# Patient Record
Sex: MALE | Race: BLACK OR AFRICAN AMERICAN | NOT HISPANIC OR LATINO | Employment: OTHER | URBAN - METROPOLITAN AREA
[De-identification: names, ages, dates, MRNs, and addresses within clinical notes are randomized per-mention and may not be internally consistent; named-entity substitution may affect disease eponyms.]

---

## 2017-01-10 ENCOUNTER — ANTICOAGULATION THERAPY VISIT (OUTPATIENT)
Dept: ANTICOAGULATION | Facility: CLINIC | Age: 72
End: 2017-01-10
Payer: COMMERCIAL

## 2017-01-10 DIAGNOSIS — Z79.01 LONG-TERM (CURRENT) USE OF ANTICOAGULANTS: Primary | ICD-10-CM

## 2017-01-10 LAB — INR POINT OF CARE: 2.6 (ref 0.86–1.14)

## 2017-01-10 PROCEDURE — 85610 PROTHROMBIN TIME: CPT | Mod: QW

## 2017-01-10 PROCEDURE — 36416 COLLJ CAPILLARY BLOOD SPEC: CPT

## 2017-01-10 PROCEDURE — 99207 ZZC NO CHARGE NURSE ONLY: CPT

## 2017-01-10 NOTE — PROGRESS NOTES
ANTICOAGULATION FOLLOW-UP CLINIC VISIT    Patient Name:  Billy Stock  Date:  1/10/2017  Contact Type:  Face to Face    SUBJECTIVE:     Patient Findings     Positives No Problem Findings           OBJECTIVE    INR PROTIME   Date Value Ref Range Status   01/10/2017 2.6* 0.86 - 1.14 Final       ASSESSMENT / PLAN  INR assessment THER    Recheck INR In: 6 WEEKS    INR Location Clinic      Anticoagulation Summary as of 1/10/2017     INR goal 2.0-3.0   Selected INR 2.6 (1/10/2017)   Maintenance plan 5 mg (5 mg x 1) every day   Full instructions 5 mg every day   Weekly total 35 mg   No change documented Samantha Galeas, RN   Plan last modified Samantha Galeas, RN (4/18/2016)   Next INR check 2/21/2017   Priority INR   Target end date     Indications   Long-term (current) use of anticoagulants [Z79.01] [Z79.01]  Atrial fibrillation (H) (Resolved) [I48.91]         Anticoagulation Episode Summary     INR check location     Preferred lab     Send INR reminders to [facility]    Comments       Anticoagulation Care Providers     Provider Role Specialty Phone number    Malcolm Schafer MD [facility] Internal Medicine [phone]            See the Encounter Report to view Anticoagulation Flowsheet and Dosing Calendar (Go to Encounters tab in chart review, and find the Anticoagulation Therapy Visit)        Samantha Galeas, RN

## 2017-01-10 NOTE — MR AVS SNAPSHOT
Billy Stock   1/10/2017 10:00 AM   Anticoagulation Therapy Visit    Description:  71 year old male   Provider:  [facility]   Department:  [facility]           INR as of 1/10/2017     Selected INR 2.6 (1/10/2017)      Anticoagulation Summary as of 1/10/2017     INR goal 2.0-3.0   Selected INR 2.6 (1/10/2017)   Full instructions 5 mg every day   Next INR check 2/21/2017    Indications   Long-term (current) use of anticoagulants [Z79.01] [Z79.01]  Atrial fibrillation (H) (Resolved) [I48.91]         Your next Anticoagulation Clinic appointment(s)     Feb 21, 2017 10:00 AM   Anticoagulation Visit with [facility]   [facility]    [address] [facility] [address]  [facility] [address] [phone]              Contact Numbers     [facility] Phone Numbers:  Anticoagulation Clinic Appointments : [phone]  Anticoagulation Nurse: [phone]         January 2017 Details    Sun Mon Tue Wed Thu Fri Sat     1               2               3               4               5               6               7                 8               9               10      5 mg   See details      11      5 mg         12      5 mg         13      5 mg         14      5 mg           15      5 mg         16      5 mg         17      5 mg         18      5 mg         19      5 mg         20      5 mg         21      5 mg           22      5 mg         23      5 mg         24      5 mg         25      5 mg         26      5 mg         27      5 mg         28      5 mg           29      5 mg         30      5 mg         31      5 mg              Date Details   01/10 This INR check               How to take your warfarin dose     To take:  5 mg Take 1 of the 5 mg tablets.           February 2017 Details    Sun Mon Tue Wed Thu Fri Sat        1      5 mg         2      5 mg         3      5 mg         4      5 mg           5      5 mg         6      5  mg         7      5 mg         8      5 mg         9      5 mg         10      5 mg         11      5 mg           12      5 mg         13      5 mg         14      5 mg         15      5 mg         16      5 mg         17      5 mg         18      5 mg           19      5 mg         20      5 mg         21            22               23               24               25                 26               27               28                    Date Details   No additional details    Date of next INR:  2/21/2017         How to take your warfarin dose     To take:  5 mg Take 1 of the 5 mg tablets.

## 2017-01-18 ENCOUNTER — OFFICE VISIT (OUTPATIENT)
Dept: INTERNAL MEDICINE | Facility: CLINIC | Age: 72
End: 2017-01-18
Payer: COMMERCIAL

## 2017-01-18 VITALS
HEART RATE: 69 BPM | WEIGHT: 242.9 LBS | SYSTOLIC BLOOD PRESSURE: 140 MMHG | TEMPERATURE: 98 F | BODY MASS INDEX: 32.19 KG/M2 | OXYGEN SATURATION: 99 % | HEIGHT: 73 IN | DIASTOLIC BLOOD PRESSURE: 80 MMHG

## 2017-01-18 DIAGNOSIS — Z23 NEED FOR VACCINATION WITH 13-POLYVALENT PNEUMOCOCCAL CONJUGATE VACCINE: ICD-10-CM

## 2017-01-18 DIAGNOSIS — I48.20 CHRONIC ATRIAL FIBRILLATION (H): ICD-10-CM

## 2017-01-18 DIAGNOSIS — D07.5 CARCINOMA IN SITU OF PROSTATE: ICD-10-CM

## 2017-01-18 DIAGNOSIS — Z00.00 ROUTINE GENERAL MEDICAL EXAMINATION AT A HEALTH CARE FACILITY: Primary | ICD-10-CM

## 2017-01-18 DIAGNOSIS — I10 HYPERTENSION GOAL BP (BLOOD PRESSURE) < 140/90: ICD-10-CM

## 2017-01-18 DIAGNOSIS — G47.33 OSA (OBSTRUCTIVE SLEEP APNEA): ICD-10-CM

## 2017-01-18 DIAGNOSIS — N28.1 KIDNEY CYST, ACQUIRED: ICD-10-CM

## 2017-01-18 LAB
ALBUMIN UR-MCNC: >=300 MG/DL
APPEARANCE UR: CLEAR
BILIRUB UR QL STRIP: NEGATIVE
COLOR UR AUTO: YELLOW
ERYTHROCYTE [DISTWIDTH] IN BLOOD BY AUTOMATED COUNT: 13.8 % (ref 10–15)
GLUCOSE UR STRIP-MCNC: NEGATIVE MG/DL
HCT VFR BLD AUTO: 39.6 % (ref 40–53)
HGB BLD-MCNC: 13.5 G/DL (ref 13.3–17.7)
HGB UR QL STRIP: ABNORMAL
KETONES UR STRIP-MCNC: NEGATIVE MG/DL
LEUKOCYTE ESTERASE UR QL STRIP: NEGATIVE
MCH RBC QN AUTO: 28.5 PG (ref 26.5–33)
MCHC RBC AUTO-ENTMCNC: 34.1 G/DL (ref 31.5–36.5)
MCV RBC AUTO: 84 FL (ref 78–100)
MUCOUS THREADS #/AREA URNS LPF: PRESENT /LPF
NITRATE UR QL: NEGATIVE
PH UR STRIP: 6 PH (ref 5–7)
PLATELET # BLD AUTO: 148 10E9/L (ref 150–450)
RBC # BLD AUTO: 4.73 10E12/L (ref 4.4–5.9)
RBC #/AREA URNS AUTO: ABNORMAL /HPF (ref 0–2)
SP GR UR STRIP: 1.02 (ref 1–1.03)
URN SPEC COLLECT METH UR: ABNORMAL
UROBILINOGEN UR STRIP-ACNC: 0.2 EU/DL (ref 0.2–1)
WBC # BLD AUTO: 5.2 10E9/L (ref 4–11)
WBC #/AREA URNS AUTO: ABNORMAL /HPF (ref 0–2)

## 2017-01-18 PROCEDURE — 36415 COLL VENOUS BLD VENIPUNCTURE: CPT | Performed by: INTERNAL MEDICINE

## 2017-01-18 PROCEDURE — G0009 ADMIN PNEUMOCOCCAL VACCINE: HCPCS | Performed by: INTERNAL MEDICINE

## 2017-01-18 PROCEDURE — 84443 ASSAY THYROID STIM HORMONE: CPT | Performed by: INTERNAL MEDICINE

## 2017-01-18 PROCEDURE — 90670 PCV13 VACCINE IM: CPT | Performed by: INTERNAL MEDICINE

## 2017-01-18 PROCEDURE — 81001 URINALYSIS AUTO W/SCOPE: CPT | Performed by: INTERNAL MEDICINE

## 2017-01-18 PROCEDURE — 99387 INIT PM E/M NEW PAT 65+ YRS: CPT | Performed by: INTERNAL MEDICINE

## 2017-01-18 PROCEDURE — 80061 LIPID PANEL: CPT | Performed by: INTERNAL MEDICINE

## 2017-01-18 PROCEDURE — 80053 COMPREHEN METABOLIC PANEL: CPT | Performed by: INTERNAL MEDICINE

## 2017-01-18 PROCEDURE — 85027 COMPLETE CBC AUTOMATED: CPT | Performed by: INTERNAL MEDICINE

## 2017-01-18 RX ORDER — WARFARIN SODIUM 5 MG/1
5 TABLET ORAL
COMMUNITY
Start: 2013-09-25 | End: 2017-09-25

## 2017-01-18 NOTE — NURSING NOTE
"Chief Complaint   Patient presents with     Medicare Visit     would like ears checked, and talk about flash backs of falling down the stairs, wants hep c screen fasting for labs, needs refills on medications        Initial /80 mmHg  Pulse 69  Temp(Src) 98  F (36.7  C) (Oral)  Ht 6' 1\" (1.854 m)  Wt 242 lb 14.4 oz (110.179 kg)  BMI 32.05 kg/m2  SpO2 99% Estimated body mass index is 32.05 kg/(m^2) as calculated from the following:    Height as of this encounter: 6' 1\" (1.854 m).    Weight as of this encounter: 242 lb 14.4 oz (110.179 kg).  BP completed using cuff size: large    "

## 2017-01-18 NOTE — MR AVS SNAPSHOT
After Visit Summary   1/18/2017    Billy Stock    MRN: [id number]           Patient Information     Date Of Birth          1945        Visit Information        Provider Department      1/18/2017 8:40 AM Malcolm Schafer MD [facility]        Today's Diagnoses     Routine general medical examination at a health care facility    -  1     Hypertension goal BP (blood pressure) < 140/90         SARAH (obstructive sleep apnea)         Chronic atrial fibrillation (H)         Kidney cyst, acquired         Carcinoma in situ of prostate           Care Instructions      Preventive Health Recommendations:       Male Ages 65 and over    Yearly exam:             See your health care provider every year in order to  o   Review health changes.   o   Discuss preventive care.    o   Review your medicines if your doctor has prescribed any.    Talk with your health care provider about whether you should have a test to screen for prostate cancer (PSA).    Every 3 years, have a diabetes test (fasting glucose). If you are at risk for diabetes, you should have this test more often.    Every 5 years, have a cholesterol test. Have this test more often if you are at risk for high cholesterol or heart disease.     Every 10 years, have a colonoscopy. Or, have a yearly FIT test (stool test). These exams will check for colon cancer.    Talk to with your health care provider about screening for Abdominal Aortic Aneurysm if you have a family history of AAA or have a history of smoking.  Shots:     Get a flu shot each year.     Get a tetanus shot every 10 years.     Talk to your doctor about your pneumonia vaccines. There are now two you should receive - Pneumovax (PPSV 23) and Prevnar (PCV 13).    Talk to your doctor about a shingles vaccine.     Talk to your doctor about the hepatitis B vaccine.  Nutrition:     Eat at least 5 servings of fruits and vegetables each day.     Eat whole-grain bread, whole-wheat  "pasta and brown rice instead of white grains and rice.     Talk to your doctor about Calcium and Vitamin D.   Lifestyle    Exercise for at least 150 minutes a week (30 minutes a day, 5 days a week). This will help you control your weight and prevent disease.     Limit alcohol to one drink per day.     No smoking.     Wear sunscreen to prevent skin cancer.     See your dentist every six months for an exam and cleaning.     See your eye doctor every 1 to 2 years to screen for conditions such as glaucoma, macular degeneration and cataracts.        Follow-ups after your visit        Your next 10 appointments already scheduled     Feb 21, 2017 10:00 AM   Anticoagulation Visit with [facility]   [facility] ([facility]    [address] [facility] [address]  [facility] [address] [phone]              Who to contact     If you have questions or need follow up information about today's clinic visit or your schedule please contact [facility] directly at [phone].  Normal or non-critical lab and imaging results will be communicated to you by MyChart, letter or phone within 4 business days after the clinic has received the results. If you do not hear from us within 7 days, please contact the clinic through MyChart or phone. If you have a critical or abnormal lab result, we will notify you by phone as soon as possible.  Submit refill requests through [employer] or call your pharmacy and they will forward the refill request to us. Please allow 3 business days for your refill to be completed.          Additional Information About Your Visit        Care EveryWhere ID     This is your Care EveryWhere ID. This could be used by other organizations to access your [facility] medical records  [id number]        Your Vitals Were     Pulse Temperature Height BMI (Body Mass Index) Pulse Oximetry       69 98  F (36.7  C) (Oral) 6' 1\" (1.854 m) 32.05 kg/m2 99%        Blood " Pressure from Last 3 Encounters:   01/18/17 140/80   11/21/16 132/78   10/31/16 114/80    Weight from Last 3 Encounters:   01/18/17 242 lb 14.4 oz (110.179 kg)   11/21/16 247 lb (112.038 kg)   10/31/16 236 lb (107.049 kg)              We Performed the Following     *UA reflex to Microscopic and Culture ([facility] (except [place] and [place])     CBC with platelets     Comprehensive metabolic panel     Lipid panel reflex to direct LDL     TSH with free T4 reflex        Primary Care Provider Office Phone # Fax #    Malcoml Schafer [phone]       [facility] [address] [facility] [address]        Thank you!     Thank you for choosing [facility]  for your care. Our goal is always to provide you with excellent care. Hearing back from our patients is one way we can continue to improve our services. Please take a few minutes to complete the written survey that you may receive in the mail after your visit with us. Thank you!             Your Updated Medication List - Protect others around you: Learn how to safely use, store and throw away your medicines at www.disposemymeds.org.          This list is accurate as of: 1/18/17  9:31 AM.  Always use your most recent med list.                   Brand Name Dispense Instructions for use    ALLEGRA 180 MG tablet   Generic drug:  fexofenadine      Take by mouth daily as needed       allopurinol 100 MG tablet    ZYLOPRIM    90 tablet    Take 1 tablet (100 mg) by mouth daily       amLODIPine 10 MG tablet    NORVASC    90 tablet    Take 1 tablet (10 mg) by mouth daily       fluticasone 50 MCG/ACT spray    FLONASE    1 Package    Spray 1-2 sprays into both nostrils daily       HYDROcodone-acetaminophen 5-325 MG per tablet    NORCO    20 tablet    Take 1 tablet by mouth every 6 hours as needed       lisinopril 10 MG tablet    PRINIVIL/ZESTRIL    180 tablet    Take 2 tablets (20 mg) by mouth daily        methocarbamol 750 MG tablet    ROBAXIN     Take by mouth 3 times daily as needed for muscle spasms       metoprolol 100 MG 24 hr tablet    TOPROL-XL    90 tablet    Take 1 tablet (100 mg) by mouth daily       * order for DME     1 Device    Equipment being ordered: CPAP-mask, hose and supplies       * order for DME     1 Device    Equipment being ordered: Digital Blood pressure Monitor       order for DME     1 each    Equipment being ordered: Walker () Treatment Diagnosis: impaired mobility       TYLENOL 325 MG tablet   Generic drug:  acetaminophen      Take 1-2 tablets by mouth every 6 hours as needed.       warfarin 5 MG tablet    COUMADIN     Take 5 mg by mouth       ZYRTEC ALLERGY PO      Take 10 mg by mouth daily as needed       * Notice:  This list has 2 medication(s) that are the same as other medications prescribed for you. Read the directions carefully, and ask your doctor or other care provider to review them with you.

## 2017-01-18 NOTE — PROGRESS NOTES
SUBJECTIVE:                                                            Billy Stock is a 71 year old male who presents for Preventive Visit.      Are you in the first 12 months of your Medicare Part B coverage?  No    Healthy Habits:    Do you get at least three servings of calcium containing foods daily (dairy, green leafy vegetables, etc.)? yes and no, taking calcium and/or vitamin D supplement: no    Amount of exercise or daily activities, outside of work: none     Problems taking medications regularly No    Medication side effects: No    Have you had an eye exam in the past two years? yes    Do you see a dentist twice per year? yes    Do you have sleep apnea, excessive snoring or daytime drowsiness?yes    COGNITIVE SCREEN  1) Repeat 3 items (Banana, Sunrise, Chair)    2) Clock draw: NORMAL  3) 3 item recall: Recalls 3 objects  Results: 3 items recalled: COGNITIVE IMPAIRMENT LESS LIKELY    Mini-CogTM Copyright S Gissel. Licensed by the author for use in [facility]; reprinted with permission ([email or web address]). All rights reserved.            PROBLEMS TO ADD ON...    Has history of atrial fibrillation. On anticoagulation with Coumadin and rate control medications. Asymptonatic - no chest pains , palpitations,  no side effects from medications.  Has h/o HTN. on medical treatment. BP has been controlled. No side effects from medications. No CP, HA, dizziness. good compliance with medications and low salt diet.  Has h/o recent fall. Had psoas hematoma and incidental finding of large left kidney cyst. No symptoms.   Has SARAH on CPAP. Controlled   Has h/o prostate cancer post surgery. No recurrence. Follows with urology     All Histories reviewed and updated in [facility] as appropriate.  Social History   Substance Use Topics     Smoking status: Never Smoker      Smokeless tobacco: Never Used     Alcohol Use: No       The patient does not drink >3 drinks per day nor >7 drinks per week.    Today's PHQ-2 Score:    PHQ-2 ( [facility]) 1/18/2017 1/12/2016   Q1: Little interest or pleasure in doing things 0 0   Q2: Feeling down, depressed or hopeless 1 0   PHQ-2 Score 1 0       Do you feel safe in your environment - Yes    Do you have a Health Care Directive?: No: Advance care planning was reviewed with patient; patient declined at this time.    Current providers sharing in care for this patient include:   Patient Care Team:  Malcolm Schafer MD as PCP - General (Internal Medicine)      Hearing impairment: No    Ability to successfully perform activities of daily living: Yes, no assistance needed     Fall risk:  Fallen 2 or more times in the past year?: No  Any fall with injury in the past year?: Yes    Home safety:  none identified      The following health maintenance items are reviewed in Epic and correct as of today:  Health Maintenance   Topic Date Due     HEPATITIS C SCREENING  10/28/1964     PNEUMOCOCCAL (1 of 2 - PCV13) 10/28/2011     AORTIC ANEURYSM SCREENING (SYSTEM ASSIGNED)  10/28/2011     FALL RISK ASSESSMENT  01/12/2017     OP ANNUAL INR REFERRAL  08/22/2017     INFLUENZA VACCINE (SYSTEM ASSIGNED)  09/01/2017     ADVANCE DIRECTIVE PLANNING Q5 YRS (NO INBASKET)  08/28/2018     COLONOSCOPY Q5 YR INBASKET MESSAGE  09/30/2019     LIPID SCREEN Q5 YR MALE (SYSTEM ASSIGNED)  10/04/2021     TETANUS IMMUNIZATION (SYSTEM ASSIGNED)  04/02/2022         Pneumonia Vaccine:Adults age 65+ who received Pneumovax (PPSV23) at 65 years or older: Should be given PCV13 > 1 year after their most recent PPSV23     ROS:  C: NEGATIVE for fever, chills, change in weight  I: NEGATIVE for worrisome rashes, moles or lesions  E: NEGATIVE for vision changes or irritation  E/M: NEGATIVE for ear, mouth and throat problems  R: NEGATIVE for significant cough or SOB  B: NEGATIVE for masses, tenderness or discharge  CV: NEGATIVE for chest pain, palpitations or peripheral edema  GI: NEGATIVE for nausea, abdominal pain, heartburn, or change in  "bowel habits  : NEGATIVE for frequency, dysuria, or hematuria  M: NEGATIVE for significant arthralgias or myalgia  N: NEGATIVE for weakness, dizziness or paresthesias  E: NEGATIVE for temperature intolerance, skin/hair changes  H: NEGATIVE for bleeding problems  P: NEGATIVE for changes in mood or affect    Problem list, Medication list, Allergies, and Medical/Social/Surgical histories reviewed in [facility] and updated as appropriate.  OBJECTIVE:                                                            /80 mmHg  Pulse 69  Temp(Src) 98  F (36.7  C) (Oral)  Ht 6' 1\" (1.854 m)  Wt 242 lb 14.4 oz (110.179 kg)  BMI 32.05 kg/m2  SpO2 99% Estimated body mass index is 32.05 kg/(m^2) as calculated from the following:    Height as of this encounter: 6' 1\" (1.854 m).    Weight as of this encounter: 242 lb 14.4 oz (110.179 kg).  EXAM:   GENERAL: healthy, alert and no distress  EYES: Eyes grossly normal to inspection, PERRL and conjunctivae and sclerae normal  HENT: ear canals and TM's normal, nose and mouth without ulcers or lesions  NECK: no adenopathy, no asymmetry, masses, or scars and thyroid normal to palpation  RESP: lungs clear to auscultation - no rales, rhonchi or wheezes  CV: regular rate and rhythm, normal S1 S2, no S3 or S4, no murmur, click or rub, no peripheral edema and peripheral pulses strong  ABDOMEN: soft, nontender, no hepatosplenomegaly, no masses and bowel sounds normal  MS: no gross musculoskeletal defects noted, no edema  SKIN: no suspicious lesions or rashes  NEURO: Normal strength and tone, mentation intact and speech normal  PSYCH: mentation appears normal, affect normal/bright    ASSESSMENT / PLAN:                                                                ICD-10-CM    1. Routine general medical examination at a health care facility Z00.00 Lipid panel reflex to direct LDL     CBC with platelets     Comprehensive metabolic panel     TSH with free T4 reflex     *UA reflex to Microscopic " "and Culture ([facility] (except [place] and [place])   2. Hypertension goal BP (blood pressure) < 140/90 I10 Lipid panel reflex to direct LDL     CBC with platelets     Comprehensive metabolic panel     TSH with free T4 reflex     *UA reflex to Microscopic and Culture ([facility] (except [place] and [place])   3. SARAH (obstructive sleep apnea) G47.33    4. Chronic atrial fibrillation (H) I48.2 TSH with free T4 reflex   5. Kidney cyst, acquired N28.1    6. Carcinoma in situ of prostate D07.5      Advised to discuss with urology findings of kidney cyst.     End of Life Planning:  Patient currently has an advanced directive: Yes.  Practitioner is supportive of decision.    COUNSELING:  Reviewed preventive health counseling, as reflected in patient instructions       Regular exercise       Healthy diet/nutrition       Vision screening       Hearing screening       Dental care       Colon cancer screening       Prostate cancer screening        Estimated body mass index is 32.05 kg/(m^2) as calculated from the following:    Height as of this encounter: 6' 1\" (1.854 m).    Weight as of this encounter: 242 lb 14.4 oz (110.179 kg).  Weight management plan: Discussed healthy diet and exercise guidelines and patient will follow up in 6 months in clinic to re-evaluate.   reports that he has never smoked. He has never used smokeless tobacco.      Appropriate preventive services were discussed with this patient, including applicable screening as appropriate for cardiovascular disease, diabetes, osteopenia/osteoporosis, and glaucoma.  As appropriate for age/gender, discussed screening for colorectal cancer, prostate cancer, breast cancer, and cervical cancer. Checklist reviewing preventive services available has been given to the patient.    Reviewed patients plan of care and provided an AVS. The Intermediate Care Plan ( asthma action plan, low back pain action plan, and " migraine action plan) for Billy meets the Care Plan requirement. This Care Plan has been established and reviewed with the Patient.    Counseling Resources:  ATP IV Guidelines  Pooled Cohorts Equation Calculator  Breast Cancer Risk Calculator  FRAX Risk Assessment  ICSI Preventive Guidelines  Dietary Guidelines for Americans, 2010  USDA's MyPlate  ASA Prophylaxis  Lung CA Screening    Malcolm Schafer MD  [facility]

## 2017-01-19 LAB
ALBUMIN SERPL-MCNC: 3.7 G/DL (ref 3.4–5)
ALP SERPL-CCNC: 107 U/L (ref 40–150)
ALT SERPL W P-5'-P-CCNC: 20 U/L (ref 0–70)
ANION GAP SERPL CALCULATED.3IONS-SCNC: 7 MMOL/L (ref 3–14)
AST SERPL W P-5'-P-CCNC: 19 U/L (ref 0–45)
BILIRUB SERPL-MCNC: 0.3 MG/DL (ref 0.2–1.3)
BUN SERPL-MCNC: 16 MG/DL (ref 7–30)
CALCIUM SERPL-MCNC: 8.8 MG/DL (ref 8.5–10.1)
CHLORIDE SERPL-SCNC: 108 MMOL/L (ref 94–109)
CHOLEST SERPL-MCNC: 173 MG/DL
CO2 SERPL-SCNC: 28 MMOL/L (ref 20–32)
CREAT SERPL-MCNC: 1.32 MG/DL (ref 0.66–1.25)
GFR SERPL CREATININE-BSD FRML MDRD: 54 ML/MIN/1.7M2
GLUCOSE SERPL-MCNC: 104 MG/DL (ref 70–99)
HDLC SERPL-MCNC: 37 MG/DL
LDLC SERPL CALC-MCNC: 71 MG/DL
NONHDLC SERPL-MCNC: 136 MG/DL
POTASSIUM SERPL-SCNC: 4 MMOL/L (ref 3.4–5.3)
PROT SERPL-MCNC: 7.6 G/DL (ref 6.8–8.8)
SODIUM SERPL-SCNC: 143 MMOL/L (ref 133–144)
TRIGL SERPL-MCNC: 323 MG/DL
TSH SERPL DL<=0.005 MIU/L-ACNC: 3.25 MU/L (ref 0.4–4)

## 2017-01-20 ENCOUNTER — TELEPHONE (OUTPATIENT)
Dept: INTERNAL MEDICINE | Facility: CLINIC | Age: 72
End: 2017-01-20

## 2017-01-20 DIAGNOSIS — R79.89 ELEVATED SERUM CREATININE: Primary | ICD-10-CM

## 2017-01-20 DIAGNOSIS — R31.9 BLOOD IN URINE: ICD-10-CM

## 2017-01-27 DIAGNOSIS — R31.9 BLOOD IN URINE: ICD-10-CM

## 2017-01-27 DIAGNOSIS — R79.89 ELEVATED SERUM CREATININE: ICD-10-CM

## 2017-01-27 LAB
ALBUMIN UR-MCNC: 30 MG/DL
APPEARANCE UR: CLEAR
BILIRUB UR QL STRIP: NEGATIVE
COLOR UR AUTO: YELLOW
GLUCOSE UR STRIP-MCNC: NEGATIVE MG/DL
HGB UR QL STRIP: NEGATIVE
KETONES UR STRIP-MCNC: NEGATIVE MG/DL
LEUKOCYTE ESTERASE UR QL STRIP: NEGATIVE
NITRATE UR QL: NEGATIVE
PH UR STRIP: 6 PH (ref 5–7)
RBC #/AREA URNS AUTO: ABNORMAL /HPF (ref 0–2)
SP GR UR STRIP: 1.01 (ref 1–1.03)
URN SPEC COLLECT METH UR: ABNORMAL
UROBILINOGEN UR STRIP-ACNC: 0.2 EU/DL (ref 0.2–1)
WBC #/AREA URNS AUTO: ABNORMAL /HPF (ref 0–2)

## 2017-01-27 PROCEDURE — 80048 BASIC METABOLIC PNL TOTAL CA: CPT | Performed by: INTERNAL MEDICINE

## 2017-01-27 PROCEDURE — 81001 URINALYSIS AUTO W/SCOPE: CPT | Performed by: INTERNAL MEDICINE

## 2017-01-27 PROCEDURE — 36415 COLL VENOUS BLD VENIPUNCTURE: CPT | Performed by: INTERNAL MEDICINE

## 2017-01-28 LAB
ANION GAP SERPL CALCULATED.3IONS-SCNC: 10 MMOL/L (ref 3–14)
BUN SERPL-MCNC: 19 MG/DL (ref 7–30)
CALCIUM SERPL-MCNC: 8.7 MG/DL (ref 8.5–10.1)
CHLORIDE SERPL-SCNC: 106 MMOL/L (ref 94–109)
CO2 SERPL-SCNC: 26 MMOL/L (ref 20–32)
CREAT SERPL-MCNC: 1.3 MG/DL (ref 0.66–1.25)
GFR SERPL CREATININE-BSD FRML MDRD: 55 ML/MIN/1.7M2
GLUCOSE SERPL-MCNC: 99 MG/DL (ref 70–99)
POTASSIUM SERPL-SCNC: 3.9 MMOL/L (ref 3.4–5.3)
SODIUM SERPL-SCNC: 142 MMOL/L (ref 133–144)

## 2017-01-31 ENCOUNTER — ANTICOAGULATION THERAPY VISIT (OUTPATIENT)
Dept: ANTICOAGULATION | Facility: CLINIC | Age: 72
End: 2017-01-31
Payer: COMMERCIAL

## 2017-01-31 DIAGNOSIS — Z79.01 LONG-TERM (CURRENT) USE OF ANTICOAGULANTS: Primary | ICD-10-CM

## 2017-01-31 LAB — INR POINT OF CARE: 2.1 (ref 0.86–1.14)

## 2017-01-31 PROCEDURE — 85610 PROTHROMBIN TIME: CPT | Mod: QW

## 2017-01-31 PROCEDURE — 99207 ZZC NO CHARGE NURSE ONLY: CPT

## 2017-01-31 PROCEDURE — 36416 COLLJ CAPILLARY BLOOD SPEC: CPT

## 2017-01-31 NOTE — PROGRESS NOTES
ANTICOAGULATION FOLLOW-UP CLINIC VISIT    Patient Name:  Billy Stock  Date:  1/31/2017  Contact Type:  Face to Face    SUBJECTIVE:     Patient Findings     Positives No Problem Findings    Comments Pt having a growth removed from inside of cheek today.           OBJECTIVE    INR PROTIME   Date Value Ref Range Status   01/31/2017 2.1* 0.86 - 1.14 Final       ASSESSMENT / PLAN  INR assessment THER    Recheck INR In: 6 WEEKS    INR Location Clinic      Anticoagulation Summary as of 1/31/2017     INR goal 2.0-3.0   Selected INR 2.1 (1/31/2017)   Maintenance plan 5 mg (5 mg x 1) every day   Full instructions 5 mg every day   Weekly total 35 mg   No change documented Samantha Galeas RN   Plan last modified Samantha Galeas, RN (4/18/2016)   Next INR check 3/14/2017   Priority INR   Target end date     Indications   Long-term (current) use of anticoagulants [Z79.01] [Z79.01]  Atrial fibrillation (H) (Resolved) [I48.91]         Anticoagulation Episode Summary     INR check location     Preferred lab     Send INR reminders to [facility]    Comments       Anticoagulation Care Providers     Provider Role Specialty Phone number    Malcolm Schafer MD Responsible Internal Medicine [phone]            See the Encounter Report to view Anticoagulation Flowsheet and Dosing Calendar (Go to Encounters tab in chart review, and find the Anticoagulation Therapy Visit)        Samantha Galeas, RN

## 2017-01-31 NOTE — MR AVS SNAPSHOT
Billy Stock   1/31/2017 9:00 AM   Anticoagulation Therapy Visit    Description:  71 year old male   Provider:  [facility]   Department:  [facility]           INR as of 1/31/2017     Selected INR 2.1 (1/31/2017)      Anticoagulation Summary as of 1/31/2017     INR goal 2.0-3.0   Selected INR 2.1 (1/31/2017)   Full instructions 5 mg every day   Next INR check 3/14/2017    Indications   Long-term (current) use of anticoagulants [Z79.01] [Z79.01]  Atrial fibrillation (H) (Resolved) [I48.91]         Your next Anticoagulation Clinic appointment(s)     Mar 14, 2017 10:00 AM   Anticoagulation Visit with [facility]   [facility]    [address] [facility] [address]  [facility] [address] [phone]              Contact Numbers     [facility] Phone Numbers:  Anticoagulation Clinic Appointments : [phone]  Anticoagulation Nurse: [phone]         January 2017 Details    Sun Mon Tue Wed Thu Fri Sat     1               2               3               4               5               6               7                 8               9               10               11               12               13               14                 15               16               17               18               19               20               21                 22               23               24               25               26               27               28                 29               30               31      5 mg   See details           Date Details   01/31 This INR check               How to take your warfarin dose     To take:  5 mg Take 1 of the 5 mg tablets.           February 2017 Details    Sun Mon Tue Wed Thu Fri Sat        1      5 mg         2      5 mg         3      5 mg         4      5 mg           5      5 mg         6      5 mg         7      5 mg         8      5 mg         9      5 mg         10      5 mg          11      5 mg           12      5 mg         13      5 mg         14      5 mg         15      5 mg         16      5 mg         17      5 mg         18      5 mg           19      5 mg         20      5 mg         21      5 mg         22      5 mg         23      5 mg         24      5 mg         25      5 mg           26      5 mg         27      5 mg         28      5 mg              Date Details   No additional details            How to take your warfarin dose     To take:  5 mg Take 1 of the 5 mg tablets.           March 2017 Details    Sun Mon Tue Wed Thu Fri Sat        1      5 mg         2      5 mg         3      5 mg         4      5 mg           5      5 mg         6      5 mg         7      5 mg         8      5 mg         9      5 mg         10      5 mg         11      5 mg           12      5 mg         13      5 mg         14            15               16               17               18                 19               20               21               22               23               24               25                 26               27               28               29               30               31                 Date Details   No additional details    Date of next INR:  3/14/2017         How to take your warfarin dose     To take:  5 mg Take 1 of the 5 mg tablets.

## 2017-02-08 DIAGNOSIS — C61 PROSTATE CANCER (H): Primary | ICD-10-CM

## 2017-02-13 DIAGNOSIS — C61 PROSTATE CANCER (H): Primary | ICD-10-CM

## 2017-02-15 ENCOUNTER — OFFICE VISIT (OUTPATIENT)
Dept: UROLOGY | Facility: CLINIC | Age: 72
End: 2017-02-15
Payer: COMMERCIAL

## 2017-02-15 VITALS
HEIGHT: 73 IN | SYSTOLIC BLOOD PRESSURE: 138 MMHG | WEIGHT: 244 LBS | DIASTOLIC BLOOD PRESSURE: 80 MMHG | BODY MASS INDEX: 32.34 KG/M2 | HEART RATE: 60 BPM

## 2017-02-15 DIAGNOSIS — C61 PROSTATE CANCER (H): Primary | ICD-10-CM

## 2017-02-15 LAB — PSA SERPL-MCNC: 0.44 NG/ML (ref 0–4)

## 2017-02-15 PROCEDURE — 99213 OFFICE O/P EST LOW 20 MIN: CPT | Performed by: UROLOGY

## 2017-02-15 PROCEDURE — 36415 COLL VENOUS BLD VENIPUNCTURE: CPT | Performed by: UROLOGY

## 2017-02-15 PROCEDURE — 84153 ASSAY OF PSA TOTAL: CPT | Performed by: UROLOGY

## 2017-02-15 ASSESSMENT — PAIN SCALES - GENERAL: PAINLEVEL: NO PAIN (0)

## 2017-02-15 NOTE — MR AVS SNAPSHOT
After Visit Summary   2/15/2017    Billy Stock    MRN: [id number]           Patient Information     Date Of Birth          1945        Visit Information        Provider Department      2/15/2017 9:50 AM Hoang Huerta MD [facility] [place]        Today's Diagnoses     Prostate cancer (H)    -  1       Follow-ups after your visit        Follow-up notes from your care team     Return in about 6 months (around 8/15/2017) for PSA, Physical Exam.      Your next 10 appointments already scheduled     Mar 14, 2017 10:00 AM CDT   Anticoagulation Visit with [facility]   [facility]    [address]  [facility] [address] [phone]            Aug 16, 2017 10:00 AM CDT   (Arrive by 9:45 AM)   Return Visit with Hoang Huerta MD   [facility] ([facility] [place])    [address]  [address]  [facility] [address] [phone]              Who to contact     If you have questions or need follow up information about today's clinic visit or your schedule please contact [facility] directly at [phone].  Normal or non-critical lab and imaging results will be communicated to you by MyChart, letter or phone within 4 business days after the clinic has received the results. If you do not hear from us within 7 days, please contact the clinic through MyChart or phone. If you have a critical or abnormal lab result, we will notify you by phone as soon as possible.  Submit refill requests through [employer] or call your pharmacy and they will forward the refill request to us. Please allow 3 business days for your refill to be completed.          Additional Information About Your Visit        Care EveryWhere ID     This is your Care EveryWhere ID. This could be used by other organizations to access  "your [facility] medical records  [id number]        Your Vitals Were     Pulse Height BMI (Body Mass Index)             60 1.854 m (6' 1\") 32.19 kg/m2          Blood Pressure from Last 3 Encounters:   02/15/17 138/80   01/18/17 140/80   11/21/16 132/78    Weight from Last 3 Encounters:   02/15/17 110.7 kg (244 lb)   01/18/17 110.2 kg (242 lb 14.4 oz)   11/21/16 112 kg (247 lb)              We Performed the Following     PSA Diag Urologic Phys [[id number]]        Primary Care Provider Office Phone # Fax #    Malcolm Schafer [phone]       [facility] [address] [facility] [address]        Thank you!     Thank you for choosing [facility] [place]  for your care. Our goal is always to provide you with excellent care. Hearing back from our patients is one way we can continue to improve our services. Please take a few minutes to complete the written survey that you may receive in the mail after your visit with us. Thank you!             Your Updated Medication List - Protect others around you: Learn how to safely use, store and throw away your medicines at www.disposemymeds.org.          This list is accurate as of: 2/15/17 10:50 AM.  Always use your most recent med list.                   Brand Name Dispense Instructions for use    allopurinol 100 MG tablet    ZYLOPRIM    90 tablet    Take 1 tablet (100 mg) by mouth daily       amLODIPine 10 MG tablet    NORVASC    90 tablet    Take 1 tablet (10 mg) by mouth daily       fluticasone 50 MCG/ACT spray    FLONASE    1 Package    Spray 1-2 sprays into both nostrils daily       HYDROcodone-acetaminophen 5-325 MG per tablet    NORCO    20 tablet    Take 1 tablet by mouth every 6 hours as needed       lisinopril 10 MG tablet    PRINIVIL/ZESTRIL    180 tablet    Take 2 tablets (20 mg) by mouth daily       metoprolol 100 MG 24 hr tablet    TOPROL-XL    90 tablet    Take 1 tablet (100 mg) by mouth daily       * " order for DME     1 Device    Equipment being ordered: CPAP-mask, hose and supplies       * order for DME     1 Device    Equipment being ordered: Digital Blood pressure Monitor       order for DME     1 each    Equipment being ordered: Walker () Treatment Diagnosis: impaired mobility       TYLENOL 325 MG tablet   Generic drug:  acetaminophen      Take 1-2 tablets by mouth every 6 hours as needed.       warfarin 5 MG tablet    COUMADIN     Take 5 mg by mouth       ZYRTEC ALLERGY PO      Take 10 mg by mouth daily as needed       * Notice:  This list has 2 medication(s) that are the same as other medications prescribed for you. Read the directions carefully, and ask your doctor or other care provider to review them with you.

## 2017-02-15 NOTE — NURSING NOTE
Chief Complaint   Patient presents with     Psa Screening     Patient is here for yearly exam and PSA     Rolanda Ordonez LPN 10:23 AM February 15, 2017

## 2017-02-15 NOTE — LETTER
2/15/2017       RE: Billy Stock  [address] [place] [address]     Dear Colleague,    Thank you for referring your patient, Billy Stock, to the [facility] [place] [facility]. Please see a copy of my visit note below.    History: It is a great pleasure to see this very pleasant 71-year-old gentleman in follow-up consultation today.  We recall that he been diagnosed with [place] 7 adenocarcinoma of the prostate in 2002 and subsequently a radical prostatectomy was performed at this time which showed extensive disease in the prostate with positive surgical margins.  Subsequently he had adjuvant external beam radiation therapy and was on Eligard until 2008.  After that we began intermittent therapy.  5 2014 the PSA returned 1.8 and a further Eligard 45 is given and again in February 2050.  Gradually it then increased to 1.5 in August 2060 and Eligard 45 was given at that time.  He did have a fall down the stairs recently and injured his pelvis but no fracture was demonstrated.  Control of urine is good, he is otherwise voiding well.  There are no other significant major medical issues at present time.  PSA today is 0.44      Past Medical History   Diagnosis Date     Bell's palsy 10/15/2002     CA IN SITU PROSTATE 10/15/2002     Chronic atrial fibrillation (H) 7/1/10     Glaucoma 4/10/2003      Problem list name updated by automated process. Provider to review     Gout 5/16/2013     Hyperlipidemia LDL goal <100 9/10/2014     Hypertension goal BP (blood pressure) < 140/90 6/28/2006     SARAH (obstructive sleep apnea) 8/28/2013     Pericarditis 2001       Social History     Social History     Marital status:      Spouse name: N/A     Number of children: 3     Years of education: N/A     Social History Main Topics     Smoking status: Never Smoker     Smokeless tobacco: Never Used     Alcohol use No     Drug use: No     Sexual  activity: No     Other Topics Concern     Caffeine Concern No     Occupational Exposure No     Hobby Hazards No     Sleep Concern Yes     CPAP at night     Stress Concern No     Weight Concern No     Special Diet No     Back Care No     Exercise No     Seat Belt Yes     Social History Narrative       Past Surgical History   Procedure Laterality Date     Suprapubic prostatectomy       Colonoscopy       [facility]     Colonoscopy  2014     Dr. Long [facility]     Eye surgery       glaucoma surgery right eye     Colonoscopy N/A 2014     Procedure: COMBINED COLONOSCOPY, SINGLE BIOPSY/POLYPECTOMY BY BIOPSY;  Surgeon: Puneet Long MD;  Location: [facility]     Cataract iol, rt/lt  10/15, 11/15       Family History   Problem Relation Age of Onset     Hypertension Maternal Grandfather      CEREBROVASCULAR DISEASE Maternal Grandfather      Hypertension Maternal Grandmother      CEREBROVASCULAR DISEASE Maternal Grandmother      Hypertension Mother       age 51, MVA     Hypertension Brother      Heart Failure Brother      Bronchitis Brother      Other - See Comments Brother      multiple myeloma     Heart Surgery Brother      defibrilater     Prostate Problems Brother      DIABETES Brother      Breast Cancer Maternal Aunt      hx     CANCER Maternal Aunt      cervical cancer     Cancer - colorectal Maternal Aunt      Unknown/Adopted Father      Don't know father's history         Current Outpatient Prescriptions:      warfarin (COUMADIN) 5 MG tablet, Take 5 mg by mouth, Disp: , Rfl:      order for DME, Equipment being ordered: Walker () Treatment Diagnosis: impaired mobility, Disp: 1 each, Rfl: 0     HYDROcodone-acetaminophen (NORCO) 5-325 MG per tablet, Take 1 tablet by mouth every 6 hours as needed, Disp: 20 tablet, Rfl: 0     lisinopril (PRINIVIL,ZESTRIL) 10 MG tablet, Take 2 tablets (20 mg) by mouth daily, Disp: 180 tablet, Rfl: 4     allopurinol (ZYLOPRIM) 100 MG tablet, Take 1 tablet (100 mg) by  "mouth daily, Disp: 90 tablet, Rfl: 4     amLODIPine (NORVASC) 10 MG tablet, Take 1 tablet (10 mg) by mouth daily, Disp: 90 tablet, Rfl: 4     metoprolol (TOPROL-XL) 100 MG 24 hr tablet, Take 1 tablet (100 mg) by mouth daily, Disp: 90 tablet, Rfl: 4     fluticasone (FLONASE) 50 MCG/ACT nasal spray, Spray 1-2 sprays into both nostrils daily, Disp: 1 Package, Rfl: 5     ORDER FOR DME, Equipment being ordered: CPAP-mask, hose and supplies, Disp: 1 Device, Rfl: 1     ORDER FOR DME, Equipment being ordered: Digital Blood pressure Monitor, Disp: 1 Device, Rfl: 1     Cetirizine HCl (ZYRTEC ALLERGY PO), Take 10 mg by mouth daily as needed , Disp: , Rfl:      acetaminophen (TYLENOL) 325 MG tablet, Take 1-2 tablets by mouth every 6 hours as needed., Disp: , Rfl:     10 point ROS of systems including Constitutional, Eyes, Respiratory, Cardiovascular, Gastroenterology, Genitourinary, Integumentary, Muscularskeletal, Psychiatric were all negative except for pertinent positives noted in my HPI.    Examination:   /84  Pulse 60  Ht 1.854 m (6' 1\")  Wt 110.7 kg (244 lb)  BMI 32.19 kg/m2  General Impression: Very pleasant gentleman in no acute distress, well-oriented in time place and person  Mental Status: Normal.  HEENT.  There is no evidence of jaundice and the mucous membranes are normal  Skin: The skin is normal to examination  Respiratory System: The respiratory cycle is normal  Lymph Nodes: Not examined  Back/Flank Tenderness: Not examined  Cardiovascular System: Not examined  Abdominal Examination: Not examined  Extremities: Not examined  Genitial: Not examined  Rectal Examination: Good sphincter tone, normal perianal sensation.  Smooth rectal mucosa without hemorrhoids or fissures.  Empty prostatic fossa with no evidence of residual mass  Seminal vesicles.  Absent  Neurologic System: There are no focal abnormal clinical neurological signs in the central peripheral nervous system    Impression: He has responded well once " again to Eligard given 6 months ago after a gap of well over a year.  PSA today is 0.44 which is not negligible, but I do not think we need to give her another injection today.  I would recommend we repeat PSA in 6 months time and if it is rising significantly we may need to give another injection at that time, and we may need to consider at some point the use of Casodex.  I did discuss his situation carefully with the patient in detail today.  I answered all his questions    Plan: 6 months for PSA and examination possibly repeating Eligard at that time    Time: 20 minutes.  Greater than 50% was spent in discussion and consultation      Again, thank you for allowing me to participate in the care of your patient.      Sincerely,    Hoang Huerta MD

## 2017-02-15 NOTE — PROGRESS NOTES
History: It is a great pleasure to see this very pleasant 71-year-old gentleman in follow-up consultation today.  We recall that he been diagnosed with [place] 7 adenocarcinoma of the prostate in 2002 and subsequently a radical prostatectomy was performed at this time which showed extensive disease in the prostate with positive surgical margins.  Subsequently he had adjuvant external beam radiation therapy and was on Eligard until 2008.  After that we began intermittent therapy.  5 2014 the PSA returned 1.8 and a further Eligard 45 is given and again in February 2050.  Gradually it then increased to 1.5 in August 2060 and Eligard 45 was given at that time.  He did have a fall down the stairs recently and injured his pelvis but no fracture was demonstrated.  Control of urine is good, he is otherwise voiding well.  There are no other significant major medical issues at present time.  PSA today is 0.44      Past Medical History   Diagnosis Date     Bell's palsy 10/15/2002     CA IN SITU PROSTATE 10/15/2002     Chronic atrial fibrillation (H) 7/1/10     Glaucoma 4/10/2003      Problem list name updated by automated process. Provider to review     Gout 5/16/2013     Hyperlipidemia LDL goal <100 9/10/2014     Hypertension goal BP (blood pressure) < 140/90 6/28/2006     SARAH (obstructive sleep apnea) 8/28/2013     Pericarditis 2001       Social History     Social History     Marital status:      Spouse name: N/A     Number of children: 3     Years of education: N/A     Social History Main Topics     Smoking status: Never Smoker     Smokeless tobacco: Never Used     Alcohol use No     Drug use: No     Sexual activity: No     Other Topics Concern     Caffeine Concern No     Occupational Exposure No     Hobby Hazards No     Sleep Concern Yes     CPAP at night     Stress Concern No     Weight Concern No     Special Diet No     Back Care No     Exercise No     Seat Belt Yes     Social History Narrative       Past  Surgical History   Procedure Laterality Date     Suprapubic prostatectomy       Colonoscopy       Counts include 234 beds at the [facility]     Colonoscopy  2014     Dr. Long Counts include 234 beds at the [facility]     Eye surgery  2007     glaucoma surgery right eye     Colonoscopy N/A 2014     Procedure: COMBINED COLONOSCOPY, SINGLE BIOPSY/POLYPECTOMY BY BIOPSY;  Surgeon: Puneet Long MD;  Location: [facility]     Cataract iol, rt/lt  10/15, 11/15       Family History   Problem Relation Age of Onset     Hypertension Maternal Grandfather      CEREBROVASCULAR DISEASE Maternal Grandfather      Hypertension Maternal Grandmother      CEREBROVASCULAR DISEASE Maternal Grandmother      Hypertension Mother       age 51, MVA     Hypertension Brother      Heart Failure Brother      Bronchitis Brother      Other - See Comments Brother      multiple myeloma     Heart Surgery Brother      defibrilater     Prostate Problems Brother      DIABETES Brother      Breast Cancer Maternal Aunt      hx     CANCER Maternal Aunt      cervical cancer     Cancer - colorectal Maternal Aunt      Unknown/Adopted Father      Don't know father's history         Current Outpatient Prescriptions:      warfarin (COUMADIN) 5 MG tablet, Take 5 mg by mouth, Disp: , Rfl:      order for DME, Equipment being ordered: Walker () Treatment Diagnosis: impaired mobility, Disp: 1 each, Rfl: 0     HYDROcodone-acetaminophen (NORCO) 5-325 MG per tablet, Take 1 tablet by mouth every 6 hours as needed, Disp: 20 tablet, Rfl: 0     lisinopril (PRINIVIL,ZESTRIL) 10 MG tablet, Take 2 tablets (20 mg) by mouth daily, Disp: 180 tablet, Rfl: 4     allopurinol (ZYLOPRIM) 100 MG tablet, Take 1 tablet (100 mg) by mouth daily, Disp: 90 tablet, Rfl: 4     amLODIPine (NORVASC) 10 MG tablet, Take 1 tablet (10 mg) by mouth daily, Disp: 90 tablet, Rfl: 4     metoprolol (TOPROL-XL) 100 MG 24 hr tablet, Take 1 tablet (100 mg) by mouth daily, Disp: 90 tablet, Rfl: 4     fluticasone (FLONASE) 50 MCG/ACT nasal spray, Spray 1-2  "sprays into both nostrils daily, Disp: 1 Package, Rfl: 5     ORDER FOR DME, Equipment being ordered: CPAP-mask, hose and supplies, Disp: 1 Device, Rfl: 1     ORDER FOR DME, Equipment being ordered: Digital Blood pressure Monitor, Disp: 1 Device, Rfl: 1     Cetirizine HCl (ZYRTEC ALLERGY PO), Take 10 mg by mouth daily as needed , Disp: , Rfl:      acetaminophen (TYLENOL) 325 MG tablet, Take 1-2 tablets by mouth every 6 hours as needed., Disp: , Rfl:     10 point ROS of systems including Constitutional, Eyes, Respiratory, Cardiovascular, Gastroenterology, Genitourinary, Integumentary, Muscularskeletal, Psychiatric were all negative except for pertinent positives noted in my HPI.    Examination:   /84  Pulse 60  Ht 1.854 m (6' 1\")  Wt 110.7 kg (244 lb)  BMI 32.19 kg/m2  General Impression: Very pleasant gentleman in no acute distress, well-oriented in time place and person  Mental Status: Normal.  HEENT.  There is no evidence of jaundice and the mucous membranes are normal  Skin: The skin is normal to examination  Respiratory System: The respiratory cycle is normal  Lymph Nodes: Not examined  Back/Flank Tenderness: Not examined  Cardiovascular System: Not examined  Abdominal Examination: Not examined  Extremities: Not examined  Genitial: Not examined  Rectal Examination: Good sphincter tone, normal perianal sensation.  Smooth rectal mucosa without hemorrhoids or fissures.  Empty prostatic fossa with no evidence of residual mass  Seminal vesicles.  Absent  Neurologic System: There are no focal abnormal clinical neurological signs in the central peripheral nervous system    Impression: He has responded well once again to Eligard given 6 months ago after a gap of well over a year.  PSA today is 0.44 which is not negligible, but I do not think we need to give her another injection today.  I would recommend we repeat PSA in 6 months time and if it is rising significantly we may need to give another injection at that " time, and we may need to consider at some point the use of Casodex.  I did discuss his situation carefully with the patient in detail today.  I answered all his questions    Plan: 6 months for PSA and examination possibly repeating Eligard at that time    Time: 20 minutes.  Greater than 50% was spent in discussion and consultation

## 2017-03-29 ENCOUNTER — ANTICOAGULATION THERAPY VISIT (OUTPATIENT)
Dept: ANTICOAGULATION | Facility: CLINIC | Age: 72
End: 2017-03-29
Payer: COMMERCIAL

## 2017-03-29 DIAGNOSIS — Z79.01 LONG-TERM (CURRENT) USE OF ANTICOAGULANTS: ICD-10-CM

## 2017-03-29 LAB — INR POINT OF CARE: 2.1 (ref 0.86–1.14)

## 2017-03-29 PROCEDURE — 99207 ZZC NO CHARGE NURSE ONLY: CPT

## 2017-03-29 PROCEDURE — 85610 PROTHROMBIN TIME: CPT | Mod: QW

## 2017-03-29 PROCEDURE — 36416 COLLJ CAPILLARY BLOOD SPEC: CPT

## 2017-03-29 NOTE — MR AVS SNAPSHOT
Billy Moore   3/29/2017 10:45 AM   Anticoagulation Therapy Visit    Description:  71 year old male   Provider[facility]  [facility]   Department:  [facility]           INR as of 3/29/2017     Today's INR 2.1      Anticoagulation Summary as of 3/29/2017     INR goal 2.0-3.0   Today's INR 2.1   Full instructions 5 mg every day   Next INR check 5/10/2017    Indications   Long-term (current) use of anticoagulants [Z79.01] [Z79.01]  Atrial fibrillation (H) (Resolved) [I48.91]         Your next Anticoagulation Clinic appointment(s)     May 10, 2017 10:45 AM CDT   Anticoagulation Visit with [facility]   [facility] ([facility])    [address] [facility] [address]  [facility] [address] [phone]              Contact Numbers     [facility] Phone Numbers:  Anticoagulation Clinic Appointments : [phone]  Anticoagulation Nurse: [phone]         March 2017 Details    Sun Mon Tue Wed Thu Fri Sat        1               2               3               4                 5               6               7               8               9               10               11                 12               13               14               15               16               17               18                 19               20               21               22               23               24               25                 26               27               28               29      5 mg   See details      30      5 mg         31      5 mg           Date Details   03/29 This INR check               How to take your warfarin dose     To take:  5 mg Take 1 of the 5 mg tablets.           April 2017 Details    Sun Mon Tue Wed Thu Fri Sat           1      5 mg           2      5 mg         3      5 mg         4      5 mg         5      5 mg         6      5 mg         7      5 mg         8      5 mg           9      5 mg         10      5 mg         11       5 mg         12      5 mg         13      5 mg         14      5 mg         15      5 mg           16      5 mg         17      5 mg         18      5 mg         19      5 mg         20      5 mg         21      5 mg         22      5 mg           23      5 mg         24      5 mg         25      5 mg         26      5 mg         27      5 mg         28      5 mg         29      5 mg           30      5 mg                Date Details   No additional details            How to take your warfarin dose     To take:  5 mg Take 1 of the 5 mg tablets.           May 2017 Details    Sun Mon Tue Wed Thu Fri Sat      1      5 mg         2      5 mg         3      5 mg         4      5 mg         5      5 mg         6      5 mg           7      5 mg         8      5 mg         9      5 mg         10            11               12               13                 14               15               16               17               18               19               20                 21               22               23               24               25               26               27                 28               29               30               31                   Date Details   No additional details    Date of next INR:  5/10/2017         How to take your warfarin dose     To take:  5 mg Take 1 of the 5 mg tablets.

## 2017-03-29 NOTE — PROGRESS NOTES
ANTICOAGULATION FOLLOW-UP CLINIC VISIT    Patient Name:  Billy Stock  Date:  3/29/2017  Contact Type:  Face to Face    SUBJECTIVE:     Patient Findings     Positives No Problem Findings           OBJECTIVE    INR Protime   Date Value Ref Range Status   03/29/2017 2.1 (A) 0.86 - 1.14 Final       ASSESSMENT / PLAN  INR assessment THER    Recheck INR In: 6 WEEKS    INR Location Clinic      Anticoagulation Summary as of 3/29/2017     INR goal 2.0-3.0   Today's INR 2.1   Maintenance plan 5 mg (5 mg x 1) every day   Full instructions 5 mg every day   Weekly total 35 mg   No change documented Marge Gale RN   Plan last modified Samantha Galeas RN (4/18/2016)   Next INR check 5/10/2017   Priority INR   Target end date     Indications   Long-term (current) use of anticoagulants [Z79.01] [Z79.01]  Atrial fibrillation (H) (Resolved) [I48.91]         Anticoagulation Episode Summary     INR check location     Preferred lab     Send INR reminders to [facility]    Comments       Anticoagulation Care Providers     Provider Role Specialty Phone number    Malcolm Schafer MD Responsible Internal Medicine [phone]            See the Encounter Report to view Anticoagulation Flowsheet and Dosing Calendar (Go to Encounters tab in chart review, and find the Anticoagulation Therapy Visit)    Dosage adjustment made based on physician directed care plan.    Marge Gale RN

## 2017-04-05 ENCOUNTER — OFFICE VISIT (OUTPATIENT)
Dept: URGENT CARE | Facility: URGENT CARE | Age: 72
End: 2017-04-05
Payer: COMMERCIAL

## 2017-04-05 VITALS
OXYGEN SATURATION: 100 % | HEART RATE: 64 BPM | WEIGHT: 247.2 LBS | TEMPERATURE: 98.4 F | DIASTOLIC BLOOD PRESSURE: 76 MMHG | BODY MASS INDEX: 32.61 KG/M2 | SYSTOLIC BLOOD PRESSURE: 140 MMHG

## 2017-04-05 DIAGNOSIS — M94.0 COSTOCHONDRITIS: Primary | ICD-10-CM

## 2017-04-05 PROCEDURE — 99213 OFFICE O/P EST LOW 20 MIN: CPT | Performed by: FAMILY MEDICINE

## 2017-04-05 RX ORDER — PREDNISONE 20 MG/1
40 TABLET ORAL DAILY
Qty: 10 TABLET | Refills: 0 | Status: SHIPPED | OUTPATIENT
Start: 2017-04-05 | End: 2017-04-10

## 2017-04-05 RX ORDER — CYCLOBENZAPRINE HCL 5 MG
5 TABLET ORAL EVERY 8 HOURS PRN
Qty: 30 TABLET | Refills: 0 | Status: SHIPPED | OUTPATIENT
Start: 2017-04-05 | End: 2017-11-20

## 2017-04-05 NOTE — MR AVS SNAPSHOT
After Visit Summary   4/5/2017    Billy Stock    MRN: [id number]           Patient Information     Date Of Birth          1945        Visit Information        Provider Department      4/5/2017 10:25 AM William James MD [facility]        Today's Diagnoses     Costochondritis    -  1      Care Instructions    Okay to take tylenol for discomfort.  Okay to try flexeril 5 mg every 8 hours as needed to help with muscle spasm.  Okay to take prednisone to help with chest wall inflammation.  Apply ice to area of discomfort.      Costochondritis  Costochondritis is inflammation of a rib or the cartilage that connects a rib to your breastbone (sternum). It causes tenderness, and sometimes chest pain may be sharp or aching, or it may feel like pressure. Pain may get worse with deep breathing, movement, or exercise. In some cases, the pain is mistaken for a heart attack. Despite this, the condition is not serious. Read on to learn more about the condition and how it can be treated.    What causes costochondritis?  The cause of costochondritis is not completely clear, but it may happen after a chest injury, chest infection or coughing episode. Some physical activities can sometimes lead to costochondritis. Large-breasted women may be more likely to have the condition. Often, the reason for the inflammation is unknown.  Diagnosing costochondritis  There is no test for costochrondritis. The condition is diagnosed by the symptoms you have. In some cases, tests are done to rule out more serious problems. These tests may include imaging tests such as chest X-ray or CT scan.  Treating costochondritis  If an underlying cause is found, treatment for that will likely relieve the problem. Costochondritis often goes away on its own. The course of the condition varies from person to person. It usually lasts from weeks to months. In some cases, mild symptoms continue for months to years. To ease  symptoms:    Take medications as directed. These relieve pain and swelling. Ibuprofen or other NSAIDs are often recommended. In some cases, you may be given prescription medication, such as muscle relaxants.    Avoid activities that put stress on the chest or spine.    Apply a heating pad (set to warm, not to high, heat) to the breastbone several times a day.    Perform stretching exercises as directed  Call the health care provider right away if you have any of the following:    Pain that is not relieved by medication    Shortness of breath    Lightheadedness, dizziness, or fainting    Feeling of irregular heartbeat or fast pulse  Anyone with chest pain should see a doctor, especially those who are older and may be at risk for heart disease.     7161-8644 [employer]. [address] [place] [address], [place] [address]. All rights reserved. This information is not intended as a substitute for professional medical care. Always follow your healthcare professional's instructions.              Follow-ups after your visit        Your next 10 appointments already scheduled     May 10, 2017 10:45 AM CDT   [facility]ticoagulation Visit with [facility]   [facility]    [address] [facility] [address]  [facility] [address] [phone]            Aug 16, 2017 10:00 AM CDT   (Arrive by 9:45 AM)   Return Visit with Hoang Huerta MD   [facility] ([facility])    [address]  [address]  [facility] [address] [phone]              Who to contact     If you have questions or need follow up information about today's clinic visit or your schedule please contact [facility] directly at [phone].  Normal or non-critical lab and imaging results will be communicated to you by MyChart, letter or phone within 4 business days after the clinic has received the results. If you do not hear from  "us within 7 days, please contact the clinic through BO[employer]LT or phone. If you have a critical or abnormal lab result, we will notify you by phone as soon as possible.  Submit refill requests through BO[employer]LT or call your pharmacy and they will forward the refill request to us. Please allow 3 business days for your refill to be completed.          Additional Information About Your Visit        [employer] Information     [employer] lets you send messages to your doctor, view your test results, renew your prescriptions, schedule appointments and more. To sign up, go to [email or web address] . Click on \"Log in\" on the left side of the screen, which will take you to the Welcome page. Then click on \"Sign up Now\" on the right side of the page.     You will be asked to enter the access code listed below, as well as some personal information. Please follow the directions to create your username and password.     Your access code is: [email or web address]  Expires: 2017 10:58 AM     Your access code will  in 90 days. If you need help or a new code, please call your [facility] clinic or [phone].        Care EveryWhere ID     This is your Care EveryWhere ID. This could be used by other organizations to access your [facility] medical records  [id number]        Your Vitals Were     Pulse Temperature Pulse Oximetry BMI (Body Mass Index)          64 98.4  F (36.9  C) (Oral) 100% 32.61 kg/m2         Blood Pressure from Last 3 Encounters:   17 140/76   02/15/17 138/80   17 140/80    Weight from Last 3 Encounters:   17 247 lb 3.2 oz (112.1 kg)   02/15/17 244 lb (110.7 kg)   17 242 lb 14.4 oz (110.2 kg)              Today, you had the following     No orders found for display         Today's Medication Changes          These changes are accurate as of: 17 10:58 AM.  If you have any questions, ask your nurse or doctor.               Start taking these medicines.        Dose/Directions    cyclobenzaprine 5 MG " tablet   Commonly known as:  FLEXERIL   Used for:  Costochondritis   Started by:  William James MD        Dose:  5 mg   Take 1 tablet (5 mg) by mouth every 8 hours as needed for muscle spasms   Quantity:  30 tablet   Refills:  0       predniSONE 20 MG tablet   Commonly known as:  DELTASONE   Used for:  Costochondritis   Started by:  William James MD        Dose:  40 mg   Take 2 tablets (40 mg) by mouth daily for 5 days   Quantity:  10 tablet   Refills:  0            Where to get your medicines      These medications were sent to [facility] [address] [place] [address] [address] [facility]  [address] [facility] [address]     Phone:  [phone]     cyclobenzaprine 5 MG tablet         Some of these will need a paper prescription and others can be bought over the counter.  Ask your nurse if you have questions.     Bring a paper prescription for each of these medications     predniSONE 20 MG tablet                Primary Care Provider Office Phone # Fax #    Malcolm Schafer [phone]       [place] [address] [place] [address]        Thank you!     Thank you for choosing [facility]  for your care. Our goal is always to provide you with excellent care. Hearing back from our patients is one way we can continue to improve our services. Please take a few minutes to complete the written survey that you may receive in the mail after your visit with us. Thank you!             Your Updated Medication List - Protect others around you: Learn how to safely use, store and throw away your medicines at www.disposemymeds.org.          This list is accurate as of: 4/5/17 10:58 AM.  Always use your most recent med list.                   Brand Name Dispense Instructions for use    allopurinol 100 MG tablet    ZYLOPRIM    90 tablet    Take 1 tablet (100 mg) by mouth daily       amLODIPine 10 MG tablet    NORVASC    90 tablet    Take 1 tablet (10 mg) by mouth daily        cyclobenzaprine 5 MG tablet    FLEXERIL    30 tablet    Take 1 tablet (5 mg) by mouth every 8 hours as needed for muscle spasms       fluticasone 50 MCG/ACT spray    FLONASE    1 Package    Spray 1-2 sprays into both nostrils daily       HYDROcodone-acetaminophen 5-325 MG per tablet    NORCO    20 tablet    Take 1 tablet by mouth every 6 hours as needed       lisinopril 10 MG tablet    PRINIVIL/ZESTRIL    180 tablet    Take 2 tablets (20 mg) by mouth daily       metoprolol 100 MG 24 hr tablet    TOPROL-XL    90 tablet    Take 1 tablet (100 mg) by mouth daily       * order for DME     1 Device    Equipment being ordered: CPAP-mask, hose and supplies       * order for DME     1 Device    Equipment being ordered: Digital Blood pressure Monitor       predniSONE 20 MG tablet    DELTASONE    10 tablet    Take 2 tablets (40 mg) by mouth daily for 5 days       TYLENOL 325 MG tablet   Generic drug:  acetaminophen      Take 1-2 tablets by mouth every 6 hours as needed.       warfarin 5 MG tablet    COUMADIN     Take 5 mg by mouth       ZYRTEC ALLERGY PO      Take 10 mg by mouth daily as needed       * Notice:  This list has 2 medication(s) that are the same as other medications prescribed for you. Read the directions carefully, and ask your doctor or other care provider to review them with you.

## 2017-04-05 NOTE — PATIENT INSTRUCTIONS
Okay to take tylenol for discomfort.  Okay to try flexeril 5 mg every 8 hours as needed to help with muscle spasm.  Okay to take prednisone to help with chest wall inflammation.  Apply ice to area of discomfort.      Costochondritis  Costochondritis is inflammation of a rib or the cartilage that connects a rib to your breastbone (sternum). It causes tenderness, and sometimes chest pain may be sharp or aching, or it may feel like pressure. Pain may get worse with deep breathing, movement, or exercise. In some cases, the pain is mistaken for a heart attack. Despite this, the condition is not serious. Read on to learn more about the condition and how it can be treated.    What causes costochondritis?  The cause of costochondritis is not completely clear, but it may happen after a chest injury, chest infection or coughing episode. Some physical activities can sometimes lead to costochondritis. Large-breasted women may be more likely to have the condition. Often, the reason for the inflammation is unknown.  Diagnosing costochondritis  There is no test for costochrondritis. The condition is diagnosed by the symptoms you have. In some cases, tests are done to rule out more serious problems. These tests may include imaging tests such as chest X-ray or CT scan.  Treating costochondritis  If an underlying cause is found, treatment for that will likely relieve the problem. Costochondritis often goes away on its own. The course of the condition varies from person to person. It usually lasts from weeks to months. In some cases, mild symptoms continue for months to years. To ease symptoms:    Take medications as directed. These relieve pain and swelling. Ibuprofen or other NSAIDs are often recommended. In some cases, you may be given prescription medication, such as muscle relaxants.    Avoid activities that put stress on the chest or spine.    Apply a heating pad (set to warm, not to high, heat) to the breastbone several times a  day.    Perform stretching exercises as directed  Call the health care provider right away if you have any of the following:    Pain that is not relieved by medication    Shortness of breath    Lightheadedness, dizziness, or fainting    Feeling of irregular heartbeat or fast pulse  Anyone with chest pain should see a doctor, especially those who are older and may be at risk for heart disease.     [id number] [employer]. [address] [place] [address], [place] [address]. All rights reserved. This information is not intended as a substitute for professional medical care. Always follow your healthcare professional's instructions.

## 2017-04-05 NOTE — NURSING NOTE
"No chief complaint on file.      Initial /76 (BP Location: Right arm, Cuff Size: Adult Large)  Pulse 64  Temp 98.4  F (36.9  C) (Oral)  Wt 247 lb 3.2 oz (112.1 kg)  SpO2 100%  BMI 32.61 kg/m2 Estimated body mass index is 32.61 kg/(m^2) as calculated from the following:    Height as of 2/15/17: 6' 1\" (1.854 m).    Weight as of this encounter: 247 lb 3.2 oz (112.1 kg).  Medication Reconciliation: complete   Larissa Kruger MA      "

## 2017-04-05 NOTE — PROGRESS NOTES
SUBJECTIVE:  Chief Complaint   Patient presents with     Chest Pain     chest muscle pain from lifting water bottle, pulling sensation Monday evening, tx: Billy Velasquez is a 71 year old male presents with a chief complaint of right sided chest wall pain.  The injury occurred 2 day(s) ago (Monday).   The injury happened while at home. How: was lifting 5 water gallon to change.  The patient complained of mild and moderate pain.  Pain exacerbated by movement.  Relieved by rest.  He treated it initially with norco. This is the first time this type of injury has occurred to this patient.  Patient currently on warfarin for a.fib, denies any palpitations, SOB.    Past Medical History:   Diagnosis Date     Bell's palsy 10/15/2002     CA IN SITU PROSTATE 10/15/2002     Chronic atrial fibrillation (H) 7/1/10     Glaucoma 4/10/2003     Problem list name updated by automated process. Provider to review     Gout 5/16/2013     Hyperlipidemia LDL goal <100 9/10/2014     Hypertension goal BP (blood pressure) < 140/90 6/28/2006     SARAH (obstructive sleep apnea) 8/28/2013     Pericarditis 2001     Current Outpatient Prescriptions   Medication Sig Dispense Refill     warfarin (COUMADIN) 5 MG tablet Take 5 mg by mouth       HYDROcodone-acetaminophen (NORCO) 5-325 MG per tablet Take 1 tablet by mouth every 6 hours as needed 20 tablet 0     lisinopril (PRINIVIL,ZESTRIL) 10 MG tablet Take 2 tablets (20 mg) by mouth daily 180 tablet 4     allopurinol (ZYLOPRIM) 100 MG tablet Take 1 tablet (100 mg) by mouth daily 90 tablet 4     amLODIPine (NORVASC) 10 MG tablet Take 1 tablet (10 mg) by mouth daily 90 tablet 4     metoprolol (TOPROL-XL) 100 MG 24 hr tablet Take 1 tablet (100 mg) by mouth daily 90 tablet 4     fluticasone (FLONASE) 50 MCG/ACT nasal spray Spray 1-2 sprays into both nostrils daily 1 Package 5     ORDER FOR DME Equipment being ordered: CPAP-mask, hose and supplies 1 Device 1     Cetirizine HCl (ZYRTEC ALLERGY  PO) Take 10 mg by mouth daily as needed        acetaminophen (TYLENOL) 325 MG tablet Take 1-2 tablets by mouth every 6 hours as needed.       ORDER FOR DME Equipment being ordered: Digital Blood pressure Monitor (Patient not taking: Reported on 4/5/2017) 1 Device 1     Social History   Substance Use Topics     Smoking status: Never Smoker     Smokeless tobacco: Never Used     Alcohol use No       ROS:  CONSTITUTIONAL:NEGATIVE for fever, chills, change in weight  INTEGUMENTARY/SKIN: NEGATIVE for worrisome rashes, moles or lesions  ENT/MOUTH: NEGATIVE for ear, mouth and throat problems  RESP:NEGATIVE for significant cough or SOB  CV: POSITIVE for chest pain/chest pressure  GI: NEGATIVE for nausea, abdominal pain, heartburn, or change in bowel habits  MUSCULOSKELETAL: NEGATIVE for significant arthralgias or myalgia    EXAM:   /76 (BP Location: Right arm, Cuff Size: Adult Large)  Pulse 64  Temp 98.4  F (36.9  C) (Oral)  Wt 247 lb 3.2 oz (112.1 kg)  SpO2 100%  BMI 32.61 kg/m2  Gen: healthy,alert,no distress  CHEST: clear to auscultation.  Right sided sternal chest wall tenderness  CV: regular rate and rhythm  EXTREMITIES: peripheral pulses normal  SKIN: no suspicious lesions or rashes  NEURO: Normal strength and tone, sensory exam grossly normal, mentation intact and speech normal    X-RAY was not done.    ASSESSMENT/PLAN:   (M94.0) Costochondritis  (primary encounter diagnosis)  Comment: right sided  Plan: predniSONE (DELTASONE) 20 MG tablet,         cyclobenzaprine (FLEXERIL) 5 MG tablet            Reassurance given, reviewed that most likely caused minor strain in chest wall.  Due to warfarin use, patient is unable to take NSAIDs, will given RX prednisone burst to help with inflammation.  Encourage heat/ice to area.  RX flexeril given to try if this may also help with muscle spasm.    Return to clinic if no resolution of symptoms.    William James MD  April 5, 2017 11:53 AM

## 2017-05-10 ENCOUNTER — ANTICOAGULATION THERAPY VISIT (OUTPATIENT)
Dept: ANTICOAGULATION | Facility: CLINIC | Age: 72
End: 2017-05-10
Payer: COMMERCIAL

## 2017-05-10 DIAGNOSIS — Z79.01 LONG-TERM (CURRENT) USE OF ANTICOAGULANTS: ICD-10-CM

## 2017-05-10 LAB — INR POINT OF CARE: 1.8 (ref 0.86–1.14)

## 2017-05-10 PROCEDURE — 99207 ZZC NO CHARGE NURSE ONLY: CPT

## 2017-05-10 PROCEDURE — 85610 PROTHROMBIN TIME: CPT | Mod: QW

## 2017-05-10 PROCEDURE — 36416 COLLJ CAPILLARY BLOOD SPEC: CPT

## 2017-05-10 NOTE — MR AVS SNAPSHOT
Billy Stock   5/10/2017 10:45 AM   Anticoagulation Therapy Visit    Description:  71 year old male   Provider[facility]   Department:  [facility]           INR as of 5/10/2017     Today's INR 1.8!      Anticoagulation Summary as of 5/10/2017     INR goal 2.0-3.0   Today's INR 1.8!   Full instructions 5/10: 10 mg; Otherwise 5 mg every day   Next INR check 5/31/2017    Indications   Long-term (current) use of anticoagulants [Z79.01] [Z79.01]  Atrial fibrillation (H) (Resolved) [I48.91]         Your next Anticoagulation Clinic appointment(s)     May 31, 2017 10:30 AM CDT   Anticoagulation Visit with [facility]   [facility]    [address] [facility] [address]  [facility] [address] [phone]              Contact Numbers     [facility] Phone Numbers:  Anticoagulation Clinic Appointments : [phone]  Anticoagulation Nurse: [phone]         May 2017 Details    Sun Mon Tue Wed Thu Fri Sat      1               2               3               4               5               6                 7               8               9               10      10 mg   See details      11      5 mg         12      5 mg         13      5 mg           14      5 mg         15      5 mg         16      5 mg         17      5 mg         18      5 mg         19      5 mg         20      5 mg           21      5 mg         22      5 mg         23      5 mg         24      5 mg         25      5 mg         26      5 mg         27      5 mg           28      5 mg         29      5 mg         30      5 mg         31                Date Details   05/10 This INR check       Date of next INR:  5/31/2017         How to take your warfarin dose     To take:  5 mg Take 1 of the 5 mg tablets.    To take:  10 mg Take 2 of the 5 mg tablets.

## 2017-05-10 NOTE — PROGRESS NOTES
ANTICOAGULATION FOLLOW-UP CLINIC VISIT    Patient Name:  Billy Stock  Date:  5/10/2017  Contact Type:  Face to Face    SUBJECTIVE:     Patient Findings     Positives Change in diet/appetite (Pt has not had many vitamin K foods recently.  Plans to slowly resume these.), Missed doses (Pt skipped his dose on 5/1.  Was having some leg swelling and thought that the Vitamin K and warfarin was contributing to this.  Reminded pt to continue to take his medication as directed and to call the INR clinic if he is having concerning symptoms.)           OBJECTIVE    INR Protime   Date Value Ref Range Status   05/10/2017 1.8 (A) 0.86 - 1.14 Final       ASSESSMENT / PLAN  INR assessment SUB    Recheck INR In: 3 WEEKS    INR Location Clinic      Anticoagulation Summary as of 5/10/2017     INR goal 2.0-3.0   Today's INR 1.8!   Maintenance plan 5 mg (5 mg x 1) every day   Full instructions 5/10: 10 mg; Otherwise 5 mg every day   Weekly total 35 mg   Plan last modified Samantha Galeas RN (4/18/2016)   Next INR check 5/31/2017   Priority INR   Target end date     Indications   Long-term (current) use of anticoagulants [Z79.01] [Z79.01]  Atrial fibrillation (H) (Resolved) [I48.91]         Anticoagulation Episode Summary     INR check location     Preferred lab     Send INR reminders to [facility]    Comments       Anticoagulation Care Providers     Provider Role Specialty Phone number    Malcolm Schafer MD [facility] Internal Medicine [phone]            See the Encounter Report to view Anticoagulation Flowsheet and Dosing Calendar (Go to Encounters tab in chart review, and find the Anticoagulation Therapy Visit)    Dosage adjustment made based on physician directed care plan.    Marge Gale RN

## 2017-05-19 DIAGNOSIS — I48.20 CHRONIC ATRIAL FIBRILLATION (H): ICD-10-CM

## 2017-05-19 NOTE — TELEPHONE ENCOUNTER
Metoprolol      Last Written Prescription Date: 10/04/16  Last Fill Quantity: 90, # refills: 4    Last Office Visit with G, P or [facility] prescribing provider:  01/18/17   Future Office Visit:        BP Readings from Last 3 Encounters:   04/05/17 140/76   02/15/17 138/80   01/18/17 140/80

## 2017-05-20 RX ORDER — METOPROLOL SUCCINATE 100 MG/1
100 TABLET, EXTENDED RELEASE ORAL DAILY
Qty: 90 TABLET | Refills: 0 | Status: SHIPPED | OUTPATIENT
Start: 2017-05-20 | End: 2017-05-20

## 2017-05-20 RX ORDER — METOPROLOL SUCCINATE 100 MG/1
100 TABLET, EXTENDED RELEASE ORAL DAILY
Qty: 90 TABLET | Refills: 0 | Status: SHIPPED | OUTPATIENT
Start: 2017-05-20 | End: 2017-08-23

## 2017-05-20 NOTE — TELEPHONE ENCOUNTER
Medication is being filled for 1 time refill only due to:    Advised 1/18/17 to follow up in 6 months. Due July 2017 for office visit.

## 2017-05-31 ENCOUNTER — ANTICOAGULATION THERAPY VISIT (OUTPATIENT)
Dept: ANTICOAGULATION | Facility: CLINIC | Age: 72
End: 2017-05-31
Payer: COMMERCIAL

## 2017-05-31 DIAGNOSIS — Z79.01 LONG-TERM (CURRENT) USE OF ANTICOAGULANTS: ICD-10-CM

## 2017-05-31 LAB — INR POINT OF CARE: 2.8 (ref 0.86–1.14)

## 2017-05-31 PROCEDURE — 36416 COLLJ CAPILLARY BLOOD SPEC: CPT

## 2017-05-31 PROCEDURE — 85610 PROTHROMBIN TIME: CPT | Mod: QW

## 2017-05-31 PROCEDURE — 99207 ZZC NO CHARGE NURSE ONLY: CPT

## 2017-05-31 NOTE — PROGRESS NOTES
ANTICOAGULATION FOLLOW-UP CLINIC VISIT    Patient Name:  Billy Stock  Date:  5/31/2017  Contact Type:  Face to Face    SUBJECTIVE:     Patient Findings     Positives No Problem Findings           OBJECTIVE    INR Protime   Date Value Ref Range Status   05/31/2017 2.8 (A) 0.86 - 1.14 Final       ASSESSMENT / PLAN  INR assessment THER    Recheck INR In: 6 WEEKS    INR Location Clinic      Anticoagulation Summary as of 5/31/2017     INR goal 2.0-3.0   Today's INR 2.8   Maintenance plan 5 mg (5 mg x 1) every day   Full instructions 5 mg every day   Weekly total 35 mg   No change documented Marge Gale RN   Plan last modified Samantha Galeas RN (4/18/2016)   Next INR check 7/12/2017   Priority INR   Target end date     Indications   Long-term (current) use of anticoagulants [Z79.01] [Z79.01]  Atrial fibrillation (H) (Resolved) [I48.91]         Anticoagulation Episode Summary     INR check location     Preferred lab     Send INR reminders to [facility]    Comments       Anticoagulation Care Providers     Provider Role Specialty Phone number    Malcolm Schafer MD Responsible Internal Medicine [phone]            See the Encounter Report to view Anticoagulation Flowsheet and Dosing Calendar (Go to Encounters tab in chart review, and find the Anticoagulation Therapy Visit)    Dosage adjustment made based on physician directed care plan.    Marge Gale RN

## 2017-05-31 NOTE — MR AVS SNAPSHOT
Billy Montrell   5/31/2017 10:30 AM   Anticoagulation Therapy Visit    Description:  71 year old male   Provider[facility]  [facility]   Department:  [facility]           INR as of 5/31/2017     Today's INR 2.8      Anticoagulation Summary as of 5/31/2017     INR goal 2.0-3.0   Today's INR 2.8   Full instructions 5 mg every day   Next INR check 7/12/2017    Indications   Long-term (current) use of anticoagulants [Z79.01] [Z79.01]  Atrial fibrillation (H) (Resolved) [I48.91]         Your next Anticoagulation Clinic appointment(s)     Jul 12, 2017 10:30 AM CDT   [facility] Visit with [facility]   [facility]    [address]  [facility] [address] [phone]              Contact Numbers     [facility] Phone Numbers:  Anticoagulation Clinic Appointments : [phone]  Anticoagulation Nurse: [phone]         May 2017 Details    Sun Mon Tue Wed Thu Fri Sat      1               2               3               4               5               6                 7               8               9               10               11               12               13                 14               15               16               17               18               19               20                 21               22               23               24               25               26               27                 28               29               30               31      5 mg   See details          Date Details   05/31 This INR check               How to take your warfarin dose     To take:  5 mg Take 1 of the 5 mg tablets.           June 2017 Details    Sun Mon Tue Wed Thu Fri Sat         1      5 mg         2      5 mg         3      5 mg           4      5 mg         5      5 mg         6      5 mg         7      5 mg         8      5 mg         9      5 mg         10      5 mg           11      5 mg          12      5 mg         13      5 mg         14      5 mg         15      5 mg         16      5 mg         17      5 mg           18      5 mg         19      5 mg         20      5 mg         21      5 mg         22      5 mg         23      5 mg         24      5 mg           25      5 mg         26      5 mg         27      5 mg         28      5 mg         29      5 mg         30      5 mg           Date Details   No additional details            How to take your warfarin dose     To take:  5 mg Take 1 of the 5 mg tablets.           July 2017 Details    Sun Mon Tue Wed Thu Fri Sat           1      5 mg           2      5 mg         3      5 mg         4      5 mg         5      5 mg         6      5 mg         7      5 mg         8      5 mg           9      5 mg         10      5 mg         11      5 mg         12            13               14               15                 16               17               18               19               20               21               22                 23               24               25               26               27               28               29                 30               31                     Date Details   No additional details    Date of next INR:  7/12/2017         How to take your warfarin dose     To take:  5 mg Take 1 of the 5 mg tablets.

## 2017-07-28 ENCOUNTER — ANTICOAGULATION THERAPY VISIT (OUTPATIENT)
Dept: ANTICOAGULATION | Facility: CLINIC | Age: 72
End: 2017-07-28
Payer: COMMERCIAL

## 2017-07-28 DIAGNOSIS — Z79.01 LONG-TERM (CURRENT) USE OF ANTICOAGULANTS: ICD-10-CM

## 2017-07-28 LAB — INR POINT OF CARE: 2.3 (ref 0.86–1.14)

## 2017-07-28 PROCEDURE — 85610 PROTHROMBIN TIME: CPT | Mod: QW

## 2017-07-28 PROCEDURE — 36416 COLLJ CAPILLARY BLOOD SPEC: CPT

## 2017-07-28 PROCEDURE — 99207 ZZC NO CHARGE NURSE ONLY: CPT

## 2017-07-28 NOTE — MR AVS SNAPSHOT
Billy Moore   7/28/2017 11:45 AM   Anticoagulation Therapy Visit    Description:  71 year old male   Provider[facility]  [facility]   Department:  [facility]           INR as of 7/28/2017     Today's INR 2.3      Anticoagulation Summary as of 7/28/2017     INR goal 2.0-3.0   Today's INR 2.3   Full instructions 5 mg every day   Next INR check 9/8/2017    Indications   Long-term (current) use of anticoagulants [Z79.01] [Z79.01]  Atrial fibrillation (H) (Resolved) [I48.91]         Your next Anticoagulation Clinic appointment(s)     Sep 08, 2017 10:30 AM CDT   [facility] Visit with [facility]   [facility]    [address] [facility] [address]  [facility] [address] [phone]              Contact Numbers     [facility] Phone Numbers:  Anticoagulation Clinic Appointments : [phone]  Anticoagulation Nurse: [phone]         July 2017 Details    Sun Mon Tue Wed Thu Fri Sat           1                 2               3               4               5               6               7               8                 9               10               11               12               13               14               15                 16               17               18               19               20               21               22                 23               24               25               26               27               28      5 mg   See details      29      5 mg           30      5 mg         31      5 mg               Date Details   07/28 This INR check               How to take your warfarin dose     To take:  5 mg Take 1 of the 5 mg tablets.           August 2017 Details    Sun Mon Tue Wed Thu Fri Sat       1      5 mg         2      5 mg         3      5 mg         4      5 mg         5      5 mg           6      5 mg         7      5 mg         8      5 mg         9      5 mg         10      5 mg          11      5 mg         12      5 mg           13      5 mg         14      5 mg         15      5 mg         16      5 mg         17      5 mg         18      5 mg         19      5 mg           20      5 mg         21      5 mg         22      5 mg         23      5 mg         24      5 mg         25      5 mg         26      5 mg           27      5 mg         28      5 mg         29      5 mg         30      5 mg         31      5 mg            Date Details   No additional details            How to take your warfarin dose     To take:  5 mg Take 1 of the 5 mg tablets.           September 2017 Details    Sun Mon Tue Wed Thu Fri Sat          1      5 mg         2      5 mg           3      5 mg         4      5 mg         5      5 mg         6      5 mg         7      5 mg         8            9                 10               11               12               13               14               15               16                 17               18               19               20               21               22               23                 24               25               26               27               28               29               30                Date Details   No additional details    Date of next INR:  9/8/2017         How to take your warfarin dose     To take:  5 mg Take 1 of the 5 mg tablets.

## 2017-07-28 NOTE — PROGRESS NOTES
ANTICOAGULATION FOLLOW-UP CLINIC VISIT    Patient Name:  Billy Stock  Date:  7/28/2017  Contact Type:  Face to Face    SUBJECTIVE:     Patient Findings     Positives No Problem Findings           OBJECTIVE    INR Protime   Date Value Ref Range Status   07/28/2017 2.3 (A) 0.86 - 1.14 Final       ASSESSMENT / PLAN  INR assessment THER    Recheck INR In: 6 WEEKS    INR Location Clinic      Anticoagulation Summary as of 7/28/2017     INR goal 2.0-3.0   Today's INR 2.3   Maintenance plan 5 mg (5 mg x 1) every day   Full instructions 5 mg every day   Weekly total 35 mg   No change documented Marge Gale RN   Plan last modified Samantha Galeas RN (4/18/2016)   Next INR check 9/8/2017   Priority INR   Target end date     Indications   Long-term (current) use of anticoagulants [Z79.01] [Z79.01]  Atrial fibrillation (H) (Resolved) [I48.91]         Anticoagulation Episode Summary     INR check location     Preferred lab     Send INR reminders to [facility]    Comments       Anticoagulation Care Providers     Provider Role Specialty Phone number    Malcolm Schafer MD [facility] Internal Medicine [phone]            See the Encounter Report to view Anticoagulation Flowsheet and Dosing Calendar (Go to Encounters tab in chart review, and find the Anticoagulation Therapy Visit)    Dosage adjustment made based on physician directed care plan.    Marge Gale RN

## 2017-08-15 DIAGNOSIS — D07.5 CARCINOMA IN SITU OF PROSTATE: Primary | ICD-10-CM

## 2017-08-16 ENCOUNTER — OFFICE VISIT (OUTPATIENT)
Dept: UROLOGY | Facility: CLINIC | Age: 72
End: 2017-08-16
Payer: COMMERCIAL

## 2017-08-16 VITALS
SYSTOLIC BLOOD PRESSURE: 130 MMHG | WEIGHT: 245 LBS | HEIGHT: 73 IN | DIASTOLIC BLOOD PRESSURE: 72 MMHG | HEART RATE: 80 BPM | BODY MASS INDEX: 32.47 KG/M2

## 2017-08-16 DIAGNOSIS — D07.5 CARCINOMA IN SITU OF PROSTATE: ICD-10-CM

## 2017-08-16 LAB — PSA SERPL-MCNC: 1.3 NG/ML (ref 0–4)

## 2017-08-16 PROCEDURE — 84153 ASSAY OF PSA TOTAL: CPT | Performed by: UROLOGY

## 2017-08-16 PROCEDURE — 36415 COLL VENOUS BLD VENIPUNCTURE: CPT | Performed by: UROLOGY

## 2017-08-16 PROCEDURE — 99213 OFFICE O/P EST LOW 20 MIN: CPT | Performed by: UROLOGY

## 2017-08-16 RX ORDER — ALBUTEROL SULFATE 90 UG/1
AEROSOL, METERED RESPIRATORY (INHALATION)
COMMUNITY
Start: 2017-03-13 | End: 2018-02-19

## 2017-08-16 ASSESSMENT — PAIN SCALES - GENERAL: PAINLEVEL: NO PAIN (0)

## 2017-08-16 NOTE — MR AVS SNAPSHOT
After Visit Summary   8/16/2017    Billy Stock    MRN: [id number]           Patient Information     Date Of Birth          1945        Visit Information        Provider Department      8/16/2017 10:10 AM Hoang Huerta MD [facility]        Today's Diagnoses     Carcinoma in situ of prostate           Follow-ups after your visit        Follow-up notes from your care team     Return in about 6 months (around 2/16/2018) for PSA, Physical Exam.      Your next 10 appointments already scheduled     Sep 08, 2017 10:30 AM CDT   [facility]tico[facility] Visit with [facility]   [facility]    [address]  [facility] [address]   [phone]            Feb 19, 2018 11:00 AM CST   Return Visit with Hoang Huerta MD   [facility] ([facility])    [address]  [address]  [facility] [id number] [phone]              Who to contact     If you have questions or need follow up information about today's clinic visit or your schedule please contact [facility] directly at [phone].  Normal or non-critical lab and imaging results will be communicated to you by MyChart, letter or phone within 4 business days after the clinic has received the results. If you do not hear from us within 7 days, please contact the clinic through MyChart or phone. If you have a critical or abnormal lab result, we will notify you by phone as soon as possible.  Submit refill requests through [email or web address] or call your pharmacy and they will forward the refill request to us. Please allow 3 business days for your refill to be completed.          Additional Information About Your Visit        BASE Inchart Information     [email or web address] lets you send messages to your doctor, view your test results, renew your prescriptions,  "schedule appointments and more. To sign up, go to [email or web address] . Click on \"Log in\" on the left side of the screen, which will take you to the Welcome page. Then click on \"Sign up Now\" on the right side of the page.     You will be asked to enter the access code listed below, as well as some personal information. Please follow the directions to create your username and password.     Your access code is: [email or web address]  Expires: 2017 10:44 AM     Your access code will  in 90 days. If you need help or a new code, please call your [place] clinic or [phone].        Care EveryWhere ID     This is your Care EveryWhere ID. This could be used by other organizations to access your [facility] [place] medical records  [id number]        Your Vitals Were     Pulse Height BMI (Body Mass Index)             80 1.854 m (6' 1\") 32.32 kg/m2          Blood Pressure from Last 3 Encounters:   17 130/72   17 140/76   02/15/17 138/80    Weight from Last 3 Encounters:   17 111.1 kg (245 lb)   17 112.1 kg (247 lb 3.2 oz)   02/15/17 110.7 kg (244 lb)              We Performed the Following     [facility]rolog[facility] Phys [[id number]]        Primary Care Provider Office Phone # Fax #    Malcolm Schafer [phone]       [address] [facility] [address]        Equal Access to Services     [facility] Center[facility] : Hadii stan gaitan hadasho Solitoali, waaxda luqadaha, qaybta kaalmada adeegyachencho, jake zurita. So [facility] [phone].    ATENCIÓN: Si habla español, tiene a braga disposición servicios gratuitos de asistencia lingüística. Llame al [phone].    We comply with applicable federal civil rights laws and [place] laws. We do not discriminate on the basis of race, color, national origin, age, disability sex, sexual orientation or gender identity.            Thank you!     Thank you for choosing [facility]  for your care. Our " goal is always to provide you with excellent care. Hearing back from our patients is one way we can continue to improve our services. Please take a few minutes to complete the written survey that you may receive in the mail after your visit with us. Thank you!             Your Updated Medication List - Protect others around you: Learn how to safely use, store and throw away your medicines at www.disposemymeds.org.          This list is accurate as of: 8/16/17 10:44 AM.  Always use your most recent med list.                   Brand Name Dispense Instructions for use Diagnosis    allopurinol 100 MG tablet    ZYLOPRIM    90 tablet    Take 1 tablet (100 mg) by mouth daily    Gout of foot, unspecified cause, unspecified chronicity, unspecified laterality       amLODIPine 10 MG tablet    NORVASC    90 tablet    Take 1 tablet (10 mg) by mouth daily    Essential hypertension with goal blood pressure less than 140/90       cyclobenzaprine 5 MG tablet    FLEXERIL    30 tablet    Take 1 tablet (5 mg) by mouth every 8 hours as needed for muscle spasms    Costochondritis       fluticasone 50 MCG/ACT spray    FLONASE    1 Package    Spray 1-2 sprays into both nostrils daily    Seasonal allergies       HYDROcodone-acetaminophen 5-325 MG per tablet    NORCO    20 tablet    Take 1 tablet by mouth every 6 hours as needed    Fall on stairs, initial encounter, Bilateral impacted cerumen, Contusion of pelvis, initial encounter, Contusion of left elbow, initial encounter       lisinopril 10 MG tablet    PRINIVIL/ZESTRIL    180 tablet    Take 2 tablets (20 mg) by mouth daily    Benign essential hypertension       metoprolol 100 MG 24 hr tablet    TOPROL-XL    90 tablet    Take 1 tablet (100 mg) by mouth daily    Chronic atrial fibrillation (H)       * order for DME     1 Device    Equipment being ordered: CPAP-mask, hose and supplies    SARAH on CPAP       * order for DME     1 Device    Equipment being ordered: Digital Blood pressure  Monitor    HTN (hypertension)       TYLENOL 325 MG tablet   Generic drug:  acetaminophen      Take 1-2 tablets by mouth every 6 hours as needed.        VENTOLIN  (90 BASE) MCG/ACT Inhaler   Generic drug:  albuterol           warfarin 5 MG tablet    COUMADIN     Take 5 mg by mouth        ZYRTEC ALLERGY PO      Take 10 mg by mouth daily as needed        * Notice:  This list has 2 medication(s) that are the same as other medications prescribed for you. Read the directions carefully, and ask your doctor or other care provider to review them with you.

## 2017-08-16 NOTE — PROGRESS NOTES
History: It is a great pleasure to see this very pleasant 71-year-old gentleman in follow-up consultation again once again today.  As you recall, he was diagnosed with Puma 7 adenocarcinoma of the prostate in 2002, he had a radical prostatectomy which showed extensive disease in the prostate with positive surgical margins.  Subsequently also had adjuvant external beam radiation therapy and was on regular Eligard therapy until 2008.  After that we begin intermittent therapy.  He received Eligard 45 in February 2015.  The PSA had increased to 1.5 by August 2016 and further Eligard 5 was given at that time.  6 months ago the PSA was 0.44, and today the PSA is 1.3.  The patient also does have gout and is being treated with allopurinol.  He has no other major complaints at this time    Past Medical History:   Diagnosis Date     Bell's palsy 10/15/2002     CA IN SITU PROSTATE 10/15/2002     Chronic atrial fibrillation (H) 7/1/10     Glaucoma 4/10/2003     Problem list name updated by automated process. Provider to review     Gout 5/16/2013     Hyperlipidemia LDL goal <100 9/10/2014     Hypertension goal BP (blood pressure) < 140/90 6/28/2006     SARAH (obstructive sleep apnea) 8/28/2013     Pericarditis 2001     Renal cyst        Social History     Social History     Marital status:      Spouse name: N/A     Number of children: 3     Years of education: N/A     Social History Main Topics     Smoking status: Never Smoker     Smokeless tobacco: Never Used     Alcohol use No     Drug use: No     Sexual activity: No     Other Topics Concern     Caffeine Concern No     Occupational Exposure No     Hobby Hazards No     Sleep Concern Yes     CPAP at night     Stress Concern No     Weight Concern No     Special Diet No     Back Care No     Exercise No     Seat Belt Yes     Social History Narrative       Past Surgical History:   Procedure Laterality Date     CATARACT IOL, RT/LT  10/15, 11/15     COLONOSCOPY  2009     [facility]     COLONOSCOPY  2014    Dr. Long [facility]     COLONOSCOPY N/A 2014    Procedure: COMBINED COLONOSCOPY, SINGLE BIOPSY/POLYPECTOMY BY BIOPSY;  Surgeon: Puneet Long MD;  Location:  GI     EYE SURGERY      glaucoma surgery right eye     PROSTATE SURGERY       SUPRAPUBIC PROSTATECTOMY  2002     VASECTOMY         Family History   Problem Relation Age of Onset     Hypertension Maternal Grandfather      CEREBROVASCULAR DISEASE Maternal Grandfather      Hypertension Maternal Grandmother      CEREBROVASCULAR DISEASE Maternal Grandmother      Hypertension Mother       age 51, MVA     Hypertension Brother      Heart Failure Brother      Bronchitis Brother      Other - See Comments Brother      multiple myeloma     Heart Surgery Brother      defibrilater     Prostate Problems Brother      DIABETES Brother      Breast Cancer Maternal Aunt      hx     CANCER Maternal Aunt      cervical cancer     Cancer - colorectal Maternal Aunt      Unknown/Adopted Father      Don't know father's history         Current Outpatient Prescriptions:      metoprolol (TOPROL-XL) 100 MG 24 hr tablet, Take 1 tablet (100 mg) by mouth daily, Disp: 90 tablet, Rfl: 0     warfarin (COUMADIN) 5 MG tablet, Take 5 mg by mouth, Disp: , Rfl:      lisinopril (PRINIVIL,ZESTRIL) 10 MG tablet, Take 2 tablets (20 mg) by mouth daily, Disp: 180 tablet, Rfl: 4     allopurinol (ZYLOPRIM) 100 MG tablet, Take 1 tablet (100 mg) by mouth daily, Disp: 90 tablet, Rfl: 4     amLODIPine (NORVASC) 10 MG tablet, Take 1 tablet (10 mg) by mouth daily, Disp: 90 tablet, Rfl: 4     fluticasone (FLONASE) 50 MCG/ACT nasal spray, Spray 1-2 sprays into both nostrils daily, Disp: 1 Package, Rfl: 5     ORDER FOR DME, Equipment being ordered: CPAP-mask, hose and supplies, Disp: 1 Device, Rfl: 1     ORDER FOR DME, Equipment being ordered: Digital Blood pressure Monitor, Disp: 1 Device, Rfl: 1     VENTOLIN  (90 BASE) MCG/ACT Inhaler, , Disp: , Rfl:       "cyclobenzaprine (FLEXERIL) 5 MG tablet, Take 1 tablet (5 mg) by mouth every 8 hours as needed for muscle spasms (Patient not taking: Reported on 8/16/2017), Disp: 30 tablet, Rfl: 0     HYDROcodone-acetaminophen (NORCO) 5-325 MG per tablet, Take 1 tablet by mouth every 6 hours as needed (Patient not taking: Reported on 8/16/2017), Disp: 20 tablet, Rfl: 0     Cetirizine HCl (ZYRTEC ALLERGY PO), Take 10 mg by mouth daily as needed , Disp: , Rfl:      acetaminophen (TYLENOL) 325 MG tablet, Take 1-2 tablets by mouth every 6 hours as needed., Disp: , Rfl:     10 point ROS of systems including Constitutional, Eyes, Respiratory, Cardiovascular, Gastroenterology, Genitourinary, Integumentary, Muscularskeletal, Psychiatric were all negative except for pertinent positives noted in my HPI.    Examination:   /72  Pulse 80  Ht 1.854 m (6' 1\")  Wt 111.1 kg (245 lb)  BMI 32.32 kg/m2  General Impression: Very pleasant gentleman in no acute distress, well-oriented in time place and person  Mental Status: Normal.  HEENT.  There is no evidence of jaundice and mucous membranes are normal  Skin: Skin is otherwise normal to examination  Respiratory System: The respiratory cycle is normal  Lymph Nodes: Not examined  Back/Flank Tenderness: Not   Cardiovascular System: Not examined  Abdominal Examination: Not examined   Extremities: There is no significant peripheral edema  Genitial: Not examined  Rectal Examination: Good sphincter tone, normal perianal sensation.  Smooth rectal mucosa without hemorrhoids or fissures.  Empty prostatic fossa no evidence of recurrent mass.  Seminal vesicles.  Not palpable  Neurologic System: There are no focal abnormal clinical neurological signs in the central, or peripheral nervous systems    Impression: The PSA has risen from 0.44-1.3 in the last 6 months.  He has been on intermittent hormone treatment.  I did discuss the situation carefully with him today.  We have therefore agreed to defer " "Eligard 45 for a further period of 6 months.  If the PSA has risen significantly, at that stage we will then give Eligard 45 once again  I did discuss the entire situation with the patient in detail.  I answered all his questions    Plan: 6 months for PSA and examination he will probably require Eligard 45 at that time    Time: 20 minutes with greater than 50% in discussion and consultation    \"This dictation was performed with voice recognition software and may contain errors,  omissions and inadvertent word substitution.\"      "

## 2017-08-16 NOTE — LETTER
8/16/2017       RE: Billy Stock  [address] [place] [address]     Dear Colleague,    Thank you for referring your patient, Billy Stock, to the [facility] [place] at [facility]. Please see a copy of my visit note below.       History: It is a great pleasure to see this very pleasant 71-year-old gentleman in follow-up consultation again once again today.  As you recall, he was diagnosed with Puma 7 adenocarcinoma of the prostate in 2002, he had a radical prostatectomy which showed extensive disease in the prostate with positive surgical margins.  Subsequently also had adjuvant external beam radiation therapy and was on regular Eligard therapy until 2008.  After that we begin intermittent therapy.  He received Eligard 45 in February 2015.  The PSA had increased to 1.5 by August 2016 and further Eligard 5 was given at that time.  6 months ago the PSA was 0.44, and today the PSA is 1.3.  The patient also does have gout and is being treated with allopurinol.  He has no other major complaints at this time  Past Medical History:   Diagnosis Date     Bell's palsy 10/15/2002     CA IN SITU PROSTATE 10/15/2002     Chronic atrial fibrillation (H) 7/1/10     Glaucoma 4/10/2003     Problem list name updated by automated process. Provider to review     Gout 5/16/2013     Hyperlipidemia LDL goal <100 9/10/2014     Hypertension goal BP (blood pressure) < 140/90 6/28/2006     SARAH (obstructive sleep apnea) 8/28/2013     Pericarditis 2001     Renal cyst        Social History     Social History     Marital status:      Spouse name: N/A     Number of children: 3     Years of education: N/A     Social History Main Topics     Smoking status: Never Smoker     Smokeless tobacco: Never Used     Alcohol use No     Drug use: No     Sexual activity: No     Other Topics Concern     Caffeine Concern No     Occupational Exposure No     Hobby Hazards  No     Sleep Concern Yes     CPAP at night     Stress Concern No     Weight Concern No     Special Diet No     Back Care No     Exercise No     Seat Belt Yes     Social History Narrative     Past Surgical History:   Procedure Laterality Date     CATARACT IOL, RT/LT  10/15, 11/15     COLONOSCOPY      [facility]     COLONOSCOPY  2014    Dr. Long [facility]     COLONOSCOPY N/A 2014    Procedure: COMBINED COLONOSCOPY, SINGLE BIOPSY/POLYPECTOMY BY BIOPSY;  Surgeon: Puneet Long MD;  Location:  GI     EYE SURGERY      glaucoma surgery right eye     PROSTATE SURGERY       SUPRAPUBIC PROSTATECTOMY       VASECTOMY       Family History   Problem Relation Age of Onset     Hypertension Maternal Grandfather      CEREBROVASCULAR DISEASE Maternal Grandfather      Hypertension Maternal Grandmother      CEREBROVASCULAR DISEASE Maternal Grandmother      Hypertension Mother       age 51, MVA     Hypertension Brother      Heart Failure Brother      Bronchitis Brother      Other - See Comments Brother      multiple myeloma     Heart Surgery Brother      defibrilater     Prostate Problems Brother      DIABETES Brother      Breast Cancer Maternal Aunt      hx     CANCER Maternal Aunt      cervical cancer     Cancer - colorectal Maternal Aunt      Unknown/Adopted Father      Don't know father's history     Current Outpatient Prescriptions:      metoprolol (TOPROL-XL) 100 MG 24 hr tablet, Take 1 tablet (100 mg) by mouth daily, Disp: 90 tablet, Rfl: 0     warfarin (COUMADIN) 5 MG tablet, Take 5 mg by mouth, Disp: , Rfl:      lisinopril (PRINIVIL,ZESTRIL) 10 MG tablet, Take 2 tablets (20 mg) by mouth daily, Disp: 180 tablet, Rfl: 4     allopurinol (ZYLOPRIM) 100 MG tablet, Take 1 tablet (100 mg) by mouth daily, Disp: 90 tablet, Rfl: 4     amLODIPine (NORVASC) 10 MG tablet, Take 1 tablet (10 mg) by mouth daily, Disp: 90 tablet, Rfl: 4     fluticasone (FLONASE) 50 MCG/ACT nasal spray, Spray 1-2 sprays into both nostrils  "daily, Disp: 1 Package, Rfl: 5     ORDER FOR DME, Equipment being ordered: CPAP-mask, hose and supplies, Disp: 1 Device, Rfl: 1     ORDER FOR DME, Equipment being ordered: Digital Blood pressure Monitor, Disp: 1 Device, Rfl: 1     VENTOLIN  (90 BASE) MCG/ACT Inhaler, , Disp: , Rfl:      cyclobenzaprine (FLEXERIL) 5 MG tablet, Take 1 tablet (5 mg) by mouth every 8 hours as needed for muscle spasms (Patient not taking: Reported on 8/16/2017), Disp: 30 tablet, Rfl: 0     HYDROcodone-acetaminophen (NORCO) 5-325 MG per tablet, Take 1 tablet by mouth every 6 hours as needed (Patient not taking: Reported on 8/16/2017), Disp: 20 tablet, Rfl: 0     Cetirizine HCl (ZYRTEC ALLERGY PO), Take 10 mg by mouth daily as needed , Disp: , Rfl:      acetaminophen (TYLENOL) 325 MG tablet, Take 1-2 tablets by mouth every 6 hours as needed., Disp: , Rfl:     10 point ROS of systems including Constitutional, Eyes, Respiratory, Cardiovascular, Gastroenterology, Genitourinary, Integumentary, Muscularskeletal, Psychiatric were all negative except for pertinent positives noted in my HPI.    Examination:   /72  Pulse 80  Ht 1.854 m (6' 1\")  Wt 111.1 kg (245 lb)  BMI 32.32 kg/m2  General Impression: Very pleasant gentleman in no acute distress, well-oriented in time place and person  Mental Status: Normal.  HEENT.  There is no evidence of jaundice and mucous membranes are normal  Skin: Skin is otherwise normal to examination  Respiratory System: The respiratory cycle is normal  Lymph Nodes: Not examined  Back/Flank Tenderness: Not   Cardiovascular System: Not examined  Abdominal Examination: Not examined   Extremities: There is no significant peripheral edema  Genitial: Not examined  Rectal Examination: Good sphincter tone, normal perianal sensation.  Smooth rectal mucosa without hemorrhoids or fissures.  Empty prostatic fossa no evidence of recurrent mass.  Seminal vesicles.  Not palpable  Neurologic System: There are no focal " "abnormal clinical neurological signs in the central, or peripheral nervous systems    Impression: The PSA has risen from 0.44-1.3 in the last 6 months.  He has been on intermittent hormone treatment.  I did discuss the situation carefully with him today.  We have therefore agreed to defer Eligard 45 for a further period of 6 months.  If the PSA has risen significantly, at that stage we will then give Eligard 45 once again  I did discuss the entire situation with the patient in detail.  I answered all his questions    Plan: 6 months for PSA and examination he will probably require Eligard 45 at that time    Time: 20 minutes with greater than 50% in discussion and consultation    \"This dictation was performed with voice recognition software and may contain errors,  omissions and inadvertent word substitution.\"    Again, thank you for allowing me to participate in the care of your patient.    Sincerely,    Hoang Huerta MD      "

## 2017-08-23 DIAGNOSIS — I48.20 CHRONIC ATRIAL FIBRILLATION (H): ICD-10-CM

## 2017-08-23 NOTE — TELEPHONE ENCOUNTER
Metoprolol      Last Written Prescription Date: 05/20/17  Last Fill Quantity: 90, # refills: 0    Last Office Visit with G, P or [facility] prescribing provider:  01/18/17   Future Office Visit:        BP Readings from Last 3 Encounters:   08/16/17 130/72   04/05/17 140/76   02/15/17 138/80

## 2017-08-25 RX ORDER — METOPROLOL SUCCINATE 100 MG/1
TABLET, EXTENDED RELEASE ORAL
Qty: 90 TABLET | Refills: 0 | Status: SHIPPED | OUTPATIENT
Start: 2017-08-25 | End: 2017-09-25

## 2017-08-28 NOTE — TELEPHONE ENCOUNTER
Patient calling asking if Rx was sent [employer], advised Rx was sent 8/25/17 with pharmacy confirmation at 0964. Advised to call [employer] to check if they received request of if it is being processed. Patient verbalizes understanding, agrees to plan of care, and has no further questions.

## 2017-09-08 ENCOUNTER — ANTICOAGULATION THERAPY VISIT (OUTPATIENT)
Dept: ANTICOAGULATION | Facility: CLINIC | Age: 72
End: 2017-09-08
Payer: COMMERCIAL

## 2017-09-08 DIAGNOSIS — Z79.01 LONG-TERM (CURRENT) USE OF ANTICOAGULANTS: ICD-10-CM

## 2017-09-08 LAB — INR POINT OF CARE: 2.7 (ref 0.86–1.14)

## 2017-09-08 PROCEDURE — 36416 COLLJ CAPILLARY BLOOD SPEC: CPT

## 2017-09-08 PROCEDURE — 85610 PROTHROMBIN TIME: CPT | Mod: QW

## 2017-09-08 PROCEDURE — 99207 ZZC NO CHARGE NURSE ONLY: CPT

## 2017-09-08 NOTE — PROGRESS NOTES
ANTICOAGULATION FOLLOW-UP CLINIC VISIT    Patient Name:  Billy Stock  Date:  9/8/2017  Contact Type:  Face to Face    SUBJECTIVE:     Patient Findings     Positives No Problem Findings           OBJECTIVE    INR Protime   Date Value Ref Range Status   09/08/2017 2.7 (A) 0.86 - 1.14 Final       ASSESSMENT / PLAN  INR assessment THER    Recheck INR In: 6 WEEKS    INR Location Clinic      Anticoagulation Summary as of 9/8/2017     INR goal 2.0-3.0   Today's INR 2.7   Maintenance plan 5 mg (5 mg x 1) every day   Full instructions 5 mg every day   Weekly total 35 mg   No change documented Lyla Jeter, RN   Plan last modified Samantha Galeas RN (4/18/2016)   Next INR check 10/20/2017   Priority INR   Target end date     Indications   Long-term (current) use of anticoagulants [Z79.01] [Z79.01]  Atrial fibrillation (H) (Resolved) [I48.91]         Anticoagulation Episode Summary     INR check location     Preferred lab     Send INR reminders to [facility]    Comments       Anticoagulation Care Providers     Provider Role Specialty Phone number    Malcolm Schafer MD [facility] Internal Medicine [phone]            See the Encounter Report to view Anticoagulation Flowsheet and Dosing Calendar (Go to Encounters tab in chart review, and find the Anticoagulation Therapy Visit)    Dosage adjustment made based on physician directed care plan.    Lyla Jeter, TOMEKA

## 2017-09-08 NOTE — MR AVS SNAPSHOT
Billy Stock   9/8/2017 10:30 AM   Anticoagulation Therapy Visit    Description:  71 year old male   Provider:  [facility]   Department:  [facility]           INR as of 9/8/2017     Today's INR 2.7      Anticoagulation Summary as of 9/8/2017     INR goal 2.0-3.0   Today's INR 2.7   Full instructions 5 mg every day   Next INR check 10/20/2017    Indications   Long-term (current) use of anticoagulants [Z79.01] [Z79.01]  Atrial fibrillation (H) (Resolved) [I48.91]         Your next Anticoagulation Clinic appointment(s)     Oct 20, 2017 10:30 AM CDT   Anticoagulation Visit with [facility]   [facility]    [address]  [facility] [address] [phone]              Contact Numbers     [facility] Phone Numbers:  Anticoagulation Clinic Appointments : [phone]  Anticoagulation Nurse: [phone]         September 2017 Details    Sun Mon Tue Wed Thu Fri Sat          1               2                 3               4               5               6               7               8      5 mg   See details      9      5 mg           10      5 mg         11      5 mg         12      5 mg         13      5 mg         14      5 mg         15      5 mg         16      5 mg           17      5 mg         18      5 mg         19      5 mg         20      5 mg         21      5 mg         22      5 mg         23      5 mg           24      5 mg         25      5 mg         26      5 mg         27      5 mg         28      5 mg         29      5 mg         30      5 mg          Date Details   09/08 This INR check               How to take your warfarin dose     To take:  5 mg Take 1 of the 5 mg tablets.           October 2017 Details    Sun Mon Tue Wed Thu Fri Sat     1      5 mg         2      5 mg         3      5 mg         4      5 mg         5      5 mg         6      5 mg         7      5 mg           8       5 mg         9      5 mg         10      5 mg         11      5 mg         12      5 mg         13      5 mg         14      5 mg           15      5 mg         16      5 mg         17      5 mg         18      5 mg         19      5 mg         20            21                 22               23               24               25               26               27               28                 29               30               31                    Date Details   No additional details    Date of next INR:  10/20/2017         How to take your warfarin dose     To take:  5 mg Take 1 of the 5 mg tablets.

## 2017-09-25 ENCOUNTER — TELEPHONE (OUTPATIENT)
Dept: INTERNAL MEDICINE | Facility: CLINIC | Age: 72
End: 2017-09-25

## 2017-09-25 DIAGNOSIS — I48.20 CHRONIC ATRIAL FIBRILLATION (H): ICD-10-CM

## 2017-09-25 DIAGNOSIS — I48.20 CHRONIC ATRIAL FIBRILLATION (H): Primary | ICD-10-CM

## 2017-09-25 RX ORDER — WARFARIN SODIUM 5 MG/1
5 TABLET ORAL DAILY
Qty: 90 TABLET | Refills: 1 | Status: SHIPPED | OUTPATIENT
Start: 2017-09-25 | End: 2018-04-03

## 2017-09-25 RX ORDER — METOPROLOL SUCCINATE 100 MG/1
TABLET, EXTENDED RELEASE ORAL
Qty: 90 TABLET | Refills: 0 | Status: SHIPPED | OUTPATIENT
Start: 2017-09-25 | End: 2018-02-21

## 2017-09-25 NOTE — TELEPHONE ENCOUNTER
Warfarin 5 mg    Last Written Prescription Date: 10/4/16  Last Fill Qty: 90, # refills: 4  Last Office Visit with [facility], [facility] or [facility] prescribing provider: 1/18/17       Date and Result of Last PT/INR:   Lab Results   Component Value Date    INR 2.7 09/08/2017    INR 2.3 07/28/2017    INR 2.52 10/23/2016    INR 2.13 10/21/2016   Prescription approved per [facility] Refill Protocol.  Samantha Galeas RN

## 2017-09-25 NOTE — TELEPHONE ENCOUNTER
Metoprolol      Last Written Prescription Date: 8/25/17  Last Fill Quantity: 90, # refills: 0    Last Office Visit with G, P or [facility] prescribing provider:  1/18/17   Future Office Visit:        BP Readings from Last 3 Encounters:   08/16/17 130/72   04/05/17 140/76   02/15/17 138/80     Will schedule OV when comes in next month for INR  Taylor Medina RN

## 2017-10-20 ENCOUNTER — ANTICOAGULATION THERAPY VISIT (OUTPATIENT)
Dept: ANTICOAGULATION | Facility: CLINIC | Age: 72
End: 2017-10-20
Payer: COMMERCIAL

## 2017-10-20 DIAGNOSIS — Z79.01 LONG-TERM (CURRENT) USE OF ANTICOAGULANTS: ICD-10-CM

## 2017-10-20 LAB — INR POINT OF CARE: 2.9 (ref 0.86–1.14)

## 2017-10-20 PROCEDURE — 85610 PROTHROMBIN TIME: CPT | Mod: QW

## 2017-10-20 PROCEDURE — 36416 COLLJ CAPILLARY BLOOD SPEC: CPT

## 2017-10-20 PROCEDURE — 99207 ZZC NO CHARGE NURSE ONLY: CPT

## 2017-10-20 NOTE — MR AVS SNAPSHOT
Billy Montrell   10/20/2017 10:30 AM   Anticoagulation Therapy Visit    Description:  71 year old male   Provider:  [facility]   Department:  [facility]           INR as of 10/20/2017     Today's INR 2.9      Anticoagulation Summary as of 10/20/2017     INR goal 2.0-3.0   Today's INR 2.9   Full instructions 5 mg every day   Next INR check 12/1/2017    Indications   Long-term (current) use of anticoagulants [Z79.01] [Z79.01]  Atrial fibrillation (H) (Resolved) [I48.91]         Your next Anticoagulation Clinic appointment(s)     Dec 01, 2017 10:00 AM CST   Anticoagulation Visit with [facility]   [facility]    [address] [facility] [address]  [facility] [address] [phone]              Contact Numbers     [facility] Phone Numbers:  Anticoagulation Clinic Appointments : [phone]  Anticoagulation Nurse: [phone]         October 2017 Details    Sun Mon Tue Wed Thu Fri Sat     1               2               3               4               5               6               7                 8               9               10               11               12               13               14                 15               16               17               18               19               20      5 mg   See details      21      5 mg           22      5 mg         23      5 mg         24      5 mg         25      5 mg         26      5 mg         27      5 mg         28      5 mg           29      5 mg         30      5 mg         31      5 mg              Date Details   10/20 This INR check               How to take your warfarin dose     To take:  5 mg Take 1 of the 5 mg tablets.           November 2017 Details    Sun Mon Tue Wed Thu Fri Sat        1      5 mg         2      5 mg         3      5 mg         4      5 mg           5      5 mg         6      5 mg         7      5 mg         8      5 mg         9      5  mg         10      5 mg         11      5 mg           12      5 mg         13      5 mg         14      5 mg         15      5 mg         16      5 mg         17      5 mg         18      5 mg           19      5 mg         20      5 mg         21      5 mg         22      5 mg         23      5 mg         24      5 mg         25      5 mg           26      5 mg         27      5 mg         28      5 mg         29      5 mg         30      5 mg            Date Details   No additional details            How to take your warfarin dose     To take:  5 mg Take 1 of the 5 mg tablets.           December 2017 Details    Sun Mon Tue Wed Thu Fri Sat          1            2                 3               4               5               6               7               8               9                 10               11               12               13               14               15               16                 17               18               19               20               21               22               23                 24               25               26               27               28               29               30                 31                      Date Details   No additional details    Date of next INR:  12/1/2017         How to take your warfarin dose     To take:  5 mg Take 1 of the 5 mg tablets.

## 2017-10-20 NOTE — PROGRESS NOTES
ANTICOAGULATION FOLLOW-UP CLINIC VISIT    Patient Name:  Billy Stock  Date:  10/20/2017  Contact Type:  Face to Face    SUBJECTIVE:     Patient Findings     Positives No Problem Findings           OBJECTIVE    INR Protime   Date Value Ref Range Status   10/20/2017 2.9 (A) 0.86 - 1.14 Final       ASSESSMENT / PLAN  INR assessment THER    Recheck INR In: 6 WEEKS    INR Location Clinic      Anticoagulation Summary as of 10/20/2017     INR goal 2.0-3.0   Today's INR 2.9   Maintenance plan 5 mg (5 mg x 1) every day   Full instructions 5 mg every day   Weekly total 35 mg   No change documented Lyla Jeter, RN   Plan last modified Samantha Galeas RN (4/18/2016)   Next INR check 12/1/2017   Priority INR   Target end date     Indications   Long-term (current) use of anticoagulants [Z79.01] [Z79.01]  Atrial fibrillation (H) (Resolved) [I48.91]         Anticoagulation Episode Summary     INR check location     Preferred lab     Send INR reminders to [facility]    Comments       Anticoagulation Care Providers     Provider Role Specialty Phone number    Malcolm Schafer MD [facility] Internal Medicine [phone]            See the Encounter Report to view Anticoagulation Flowsheet and Dosing Calendar (Go to Encounters tab in chart review, and find the Anticoagulation Therapy Visit)    Dosage adjustment made based on physician directed care plan.    Lyla Jeter, TOMEKA

## 2017-10-27 DIAGNOSIS — I10 ESSENTIAL HYPERTENSION WITH GOAL BLOOD PRESSURE LESS THAN 140/90: ICD-10-CM

## 2017-10-27 RX ORDER — AMLODIPINE BESYLATE 10 MG/1
10 TABLET ORAL DAILY
Qty: 90 TABLET | Refills: 1 | Status: SHIPPED | OUTPATIENT
Start: 2017-10-27 | End: 2018-04-27

## 2017-10-27 NOTE — TELEPHONE ENCOUNTER
Routing refill request to provider for review/approval because:  Labs out of range:  Creatinine elevated on 1/27/17. recommendation was to repeat in 3 months, but pt did not do this. Please advise if pt need to have this done now or if okay to wait until due for appt in January.

## 2017-11-01 DIAGNOSIS — M10.9 GOUT OF FOOT, UNSPECIFIED CAUSE, UNSPECIFIED CHRONICITY, UNSPECIFIED LATERALITY: ICD-10-CM

## 2017-11-04 NOTE — TELEPHONE ENCOUNTER
Routing refill request to provider for review/approval because:  Labs not current:  CBC, Cr;  greater than 12 months

## 2017-11-05 RX ORDER — ALLOPURINOL 100 MG/1
100 TABLET ORAL DAILY
Qty: 90 TABLET | Refills: 4 | Status: SHIPPED | OUTPATIENT
Start: 2017-11-05 | End: 2018-10-29

## 2017-11-20 ENCOUNTER — OFFICE VISIT (OUTPATIENT)
Dept: CARDIOLOGY | Facility: CLINIC | Age: 72
End: 2017-11-20
Attending: INTERNAL MEDICINE
Payer: COMMERCIAL

## 2017-11-20 VITALS
WEIGHT: 240 LBS | HEIGHT: 73 IN | SYSTOLIC BLOOD PRESSURE: 146 MMHG | BODY MASS INDEX: 31.81 KG/M2 | DIASTOLIC BLOOD PRESSURE: 90 MMHG | HEART RATE: 84 BPM

## 2017-11-20 DIAGNOSIS — I48.0 PAROXYSMAL ATRIAL FIBRILLATION (H): ICD-10-CM

## 2017-11-20 PROCEDURE — 99213 OFFICE O/P EST LOW 20 MIN: CPT | Performed by: INTERNAL MEDICINE

## 2017-11-20 PROCEDURE — 93000 ELECTROCARDIOGRAM COMPLETE: CPT | Performed by: INTERNAL MEDICINE

## 2017-11-20 NOTE — PROGRESS NOTES
HISTORY OF PRESENT ILLNESS:  I saw Mr. Kirby for followup of atrial fibrillation.  He is a 72-year-old black male with chronic atrial fibrillation.  He has been on rate control and anticoagulation.  About 3 years ago he was in sinus rhythm but later on he reverted back to atrial fibrillation.  On the current medications, he does not feel palpitation, shortness of breath of fatigue.  He has not had any bleeding problem from anticoagulation.      PHYSICAL EXAMINATION:     VITAL SIGNS:  The initial blood pressure reading was 146/90.  When I rechecked it, it was 120/70, heart rate 84 beats per minute, body weight 240 pounds.   EYES AND ENT:  Unremarkable.   LUNGS:  Clear.   CARDIAC:  Rhythm was irregularly irregular.  The heart sounds were normal, without murmur.   ABDOMEN:  Moderate obesity.   EXTREMITIES:  There was no pedal edema.        EKG showed atrial fibrillation with controlled ventricular rate.      ASSESSMENT AND RECOMMENDATIONS:  Mr. Kirby is doing well symptomatically.  His weight is trending down and I encouraged him to continue the weight loss effort.  He can continue the current medications for atrial fibrillation.  He is scheduled to return for followup in 1 year.      cc:      Malcolm Schafer MD   [facility]   [address]   [place] [address]         JONATHAN MITTAL MD             D: 2017 14:20   T: 2017 17:03   MT: ALONDRA      Name:     TONY KIRBY   MRN:      [id number]        Account:      [id number]   :      1945           Service Date: 2017      Document: [id number]

## 2017-11-20 NOTE — LETTER
11/20/2017      Malcolm Schafer MD  [address] [facility] [address]      RE: Billy Stock       Dear Colleague,    I had the pleasure of seeing Billy Stock in the [facility].    HISTORY OF PRESENT ILLNESS:  I saw Mr. Stock for followup of atrial fibrillation.  He is a 72-year-old black male with chronic atrial fibrillation.  He has been on rate control and anticoagulation.  About 3 years ago he was in sinus rhythm but later on he reverted back to atrial fibrillation.  On the current medications, he does not feel palpitation, shortness of breath of fatigue.  He has not had any bleeding problem from anticoagulation.      PHYSICAL EXAMINATION:     VITAL SIGNS:  The initial blood pressure reading was 146/90.  When I rechecked it, it was 120/70, heart rate 84 beats per minute, body weight 240 pounds.   EYES AND ENT:  Unremarkable.   LUNGS:  Clear.   CARDIAC:  Rhythm was irregularly irregular.  The heart sounds were normal, without murmur.   ABDOMEN:  Moderate obesity.   EXTREMITIES:  There was no pedal edema.        EKG showed atrial fibrillation with controlled ventricular rate.      ASSESSMENT AND RECOMMENDATIONS:  Mr. Stock is doing well symptomatically.  His weight is trending down and I encouraged him to continue the weight loss effort.  He can continue the current medications for atrial fibrillation.  He is scheduled to return for followup in 1 year.        Outpatient Encounter Prescriptions as of 11/20/2017   Medication Sig Dispense Refill     allopurinol (ZYLOPRIM) 100 MG tablet Take 1 tablet (100 mg) by mouth daily 90 tablet 4     amLODIPine (NORVASC) 10 MG tablet Take 1 tablet (10 mg) by mouth daily 90 tablet 1     metoprolol (TOPROL-XL) 100 MG 24 hr tablet TAKE 1 TABLET (100 MG) BY MOUTH DAILY 90 tablet 0     warfarin (COUMADIN) 5 MG tablet Take 1 tablet (5 mg) by mouth daily Or as instructed by INR clinic 90 tablet 1     VENTOLIN  (90 BASE) MCG/ACT  Inhaler        [DISCONTINUED] lisinopril (PRINIVIL,ZESTRIL) 10 MG tablet Take 2 tablets (20 mg) by mouth daily 180 tablet 4     fluticasone (FLONASE) 50 MCG/ACT nasal spray Spray 1-2 sprays into both nostrils daily 1 Package 5     ORDER FOR DME Equipment being ordered: CPAP-mask, hose and supplies 1 Device 1     ORDER FOR DME Equipment being ordered: Digital Blood pressure Monitor 1 Device 1     Cetirizine HCl (ZYRTEC ALLERGY PO) Take 10 mg by mouth daily as needed        acetaminophen (TYLENOL) 325 MG tablet Take 1-2 tablets by mouth every 6 hours as needed.       [DISCONTINUED] cyclobenzaprine (FLEXERIL) 5 MG tablet Take 1 tablet (5 mg) by mouth every 8 hours as needed for muscle spasms 30 tablet 0     [DISCONTINUED] HYDROcodone-acetaminophen (NORCO) 5-325 MG per tablet Take 1 tablet by mouth every 6 hours as needed 20 tablet 0     No facility-administered encounter medications on file as of 11/20/2017.        Again, thank you for allowing me to participate in the care of your patient.      Sincerely,    Justo Reilly MD     [facility]

## 2017-11-20 NOTE — PROGRESS NOTES
HPI and Plan:   See dictation    Orders Placed This Encounter   Procedures     Follow-Up with Cardiac Advanced Practice Provider     Follow-Up with Electrophysiologist     EKG 12-lead complete w/read - Clinics (performed today)       No orders of the defined types were placed in this encounter.      Medications Discontinued During This Encounter   Medication Reason     cyclobenzaprine (FLEXERIL) 5 MG tablet Therapy completed     HYDROcodone-acetaminophen (NORCO) 5-325 MG per tablet Therapy completed         Encounter Diagnosis   Name Primary?     Paroxysmal atrial fibrillation (H)        CURRENT MEDICATIONS:  Current Outpatient Prescriptions   Medication Sig Dispense Refill     allopurinol (ZYLOPRIM) 100 MG tablet Take 1 tablet (100 mg) by mouth daily 90 tablet 4     amLODIPine (NORVASC) 10 MG tablet Take 1 tablet (10 mg) by mouth daily 90 tablet 1     metoprolol (TOPROL-XL) 100 MG 24 hr tablet TAKE 1 TABLET (100 MG) BY MOUTH DAILY 90 tablet 0     warfarin (COUMADIN) 5 MG tablet Take 1 tablet (5 mg) by mouth daily Or as instructed by INR clinic 90 tablet 1     VENTOLIN  (90 BASE) MCG/ACT Inhaler        lisinopril (PRINIVIL,ZESTRIL) 10 MG tablet Take 2 tablets (20 mg) by mouth daily 180 tablet 4     fluticasone (FLONASE) 50 MCG/ACT nasal spray Spray 1-2 sprays into both nostrils daily 1 Package 5     ORDER FOR DME Equipment being ordered: CPAP-mask, hose and supplies 1 Device 1     ORDER FOR DME Equipment being ordered: Digital Blood pressure Monitor 1 Device 1     Cetirizine HCl (ZYRTEC ALLERGY PO) Take 10 mg by mouth daily as needed        acetaminophen (TYLENOL) 325 MG tablet Take 1-2 tablets by mouth every 6 hours as needed.         ALLERGIES     Allergies   Allergen Reactions     Cats      Codeine      Hallucinated when taking codeine with another medication     Maple Tree      Morphine Visual Disturbance     Watermelon [Citrullus Vulgaris]      Itching throat, hives       PAST MEDICAL HISTORY:  Past  Medical History:   Diagnosis Date     Bell's palsy 10/15/2002     CA IN SITU PROSTATE 10/15/2002     Chronic atrial fibrillation (H) 7/1/10     Glaucoma 4/10/2003     Problem list name updated by automated process. Provider to review     Gout 2013     Hyperlipidemia LDL goal <100 9/10/2014     Hypertension goal BP (blood pressure) < 140/90 2006     SARAH (obstructive sleep apnea) 2013     Pericarditis 2001     Renal cyst        PAST SURGICAL HISTORY:  Past Surgical History:   Procedure Laterality Date     CATARACT IOL, RT/LT  10/15, 11/15     COLONOSCOPY  2009    [facility]     COLONOSCOPY  2014    Dr. Long [facility]     COLONOSCOPY N/A 2014    Procedure: COMBINED COLONOSCOPY, SINGLE BIOPSY/POLYPECTOMY BY BIOPSY;  Surgeon: Puneet Long MD;  Location:  GI     EYE SURGERY      glaucoma surgery right eye     PROSTATE SURGERY       SUPRAPUBIC PROSTATECTOMY  2002     VASECTOMY         FAMILY HISTORY:  Family History   Problem Relation Age of Onset     Hypertension Maternal Grandfather      CEREBROVASCULAR DISEASE Maternal Grandfather      Hypertension Maternal Grandmother      CEREBROVASCULAR DISEASE Maternal Grandmother      Hypertension Mother       age 51, MVA     Hypertension Brother      Heart Failure Brother      Bronchitis Brother      Other - See Comments Brother      multiple myeloma     Heart Surgery Brother      defibrilater     Prostate Problems Brother      DIABETES Brother      Breast Cancer Maternal Aunt      hx     CANCER Maternal Aunt      cervical cancer     Cancer - colorectal Maternal Aunt      Unknown/Adopted Father      Don't know father's history       SOCIAL HISTORY:  Social History     Social History     Marital status:      Spouse name: N/A     Number of children: 3     Years of education: N/A     Social History Main Topics     Smoking status: Never Smoker     Smokeless tobacco: Never Used     Alcohol use No     Drug use: No     Sexual activity: No     Other  "Topics Concern     Caffeine Concern No     Occupational Exposure No     Hobby Hazards No     Sleep Concern Yes     CPAP at night     Stress Concern No     Weight Concern No     Special Diet No     Back Care No     Exercise No     Seat Belt Yes     Social History Narrative       Review of Systems:  Skin:  Negative       Eyes:  Positive for cataracts    ENT:  Negative      Respiratory:  Negative       Cardiovascular:  Negative      Gastroenterology: Negative      Genitourinary:  Negative      Musculoskeletal:  Negative      Neurologic:  Negative      Psychiatric:  Negative      Heme/Lymph/Imm:  Negative      Endocrine:  Negative        Physical Exam:  Vitals: /90  Pulse 84  Ht 1.854 m (6' 1\")  Wt 108.9 kg (240 lb)  BMI 31.66 kg/m2    Constitutional:  cooperative, alert and oriented, well developed, well nourished, in no acute distress        Skin:  warm and dry to the touch          Head:  normocephalic        Eyes:  pupils equal and round        Lymph:      ENT:  no pallor or cyanosis        Neck:           Respiratory:  normal breath sounds, clear to auscultation, normal A-P diameter, normal symmetry, normal respiratory excursion, no use of accessory muscles         Cardiac: regular rhythm, normal S1/S2, no S3 or S4, apical impulse not displaced, no murmurs, gallops or rubs                pulses full and equal, no bruits auscultated                                        GI:  abdomen soft, non-tender, BS normoactive, no mass, no HSM, no bruits        Extremities and Muscular Skeletal:  no deformities, clubbing, cyanosis, erythema observed              Neurological:           Psych:           CC  Justo Reilly MD  [address]  [address]  [place] [address]              "

## 2017-11-20 NOTE — MR AVS SNAPSHOT
After Visit Summary   11/20/2017    Billy Stock    MRN: [id number]           Patient Information     Date Of Birth          1945        Visit Information        Provider Department      11/20/2017 1:45 PM Justo Reilly MD [facility]        Today's Diagnoses     Paroxysmal atrial fibrillation (H)           Follow-ups after your visit        Additional Services     Follow-Up with Cardiac Advanced Practice Provider           Follow-Up with Electrophysiologist                 Your next 10 appointments already scheduled     Dec 01, 2017  9:45 AM CST   [facility]tico[facility] Visit with [facility]   [facility]    [address]  [facility] [id number] [phone]            Feb 19, 2018 11:00 AM CST   Return Visit with Hoang Huerta MD   [facility] ([facility]    [address]  [address]  [facility] [address] [phone]              Who to contact     If you have questions or need follow up information about today's clinic visit or your schedule please contact [facility] directly at [phone].  Normal or non-critical lab and imaging results will be communicated to you by MyChart, letter or phone within 4 business days after the clinic has received the results. If you do not hear from us within 7 days, please contact the clinic through MyChart or phone. If you have a critical or abnormal lab result, we will notify you by phone as soon as possible.  Submit refill requests through [employer] or call your pharmacy and they will forward the refill request to us. Please allow 3 business days for your refill to be completed.          Additional Information About Your Visit        MyChart Information     [employer] lets you send messages to your doctor, view your test results, renew your  "prescriptions, schedule appointments and more. To sign up, go to [email or web address] . Click on \"Log in\" on the left side of the screen, which will take you to the Welcome page. Then click on \"Sign up Now\" on the right side of the page.     You will be asked to enter the access code listed below, as well as some personal information. Please follow the directions to create your username and password.     Your access code is: [email or web address]  Expires: 2018  2:47 PM     Your access code will  in 90 days. If you need help or a new code, please call your [place] clinic or [phone].        Care EveryWhere ID     This is your Care EveryWhere ID. This could be used by other organizations to access your [place] medical records  [id number]        Your Vitals Were     Pulse Height BMI (Body Mass Index)             84 1.854 m (6' 1\") 31.66 kg/m2          Blood Pressure from Last 3 Encounters:   17 146/90   17 130/72   17 140/76    Weight from Last 3 Encounters:   17 108.9 kg (240 lb)   17 111.1 kg (245 lb)   17 112.1 kg (247 lb 3.2 oz)              We Performed the Following     EKG 12-lead complete w/read - Clinics (performed today)     Follow-Up with Electrophysiologist        Primary Care Provider Office Phone # Fax #    Malcolm Schafer [phone]       [facility] [address] [facility] [address]        Equal Access to Services     [facility]: Hadii stan gaitan hadasho Solitoali, waaxda luqadaha, qaybta kaalmada sofiya, jake zurita. So [facility] [phone].    ATENCIÓN: Si habla español, tiene a braga disposición servicios gratuitos de asistencia lingüística. Llame al [phone].    We comply with applicable federal civil rights laws and [place] laws. We do not discriminate on the basis of race, color, national origin, age, disability, sex, sexual orientation, or gender identity.            Thank you!     Thank you for " choosing [facility]  for your care. Our goal is always to provide you with excellent care. Hearing back from our patients is one way we can continue to improve our services. Please take a few minutes to complete the written survey that you may receive in the mail after your visit with us. Thank you!             Your Updated Medication List - Protect others around you: Learn how to safely use, store and throw away your medicines at www.disposemymeds.org.          This list is accurate as of: 11/20/17 11:59 PM.  Always use your most recent med list.                   Brand Name Dispense Instructions for use Diagnosis    allopurinol 100 MG tablet    ZYLOPRIM    90 tablet    Take 1 tablet (100 mg) by mouth daily    Gout of foot, unspecified cause, unspecified chronicity, unspecified laterality       amLODIPine 10 MG tablet    NORVASC    90 tablet    Take 1 tablet (10 mg) by mouth daily    Essential hypertension with goal blood pressure less than 140/90       fluticasone 50 MCG/ACT spray    FLONASE    1 Package    Spray 1-2 sprays into both nostrils daily    Seasonal allergies       lisinopril 10 MG tablet    PRINIVIL/ZESTRIL    180 tablet    Take 2 tablets (20 mg) by mouth daily    Benign essential hypertension       metoprolol 100 MG 24 hr tablet    TOPROL-XL    90 tablet    TAKE 1 TABLET (100 MG) BY MOUTH DAILY    Chronic atrial fibrillation (H)       * order for DME     1 Device    Equipment being ordered: CPAP-mask, hose and supplies    SARAH on CPAP       * order for DME     1 Device    Equipment being ordered: Digital Blood pressure Monitor    HTN (hypertension)       TYLENOL 325 MG tablet   Generic drug:  acetaminophen      Take 1-2 tablets by mouth every 6 hours as needed.        VENTOLIN  (90 BASE) MCG/ACT Inhaler   Generic drug:  albuterol           warfarin 5 MG tablet    COUMADIN    90 tablet    Take 1 tablet (5 mg) by mouth daily Or as instructed by INR clinic     Chronic atrial fibrillation (H)       ZYRTEC ALLERGY PO      Take 10 mg by mouth daily as needed        * Notice:  This list has 2 medication(s) that are the same as other medications prescribed for you. Read the directions carefully, and ask your doctor or other care provider to review them with you.

## 2017-11-27 DIAGNOSIS — I10 BENIGN ESSENTIAL HYPERTENSION: ICD-10-CM

## 2017-11-28 RX ORDER — LISINOPRIL 10 MG/1
20 TABLET ORAL DAILY
Qty: 60 TABLET | Refills: 0 | Status: SHIPPED | OUTPATIENT
Start: 2017-11-28 | End: 2017-12-30

## 2017-11-28 NOTE — TELEPHONE ENCOUNTER
Abnormal labs-pt past due for BP appt       Per note attached to 1/27 lab results-Notes Recorded by Malcolm Schafer MD on 1/30/2017 at 10:01 AM  Borderline elevated creatinine. Improved. Suggest to follow up with repeated BMP in 3 months.

## 2017-12-06 ENCOUNTER — ANTICOAGULATION THERAPY VISIT (OUTPATIENT)
Dept: ANTICOAGULATION | Facility: CLINIC | Age: 72
End: 2017-12-06
Payer: COMMERCIAL

## 2017-12-06 ENCOUNTER — TELEPHONE (OUTPATIENT)
Dept: ANTICOAGULATION | Facility: CLINIC | Age: 72
End: 2017-12-06

## 2017-12-06 DIAGNOSIS — I48.20 CHRONIC ATRIAL FIBRILLATION (H): ICD-10-CM

## 2017-12-06 DIAGNOSIS — Z79.01 LONG-TERM (CURRENT) USE OF ANTICOAGULANTS: Primary | ICD-10-CM

## 2017-12-06 DIAGNOSIS — Z79.01 LONG-TERM (CURRENT) USE OF ANTICOAGULANTS: ICD-10-CM

## 2017-12-06 LAB — INR POINT OF CARE: 3.6 (ref 0.86–1.14)

## 2017-12-06 PROCEDURE — 85610 PROTHROMBIN TIME: CPT | Mod: QW

## 2017-12-06 PROCEDURE — 36416 COLLJ CAPILLARY BLOOD SPEC: CPT

## 2017-12-06 PROCEDURE — 99207 ZZC NO CHARGE NURSE ONLY: CPT

## 2017-12-06 NOTE — PROGRESS NOTES
ANTICOAGULATION FOLLOW-UP CLINIC VISIT    Patient Name:  Billy Stock  Date:  12/6/2017  Contact Type:  Face to Face    SUBJECTIVE:     Patient Findings     Positives Change in diet/appetite (Pt reports decrease in greens. will resume. Also increase in stress due to family crisis)           OBJECTIVE    INR Protime   Date Value Ref Range Status   12/06/2017 3.6 (A) 0.86 - 1.14 Final       ASSESSMENT / PLAN  INR assessment SUPRA    Recheck INR In: 3 WEEKS    INR Location Clinic      Anticoagulation Summary as of 12/6/2017     INR goal 2.0-3.0   Today's INR 3.6!   Maintenance plan 5 mg (5 mg x 1) every day   Full instructions 12/6: 2.5 mg; Otherwise 5 mg every day   Weekly total 35 mg   Plan last modified Samantha Galeas RN (4/18/2016)   Next INR check 12/27/2017   Priority INR   Target end date     Indications   Long-term (current) use of anticoagulants [Z79.01] [Z79.01]  Atrial fibrillation (H) (Resolved) [I48.91]         Anticoagulation Episode Summary     INR check location     Preferred lab     Send INR reminders to [facility]    Comments       Anticoagulation Care Providers     Provider Role Specialty Phone number    Malcolm Schafer MD Responsible Internal Medicine [phone]            See the Encounter Report to view Anticoagulation Flowsheet and Dosing Calendar (Go to Encounters tab in chart review, and find the Anticoagulation Therapy Visit)    Dosage adjustment made based on physician directed care plan.    Lyla Jeter RN

## 2017-12-06 NOTE — TELEPHONE ENCOUNTER
For insurance purposes, an annual INR referral is needed.  Please sign order and route message back to p [facility].

## 2017-12-06 NOTE — MR AVS SNAPSHOT
Billy Montrell   12/6/2017 9:30 AM   Anticoagulation Therapy Visit    Description:  72 year old male   Provider:  [facility]   Department:  [facility]           INR as of 12/6/2017     Today's INR 3.6!      Anticoagulation Summary as of 12/6/2017     INR goal 2.0-3.0   Today's INR 3.6!   Full instructions 12/6: 2.5 mg; Otherwise 5 mg every day   Next INR check 12/27/2017    Indications   Long-term (current) use of anticoagulants [Z79.01] [Z79.01]  Atrial fibrillation (H) (Resolved) [I48.91]         Your next Anticoagulation Clinic appointment(s)     Dec 27, 2017 10:00 AM CST   Anticoagulation Visit with [facility]   [facility] ([facility]    [address] [facility] [address]  [facility] [address] [phone]              Contact Numbers     [facility] Phone Numbers:  Anticoagulation Clinic Appointments : [phone]  Anticoagulation Nurse: [phone]         December 2017 Details    Sun Mon Tue Wed Thu Fri Sat          1               2                 3               4               5               6      2.5 mg   See details      7      5 mg         8      5 mg         9      5 mg           10      5 mg         11      5 mg         12      5 mg         13      5 mg         14      5 mg         15      5 mg         16      5 mg           17      5 mg         18      5 mg         19      5 mg         20      5 mg         21      5 mg         22      5 mg         23      5 mg           24      5 mg         25      5 mg         26      5 mg         27            28               29               30                 31                      Date Details   12/06 This INR check       Date of next INR:  12/27/2017         How to take your warfarin dose     To take:  2.5 mg Take 0.5 of a 5 mg tablet.    To take:  5 mg Take 1 of the 5 mg tablets.

## 2017-12-27 ENCOUNTER — ANTICOAGULATION THERAPY VISIT (OUTPATIENT)
Dept: ANTICOAGULATION | Facility: CLINIC | Age: 72
End: 2017-12-27
Payer: COMMERCIAL

## 2017-12-27 DIAGNOSIS — Z79.01 LONG-TERM (CURRENT) USE OF ANTICOAGULANTS: ICD-10-CM

## 2017-12-27 LAB — INR POINT OF CARE: 3.1 (ref 0.86–1.14)

## 2017-12-27 PROCEDURE — 85610 PROTHROMBIN TIME: CPT | Mod: QW

## 2017-12-27 PROCEDURE — 36416 COLLJ CAPILLARY BLOOD SPEC: CPT

## 2017-12-27 PROCEDURE — 99207 ZZC NO CHARGE NURSE ONLY: CPT

## 2017-12-27 NOTE — MR AVS SNAPSHOT
Billy Moore   12/27/2017 10:00 AM   Anticoagulation Therapy Visit    Description:  72 year old male   Provider:  [facility]   Department:  [facility]           INR as of 12/27/2017     Today's INR 3.1!      Anticoagulation Summary as of 12/27/2017     INR goal 2.0-3.0   Today's INR 3.1!   Full instructions 5 mg every day   Next INR check 1/19/2018    Indications   Long-term (current) use of anticoagulants [Z79.01] [Z79.01]  Atrial fibrillation (H) (Resolved) [I48.91]         Your next Anticoagulation Clinic appointment(s)     Jan 19, 2018  9:15 AM CST   Anticoagulation Visit with [facility]   [facility] ([facility])    [address]  [facility] [address] [phone]              Contact Numbers     [facility] Phone Numbers:  Anticoagulation Clinic Appointments : [phone]  Anticoagulation Nurse: [phone]         December 2017 Details    Sun Mon Tue Wed Thu Fri Sat          1               2                 3               4               5               6               7               8               9                 10               11               12               13               14               15               16                 17               18               19               20               21               22               23                 24               25               26               27      5 mg   See details      28      5 mg         29      5 mg         30      5 mg           31      5 mg                Date Details   12/27 This INR check               How to take your warfarin dose     To take:  5 mg Take 1 of the 5 mg tablets.           January 2018 Details    Sun Mon Tue Wed Thu Fri Sat      1      5 mg         2      5 mg         3      5 mg         4      5 mg         5      5 mg         6      5 mg           7      5 mg         8      5 mg         9      5 mg         10       5 mg         11      5 mg         12      5 mg         13      5 mg           14      5 mg         15      5 mg         16      5 mg         17      5 mg         18      5 mg         19            20                 21               22               23               24               25               26               27                 28               29               30               31                   Date Details   No additional details    Date of next INR:  1/19/2018         How to take your warfarin dose     To take:  5 mg Take 1 of the 5 mg tablets.

## 2017-12-27 NOTE — PROGRESS NOTES
ANTICOAGULATION FOLLOW-UP CLINIC VISIT    Patient Name:  Billy Stock  Date:  12/27/2017  Contact Type:  Face to Face    SUBJECTIVE:     Patient Findings     Positives No Problem Findings           OBJECTIVE    INR Protime   Date Value Ref Range Status   12/27/2017 3.1 (A) 0.86 - 1.14 Final       ASSESSMENT / PLAN  INR assessment THER    Recheck INR In: 3 WEEKS    INR Location Clinic      Anticoagulation Summary as of 12/27/2017     INR goal 2.0-3.0   Today's INR 3.1!   Maintenance plan 5 mg (5 mg x 1) every day   Full instructions 5 mg every day   Weekly total 35 mg   No change documented Lyla Jeter, RN   Plan last modified Samantha Galeas RN (4/18/2016)   Next INR check 1/19/2018   Priority INR   Target end date     Indications   Long-term (current) use of anticoagulants [Z79.01] [Z79.01]  Atrial fibrillation (H) (Resolved) [I48.91]         Anticoagulation Episode Summary     INR check location     Preferred lab     Send INR reminders to [facility]    Comments       Anticoagulation Care Providers     Provider Role Specialty Phone number    Malcolm Schafer MD Responsible Internal Medicine [phone]            See the Encounter Report to view Anticoagulation Flowsheet and Dosing Calendar (Go to Encounters tab in chart review, and find the Anticoagulation Therapy Visit)    Dosage adjustment made based on physician directed care plan.    Lyla Jeter RN

## 2017-12-30 DIAGNOSIS — I10 BENIGN ESSENTIAL HYPERTENSION: ICD-10-CM

## 2018-01-03 RX ORDER — LISINOPRIL 10 MG/1
20 TABLET ORAL DAILY
Qty: 60 TABLET | Refills: 0 | Status: SHIPPED | OUTPATIENT
Start: 2018-01-03 | End: 2018-02-05

## 2018-01-03 NOTE — TELEPHONE ENCOUNTER
Pt has a 1/19 appt for Med Check appt, needs enough Lisinopril until that appt.  Please call pt with status.

## 2018-01-03 NOTE — TELEPHONE ENCOUNTER
"Requested Prescriptions   Pending Prescriptions Disp Refills     lisinopril (PRINIVIL/ZESTRIL) 10 MG tablet [Pharmacy Med Name: LISINOPRIL 10MG  TAB] 60 tablet 0     Sig: TAKE TWO TABLETS BY MOUTH ONCE DAILY PAST  DUE FOR  APPT/LABS  NEEDS  TO  CALL  DRS  OFFICE    ACE Inhibitors (Including Combos) Protocol Failed    1/3/2018 11:01 AM       Failed - Blood pressure under 140/90    BP Readings from Last 3 Encounters:   11/20/17 146/90   08/16/17 130/72   04/05/17 140/76          Failed - Normal serum creatinine on file in past 12 months    Recent Labs   Lab Test  01/27/17   0951   CR  1.30*   Per 01/27/17 result note: \"Borderline elevated creatinine. Improved. Suggest to follow up with repeated BMP in 3 months\"       Passed - Recent or future visit with authorizing provider's specialty    Patient had office visit in the last year or has a visit in the next 30 days with authorizing provider.  See chart review.   Last OV: 01/18/17, upcoming appt in Jan 2018       Passed - Patient is age 18 or older       Passed - Normal serum potassium on file in past 12 months    Recent Labs   Lab Test  01/27/17   0951   POTASSIUM  3.9           Medication is being filled for 1 time refill only due to:  upcoming appt    "

## 2018-01-19 ENCOUNTER — ANTICOAGULATION THERAPY VISIT (OUTPATIENT)
Dept: ANTICOAGULATION | Facility: CLINIC | Age: 73
End: 2018-01-19
Payer: COMMERCIAL

## 2018-01-19 ENCOUNTER — OFFICE VISIT (OUTPATIENT)
Dept: INTERNAL MEDICINE | Facility: CLINIC | Age: 73
End: 2018-01-19
Payer: COMMERCIAL

## 2018-01-19 ENCOUNTER — RADIANT APPOINTMENT (OUTPATIENT)
Dept: GENERAL RADIOLOGY | Facility: CLINIC | Age: 73
End: 2018-01-19
Attending: INTERNAL MEDICINE
Payer: COMMERCIAL

## 2018-01-19 VITALS
WEIGHT: 236 LBS | BODY MASS INDEX: 31.28 KG/M2 | OXYGEN SATURATION: 98 % | SYSTOLIC BLOOD PRESSURE: 130 MMHG | DIASTOLIC BLOOD PRESSURE: 80 MMHG | HEART RATE: 65 BPM | HEIGHT: 73 IN | TEMPERATURE: 98.2 F

## 2018-01-19 DIAGNOSIS — I10 HYPERTENSION GOAL BP (BLOOD PRESSURE) < 140/90: ICD-10-CM

## 2018-01-19 DIAGNOSIS — Z23 NEED FOR PROPHYLACTIC VACCINATION AGAINST STREPTOCOCCUS PNEUMONIAE (PNEUMOCOCCUS): ICD-10-CM

## 2018-01-19 DIAGNOSIS — M19.019 ARTHROPATHY OF SHOULDER REGION: ICD-10-CM

## 2018-01-19 DIAGNOSIS — I48.20 CHRONIC ATRIAL FIBRILLATION (H): ICD-10-CM

## 2018-01-19 DIAGNOSIS — Z00.00 ROUTINE GENERAL MEDICAL EXAMINATION AT A HEALTH CARE FACILITY: Primary | ICD-10-CM

## 2018-01-19 DIAGNOSIS — Z79.01 LONG-TERM (CURRENT) USE OF ANTICOAGULANTS: ICD-10-CM

## 2018-01-19 DIAGNOSIS — M10.9 GOUT OF FOOT, UNSPECIFIED CAUSE, UNSPECIFIED CHRONICITY, UNSPECIFIED LATERALITY: ICD-10-CM

## 2018-01-19 DIAGNOSIS — E78.5 HYPERLIPIDEMIA LDL GOAL <100: ICD-10-CM

## 2018-01-19 DIAGNOSIS — D07.5 CARCINOMA IN SITU OF PROSTATE: ICD-10-CM

## 2018-01-19 LAB
ALBUMIN UR-MCNC: 100 MG/DL
APPEARANCE UR: CLEAR
BILIRUB UR QL STRIP: NEGATIVE
COLOR UR AUTO: YELLOW
ERYTHROCYTE [DISTWIDTH] IN BLOOD BY AUTOMATED COUNT: 14.6 % (ref 10–15)
GLUCOSE UR STRIP-MCNC: NEGATIVE MG/DL
HCT VFR BLD AUTO: 43 % (ref 40–53)
HGB BLD-MCNC: 14.3 G/DL (ref 13.3–17.7)
HGB UR QL STRIP: ABNORMAL
INR POINT OF CARE: 3 (ref 0.86–1.14)
KETONES UR STRIP-MCNC: NEGATIVE MG/DL
LEUKOCYTE ESTERASE UR QL STRIP: NEGATIVE
MCH RBC QN AUTO: 27.9 PG (ref 26.5–33)
MCHC RBC AUTO-ENTMCNC: 33.3 G/DL (ref 31.5–36.5)
MCV RBC AUTO: 84 FL (ref 78–100)
MUCOUS THREADS #/AREA URNS LPF: PRESENT /LPF
NITRATE UR QL: NEGATIVE
PH UR STRIP: 7 PH (ref 5–7)
PLATELET # BLD AUTO: 149 10E9/L (ref 150–450)
RBC # BLD AUTO: 5.12 10E12/L (ref 4.4–5.9)
RBC #/AREA URNS AUTO: ABNORMAL /HPF
SOURCE: ABNORMAL
SP GR UR STRIP: 1.01 (ref 1–1.03)
UROBILINOGEN UR STRIP-ACNC: 0.2 EU/DL (ref 0.2–1)
WBC # BLD AUTO: 5.1 10E9/L (ref 4–11)
WBC #/AREA URNS AUTO: ABNORMAL /HPF

## 2018-01-19 PROCEDURE — 90732 PPSV23 VACC 2 YRS+ SUBQ/IM: CPT | Performed by: INTERNAL MEDICINE

## 2018-01-19 PROCEDURE — 99397 PER PM REEVAL EST PAT 65+ YR: CPT | Mod: 25 | Performed by: INTERNAL MEDICINE

## 2018-01-19 PROCEDURE — 99213 OFFICE O/P EST LOW 20 MIN: CPT | Mod: 25 | Performed by: INTERNAL MEDICINE

## 2018-01-19 PROCEDURE — 85027 COMPLETE CBC AUTOMATED: CPT | Performed by: INTERNAL MEDICINE

## 2018-01-19 PROCEDURE — 99207 ZZC NO CHARGE NURSE ONLY: CPT

## 2018-01-19 PROCEDURE — 85610 PROTHROMBIN TIME: CPT | Mod: QW

## 2018-01-19 PROCEDURE — 81001 URINALYSIS AUTO W/SCOPE: CPT | Performed by: INTERNAL MEDICINE

## 2018-01-19 PROCEDURE — 80061 LIPID PANEL: CPT | Performed by: INTERNAL MEDICINE

## 2018-01-19 PROCEDURE — 80053 COMPREHEN METABOLIC PANEL: CPT | Performed by: INTERNAL MEDICINE

## 2018-01-19 PROCEDURE — 84443 ASSAY THYROID STIM HORMONE: CPT | Performed by: INTERNAL MEDICINE

## 2018-01-19 PROCEDURE — G0009 ADMIN PNEUMOCOCCAL VACCINE: HCPCS | Performed by: INTERNAL MEDICINE

## 2018-01-19 PROCEDURE — 73030 X-RAY EXAM OF SHOULDER: CPT | Mod: LT

## 2018-01-19 PROCEDURE — 84550 ASSAY OF BLOOD/URIC ACID: CPT | Performed by: INTERNAL MEDICINE

## 2018-01-19 PROCEDURE — 36416 COLLJ CAPILLARY BLOOD SPEC: CPT

## 2018-01-19 ASSESSMENT — ACTIVITIES OF DAILY LIVING (ADL)
I_NEED_ASSISTANCE_FOR_THE_FOLLOWING_DAILY_ACTIVITIES:: NO ASSISTANCE IS NEEDED
CURRENT_FUNCTION: NO ASSISTANCE NEEDED

## 2018-01-19 NOTE — MR AVS SNAPSHOT
Billy Moore   1/19/2018 9:15 AM   Anticoagulation Therapy Visit    Description:  72 year old male   Provider:  [facility]   Department:  [facility]           INR as of 1/19/2018     Today's INR 3.0      Anticoagulation Summary as of 1/19/2018     INR goal 2.0-3.0   Today's INR 3.0   Full instructions 5 mg every day   Next INR check 2/16/2018    Indications   Long-term (current) use of anticoagulants [Z79.01] [Z79.01]  Atrial fibrillation (H) (Resolved) [I48.91]         Your next Anticoagulation Clinic appointment(s)     Feb 16, 2018 10:00 AM CST   [facility]    [address]  [facility] [address] [phone]              Contact Numbers     [facility] Phone Numbers:  Anticoagulation Clinic Appointments : [phone]  Anticoagulation Nurse: [phone]         January 2018 Details    Sun Mon Tue Wed Thu Fri Sat      1               2               3               4               5               6                 7               8               9               10               11               12               13                 14               15               16               17               18               19      5 mg   See details      20      5 mg           21      5 mg         22      5 mg         23      5 mg         24      5 mg         25      5 mg         26      5 mg         27      5 mg           28      5 mg         29      5 mg         30      5 mg         31      5 mg             Date Details   01/19 This INR check               How to take your warfarin dose     To take:  5 mg Take 1 of the 5 mg tablets.           February 2018 Details    Sun Mon Tue Wed Thu Fri Sat         1      5 mg         2      5 mg         3      5 mg           4      5 mg         5      5 mg         6      5 mg         7      5 mg         8      5 mg         9       5 mg         10      5 mg           11      5 mg         12      5 mg         13      5 mg         14      5 mg         15      5 mg         16            17                 18               19               20               21               22               23               24                 25               26               27               28                   Date Details   No additional details    Date of next INR:  2/16/2018         How to take your warfarin dose     To take:  5 mg Take 1 of the 5 mg tablets.

## 2018-01-19 NOTE — NURSING NOTE
"  Chief Complaint   Patient presents with     Wellness Visit     Fasting Px     Shoulder left     Lt shoulder discomfort       Initial /78  Pulse 65  Temp 98.2  F (36.8  C) (Oral)  Ht 6' 1\" (1.854 m)  Wt 236 lb (107 kg)  SpO2 98%  BMI 31.14 kg/m2 Estimated body mass index is 31.14 kg/(m^2) as calculated from the following:    Height as of this encounter: 6' 1\" (1.854 m).    Weight as of this encounter: 236 lb (107 kg).  Medication Reconciliation: complete   Lamar Campbell MA      "

## 2018-01-19 NOTE — MR AVS SNAPSHOT
After Visit Summary   1/19/2018    Billy Stock    MRN: [id number]           Patient Information     Date Of Birth          1945        Visit Information        Provider Department      1/19/2018 8:40 AM Malcolm Schafer MD [facility]        Today's Diagnoses     Routine general medical examination at a health care facility    -  1    Arthropathy of shoulder region        Hypertension goal BP (blood pressure) < 140/90        Carcinoma in situ of prostate        Hyperlipidemia LDL goal <100        Gout of foot, unspecified cause, unspecified chronicity, unspecified laterality        Chronic atrial fibrillation (H)           Follow-ups after your visit        Additional Services     [facility] PT, HAND, AND CHIROPRACTIC REFERRAL       **This order will print in the [facility] Scheduling Office**    Physical Therapy, Hand Therapy and Chiropractic Care are available through:    *[facility]  *[facility]  *[facility]    Call one number to schedule at any of the above locations: [phone].    Your provider has referred you to: Physical Therapy at [facility] or [facility]    Indication/Reason for Referral: Shoulder Pain  Onset of Illness: 3 months   Therapy Orders: Evaluate and Treat  Special Programs: None  Special Request: None    Alice Jenkins      Additional Comments for the Therapist or Chiropractor:     Please be aware that coverage of these services is subject to the terms and limitations of your health insurance plan.  Call member services at your health plan with any benefit or coverage questions.      Please bring the following to your appointment:    *Your personal calendar for scheduling future appointments  *Comfortable clothing                  Your next 10 appointments already scheduled     Feb 19, 2018 11:00 AM CST   Return Visit with Hoang Huerta MD   [facility]    [address]  [address]  [place] [address]   [phone]              Future tests that were ordered for you today     Open Future Orders        Priority Expected Expires Ordered    XR Shoulder Left 2 Views Routine 2018            Who to contact     If you have questions or need follow up information about today's clinic visit or your schedule please contact [facility] directly at [phone].  Normal or non-critical lab and imaging results will be communicated to you by MyChart, letter or phone within 4 business days after the clinic has received the results. If you do not hear from us within 7 days, please contact the clinic through [employer] or phone. If you have a critical or abnormal lab result, we will notify you by phone as soon as possible.  Submit refill requests through [employer] or call your pharmacy and they will forward the refill request to us. Please allow 3 business days for your refill to be completed.          Additional Information About Your Visit        [facility] Information     [employer] lets you send messages to your doctor, view your test results, renew your prescriptions, schedule appointments and more. To sign up, go to [email or web address] . Click on \"Log in\" on the left side of the screen, which will take you to the Welcome page. Then click on \"Sign up Now\" on the right side of the page.     You will be asked to enter the access code listed below, as well as some personal information. Please follow the directions to create your username and password.     Your access code is: [email or web address]  Expires: 2018  2:47 PM     Your access code will  in 90 days. If you need help or a new code, please call your [facility] or [phone].        Care EveryWhere ID     This is your Care EveryWhere ID. This could be used by other organizations to access your [facility] medical records  [id number]        Your Vitals Were  " "   Pulse Temperature Height Pulse Oximetry BMI (Body Mass Index)       65 98.2  F (36.8  C) (Oral) 6' 1\" (1.854 m) 98% 31.14 kg/m2        Blood Pressure from Last 3 Encounters:   01/19/18 130/80   11/20/17 146/90   08/16/17 130/72    Weight from Last 3 Encounters:   01/19/18 236 lb (107 kg)   11/20/17 240 lb (108.9 kg)   08/16/17 245 lb (111.1 kg)              We Performed the Following     *UA reflex to Microscopic     CBC with platelets     Comprehensive metabolic panel     [facility] PT, HAND, AND CHIROPRACTIC REFERRAL     Lipid panel reflex to direct LDL Fasting     TSH with free T4 reflex     Uric acid        Primary Care Provider Office Phone # Fax #    Malcolm Schafer [phone]       [address] [facility] [address]        Equal Access to Services     [facility] : Hadii aad ku hadasho Sostephon, waaxda luqadaha, qaybta kaalmada adeegyada, waxay kelvinin hayaan spencer hortonn . So [facility] [phone].    ATENCIÓN: Si habla español, tiene a braga disposición servicios gratuitos de asistencia lingüística. Llame al [phone].    We comply with applicable federal civil rights laws and [place] laws. We do not discriminate on the basis of race, color, national origin, age, disability, sex, sexual orientation, or gender identity.            Thank you!     Thank you for choosing [facility]  for your care. Our goal is always to provide you with excellent care. Hearing back from our patients is one way we can continue to improve our services. Please take a few minutes to complete the written survey that you may receive in the mail after your visit with us. Thank you!             Your Updated Medication List - Protect others around you: Learn how to safely use, store and throw away your medicines at www.disposemymeds.org.          This list is accurate as of: 1/19/18  9:30 AM.  Always use your most recent med list.                   Brand Name Dispense Instructions for use " Diagnosis    allopurinol 100 MG tablet    ZYLOPRIM    90 tablet    Take 1 tablet (100 mg) by mouth daily    Gout of foot, unspecified cause, unspecified chronicity, unspecified laterality       amLODIPine 10 MG tablet    NORVASC    90 tablet    Take 1 tablet (10 mg) by mouth daily    Essential hypertension with goal blood pressure less than 140/90       fluticasone 50 MCG/ACT spray    FLONASE    1 Package    Spray 1-2 sprays into both nostrils daily    Seasonal allergies       lisinopril 10 MG tablet    PRINIVIL/ZESTRIL    60 tablet    Take 2 tablets (20 mg) by mouth daily    Benign essential hypertension       metoprolol succinate 100 MG 24 hr tablet    TOPROL-XL    90 tablet    TAKE 1 TABLET (100 MG) BY MOUTH DAILY    Chronic atrial fibrillation (H)       TYLENOL 325 MG tablet   Generic drug:  acetaminophen      Take 1-2 tablets by mouth every 6 hours as needed.        VENTOLIN  (90 BASE) MCG/ACT Inhaler   Generic drug:  albuterol           warfarin 5 MG tablet    COUMADIN    90 tablet    Take 1 tablet (5 mg) by mouth daily Or as instructed by INR clinic    Chronic atrial fibrillation (H)       ZYRTEC ALLERGY PO      Take 10 mg by mouth daily as needed

## 2018-01-19 NOTE — PROGRESS NOTES
SUBJECTIVE:   Billy Stock is a 72 year old male who presents for Preventive Visit.      Are you in the first 12 months of your Medicare coverage?  No    Physical   Annual:     Getting at least 3 servings of Calcium per day::  Yes    Bi-annual eye exam::  Yes    Dental care twice a year::  Yes    Sleep apnea or symptoms of sleep apnea::  Sleep apnea    Diet::  Regular (no restrictions)    Frequency of exercise::  None    Taking medications regularly::  Yes    Medication side effects::  None    Additional concerns today::  YES    Ability to successfully perform activities of daily living: no assistance needed  Home Safety:  Throw rugs in the hallway and lack of grab bars in the bathroom  Hearing Impairment: feel that people are mumbling or not speaking clearly      Ability to successfully perform activities of daily living: Yes, no assistance needed  Home safety:  none identified   Hearing impairment: no      Fall risk:       click delete button to remove this line nowCOGNITIVE SCREEN  1) Repeat 3 items (Banana, Sunrise, Chair)    2) Clock draw: NORMAL  3) 3 item recall: Recalls 3 objects  Results: 3 items recalled: COGNITIVE IMPAIRMENT LESS LIKELY    Mini-CogTM Copyright S Gissel. Licensed by the author for use in [facility]; reprinted with permission (elvia@[email or web address]). All rights reserved.          Reviewed and updated as needed this visit by clinical staffTobacco  Allergies  Meds  Med Hx  Surg Hx  Fam Hx  Soc Hx        Reviewed and updated as needed this visit by Provider        Social History   Substance Use Topics     Smoking status: Never Smoker     Smokeless tobacco: Never Used     Alcohol use No       Alcohol Use 1/19/2018   If you drink alcohol, do you typically have greater than 3 drinks per day OR greater than 7 drinks per week?   No           PROBLEMS TO ADD ON...    Has h/o HTN. on medical treatment. BP has been controlled. No side effects from medications. No CP, HA, dizziness. good  compliance with medications and low salt diet.  Has history of atrial fibrillation. On anticoagulation with Coumadin and rate control medications. Asymptonatic - no chest pains , palpitations,  no side effects from medications.  Has h/o prostate cancer, no recurrence, follows with urology  Concern for left shoulder pain for several months. Worse with physical work. No edema.     Today's PHQ-2 Score: 0  PHQ-2 ( 1999 Pfizer) 1/19/2018   Q1: Little interest or pleasure in doing things 0   Q2: Feeling down, depressed or hopeless 0   PHQ-2 Score 0   Q1: Little interest or pleasure in doing things Not at all   Q2: Feeling down, depressed or hopeless Not at all   PHQ-2 Score 0       Do you feel safe in your environment - Yes    Do you have a Health Care Directive?: Yes: Advance Directive has been received and scanned.    Current providers sharing in care for this patient include:   Patient Care Team:  Malcolm Schafer MD as PCP - General (Internal Medicine)    The following health maintenance items are reviewed in Epic and correct as of today:  Health Maintenance   Topic Date Due     HEPATITIS C SCREENING  10/28/1963     AORTIC ANEURYSM SCREENING (SYSTEM ASSIGNED)  10/28/2011     OP ANNUAL INR REFERRAL  08/22/2017     FALL RISK ASSESSMENT  01/18/2018     PNEUMOCOCCAL (2 of 2 - PPSV23) 01/18/2018     ADVANCE DIRECTIVE PLANNING Q5 YRS  08/28/2018     COLONOSCOPY Q5 YR  09/30/2019     LIPID SCREEN Q5 YR MALE (SYSTEM ASSIGNED)  01/18/2022     TETANUS IMMUNIZATION (SYSTEM ASSIGNED)  04/02/2022     INFLUENZA VACCINE (SYSTEM ASSIGNED)  Addressed     Labs reviewed in EPIC  BP Readings from Last 3 Encounters:   01/19/18 150/78   11/20/17 146/90   08/16/17 130/72    Wt Readings from Last 3 Encounters:   01/19/18 236 lb (107 kg)   11/20/17 240 lb (108.9 kg)   08/16/17 245 lb (111.1 kg)                      Pneumonia Vaccine:Adults age 65+ who received Pneumovax (PPSV23) at 65 years or older: Should be given PCV13 > 1 year after  "their most recent PPSV23  Review of Systems  C: NEGATIVE for fever, chills, change in weight  I: NEGATIVE for worrisome rashes, moles or lesions  E: NEGATIVE for vision changes or irritation  E/M: NEGATIVE for ear, mouth and throat problems  R: NEGATIVE for significant cough or SOB  B: NEGATIVE for masses, tenderness or discharge  CV: NEGATIVE for chest pain, palpitations or peripheral edema  GI: NEGATIVE for nausea, abdominal pain, heartburn, or change in bowel habits  : NEGATIVE for frequency, dysuria, or hematuria  M: NEGATIVE for significant arthralgias or myalgia  N: NEGATIVE for weakness, dizziness or paresthesias  E: NEGATIVE for temperature intolerance, skin/hair changes  H: NEGATIVE for bleeding problems  P: NEGATIVE for changes in mood or affect    OBJECTIVE:   Pulse 65  Temp 98.2  F (36.8  C) (Oral)  Ht 6' 1\" (1.854 m)  Wt 236 lb (107 kg)  SpO2 98%  BMI 31.14 kg/m2 Estimated body mass index is 31.14 kg/(m^2) as calculated from the following:    Height as of this encounter: 6' 1\" (1.854 m).    Weight as of this encounter: 236 lb (107 kg).  Physical Exam  GENERAL: overweight , alert and no distress  EYES: Eyes grossly normal to inspection, PERRL and conjunctivae and sclerae normal  HENT: ear canals and TM's normal, nose and mouth without ulcers or lesions  NECK: no adenopathy, no asymmetry, masses, or scars and thyroid normal to palpation  RESP: lungs clear to auscultation - no rales, rhonchi or wheezes  CV: regular rate and rhythm, normal S1 S2, no S3 or S4, no murmur, click or rub, no peripheral edema and peripheral pulses strong  ABDOMEN: soft,obese, nontender, no hepatosplenomegaly, no masses and bowel sounds normal  MS: no gross musculoskeletal defects noted, no edema         Left shoulder restricted abduction, has pain in the superior, posterior shoulder, mild crepitus   SKIN: no suspicious lesions or rashes  NEURO: Normal strength and tone, mentation intact and speech normal  PSYCH: mentation " "appears normal, affect normal/bright    ASSESSMENT / PLAN:       ICD-10-CM    1. Routine general medical examination at a health care facility Z00.00 CBC with platelets     Comprehensive metabolic panel     Lipid panel reflex to direct LDL Fasting     TSH with free T4 reflex     *UA reflex to Microscopic     Uric acid   2. Arthropathy of shoulder region M19.019 XR Shoulder Left 2 Views     [facility] PT, HAND, AND CHIROPRACTIC REFERRAL   3. Hypertension goal BP (blood pressure) < 140/90 I10 CBC with platelets     Comprehensive metabolic panel     TSH with free T4 reflex     *UA reflex to Microscopic   4. Carcinoma in situ of prostate D07.5    5. Hyperlipidemia LDL goal <100 E78.5 Lipid panel reflex to direct LDL Fasting   6. Gout of foot, unspecified cause, unspecified chronicity, unspecified laterality M10.9 Uric acid   7. Chronic atrial fibrillation (H) I48.2 TSH with free T4 reflex     Assess shoulder X rays, refer to PT, likely OA of the shoulder. Can take PRN Tylenol, reassess as needed   Monitor BP, adjust medications if elevated, keep low salt diet.       End of Life Planning:  Patient currently has an advanced directive: Yes.  Practitioner is supportive of decision.    COUNSELING:  Reviewed preventive health counseling, as reflected in patient instructions       Regular exercise       Healthy diet/nutrition       Vision screening       Hearing screening       Dental care       Colon cancer screening       Prostate cancer screening        Estimated body mass index is 31.14 kg/(m^2) as calculated from the following:    Height as of this encounter: 6' 1\" (1.854 m).    Weight as of this encounter: 236 lb (107 kg).  Weight management plan: Discussed healthy diet and exercise guidelines and patient will follow up in 12 months in clinic to re-evaluate.   reports that he has never smoked. He has never used smokeless tobacco.        Appropriate preventive services were discussed with this patient, including applicable " screening as appropriate for cardiovascular disease, diabetes, osteopenia/osteoporosis, and glaucoma.  As appropriate for age/gender, discussed screening for colorectal cancer, prostate cancer, breast cancer, and cervical cancer. Checklist reviewing preventive services available has been given to the patient.    Reviewed patients plan of care and provided an AVS. The Intermediate Care Plan ( asthma action plan, low back pain action plan, and migraine action plan) for Billy meets the Care Plan requirement. This Care Plan has been established and reviewed with the Patient.    Counseling Resources:  ATP IV Guidelines  Pooled Cohorts Equation Calculator  Breast Cancer Risk Calculator  FRAX Risk Assessment  ICSI Preventive Guidelines  Dietary Guidelines for Americans, 2010  [employer]Log[employer]'s MyPlate  ASA Prophylaxis  Lung CA Screening    Malcolm Schafer MD  [facility] for HPI/ROS submitted by the patient on 1/19/2018   PHQ-2 Score: 0

## 2018-01-19 NOTE — PROGRESS NOTES
ANTICOAGULATION FOLLOW-UP CLINIC VISIT    Patient Name:  Billy Stock  Date:  1/19/2018  Contact Type:  Face to Face    SUBJECTIVE:     Patient Findings     Positives Change in diet/appetite (Pt reports fewer greens recently, will resume usual diet. ), No Problem Findings           OBJECTIVE    INR Protime   Date Value Ref Range Status   01/19/2018 3.0 (A) 0.86 - 1.14 Final       ASSESSMENT / PLAN  INR assessment THER    Recheck INR In: 4 WEEKS    INR Location Clinic      Anticoagulation Summary as of 1/19/2018     INR goal 2.0-3.0   Today's INR 3.0   Maintenance plan 5 mg (5 mg x 1) every day   Full instructions 5 mg every day   Weekly total 35 mg   No change documented Samantha Galeas, RN   Plan last modified Samantha Galeas RN (4/18/2016)   Next INR check 2/16/2018   Priority INR   Target end date     Indications   Long-term (current) use of anticoagulants [Z79.01] [Z79.01]  Atrial fibrillation (H) (Resolved) [I48.91]         Anticoagulation Episode Summary     INR check location     Preferred lab     Send INR reminders to [facility]    Comments       Anticoagulation Care Providers     Provider Role Specialty Phone number    Malcolm Schafer MD Responsible Internal Medicine [phone]            See the Encounter Report to view Anticoagulation Flowsheet and Dosing Calendar (Go to Encounters tab in chart review, and find the Anticoagulation Therapy Visit)    Dosage adjustment made based on physician directed care plan.    Samantha Galeas, RN

## 2018-01-21 LAB
ALBUMIN SERPL-MCNC: 3.7 G/DL (ref 3.4–5)
ALP SERPL-CCNC: 99 U/L (ref 40–150)
ALT SERPL W P-5'-P-CCNC: 19 U/L (ref 0–70)
ANION GAP SERPL CALCULATED.3IONS-SCNC: 7 MMOL/L (ref 3–14)
AST SERPL W P-5'-P-CCNC: 22 U/L (ref 0–45)
BILIRUB SERPL-MCNC: 0.4 MG/DL (ref 0.2–1.3)
BUN SERPL-MCNC: 18 MG/DL (ref 7–30)
CALCIUM SERPL-MCNC: 8.4 MG/DL (ref 8.5–10.1)
CHLORIDE SERPL-SCNC: 109 MMOL/L (ref 94–109)
CHOLEST SERPL-MCNC: 113 MG/DL
CO2 SERPL-SCNC: 28 MMOL/L (ref 20–32)
CREAT SERPL-MCNC: 1.41 MG/DL (ref 0.66–1.25)
GFR SERPL CREATININE-BSD FRML MDRD: 50 ML/MIN/1.7M2
GLUCOSE SERPL-MCNC: 89 MG/DL (ref 70–99)
HDLC SERPL-MCNC: 33 MG/DL
LDLC SERPL CALC-MCNC: 51 MG/DL
NONHDLC SERPL-MCNC: 80 MG/DL
POTASSIUM SERPL-SCNC: 4.2 MMOL/L (ref 3.4–5.3)
PROT SERPL-MCNC: 7.2 G/DL (ref 6.8–8.8)
SODIUM SERPL-SCNC: 144 MMOL/L (ref 133–144)
TRIGL SERPL-MCNC: 143 MG/DL
TSH SERPL DL<=0.005 MIU/L-ACNC: 2.04 MU/L (ref 0.4–4)
URATE SERPL-MCNC: 5 MG/DL (ref 3.5–7.2)

## 2018-01-24 ENCOUNTER — THERAPY VISIT (OUTPATIENT)
Dept: PHYSICAL THERAPY | Facility: CLINIC | Age: 73
End: 2018-01-24
Payer: COMMERCIAL

## 2018-01-24 DIAGNOSIS — M25.512 LEFT SHOULDER PAIN: Primary | ICD-10-CM

## 2018-01-24 PROCEDURE — G8984 CARRY CURRENT STATUS: HCPCS | Mod: GP | Performed by: PHYSICAL THERAPIST

## 2018-01-24 PROCEDURE — G8985 CARRY GOAL STATUS: HCPCS | Mod: GP | Performed by: PHYSICAL THERAPIST

## 2018-01-24 PROCEDURE — 97161 PT EVAL LOW COMPLEX 20 MIN: CPT | Mod: GP | Performed by: PHYSICAL THERAPIST

## 2018-01-24 PROCEDURE — 97110 THERAPEUTIC EXERCISES: CPT | Mod: GP | Performed by: PHYSICAL THERAPIST

## 2018-01-24 NOTE — PROGRESS NOTES
[facility] Initial Evaluation  Subjective:  Patient is a 72 year old male presenting with rehab left shoulder hpi. The history is provided by the patient. No  was used.   Billy Stock is a 72 year old male with a left shoulder condition.  Condition occurred with:  Unknown cause.  Condition occurred: for unknown reasons.  This is a new condition  Patient reports a gradual onset of shoulder pain beginning about 3-5 months ago.  Physical therapy was ordered on 1/19/2017.  Patient c/o pain reaching across body, with repetitive movements, and reaching forward.    Patient reports pain:  Posterior.    Pain is described as aching and is intermittent and reported as 3/10.  Associated symptoms:  Loss of motion/stiffness.   Symptoms are exacerbated by using arm at shoulder level, using arm overhead and certain positions and relieved by rest, heat and other (Mundo beck ).  Since onset symptoms are unchanged.  Special tests:  X-ray.  Previous treatment: none.    General health as reported by patient is good.  Pertinent medical history includes:  Cancer, high blood pressure and sleep disorder/apnea.  Medical allergies: no.  Other surgeries include:  Cancer surgery.  Current medications:  Heparin/coumadin and high blood pressure medication.  Current occupation is Retired.  ..  Patient is working in normal job without restrictions.      Barriers include:  None as reported by the patient.    Red flags:  None as reported by the patient.                        Objective:  Standing Alignment:      Shoulder/UE:  Rounded shoulders, protracted scapula L and protracted scapula R                                       Shoulder Evaluation:  ROM:  AROM:    Flexion:  Left:  95        Abduction:  Left: 85         External Rotation:  Left:  40                Extension/Internal Rotation:  Left:  To T10        PROM:    Flexion:  Left:  105          Abduction:  Left:  70 Gh Jt        Internal  Rotation:  Left:  60      External Rotation:  Left:  60                      Endfeel: Firm  Strength:        Abduction:  Left: 4+/5   Pain:+      Adduction:  Left: 5/5     Pain:-      Internal Rotation:  Left:5-/5      Pain:-/+      External Rotation:   Left:4/5      Pain:+               Special Tests:      Right shoulder positive for the following special tests:Impingement  Right shoulder negative for the following special tests:Rotator cuff tear    Mobility Tests:    Glenohumeral anterior left:  Hypomobile    Glenohumeral posterior left:  Hypomobile    Glenohumeral inferior left:  Hypomobile                                             General     ROS    Assessment/Plan:    Patient is a 72 year old male with left side shoulder complaints.    Patient has the following significant findings with corresponding treatment plan.                Diagnosis 1:  Shoulder arthropathy   Pain -  self management, education and home program  Decreased ROM/flexibility - therapeutic exercise, therapeutic activity and home program  Decreased joint mobility - therapeutic exercise, therapeutic activity and home program  Decreased strength - therapeutic exercise, therapeutic activities and home program  Impaired muscle performance - neuro re-education and home program  Decreased function - therapeutic activities and home program  Impaired posture - neuro re-education, therapeutic activities and home program    Therapy Evaluation Codes:   1) History comprised of:   Personal factors that impact the plan of care:      None.    Comorbidity factors that impact the plan of care are:      High blood pressure, Overweight and Sleep disorder/apnea.     Medications impacting care: High blood pressure, Heparin/coumadin and Pain.  2) Examination of Body Systems comprised of:   Body structures and functions that impact the plan of care:      Shoulder.   Activity limitations that impact the plan of care are:      Bathing and Reaching and repetive  tasks.  3) Clinical presentation characteristics are:   Stable/Uncomplicated.  4) Decision-Making    Low complexity using standardized patient assessment instrument and/or measureable assessment of functional outcome.  Cumulative Therapy Evaluation is: Low complexity.    Previous and current functional limitations:  (See Goal Flow Sheet for this information)    Short term and Long term goals: (See Goal Flow Sheet for this information)     Communication ability:  Patient appears to be able to clearly communicate and understand verbal and written communication and follow directions correctly.  Treatment Explanation - The following has been discussed with the patient:   RX ordered/plan of care  Anticipated outcomes  Possible risks and side effects  This patient would benefit from PT intervention to resume normal activities.   Rehab potential is good.    Frequency:  1 X week, once daily  Duration:  for 8 weeks  Discharge Plan:  Achieve all LTG.  Independent in home treatment program.  Reach maximal therapeutic benefit.    Please refer to the daily flowsheet for treatment today, total treatment time and time spent performing 1:1 timed codes.

## 2018-01-31 ENCOUNTER — THERAPY VISIT (OUTPATIENT)
Dept: PHYSICAL THERAPY | Facility: CLINIC | Age: 73
End: 2018-01-31
Payer: COMMERCIAL

## 2018-01-31 DIAGNOSIS — M25.512 LEFT SHOULDER PAIN: ICD-10-CM

## 2018-01-31 PROCEDURE — 97110 THERAPEUTIC EXERCISES: CPT | Mod: GP | Performed by: PHYSICAL THERAPIST

## 2018-02-05 DIAGNOSIS — I10 BENIGN ESSENTIAL HYPERTENSION: ICD-10-CM

## 2018-02-05 RX ORDER — LISINOPRIL 10 MG/1
TABLET ORAL
Qty: 90 TABLET | Refills: 1 | Status: SHIPPED | OUTPATIENT
Start: 2018-02-05 | End: 2018-05-08

## 2018-02-05 NOTE — TELEPHONE ENCOUNTER
"Requested Prescriptions   Pending Prescriptions Disp Refills     lisinopril (PRINIVIL/ZESTRIL) 10 MG tablet [Pharmacy Med Name: LISINOPRIL 10MG  TAB] 60 tablet 0     Sig: TAKE TWO TABLETS BY MOUTH  DAILY    ACE Inhibitors (Including Combos) Protocol Failed    2/5/2018  9:02 AM       Failed - Normal serum creatinine on file in past 12 months    Recent Labs   Lab Test  01/19/18   0936   CR  1.41*            Passed - Blood pressure under 140/90    BP Readings from Last 3 Encounters:   01/19/18 130/80   11/20/17 146/90   08/16/17 130/72                Passed - Recent or future visit with authorizing provider's specialty    Patient had office visit in the last year or has a visit in the next 30 days with authorizing provider.  See \"Patient Info\" tab in inbasket, or \"Choose Columns\" in Meds & Orders section of the refill encounter.            Passed - Patient is age 18 or older       Passed - Normal serum potassium on file in past 12 months    Recent Labs   Lab Test  01/19/18   0936   POTASSIUM  4.2             Routing refill request to provider for review/approval because:  Labs out of range:          "

## 2018-02-12 ENCOUNTER — THERAPY VISIT (OUTPATIENT)
Dept: PHYSICAL THERAPY | Facility: CLINIC | Age: 73
End: 2018-02-12
Payer: COMMERCIAL

## 2018-02-12 DIAGNOSIS — M25.512 LEFT SHOULDER PAIN: ICD-10-CM

## 2018-02-12 PROCEDURE — 97110 THERAPEUTIC EXERCISES: CPT | Mod: GP | Performed by: PHYSICAL THERAPIST

## 2018-02-12 PROCEDURE — 97112 NEUROMUSCULAR REEDUCATION: CPT | Mod: GP | Performed by: PHYSICAL THERAPIST

## 2018-02-16 ENCOUNTER — ANTICOAGULATION THERAPY VISIT (OUTPATIENT)
Dept: ANTICOAGULATION | Facility: CLINIC | Age: 73
End: 2018-02-16
Payer: COMMERCIAL

## 2018-02-16 DIAGNOSIS — Z79.01 LONG-TERM (CURRENT) USE OF ANTICOAGULANTS: ICD-10-CM

## 2018-02-16 LAB — INR POINT OF CARE: 2.3 (ref 0.86–1.14)

## 2018-02-16 PROCEDURE — 99207 ZZC NO CHARGE NURSE ONLY: CPT

## 2018-02-16 PROCEDURE — 36416 COLLJ CAPILLARY BLOOD SPEC: CPT

## 2018-02-16 PROCEDURE — 85610 PROTHROMBIN TIME: CPT | Mod: QW

## 2018-02-16 NOTE — MR AVS SNAPSHOT
Billy Stock   2/16/2018 10:00 AM   Anticoagulation Therapy Visit    Description:  72 year old male   Provider:  [facility]   Department:  [facility]           INR as of 2/16/2018     Today's INR 2.3      Anticoagulation Summary as of 2/16/2018     INR goal 2.0-3.0   Today's INR 2.3   Full instructions 5 mg every day   Next INR check 3/29/2018    Indications   Long-term (current) use of anticoagulants [Z79.01] [Z79.01]  Atrial fibrillation (H) (Resolved) [I48.91]         Your next Anticoagulation Clinic appointment(s)     Mar 29, 2018 10:00 AM CDT   Anticoagulation Visit with [facility]   [facility] ([facility]    [address] [facility] [address]  [facility] [address] [phone]              Contact Numbers     [facility] Phone Numbers:  Anticoagulation Clinic Appointments : [phone]  Anticoagulation Nurse: [phone]         February 2018 Details    Sun Mon Tue Wed Thu Fri Sat         1               2               3                 4               5               6               7               8               9               10                 11               12               13               14               15               16      5 mg   See details      17      5 mg           18      5 mg         19      5 mg         20      5 mg         21      5 mg         22      5 mg         23      5 mg         24      5 mg           25      5 mg         26      5 mg         27      5 mg         28      5 mg             Date Details   02/16 This INR check               How to take your warfarin dose     To take:  5 mg Take 1 of the 5 mg tablets.           March 2018 Details    Sun Mon Tue Wed Thu Fri Sat         1      5 mg         2      5 mg         3      5 mg           4      5 mg         5      5 mg         6      5 mg         7      5 mg         8      5 mg         9      5 mg         10      5 mg           11      5 mg          12      5 mg         13      5 mg         14      5 mg         15      5 mg         16      5 mg         17      5 mg           18      5 mg         19      5 mg         20      5 mg         21      5 mg         22      5 mg         23      5 mg         24      5 mg           25      5 mg         26      5 mg         27      5 mg         28      5 mg         29            30               31                Date Details   No additional details    Date of next INR:  3/29/2018         How to take your warfarin dose     To take:  5 mg Take 1 of the 5 mg tablets.

## 2018-02-16 NOTE — PROGRESS NOTES
ANTICOAGULATION FOLLOW-UP CLINIC VISIT    Patient Name:  Billy Stock  Date:  2/16/2018  Contact Type:  Face to Face    SUBJECTIVE:     Patient Findings     Positives No Problem Findings           OBJECTIVE    INR Protime   Date Value Ref Range Status   02/16/2018 2.3 (A) 0.86 - 1.14 Final       ASSESSMENT / PLAN  INR assessment THER    Recheck INR In: 6 WEEKS    INR Location Clinic      Anticoagulation Summary as of 2/16/2018     INR goal 2.0-3.0   Today's INR 2.3   Maintenance plan 5 mg (5 mg x 1) every day   Full instructions 5 mg every day   Weekly total 35 mg   No change documented Lyla Jeter, RN   Plan last modified Samantha Galeas RN (4/18/2016)   Next INR check 3/29/2018   Priority INR   Target end date     Indications   Long-term (current) use of anticoagulants [Z79.01] [Z79.01]  Atrial fibrillation (H) (Resolved) [I48.91]         Anticoagulation Episode Summary     INR check location     Preferred lab     Send INR reminders to [facility]    Comments       Anticoagulation Care Providers     Provider Role Specialty Phone number    Malcolm Schafer MD [facility] Internal Medicine [phone]            See the Encounter Report to view Anticoagulation Flowsheet and Dosing Calendar (Go to Encounters tab in chart review, and find the Anticoagulation Therapy Visit)    Dosage adjustment made based on physician directed care plan.    Lyla Jeter, TOMEKA

## 2018-02-19 ENCOUNTER — OFFICE VISIT (OUTPATIENT)
Dept: UROLOGY | Facility: CLINIC | Age: 73
End: 2018-02-19
Payer: COMMERCIAL

## 2018-02-19 ENCOUNTER — THERAPY VISIT (OUTPATIENT)
Dept: PHYSICAL THERAPY | Facility: CLINIC | Age: 73
End: 2018-02-19
Payer: COMMERCIAL

## 2018-02-19 VITALS
BODY MASS INDEX: 31.28 KG/M2 | SYSTOLIC BLOOD PRESSURE: 146 MMHG | WEIGHT: 236 LBS | HEART RATE: 60 BPM | DIASTOLIC BLOOD PRESSURE: 76 MMHG | HEIGHT: 73 IN

## 2018-02-19 DIAGNOSIS — C61 PROSTATE CANCER (H): Primary | ICD-10-CM

## 2018-02-19 DIAGNOSIS — M25.512 LEFT SHOULDER PAIN: ICD-10-CM

## 2018-02-19 LAB — PSA SERPL-MCNC: 2 NG/ML (ref 0–4)

## 2018-02-19 PROCEDURE — 97110 THERAPEUTIC EXERCISES: CPT | Mod: GP | Performed by: PHYSICAL THERAPIST

## 2018-02-19 PROCEDURE — 99213 OFFICE O/P EST LOW 20 MIN: CPT | Performed by: UROLOGY

## 2018-02-19 PROCEDURE — 84153 ASSAY OF PSA TOTAL: CPT | Performed by: UROLOGY

## 2018-02-19 PROCEDURE — 36415 COLL VENOUS BLD VENIPUNCTURE: CPT | Performed by: UROLOGY

## 2018-02-19 ASSESSMENT — PAIN SCALES - GENERAL: PAINLEVEL: NO PAIN (0)

## 2018-02-19 NOTE — LETTER
2/19/2018       RE: Billy Stock  [address] [place] [address]     Dear Colleague,    Thank you for referring your patient, Billy Stock, to the [facility] [place] at [facility]. Please see a copy of my visit note below.    History: It is a great pleasure to see this very pleasant 72-year-old gentleman in follow-up consultation today.  We recall, he was diagnosed with Puma 7 adenocarcinoma prostate in 2002.  Radical prostatectomy was performed at that time showed extensive disease in the prostate and with positive surgical margins.  Subsequently he also had adjuvant external beam radiation therapy and was on regular Eligard 45 therapy until 2008.  After that we began intermittent hormone treatment.  His last Eligard injection was given in August 2016 when the PSA had increased to 1.5.  One year ago the PSA was 0.44, 6 months ago it was 1.3 and today it is 2.0.  He has no symptoms.  He does get hot flashes with Eligard.  His general health is otherwise stable    Past Medical History:   Diagnosis Date     Bell's palsy 10/15/2002     CA IN SITU PROSTATE 10/15/2002     Chronic atrial fibrillation (H) 7/1/10     Glaucoma 4/10/2003     Problem list name updated by automated process. Provider to review     Gout 5/16/2013     Hyperlipidemia LDL goal <100 9/10/2014     Hypertension goal BP (blood pressure) < 140/90 6/28/2006     SARAH (obstructive sleep apnea) 8/28/2013     Pericarditis 2001     Renal cyst        Social History     Social History     Marital status:      Spouse name: N/A     Number of children: 3     Years of education: N/A     Social History Main Topics     Smoking status: Never Smoker     Smokeless tobacco: Never Used     Alcohol use No     Drug use: No     Sexual activity: No     Other Topics Concern     Caffeine Concern No     Occupational Exposure No     Hobby Hazards No     Sleep Concern Yes     CPAP at night      Stress Concern No     Weight Concern No     Special Diet No     Back Care No     Exercise No     Seat Belt Yes     Social History Narrative       Past Surgical History:   Procedure Laterality Date     CATARACT IOL, RT/LT  10/15, 11/15     COLONOSCOPY      [facility]     COLONOSCOPY  2014    Dr. Long [facility]     COLONOSCOPY N/A 2014    Procedure: COMBINED COLONOSCOPY, SINGLE BIOPSY/POLYPECTOMY BY BIOPSY;  Surgeon: Puneet Long MD;  Location:  GI     EYE SURGERY      glaucoma surgery right eye     PROSTATE SURGERY       SUPRAPUBIC PROSTATECTOMY       VASECTOMY         Family History   Problem Relation Age of Onset     Hypertension Maternal Grandfather      CEREBROVASCULAR DISEASE Maternal Grandfather      Hypertension Maternal Grandmother      CEREBROVASCULAR DISEASE Maternal Grandmother      Hypertension Mother       age 51, MVA     Hypertension Brother      Heart Failure Brother      Bronchitis Brother      Other - See Comments Brother      multiple myeloma     Heart Surgery Brother      defibrilater     Prostate Problems Brother      DIABETES Brother      Breast Cancer Maternal Aunt      hx     CANCER Maternal Aunt      cervical cancer     Cancer - colorectal Maternal Aunt      Unknown/Adopted Father      Don't know father's history         Current Outpatient Prescriptions:      Levocetirizine Dihydrochloride (XYZAL PO), , Disp: , Rfl:      UNABLE TO FIND, MEDICATION NAME: Tabavit, multivitmain without vitamin K in it., Disp: , Rfl:      lisinopril (PRINIVIL/ZESTRIL) 10 MG tablet, TAKE TWO TABLETS BY MOUTH  DAILY, Disp: 90 tablet, Rfl: 1     allopurinol (ZYLOPRIM) 100 MG tablet, Take 1 tablet (100 mg) by mouth daily, Disp: 90 tablet, Rfl: 4     amLODIPine (NORVASC) 10 MG tablet, Take 1 tablet (10 mg) by mouth daily, Disp: 90 tablet, Rfl: 1     metoprolol (TOPROL-XL) 100 MG 24 hr tablet, TAKE 1 TABLET (100 MG) BY MOUTH DAILY, Disp: 90 tablet, Rfl: 0     warfarin (COUMADIN) 5 MG tablet,  "Take 1 tablet (5 mg) by mouth daily Or as instructed by INR clinic, Disp: 90 tablet, Rfl: 1     fluticasone (FLONASE) 50 MCG/ACT nasal spray, Spray 1-2 sprays into both nostrils daily, Disp: 1 Package, Rfl: 5     acetaminophen (TYLENOL) 325 MG tablet, Take 1-2 tablets by mouth every 6 hours as needed., Disp: , Rfl:     10 point ROS of systems including Constitutional, Eyes, Respiratory, Cardiovascular, Gastroenterology, Genitourinary, Integumentary, Muscularskeletal, Psychiatric were all negative except for pertinent positives noted in my HPI.    Examination:   /76  Pulse 60  Ht 1.854 m (6' 1\")  Wt 107 kg (236 lb)  BMI 31.14 kg/m2  General Impression: Very pleasant gentleman in no acute distress, well-oriented in time place and person  Mental Status: Normal  HEENT.  There is no clinical evidence of jaundice.  Mucous membranes are normal  Skin: Skin is otherwise normal to examination  Respiratory System: The respiratory cycle is normal  Lymph Nodes: Not examined  Back/Flank Tenderness: Not examined  Cardiovascular System: Not Examined  Abdominal Examination: Not examined  Extremities: Not examined  Genitial: Not examined  Rectal Examination: Good sphincter tone, normal perianal sensation.  Smooth rectal mucosa without hemorrhoids or fissures.  Empty prostatic fossa.  No masses palpable.  Seminal vesicles.  Absent.  Perineum otherwise normal to examination  Neurologic System: There are no focal abnormal clinical neurological signs and central, or peripheral nervous system    Impression: His PSA today had risen to 2.0 from 1.3, 6 months ago.  We did have a full discussion today about the option of either repeating Eligard 45 today, all waiting 6 months to see what the PSA is like at that point.  The patient was concerned about problems with hot flashes which he has had before and so we decided to wait 6 months, and see what the PSA is at that time and then most likely repeat the Eligard 45 at that time.  I " "did discuss the entire situation with the patient in detail today.  I answered all questions    Plan: 6 months for PSA and examination    Time: 15 minutes with greater than 50% in discussion and consultation    \"This dictation was performed with voice recognition software and may contain errors,  omissions and inadvertent word substitution.\"        Again, thank you for allowing me to participate in the care of your patient.      Sincerely,    Hoang Huerta MD      "

## 2018-02-19 NOTE — NURSING NOTE
Chief Complaint   Patient presents with     Psa Screening     Patient is here for 6 month PSA and Exam.      Rolanda Ordonez LPN 11:00 AM February 19, 2018

## 2018-02-19 NOTE — PROGRESS NOTES
History: It is a great pleasure to see this very pleasant 72-year-old gentleman in follow-up consultation today.  We recall, he was diagnosed with Puma 7 adenocarcinoma prostate in 2002.  Radical prostatectomy was performed at that time showed extensive disease in the prostate and with positive surgical margins.  Subsequently he also had adjuvant external beam radiation therapy and was on regular Eligard 45 therapy until 2008.  After that we began intermittent hormone treatment.  His last Eligard injection was given in August 2016 when the PSA had increased to 1.5.  One year ago the PSA was 0.44, 6 months ago it was 1.3 and today it is 2.0.  He has no symptoms.  He does get hot flashes with Eligard.  His general health is otherwise stable    Past Medical History:   Diagnosis Date     Bell's palsy 10/15/2002     CA IN SITU PROSTATE 10/15/2002     Chronic atrial fibrillation (H) 7/1/10     Glaucoma 4/10/2003     Problem list name updated by automated process. Provider to review     Gout 5/16/2013     Hyperlipidemia LDL goal <100 9/10/2014     Hypertension goal BP (blood pressure) < 140/90 6/28/2006     SARAH (obstructive sleep apnea) 8/28/2013     Pericarditis 2001     Renal cyst        Social History     Social History     Marital status:      Spouse name: N/A     Number of children: 3     Years of education: N/A     Social History Main Topics     Smoking status: Never Smoker     Smokeless tobacco: Never Used     Alcohol use No     Drug use: No     Sexual activity: No     Other Topics Concern     Caffeine Concern No     Occupational Exposure No     Hobby Hazards No     Sleep Concern Yes     CPAP at night     Stress Concern No     Weight Concern No     Special Diet No     Back Care No     Exercise No     Seat Belt Yes     Social History Narrative       Past Surgical History:   Procedure Laterality Date     CATARACT IOL, RT/LT  10/15, 11/15     COLONOSCOPY  2009    [facility]     COLONOSCOPY  9/30/2014      [facility]     COLONOSCOPY N/A 2014    Procedure: COMBINED COLONOSCOPY, SINGLE BIOPSY/POLYPECTOMY BY BIOPSY;  Surgeon: Puneet Long MD;  Location:  GI     EYE SURGERY  2007    glaucoma surgery right eye     PROSTATE SURGERY       SUPRAPUBIC PROSTATECTOMY  2002     VASECTOMY         Family History   Problem Relation Age of Onset     Hypertension Maternal Grandfather      CEREBROVASCULAR DISEASE Maternal Grandfather      Hypertension Maternal Grandmother      CEREBROVASCULAR DISEASE Maternal Grandmother      Hypertension Mother       age 51, MVA     Hypertension Brother      Heart Failure Brother      Bronchitis Brother      Other - See Comments Brother      multiple myeloma     Heart Surgery Brother      defibrilater     Prostate Problems Brother      DIABETES Brother      Breast Cancer Maternal Aunt      hx     CANCER Maternal Aunt      cervical cancer     Cancer - colorectal Maternal Aunt      Unknown/Adopted Father      Don't know father's history         Current Outpatient Prescriptions:      Levocetirizine Dihydrochloride (XYZAL PO), , Disp: , Rfl:      UNABLE TO FIND, MEDICATION NAME: Tabavit, multivitmain without vitamin K in it., Disp: , Rfl:      lisinopril (PRINIVIL/ZESTRIL) 10 MG tablet, TAKE TWO TABLETS BY MOUTH  DAILY, Disp: 90 tablet, Rfl: 1     allopurinol (ZYLOPRIM) 100 MG tablet, Take 1 tablet (100 mg) by mouth daily, Disp: 90 tablet, Rfl: 4     amLODIPine (NORVASC) 10 MG tablet, Take 1 tablet (10 mg) by mouth daily, Disp: 90 tablet, Rfl: 1     metoprolol (TOPROL-XL) 100 MG 24 hr tablet, TAKE 1 TABLET (100 MG) BY MOUTH DAILY, Disp: 90 tablet, Rfl: 0     warfarin (COUMADIN) 5 MG tablet, Take 1 tablet (5 mg) by mouth daily Or as instructed by INR clinic, Disp: 90 tablet, Rfl: 1     fluticasone (FLONASE) 50 MCG/ACT nasal spray, Spray 1-2 sprays into both nostrils daily, Disp: 1 Package, Rfl: 5     acetaminophen (TYLENOL) 325 MG tablet, Take 1-2 tablets by mouth every 6 hours as  "needed., Disp: , Rfl:     10 point ROS of systems including Constitutional, Eyes, Respiratory, Cardiovascular, Gastroenterology, Genitourinary, Integumentary, Muscularskeletal, Psychiatric were all negative except for pertinent positives noted in my HPI.    Examination:   /76  Pulse 60  Ht 1.854 m (6' 1\")  Wt 107 kg (236 lb)  BMI 31.14 kg/m2  General Impression: Very pleasant gentleman in no acute distress, well-oriented in time place and person  Mental Status: Normal  HEENT.  There is no clinical evidence of jaundice.  Mucous membranes are normal  Skin: Skin is otherwise normal to examination  Respiratory System: The respiratory cycle is normal  Lymph Nodes: Not examined  Back/Flank Tenderness: Not examined  Cardiovascular System: Not Examined  Abdominal Examination: Not examined  Extremities: Not examined  Genitial: Not examined  Rectal Examination: Good sphincter tone, normal perianal sensation.  Smooth rectal mucosa without hemorrhoids or fissures.  Empty prostatic fossa.  No masses palpable.  Seminal vesicles.  Absent.  Perineum otherwise normal to examination  Neurologic System: There are no focal abnormal clinical neurological signs and central, or peripheral nervous system    Impression: His PSA today had risen to 2.0 from 1.3, 6 months ago.  We did have a full discussion today about the option of either repeating Eligard 45 today, all waiting 6 months to see what the PSA is like at that point.  The patient was concerned about problems with hot flashes which he has had before and so we decided to wait 6 months, and see what the PSA is at that time and then most likely repeat the Eligard 45 at that time.  I did discuss the entire situation with the patient in detail today.  I answered all questions    Plan: 6 months for PSA and examination    Time: 15 minutes with greater than 50% in discussion and consultation    \"This dictation was performed with voice recognition software and may contain errors,  " "omissions and inadvertent word substitution.\"      "

## 2018-02-19 NOTE — MR AVS SNAPSHOT
After Visit Summary   2/19/2018    Billy Stock    MRN: [id number]           Patient Information     Date Of Birth          1945        Visit Information        Provider Department      2/19/2018 11:00 AM Hoang Huerta MD [facility] [place]        Today's Diagnoses     Prostate cancer (H)    -  1       Follow-ups after your visit        Follow-up notes from your care team     Return in about 6 months (around 8/19/2018) for PSA, Physical Exam.      Your next 10 appointments already scheduled     Feb 19, 2018  2:10 PM CST   MOISÉS Extremity with Tre Ceja, PT   [facility] ([facility]  )    [address]  [address]  [facility] [address]   [phone]            Mar 29, 2018 10:00 AM CDT   Anticoagulation Visit with [facility]UL[facility] CLINIC   [facility] ([facility]    [address]  [facility] [id number] [phone]              Who to contact     If you have questions or need follow up information about today's clinic visit or your schedule please contact [facility] [place] directly at [phone].  Normal or non-critical lab and imaging results will be communicated to you by MyChart, letter or phone within 4 business days after the clinic has received the results. If you do not hear from us within 7 days, please contact the clinic through MyChart or phone. If you have a critical or abnormal lab result, we will notify you by phone as soon as possible.  Submit refill requests through [employer] or call your pharmacy and they will forward the refill request to us. Please allow 3 business days for your refill to be completed.          Additional Information About Your Visit        MyChart Information     [employer] lets you send messages to your doctor, view your test results, renew your prescriptions, schedule appointments and  "more. To sign up, go to [email or web address] . Click on \"Log in\" on the left side of the screen, which will take you to the Welcome page. Then click on \"Sign up Now\" on the right side of the page.     You will be asked to enter the access code listed below, as well as some personal information. Please follow the directions to create your username and password.     Your access code is: [email or web address]  Expires: 2018  2:47 PM     Your access code will  in 90 days. If you need help or a new code, please call your [place] clinic or [phone].        Care EveryWhere ID     This is your Care EveryWhere ID. This could be used by other organizations to access your [place] medical records  [id number]        Your Vitals Were     Pulse Height BMI (Body Mass Index)             60 1.854 m (6' 1\") 31.14 kg/m2          Blood Pressure from Last 3 Encounters:   18 146/76   18 130/80   17 146/90    Weight from Last 3 Encounters:   18 107 kg (236 lb)   18 107 kg (236 lb)   17 108.9 kg (240 lb)              We Performed the Following     PSA Diag Urologic Phys        Primary Care Provider Office Phone # Fax #    Malcolm Schafer [phone]       [address] [facility] [address]        Equal Access to Services     [facility]: Hadii aad ku hadasho Soomaali, waaxda luqadaha, qaybta kaalmada adeegyada, jake delacruz . So [facility] [phone].    ATENCIÓN: Si habla español, tiene a braga disposición servicios gratuitos de asistencia lingüística. Llame al [phone].    We comply with applicable federal civil rights laws and [place] laws. We do not discriminate on the basis of race, color, national origin, age, disability, sex, sexual orientation, or gender identity.            Thank you!     Thank you for choosing [facility] [place]  for your care. Our goal is always to provide you with excellent " care. Hearing back from our patients is one way we can continue to improve our services. Please take a few minutes to complete the written survey that you may receive in the mail after your visit with us. Thank you!             Your Updated Medication List - Protect others around you: Learn how to safely use, store and throw away your medicines at www.disposemymeds.org.          This list is accurate as of 2/19/18 11:26 AM.  Always use your most recent med list.                   Brand Name Dispense Instructions for use Diagnosis    allopurinol 100 MG tablet    ZYLOPRIM    90 tablet    Take 1 tablet (100 mg) by mouth daily    Gout of foot, unspecified cause, unspecified chronicity, unspecified laterality       amLODIPine 10 MG tablet    NORVASC    90 tablet    Take 1 tablet (10 mg) by mouth daily    Essential hypertension with goal blood pressure less than 140/90       fluticasone 50 MCG/ACT spray    FLONASE    1 Package    Spray 1-2 sprays into both nostrils daily    Seasonal allergies       lisinopril 10 MG tablet    PRINIVIL/ZESTRIL    90 tablet    TAKE TWO TABLETS BY MOUTH  DAILY    Benign essential hypertension       metoprolol succinate 100 MG 24 hr tablet    TOPROL-XL    90 tablet    TAKE 1 TABLET (100 MG) BY MOUTH DAILY    Chronic atrial fibrillation (H)       TYLENOL 325 MG tablet   Generic drug:  acetaminophen      Take 1-2 tablets by mouth every 6 hours as needed.        UNABLE TO FIND      MEDICATION NAME: Tabavit, multivitmain without vitamin K in it.        warfarin 5 MG tablet    COUMADIN    90 tablet    Take 1 tablet (5 mg) by mouth daily Or as instructed by INR clinic    Chronic atrial fibrillation (H)       XYZAL PO

## 2018-02-21 DIAGNOSIS — I48.20 CHRONIC ATRIAL FIBRILLATION (H): ICD-10-CM

## 2018-02-23 RX ORDER — METOPROLOL SUCCINATE 100 MG/1
TABLET, EXTENDED RELEASE ORAL
Qty: 90 TABLET | Refills: 0 | Status: SHIPPED | OUTPATIENT
Start: 2018-02-23 | End: 2018-02-27

## 2018-02-23 NOTE — TELEPHONE ENCOUNTER
"Requested Prescriptions   Pending Prescriptions Disp Refills     metoprolol succinate (TOPROL-XL) 100 MG 24 hr tablet [Pharmacy Med Name: METOPROLOL SUCCINATE  MG Tablet Extended Release 24 Hour] 90 tablet 0     Sig: TAKE 1 TABLET EVERY DAY    Beta-Blockers Protocol Passed    2/21/2018  2:58 PM       Passed - Blood pressure under 140/90 in past 12 months    BP Readings from Last 3 Encounters:   02/19/18 146/76   01/19/18 130/80   11/20/17 146/90                Passed - Patient is age 6 or older       Passed - Recent or future visit with authorizing provider's specialty    Patient had office visit in the last year or has a visit in the next 30 days with authorizing provider.  See \"Patient Info\" tab in inbasket, or \"Choose Columns\" in Meds & Orders section of the refill encounter.             Routing refill request to provider for review/approval because:  Labs out of range:  /76        "

## 2018-02-26 ENCOUNTER — THERAPY VISIT (OUTPATIENT)
Dept: PHYSICAL THERAPY | Facility: CLINIC | Age: 73
End: 2018-02-26
Payer: COMMERCIAL

## 2018-02-26 DIAGNOSIS — M25.512 LEFT SHOULDER PAIN: ICD-10-CM

## 2018-02-26 PROCEDURE — 97110 THERAPEUTIC EXERCISES: CPT | Mod: GP | Performed by: PHYSICAL THERAPIST

## 2018-02-26 PROCEDURE — 97112 NEUROMUSCULAR REEDUCATION: CPT | Mod: GP | Performed by: PHYSICAL THERAPIST

## 2018-02-27 ENCOUNTER — OFFICE VISIT (OUTPATIENT)
Dept: INTERNAL MEDICINE | Facility: CLINIC | Age: 73
End: 2018-02-27
Payer: COMMERCIAL

## 2018-02-27 VITALS
DIASTOLIC BLOOD PRESSURE: 80 MMHG | HEART RATE: 72 BPM | WEIGHT: 236 LBS | HEIGHT: 73 IN | BODY MASS INDEX: 31.28 KG/M2 | SYSTOLIC BLOOD PRESSURE: 130 MMHG | OXYGEN SATURATION: 97 % | TEMPERATURE: 97.8 F

## 2018-02-27 DIAGNOSIS — H26.9 CATARACT OF LEFT EYE, UNSPECIFIED CATARACT TYPE: ICD-10-CM

## 2018-02-27 DIAGNOSIS — I48.20 CHRONIC ATRIAL FIBRILLATION (H): ICD-10-CM

## 2018-02-27 DIAGNOSIS — C61 PROSTATE CANCER (H): ICD-10-CM

## 2018-02-27 DIAGNOSIS — I10 ESSENTIAL HYPERTENSION, BENIGN: ICD-10-CM

## 2018-02-27 DIAGNOSIS — Z01.818 PREOP GENERAL PHYSICAL EXAM: Primary | ICD-10-CM

## 2018-02-27 PROCEDURE — 99214 OFFICE O/P EST MOD 30 MIN: CPT | Performed by: INTERNAL MEDICINE

## 2018-02-27 RX ORDER — METOPROLOL SUCCINATE 100 MG/1
TABLET, EXTENDED RELEASE ORAL
Qty: 30 TABLET | Refills: 0 | Status: SHIPPED | OUTPATIENT
Start: 2018-02-27 | End: 2018-04-03

## 2018-02-27 NOTE — MR AVS SNAPSHOT
After Visit Summary   2/27/2018    Billy Stock    MRN: [id number]           Patient Information     Date Of Birth          1945        Visit Information        Provider Department      2/27/2018 5:00 PM Malcolm Schafer MD [facility]        Today's Diagnoses     Preop general physical exam    -  1    Cataract of left eye, unspecified cataract type        Chronic atrial fibrillation (H)        Essential hypertension, benign        Prostate cancer (H)          Care Instructions      Before Your Surgery      Call your surgeon if there is any change in your health. This includes signs of a cold or flu (such as a sore throat, runny nose, cough, rash or fever).    Do not smoke, drink alcohol or take over the counter medicine (unless your surgeon or primary care doctor tells you to) for the 24 hours before and after surgery.    If you take prescribed drugs: Follow your doctor s orders about which medicines to take and which to stop until after surgery.    Eating and drinking prior to surgery: follow the instructions from your surgeon    Take a shower or bath the night before surgery. Use the soap your surgeon gave you to gently clean your skin. If you do not have soap from your surgeon, use your regular soap. Do not shave or scrub the surgery site.  Wear clean pajamas and have clean sheets on your bed.           Follow-ups after your visit        Your next 10 appointments already scheduled     Mar 05, 2018  2:50 PM CST   MOISÉS Extremity with Tre Ceja, PT   [facility] ([facility]  )    [address]  [address]  [facility] [address]   [phone]            Mar 29, 2018 10:00 AM CDT   Anticoagulation Visit with [facility] ANTICOAGULATION CLINIC   [facility] ([facility])    [address]  [facility] [address] [phone]              Who to contact     If you have questions or need follow  "up information about today's clinic visit or your schedule please contact [facility] directly at [phone].  Normal or non-critical lab and imaging results will be communicated to you by "[employer]hart, letter or phone within 4 business days after the clinic has received the results. If you do not hear from us within 7 days, please contact the clinic through "[employer]hart or phone. If you have a critical or abnormal lab result, we will notify you by phone as soon as possible.  Submit refill requests through [employer] or call your pharmacy and they will forward the refill request to us. Please allow 3 business days for your refill to be completed.          Additional Information About Your Visit        MyChart Information     [employer] lets you send messages to your doctor, view your test results, renew your prescriptions, schedule appointments and more. To sign up, go to [email or web address] . Click on \"Log in\" on the left side of the screen, which will take you to the Welcome page. Then click on \"Sign up Now\" on the right side of the page.     You will be asked to enter the access code listed below, as well as some personal information. Please follow the directions to create your username and password.     Your access code is: [email or web address]  Expires: 2018  5:34 PM     Your access code will  in 90 days. If you need help or a new code, please call your [facility] clinic or [phone].        Care EveryWhere ID     This is your Care EveryWhere ID. This could be used by other organizations to access your [facility] medical records  [id number]        Your Vitals Were     Pulse Temperature Height Pulse Oximetry BMI (Body Mass Index)       72 97.8  F (36.6  C) (Oral) 6' 1\" (1.854 m) 97% 31.14 kg/m2        Blood Pressure from Last 3 Encounters:   18 130/80   18 146/76   18 130/80    Weight from Last 3 Encounters:   18 236 lb (107 kg)   18 236 lb (107 kg)   18 236 lb (107 " kg)              Today, you had the following     No orders found for display         Today's Medication Changes          These changes are accurate as of 2/27/18  5:34 PM.  If you have any questions, ask your nurse or doctor.               These medicines have changed or have updated prescriptions.        Dose/Directions    metoprolol succinate 100 MG 24 hr tablet   Commonly known as:  TOPROL-XL   This may have changed:  See the new instructions.   Used for:  Chronic atrial fibrillation (H)   Changed by:  Malcolm Schafer MD        TAKE 1 TABLET EVERY DAY   Quantity:  30 tablet   Refills:  0            Where to get your medicines      These medications [facility] [place] - [address]  [address], [facility] [facility] [address]     Phone:  [phone]     metoprolol succinate 100 MG 24 hr tablet                Primary Care Provider Office Phone # Fax #    Malcolm Schafer [phone]       [address] [facility] [address]        Equal Access to Services     [facility] Hadii aad ku hadasho Soomaali, waaxda luqadaha, qaybta kaalmada adeegyada, waxay idiin hayaan spencer delacruz . So [facility] [phone].    ATENCIÓN: Si habla español, tiene a braga disposición servicios gratuitos de asistencia lingüística. Llame al [phone].    We comply with applicable federal civil rights laws and [place] laws. We do not discriminate on the basis of race, color, national origin, age, disability, sex, sexual orientation, or gender identity.            Thank you!     Thank you for choosing [facility]  for your care. Our goal is always to provide you with excellent care. Hearing back from our patients is one way we can continue to improve our services. Please take a few minutes to complete the written survey that you may receive in the mail after your visit with us. Thank you!             Your Updated Medication List - Protect others around you:  Learn how to safely use, store and throw away your medicines at www.disposemymeds.org.          This list is accurate as of 2/27/18  5:34 PM.  Always use your most recent med list.                   Brand Name Dispense Instructions for use Diagnosis    allopurinol 100 MG tablet    ZYLOPRIM    90 tablet    Take 1 tablet (100 mg) by mouth daily    Gout of foot, unspecified cause, unspecified chronicity, unspecified laterality       amLODIPine 10 MG tablet    NORVASC    90 tablet    Take 1 tablet (10 mg) by mouth daily    Essential hypertension with goal blood pressure less than 140/90       fluticasone 50 MCG/ACT spray    FLONASE    1 Package    Spray 1-2 sprays into both nostrils daily    Seasonal allergies       lisinopril 10 MG tablet    PRINIVIL/ZESTRIL    90 tablet    TAKE TWO TABLETS BY MOUTH  DAILY    Benign essential hypertension       metoprolol succinate 100 MG 24 hr tablet    TOPROL-XL    30 tablet    TAKE 1 TABLET EVERY DAY    Chronic atrial fibrillation (H)       TYLENOL 325 MG tablet   Generic drug:  acetaminophen      Take 1-2 tablets by mouth every 6 hours as needed.        UNABLE TO FIND      MEDICATION NAME: Tabavit, multivitmain without vitamin K in it.        warfarin 5 MG tablet    COUMADIN    90 tablet    Take 1 tablet (5 mg) by mouth daily Or as instructed by INR clinic    Chronic atrial fibrillation (H)       XYZAL PO      Take 5 mg by mouth daily

## 2018-02-27 NOTE — PROGRESS NOTES
[place] [address]  [facility] [address]  [phone]  Dept: [phone]    PRE-OP EVALUATION:  Today's date: 2018    Billy Stock (: 1945) presents for pre-operative evaluation assessment as requested by Dr. Bailon.  He requires evaluation and anesthesia risk assessment prior to undergoing surgery/procedure for treatment of left eye lens opacification, laser surgery  .    Proposed Surgery/ Procedure: Yag Laser Capsulotomy ( Lt eye)  Date of Surgery/ Procedure: 2018  Time of Surgery/ Procedure: 10:00AM  Hospital/Surgical Facility: [facility]  Fax number for surgical facility: [phone]  Primary Physician: Malcolm Schafer  Type of Anesthesia Anticipated: Local    Patient has a Health Care Directive or Living Will:  YES     1. NO - Do you have a history of heart attack, stroke, stent, bypass or surgery on an artery in the head, neck, heart or legs?  2. NO - Do you ever have any pain or discomfort in your chest?  3. NO - Do you have a history of  Heart Failure?  4. NO - Are you troubled by shortness of breath when: walking on the level, up a slight hill or at night?  5. NO - Do you currently have a cold, bronchitis or other respiratory infection?  6. NO - Do you have a cough, shortness of breath or wheezing?  7. NO - Do you sometimes get pains in the calves of your legs when you walk?  8. NO - Do you or anyone in your family have previous history of blood clots?  9. NO - Do you or does anyone in your family have a serious bleeding problem such as prolonged bleeding following surgeries or cuts?  10. NO - Have you ever had problems with anemia or been told to take iron pills?  11. NO - Have you had any abnormal blood loss such as black, tarry or bloody stools, or abnormal vaginal bleeding?  12. NO - Have you ever had a blood transfusion?  13. NO - Have you or any of your relatives ever had problems with anesthesia?  14. YES - DO YOU HAVE  SLEEP APNEA, EXCESSIVE SNORING OR DAYTIME DROWSINESS?   15. NO - Do you have any prosthetic heart valves?  16. NO - Do you have prosthetic joints?  17. NO - Is there any chance that you may be pregnant?      HPI:     HPI related to upcoming procedure: scheduled for laser surgery of the left eye artificial lens.   No acute complaints, no medication change or new medical conditions.    Has history of atrial fibrillation. On anticoagulation with Coumadin and rate control medications. Asymptonatic - no chest pains , palpitations,  no side effects from medications.  Has h/o HTN. on medical treatment. BP has been controlled. No side effects from medications. No CP, HA, dizziness. good compliance with medications and low salt diet.  Has SARAH. On CPAP. Good compliance.   Has prostate cancer , not symptomatic, follows with urology.   Has h/o CRF. Symptoms include mild fatigue. Monitoring BP, BG, medications, avoiding OTC NSAIDs. Needs periodic recheck of kidney function.      See problem list for active medical problems.  Problems all longstanding and stable, except as noted/documented.  See ROS for pertinent symptoms related to these conditions.                                                                                                  .    MEDICAL HISTORY:     Patient Active Problem List    Diagnosis Date Noted     Left shoulder pain 01/24/2018     Priority: Medium     Prostate cancer (H) 02/08/2017     Priority: Medium     Kidney cyst, acquired 01/18/2017     Priority: Medium     Psoas hematoma, right, secondary to anticoagulant therapy 10/22/2016     Priority: Medium     Associated with fall.        Essential hypertension, benign 10/09/2016     Priority: Medium     Long-term (current) use of anticoagulants [Z79.01] 04/18/2016     Priority: Medium     Gout of foot, unspecified cause, unspecified chronicity, unspecified laterality 01/12/2016     Priority: Medium     Chronic atrial fibrillation (H) 01/12/2016      Priority: Medium     Hyperlipidemia LDL goal <100 09/10/2014     Priority: Medium     HCD (health care directive) 08/28/2013     Priority: Medium     SARAH (obstructive sleep apnea) 08/28/2013     Priority: Medium     Gout 05/16/2013     Priority: Medium     Colon polyps 04/02/2012     Priority: Medium                 Copath Report      Patient Name: TONY KIRBY   MR#: [id number]   Specimen #: [id number]   Collected: 9/30/2014   Received: 9/30/2014   Reported: 10/1/2014 17:12   Ordering Phy(s): CLARK COFFEY   Additional Phy(s): SUSAN PAUL               SPECIMEN(S):   Cecal polyp     FINAL DIAGNOSIS:   Colon, cecum, polypectomy:   -Tubular adenoma.     Electronically signed out by:     Bradly Stewart M.D.       CLINICAL HISTORY:   History of colon polyps.               CARDIOVASCULAR SCREENING; LDL GOAL LESS THAN 100 10/31/2010     Priority: Medium     Hypertension goal BP (blood pressure) < 140/90 06/28/2006     Priority: Medium     Glaucoma 04/10/2003     Priority: Medium     Problem list name updated by automated process. Provider to review       Carcinoma in situ of prostate 10/15/2002     Priority: Medium     Bell's palsy 10/15/2002     Priority: Medium     Allergic rhinitis due to pollen 10/10/2002     Priority: Medium      Past Medical History:   Diagnosis Date     Bell's palsy 10/15/2002     CA IN SITU PROSTATE 10/15/2002     Chronic atrial fibrillation (H) 7/1/10     Glaucoma 4/10/2003     Problem list name updated by automated process. Provider to review     Gout 5/16/2013     Hyperlipidemia LDL goal <100 9/10/2014     Hypertension goal BP (blood pressure) < 140/90 6/28/2006     SARAH (obstructive sleep apnea) 8/28/2013     Pericarditis 2001     Renal cyst      Past Surgical History:   Procedure Laterality Date     CATARACT IOL, RT/LT  10/15, 11/15     COLONOSCOPY  2009    [facility]     COLONOSCOPY  9/30/2014    Dr. Coffey [facility]     COLONOSCOPY N/A 9/30/2014    Procedure: COMBINED COLONOSCOPY, SINGLE  BIOPSY/POLYPECTOMY BY BIOPSY;  Surgeon: Puneet Long MD;  Location:  GI     EYE SURGERY  2007    glaucoma surgery right eye     PROSTATE SURGERY       SUPRAPUBIC PROSTATECTOMY  2002     VASECTOMY       Current Outpatient Prescriptions   Medication Sig Dispense Refill     metoprolol succinate (TOPROL-XL) 100 MG 24 hr tablet TAKE 1 TABLET EVERY DAY 90 tablet 0     Levocetirizine Dihydrochloride (XYZAL PO)        UNABLE TO FIND MEDICATION NAME: Tabavit, multivitmain without vitamin K in it.       lisinopril (PRINIVIL/ZESTRIL) 10 MG tablet TAKE TWO TABLETS BY MOUTH  DAILY 90 tablet 1     allopurinol (ZYLOPRIM) 100 MG tablet Take 1 tablet (100 mg) by mouth daily 90 tablet 4     amLODIPine (NORVASC) 10 MG tablet Take 1 tablet (10 mg) by mouth daily 90 tablet 1     warfarin (COUMADIN) 5 MG tablet Take 1 tablet (5 mg) by mouth daily Or as instructed by INR clinic 90 tablet 1     fluticasone (FLONASE) 50 MCG/ACT nasal spray Spray 1-2 sprays into both nostrils daily 1 Package 5     acetaminophen (TYLENOL) 325 MG tablet Take 1-2 tablets by mouth every 6 hours as needed.       OTC products: None, except as noted above    Allergies   Allergen Reactions     Cats      Codeine      Hallucinated when taking codeine with another medication     Maple Tree      Morphine Visual Disturbance     Watermelon [Citrullus Vulgaris]      Itching throat, hives      Latex Allergy: NO    Social History   Substance Use Topics     Smoking status: Never Smoker     Smokeless tobacco: Never Used     Alcohol use No     History   Drug Use No       REVIEW OF SYSTEMS:   CONSTITUTIONAL: NEGATIVE for fever, chills, change in weight  INTEGUMENTARY/SKIN: NEGATIVE for worrisome rashes, moles or lesions  EYES: NEGATIVE for vision changes or irritation  ENT/MOUTH: NEGATIVE for ear, mouth and throat problems  RESP: NEGATIVE for significant cough or SOB  BREAST: NEGATIVE for masses, tenderness or discharge  CV: NEGATIVE for chest pain, palpitations or  "peripheral edema  GI: NEGATIVE for nausea, abdominal pain, heartburn, or change in bowel habits  : NEGATIVE for frequency, dysuria, or hematuria  MUSCULOSKELETAL: NEGATIVE for significant arthralgias or myalgia  NEURO: NEGATIVE for weakness, dizziness or paresthesias  ENDOCRINE: NEGATIVE for temperature intolerance, skin/hair changes  HEME: NEGATIVE for bleeding problems  PSYCHIATRIC: NEGATIVE for changes in mood or affect    EXAM:   Pulse 72  Temp 97.8  F (36.6  C) (Oral)  Ht 6' 1\" (1.854 m)  Wt 236 lb (107 kg)  SpO2 97%  BMI 31.14 kg/m2    GENERAL APPEARANCE: overweight, alert and no distress     EYES: EOMI,  PERRL     HENT: ear canals and TM's normal and nose and mouth without ulcers or lesions     NECK: no adenopathy, no asymmetry, masses, or scars and thyroid normal to palpation     RESP: lungs clear to auscultation - no rales, rhonchi or wheezes     CV: regular rates and rhythm, normal S1 S2, no S3 or S4 and no murmur, click or rub     ABDOMEN:  soft, nontender, no HSM or masses and bowel sounds normal     MS: extremities normal- no gross deformities noted, no evidence of inflammation in joints, FROM in all extremities.     SKIN: no suspicious lesions or rashes     NEURO: Normal strength and tone, sensory exam grossly normal, mentation intact and speech normal     PSYCH: mentation appears normal. and affect normal/bright     LYMPHATICS: No cervical adenopathy    DIAGNOSTICS:   No labs or EKG required for low risk surgery (cataract, skin procedure, breast biopsy, etc)    Recent Labs   Lab Test 02/16/18 01/19/18   0936 01/19/18 01/27/17   0951  01/18/17   0936   HGB   --   14.3   --    --    --   13.5   PLT   --   149*   --    --    --   148*   INR  2.3*   --   3.0*   < >   --    --    NA   --   144   --    --   142  143   POTASSIUM   --   4.2   --    --   3.9  4.0   CR   --   1.41*   --    --   1.30*  1.32*    < > = values in this interval not displayed.        IMPRESSION:   Reason for " surgery/procedure: left eye cataract of lens   Diagnosis/reason for consult: preoperative evaluation/ clearance      The proposed surgical procedure is considered LOW risk.    REVISED CARDIAC RISK INDEX  The patient has the following serious cardiovascular risks for perioperative complications such as (MI, PE, VFib and 3  AV Block):  No serious cardiac risks  INTERPRETATION: 0 risks: Class I (very low risk - 0.4% complication rate)    The patient has the following additional risks for perioperative complications:  No identified additional risks      ICD-10-CM    1. Preop general physical exam Z01.818        RECOMMENDATIONS:             APPROVAL GIVEN to proceed with proposed procedure, without further diagnostic evaluation       Signed Electronically by: Malcolm Schafer MD    Copy of this evaluation report is provided to requesting physician.    [facility] Preop Guidelines

## 2018-02-27 NOTE — NURSING NOTE
"Chief Complaint   Patient presents with     Pre-Op Exam     09/09/18       Initial /80  Pulse 72  Temp 97.8  F (36.6  C) (Oral)  Ht 6' 1\" (1.854 m)  Wt 236 lb (107 kg)  SpO2 97%  BMI 31.14 kg/m2 Estimated body mass index is 31.14 kg/(m^2) as calculated from the following:    Height as of this encounter: 6' 1\" (1.854 m).    Weight as of this encounter: 236 lb (107 kg).  Medication Reconciliation: complete   Lamar Campbell MA      "

## 2018-02-28 ENCOUNTER — TELEPHONE (OUTPATIENT)
Dept: INTERNAL MEDICINE | Facility: CLINIC | Age: 73
End: 2018-02-28

## 2018-02-28 DIAGNOSIS — J30.2 CHRONIC SEASONAL ALLERGIC RHINITIS, UNSPECIFIED TRIGGER: ICD-10-CM

## 2018-02-28 RX ORDER — FLUTICASONE PROPIONATE 50 MCG
1-2 SPRAY, SUSPENSION (ML) NASAL DAILY
Qty: 1 BOTTLE | Refills: 11 | Status: SHIPPED | OUTPATIENT
Start: 2018-02-28 | End: 2019-08-29

## 2018-02-28 NOTE — TELEPHONE ENCOUNTER
Pt calls, states that he spoke to Dr. Schafer regarding alternative for Flonase at appt yesterday. Pt states the pharmacy did not get a prescription and asks if this was sent in. Reviewed visit note from yesterday, prescription was not sent for Flonase and no mention of this in visit note.    Pt is waiting at the pharmacy-he is aware provider is at lunch. Sent to provider to review.

## 2018-03-03 ENCOUNTER — OFFICE VISIT (OUTPATIENT)
Dept: URGENT CARE | Facility: URGENT CARE | Age: 73
End: 2018-03-03
Payer: COMMERCIAL

## 2018-03-03 VITALS
TEMPERATURE: 97.8 F | BODY MASS INDEX: 18.34 KG/M2 | OXYGEN SATURATION: 99 % | WEIGHT: 139 LBS | SYSTOLIC BLOOD PRESSURE: 132 MMHG | HEART RATE: 53 BPM | DIASTOLIC BLOOD PRESSURE: 80 MMHG

## 2018-03-03 DIAGNOSIS — J00 ACUTE RHINITIS, UNSPECIFIED TYPE: Primary | ICD-10-CM

## 2018-03-03 DIAGNOSIS — H93.91 EAR PROBLEM, RIGHT: ICD-10-CM

## 2018-03-03 PROCEDURE — 99213 OFFICE O/P EST LOW 20 MIN: CPT | Performed by: FAMILY MEDICINE

## 2018-03-03 NOTE — MR AVS SNAPSHOT
After Visit Summary   3/3/2018    Billy Stock    MRN: [id number]           Patient Information     Date Of Birth          1945        Visit Information        Provider Department      3/3/2018 5:40 PM Serg Morales MD [facility]        Today's Diagnoses     Acute rhinitis, unspecified type    -  1    Ear problem, right          Care Instructions    Continue the Flonase and the Antihistamine  Saline nasal rinses  follow up with the primary care provider if not better in 10-14 days.           Follow-ups after your visit        Your next 10 appointments already scheduled     Mar 05, 2018  2:50 PM CST   [facility] Extremity with Tre Ceja PT   [facility] ([facility] [place]  )    [address]  [address]  [facility] [address]   [phone]            Mar 29, 2018 10:00 AM CDT   [facility] Visit with [facility]   [facility]    [address]  [facility] [address] [phone]              Who to contact     If you have questions or need follow up information about today's clinic visit or your schedule please contact [facility] directly at [phone].  Normal or non-critical lab and imaging results will be communicated to you by MyChart, letter or phone within 4 business days after the clinic has received the results. If you do not hear from us within 7 days, please contact the clinic through MyChart or phone. If you have a critical or abnormal lab result, we will notify you by phone as soon as possible.  Submit refill requests through [employer] or call your pharmacy and they will forward the refill request to us. Please allow 3 business days for your refill to be completed.          Additional Information About Your Visit        MyChart Information     [employer] lets you send messages to your doctor, view your test results, renew your prescriptions,  "schedule appointments and more. To sign up, go to [email or web address] . Click on \"Log in\" on the left side of the screen, which will take you to the Welcome page. Then click on \"Sign up Now\" on the right side of the page.     You will be asked to enter the access code listed below, as well as some personal information. Please follow the directions to create your username and password.     Your access code is: [email or web address]  Expires: 2018  5:34 PM     Your access code will  in 90 days. If you need help or a new code, please call your [facility] clinic or [phone].        Care EveryWhere ID     This is your Care EveryWhere ID. This could be used by other organizations to access your [facility] medical records  [id number]        Your Vitals Were     Pulse Temperature Pulse Oximetry BMI (Body Mass Index)          53 97.8  F (36.6  C) (Oral) 99% 18.34 kg/m2         Blood Pressure from Last 3 Encounters:   18 132/80   18 130/80   18 146/76    Weight from Last 3 Encounters:   18 139 lb (63 kg)   18 236 lb (107 kg)   18 236 lb (107 kg)              Today, you had the following     No orders found for display       Primary Care Provider Office Phone # Fax #    Malcolm Schafer [phone]       [address] [facility] [address]        Equal Access to Services     [facility] AH: Hadii aad ku hadasho Soomaali, waaxda luqadaha, qaybta kaalmada adeegyada, jake delacruz . So [facility] [phone].    ATENCIÓN: Si habla español, tiene a braga disposición servicios gratuitos de asistencia lingüística. Llame al [phone].    We comply with applicable federal civil rights laws and [place] laws. We do not discriminate on the basis of race, color, national origin, age, disability, sex, sexual orientation, or gender identity.            Thank you!     Thank you for choosing [facility]  for your care. Our goal is always " to provide you with excellent care. Hearing back from our patients is one way we can continue to improve our services. Please take a few minutes to complete the written survey that you may receive in the mail after your visit with us. Thank you!             Your Updated Medication List - Protect others around you: Learn how to safely use, store and throw away your medicines at www.disposemymeds.org.          This list is accurate as of 3/3/18  6:25 PM.  Always use your most recent med list.                   Brand Name Dispense Instructions for use Diagnosis    allopurinol 100 MG tablet    ZYLOPRIM    90 tablet    Take 1 tablet (100 mg) by mouth daily    Gout of foot, unspecified cause, unspecified chronicity, unspecified laterality       amLODIPine 10 MG tablet    NORVASC    90 tablet    Take 1 tablet (10 mg) by mouth daily    Essential hypertension with goal blood pressure less than 140/90       fluticasone 50 MCG/ACT spray    FLONASE    1 Bottle    Spray 1-2 sprays into both nostrils daily    Chronic seasonal allergic rhinitis, unspecified trigger       lisinopril 10 MG tablet    PRINIVIL/ZESTRIL    90 tablet    TAKE TWO TABLETS BY MOUTH  DAILY    Benign essential hypertension       metoprolol succinate 100 MG 24 hr tablet    TOPROL-XL    30 tablet    TAKE 1 TABLET EVERY DAY    Chronic atrial fibrillation (H)       TYLENOL 325 MG tablet   Generic drug:  acetaminophen      Take 1-2 tablets by mouth every 6 hours as needed.        UNABLE TO FIND      MEDICATION NAME: Tabavit, multivitmain without vitamin K in it.        warfarin 5 MG tablet    COUMADIN    90 tablet    Take 1 tablet (5 mg) by mouth daily Or as instructed by INR clinic    Chronic atrial fibrillation (H)       XYZAL PO      Take 5 mg by mouth daily

## 2018-03-04 NOTE — PROGRESS NOTES
SUBJECTIVE:   Billy Stock is a 72 year old male presenting with a chief complaint of an improving ticklish sensation from the right ear down to the throat, nasal discharge (clear colorless discharge).  No fevers.  No sore throat pain.    Onset of symptoms was one day ago.  Course of illness is still present but improved.        Current and Associated symptoms: as listed above.   Treatment measures tried include Flonase nasal spray, Antihistamine..    Past Medical History:   Diagnosis Date     Bell's palsy 10/15/2002     CA IN SITU PROSTATE 10/15/2002     Chronic atrial fibrillation (H) 7/1/10     Glaucoma 4/10/2003     Problem list name updated by automated process. Provider to review     Gout 5/16/2013     Hyperlipidemia LDL goal <100 9/10/2014     Hypertension goal BP (blood pressure) < 140/90 6/28/2006     SARAH (obstructive sleep apnea) 8/28/2013     Pericarditis 2001     Renal cyst      Current Outpatient Prescriptions   Medication Sig Dispense Refill     fluticasone (FLONASE) 50 MCG/ACT spray Spray 1-2 sprays into both nostrils daily 1 Bottle 11     metoprolol succinate (TOPROL-XL) 100 MG 24 hr tablet TAKE 1 TABLET EVERY DAY 30 tablet 0     Levocetirizine Dihydrochloride (XYZAL PO) Take 5 mg by mouth daily        UNABLE TO FIND MEDICATION NAME: Tabavit, multivitmain without vitamin K in it.       lisinopril (PRINIVIL/ZESTRIL) 10 MG tablet TAKE TWO TABLETS BY MOUTH  DAILY 90 tablet 1     allopurinol (ZYLOPRIM) 100 MG tablet Take 1 tablet (100 mg) by mouth daily 90 tablet 4     amLODIPine (NORVASC) 10 MG tablet Take 1 tablet (10 mg) by mouth daily 90 tablet 1     warfarin (COUMADIN) 5 MG tablet Take 1 tablet (5 mg) by mouth daily Or as instructed by INR clinic 90 tablet 1     acetaminophen (TYLENOL) 325 MG tablet Take 1-2 tablets by mouth every 6 hours as needed.       Social History   Substance Use Topics     Smoking status: Never Smoker     Smokeless tobacco: Never Used     Alcohol use No       ROS:  Review  of systems negative except as stated above.    OBJECTIVE:  /80 (BP Location: Right arm, Patient Position: Chair, Cuff Size: Adult Regular)  Pulse 53  Temp 97.8  F (36.6  C) (Oral)  Wt 139 lb (63 kg)  SpO2 99%  BMI 18.34 kg/m2  GENERAL APPEARANCE: healthy, alert and no distress. No cough.  No acute respiratory distress.    HENT: TM's normal bilaterally and Oropharynx is within normal limits   NECK: supple, nontender, no lymphadenopathy  RESP: lungs clear to auscultation - no rales, rhonchi or wheezes  CV: regular rates and rhythm, normal S1 S2, no murmur noted  SKIN: no suspicious lesions or rashes    ASSESSMENT:  Right Ear Problem  Acute Rhinitis      PLAN:  Continue the Flonase and the Antihistamine  Saline nasal rinses  follow up with the primary care provider if not better in 10-14 days.   See orders in Epic    Serg Morales MD

## 2018-03-04 NOTE — PATIENT INSTRUCTIONS
Continue the Flonase and the Antihistamine  Saline nasal rinses  follow up with the primary care provider if not better in 10-14 days.

## 2018-03-05 ENCOUNTER — THERAPY VISIT (OUTPATIENT)
Dept: PHYSICAL THERAPY | Facility: CLINIC | Age: 73
End: 2018-03-05
Payer: COMMERCIAL

## 2018-03-05 DIAGNOSIS — M25.512 LEFT SHOULDER PAIN: ICD-10-CM

## 2018-03-05 PROCEDURE — 97110 THERAPEUTIC EXERCISES: CPT | Mod: GP | Performed by: PHYSICAL THERAPIST

## 2018-03-05 PROCEDURE — 97112 NEUROMUSCULAR REEDUCATION: CPT | Mod: GP | Performed by: PHYSICAL THERAPIST

## 2018-03-19 ENCOUNTER — THERAPY VISIT (OUTPATIENT)
Dept: PHYSICAL THERAPY | Facility: CLINIC | Age: 73
End: 2018-03-19
Payer: COMMERCIAL

## 2018-03-19 DIAGNOSIS — M25.512 LEFT SHOULDER PAIN: ICD-10-CM

## 2018-03-19 PROCEDURE — 97110 THERAPEUTIC EXERCISES: CPT | Mod: GP | Performed by: PHYSICAL THERAPIST

## 2018-03-19 PROCEDURE — 97112 NEUROMUSCULAR REEDUCATION: CPT | Mod: GP | Performed by: PHYSICAL THERAPIST

## 2018-03-29 ENCOUNTER — ANTICOAGULATION THERAPY VISIT (OUTPATIENT)
Dept: ANTICOAGULATION | Facility: CLINIC | Age: 73
End: 2018-03-29
Payer: COMMERCIAL

## 2018-03-29 DIAGNOSIS — I48.20 CHRONIC ATRIAL FIBRILLATION (H): ICD-10-CM

## 2018-03-29 DIAGNOSIS — Z79.01 LONG-TERM (CURRENT) USE OF ANTICOAGULANTS: ICD-10-CM

## 2018-03-29 LAB — INR POINT OF CARE: 3.4 (ref 0.86–1.14)

## 2018-03-29 PROCEDURE — 36416 COLLJ CAPILLARY BLOOD SPEC: CPT

## 2018-03-29 PROCEDURE — 85610 PROTHROMBIN TIME: CPT | Mod: QW

## 2018-03-29 PROCEDURE — 99207 ZZC NO CHARGE NURSE ONLY: CPT

## 2018-03-29 NOTE — PROGRESS NOTES
ANTICOAGULATION FOLLOW-UP CLINIC VISIT    Patient Name:  Billy Stock  Date:  3/29/2018  Contact Type:  Face to Face    SUBJECTIVE:     Patient Findings     Positives Change in diet/appetite (Pt reports having fewer greens than usual, will resume usual diet. )    Comments Pt denies any other changes.            OBJECTIVE    INR Protime   Date Value Ref Range Status   03/29/2018 3.4 (A) 0.86 - 1.14 Final       ASSESSMENT / PLAN  INR assessment SUPRA    Recheck INR In: 3 WEEKS    INR Location Clinic      Anticoagulation Summary as of 3/29/2018     INR goal 2.0-3.0   Today's INR 3.4!   Maintenance plan 5 mg (5 mg x 1) every day   Full instructions 3/30: 2.5 mg; Otherwise 5 mg every day   Weekly total 35 mg   Plan last modified Samantha Galeas RN (4/18/2016)   Next INR check 4/19/2018   Priority INR   Target end date     Indications   Long-term (current) use of anticoagulants [Z79.01] [Z79.01]  Chronic atrial fibrillation (H) [I48.2]         Anticoagulation Episode Summary     INR check location     Preferred lab     Send [facility] reminders to [facility]    Comments Pt reports taking Warfarin in the morning      Anticoagulation Care Providers     Provider Role Specialty Phone number    Malcolm Schafer MD Responsible Internal Medicine [phone]            See the Encounter Report to view Anticoagulation Flowsheet and Dosing Calendar (Go to Encounters tab in chart review, and find the Anticoagulation Therapy Visit)    Dosage adjustment made based on physician directed care plan.    Samantha Galeas, RN

## 2018-03-29 NOTE — MR AVS SNAPSHOT
Billy Stock   3/29/2018 10:00 AM   Anticoagulation Therapy Visit    Description:  72 year old male   Provider:  [facility]   Department:  [facility]           INR as of 3/29/2018     Today's INR 3.4!      Anticoagulation Summary as of 3/29/2018     INR goal 2.0-3.0   Today's INR 3.4!   Full instructions 3/30: 2.5 mg; Otherwise 5 mg every day   Next INR check 4/19/2018    Indications   Long-term (current) use of anticoagulants [Z79.01] [Z79.01]  Chronic atrial fibrillation (H) [I48.2]         Your next Anticoagulation Clinic appointment(s)     Apr 19, 2018  9:15 AM CDT   Anticoagulation Visit with [facility]   [facility]    [address] [facility] [address]  [facility] [address] [phone]              Contact Numbers     [facility] Phone Numbers:  Anticoagulation Clinic Appointments : [phone]  Anticoagulation Nurse: [phone]         March 2018 Details    Sun Mon Tue Wed Thu Fri Sat         1               2               3                 4               5               6               7               8               9               10                 11               12               13               14               15               16               17                 18               19               20               21               22               23               24                 25               26               27               28               29      5 mg   See details      30      2.5 mg         31      5 mg          Date Details   03/29 This INR check               How to take your warfarin dose     To take:  2.5 mg Take 0.5 of a 5 mg tablet.    To take:  5 mg Take 1 of the 5 mg tablets.           April 2018 Details    Sun Mon Tue Wed Thu Fri Sat     1      5 mg         2      5 mg         3      5 mg         4      5 mg         5      5 mg         6      5 mg         7      5 mg           8       5 mg         9      5 mg         10      5 mg         11      5 mg         12      5 mg         13      5 mg         14      5 mg           15      5 mg         16      5 mg         17      5 mg         18      5 mg         19            20               21                 22               23               24               25               26               27               28                 29               30                     Date Details   No additional details    Date of next INR:  4/19/2018         How to take your warfarin dose     To take:  5 mg Take 1 of the 5 mg tablets.

## 2018-04-02 ENCOUNTER — THERAPY VISIT (OUTPATIENT)
Dept: PHYSICAL THERAPY | Facility: CLINIC | Age: 73
End: 2018-04-02
Payer: COMMERCIAL

## 2018-04-02 DIAGNOSIS — M25.512 LEFT SHOULDER PAIN: ICD-10-CM

## 2018-04-02 PROCEDURE — 97112 NEUROMUSCULAR REEDUCATION: CPT | Mod: GP | Performed by: PHYSICAL THERAPIST

## 2018-04-02 PROCEDURE — 97110 THERAPEUTIC EXERCISES: CPT | Mod: GP | Performed by: PHYSICAL THERAPIST

## 2018-04-02 NOTE — PROGRESS NOTES
"Subjective:  HPI                    Objective:  System    Physical Exam    General     ROS    Assessment/Plan:    DISCHARGE REPORT    Progress reporting period is from 1/24/2018 to 4/2/2018.       SUBJECTIVE  Shoulder has been worse recently d/t cold and wintery weather.  Shoulder feels much better when the weather is warmer.  Patient reports a \"70-75%\" improvement in symptoms since beginning therapy.  Patient c/o difficulty reaching overhead and across body.  HEP is going well, and he feels he has a good understanding of the HEP.  Current pain level is 3/10  .     Previous pain level was  3/10  .   Changes in function:  Yes (See Goal flowsheet attached for changes in current functional level)  Adverse reaction to treatment or activity: None    OBJECTIVE  AROM:    Elevation: 125   External Rotation:  Left: 60      Extension/Internal Rotation:  Left:  To T10      Strength:    Abduction:  Left: 5-/5   Pain:-/+    Adduction:  Left: 5/5     Pain:-      Internal Rotation:  Left:5-/5    Pain:-/+      External Rotation:   Left:5-/5   Pain: -/+       ASSESSMENT/PLAN  Updated problem list and treatment plan: Diagnosis 1:  Left Shoulder Pain  Pain -  home program  Decreased ROM/flexibility - home program  Decreased joint mobility - home program  Decreased strength - home program  Decreased function - home program  STG/LTGs have been met or progress has been made towards goals:  Yes (See Goal flow sheet completed today.)  Assessment of Progress: The patient's condition is improving.  Patient is meeting short term goals and is progressing towards long term goals.  Self Management Plans:  Patient has been instructed in a home treatment program.  Patient is independent in a home treatment program.  I have re-evaluated this patient and find that the nature, scope, duration and intensity of the therapy is appropriate for the medical condition of the patient.  Billy continues to require the following intervention to meet STG and " LTG's:  PT intervention is no longer required to meet STG/LTG.    Recommendations:  This patient is ready to be discharged from therapy and continue their home treatment program.    Please refer to the daily flowsheet for treatment today, total treatment time and time spent performing 1:1 timed codes.

## 2018-04-03 ENCOUNTER — TELEPHONE (OUTPATIENT)
Dept: INTERNAL MEDICINE | Facility: CLINIC | Age: 73
End: 2018-04-03

## 2018-04-03 DIAGNOSIS — I48.20 CHRONIC ATRIAL FIBRILLATION (H): ICD-10-CM

## 2018-04-03 RX ORDER — WARFARIN SODIUM 5 MG/1
5 TABLET ORAL DAILY
Qty: 90 TABLET | Refills: 1 | Status: SHIPPED | OUTPATIENT
Start: 2018-04-03 | End: 2018-08-16

## 2018-04-03 RX ORDER — METOPROLOL SUCCINATE 100 MG/1
TABLET, EXTENDED RELEASE ORAL
Qty: 90 TABLET | Refills: 1 | Status: SHIPPED | OUTPATIENT
Start: 2018-04-03 | End: 2018-10-29

## 2018-04-03 NOTE — TELEPHONE ENCOUNTER
Last OV 2/27/18    Refill request for Metoprolol and Warfarin.   Prescription approved per [facility] Refill Protocol.    BP Readings from Last 3 Encounters:   03/03/18 132/80   02/27/18 130/80   02/19/18 146/76       Lab Results   Component Value Date    INR 3.4 (A) 03/29/2018    INR 2.3 (A) 02/16/2018    INR 3.0 (A) 01/19/2018    INR 3.1 (A) 12/27/2017    INR 3.6 (A) 12/06/2017

## 2018-04-03 NOTE — TELEPHONE ENCOUNTER
Reason for Call:  Medication or medication refill:    Do you use a [facility]?  Name of the pharmacy and phone number for the current request:  [facility] mail order # [phone]    Name of the medication requested: metoprolol and warfarin    Other request: none    Can we leave a detailed message on this number? YES    Phone number patient can be reached at: Home number on file [phone] (home)    Best Time: any    Call taken on 4/3/2018 at 1:16 PM by Keila Rowe

## 2018-04-08 ENCOUNTER — OFFICE VISIT (OUTPATIENT)
Dept: URGENT CARE | Facility: URGENT CARE | Age: 73
End: 2018-04-08
Payer: COMMERCIAL

## 2018-04-08 VITALS
BODY MASS INDEX: 31.08 KG/M2 | TEMPERATURE: 98 F | SYSTOLIC BLOOD PRESSURE: 141 MMHG | OXYGEN SATURATION: 98 % | HEART RATE: 66 BPM | WEIGHT: 235.6 LBS | DIASTOLIC BLOOD PRESSURE: 89 MMHG

## 2018-04-08 DIAGNOSIS — R10.9 FLANK PAIN, ACUTE: Primary | ICD-10-CM

## 2018-04-08 LAB
ALBUMIN UR-MCNC: >=300 MG/DL
APPEARANCE UR: CLEAR
BACTERIA #/AREA URNS HPF: ABNORMAL /HPF
BILIRUB UR QL STRIP: NEGATIVE
COLOR UR AUTO: YELLOW
GLUCOSE UR STRIP-MCNC: NEGATIVE MG/DL
HGB UR QL STRIP: ABNORMAL
KETONES UR STRIP-MCNC: NEGATIVE MG/DL
LEUKOCYTE ESTERASE UR QL STRIP: NEGATIVE
MUCOUS THREADS #/AREA URNS LPF: PRESENT /LPF
NITRATE UR QL: NEGATIVE
NON-SQ EPI CELLS #/AREA URNS LPF: ABNORMAL /LPF
PH UR STRIP: 6.5 PH (ref 5–7)
RBC #/AREA URNS AUTO: ABNORMAL /HPF
SOURCE: ABNORMAL
SP GR UR STRIP: 1.02 (ref 1–1.03)
UROBILINOGEN UR STRIP-ACNC: 1 EU/DL (ref 0.2–1)
WBC #/AREA URNS AUTO: ABNORMAL /HPF

## 2018-04-08 PROCEDURE — 81001 URINALYSIS AUTO W/SCOPE: CPT | Performed by: PHYSICIAN ASSISTANT

## 2018-04-08 PROCEDURE — 99213 OFFICE O/P EST LOW 20 MIN: CPT | Performed by: PHYSICIAN ASSISTANT

## 2018-04-08 NOTE — MR AVS SNAPSHOT
After Visit Summary   4/8/2018    Billy Stock    MRN: [id number]           Patient Information     Date Of Birth          1945        Visit Information        Provider Department      4/8/2018 3:55 PM Ritesh Morin PA-C [facility]        Today's Diagnoses     Urinary frequency    -  1      Care Instructions        Follow up with primary care provider for urine recheck    Protein in the Urine (Proteinuria)  The kidney filters waste products and unwanted substances from the blood. These waste products end up in the urine. Protein is an important part of the blood and is not filtered out. Normally, there is no protein in the urine.  Proteinuria occurs when some of the normal protein in the bloodstream ends up in the urine. Protein in the urine will show up on a standard urine test.   Protein in the urine can be a sign of serious disease or a harmless temporary condition. For example, heavy exercise or fever can cause temporary proteinuria. This is normal and will go away if the urine test is repeated.  If urine tests continue to show protein in the urine, chronic kidney disease may be present. Common causes of kidney disease include diabetes, high blood pressure, and conditions that cause inflammation in the kidneys.  Protein in the blood helps keep fluids from leaking out of the blood vessels and into the surrounding tissues. Mild or temporary proteinuria doesn't cause any symptoms. However, if too much protein is lost from the body, there may be swelling as fluid leaks from the blood vessels. Swelling may appear in legs, feet, and ankles or elsewhere such as lower back, face and eyelids.  If proteinuria persists on repeat urine testing, more tests will be needed to figure out the cause. If the cause is still unclear, you may be told to see a kidney specialist.  Home care  No special home care is needed until the cause of your proteinuria is known.  Follow-up  care  Follow up with your doctor, or as advised.  Call 911   Call 911 or get immediate medical care if any of the following occur:     Severe weakness, dizziness, fainting, drowsiness, or confusion    Chest pain or shortness of breath   When to seek medical advice  Call your healthcare provider right away if any of these occur:     Nausea or vomiting    Unexpected weight gain or swelling in the legs, ankles, or around the eyes    Dark colored urine    Decreased or absent urine output  Date Last Reviewed: 6/1/2016 2000-2017 [employer]. [address] [place] [address]. All rights reserved. This information is not intended as a substitute for professional medical care. Always follow your healthcare professional's instructions.                Follow-ups after your visit        Your next 10 appointments already scheduled     Apr 19, 2018  9:15 AM CDT   Anticoagulation Visit with [facility]   [facility] ([facility])    [address] [facility] [address]  [facility] [id number] [phone]              Who to contact     If you have questions or need follow up information about today's clinic visit or your schedule please contact [facility] directly at [phone].  Normal or non-critical lab and imaging results will be communicated to you by [employer], letter or phone within 4 business days after the clinic has received the results. If you do not hear from us within 7 days, please contact the clinic through [employer] or phone. If you have a critical or abnormal lab result, we will notify you by phone as soon as possible.  Submit refill requests through [email or web address] or call your pharmacy and they will forward the refill request to us. Please allow 3 business days for your refill to be completed.          Additional Information About Your Visit        [employer] Information     [email or web address] lets you send messages to your doctor, view your test  "results, renew your prescriptions, schedule appointments and more. To sign up, go to [email or web address] . Click on \"Log in\" on the left side of the screen, which will take you to the Welcome page. Then click on \"Sign up Now\" on the right side of the page.     You will be asked to enter the access code listed below, as well as some personal information. Please follow the directions to create your username and password.     Your access code is: [email or web address]  Expires: 2018  6:34 PM     Your access code will  in 90 days. If you need help or a new code, please call your [place] clinic or [phone].        Care EveryWhere ID     This is your Care EveryWhere ID. This could be used by other organizations to access your [place] medical records  [id number]        Your Vitals Were     Pulse Temperature Pulse Oximetry BMI (Body Mass Index)          66 98  F (36.7  C) (Tympanic) 98% 31.08 kg/m2         Blood Pressure from Last 3 Encounters:   18 141/89   18 132/80   18 130/80    Weight from Last 3 Encounters:   18 235 lb 9.6 oz (106.9 kg)   18 139 lb (63 kg)   18 236 lb (107 kg)              We Performed the Following     UA reflex to Microscopic and Culture     Urine Microscopic        Primary Care Provider Office Phone # Fax #    Malcolm Schafer [phone]       [address] [facility] [address]        Equal Access to Services     [facility]: Hadii stan gaitan hadasho Soomaali, waaxda luqadaha, qaybta kaalmada sofiya, jake delacruz . So [facility] [phone].    ATENCIÓN: Si habla español, tiene a braga disposición servicios gratuitos de asistencia lingüística. Llame al [phone].    We comply with applicable federal civil rights laws and [place] laws. We do not discriminate on the basis of race, color, national origin, age, disability, sex, sexual orientation, or gender identity.            Thank you!     Thank you " for choosing [facility]  for your care. Our goal is always to provide you with excellent care. Hearing back from our patients is one way we can continue to improve our services. Please take a few minutes to complete the written survey that you may receive in the mail after your visit with us. Thank you!             Your Updated Medication List - Protect others around you: Learn how to safely use, store and throw away your medicines at www.disposemymeds.org.          This list is accurate as of 4/8/18  5:10 PM.  Always use your most recent med list.                   Brand Name Dispense Instructions for use Diagnosis    allopurinol 100 MG tablet    ZYLOPRIM    90 tablet    Take 1 tablet (100 mg) by mouth daily    Gout of foot, unspecified cause, unspecified chronicity, unspecified laterality       amLODIPine 10 MG tablet    NORVASC    90 tablet    Take 1 tablet (10 mg) by mouth daily    Essential hypertension with goal blood pressure less than 140/90       fluticasone 50 MCG/ACT spray    FLONASE    1 Bottle    Spray 1-2 sprays into both nostrils daily    Chronic seasonal allergic rhinitis, unspecified trigger       lisinopril 10 MG tablet    PRINIVIL/ZESTRIL    90 tablet    TAKE TWO TABLETS BY MOUTH  DAILY    Benign essential hypertension       metoprolol succinate 100 MG 24 hr tablet    TOPROL-XL    90 tablet    TAKE 1 TABLET EVERY DAY    Chronic atrial fibrillation (H)       TYLENOL 325 MG tablet   Generic drug:  acetaminophen      Take 1-2 tablets by mouth every 6 hours as needed.        UNABLE TO FIND      MEDICATION NAME: Tabavit, multivitmain without vitamin K in it.        warfarin 5 MG tablet    COUMADIN    90 tablet    Take 1 tablet (5 mg) by mouth daily Or as instructed by INR clinic    Chronic atrial fibrillation (H)       XYZAL PO      Take 5 mg by mouth daily

## 2018-04-08 NOTE — PROGRESS NOTES
SUBJECTIVE  HPI: Billy Stock is a 72 year old male who presents for evaluation of flank pain  Symptoms began 4 day(s) ago.  Pain is located in the right flank region, with radiation to groin, and are at worst a 3 on a scale of 1-10.  Recent injury: none  Personal hx of back pain is no prior back problems.      Past Medical History:   Diagnosis Date     Bell's palsy 10/15/2002     CA IN SITU PROSTATE 10/15/2002     Chronic atrial fibrillation (H) 7/1/10     Glaucoma 4/10/2003     Problem list name updated by automated process. Provider to review     Gout 5/16/2013     Hyperlipidemia LDL goal <100 9/10/2014     Hypertension goal BP (blood pressure) < 140/90 6/28/2006     SARAH (obstructive sleep apnea) 8/28/2013     Pericarditis 2001     Renal cyst      Current Outpatient Prescriptions   Medication Sig Dispense Refill     warfarin (COUMADIN) 5 MG tablet Take 1 tablet (5 mg) by mouth daily Or as instructed by INR clinic 90 tablet 1     metoprolol succinate (TOPROL-XL) 100 MG 24 hr tablet TAKE 1 TABLET EVERY DAY 90 tablet 1     fluticasone (FLONASE) 50 MCG/ACT spray Spray 1-2 sprays into both nostrils daily 1 Bottle 11     Levocetirizine Dihydrochloride (XYZAL PO) Take 5 mg by mouth daily        UNABLE TO FIND MEDICATION NAME: Tabavit, multivitmain without vitamin K in it.       lisinopril (PRINIVIL/ZESTRIL) 10 MG tablet TAKE TWO TABLETS BY MOUTH  DAILY 90 tablet 1     allopurinol (ZYLOPRIM) 100 MG tablet Take 1 tablet (100 mg) by mouth daily 90 tablet 4     amLODIPine (NORVASC) 10 MG tablet Take 1 tablet (10 mg) by mouth daily 90 tablet 1     acetaminophen (TYLENOL) 325 MG tablet Take 1-2 tablets by mouth every 6 hours as needed.       Social History   Substance Use Topics     Smoking status: Never Smoker     Smokeless tobacco: Never Used     Alcohol use No       ROS:  10 point ROS negative except as listed above      OBJECTIVE:  Pulse 66  Temp 98  F (36.7  C) (Tympanic)  Wt 235 lb 9.6 oz (106.9 kg)  SpO2 98%   BMI 31.08 kg/m2  GENERAL APPEARANCE: healthy, alert and no distress  RESP: lungs clear to auscultation - no rales, rhonchi or wheezes  CV: regular rates and rhythm, normal S1 S2, no murmur noted  ABDOMEN:  soft, nontender, no HSM or masses and bowel sounds normal  NEURO: Normal strength and tone with no weakness or sensory deficit noted, reflexes normal   MS:  ROM intact, negative straight leg test  SKIN: no suspicious lesions or rashes    Results for orders placed or performed in visit on 04/08/18   UA reflex to Microscopic and Culture   Result Value Ref Range    Color Urine Yellow     Appearance Urine Clear     Glucose Urine Negative NEG^Negative mg/dL    Bilirubin Urine Negative NEG^Negative    Ketones Urine Negative NEG^Negative mg/dL    Specific Gravity Urine 1.025 1.003 - 1.035    Blood Urine Trace (A) NEG^Negative    pH Urine 6.5 5.0 - 7.0 pH    Protein Albumin Urine >=300 (A) NEG^Negative mg/dL    Urobilinogen Urine 1.0 0.2 - 1.0 EU/dL    Nitrite Urine Negative NEG^Negative    Leukocyte Esterase Urine Negative NEG^Negative    Source Midstream Urine    Urine Microscopic   Result Value Ref Range    WBC Urine 0 - 5 OTO5^0 - 5 /HPF    RBC Urine O - 2 OTO2^O - 2 /HPF    Squamous Epithelial /LPF Urine Few FEW^Few /LPF    Bacteria Urine Few (A) NEG^Negative /HPF    Mucous Urine Present (A) NEG^Negative /LPF         ASSESSMENT/IMPRESSION:  (R10.9) Flank pain, acute  (primary encounter diagnosis)  Comment: Does nto appear to be pyelo, or other emergent process  Plan: UA reflex to Microscopic and Culture, Urine         Microscopic  Follow up with PCP this week    Patient Instructions       Follow up with primary care provider for urine recheck    Protein in the Urine (Proteinuria)  The kidney filters waste products and unwanted substances from the blood. These waste products end up in the urine. Protein is an important part of the blood and is not filtered out. Normally, there is no protein in the urine.  Proteinuria  occurs when some of the normal protein in the bloodstream ends up in the urine. Protein in the urine will show up on a standard urine test.   Protein in the urine can be a sign of serious disease or a harmless temporary condition. For example, heavy exercise or fever can cause temporary proteinuria. This is normal and will go away if the urine test is repeated.  If urine tests continue to show protein in the urine, chronic kidney disease may be present. Common causes of kidney disease include diabetes, high blood pressure, and conditions that cause inflammation in the kidneys.  Protein in the blood helps keep fluids from leaking out of the blood vessels and into the surrounding tissues. Mild or temporary proteinuria doesn't cause any symptoms. However, if too much protein is lost from the body, there may be swelling as fluid leaks from the blood vessels. Swelling may appear in legs, feet, and ankles or elsewhere such as lower back, face and eyelids.  If proteinuria persists on repeat urine testing, more tests will be needed to figure out the cause. If the cause is still unclear, you may be told to see a kidney specialist.  Home care  No special home care is needed until the cause of your proteinuria is known.  Follow-up care  Follow up with your doctor, or as advised.  Call 911   Call 911 or get immediate medical care if any of the following occur:     Severe weakness, dizziness, fainting, drowsiness, or confusion    Chest pain or shortness of breath   When to seek medical advice  Call your healthcare provider right away if any of these occur:     Nausea or vomiting    Unexpected weight gain or swelling in the legs, ankles, or around the eyes    Dark colored urine    Decreased or absent urine output  Date Last Reviewed: 6/1/2016 2000-2017 [employer]. [address] [place] [address], [place] [address]. All rights reserved. This information is not intended as a substitute for professional medical care. Always  follow your healthcare professional's instructions.

## 2018-04-08 NOTE — PATIENT INSTRUCTIONS
Follow up with primary care provider for urine recheck    Protein in the Urine (Proteinuria)  The kidney filters waste products and unwanted substances from the blood. These waste products end up in the urine. Protein is an important part of the blood and is not filtered out. Normally, there is no protein in the urine.  Proteinuria occurs when some of the normal protein in the bloodstream ends up in the urine. Protein in the urine will show up on a standard urine test.   Protein in the urine can be a sign of serious disease or a harmless temporary condition. For example, heavy exercise or fever can cause temporary proteinuria. This is normal and will go away if the urine test is repeated.  If urine tests continue to show protein in the urine, chronic kidney disease may be present. Common causes of kidney disease include diabetes, high blood pressure, and conditions that cause inflammation in the kidneys.  Protein in the blood helps keep fluids from leaking out of the blood vessels and into the surrounding tissues. Mild or temporary proteinuria doesn't cause any symptoms. However, if too much protein is lost from the body, there may be swelling as fluid leaks from the blood vessels. Swelling may appear in legs, feet, and ankles or elsewhere such as lower back, face and eyelids.  If proteinuria persists on repeat urine testing, more tests will be needed to figure out the cause. If the cause is still unclear, you may be told to see a kidney specialist.  Home care  No special home care is needed until the cause of your proteinuria is known.  Follow-up care  Follow up with your doctor, or as advised.  Call 911   Call 911 or get immediate medical care if any of the following occur:     Severe weakness, dizziness, fainting, drowsiness, or confusion    Chest pain or shortness of breath   When to seek medical advice  Call your healthcare provider right away if any of these occur:     Nausea or vomiting    Unexpected  weight gain or swelling in the legs, ankles, or around the eyes    Dark colored urine    Decreased or absent urine output  Date Last Reviewed: 6/1/2016 2000-2017 [employer]. [address] [place] [address], [place] [address]. All rights reserved. This information is not intended as a substitute for professional medical care. Always follow your healthcare professional's instructions.

## 2018-04-18 ENCOUNTER — OFFICE VISIT (OUTPATIENT)
Dept: INTERNAL MEDICINE | Facility: CLINIC | Age: 73
End: 2018-04-18
Payer: COMMERCIAL

## 2018-04-18 ENCOUNTER — ANTICOAGULATION THERAPY VISIT (OUTPATIENT)
Dept: ANTICOAGULATION | Facility: CLINIC | Age: 73
End: 2018-04-18
Payer: COMMERCIAL

## 2018-04-18 VITALS
HEIGHT: 73 IN | BODY MASS INDEX: 31.01 KG/M2 | TEMPERATURE: 97.6 F | OXYGEN SATURATION: 99 % | WEIGHT: 234 LBS | SYSTOLIC BLOOD PRESSURE: 128 MMHG | HEART RATE: 68 BPM | DIASTOLIC BLOOD PRESSURE: 84 MMHG

## 2018-04-18 DIAGNOSIS — I10 HYPERTENSION GOAL BP (BLOOD PRESSURE) < 140/90: ICD-10-CM

## 2018-04-18 DIAGNOSIS — Z09 HOSPITAL DISCHARGE FOLLOW-UP: ICD-10-CM

## 2018-04-18 DIAGNOSIS — R10.9 RIGHT FLANK PAIN: Primary | ICD-10-CM

## 2018-04-18 DIAGNOSIS — Z79.01 LONG-TERM (CURRENT) USE OF ANTICOAGULANTS: ICD-10-CM

## 2018-04-18 DIAGNOSIS — I48.20 CHRONIC ATRIAL FIBRILLATION (H): ICD-10-CM

## 2018-04-18 LAB
ALBUMIN UR-MCNC: >=300 MG/DL
APPEARANCE UR: CLEAR
BACTERIA #/AREA URNS HPF: ABNORMAL /HPF
BILIRUB UR QL STRIP: NEGATIVE
COLOR UR AUTO: YELLOW
GLUCOSE UR STRIP-MCNC: NEGATIVE MG/DL
HGB UR QL STRIP: ABNORMAL
INR POINT OF CARE: 2 (ref 0.86–1.14)
KETONES UR STRIP-MCNC: NEGATIVE MG/DL
LEUKOCYTE ESTERASE UR QL STRIP: NEGATIVE
MUCOUS THREADS #/AREA URNS LPF: PRESENT /LPF
NITRATE UR QL: NEGATIVE
PH UR STRIP: 5.5 PH (ref 5–7)
RBC #/AREA URNS AUTO: ABNORMAL /HPF
SOURCE: ABNORMAL
SP GR UR STRIP: 1.02 (ref 1–1.03)
UROBILINOGEN UR STRIP-ACNC: 0.2 EU/DL (ref 0.2–1)
WBC #/AREA URNS AUTO: ABNORMAL /HPF

## 2018-04-18 PROCEDURE — 85610 PROTHROMBIN TIME: CPT | Mod: QW

## 2018-04-18 PROCEDURE — 99214 OFFICE O/P EST MOD 30 MIN: CPT | Performed by: INTERNAL MEDICINE

## 2018-04-18 PROCEDURE — 99207 ZZC NO CHARGE NURSE ONLY: CPT

## 2018-04-18 PROCEDURE — 36416 COLLJ CAPILLARY BLOOD SPEC: CPT

## 2018-04-18 PROCEDURE — 81001 URINALYSIS AUTO W/SCOPE: CPT | Performed by: INTERNAL MEDICINE

## 2018-04-18 NOTE — PROGRESS NOTES
ANTICOAGULATION FOLLOW-UP CLINIC VISIT    Patient Name:  Billy Stock  Date:  4/18/2018  Contact Type:  Face to Face    SUBJECTIVE:     Patient Findings     Positives No Problem Findings           OBJECTIVE    INR Protime   Date Value Ref Range Status   04/18/2018 2.0 (A) 0.86 - 1.14 Final       ASSESSMENT / PLAN  INR assessment THER    Recheck INR In: 4 WEEKS    INR Location Clinic      Anticoagulation Summary as of 4/18/2018     INR goal 2.0-3.0   Today's INR 2.0   Maintenance plan 5 mg (5 mg x 1) every day   Full instructions 5 mg every day   Weekly total 35 mg   No change documented Marge Gale RN   Plan last modified Samantha Galeas RN (4/18/2016)   Next INR check 5/16/2018   Priority INR   Target end date     Indications   Long-term (current) use of anticoagulants [Z79.01] [Z79.01]  Chronic atrial fibrillation (H) [I48.2]         Anticoagulation Episode Summary     INR check location     Preferred lab     Send INR reminders to [facility]    Comments Pt reports taking Warfarin in the morning      Anticoagulation Care Providers     Provider Role Specialty Phone number    Malcolm Schafer MD [facility] Internal Medicine [phone]            See the Encounter Report to view Anticoagulation Flowsheet and Dosing Calendar (Go to Encounters tab in chart review, and find the Anticoagulation Therapy Visit)    Dosage adjustment made based on physician directed care plan.    Marge Gale RN

## 2018-04-18 NOTE — PROGRESS NOTES
SUBJECTIVE:   Billy Stock is a 72 year old male who presents to clinic today for the following health issues:      ED/UC Followup:    Facility[facility]  Date of visit: 04/08/2018  Reason for visit: Acute Flank pain  Current Status: improved        Patient is seen for a follow up visit.  Seen in ED for back pain. Had pain in the lower back and right flank. Has improved .   No dysuria or hematuria. Worse with movements. No fever, chills. Initially had radiation to the groin, now resolved. UA with few bacteria , no evidence of infection and no hematuria.       PROBLEMS TO ADD ON...  Has h/o HTN. on medical treatment. BP has been controlled. No side effects from medications. No CP, HA, dizziness. good compliance with medications and low salt diet.      Problem list and histories reviewed & adjusted, as indicated.  Additional history: as documented    Patient Active Problem List   Diagnosis     Allergic rhinitis due to pollen     Carcinoma in situ of prostate     Bell's palsy     Glaucoma     Hypertension goal BP (blood pressure) < 140/90     CARDIOVASCULAR SCREENING; LDL GOAL LESS THAN 100     Colon polyps     Gout     HCD (health care directive)     SARAH (obstructive sleep apnea)     Hyperlipidemia LDL goal <100     Gout of foot, unspecified cause, unspecified chronicity, unspecified laterality     Chronic atrial fibrillation (H)     Long-term (current) use of anticoagulants [Z79.01]     Essential hypertension, benign     Psoas hematoma, right, secondary to anticoagulant therapy     Kidney cyst, acquired     Prostate cancer (H)     Left shoulder pain     Past Surgical History:   Procedure Laterality Date     CATARACT IOL, RT/LT  10/15, 11/15     COLONOSCOPY  2009    [facility]     COLONOSCOPY  9/30/2014    Dr. Long [facility]     COLONOSCOPY N/A 9/30/2014    Procedure: COMBINED COLONOSCOPY, SINGLE BIOPSY/POLYPECTOMY BY BIOPSY;  Surgeon: Puneet Long MD;  Location:  GI     EYE SURGERY  2007    glaucoma surgery  right eye     PROSTATE SURGERY       SUPRAPUBIC PROSTATECTOMY  2002     VASECTOMY         Social History   Substance Use Topics     Smoking status: Never Smoker     Smokeless tobacco: Never Used     Alcohol use No     Family History   Problem Relation Age of Onset     Hypertension Maternal Grandfather      CEREBROVASCULAR DISEASE Maternal Grandfather      Hypertension Maternal Grandmother      CEREBROVASCULAR DISEASE Maternal Grandmother      Hypertension Mother       age 51, MVA     Hypertension Brother      Heart Failure Brother      Bronchitis Brother      Other - See Comments Brother      multiple myeloma     Heart Surgery Brother      defibrilater     Prostate Problems Brother      DIABETES Brother      Other Cancer Brother      Multiple Myeloma     Breast Cancer Maternal Aunt      hx     CANCER Maternal Aunt      cervical cancer     Cancer - colorectal Maternal Aunt      Unknown/Adopted Father      Don't know father's history         Current Outpatient Prescriptions   Medication Sig Dispense Refill     acetaminophen (TYLENOL) 325 MG tablet Take 1-2 tablets by mouth every 6 hours as needed.       allopurinol (ZYLOPRIM) 100 MG tablet Take 1 tablet (100 mg) by mouth daily 90 tablet 4     amLODIPine (NORVASC) 10 MG tablet Take 1 tablet (10 mg) by mouth daily 90 tablet 1     fluticasone (FLONASE) 50 MCG/ACT spray Spray 1-2 sprays into both nostrils daily 1 Bottle 11     Levocetirizine Dihydrochloride (XYZAL PO) Take 5 mg by mouth daily        lisinopril (PRINIVIL/ZESTRIL) 10 MG tablet TAKE TWO TABLETS BY MOUTH  DAILY 90 tablet 1     metoprolol succinate (TOPROL-XL) 100 MG 24 hr tablet TAKE 1 TABLET EVERY DAY 90 tablet 1     UNABLE TO FIND MEDICATION NAME: Tabavit, multivitmain without vitamin K in it.       warfarin (COUMADIN) 5 MG tablet Take 1 tablet (5 mg) by mouth daily Or as instructed by INR clinic 90 tablet 1       Reviewed and updated as needed this visit by clinical staff  Tobacco       Reviewed and  "updated as needed this visit by Provider         ROS:  Constitutional, HEENT, cardiovascular, pulmonary, gi and gu systems are negative, except as otherwise noted.    OBJECTIVE:     /84  Pulse 68  Temp 97.6  F (36.4  C) (Oral)  Ht 6' 1\" (1.854 m)  Wt 234 lb (106.1 kg)  SpO2 99%  BMI 30.87 kg/m2  Body mass index is 30.87 kg/(m^2).   GENERAL: healthy, alert and no distress  NECK: no adenopathy, no asymmetry, masses, or scars and thyroid normal to palpation  RESP: lungs clear to auscultation - no rales, rhonchi or wheezes  CV: regular rate and rhythm, normal S1 S2, no S3 or S4, no murmur, click or rub, no peripheral edema and peripheral pulses strong  ABDOMEN: soft, nontender, no hepatosplenomegaly, no masses and bowel sounds normal  MS: no gross musculoskeletal defects noted, no edema    Diagnostic Test Results:  Results for orders placed or performed in visit on 04/18/18   **UA reflex to Microscopic FUTURE anytime   Result Value Ref Range    Color Urine Yellow     Appearance Urine Clear     Glucose Urine Negative NEG^Negative mg/dL    Bilirubin Urine Negative NEG^Negative    Ketones Urine Negative NEG^Negative mg/dL    Specific Gravity Urine 1.020 1.003 - 1.035    Blood Urine Trace (A) NEG^Negative    pH Urine 5.5 5.0 - 7.0 pH    Protein Albumin Urine >=300 (A) NEG^Negative mg/dL    Urobilinogen Urine 0.2 0.2 - 1.0 EU/dL    Nitrite Urine Negative NEG^Negative    Leukocyte Esterase Urine Negative NEG^Negative    Source Midstream Urine    Urine Microscopic   Result Value Ref Range    WBC Urine 0 - 5 OTO5^0 - 5 /HPF    RBC Urine O - 2 OTO2^O - 2 /HPF    Bacteria Urine Few (A) NEG^Negative /HPF    Mucous Urine Present (A) NEG^Negative /LPF       ASSESSMENT/PLAN:     Problem List Items Addressed This Visit     Hypertension goal BP (blood pressure) < 140/90      Other Visit Diagnoses     Right flank pain    -  Primary    Relevant Orders    **UA reflex to Microscopic FUTURE anytime (Completed)    Urine " Microscopic (Completed)    Hospital discharge follow-up               Repeated UA with again few bacteria and trace blood     Clinically improved , consider assessment with US if recurrent symptoms     Cont current treatment       Follow-Up:as needed     Malcolm Schafer MD  [facility]

## 2018-04-18 NOTE — NURSING NOTE
"Chief Complaint   Patient presents with     RECHECK     UC F/U       Initial /84  Pulse 68  Temp 97.6  F (36.4  C) (Oral)  Ht 6' 1\" (1.854 m)  Wt 234 lb (106.1 kg)  SpO2 99%  BMI 30.87 kg/m2 Estimated body mass index is 30.87 kg/(m^2) as calculated from the following:    Height as of this encounter: 6' 1\" (1.854 m).    Weight as of this encounter: 234 lb (106.1 kg).  Medication Reconciliation: complete   Lamar Campbell MA      "

## 2018-04-18 NOTE — MR AVS SNAPSHOT
Billy Moore   4/18/2018 3:45 PM   Anticoagulation Therapy Visit    Description:  72 year old male   Provider[facility]   Department:  [facility]           INR as of 4/18/2018     Today's INR 2.0      Anticoagulation Summary as of 4/18/2018     INR goal 2.0-3.0   Today's INR 2.0   Full instructions 5 mg every day   Next INR check 5/16/2018    Indications   Long-term (current) use of anticoagulants [Z79.01] [Z79.01]  Chronic atrial fibrillation (H) [I48.2]         Your next Anticoagulation Clinic appointment(s)     May 16, 2018 11:15 AM CDT   [facility] with [facility]   [facility]    [address] [facility] [address]  [facility] [address] [phone]              Contact Numbers     [facility] Phone Numbers:  Anticoagulation Clinic Appointments : [phone]  Anticoagulation Nurse: [phone]         April 2018 Details    Sun Mon Tue Wed Thu Fri Sat     1               2               3               4               5               6               7                 8               9               10               11               12               13               14                 15               16               17               18      5 mg   See details      19      5 mg         20      5 mg         21      5 mg           22      5 mg         23      5 mg         24      5 mg         25      5 mg         26      5 mg         27      5 mg         28      5 mg           29      5 mg         30      5 mg               Date Details   04/18 This INR check               How to take your warfarin dose     To take:  5 mg Take 1 of the 5 mg tablets.           May 2018 Details    Sun Mon Tue Wed Thu Fri Sat       1      5 mg         2      5 mg         3      5 mg         4      5 mg         5      5 mg           6      5 mg         7      5 mg         8      5 mg         9      5 mg         10      5 mg          11      5 mg         12      5 mg           13      5 mg         14      5 mg         15      5 mg         16            17               18               19                 20               21               22               23               24               25               26                 27               28               29               30               31                  Date Details   No additional details    Date of next INR:  5/16/2018         How to take your warfarin dose     To take:  5 mg Take 1 of the 5 mg tablets.

## 2018-04-18 NOTE — MR AVS SNAPSHOT
"              After Visit Summary   4/18/2018    Billy Stock    MRN: [id number]           Patient Information     Date Of Birth          1945        Visit Information        Provider Department      4/18/2018 4:20 PM Malcolm Schafer MD [facility]        Today's Diagnoses     Right flank pain    -  1    Hospital discharge follow-up        Hypertension goal BP (blood pressure) < 140/90           Follow-ups after your visit        Your next 10 appointments already scheduled     May 16, 2018 11:15 AM CDT   [facility] Visit with [facility]   [facility]    [address]  [facility] [address] [phone]              Who to contact     If you have questions or need follow up information about today's clinic visit or your schedule please contact [facility] directly at [phone].  Normal or non-critical lab and imaging results will be communicated to you by MyChart, letter or phone within 4 business days after the clinic has received the results. If you do not hear from us within 7 days, please contact the clinic through MyChart or phone. If you have a critical or abnormal lab result, we will notify you by phone as soon as possible.  Submit refill requests through [employer] or call your pharmacy and they will forward the refill request to us. Please allow 3 business days for your refill to be completed.          Additional Information About Your Visit        MyChart Information     [employer]ate lets you send messages to your doctor, view your test results, renew your prescriptions, schedule appointments and more. To sign up, go to [email or web address] . Click on \"Log in\" on the left side of the screen, which will take you to the Welcome page. Then click on \"Sign up Now\" on the right side of the page.     You will be asked to enter the access code listed below, as well as some personal " "information. Please follow the directions to create your username and password.     Your access code is: [email or web address]  Expires: 2018  6:34 PM     Your access code will  in 90 days. If you need help or a new code, please call your [place] clinic or [phone].        Care EveryWhere ID     This is your Care EveryWhere ID. This could be used by other organizations to access your [place] medical records  [id number]        Your Vitals Were     Pulse Temperature Height Pulse Oximetry BMI (Body Mass Index)       68 97.6  F (36.4  C) (Oral) 6' 1\" (1.854 m) 99% 30.87 kg/m2        Blood Pressure from Last 3 Encounters:   18 128/84   18 141/89   18 132/80    Weight from Last 3 Encounters:   18 234 lb (106.1 kg)   18 235 lb 9.6 oz (106.9 kg)   18 139 lb (63 kg)              We Performed the Following     **UA reflex to Microscopic FUTURE anytime     Urine Microscopic        Primary Care Provider Office Phone # Fax #    Malcolm Schafer [phone]       [address] [facility] [address]        Equal Access to Services     RUBI [place] : Hadii aad ku hadasho Soomaali, waaxda luqadaha, qaybta kaalmada adeegyada, waxay idiin haylissan spencer delacruz . So [facility] [phone].    ATENCIÓN: Si habla español, tiene a braga disposición servicios gratuitos de asistencia lingüística. Ll[facility] [phone].    We comply with applicable federal civil rights laws and [place] laws. We do not discriminate on the basis of race, color, national origin, age, disability, sex, sexual orientation, or gender identity.            Thank you!     Thank you for choosing [facility]  for your care. Our goal is always to provide you with excellent care. Hearing back from our patients is one way we can continue to improve our services. Please take a few minutes to complete the written survey that you may receive in the mail after your visit with us. Thank you!   "           Your Updated Medication List - Protect others around you: Learn how to safely use, store and throw away your medicines at www.disposemymeds.org.          This list is accurate as of 4/18/18 11:59 PM.  Always use your most recent med list.                   Brand Name Dispense Instructions for use Diagnosis    allopurinol 100 MG tablet    ZYLOPRIM    90 tablet    Take 1 tablet (100 mg) by mouth daily    Gout of foot, unspecified cause, unspecified chronicity, unspecified laterality       amLODIPine 10 MG tablet    NORVASC    90 tablet    Take 1 tablet (10 mg) by mouth daily    Essential hypertension with goal blood pressure less than 140/90       fluticasone 50 MCG/ACT spray    FLONASE    1 Bottle    Spray 1-2 sprays into both nostrils daily    Chronic seasonal allergic rhinitis, unspecified trigger       lisinopril 10 MG tablet    PRINIVIL/ZESTRIL    90 tablet    TAKE TWO TABLETS BY MOUTH  DAILY    Benign essential hypertension       metoprolol succinate 100 MG 24 hr tablet    TOPROL-XL    90 tablet    TAKE 1 TABLET EVERY DAY    Chronic atrial fibrillation (H)       TYLENOL 325 MG tablet   Generic drug:  acetaminophen      Take 1-2 tablets by mouth every 6 hours as needed.        UNABLE TO FIND      MEDICATION NAME: Tabavit, multivitmain without vitamin K in it.        warfarin 5 MG tablet    COUMADIN    90 tablet    Take 1 tablet (5 mg) by mouth daily Or as instructed by INR clinic    Chronic atrial fibrillation (H)       XYZAL PO      Take 5 mg by mouth daily

## 2018-04-27 DIAGNOSIS — I10 ESSENTIAL HYPERTENSION WITH GOAL BLOOD PRESSURE LESS THAN 140/90: ICD-10-CM

## 2018-04-30 ENCOUNTER — TRANSFERRED RECORDS (OUTPATIENT)
Dept: HEALTH INFORMATION MANAGEMENT | Facility: CLINIC | Age: 73
End: 2018-04-30

## 2018-04-30 RX ORDER — AMLODIPINE BESYLATE 10 MG/1
TABLET ORAL
Qty: 90 TABLET | Refills: 1 | Status: SHIPPED | OUTPATIENT
Start: 2018-04-30 | End: 2018-10-29

## 2018-04-30 NOTE — TELEPHONE ENCOUNTER
"Abnormal labs        Requested Prescriptions   Pending Prescriptions Disp Refills     amLODIPine (NORVASC) 10 MG tablet [Pharmacy Med Name: AmLODIPine Besylate 10MG TAB] 90 tablet 1     Sig: TAKE ONE TABLET BY MOUTH ONCE DAILY DUE  FOR  APPOINTMENT    Calcium Channel Blockers Protocol  Failed    4/27/2018  3:32 PM       Failed - Normal serum creatinine on file in past 12 months    Recent Labs   Lab Test  01/19/18   0936   CR  1.41*            Passed - Blood pressure under 140/90 in past 12 months    BP Readings from Last 3 Encounters:   04/18/18 128/84   04/08/18 141/89   03/03/18 132/80                Passed - Recent (12 mo) or future (30 days) visit within the authorizing provider's specialty    Patient had office visit in the last 12 months or has a visit in the next 30 days with authorizing provider or within the authorizing provider's specialty.  See \"Patient Info\" tab in inbasket, or \"Choose Columns\" in Meds & Orders section of the refill encounter.           Passed - Patient is age 18 or older          "

## 2018-05-04 DIAGNOSIS — I10 BENIGN ESSENTIAL HYPERTENSION: ICD-10-CM

## 2018-05-08 DIAGNOSIS — I10 BENIGN ESSENTIAL HYPERTENSION: ICD-10-CM

## 2018-05-08 RX ORDER — LISINOPRIL 10 MG/1
TABLET ORAL
Qty: 90 TABLET | Refills: 1 | OUTPATIENT
Start: 2018-05-08

## 2018-05-08 RX ORDER — LISINOPRIL 10 MG/1
TABLET ORAL
Qty: 180 TABLET | Refills: 3 | Status: SHIPPED | OUTPATIENT
Start: 2018-05-08 | End: 2018-10-29

## 2018-05-08 NOTE — TELEPHONE ENCOUNTER
"Pt received message from pharmacy that \"refill too soon\".  He was not given the correct quantity of his Lisinopril, he takes 2 per day but was only given quantity of 90.  Would like additional refills.    Routing refill request to provider for review/approval because:  Labs out of range:  Creatinine      Requested Prescriptions   Pending Prescriptions Disp Refills     lisinopril (PRINIVIL/ZESTRIL) 10 MG tablet 90 tablet 1    ACE Inhibitors (Including Combos) Protocol Failed    5/8/2018 12:15 PM       Failed - Normal serum creatinine on file in past 12 months    Recent Labs   Lab Test  01/19/18   0936   CR  1.41*            Passed - Blood pressure under 140/90 in past 12 months    BP Readings from Last 3 Encounters:   04/18/18 128/84   04/08/18 141/89   03/03/18 132/80                Passed - Recent (12 mo) or future (30 days) visit within the authorizing provider's specialty    Patient had office visit in the last 12 months or has a visit in the next 30 days with authorizing provider or within the authorizing provider's specialty.  See \"Patient Info\" tab in inbasket, or \"Choose Columns\" in Meds & Orders section of the refill encounter.           Passed - Patient is age 18 or older       Passed - Normal serum potassium on file in past 12 months    Recent Labs   Lab Test  01/19/18   0936   POTASSIUM  4.2               "

## 2018-05-16 ENCOUNTER — ANTICOAGULATION THERAPY VISIT (OUTPATIENT)
Dept: ANTICOAGULATION | Facility: CLINIC | Age: 73
End: 2018-05-16
Payer: COMMERCIAL

## 2018-05-16 DIAGNOSIS — I48.20 CHRONIC ATRIAL FIBRILLATION (H): ICD-10-CM

## 2018-05-16 DIAGNOSIS — Z79.01 LONG-TERM (CURRENT) USE OF ANTICOAGULANTS: ICD-10-CM

## 2018-05-16 LAB — INR POINT OF CARE: 4.7 (ref 0.86–1.14)

## 2018-05-16 PROCEDURE — 36416 COLLJ CAPILLARY BLOOD SPEC: CPT

## 2018-05-16 PROCEDURE — 85610 PROTHROMBIN TIME: CPT | Mod: QW

## 2018-05-16 PROCEDURE — 99207 ZZC NO CHARGE NURSE ONLY: CPT

## 2018-05-16 NOTE — MR AVS SNAPSHOT
Billy Montrell   5/16/2018 11:15 AM   Anticoagulation Therapy Visit    Description:  72 year old male   Provider[facility]  [facility]   Department:  [facility]           INR as of 5/16/2018     Today's INR 4.7!      Anticoagulation Summary as of 5/16/2018     INR goal 2.0-3.0   Today's INR 4.7!   Full instructions 5/16: Hold; 5/17: 2.5 mg; Otherwise 5 mg every day   Next INR check 5/23/2018    Indications   Long-term (current) use of anticoagulants [Z79.01] [Z79.01]  Chronic atrial fibrillation (H) [I48.2]         Your next Anticoagulation Clinic appointment(s)     May 23, 2018  2:30 PM CDT   [facility] with [facility]   [facility]    [address] [facility] [address]  [facility] [id number] [phone]              Contact Numbers     [facility] Phone Numbers:  Anticoagulation Clinic Appointments : [phone]  Anticoagulation Nurse: [phone]         May 2018 Details    Sun Mon Tue Wed Thu Fri Sat       1               2               3               4               5                 6               7               8               9               10               11               12                 13               14               15               16      Hold   See details      17      2.5 mg         18      5 mg         19      5 mg           20      5 mg         21      5 mg         22      5 mg         23            24               25               26                 27               28               29               30               31                  Date Details   05/16 This INR check       Date of next INR:  5/23/2018         How to take your warfarin dose     To take:  2.5 mg Take 0.5 of a 5 mg tablet.    To take:  5 mg Take 1 of the 5 mg tablets.    Hold Do not take your warfarin dose. See the Details table to the right for additional instructions.

## 2018-05-16 NOTE — PROGRESS NOTES
ANTICOAGULATION FOLLOW-UP CLINIC VISIT    Patient Name:  Billy Stock  Date:  5/16/2018  Contact Type:  Face to Face    SUBJECTIVE:     Patient Findings     Positives Inflammation (Pt states his allergies are flaring up a lot. He also changed his Warfarin to the PM )           OBJECTIVE    INR Protime   Date Value Ref Range Status   05/16/2018 4.7 (A) 0.86 - 1.14 Final       ASSESSMENT / PLAN  INR assessment SUPRA    Recheck INR In: 1 WEEK Pt cannot come in sooner    INR Location Clinic      Anticoagulation Summary as of 5/16/2018     INR goal 2.0-3.0   Today's INR 4.7!   Maintenance plan 5 mg (5 mg x 1) every day   Full instructions 5/16: Hold; 5/17: 2.5 mg; Otherwise 5 mg every day   Weekly total 35 mg   Plan last modified Samantha Galeas RN (4/18/2016)   Next INR check 5/23/2018   Priority INR   Target end date     Indications   Long-term (current) use of anticoagulants [Z79.01] [Z79.01]  Chronic atrial fibrillation (H) [I48.2]         Anticoagulation Episode Summary     INR check location     Preferred lab     Send INR reminders to [facility]    Comments Pt reports taking Warfarin in the morning      Anticoagulation Care Providers     Provider Role Specialty Phone number    Malcolm Schafer MD Responsible Internal Medicine [phone]            See the Encounter Report to view Anticoagulation Flowsheet and Dosing Calendar (Go to Encounters tab in chart review, and find the Anticoagulation Therapy Visit)    Dosage adjustment made based on physician directed care plan.    Lyla Jeter RN

## 2018-05-23 ENCOUNTER — ANTICOAGULATION THERAPY VISIT (OUTPATIENT)
Dept: ANTICOAGULATION | Facility: CLINIC | Age: 73
End: 2018-05-23
Payer: COMMERCIAL

## 2018-05-23 DIAGNOSIS — I48.20 CHRONIC ATRIAL FIBRILLATION (H): ICD-10-CM

## 2018-05-23 DIAGNOSIS — Z79.01 LONG-TERM (CURRENT) USE OF ANTICOAGULANTS: ICD-10-CM

## 2018-05-23 LAB — INR POINT OF CARE: 1.8 (ref 0.86–1.14)

## 2018-05-23 PROCEDURE — 85610 PROTHROMBIN TIME: CPT | Mod: QW

## 2018-05-23 PROCEDURE — 36416 COLLJ CAPILLARY BLOOD SPEC: CPT

## 2018-05-23 PROCEDURE — 99207 ZZC NO CHARGE NURSE ONLY: CPT

## 2018-05-23 NOTE — MR AVS SNAPSHOT
Billy Moore   5/23/2018 2:30 PM   Anticoagulation Therapy Visit    Description:  72 year old male   Provider[facility]  [facility]   Department:  [facility]           INR as of 5/23/2018     Today's INR 1.8!      Anticoagulation Summary as of 5/23/2018     INR goal 2.0-3.0   Today's INR 1.8!   Full warfarin instructions 5 mg every day   Next INR check 6/1/2018    Indications   Long-term (current) use of anticoagulants [Z79.01] [Z79.01]  Chronic atrial fibrillation (H) [I48.2]         Your next Anticoagulation Clinic appointment(s)     Jun 01, 2018 10:30 AM CDT   Anticoagulation [facility] with [facility]   [facility]    [address] [facility] [address]  [facility] [address] [phone]              Contact Numbers     [facility] Phone Numbers:  Anticoagulation Clinic Appointments : [phone]  Anticoagulation Nurse: [phone]         May 2018 Details    Sun Mon Tue Wed Thu Fri Sat       1               2               3               4               5                 6               7               8               9               10               11               12                 13               14               15               16               17               18               19                 20               21               22               23      5 mg   See details      24      5 mg         25      5 mg         26      5 mg           27      5 mg         28      5 mg         29      5 mg         30      5 mg         31      5 mg            Date Details   05/23 This INR check               How to take your warfarin dose     To take:  5 mg Take 1 of the 5 mg tablets.           June 2018 Details    Sun Mon Tue Wed Thu Fri Sat          1            2                 3               4               5               6               7               8               9                 10               11               12                13               14               15               16                 17               18               19               20               21               22               23                 24               25               26               27               28               29               30                Date Details   No additional details    Date of next INR:  6/1/2018         How to take your warfarin dose     To take:  5 mg Take 1 of the 5 mg tablets.

## 2018-05-23 NOTE — PROGRESS NOTES
ANTICOAGULATION FOLLOW-UP CLINIC VISIT    Patient Name:  Billy Stock  Date:  5/23/2018  Contact Type:  Face to Face    SUBJECTIVE:     Patient Findings     Positives Change in diet/appetite (Pt has been eating a lot of dark green veggies since last INR visit.  Recommended cutting back slightly on this.), Bruising (bruise and bump on left knee that he noticed this morning.  No pain in that area.  Also has some swelling on the left leg and ankle.  Recommended following up with MD if he starts having pain or if the swelling does not improve.), Inflammation (continues to have seasonal allergy symptoms.  Takes Xyzal but not on a regular basis.  Will try to take this more regularly.)           OBJECTIVE    INR Protime   Date Value Ref Range Status   05/23/2018 1.8 (A) 0.86 - 1.14 Final       ASSESSMENT / PLAN  INR assessment SUB    Recheck INR In: 1 WEEK    INR Location Clinic      Anticoagulation Summary as of 5/23/2018     INR goal 2.0-3.0   Today's INR 1.8!   Warfarin maintenance plan 5 mg (5 mg x 1) every day   Full warfarin instructions 5 mg every day   Weekly warfarin total 35 mg   Plan last modified Samantha Galeas RN (4/18/2016)   Next INR check 6/1/2018   Priority INR   Target end date     Indications   Long-term (current) use of anticoagulants [Z79.01] [Z79.01]  Chronic atrial fibrillation (H) [I48.2]         Anticoagulation Episode Summary     INR check location     Preferred lab     Send INR reminders to [facility]    Comments Pt reports taking Warfarin in the morning      Anticoagulation Care Providers     Provider Role Specialty Phone number    Malcolm Schafer MD Responsible Internal Medicine [phone]            See the Encounter Report to view Anticoagulation Flowsheet and Dosing Calendar (Go to Encounters tab in chart review, and find the Anticoagulation Therapy Visit)    Dosage adjustment made based on physician directed care plan.    Marge Gale RN

## 2018-06-01 ENCOUNTER — ANTICOAGULATION THERAPY VISIT (OUTPATIENT)
Dept: ANTICOAGULATION | Facility: CLINIC | Age: 73
End: 2018-06-01
Payer: COMMERCIAL

## 2018-06-01 DIAGNOSIS — I48.20 CHRONIC ATRIAL FIBRILLATION (H): ICD-10-CM

## 2018-06-01 DIAGNOSIS — Z79.01 LONG-TERM (CURRENT) USE OF ANTICOAGULANTS: ICD-10-CM

## 2018-06-01 LAB — INR POINT OF CARE: 3.2 (ref 0.86–1.14)

## 2018-06-01 PROCEDURE — 99207 ZZC NO CHARGE NURSE ONLY: CPT

## 2018-06-01 PROCEDURE — 85610 PROTHROMBIN TIME: CPT | Mod: QW

## 2018-06-01 PROCEDURE — 36416 COLLJ CAPILLARY BLOOD SPEC: CPT

## 2018-06-01 NOTE — PROGRESS NOTES
ANTICOAGULATION FOLLOW-UP CLINIC VISIT    Patient Name:  Billy Stock  Date:  6/1/2018  Contact Type:  Face to Face    SUBJECTIVE:     Patient Findings     Positives Change in diet/appetite (Pt reports cut back on greens to 2 days a week. Pt will increase greens to 3 times a week. ), Bruising (Pt reports bruising and bump on left knee has improved, did not develop any pain. ), Inflammation (Pt reports swelling in knee has improved. Pt further reports allergies have worsened. )           OBJECTIVE    INR Protime   Date Value Ref Range Status   06/01/2018 3.2 (A) 0.86 - 1.14 Final       ASSESSMENT / PLAN  INR assessment SUPRA    Recheck INR In: 2 WEEKS    INR Location Clinic      Anticoagulation Summary as of 6/1/2018     INR goal 2.0-3.0   Today's INR 3.2!   Warfarin maintenance plan 5 mg (5 mg x 1) every day   Full warfarin instructions 5 mg every day   Weekly warfarin total 35 mg   No change documented Samantha Galeas RN   Plan last modified Samantha Galeas RN (4/18/2016)   Next INR check 6/15/2018   Priority INR   Target end date     Indications   Long-term (current) use of anticoagulants [Z79.01] [Z79.01]  Chronic atrial fibrillation (H) [I48.2]         Anticoagulation Episode Summary     INR check location     Preferred lab     Send INR reminders to [facility]    Comments Pt reports taking Warfarin in the morning      Anticoagulation Care Providers     Provider Role Specialty Phone number    Malcolm Schafer MD [facility] Internal Medicine [phone]            See the Encounter Report to view Anticoagulation Flowsheet and Dosing Calendar (Go to Encounters tab in chart review, and find the Anticoagulation Therapy Visit)    Dosage adjustment made based on physician directed care plan.    Samantha Galeas RN

## 2018-06-01 NOTE — MR AVS SNAPSHOT
Billy Stock   6/1/2018 10:30 AM   Anticoagulation Therapy Visit    Description:  72 year old male   [facility]   Department:  [facility]           INR as of 6/1/2018     Today's INR 3.2!      Anticoagulation Summary as of 6/1/2018     INR goal 2.0-3.0   Today's INR 3.2!   Full warfarin instructions 5 mg every day   Next INR check 6/15/2018    Indications   Long-term (current) use of anticoagulants [Z79.01] [Z79.01]  Chronic atrial fibrillation (H) [I48.2]         Your next Anticoagulation Clinic appointment(s)     Jun 13, 2018  9:15 AM CDT   [facility] with [facility]   [facility] ([facility]    [address] [facility] [address]  [facility] [address] [phone]              Contact Numbers     [facility] Phone Numbers:  Anticoagulation Clinic Appointments : [phone]  Anticoagulation Nurse: [phone]         June 2018 Details    Sun Mon Tue Wed Thu Fri Sat          1      5 mg   See details      2      5 mg           3      5 mg         4      5 mg         5      5 mg         6      5 mg         7      5 mg         8      5 mg         9      5 mg           10      5 mg         11      5 mg         12      5 mg         13      5 mg         14      5 mg         15            16                 17               18               19               20               21               22               23                 24               25               26               27               28               29               30                Date Details   06/01 This INR check       Date of next INR:  6/15/2018         How to take your warfarin dose     To take:  5 mg Take 1 of the 5 mg tablets.

## 2018-06-13 ENCOUNTER — ANTICOAGULATION THERAPY VISIT (OUTPATIENT)
Dept: ANTICOAGULATION | Facility: CLINIC | Age: 73
End: 2018-06-13
Payer: COMMERCIAL

## 2018-06-13 DIAGNOSIS — I48.20 CHRONIC ATRIAL FIBRILLATION (H): ICD-10-CM

## 2018-06-13 DIAGNOSIS — Z79.01 LONG-TERM (CURRENT) USE OF ANTICOAGULANTS: ICD-10-CM

## 2018-06-13 LAB — INR POINT OF CARE: 3.6 (ref 0.86–1.14)

## 2018-06-13 PROCEDURE — 85610 PROTHROMBIN TIME: CPT | Mod: QW

## 2018-06-13 PROCEDURE — 99207 ZZC NO CHARGE NURSE ONLY: CPT

## 2018-06-13 PROCEDURE — 36416 COLLJ CAPILLARY BLOOD SPEC: CPT

## 2018-06-13 NOTE — MR AVS SNAPSHOT
Billy Moore   6/13/2018 9:15 AM   Anticoagulation Therapy Visit    Description:  72 year old male   Provider[facility]   Department:  [facility]           INR as of 6/13/2018     Today's INR 3.6!      Anticoagulation Summary as of 6/13/2018     INR goal 2.0-3.0   Today's INR 3.6!   Full warfarin instructions 6/13: 2.5 mg; 6/20: 2.5 mg; Otherwise 5 mg every day   Next INR check 6/27/2018    Indications   Long-term (current) use of anticoagulants [Z79.01] [Z79.01]  Chronic atrial fibrillation (H) [I48.2]         Your next Anticoagulation Clinic appointment(s)     Jun 27, 2018 10:00 AM CDT   [facility]   [facility]    [address] [facility] [address]  [facility] [id number] [phone]              Contact Numbers     [facility] Phone Numbers:  Anticoagulation Clinic Appointments : [phone]  Anticoagulation Nurse: [phone]         June 2018 Details    Sun Mon Tue Wed Thu Fri Sat          1               2                 3               4               5               6               7               8               9                 10               11               12               13      2.5 mg   See details      14      5 mg         15      5 mg         16      5 mg           17      5 mg         18      5 mg         19      5 mg         20      2.5 mg         21      5 mg         22      5 mg         23      5 mg           24      5 mg         25      5 mg         26      5 mg         27            28               29               30                Date Details   06/13 This INR check       Date of next INR:  6/27/2018         How to take your warfarin dose     To take:  2.5 mg Take 0.5 of a 5 mg tablet.    To take:  5 mg Take 1 of the 5 mg tablets.

## 2018-06-13 NOTE — PROGRESS NOTES
ANTICOAGULATION FOLLOW-UP CLINIC VISIT    Patient Name:  Billy Stock  Date:  6/13/2018  Contact Type:  Face to Face    SUBJECTIVE:     Patient Findings     Positives Change in diet/appetite (pt will continue to eat the same amount of greens.), Other complaints (bump on knee has slowly improved.)           OBJECTIVE    INR Protime   Date Value Ref Range Status   06/13/2018 3.6 (A) 0.86 - 1.14 Final       ASSESSMENT / PLAN  INR assessment SUPRA    Recheck INR In: 2 WEEKS    INR Location Clinic      Anticoagulation Summary as of 6/13/2018     INR goal 2.0-3.0   Today's INR 3.6!   Warfarin maintenance plan 5 mg (5 mg x 1) every day   Full warfarin instructions 6/13: 2.5 mg; 6/20: 2.5 mg; Otherwise 5 mg every day   Weekly warfarin total 35 mg   Plan last modified Samantha Galeas RN (4/18/2016)   Next INR check 6/27/2018   Priority INR   Target end date     Indications   Long-term (current) use of anticoagulants [Z79.01] [Z79.01]  Chronic atrial fibrillation (H) [I48.2]         Anticoagulation Episode Summary     INR check location     Preferred lab     Send INR reminders to [facility]    Comments Pt reports taking Warfarin in the morning      Anticoagulation Care Providers     Provider Role Specialty Phone number    Malcolm Schafer MD Responsible Internal Medicine [phone]            See the Encounter Report to view Anticoagulation Flowsheet and Dosing Calendar (Go to Encounters tab in chart review, and find the Anticoagulation Therapy Visit)    Dosage adjustment made based on physician directed care plan.    Marge Gale RN

## 2018-06-27 ENCOUNTER — ANTICOAGULATION THERAPY VISIT (OUTPATIENT)
Dept: ANTICOAGULATION | Facility: CLINIC | Age: 73
End: 2018-06-27
Payer: COMMERCIAL

## 2018-06-27 DIAGNOSIS — I48.20 CHRONIC ATRIAL FIBRILLATION (H): ICD-10-CM

## 2018-06-27 DIAGNOSIS — Z79.01 LONG-TERM (CURRENT) USE OF ANTICOAGULANTS: ICD-10-CM

## 2018-06-27 LAB — INR POINT OF CARE: 3.5 (ref 0.86–1.14)

## 2018-06-27 PROCEDURE — 85610 PROTHROMBIN TIME: CPT | Mod: QW

## 2018-06-27 PROCEDURE — 36416 COLLJ CAPILLARY BLOOD SPEC: CPT

## 2018-06-27 NOTE — MR AVS SNAPSHOT
Billy Stock   6/27/2018 10:00 AM   Anticoagulation Therapy Visit    Description:  72 year old male   [facility]   Department:  [facility]           INR as of 6/27/2018     Today's INR 3.5!      Anticoagulation Summary as of 6/27/2018     INR goal 2.0-3.0   Today's INR 3.5!   Full warfarin instructions 2.5 mg on Wed, Sat; 5 mg all other days   Next INR check 7/12/2018    Indications   Long-term (current) use of anticoagulants [Z79.01] [Z79.01]  Chronic atrial fibrillation (H) [I48.2]         Your next Anticoagulation Clinic appointment(s)     Jul 12, 2018 10:00 AM CDT   [facility]   [facility] ([facility])    [address] [facility] [address]  [facility] [address] [phone]              Contact Numbers     [facility] Phone Numbers:  Anticoagulation Clinic Appointments : [phone]  Anticoagulation Nurse: [phone]         June 2018 Details    Sun Mon Tue Wed Thu Fri Sat          1               2                 3               4               5               6               7               8               9                 10               11               12               13               14               15               16                 17               18               19               20               21               22               23                 24               25               26               27      2.5 mg   See details      28      5 mg         29      5 mg         30      2.5 mg          Date Details   06/27 This INR check               How to take your warfarin dose     To take:  2.5 mg Take 0.5 of a 5 mg tablet.    To take:  5 mg Take 1 of the 5 mg tablets.           July 2018 Details    Sun Mon Tue Wed Thu Fri Sat     1      5 mg         2      5 mg         3      5 mg         4      2.5 mg         5      5 mg         6      5 mg         7      2.5 mg           8       5 mg         9      5 mg         10      5 mg         11      2.5 mg         12            13               14                 15               16               17               18               19               20               21                 22               23               24               25               26               27               28                 29               30               31                    Date Details   No additional details    Date of next INR:  7/12/2018         How to take your warfarin dose     To take:  2.5 mg Take 0.5 of a 5 mg tablet.    To take:  5 mg Take 1 of the 5 mg tablets.

## 2018-06-27 NOTE — PROGRESS NOTES
ANTICOAGULATION FOLLOW-UP CLINIC VISIT    Patient Name:  Billy Stock  Date:  6/27/2018  Contact Type:  Face to Face    SUBJECTIVE:     Patient Findings     Positives Unexplained INR or factor level change (pt continues to eat a lot of green veggies and plans to continue same diet.)           OBJECTIVE    INR Protime   Date Value Ref Range Status   06/27/2018 3.5 (A) 0.86 - 1.14 Final       ASSESSMENT / PLAN  INR assessment SUPRA    Recheck INR In: 2 WEEKS    INR Location Clinic      Anticoagulation Summary as of 6/27/2018     INR goal 2.0-3.0   Today's INR 3.5!   Warfarin maintenance plan 2.5 mg (5 mg x 0.5) on Wed, Sat; 5 mg (5 mg x 1) all other days   Full warfarin instructions 2.5 mg on Wed, Sat; 5 mg all other days   Weekly warfarin total 30 mg   Plan last modified Marge Gale RN (6/27/2018)   Next INR check 7/12/2018   Priority INR   Target end date     Indications   Long-term (current) use of anticoagulants [Z79.01] [Z79.01]  Chronic atrial fibrillation (H) [I48.2]         Anticoagulation Episode Summary     INR check location     Preferred lab     Send INR reminders to [facility]    Comments Pt reports taking Warfarin in the morning      Anticoagulation Care Providers     Provider Role Specialty Phone number    Malcolm Schafer MD Responsible Internal Medicine [phone]            See the Encounter Report to view Anticoagulation Flowsheet and Dosing Calendar (Go to Encounters tab in chart review, and find the Anticoagulation Therapy Visit)    Dosage adjustment made based on physician directed care plan.    Marge Gale RN

## 2018-07-12 ENCOUNTER — ANTICOAGULATION THERAPY VISIT (OUTPATIENT)
Dept: ANTICOAGULATION | Facility: CLINIC | Age: 73
End: 2018-07-12
Payer: COMMERCIAL

## 2018-07-12 DIAGNOSIS — I48.20 CHRONIC ATRIAL FIBRILLATION (H): ICD-10-CM

## 2018-07-12 DIAGNOSIS — Z79.01 LONG-TERM (CURRENT) USE OF ANTICOAGULANTS: ICD-10-CM

## 2018-07-12 LAB — INR POINT OF CARE: 2 (ref 0.86–1.14)

## 2018-07-12 PROCEDURE — 85610 PROTHROMBIN TIME: CPT | Mod: QW

## 2018-07-12 PROCEDURE — 99207 ZZC NO CHARGE NURSE ONLY: CPT

## 2018-07-12 PROCEDURE — 36416 COLLJ CAPILLARY BLOOD SPEC: CPT

## 2018-07-12 NOTE — PROGRESS NOTES
ANTICOAGULATION FOLLOW-UP CLINIC VISIT    Patient Name:  Billy Stock  Date:  7/12/2018  Contact Type:  Face to Face    SUBJECTIVE:     Patient Findings     Positives Change in diet/appetite (Pt reports has increased green intake, this will now be usual diet. ), No Problem Findings           OBJECTIVE    INR Protime   Date Value Ref Range Status   07/12/2018 2.0 (A) 0.86 - 1.14 Final       ASSESSMENT / PLAN  INR assessment THER    Recheck INR In: 2 WEEKS    INR Location Clinic      Anticoagulation Summary as of 7/12/2018     INR goal 2.0-3.0   Today's INR 2.0   Warfarin maintenance plan 2.5 mg (5 mg x 0.5) on Wed, Sat; 5 mg (5 mg x 1) all other days   Full warfarin instructions 7/14: 5 mg; 7/21: 5 mg; Otherwise 2.5 mg on Wed, Sat; 5 mg all other days   Weekly warfarin total 30 mg   Plan last modified Marge Gale RN (6/27/2018)   Next INR check 7/26/2018   Priority INR   Target end date     Indications   Long-term (current) use of anticoagulants [Z79.01] [Z79.01]  Chronic atrial fibrillation (H) [I48.2]         Anticoagulation Episode Summary     INR check location     Preferred lab     Send INR reminders to [facility]    Comments Pt reports taking Warfarin in the morning      Anticoagulation Care Providers     Provider Role Specialty Phone number    Malcolm Schafer MD [facility] Internal Medicine [phone]            See the Encounter Report to view Anticoagulation Flowsheet and Dosing Calendar (Go to Encounters tab in chart review, and find the Anticoagulation Therapy Visit)    Dosage adjustment made based on physician directed care plan.    Samantha Galeas RN

## 2018-07-12 NOTE — MR AVS SNAPSHOT
Billy Stock   7/12/2018 10:00 AM   Anticoagulation Therapy Visit    Description:  72 year old male   [facility]   Department:  [facility]           INR as of 7/12/2018     Today's INR 2.0      Anticoagulation Summary as of 7/12/2018     INR goal 2.0-3.0   Today's INR 2.0   Full warfarin instructions 7/14: 5 mg; 7/21: 5 mg; Otherwise 2.5 mg on Wed, Sat; 5 mg all other days   Next INR check 7/26/2018    Indications   Long-term (current) use of anticoagulants [Z79.01] [Z79.01]  Chronic atrial fibrillation (H) [I48.2]         Your next Anticoagulation Clinic appointment(s)     Jul 26, 2018 10:00 AM CDT   [facility] with [facility]   [facility]    [address] [facility] [address]  [facility] [address] [phone]              Contact Numbers     [facility] Phone Numbers:  Anticoagulation Clinic Appointments : [phone]  Anticoagulation Nurse: [phone]         July 2018 Details    Sun Mon Tue Wed Thu Fri Sat     1               2               3               4               5               6               7                 8               9               10               11               12      5 mg   See details      13      5 mg         14      5 mg           15      5 mg         16      5 mg         17      5 mg         18      2.5 mg         19      5 mg         20      5 mg         21      5 mg           22      5 mg         23      5 mg         24      5 mg         25      2.5 mg         26            27               28                 29               30               31                    Date Details   07/12 This INR check       Date of next INR:  7/26/2018         How to take your warfarin dose     To take:  2.5 mg Take 0.5 of a 5 mg tablet.    To take:  5 mg Take 1 of the 5 mg tablets.

## 2018-07-26 ENCOUNTER — ANTICOAGULATION THERAPY VISIT (OUTPATIENT)
Dept: ANTICOAGULATION | Facility: CLINIC | Age: 73
End: 2018-07-26
Payer: COMMERCIAL

## 2018-07-26 DIAGNOSIS — I48.20 CHRONIC ATRIAL FIBRILLATION (H): ICD-10-CM

## 2018-07-26 DIAGNOSIS — Z79.01 LONG-TERM (CURRENT) USE OF ANTICOAGULANTS: ICD-10-CM

## 2018-07-26 LAB — INR POINT OF CARE: 2.1 (ref 0.86–1.14)

## 2018-07-26 PROCEDURE — 99207 ZZC NO CHARGE NURSE ONLY: CPT

## 2018-07-26 PROCEDURE — 85610 PROTHROMBIN TIME: CPT | Mod: QW

## 2018-07-26 PROCEDURE — 36416 COLLJ CAPILLARY BLOOD SPEC: CPT

## 2018-07-26 NOTE — PROGRESS NOTES
ANTICOAGULATION FOLLOW-UP CLINIC VISIT    Patient Name:  Billy Stock  Date:  7/26/2018  Contact Type:  Face to Face    SUBJECTIVE:     Patient Findings     Positives No Problem Findings           OBJECTIVE    INR Protime   Date Value Ref Range Status   07/26/2018 2.1 (A) 0.86 - 1.14 Final       ASSESSMENT / PLAN  INR assessment THER    Recheck INR In: 3 WEEKS    INR Location Clinic      Anticoagulation Summary as of 7/26/2018     INR goal 2.0-3.0   Today's INR 2.1   Warfarin maintenance plan 2.5 mg (5 mg x 0.5) on Wed; 5 mg (5 mg x 1) all other days   Full warfarin instructions 2.5 mg on Wed; 5 mg all other days   Weekly warfarin total 32.5 mg   Plan last modified Marge Gale RN (7/26/2018)   Next INR check 8/16/2018   Priority INR   Target end date     Indications   Long-term (current) use of anticoagulants [Z79.01] [Z79.01]  Chronic atrial fibrillation (H) [I48.2]         Anticoagulation Episode Summary     INR check location     Preferred lab     Send INR reminders to [facility]    Comments Pt reports taking Warfarin in the morning      Anticoagulation Care Providers     Provider Role Specialty Phone number    Malcolm Schafer MD Responsible Internal Medicine [phone]            See the Encounter Report to view Anticoagulation Flowsheet and Dosing Calendar (Go to Encounters tab in chart review, and find the Anticoagulation Therapy Visit)    Dosage adjustment made based on physician directed care plan.    Marge Gale RN

## 2018-07-26 NOTE — MR AVS SNAPSHOT
Billy Moore   7/26/2018 10:00 AM   Anticoagulation Therapy Visit    Description:  72 year old male   [facility]   Department:  [facility]           INR as of 7/26/2018     Today's INR 2.1      Anticoagulation Summary as of 7/26/2018     INR goal 2.0-3.0   Today's INR 2.1   Full warfarin instructions 2.5 mg on Wed; 5 mg all other days   Next INR check 8/16/2018    Indications   Long-term (current) use of anticoagulants [Z79.01] [Z79.01]  Chronic atrial fibrillation (H) [I48.2]         Your next Anticoagulation Clinic appointment(s)     Aug 16, 2018 10:30 AM CDT   [facility]   [facility]    [address] [facility] [address]  [facility] [address] [phone]              Contact Numbers     [facility] Phone Numbers:  Anticoagulation Clinic Appointments : [phone]  Anticoagulation Nurse: [phone]         July 2018 Details    Sun Mon Tue Wed Thu Fri Sat     1               2               3               4               5               6               7                 8               9               10               11               12               13               14                 15               16               17               18               19               20               21                 22               23               24               25               26      5 mg   See details      27      5 mg         28      5 mg           29      5 mg         30      5 mg         31      5 mg              Date Details   07/26 This INR check               How to take your warfarin dose     To take:  5 mg Take 1 of the 5 mg tablets.           August 2018 Details    Sun Mon Tue Wed Thu Fri Sat        1      2.5 mg         2      5 mg         3      5 mg         4      5 mg           5      5 mg         6      5 mg         7      5 mg         8      2.5 mg         9      5 mg          10      5 mg         11      5 mg           12      5 mg         13      5 mg         14      5 mg         15      2.5 mg         16            17               18                 19               20               21               22               23               24               25                 26               27               28               29               30               31                 Date Details   No additional details    Date of next INR:  8/16/2018         How to take your warfarin dose     To take:  2.5 mg Take 0.5 of a 5 mg tablet.    To take:  5 mg Take 1 of the 5 mg tablets.

## 2018-08-02 ENCOUNTER — OFFICE VISIT (OUTPATIENT)
Dept: PEDIATRICS | Facility: CLINIC | Age: 73
End: 2018-08-02
Payer: COMMERCIAL

## 2018-08-02 VITALS
WEIGHT: 227.7 LBS | HEIGHT: 73 IN | BODY MASS INDEX: 30.18 KG/M2 | DIASTOLIC BLOOD PRESSURE: 76 MMHG | HEART RATE: 72 BPM | OXYGEN SATURATION: 100 % | TEMPERATURE: 98.1 F | SYSTOLIC BLOOD PRESSURE: 132 MMHG

## 2018-08-02 DIAGNOSIS — L73.9 FOLLICULITIS: Primary | ICD-10-CM

## 2018-08-02 PROCEDURE — 99213 OFFICE O/P EST LOW 20 MIN: CPT | Performed by: PHYSICIAN ASSISTANT

## 2018-08-02 NOTE — MR AVS SNAPSHOT
After Visit Summary   8/2/2018    Billy Stock    MRN: [id number]           Patient Information     Date Of Birth          1945        Visit Information        Provider Department      8/2/2018 11:00 AM Ritesh Morin PA-C [facility]        Care Instructions      Folliculitis  Folliculitis is an inflammation of a hair follicle. A hair follicle is the little pocket where a hair grows out of the skin. Bacteria normally live on the skin. But sometimes bacteria can get trapped in a follicle and cause infection. This causes a bumpy rash. The area over the follicles is red and raised. It may itch or be painful. The bumps may have fluid (pus) inside. The pus may leak and then form crusts. Sores can spread to other areas of the body. Once it goes away, folliculitis can come back at any time. Severe cases may cause permanent hair loss and scarring.  Folliculitis can happen anywhere on the body where hair grows. It can be caused by rubbing from tight clothing. Ingrown hairs can cause it. Soaking in a hot tub or swimming pool that has bacteria in the water can cause it. It may also occur if a hair follicle is blocked by a bandage.  Sores often go away in a few days with no treatment. In some cases, medicine may be given. A small piece of skin or pus may be taken to find the type of bacteria causing the infection.  Home care  The healthcare provider may prescribe an antibiotic cream or ointment.  Oral antibiotics may also be prescribed. Or you may be told to use an over-the-counter antibiotic cream. Follow all instructions when using any of these medicines.  General care:    Apply warm, moist compresses to the sores for 20 minutes up to 3 times a day. You can make a compress by soaking a cloth in warm water. Squeeze out excess water.    Don t cut, poke, or squeeze the sores. This can be painful and spread infection.    Don t scratch the affected area. Scratching can delay  healing.    Don t shave the areas affected by folliculitis.    If the sores leak fluid, cover the area with a nonstick gauze bandage. Use as little tape as possible. Carefully discard all soiled bandages.    Dress in loose cotton clothing.    Change sheets and blankets if they are soiled by pus. Wash all clothes, towels, sheets, and cloth diapers in soap and hot water. Do not share clothes, towels, or sheets with other family members.    Do not soak the sores in bath water. This can spread infection. Instead, keep the area clean by gently washing sores with soap and warm water.    Wash your hands or use antibacterial gels often to prevent spreading the bacteria.  Follow-up care  Follow up with your healthcare provider, or as advised.  When to seek medical advice  Call your healthcare provider right away if any of these occur:    Fever of 100.4 F (38 C) or higher    Spreading of the rash    Rash does not get better with treatment    Redness or swelling that gets worse    Rash becomes more painful    Foul-smelling fluid leaking from the skin    Rash improves, but then comes back   Date Last Reviewed: 11/1/2016 2000-2017 [employer]. [address] [place] [address]. All rights reserved. This information is not intended as a substitute for professional medical care. Always follow your healthcare professional's instructions.                Follow-ups after your visit        Your next 10 appointments already scheduled     Aug 16, 2018 10:30 AM CDT   [facility] with [facility]   [facility]    [address] [facility] [address]  [facility] [address]   [phone]            Sep 05, 2018 11:10 AM CDT   (Arrive by 11:00 AM)   Return Visit with Hoang Huerta MD   [facility] ([facility])    [address]  [address]  [facility] [id number] [phone]             "  Who to contact     If you have questions or need follow up information about today's clinic visit or your schedule please contact [facility] directly at [phone].  Normal or non-critical lab and imaging results will be communicated to you by MyChart, letter or phone within 4 business days after the clinic has received the results. If you do not hear from us within 7 days, please contact the clinic through MyChart or phone. If you have a critical or abnormal lab result, we will notify you by phone as soon as possible.  Submit refill requests through [employer] or call your pharmacy and they will forward the refill request to us. Please allow 3 business days for your refill to be completed.          Additional Information About Your Visit        Care EveryWhere ID     This is your Care EveryWhere ID. This could be used by other organizations to access your [place] medical records  [id number]        Your Vitals Were     Pulse Temperature Height Pulse Oximetry BMI (Body Mass Index)       72 98.1  F (36.7  C) (Oral) 6' 1\" (1.854 m) 100% 30.04 kg/m2        Blood Pressure from Last 3 Encounters:   08/02/18 132/76   04/18/18 128/84   04/08/18 141/89    Weight from Last 3 Encounters:   08/02/18 227 lb 11.2 oz (103.3 kg)   04/18/18 234 lb (106.1 kg)   04/08/18 235 lb 9.6 oz (106.9 kg)              Today, you had the following     No orders found for display       Primary Care Provider Office Phone # Fax #    Mlacolm Schafer [phone]       [address] [facility] [address]        Equal Access to Services     [facility] : Hadii stan Doe, waaxda luqadaha, qaybta kaaljake crespo. So [facility] [phone].    ATENCIÓN: Si habla español, tiene a braga disposición servicios gratuitos de asistencia lingüística. Llame al [phone].    We comply with applicable federal civil rights laws and [place] laws. We do not discriminate " on the basis of race, color, national origin, age, disability, sex, sexual orientation, or gender identity.            Thank you!     Thank you for choosing [facility]  for your care. Our goal is always to provide you with excellent care. Hearing back from our patients is one way we can continue to improve our services. Please take a few minutes to complete the written survey that you may receive in the mail after your visit with us. Thank you!             Your Updated Medication List - Protect others around you: Learn how to safely use, store and throw away your medicines at www.disposemymeds.org.          This list is accurate as of 8/2/18 11:33 AM.  Always use your most recent med list.                   Brand Name Dispense Instructions for use Diagnosis    allopurinol 100 MG tablet    ZYLOPRIM    90 tablet    Take 1 tablet (100 mg) by mouth daily    Gout of foot, unspecified cause, unspecified chronicity, unspecified laterality       amLODIPine 10 MG tablet    NORVASC    90 tablet    TAKE ONE TABLET BY MOUTH ONCE DAILY DUE  FOR  APPOINTMENT    Essential hypertension with goal blood pressure less than 140/90       fluticasone 50 MCG/ACT spray    FLONASE    1 Bottle    Spray 1-2 sprays into both nostrils daily    Chronic seasonal allergic rhinitis, unspecified trigger       lisinopril 10 MG tablet    PRINIVIL/ZESTRIL    180 tablet    TAKE TWO TABLETS BY MOUTH  DAILY    Benign essential hypertension       metoprolol succinate 100 MG 24 hr tablet    TOPROL-XL    90 tablet    TAKE 1 TABLET EVERY DAY    Chronic atrial fibrillation (H)       TYLENOL 325 MG tablet   Generic drug:  acetaminophen      Take 1-2 tablets by mouth every 6 hours as needed.        UNABLE TO FIND      MEDICATION NAME: Tabavit, multivitmain without vitamin K in it.        warfarin 5 MG tablet    COUMADIN    90 tablet    Take 1 tablet (5 mg) by mouth daily Or as instructed by INR clinic    Chronic atrial fibrillation (H)       XYZAL PO       Take 5 mg by mouth daily

## 2018-08-02 NOTE — PROGRESS NOTES
SUBJECTIVE:  Billy Stock is a 72 year old male who presents to the clinic today for sore on right thigh noticed this morning. It is mildly sore, tender to touch.  No discharge.   No associated symptoms.  Used hydrocortisone this morning to improve symptoms.   Unsure if it was helpful.     Past Medical History:   Diagnosis Date     Bell's palsy 10/15/2002     CA IN SITU PROSTATE 10/15/2002     Chronic atrial fibrillation (H) 7/1/10     Glaucoma 4/10/2003     Problem list name updated by automated process. Provider to review     Gout 5/16/2013     Hyperlipidemia LDL goal <100 9/10/2014     Hypertension goal BP (blood pressure) < 140/90 6/28/2006     SARAH (obstructive sleep apnea) 8/28/2013     Pericarditis 2001     Renal cyst      Current Outpatient Prescriptions   Medication Sig Dispense Refill     acetaminophen (TYLENOL) 325 MG tablet Take 1-2 tablets by mouth every 6 hours as needed.       allopurinol (ZYLOPRIM) 100 MG tablet Take 1 tablet (100 mg) by mouth daily 90 tablet 4     amLODIPine (NORVASC) 10 MG tablet TAKE ONE TABLET BY MOUTH ONCE DAILY DUE  FOR  APPOINTMENT 90 tablet 1     fluticasone (FLONASE) 50 MCG/ACT spray Spray 1-2 sprays into both nostrils daily 1 Bottle 11     Levocetirizine Dihydrochloride (XYZAL PO) Take 5 mg by mouth daily        lisinopril (PRINIVIL/ZESTRIL) 10 MG tablet TAKE TWO TABLETS BY MOUTH  DAILY 180 tablet 3     metoprolol succinate (TOPROL-XL) 100 MG 24 hr tablet TAKE 1 TABLET EVERY DAY 90 tablet 1     UNABLE TO FIND MEDICATION NAME: Tabavit, multivitmain without vitamin K in it.       warfarin (COUMADIN) 5 MG tablet Take 1 tablet (5 mg) by mouth daily Or as instructed by INR clinic 90 tablet 1     Social History   Substance Use Topics     Smoking status: Never Smoker     Smokeless tobacco: Never Used     Alcohol use No       ROS:  Review of systems negative except as stated above.    EXAM:   /76 (BP Location: Right arm, Patient Position: Chair, Cuff Size: Adult Large)   "Pulse 72  Temp 98.1  F (36.7  C) (Oral)  Ht 6' 1\" (1.854 m)  Wt 227 lb 11.2 oz (103.3 kg)  SpO2 100%  BMI 30.04 kg/m2  GENERAL: alert, no acute distress.  SKIN:Inflamed hair follicle on upper thigh.   No erythema extending beyond lesion.   RESP: lungs clear to auscultation - no rales, rhonchi or wheezes  CV: regular rates and rhythm, normal S1 S2, no murmur noted    ASSESSMENT:  (L73.9) Folliculitis  (primary encounter diagnosis)  Comment: Mild presentation  Plan:     Patient Instructions     Folliculitis  Folliculitis is an inflammation of a hair follicle. A hair follicle is the little pocket where a hair grows out of the skin. Bacteria normally live on the skin. But sometimes bacteria can get trapped in a follicle and cause infection. This causes a bumpy rash. The area over the follicles is red and raised. It may itch or be painful. The bumps may have fluid (pus) inside. The pus may leak and then form crusts. Sores can spread to other areas of the body. Once it goes away, folliculitis can come back at any time. Severe cases may cause permanent hair loss and scarring.  Folliculitis can happen anywhere on the body where hair grows. It can be caused by rubbing from tight clothing. Ingrown hairs can cause it. Soaking in a hot tub or swimming pool that has bacteria in the water can cause it. It may also occur if a hair follicle is blocked by a bandage.  Sores often go away in a few days with no treatment. In some cases, medicine may be given. A small piece of skin or pus may be taken to find the type of bacteria causing the infection.  Home care  The healthcare provider may prescribe an antibiotic cream or ointment.  Oral antibiotics may also be prescribed. Or you may be told to use an over-the-counter antibiotic cream. Follow all instructions when using any of these medicines.  General care:    Apply warm, moist compresses to the sores for 20 minutes up to 3 times a day. You can make a compress by soaking a cloth " in warm water. Squeeze out excess water.    Don t cut, poke, or squeeze the sores. This can be painful and spread infection.    Don t scratch the affected area. Scratching can delay healing.    Don t shave the areas affected by folliculitis.    If the sores leak fluid, cover the area with a nonstick gauze bandage. Use as little tape as possible. Carefully discard all soiled bandages.    Dress in loose cotton clothing.    Change sheets and blankets if they are soiled by pus. Wash all clothes, towels, sheets, and cloth diapers in soap and hot water. Do not share clothes, towels, or sheets with other family members.    Do not soak the sores in bath water. This can spread infection. Instead, keep the area clean by gently washing sores with soap and warm water.    Wash your hands or use antibacterial gels often to prevent spreading the bacteria.  Follow-up care  Follow up with your healthcare provider, or as advised.  When to seek medical advice  Call your healthcare provider right away if any of these occur:    Fever of 100.4 F (38 C) or higher    Spreading of the rash    Rash does not get better with treatment    Redness or swelling that gets worse    Rash becomes more painful    Foul-smelling fluid leaking from the skin    Rash improves, but then comes back   Date Last Reviewed: 11/1/2016 2000-2017 [employer]. [address] [place] [address], [place] [address]. All rights reserved. This information is not intended as a substitute for professional medical care. Always follow your healthcare professional's instructions.

## 2018-08-02 NOTE — PATIENT INSTRUCTIONS
Folliculitis  Folliculitis is an inflammation of a hair follicle. A hair follicle is the little pocket where a hair grows out of the skin. Bacteria normally live on the skin. But sometimes bacteria can get trapped in a follicle and cause infection. This causes a bumpy rash. The area over the follicles is red and raised. It may itch or be painful. The bumps may have fluid (pus) inside. The pus may leak and then form crusts. Sores can spread to other areas of the body. Once it goes away, folliculitis can come back at any time. Severe cases may cause permanent hair loss and scarring.  Folliculitis can happen anywhere on the body where hair grows. It can be caused by rubbing from tight clothing. Ingrown hairs can cause it. Soaking in a hot tub or swimming pool that has bacteria in the water can cause it. It may also occur if a hair follicle is blocked by a bandage.  Sores often go away in a few days with no treatment. In some cases, medicine may be given. A small piece of skin or pus may be taken to find the type of bacteria causing the infection.  Home care  The healthcare provider may prescribe an antibiotic cream or ointment.  Oral antibiotics may also be prescribed. Or you may be told to use an over-the-counter antibiotic cream. Follow all instructions when using any of these medicines.  General care:    Apply warm, moist compresses to the sores for 20 minutes up to 3 times a day. You can make a compress by soaking a cloth in warm water. Squeeze out excess water.    Don t cut, poke, or squeeze the sores. This can be painful and spread infection.    Don t scratch the affected area. Scratching can delay healing.    Don t shave the areas affected by folliculitis.    If the sores leak fluid, cover the area with a nonstick gauze bandage. Use as little tape as possible. Carefully discard all soiled bandages.    Dress in loose cotton clothing.    Change sheets and blankets if they are soiled by pus. Wash all clothes,  towels, sheets, and cloth diapers in soap and hot water. Do not share clothes, towels, or sheets with other family members.    Do not soak the sores in bath water. This can spread infection. Instead, keep the area clean by gently washing sores with soap and warm water.    Wash your hands or use antibacterial gels often to prevent spreading the bacteria.  Follow-up care  Follow up with your healthcare provider, or as advised.  When to seek medical advice  Call your healthcare provider right away if any of these occur:    Fever of 100.4 F (38 C) or higher    Spreading of the rash    Rash does not get better with treatment    Redness or swelling that gets worse    Rash becomes more painful    Foul-smelling fluid leaking from the skin    Rash improves, but then comes back   Date Last Reviewed: 11/1/2016 2000-2017 [employer]. [address] [place] [address], [place] [address]. All rights reserved. This information is not intended as a substitute for professional medical care. Always follow your healthcare professional's instructions.

## 2018-08-16 ENCOUNTER — ANTICOAGULATION THERAPY VISIT (OUTPATIENT)
Dept: ANTICOAGULATION | Facility: CLINIC | Age: 73
End: 2018-08-16
Payer: COMMERCIAL

## 2018-08-16 ENCOUNTER — TELEPHONE (OUTPATIENT)
Dept: ANTICOAGULATION | Facility: CLINIC | Age: 73
End: 2018-08-16

## 2018-08-16 DIAGNOSIS — I48.20 CHRONIC ATRIAL FIBRILLATION (H): ICD-10-CM

## 2018-08-16 DIAGNOSIS — Z79.01 LONG-TERM (CURRENT) USE OF ANTICOAGULANTS: ICD-10-CM

## 2018-08-16 LAB — INR POINT OF CARE: 2.3 (ref 0.86–1.14)

## 2018-08-16 PROCEDURE — 99207 ZZC NO CHARGE NURSE ONLY: CPT

## 2018-08-16 PROCEDURE — 85610 PROTHROMBIN TIME: CPT | Mod: QW

## 2018-08-16 PROCEDURE — 36416 COLLJ CAPILLARY BLOOD SPEC: CPT

## 2018-08-16 RX ORDER — WARFARIN SODIUM 5 MG/1
5 TABLET ORAL DAILY
Qty: 14 TABLET | Refills: 0 | Status: SHIPPED | OUTPATIENT
Start: 2018-08-16 | End: 2018-10-31

## 2018-08-16 RX ORDER — WARFARIN SODIUM 5 MG/1
5 TABLET ORAL DAILY
Qty: 90 TABLET | Refills: 1 | Status: SHIPPED | OUTPATIENT
Start: 2018-08-16 | End: 2018-08-16

## 2018-08-16 NOTE — TELEPHONE ENCOUNTER
Patient requests a small supply of warfarin until he can get his mail order supply.  Prescription approved per [facility] Refill Protocol.  Marge Gale RN

## 2018-08-16 NOTE — PROGRESS NOTES
ANTICOAGULATION FOLLOW-UP CLINIC VISIT    Patient Name:  Billy Stock  Date:  8/16/2018  Contact Type:  Face to Face    SUBJECTIVE:     Patient Findings     Positives No Problem Findings           OBJECTIVE    INR Protime   Date Value Ref Range Status   08/16/2018 2.3 (A) 0.86 - 1.14 Final       ASSESSMENT / PLAN  INR assessment THER    Recheck INR In: 4 WEEKS    INR Location Clinic      Anticoagulation Summary as of 8/16/2018     INR goal 2.0-3.0   Today's INR 2.3   Warfarin maintenance plan 2.5 mg (5 mg x 0.5) on Wed; 5 mg (5 mg x 1) all other days   Full warfarin instructions 2.5 mg on Wed; 5 mg all other days   Weekly warfarin total 32.5 mg   No change documented Marge Gale RN   Plan last modified Marge Gale RN (7/26/2018)   Next INR check 9/13/2018   Priority INR   Target end date     Indications   Long-term (current) use of anticoagulants [Z79.01] [Z79.01]  Chronic atrial fibrillation (H) [I48.2]         Anticoagulation Episode Summary     INR check location     Preferred lab     Send INR reminders to [facility]    Comments Pt reports taking Warfarin in the morning      Anticoagulation Care Providers     Provider Role Specialty Phone number    Malcolm Schafer MD Responsible Internal Medicine [phone]            See the Encounter Report to view Anticoagulation Flowsheet and Dosing Calendar (Go to Encounters tab in chart review, and find the Anticoagulation Therapy Visit)    Dosage adjustment made based on physician directed care plan.    Marge Gale RN

## 2018-08-21 ENCOUNTER — OFFICE VISIT (OUTPATIENT)
Dept: URGENT CARE | Facility: URGENT CARE | Age: 73
End: 2018-08-21
Payer: COMMERCIAL

## 2018-08-21 VITALS
HEART RATE: 60 BPM | WEIGHT: 227 LBS | BODY MASS INDEX: 29.95 KG/M2 | SYSTOLIC BLOOD PRESSURE: 126 MMHG | DIASTOLIC BLOOD PRESSURE: 78 MMHG | OXYGEN SATURATION: 99 %
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
## 2018-08-21 DIAGNOSIS — S80.862A INSECT BITE OF LEFT LOWER EXTREMITY, INITIAL ENCOUNTER: Primary | ICD-10-CM

## 2018-08-21 DIAGNOSIS — S00.06XA INSECT BITE OF SCALP, INITIAL ENCOUNTER: ICD-10-CM
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
## 2018-08-21 PROCEDURE — 99213 OFFICE O/P EST LOW 20 MIN: CPT | Performed by: PHYSICIAN ASSISTANT

## 2018-08-21 RX ORDER — TRIAMCINOLONE ACETONIDE 5 MG/G
CREAM TOPICAL
Qty: 30 G | Refills: 0 | Status: SHIPPED | OUTPATIENT
Start: 2018-08-21 | End: 2021-08-05

## 2018-08-21 NOTE — PROGRESS NOTES
SUBJECTIVE:  Billy Stock is a 72 year old male who presents to the clinic today for a rash.  Onset of rash was 3 day(s) ago.   Rash is sudden onset.  Location of the rash: left leg.  Quality/symptoms of rash: itching   Symptoms are moderate and rash seems to be stable.  Previous history of a similar rash? Yes: has had insect bites in the past   Recent exposure history: insect bites on Sunday. He has been using cortisone 10 without relief of itchiness.     Associated symptoms include: nothing. Patient denies throat tightness, wheezing, cough and vomiting.    Past Medical History:   Diagnosis Date     Bell's palsy 10/15/2002     CA IN SITU PROSTATE 10/15/2002     Chronic atrial fibrillation (H) 7/1/10     Glaucoma 4/10/2003     Problem list name updated by automated process. Provider to review     Gout 5/16/2013     Hyperlipidemia LDL goal <100 9/10/2014     Hypertension goal BP (blood pressure) < 140/90 6/28/2006     SARAH (obstructive sleep apnea) 8/28/2013     Pericarditis 2001     Renal cyst      Current Outpatient Prescriptions   Medication Sig Dispense Refill     allopurinol (ZYLOPRIM) 100 MG tablet Take 1 tablet (100 mg) by mouth daily 90 tablet 4     amLODIPine (NORVASC) 10 MG tablet TAKE ONE TABLET BY MOUTH ONCE DAILY DUE  FOR  APPOINTMENT 90 tablet 1     Levocetirizine Dihydrochloride (XYZAL PO) Take 5 mg by mouth daily        lisinopril (PRINIVIL/ZESTRIL) 10 MG tablet TAKE TWO TABLETS BY MOUTH  DAILY 180 tablet 3     metoprolol succinate (TOPROL-XL) 100 MG 24 hr tablet TAKE 1 TABLET EVERY DAY 90 tablet 1     UNABLE TO FIND MEDICATION NAME: Tabavit, multivitmain without vitamin K in it.       warfarin (COUMADIN) 5 MG tablet Take 1 tablet (5 mg) by mouth daily Or as instructed by INR clinic 14 tablet 0     acetaminophen (TYLENOL) 325 MG tablet Take 1-2 tablets by mouth every 6 hours as needed.       fluticasone (FLONASE) 50 MCG/ACT spray Spray 1-2 sprays into both nostrils daily 1 Bottle 11     Social  History   Substance Use Topics     Smoking status: Never Smoker     Smokeless tobacco: Never Used     Alcohol use No       ROS:  Review of systems negative except as stated above.    EXAM:   /78 (BP Location: Right arm, Patient Position: Chair, Cuff Size: Adult Large)  Pulse 60  Wt 227 lb (103 kg)  SpO2 99%  BMI 29.95 kg/m2  GENERAL: alert, no acute distress.  SKIN: Rash description:    Distribution: generalized  Location: left feg, forehead and hand (5 total)  Color: red,  Lesion type: nodular, round with swelling. NO secondary sign of infection  GENERAL APPEARANCE: healthy, alert and no distress  EYES: EOMI,  PERRL, conjunctiva clear  NECK: supple, non-tender to palpation, no adenopathy noted  RESP: lungs clear to auscultation - no rales, rhonchi or wheezes  CV: regular rates and rhythm, normal S1 S2, no murmur noted    ASSESSMENT / PLAN:  1. Insect bite, initial encounter  Classic insect bites, still remain itchy after using hydrocortisone 1%, will try something stronger.   DO not use for greater than 2 weeks at a time.   - triamcinolone (KENALOG) 0.5 % cream; Apply sparingly to affected area three times daily.  Dispense: 30 g; Refill: 0    Terri Yancey PA-C

## 2018-08-21 NOTE — PATIENT INSTRUCTIONS
Insect Bite  Insects most often bite to protect themselves or their nests. Certain bugs, like fleas and mosquitoes, bite to feed. In some cases, the actual bite causes no pain. An itchy red welt or swelling may develop at the site of the bite. Most insect bites do not cause illness. And the itching and swelling most often go away without treatment. However, an infection can develop if the bite is scratched and the skin broken. Rarely, a person may have an allergic reaction to an insect bite.  If a stinger is visible at the bite spot, remove it as quickly as possible, as this can decrease the amount of venom that gets into your body. Scrape it out with a dull edge, such as the edge of a credit card. Try not to squeeze it. Do not try to dig it out, as you may damage the skin and also increase the chance of infection.     To help reduce swelling and itching, apply a cold pack or ice in a zip-top plastic bag wrapped in a thin towel.   Home care    Your healthcare provider may prescribe over-the-counter medicines to help relieve itching and swelling. Use each medicine according to the directions on the package. If the bite becomes infected, you will need an antibiotic. This may be in pill form taken by mouth or as an ointment or cream put directly on the skin. Be sure to use them exactly as prescribed.    Bite symptoms usually go away on their own within a week or two.    To help prevent infection, avoid scratching or picking at the bite.    To help relieve itching and swelling, apply ice in a zip-top plastic bag wrapped in a thin towel to the bites. Do this for up to 10 minutes at a time. Avoid hot showers or baths as these tend to make itching worse.    An over-the-counter anti-itch medicine such as calamine lotion or an antihistamine cream may be helpful.    If you suspect you have insects in your home, talk to a licensed pest-control professional. He or she can inspect your home and tell you how to get rid of bugs  safely.  Follow-up care  Follow up with your healthcare provider, or as advised.  Call 911  Call 911 if any of these occur:    Trouble breathing or swallowing    Wheezing    Feeling like your throat is closing up    Fainting, loss of consciousness    Swelling around the face or mouth  When to seek medical advice  Call your healthcare provider right away if any of these occur:    Fever of 100.4 F (38 C) or higher, or as directed by your healthcare provider    Signs of infection, such as increased swelling and pain, warmth, red streaks, or drainage from the skin    Signs of allergic reaction, such as hives, a spreading rash, or throat itching  Date Last Reviewed: 10/1/2016    6149-2467 [employer]. [address] [place] [address], [place] [address]. All rights reserved. This information is not intended as a substitute for professional medical care. Always follow your healthcare professional's instructions.

## 2018-08-21 NOTE — MR AVS SNAPSHOT
After Visit Summary   8/21/2018    Billy Stock    MRN: [id number]           Patient Information     Date Of Birth          1945        Visit Information        Provider Department      8/21/2018 10:25 AM Terri Yancey PA-C [facility]        Today's Diagnoses     Insect bite, initial encounter    -  1      Care Instructions      Insect Bite  Insects most often bite to protect themselves or their nests. Certain bugs, like fleas and mosquitoes, bite to feed. In some cases, the actual bite causes no pain. An itchy red welt or swelling may develop at the site of the bite. Most insect bites do not cause illness. And the itching and swelling most often go away without treatment. However, an infection can develop if the bite is scratched and the skin broken. Rarely, a person may have an allergic reaction to an insect bite.  If a stinger is visible at the bite spot, remove it as quickly as possible, as this can decrease the amount of venom that gets into your body. Scrape it out with a dull edge, such as the edge of a credit card. Try not to squeeze it. Do not try to dig it out, as you may damage the skin and also increase the chance of infection.     To help reduce swelling and itching, apply a cold pack or ice in a zip-top plastic bag wrapped in a thin towel.   Home care    Your healthcare provider may prescribe over-the-counter medicines to help relieve itching and swelling. Use each medicine according to the directions on the package. If the bite becomes infected, you will need an antibiotic. This may be in pill form taken by mouth or as an ointment or cream put directly on the skin. Be sure to use them exactly as prescribed.    Bite symptoms usually go away on their own within a week or two.    To help prevent infection, avoid scratching or picking at the bite.    To help relieve itching and swelling, apply ice in a zip-top plastic bag wrapped in a thin towel to the  bites. Do this for up to 10 minutes at a time. Avoid hot showers or baths as these tend to make itching worse.    An over-the-counter anti-itch medicine such as calamine lotion or an antihistamine cream may be helpful.    If you suspect you have insects in your home, talk to a licensed pest-control professional. He or she can inspect your home and tell you how to get rid of bugs safely.  Follow-up care  Follow up with your healthcare provider, or as advised.  Call 911  Call 911 if any of these occur:    Trouble breathing or swallowing    Wheezing    Feeling like your throat is closing up    Fainting, loss of consciousness    Swelling around the face or mouth  When to seek medical advice  Call your healthcare provider right away if any of these occur:    Fever of 100.4 F (38 C) or higher, or as directed by your healthcare provider    Signs of infection, such as increased swelling and pain, warmth, red streaks, or drainage from the skin    Signs of allergic reaction, such as hives, a spreading rash, or throat itching  Date Last Reviewed: 10/1/2016    7064-3500 [employer]. [address] [place] [address]. All rights reserved. This information is not intended as a substitute for professional medical care. Always follow your healthcare professional's instructions.                Follow-ups after your visit        Your next 10 appointments already scheduled     Sep 05, 2018 11:10 AM CDT   (Arrive by 11:00 AM)   Return Visit with Hoang Huerta MD   [facility] ([facility])    [address]  [address]  [facility] [id number] [phone]            Sep 13, 2018 10:00 AM CDT   Anticoagulation Visit with [facility]   [facility]    [address] [facility] [address]  [facility] [address] [phone]              Who to contact     If you have questions or need follow up  information about today's clinic visit or your schedule please contact [facility] directly at [phone].  Normal or non-critical lab and imaging results will be communicated to you by MyChart, letter or phone within 4 business days after the clinic has received the results. If you do not hear from us within 7 days, please contact the clinic through MyChart or phone. If you have a critical or abnormal lab result, we will notify you by phone as soon as possible.  Submit refill requests through [employer] or call your pharmacy and they will forward the refill request to us. Please allow 3 business days for your refill to be completed.          Additional Information About Your Visit        Care EveryWhere ID     This is your Care EveryWhere ID. This could be used by other organizations to access your [facility] medical records  [id number]        Your Vitals Were     Pulse Pulse Oximetry BMI (Body Mass Index)             60 99% 29.95 kg/m2          Blood Pressure from Last 3 Encounters:   08/21/18 126/78   08/02/18 132/76   04/18/18 128/84    Weight from Last 3 Encounters:   08/21/18 227 lb (103 kg)   08/02/18 227 lb 11.2 oz (103.3 kg)   04/18/18 234 lb (106.1 kg)              Today, you had the following     No orders found for display         Today's Medication Changes          These changes are accurate as of 8/21/18 10:39 AM.  If you have any questions, ask your nurse or doctor.               Start taking these medicines.        Dose/Directions    triamcinolone 0.5 % cream   Commonly known as:  KENALOG   Used for:  Insect bite, initial encounter   Started by:  Terri Yancey PA-C        Apply sparingly to affected area three times daily.   Quantity:  30 g   Refills:  0            Where to get your medicines      These medications were sent to [facility] [place] [facility] [address] [facility]  [address] [facility] [address]     Phone:  [phone]     triamcinolone 0.5 % cream                 Primary Care Provider Office Phone # Fax #    Malcolm Schafer [phone]       [address] [facility] [address]        Equal Access to Services     SHANTA [place] : Hadii aad ku hadsamo Solitoali, waaxda luqadaha, qaybta kaalmada adedanielyada, jake hortondavi parminder. So [facility] [phone].    ATENCIÓN: Si habla español, tiene a braga disposición servicios gratuitos de asistencia lingüística. Llame al [phone].    We comply with applicable federal civil rights laws and [place] laws. We do not discriminate on the basis of race, color, national origin, age, disability, sex, sexual orientation, or gender identity.            Thank you!     Thank you for choosing [facility]  for your care. Our goal is always to provide you with excellent care. Hearing back from our patients is one way we can continue to improve our services. Please take a few minutes to complete the written survey that you may receive in the mail after your visit with us. Thank you!             Your Updated Medication List - Protect others around you: Learn how to safely use, store and throw away your medicines at www.disposemymeds.org.          This list is accurate as of 8/21/18 10:39 AM.  Always use your most recent med list.                   Brand Name Dispense Instructions for use Diagnosis    allopurinol 100 MG tablet    ZYLOPRIM    90 tablet    Take 1 tablet (100 mg) by mouth daily    Gout of foot, unspecified cause, unspecified chronicity, unspecified laterality       amLODIPine 10 MG tablet    NORVASC    90 tablet    TAKE ONE TABLET BY MOUTH ONCE DAILY DUE  FOR  APPOINTMENT    Essential hypertension with goal blood pressure less than 140/90       fluticasone 50 MCG/ACT spray    FLONASE    1 Bottle    Spray 1-2 sprays into both nostrils daily    Chronic seasonal allergic rhinitis, unspecified trigger       lisinopril 10 MG tablet    PRINIVIL/ZESTRIL    180 tablet    TAKE  TWO TABLETS BY MOUTH  DAILY    Benign essential hypertension       metoprolol succinate 100 MG 24 hr tablet    TOPROL-XL    90 tablet    TAKE 1 TABLET EVERY DAY    Chronic atrial fibrillation (H)       triamcinolone 0.5 % cream    KENALOG    30 g    Apply sparingly to affected area three times daily.    Insect bite, initial encounter       TYLENOL 325 MG tablet   Generic drug:  acetaminophen      Take 1-2 tablets by mouth every 6 hours as needed.        UNABLE TO FIND      MEDICATION NAME: Tabavit, multivitmain without vitamin K in it.        warfarin 5 MG tablet    COUMADIN    14 tablet    Take 1 tablet (5 mg) by mouth daily Or as instructed by INR clinic    Chronic atrial fibrillation (H)       XYZAL PO      Take 5 mg by mouth daily

## 2018-08-30 DIAGNOSIS — C61 PROSTATE CANCER (H): Primary | ICD-10-CM

## 2018-09-05 ENCOUNTER — OFFICE VISIT (OUTPATIENT)
Dept: UROLOGY | Facility: CLINIC | Age: 73
End: 2018-09-05
Payer: COMMERCIAL

## 2018-09-05 VITALS
DIASTOLIC BLOOD PRESSURE: 80 MMHG | HEIGHT: 73 IN | BODY MASS INDEX: 30.09 KG/M2 | WEIGHT: 227 LBS | SYSTOLIC BLOOD PRESSURE: 130 MMHG

## 2018-09-05 DIAGNOSIS — C61 PROSTATE CANCER (H): ICD-10-CM

## 2018-09-05 LAB — PSA SERPL-MCNC: 2.5 NG/ML (ref 0–4)

## 2018-09-05 PROCEDURE — 36415 COLL VENOUS BLD VENIPUNCTURE: CPT | Performed by: UROLOGY

## 2018-09-05 PROCEDURE — 84153 ASSAY OF PSA TOTAL: CPT | Performed by: UROLOGY

## 2018-09-05 PROCEDURE — 99213 OFFICE O/P EST LOW 20 MIN: CPT | Performed by: UROLOGY

## 2018-09-05 ASSESSMENT — PAIN SCALES - GENERAL: PAINLEVEL: NO PAIN (0)

## 2018-09-05 NOTE — PROGRESS NOTES
History: There is a pleasure to see this very pleasant, 72-year-old gentleman in follow-up consultation today   He was diagnosed with Pmua 7 adenocarcinoma the prostate in 2002 and radical prostatectomy was performed at that time.  This showed extensive disease in the prostate with positive surgical margins and he subsequently had adjuvant radiation therapy to the region once occupied by the prostate and was on regular Eligard 45 therapy and 2/2008.  Since then he has been on intermittent hormone treatment.  The last injection was given in August 2016.  PSA at his last visit 6 months ago was 2.0, and he was concerned about hot flashes at that time so we deferred further Eligard 6 months and repeated the PSA again today  Today the PSA is now 2.5.    Results for TONY KIRBY (MRN [id number]) as of 9/5/2018 11:50   Ref. Range 2/15/2017 09:46 8/16/2017 10:11 2/19/2018 10:48 9/5/2018 11:13   PSA Diag Urologic Phys Latest Ref Range: 0.00 - 4.00 ng/mL 0.44 1.30 2.00 2.50   The patient has no major symptoms and is otherwise quite content    Past Medical History:   Diagnosis Date     Bell's palsy 10/15/2002     CA IN SITU PROSTATE 10/15/2002     Chronic atrial fibrillation (H) 7/1/10     Glaucoma 4/10/2003     Problem list name updated by automated process. Provider to review     Gout 5/16/2013     Hyperlipidemia LDL goal <100 9/10/2014     Hypertension goal BP (blood pressure) < 140/90 6/28/2006     SARAH (obstructive sleep apnea) 8/28/2013     Pericarditis 2001     Renal cyst        Social History     Social History     Marital status:      Spouse name: N/A     Number of children: 3     Years of education: N/A     Social History Main Topics     Smoking status: Never Smoker     Smokeless tobacco: Never Used     Alcohol use No     Drug use: No     Sexual activity: No     Other Topics Concern     Caffeine Concern No     Occupational Exposure No     Hobby Hazards No     Sleep Concern Yes     CPAP at night      Stress Concern No     Weight Concern No     Special Diet No     Back Care No     Exercise No     Seat Belt Yes     Social History Narrative       Past Surgical History:   Procedure Laterality Date     CATARACT IOL, RT/LT  10/15, 11/15     COLONOSCOPY      [facility]     COLONOSCOPY  2014    Dr. Long [facility]     COLONOSCOPY N/A 2014    Procedure: COMBINED COLONOSCOPY, SINGLE BIOPSY/POLYPECTOMY BY BIOPSY;  Surgeon: Puneet Long MD;  Location:  GI     EYE SURGERY      glaucoma surgery right eye     PROSTATE SURGERY       SUPRAPUBIC PROSTATECTOMY       VASECTOMY         Family History   Problem Relation Age of Onset     Hypertension Maternal Grandfather      Cerebrovascular Disease Maternal Grandfather      Hypertension Maternal Grandmother      Cerebrovascular Disease Maternal Grandmother      Hypertension Mother       age 51, MVA     Hypertension Brother      Heart Failure Brother      Bronchitis Brother      Other - See Comments Brother      multiple myeloma     Heart Surgery Brother      defibrilater     Prostate Problems Brother      Diabetes Brother      Other Cancer Brother      Multiple Myeloma     Breast Cancer Maternal Aunt      hx     Cancer Maternal Aunt      cervical cancer     Cancer - colorectal Maternal Aunt      Unknown/Adopted Father      Don't know father's history     Diabetes Son          Current Outpatient Prescriptions:      acetaminophen (TYLENOL) 325 MG tablet, Take 1-2 tablets by mouth every 6 hours as needed., Disp: , Rfl:      allopurinol (ZYLOPRIM) 100 MG tablet, Take 1 tablet (100 mg) by mouth daily, Disp: 90 tablet, Rfl: 4     amLODIPine (NORVASC) 10 MG tablet, TAKE ONE TABLET BY MOUTH ONCE DAILY DUE  FOR  APPOINTMENT, Disp: 90 tablet, Rfl: 1     fluticasone (FLONASE) 50 MCG/ACT spray, Spray 1-2 sprays into both nostrils daily, Disp: 1 Bottle, Rfl: 11     Levocetirizine Dihydrochloride (XYZAL PO), Take 5 mg by mouth daily , Disp: , Rfl:      lisinopril  "(PRINIVIL/ZESTRIL) 10 MG tablet, TAKE TWO TABLETS BY MOUTH  DAILY, Disp: 180 tablet, Rfl: 3     triamcinolone (KENALOG) 0.5 % cream, Apply sparingly to affected area three times daily., Disp: 30 g, Rfl: 0     warfarin (COUMADIN) 5 MG tablet, Take 1 tablet (5 mg) by mouth daily Or as instructed by INR clinic, Disp: 14 tablet, Rfl: 0     metoprolol succinate (TOPROL-XL) 100 MG 24 hr tablet, TAKE 1 TABLET EVERY DAY, Disp: 90 tablet, Rfl: 1     UNABLE TO FIND, MEDICATION NAME: Tabavit, multivitmain without vitamin K in it., Disp: , Rfl:     10 point ROS of systems including Constitutional, Eyes, Respiratory, Cardiovascular, Gastroenterology, Genitourinary, Integumentary, Muscularskeletal, Psychiatric were all negative except for pertinent positives noted in my HPI.    Examination:   /80 (BP Location: Right arm, Patient Position: Sitting, Cuff Size: Adult Regular)  Ht 1.854 m (6' 1\")  Wt 103 kg (227 lb)  BMI 29.95 kg/m2  General Impression: Very pleasant gentleman in no acute distress, well oriented in time place and person  Mental Status: Normal.  HEENT.  There is no clinical evidence of jaundice and the mucous membranes are normal  Skin: Skin otherwise normal to examination  Respiratory System: Respiratory cycle normal  Lymph Nodes: Not examined  Back/Flank Tenderness: Not examined  Cardiovascular System: Not examined  Abdominal Examination: Not examined  Extremities: No significant peripheral edema  Genitial: Not examined  Rectal Examination: Not examined  Neurologic System: There are no focal abnormal clinical neurological signs in the central, or peripheral nervous systems    Impression: The PSA is only risen very slightly over the last 6 months and is now 2.5.  The last Eligard injection was 2 years ago.  We did talk about whether we should repeat an injection today.  Given, the very slow rise in the PSA and his concerns about hot flashes when he does have Eligard I am happy to defer Eligard for a further " "6 months.  We will therefore repeat PSA in 6 months to determine if we need to give an Eligard injection at that time.  I did discuss the entire situation with the patient in detail today.  I answered all his questions    Plan: 6 months for PSA and examination with possible Eligard injection at that time    Time: 15 minutes with greater than 50% in discussion consultation    \"This dictation was performed with voice recognition software and may contain errors,  omissions and inadvertent word substitution.\"      "

## 2018-09-05 NOTE — LETTER
9/5/2018       RE: Tony Kirby  [address]  [place] [address]     Dear Colleague,    Thank you for referring your patient, Tony Kirby, to the [facility] UROLOGY CLINIC [place] at [facility]. Please see a copy of my visit note below.    History: There is a pleasure to see this very pleasant, 72-year-old gentleman in follow-up consultation today   He was diagnosed with [place] 7 adenocarcinoma the prostate in 2002 and radical prostatectomy was performed at that time.  This showed extensive disease in the prostate with positive surgical margins and he subsequently had adjuvant radiation therapy to the region once occupied by the prostate and was on regular Eligard 45 therapy and 2/2008.  Since then he has been on intermittent hormone treatment.  The last injection was given in August 2016.  PSA at his last visit 6 months ago was 2.0, and he was concerned about hot flashes at that time so we deferred further Eligard 6 months and repeated the PSA again today  Today the PSA is now 2.5.    Results for TONY KIRBY (MRN [id number]) as of 9/5/2018 11:50   Ref. Range 2/15/2017 09:46 8/16/2017 10:11 2/19/2018 10:48 9/5/2018 11:13   PSA Diag Urologic Phys Latest Ref Range: 0.00 - 4.00 ng/mL 0.44 1.30 2.00 2.50   The patient has no major symptoms and is otherwise quite content    Past Medical History:   Diagnosis Date     Bell's palsy 10/15/2002     CA IN SITU PROSTATE 10/15/2002     Chronic atrial fibrillation (H) 7/1/10     Glaucoma 4/10/2003     Problem list name updated by automated process. Provider to review     Gout 5/16/2013     Hyperlipidemia LDL goal <100 9/10/2014     Hypertension goal BP (blood pressure) < 140/90 6/28/2006     SARAH (obstructive sleep apnea) 8/28/2013     Pericarditis 2001     Renal cyst        Social History     Social History     Marital status:      Spouse name: N/A     Number of children: 3     Years of education:  N/A     Social History Main Topics     Smoking status: Never Smoker     Smokeless tobacco: Never Used     Alcohol use No     Drug use: No     Sexual activity: No     Other Topics Concern     Caffeine Concern No     Occupational Exposure No     Hobby Hazards No     Sleep Concern Yes     CPAP at night     Stress Concern No     Weight Concern No     Special Diet No     Back Care No     Exercise No     Seat Belt Yes     Social History Narrative       Past Surgical History:   Procedure Laterality Date     CATARACT IOL, RT/LT  10/15, 11/15     COLONOSCOPY  2009    [facility]     COLONOSCOPY  2014    Dr. Long [facility]     COLONOSCOPY N/A 2014    Procedure: COMBINED COLONOSCOPY, SINGLE BIOPSY/POLYPECTOMY BY BIOPSY;  Surgeon: Puneet Long MD;  Location:  GI     EYE SURGERY      glaucoma surgery right eye     PROSTATE SURGERY       SUPRAPUBIC PROSTATECTOMY       VASECTOMY         Family History   Problem Relation Age of Onset     Hypertension Maternal Grandfather      Cerebrovascular Disease Maternal Grandfather      Hypertension Maternal Grandmother      Cerebrovascular Disease Maternal Grandmother      Hypertension Mother       age 51, MVA     Hypertension Brother      Heart Failure Brother      Bronchitis Brother      Other - See Comments Brother      multiple myeloma     Heart Surgery Brother      defibrilater     Prostate Problems Brother      Diabetes Brother      Other Cancer Brother      Multiple Myeloma     Breast Cancer Maternal Aunt      hx     Cancer Maternal Aunt      cervical cancer     Cancer - colorectal Maternal Aunt      Unknown/Adopted Father      Don't know father's history     Diabetes Son          Current Outpatient Prescriptions:      acetaminophen (TYLENOL) 325 MG tablet, Take 1-2 tablets by mouth every 6 hours as needed., Disp: , Rfl:      allopurinol (ZYLOPRIM) 100 MG tablet, Take 1 tablet (100 mg) by mouth daily, Disp: 90 tablet, Rfl: 4     amLODIPine (NORVASC) 10 MG tablet,  "TAKE ONE TABLET BY MOUTH ONCE DAILY DUE  FOR  APPOINTMENT, Disp: 90 tablet, Rfl: 1     fluticasone (FLONASE) 50 MCG/ACT spray, Spray 1-2 sprays into both nostrils daily, Disp: 1 Bottle, Rfl: 11     Levocetirizine Dihydrochloride (XYZAL PO), Take 5 mg by mouth daily , Disp: , Rfl:      lisinopril (PRINIVIL/ZESTRIL) 10 MG tablet, TAKE TWO TABLETS BY MOUTH  DAILY, Disp: 180 tablet, Rfl: 3     triamcinolone (KENALOG) 0.5 % cream, Apply sparingly to affected area three times daily., Disp: 30 g, Rfl: 0     warfarin (COUMADIN) 5 MG tablet, Take 1 tablet (5 mg) by mouth daily Or as instructed by INR clinic, Disp: 14 tablet, Rfl: 0     metoprolol succinate (TOPROL-XL) 100 MG 24 hr tablet, TAKE 1 TABLET EVERY DAY, Disp: 90 tablet, Rfl: 1     UNABLE TO FIND, MEDICATION NAME: Tabavit, multivitmain without vitamin K in it., Disp: , Rfl:     10 point ROS of systems including Constitutional, Eyes, Respiratory, Cardiovascular, Gastroenterology, Genitourinary, Integumentary, Muscularskeletal, Psychiatric were all negative except for pertinent positives noted in my HPI.    Examination:   /80 (BP Location: Right arm, Patient Position: Sitting, Cuff Size: Adult Regular)  Ht 1.854 m (6' 1\")  Wt 103 kg (227 lb)  BMI 29.95 kg/m2  General Impression: Very pleasant gentleman in no acute distress, well oriented in time place and person  Mental Status: Normal.  HEENT.  There is no clinical evidence of jaundice and the mucous membranes are normal  Skin: Skin otherwise normal to examination  Respiratory System: Respiratory cycle normal  Lymph Nodes: Not examined  Back/Flank Tenderness: Not examined  Cardiovascular System: Not examined  Abdominal Examination: Not examined  Extremities: No significant peripheral edema  Genitial: Not examined  Rectal Examination: Not examined  Neurologic System: There are no focal abnormal clinical neurological signs in the central, or peripheral nervous systems    Impression: The PSA is only risen very " "slightly over the last 6 months and is now 2.5.  The last Eligard injection was 2 years ago.  We did talk about whether we should repeat an injection today.  Given, the very slow rise in the PSA and his concerns about hot flashes when he does have Eligard I am happy to defer Eligard for a further 6 months.  We will therefore repeat PSA in 6 months to determine if we need to give an Eligard injection at that time.  I did discuss the entire situation with the patient in detail today.  I answered all his questions    Plan: 6 months for PSA and examination with possible Eligard injection at that time    Time: 15 minutes with greater than 50% in discussion consultation    \"This dictation was performed with voice recognition software and may contain errors,  omissions and inadvertent word substitution.\"        Again, thank you for allowing me to participate in the care of your patient.      Sincerely,    Hoang Huerta MD      "

## 2018-09-05 NOTE — LETTER
9/5/2018      RE: Billy Kirby  [address]  [place] [address]       History: There is a pleasure to see this very pleasant, 72-year-old gentleman in follow-up consultation today   He was diagnosed with [place] 7 adenocarcinoma the prostate in 2002 and radical prostatectomy was performed at that time.  This showed extensive disease in the prostate with positive surgical margins and he subsequently had adjuvant radiation therapy to the region once occupied by the prostate and was on regular Eligard 45 therapy and 2/2008.  Since then he has been on intermittent hormone treatment.  The last injection was given in August 2016.  PSA at his last visit 6 months ago was 2.0, and he was concerned about hot flashes at that time so we deferred further Eligard 6 months and repeated the PSA again today  Today the PSA is now 2.5.    Results for BILLY KIRBY (MRN [id number]) as of 9/5/2018 11:50   Ref. Range 2/15/2017 09:46 8/16/2017 10:11 2/19/2018 10:48 9/5/2018 11:13   PSA Diag Urologic Phys Latest Ref Range: 0.00 - 4.00 ng/mL 0.44 1.30 2.00 2.50   The patient has no major symptoms and is otherwise quite content    Past Medical History:   Diagnosis Date     Bell's palsy 10/15/2002     CA IN SITU PROSTATE 10/15/2002     Chronic atrial fibrillation (H) 7/1/10     Glaucoma 4/10/2003     Problem list name updated by automated process. Provider to review     Gout 5/16/2013     Hyperlipidemia LDL goal <100 9/10/2014     Hypertension goal BP (blood pressure) < 140/90 6/28/2006     SARAH (obstructive sleep apnea) 8/28/2013     Pericarditis 2001     Renal cyst        Social History     Social History     Marital status:      Spouse name: N/A     Number of children: 3     Years of education: N/A     Social History Main Topics     Smoking status: Never Smoker     Smokeless tobacco: Never Used     Alcohol use No     Drug use: No     Sexual activity: No     Other Topics Concern     Caffeine Concern No     Occupational  Exposure No     Hobby Hazards No     Sleep Concern Yes     CPAP at night     Stress Concern No     Weight Concern No     Special Diet No     Back Care No     Exercise No     Seat Belt Yes     Social History Narrative       Past Surgical History:   Procedure Laterality Date     CATARACT IOL, RT/LT  10/15, 11/15     COLONOSCOPY      [facility]     COLONOSCOPY  2014    Dr. Long [facility]     COLONOSCOPY N/A 2014    Procedure: COMBINED COLONOSCOPY, SINGLE BIOPSY/POLYPECTOMY BY BIOPSY;  Surgeon: Puneet Long MD;  Location:  GI     EYE SURGERY      glaucoma surgery right eye     PROSTATE SURGERY       SUPRAPUBIC PROSTATECTOMY  2002     VASECTOMY         Family History   Problem Relation Age of Onset     Hypertension Maternal Grandfather      Cerebrovascular Disease Maternal Grandfather      Hypertension Maternal Grandmother      Cerebrovascular Disease Maternal Grandmother      Hypertension Mother       age 51, MVA     Hypertension Brother      Heart Failure Brother      Bronchitis Brother      Other - See Comments Brother      multiple myeloma     Heart Surgery Brother      defibrilater     Prostate Problems Brother      Diabetes Brother      Other Cancer Brother      Multiple Myeloma     Breast Cancer Maternal Aunt      hx     Cancer Maternal Aunt      cervical cancer     Cancer - colorectal Maternal Aunt      Unknown/Adopted Father      Don't know father's history     Diabetes Son          Current Outpatient Prescriptions:      acetaminophen (TYLENOL) 325 MG tablet, Take 1-2 tablets by mouth every 6 hours as needed., Disp: , Rfl:      allopurinol (ZYLOPRIM) 100 MG tablet, Take 1 tablet (100 mg) by mouth daily, Disp: 90 tablet, Rfl: 4     amLODIPine (NORVASC) 10 MG tablet, TAKE ONE TABLET BY MOUTH ONCE DAILY DUE  FOR  APPOINTMENT, Disp: 90 tablet, Rfl: 1     fluticasone (FLONASE) 50 MCG/ACT spray, Spray 1-2 sprays into both nostrils daily, Disp: 1 Bottle, Rfl: 11     Levocetirizine Dihydrochloride  "(XYZAL PO), Take 5 mg by mouth daily , Disp: , Rfl:      lisinopril (PRINIVIL/ZESTRIL) 10 MG tablet, TAKE TWO TABLETS BY MOUTH  DAILY, Disp: 180 tablet, Rfl: 3     triamcinolone (KENALOG) 0.5 % cream, Apply sparingly to affected area three times daily., Disp: 30 g, Rfl: 0     warfarin (COUMADIN) 5 MG tablet, Take 1 tablet (5 mg) by mouth daily Or as instructed by INR clinic, Disp: 14 tablet, Rfl: 0     metoprolol succinate (TOPROL-XL) 100 MG 24 hr tablet, TAKE 1 TABLET EVERY DAY, Disp: 90 tablet, Rfl: 1     UNABLE TO FIND, MEDICATION NAME: Tabavit, multivitmain without vitamin K in it., Disp: , Rfl:     10 point ROS of systems including Constitutional, Eyes, Respiratory, Cardiovascular, Gastroenterology, Genitourinary, Integumentary, Muscularskeletal, Psychiatric were all negative except for pertinent positives noted in my HPI.    Examination:   /80 (BP Location: Right arm, Patient Position: Sitting, Cuff Size: Adult Regular)  Ht 1.854 m (6' 1\")  Wt 103 kg (227 lb)  BMI 29.95 kg/m2  General Impression: Very pleasant gentleman in no acute distress, well oriented in time place and person  Mental Status: Normal.  HEENT.  There is no clinical evidence of jaundice and the mucous membranes are normal  Skin: Skin otherwise normal to examination  Respiratory System: Respiratory cycle normal  Lymph Nodes: Not examined  Back/Flank Tenderness: Not examined  Cardiovascular System: Not examined  Abdominal Examination: Not examined  Extremities: No significant peripheral edema  Genitial: Not examined  Rectal Examination: Not examined  Neurologic System: There are no focal abnormal clinical neurological signs in the central, or peripheral nervous systems    Impression: The PSA is only risen very slightly over the last 6 months and is now 2.5.  The last Eligard injection was 2 years ago.  We did talk about whether we should repeat an injection today.  Given, the very slow rise in the PSA and his concerns about hot flashes " when he does have Eligard I am happy to defer Eligard for a further 6 months.  We will therefore repeat PSA in 6 months to determine if we need to give an Eligard injection at that time.  I did discuss the entire situation with the patient in detail today.  I answered all his questions    Plan: 6 months for PSA and examination with possible Eligard injection at that time    Time: 15 minutes with greater than 50% in discussion consultation      Hoang Huerta MD

## 2018-09-05 NOTE — MR AVS SNAPSHOT
After Visit Summary   9/5/2018    Billy Stock    MRN: [id number]           Patient Information     Date Of Birth          1945        Visit Information        Provider Department      9/5/2018 11:10 AM Hoang Huerta MD [facility] [place]        Today's Diagnoses     Prostate cancer (H)           Follow-ups after your visit        Follow-up notes from your care team     Return in about 6 months (around 3/5/2019) for PSA, Physical Exam, ultrasound of kidneys, creatinine.      Your next 10 appointments already scheduled     Sep 13, 2018 10:00 AM CDT   Anticoagulation Visit with [facility]   [facility]    [address]  [facility] [address] [phone]            Mar 05, 2019 12:00 PM CST   US RENAL COMPLETE with SHUS5   [facility] Ultrasound ([facility])    [address] [facility] [address] [phone]           Please bring a list of your medicines (including vitamins, minerals and over-the-counter drugs). Also, tell your doctor about any allergies you may have. Wear comfortable clothes and leave your valuables at home.  You do not need to do anything special to prepare for your exam.  Please call the Imaging Department at your exam site with any questions.            Mar 05, 2019  1:00 PM CST   Return Visit with Hoang Huerta MD   [facility] [place] ([facility] [place])    [address]  [address]  [facility] [address] [phone]              Future tests that were ordered for you today     Open Future Orders        Priority Expected Expires Ordered    US Renal Complete [[id number]] Routine  9/5/2019 9/5/2018            Who to contact     If you have questions or need follow up information about today's clinic visit or your schedule please contact [facility]  "[facility]OLOGY CLINIC [place] directly at [phone].  Normal or non-critical lab and imaging results will be communicated to you by MyChart, letter or phone within 4 business days after the clinic has received the results. If you do not hear from us within 7 days, please contact the clinic through MyChart or phone. If you have a critical or abnormal lab result, we will notify you by phone as soon as possible.  Submit refill requests through [employer] or call your pharmacy and they will forward the refill request to us. Please allow 3 business days for your refill to be completed.          Additional Information About Your Visit        Care EveryWhere ID     This is your Care EveryWhere ID. This could be used by other organizations to access your [place] medical records  [id number]        Your Vitals Were     Height BMI (Body Mass Index)                1.854 m (6' 1\") 29.95 kg/m2           Blood Pressure from Last 3 Encounters:   09/05/18 130/80   08/21/18 126/78   08/02/18 132/76    Weight from Last 3 Encounters:   09/05/18 103 kg (227 lb)   08/21/18 103 kg (227 lb)   08/02/18 103.3 kg (227 lb 11.2 oz)              We Performed the Following     PSA Diag Urologic Phys        Primary Care Provider Office Phone # Fax #    Malcolm Schafer [phone]       [address] [facility] [address]        Equal Access to Services     [facility]: Hadii aad ku hadasho Solitoali, waaxda luqadaha, qaybta kaalmada adeegyada, jake delacruz . So [facility] [phone].    ATENCIÓN: Si habla español, tiene a braga disposición servicios gratuitos de asistencia lingüística. Jorje sanders [phone].    We comply with applicable federal civil rights laws and [place] laws. We do not discriminate on the basis of race, color, national origin, age, disability, sex, sexual orientation, or gender identity.            Thank you!     Thank you for choosing [facility] UROLOGY CLINIC " [place]  for your care. Our goal is always to provide you with excellent care. Hearing back from our patients is one way we can continue to improve our services. Please take a few minutes to complete the written survey that you may receive in the mail after your visit with us. Thank you!             Your Updated Medication List - Protect others around you: Learn how to safely use, store and throw away your medicines at www.disposemymeds.org.          This list is accurate as of 9/5/18 11:46 AM.  Always use your most recent med list.                   Brand Name Dispense Instructions for use Diagnosis    allopurinol 100 MG tablet    ZYLOPRIM    90 tablet    Take 1 tablet (100 mg) by mouth daily    Gout of foot, unspecified cause, unspecified chronicity, unspecified laterality       amLODIPine 10 MG tablet    NORVASC    90 tablet    TAKE ONE TABLET BY MOUTH ONCE DAILY DUE  FOR  APPOINTMENT    Essential hypertension with goal blood pressure less than 140/90       fluticasone 50 MCG/ACT spray    FLONASE    1 Bottle    Spray 1-2 sprays into both nostrils daily    Chronic seasonal allergic rhinitis, unspecified trigger       lisinopril 10 MG tablet    PRINIVIL/ZESTRIL    180 tablet    TAKE TWO TABLETS BY MOUTH  DAILY    Benign essential hypertension       metoprolol succinate 100 MG 24 hr tablet    TOPROL-XL    90 tablet    TAKE 1 TABLET EVERY DAY    Chronic atrial fibrillation (H)       triamcinolone 0.5 % cream    KENALOG    30 g    Apply sparingly to affected area three times daily.        TYLENOL 325 MG tablet   Generic drug:  acetaminophen      Take 1-2 tablets by mouth every 6 hours as needed.        UNABLE TO FIND      MEDICATION NAME: Tabavit, multivitmain without vitamin K in it.        warfarin 5 MG tablet    COUMADIN    14 tablet    Take 1 tablet (5 mg) by mouth daily Or as instructed by INR clinic    Chronic atrial fibrillation (H)       XYZAL PO      Take 5 mg by mouth daily

## 2018-09-05 NOTE — NURSING NOTE
Chief Complaint   Patient presents with     hx of prostate cancer     Pt is here for the same day PSA.      Radha Piña CMA

## 2018-09-13 ENCOUNTER — ANTICOAGULATION THERAPY VISIT (OUTPATIENT)
Dept: ANTICOAGULATION | Facility: CLINIC | Age: 73
End: 2018-09-13
Payer: COMMERCIAL

## 2018-09-13 DIAGNOSIS — Z79.01 LONG-TERM (CURRENT) USE OF ANTICOAGULANTS: ICD-10-CM

## 2018-09-13 DIAGNOSIS — I48.20 CHRONIC ATRIAL FIBRILLATION (H): ICD-10-CM

## 2018-09-13 LAB — INR POINT OF CARE: 2.1 (ref 0.86–1.14)

## 2018-09-13 PROCEDURE — 99207 ZZC NO CHARGE NURSE ONLY: CPT

## 2018-09-13 PROCEDURE — 85610 PROTHROMBIN TIME: CPT | Mod: QW

## 2018-09-13 PROCEDURE — 36416 COLLJ CAPILLARY BLOOD SPEC: CPT

## 2018-09-13 NOTE — PROGRESS NOTES
ANTICOAGULATION FOLLOW-UP CLINIC VISIT    Patient Name:  Billy Stock  Date:  9/13/2018  Contact Type:  Face to Face    SUBJECTIVE:     Patient Findings     Positives No Problem Findings           OBJECTIVE    INR Protime   Date Value Ref Range Status   09/13/2018 2.1 (A) 0.86 - 1.14 Final       ASSESSMENT / PLAN  INR assessment THER    Recheck INR In: 4 WEEKS    INR Location Clinic      Anticoagulation Summary as of 9/13/2018     INR goal 2.0-3.0   Today's INR 2.1   Warfarin maintenance plan 2.5 mg (5 mg x 0.5) on Wed; 5 mg (5 mg x 1) all other days   Full warfarin instructions 2.5 mg on Wed; 5 mg all other days   Weekly warfarin total 32.5 mg   No change documented Lyal Jeter, RN   Plan last modified Marge Gale RN (7/26/2018)   Next INR check 10/11/2018   Priority INR   Target end date     Indications   Long-term (current) use of anticoagulants [Z79.01] [Z79.01]  Chronic atrial fibrillation (H) [I48.2]         Anticoagulation Episode Summary     INR check location     Preferred lab     Send INR reminders to [facility]    Comments Pt reports taking Warfarin in the morning      Anticoagulation Care Providers     Provider Role Specialty Phone number    Malcolm Schafer MD Responsible Internal Medicine [phone]            See the Encounter Report to view Anticoagulation Flowsheet and Dosing Calendar (Go to Encounters tab in chart review, and find the Anticoagulation Therapy Visit)    Dosage adjustment made based on physician directed care plan.    Lyla Jeter RN

## 2018-09-13 NOTE — MR AVS SNAPSHOT
Billy Stock   9/13/2018 10:00 AM   Anticoagulation Therapy Visit    Description:  72 year old male   Provider:  [facility]   Department:  [facility] Co[facility]           INR as of 9/13/2018     Today's INR 2.1      Anticoagulation Summary as of 9/13/2018     INR goal 2.0-3.0   Today's INR 2.1   Full warfarin instructions 2.5 mg on Wed; 5 mg all other days   Next INR check 10/11/2018    Indications   Long-term (current) use of anticoagulants [Z79.01] [Z79.01]  Chronic atrial fibrillation (H) [I48.2]         Your next Anticoagulation Clinic appointment(s)     Oct 11, 2018 10:00 AM CDT   Anticoagulation Visit with [facility]   [facility] ([facility])    [address] [facility] [address]  [facility] [address] [phone]              Contact Numbers     [facility] Phone Numbers:  Anticoagulation Clinic Appointments : [phone]  Anticoagulation Nurse: [phone]         September 2018 Details    Sun Mon Tue Wed Thu Fri Sat           1                 2               3               4               5               6               7               8                 9               10               11               12               13      5 mg   See details      14      5 mg         15      5 mg           16      5 mg         17      5 mg         18      5 mg         19      2.5 mg         20      5 mg         21      5 mg         22      5 mg           23      5 mg         24      5 mg         25      5 mg         26      2.5 mg         27      5 mg         28      5 mg         29      5 mg           30      5 mg                Date Details   09/13 This INR check               How to take your warfarin dose     To take:  2.5 mg Take 0.5 of a 5 mg tablet.    To take:  5 mg Take 1 of the 5 mg tablets.           October 2018 Details    Sun Mon Tue Wed Thu Fri Sat      1      5 mg         2      5 mg         3      2.5 mg         4      5 mg          5      5 mg         6      5 mg           7      5 mg         8      5 mg         9      5 mg         10      2.5 mg         11            12               13                 14               15               16               17               18               19               20                 21               22               23               24               25               26               27                 28               29               30               31                   Date Details   No additional details    Date of next INR:  10/11/2018         How to take your warfarin dose     To take:  2.5 mg Take 0.5 of a 5 mg tablet.    To take:  5 mg Take 1 of the 5 mg tablets.

## 2018-10-11 ENCOUNTER — ANTICOAGULATION THERAPY VISIT (OUTPATIENT)
Dept: ANTICOAGULATION | Facility: CLINIC | Age: 73
End: 2018-10-11
Payer: COMMERCIAL

## 2018-10-11 DIAGNOSIS — I48.20 CHRONIC ATRIAL FIBRILLATION (H): ICD-10-CM

## 2018-10-11 LAB — INR POINT OF CARE: 2.5 (ref 0.86–1.14)

## 2018-10-11 PROCEDURE — 99207 ZZC NO CHARGE NURSE ONLY: CPT

## 2018-10-11 PROCEDURE — 36416 COLLJ CAPILLARY BLOOD SPEC: CPT

## 2018-10-11 PROCEDURE — 85610 PROTHROMBIN TIME: CPT | Mod: QW

## 2018-10-11 NOTE — PROGRESS NOTES
ANTICOAGULATION FOLLOW-UP CLINIC VISIT    Patient Name:  Billy Stock  Date:  10/11/2018  Contact Type:  Face to Face    SUBJECTIVE:     Patient Findings     Positives No Problem Findings    Comments Pt denies any changes or missed warfarin since last INR visit. Pt denies any bleeding or bruising problems.            OBJECTIVE    INR Protime   Date Value Ref Range Status   10/11/2018 2.5 (A) 0.86 - 1.14 Final       ASSESSMENT / PLAN  INR assessment THER    Recheck INR In: 4 WEEKS    INR Location Clinic      Anticoagulation Summary as of 10/11/2018     INR goal 2.0-3.0   Today's INR 2.5   Warfarin maintenance plan 2.5 mg (5 mg x 0.5) on Wed; 5 mg (5 mg x 1) all other days   Full warfarin instructions 2.5 mg on Wed; 5 mg all other days   Weekly warfarin total 32.5 mg   No change documented Samantha Galeas RN   Plan last modified Marge Gale RN (7/26/2018)   Next INR check 11/8/2018   Priority INR   Target end date     Indications   Long-term (current) use of anticoagulants [Z79.01] [Z79.01]  Chronic atrial fibrillation (H) [I48.2]         Anticoagulation Episode Summary     INR check location     Preferred lab     Send INR reminders to [facility]    Comments Pt reports taking Warfarin in the morning      Anticoagulation Care Providers     Provider Role Specialty Phone number    Malcolm Schafer MD Responsible Internal Medicine [phone]            See the Encounter Report to view Anticoagulation Flowsheet and Dosing Calendar (Go to Encounters tab in chart review, and find the Anticoagulation Therapy Visit)    Dosage adjustment made based on physician directed care plan.    Samantha Galeas RN

## 2018-10-11 NOTE — MR AVS SNAPSHOT
Billy Stock   10/11/2018 10:00 AM   Anticoagulation Therapy Visit    Description:  72 year old male   Provider:  [facility]   Department:  [facility]           INR as of 10/11/2018     Today's INR 2.5      Anticoagulation Summary as of 10/11/2018     INR goal 2.0-3.0   Today's INR 2.5   Full warfarin instructions 2.5 mg on Wed; 5 mg all other days   Next INR check 11/8/2018    Indications   Long-term (current) use of anticoagulants [Z79.01] [Z79.01]  Chronic atrial fibrillation (H) [I48.2]         Your next Anticoagulation Clinic appointment(s)     Nov 08, 2018 10:00 AM CST   [facility]ticoagulation Visit with [facility]   [facility]    [address] [facility] [address]  [facility] [address] [phone]              Contact Numbers     [facility] Phone Numbers:  Anticoagulation Clinic Appointments : [phone]  Anticoagulation Nurse: [phone]         October 2018 Details    Sun Mon Tue Wed Thu Fri Sat      1               2               3               4               5               6                 7               8               9               10               11      5 mg   See details      12      5 mg         13      5 mg           14      5 mg         15      5 mg         16      5 mg         17      2.5 mg         18      5 mg         19      5 mg         20      5 mg           21      5 mg         22      5 mg         23      5 mg         24      2.5 mg         25      5 mg         26      5 mg         27      5 mg           28      5 mg         29      5 mg         30      5 mg         31      2.5 mg             Date Details   10/11 This INR check               How to take your warfarin dose     To take:  2.5 mg Take 0.5 of a 5 mg tablet.    To take:  5 mg Take 1 of the 5 mg tablets.           November 2018 Details    Sun Mon Tue Wed Thu Fri Sat         1      5 mg         2      5 mg         3      5 mg            4      5 mg         5      5 mg         6      5 mg         7      2.5 mg         8            9               10                 11               12               13               14               15               16               17                 18               19               20               21               22               23               24                 25               26               27               28               29               30                 Date Details   No additional details    Date of next INR:  11/8/2018         How to take your warfarin dose     To take:  2.5 mg Take 0.5 of a 5 mg tablet.    To take:  5 mg Take 1 of the 5 mg tablets.

## 2018-10-29 ENCOUNTER — OFFICE VISIT (OUTPATIENT)
Dept: INTERNAL MEDICINE | Facility: CLINIC | Age: 73
End: 2018-10-29
Payer: COMMERCIAL

## 2018-10-29 VITALS
WEIGHT: 227.7 LBS | RESPIRATION RATE: 18 BRPM | HEIGHT: 73 IN | SYSTOLIC BLOOD PRESSURE: 122 MMHG | BODY MASS INDEX: 30.18 KG/M2 | HEART RATE: 69 BPM | TEMPERATURE: 98.1 F | OXYGEN SATURATION: 98 % | DIASTOLIC BLOOD PRESSURE: 82 MMHG

## 2018-10-29 DIAGNOSIS — Z29.9 PREVENTIVE MEASURE: ICD-10-CM

## 2018-10-29 DIAGNOSIS — I10 ESSENTIAL HYPERTENSION WITH GOAL BLOOD PRESSURE LESS THAN 140/90: Primary | ICD-10-CM

## 2018-10-29 DIAGNOSIS — I48.20 CHRONIC ATRIAL FIBRILLATION (H): ICD-10-CM

## 2018-10-29 DIAGNOSIS — I10 BENIGN ESSENTIAL HYPERTENSION: ICD-10-CM

## 2018-10-29 DIAGNOSIS — M10.9 GOUT OF FOOT, UNSPECIFIED CAUSE, UNSPECIFIED CHRONICITY, UNSPECIFIED LATERALITY: ICD-10-CM

## 2018-10-29 LAB
ERYTHROCYTE [DISTWIDTH] IN BLOOD BY AUTOMATED COUNT: 14.6 % (ref 10–15)
HCT VFR BLD AUTO: 44.1 % (ref 40–53)
HGB BLD-MCNC: 14.9 G/DL (ref 13.3–17.7)
MCH RBC QN AUTO: 28.8 PG (ref 26.5–33)
MCHC RBC AUTO-ENTMCNC: 33.8 G/DL (ref 31.5–36.5)
MCV RBC AUTO: 85 FL (ref 78–100)
PLATELET # BLD AUTO: 162 10E9/L (ref 150–450)
RBC # BLD AUTO: 5.17 10E12/L (ref 4.4–5.9)
WBC # BLD AUTO: 6.8 10E9/L (ref 4–11)

## 2018-10-29 PROCEDURE — 99214 OFFICE O/P EST MOD 30 MIN: CPT | Performed by: INTERNAL MEDICINE

## 2018-10-29 PROCEDURE — G0472 HEP C SCREEN HIGH RISK/OTHER: HCPCS | Performed by: INTERNAL MEDICINE

## 2018-10-29 PROCEDURE — 85027 COMPLETE CBC AUTOMATED: CPT | Performed by: INTERNAL MEDICINE

## 2018-10-29 PROCEDURE — 80053 COMPREHEN METABOLIC PANEL: CPT | Performed by: INTERNAL MEDICINE

## 2018-10-29 PROCEDURE — 36415 COLL VENOUS BLD VENIPUNCTURE: CPT | Performed by: INTERNAL MEDICINE

## 2018-10-29 RX ORDER — AMLODIPINE BESYLATE 10 MG/1
TABLET ORAL
Qty: 90 TABLET | Refills: 3 | Status: SHIPPED | OUTPATIENT
Start: 2018-10-29 | End: 2019-10-31

## 2018-10-29 RX ORDER — METOPROLOL SUCCINATE 100 MG/1
TABLET, EXTENDED RELEASE ORAL
Qty: 90 TABLET | Refills: 3 | Status: SHIPPED | OUTPATIENT
Start: 2018-10-29 | End: 2018-10-31

## 2018-10-29 RX ORDER — ALLOPURINOL 100 MG/1
100 TABLET ORAL DAILY
Qty: 90 TABLET | Refills: 4 | Status: SHIPPED | OUTPATIENT
Start: 2018-10-29 | End: 2019-10-31

## 2018-10-29 RX ORDER — LISINOPRIL 10 MG/1
TABLET ORAL
Qty: 180 TABLET | Refills: 3 | Status: SHIPPED | OUTPATIENT
Start: 2018-10-29 | End: 2019-10-31

## 2018-10-29 NOTE — MR AVS SNAPSHOT
After Visit Summary   10/29/2018    Billy Stock    MRN: [id number]           Patient Information     Date Of Birth          1945        Visit Information        Provider Department      10/29/2018 4:00 PM Malcolm Schafer MD [facility]        Today's Diagnoses     Essential hypertension with goal blood pressure less than 140/90    -  1    Gout of foot, unspecified cause, unspecified chronicity, unspecified laterality        Benign essential hypertension        Chronic atrial fibrillation (H)        Preventive measure           Follow-ups after your visit        Follow-up notes from your care team     Return in about 6 months (around 4/29/2019) for Physical Exam.      Your next 10 appointments already scheduled     Nov 08, 2018 10:00 AM CST   [facility] with [facility]   [facility]    [address]  [facility] [address]   [phone]            Nov 27, 2018 11:00 AM CST   UMP EP RETURN with Radha Kern PA-C   [facility] ([facility])    [address]  [facility] [address]   [phone] OPT 2            Mar 05, 2019 12:00 PM CST   US RENAL COMPLETE with SHUS5   [facility] ([facility])    [address] [address] [phone]           How do I prepare for my exam? (Food and drink instructions) No Food and Drink Restrictions.  How do I prepare for my exam? (Other instructions) You do not need to do anything special to prepare for your exam.  What should I wear: Wear comfortable clothes.  How long does the exam take: Most ultrasounds take 30 to 60 minutes.  What should I bring: Bring a list of your medicines, including vitamins, minerals and over-the-counter drugs. It is safest to leave personal items at home.  Do I need a :  No  is needed.  " What do I need to tell my doctor: Tell your doctor about any allergies you may have.  What should I do after the exam: No restrictions, You may resume normal activities.  What is this test: An ultrasound uses sound waves to make pictures of the body. Sound waves do not cause pain. The only discomfort may be the pressure of the wand against your skin or full bladder.  Who should I call with questions: If you have any questions, please call the Imaging Department where you will have your exam. Directions, parking instructions, and other information is available on our website, [email or web address].            Mar 05, 2019  1:00 PM CST   Return Visit with Hoang Huerta MD   [facility] ([facility])    [address]  [address]  [facility] [address] [phone]              Who to contact     If you have questions or need follow up information about today's clinic visit or your schedule please contact [facility] directly at [phone].  Normal or non-critical lab and imaging results will be communicated to you by MyChart, letter or phone within 4 business days after the clinic has received the results. If you do not hear from us within 7 days, please contact the clinic through MyChart or phone. If you have a critical or abnormal lab result, we will notify you by phone as soon as possible.  Submit refill requests through [employer] or call your pharmacy and they will forward the refill request to us. Please allow 3 business days for your refill to be completed.          Additional Information About Your Visit        [employer] Information     [employer] lets you send messages to your doctor, view your test results, renew your prescriptions, schedule appointments and more. To sign up, go to [email or web address] . Click on \"Log in\" on the left side of the screen, which will take you to the Welcome page. Then click on \"Sign up Now\" on the " "right side of the page.     You will be asked to enter the access code listed below, as well as some personal information. Please follow the directions to create your username and password.     Your access code is: [email or web address]  Expires: 2019 10:02 AM     Your access code will  in 90 days. If you need help or a new code, please call your [facility] clinic or [phone].        Care EveryWhere ID     This is your Care EveryWhere ID. This could be used by other organizations to access your [facility] medical records  [id number]        Your Vitals Were     Pulse Temperature Respirations Height Pulse Oximetry BMI (Body Mass Index)    69 98.1  F (36.7  C) (Oral) 18 6' 1\" (1.854 m) 98% 30.04 kg/m2       Blood Pressure from Last 3 Encounters:   10/29/18 122/82   18 130/80   18 126/78    Weight from Last 3 Encounters:   10/29/18 227 lb 11.2 oz (103.3 kg)   18 227 lb (103 kg)   18 227 lb (103 kg)              We Performed the Following     CBC with platelets     Comprehensive metabolic panel     Hepatitis C antibody          Where to get your medicines      These medications were sent to [facility] [address] [place], [place] [address] [address] [address]     Phone:  [phone]     allopurinol 100 MG tablet    amLODIPine 10 MG tablet    lisinopril 10 MG tablet    metoprolol succinate 100 MG 24 hr tablet          Primary Care Provider Office Phone # Fax #    Malcolm Schafer [phone]       [address] [place] [address]        Equal Access to Services     [facility]: Hadii stan lopez Sostephon, waaxda luqadaha, qaybta kaalmada sofiya, jake delacruz . So [facility] [phone].    ATENCIÓN: Si habla español, tiene a braga disposición servicios gratuitos de asistencia lingüística. Llame al [phone].    We comply with applicable federal civil rights laws and [place] laws. We do not discriminate on the " basis of race, color, national origin, age, disability, sex, sexual orientation, or gender identity.            Thank you!     Thank you for choosing [facility]  for your care. Our goal is always to provide you with excellent care. Hearing back from our patients is one way we can continue to improve our services. Please take a few minutes to complete the written survey that you may receive in the mail after your visit with us. Thank you!             Your Updated Medication List - Protect others around you: Learn how to safely use, store and throw away your medicines at www.disposemymeds.org.          This list is accurate as of 10/29/18  4:23 PM.  Always use your most recent med list.                   Brand Name Dispense Instructions for use Diagnosis    allopurinol 100 MG tablet    ZYLOPRIM    90 tablet    Take 1 tablet (100 mg) by mouth daily    Gout of foot, unspecified cause, unspecified chronicity, unspecified laterality       amLODIPine 10 MG tablet    NORVASC    90 tablet    TAKE ONE TABLET BY MOUTH ONCE DAILY DUE  FOR  APPOINTMENT    Essential hypertension with goal blood pressure less than 140/90       fluticasone 50 MCG/ACT spray    FLONASE    1 Bottle    Spray 1-2 sprays into both nostrils daily    Chronic seasonal allergic rhinitis, unspecified trigger       lisinopril 10 MG tablet    PRINIVIL/ZESTRIL    180 tablet    TAKE TWO TABLETS BY MOUTH  DAILY    Benign essential hypertension       metoprolol succinate 100 MG 24 hr tablet    TOPROL-XL    90 tablet    TAKE 1 TABLET EVERY DAY    Chronic atrial fibrillation (H)       triamcinolone 0.5 % cream    KENALOG    30 g    Apply sparingly to affected area three times daily.        TYLENOL 325 MG tablet   Generic drug:  acetaminophen      Take 1-2 tablets by mouth every 6 hours as needed.        UNABLE TO FIND      MEDICATION NAME: Tabavit, multivitmain without vitamin K in it.        warfarin 5 MG tablet    COUMADIN    14 tablet    Take 1  tablet (5 mg) by mouth daily Or as instructed by INR clinic    Chronic atrial fibrillation (H)       XYZAL PO      Take 5 mg by mouth daily

## 2018-10-29 NOTE — PROGRESS NOTES
SUBJECTIVE:   Billy Stock is a 73 year old male who presents to clinic today for the following health issues:    Patient is seen for a follow up visit.  No acute complaints, no medication change or new medical conditions.    Hyperlipidemia Follow-Up      Rate your low fat/cholesterol diet?: fair    Taking statin?  No    Other lipid medications/supplements?:  none    Hypertension Follow-up  Has h/o HTN. on medical treatment. BP has been controlled. No side effects from medications. No CP, HA, dizziness. good compliance with medications and low salt diet.      Outpatient blood pressures are not being checked.    Low Salt Diet: no added salt      Amount of exercise or physical activity: walk the dog about 3 times a day    Problems taking medications regularly: No    Medication side effects: none    Diet: regular (no restrictions)        PROBLEMS TO ADD ON...  Has history of atrial fibrillation. On anticoagulation with Coumadin and rate control medications. Asymptonatic - no chest pains , palpitations,  no side effects from medications.      Problem list and histories reviewed & adjusted, as indicated.  Additional history: as documented    Patient Active Problem List   Diagnosis     Allergic rhinitis due to pollen     Carcinoma in situ of prostate     Bell's palsy     Glaucoma     Hypertension goal BP (blood pressure) < 140/90     CARDIOVASCULAR SCREENING; LDL GOAL LESS THAN 100     Colon polyps     Gout     HCD (health care directive)     SARAH (obstructive sleep apnea)     Hyperlipidemia LDL goal <100     Gout of foot, unspecified cause, unspecified chronicity, unspecified laterality     Chronic atrial fibrillation (H)     Long-term (current) use of anticoagulants [Z79.01]     Essential hypertension, benign     Psoas hematoma, right, secondary to anticoagulant therapy     Kidney cyst, acquired     Prostate cancer (H)     Left shoulder pain     Past Surgical History:   Procedure Laterality Date     CATARACT IOL,  RT/LT  10/15, 11/15     COLONOSCOPY      [facility]     COLONOSCOPY  2014    Dr. Long [facility]     COLONOSCOPY N/A 2014    Procedure: COMBINED COLONOSCOPY, SINGLE BIOPSY/POLYPECTOMY BY BIOPSY;  Surgeon: Puneet Long MD;  Location:  GI     EYE SURGERY      glaucoma surgery right eye     PROSTATE SURGERY       SUPRAPUBIC PROSTATECTOMY  2002     VASECTOMY         Social History   Substance Use Topics     Smoking status: Never Smoker     Smokeless tobacco: Never Used     Alcohol use No     Family History   Problem Relation Age of Onset     Hypertension Maternal Grandfather      Cerebrovascular Disease Maternal Grandfather      Hypertension Maternal Grandmother      Cerebrovascular Disease Maternal Grandmother      Hypertension Mother       age 51, MVA     Hypertension Brother      Heart Failure Brother      Bronchitis Brother      Other - See Comments Brother      multiple myeloma     Heart Surgery Brother      defibrilater     Prostate Problems Brother      Diabetes Brother      Other Cancer Brother      Multiple Myeloma     Breast Cancer Maternal Aunt      hx     Cancer Maternal Aunt      cervical cancer     Cancer - colorectal Maternal Aunt      Unknown/Adopted Father      Don't know father's history     Diabetes Son          Current Outpatient Prescriptions   Medication Sig Dispense Refill     acetaminophen (TYLENOL) 325 MG tablet Take 1-2 tablets by mouth every 6 hours as needed.       allopurinol (ZYLOPRIM) 100 MG tablet Take 1 tablet (100 mg) by mouth daily 90 tablet 4     amLODIPine (NORVASC) 10 MG tablet TAKE ONE TABLET BY MOUTH ONCE DAILY DUE  FOR  APPOINTMENT 90 tablet 3     fluticasone (FLONASE) 50 MCG/ACT spray Spray 1-2 sprays into both nostrils daily 1 Bottle 11     Levocetirizine Dihydrochloride (XYZAL PO) Take 5 mg by mouth daily        lisinopril (PRINIVIL/ZESTRIL) 10 MG tablet TAKE TWO TABLETS BY MOUTH  DAILY 180 tablet 3     metoprolol succinate (TOPROL-XL) 100 MG 24 hr tablet  "TAKE 1 TABLET EVERY DAY 90 tablet 3     UNABLE TO FIND MEDICATION NAME: Tabavit, multivitmain without vitamin K in it.       warfarin (COUMADIN) 5 MG tablet Take 1 tablet (5 mg) by mouth daily Or as instructed by INR clinic 14 tablet 0     triamcinolone (KENALOG) 0.5 % cream Apply sparingly to affected area three times daily. (Patient not taking: Reported on 10/29/2018) 30 g 0     [DISCONTINUED] allopurinol (ZYLOPRIM) 100 MG tablet Take 1 tablet (100 mg) by mouth daily 90 tablet 4     [DISCONTINUED] amLODIPine (NORVASC) 10 MG tablet TAKE ONE TABLET BY MOUTH ONCE DAILY DUE  FOR  APPOINTMENT 90 tablet 1     [DISCONTINUED] lisinopril (PRINIVIL/ZESTRIL) 10 MG tablet TAKE TWO TABLETS BY MOUTH  DAILY 180 tablet 3     [DISCONTINUED] metoprolol succinate (TOPROL-XL) 100 MG 24 hr tablet TAKE 1 TABLET EVERY DAY 90 tablet 1       Reviewed and updated as needed this visit by clinical staff  Tobacco  Allergies  Meds  Med Hx  Surg Hx  Fam Hx  Soc Hx      Reviewed and updated as needed this visit by Provider         ROS:  Constitutional, HEENT, cardiovascular, pulmonary, GI, , musculoskeletal, neuro, skin, endocrine and psych systems are negative, except as otherwise noted.    OBJECTIVE:     /82 (BP Location: Left arm, Patient Position: Chair, Cuff Size: Adult Large)  Pulse 69  Temp 98.1  F (36.7  C) (Oral)  Resp 18  Ht 6' 1\" (1.854 m)  Wt 227 lb 11.2 oz (103.3 kg)  SpO2 98%  BMI 30.04 kg/m2  Body mass index is 30.04 kg/(m^2).   GENERAL: healthy, alert and no distress  NECK: no adenopathy, no asymmetry, masses, or scars and thyroid normal to palpation  RESP: lungs clear to auscultation - no rales, rhonchi or wheezes  CV: irregular rate and rhythm, normal S1 S2, no S3 or S4, no murmur, click or rub, no peripheral edema and peripheral pulses strong  ABDOMEN: soft, nontender, no hepatosplenomegaly, no masses and bowel sounds normal  MS: no gross musculoskeletal defects noted, no edema    Diagnostic Test " Results:  none     ASSESSMENT/PLAN:     Problem List Items Addressed This Visit     Gout of foot, unspecified cause, unspecified chronicity, unspecified laterality    Relevant Medications    allopurinol (ZYLOPRIM) 100 MG tablet    Chronic atrial fibrillation (H)    Relevant Medications    metoprolol succinate (TOPROL-XL) 100 MG 24 hr tablet      Other Visit Diagnoses     Essential hypertension with goal blood pressure less than 140/90    -  Primary    Relevant Medications    amLODIPine (NORVASC) 10 MG tablet    lisinopril (PRINIVIL/ZESTRIL) 10 MG tablet    Other Relevant Orders    CBC with platelets    Comprehensive metabolic panel    Benign essential hypertension        Relevant Medications    lisinopril (PRINIVIL/ZESTRIL) 10 MG tablet    Preventive measure        Relevant Orders    Hepatitis C antibody           Cont treatment   Assess lab work      Follow-Up:in 6 months     Malcolm Schafer MD  [facility]

## 2018-10-29 NOTE — LETTER
[facility]  [address], [address]  [place] [address]  [phone]        October 31, 2018    Billy Stock  [address] [place] [address]            Dear Mr. Billy Stock:      The results of your recent Tests showed Slightly decreased kidney function, recheck in 6 months. Normal liver tests and blood counts. If you have any further questions or problems, please contact our office.    Sincerely,    Malcolm Schafer M.D.    Results for orders placed or performed in visit on 10/29/18   CBC with platelets   Result Value Ref Range    WBC 6.8 4.0 - 11.0 10e9/L    RBC Count 5.17 4.4 - 5.9 10e12/L    Hemoglobin 14.9 13.3 - 17.7 g/dL    Hematocrit 44.1 40.0 - 53.0 %    MCV 85 78 - 100 fl    MCH 28.8 26.5 - 33.0 pg    MCHC 33.8 31.5 - 36.5 g/dL    RDW 14.6 10.0 - 15.0 %    Platelet Count 162 150 - 450 10e9/L   Comprehensive metabolic panel   Result Value Ref Range    Sodium 142 133 - 144 mmol/L    Potassium 4.0 3.4 - 5.3 mmol/L    Chloride 108 94 - 109 mmol/L    Carbon Dioxide 28 20 - 32 mmol/L    Anion Gap 6 3 - 14 mmol/L    Glucose 88 70 - 99 mg/dL    Urea Nitrogen 17 7 - 30 mg/dL    Creatinine 1.48 (H) 0.66 - 1.25 mg/dL    GFR Estimate 47 (L) >60 mL/min/1.7m2    GFR Estimate If Black 57 (L) >60 mL/min/1.7m2    Calcium 9.4 8.5 - 10.1 mg/dL    Bilirubin Total 0.5 0.2 - 1.3 mg/dL    Albumin 4.0 3.4 - 5.0 g/dL    Protein Total 7.9 6.8 - 8.8 g/dL    Alkaline Phosphatase 95 40 - 150 U/L    ALT 25 0 - 70 U/L    AST 18 0 - 45 U/L   Hepatitis C antibody   Result Value Ref Range    Hepatitis C Antibody Nonreactive NR^Nonreactive

## 2018-10-30 LAB
ALBUMIN SERPL-MCNC: 4 G/DL (ref 3.4–5)
ALP SERPL-CCNC: 95 U/L (ref 40–150)
ALT SERPL W P-5'-P-CCNC: 25 U/L (ref 0–70)
ANION GAP SERPL CALCULATED.3IONS-SCNC: 6 MMOL/L (ref 3–14)
AST SERPL W P-5'-P-CCNC: 18 U/L (ref 0–45)
BILIRUB SERPL-MCNC: 0.5 MG/DL (ref 0.2–1.3)
BUN SERPL-MCNC: 17 MG/DL (ref 7–30)
CALCIUM SERPL-MCNC: 9.4 MG/DL (ref 8.5–10.1)
CHLORIDE SERPL-SCNC: 108 MMOL/L (ref 94–109)
CO2 SERPL-SCNC: 28 MMOL/L (ref 20–32)
CREAT SERPL-MCNC: 1.48 MG/DL (ref 0.66–1.25)
GFR SERPL CREATININE-BSD FRML MDRD: 47 ML/MIN/1.7M2
GLUCOSE SERPL-MCNC: 88 MG/DL (ref 70–99)
POTASSIUM SERPL-SCNC: 4 MMOL/L (ref 3.4–5.3)
PROT SERPL-MCNC: 7.9 G/DL (ref 6.8–8.8)
SODIUM SERPL-SCNC: 142 MMOL/L (ref 133–144)

## 2018-10-31 DIAGNOSIS — I48.20 CHRONIC ATRIAL FIBRILLATION (H): ICD-10-CM

## 2018-10-31 LAB — HCV AB SERPL QL IA: NONREACTIVE

## 2018-10-31 RX ORDER — METOPROLOL SUCCINATE 100 MG/1
TABLET, EXTENDED RELEASE ORAL
Qty: 90 TABLET | Refills: 3 | Status: SHIPPED | OUTPATIENT
Start: 2018-10-31 | End: 2019-11-12

## 2018-10-31 RX ORDER — WARFARIN SODIUM 5 MG/1
TABLET ORAL
Qty: 90 TABLET | Refills: 0 | Status: SHIPPED | OUTPATIENT
Start: 2018-10-31 | End: 2019-05-14

## 2018-10-31 NOTE — TELEPHONE ENCOUNTER
"Requested Prescriptions   Pending Prescriptions Disp Refills     metoprolol succinate (TOPROL-XL) 100 MG 24 hr tablet 90 tablet 3     Sig: TAKE 1 TABLET EVERY DAY    Beta-Blockers Protocol Passed    10/31/2018  2:44 PM       Passed - Blood pressure under 140/90 in past 12 months    BP Readings from Last 3 Encounters:   10/29/18 122/82   09/05/18 130/80   08/21/18 126/78                Passed - Patient is age 6 or older       Passed - Recent (12 mo) or future (30 days) visit within the authorizing provider's specialty    Patient had office visit in the last 12 months or has a visit in the next 30 days with authorizing provider or within the authorizing provider's specialty.  See \"Patient Info\" tab in inbasket, or \"Choose Columns\" in Meds & Orders section of the refill encounter.              warfarin (COUMADIN) 5 MG tablet 14 tablet 0     Sig: Take 1 tablet (5 mg) by mouth daily Or as instructed by INR clinic    There is no refill protocol information for this order        Last office visit 10/29/18.  Prescription approved per [facility] Refill Protocol.  Marge Gale RN     "

## 2018-10-31 NOTE — TELEPHONE ENCOUNTER
Patient states that his Metoprolol and Coumadin RX's need to be sent to [facility] rather than to [facility].    Refill was sent on 10/29/18n to [facility] for Metoprolol, but Coumadin has not been refilled since 08/16/18.

## 2018-11-08 ENCOUNTER — ANTICOAGULATION THERAPY VISIT (OUTPATIENT)
Dept: ANTICOAGULATION | Facility: CLINIC | Age: 73
End: 2018-11-08
Payer: COMMERCIAL

## 2018-11-08 DIAGNOSIS — Z79.01 LONG TERM CURRENT USE OF ANTICOAGULANTS WITH INR GOAL OF 2.0-3.0: Primary | ICD-10-CM

## 2018-11-08 DIAGNOSIS — I48.20 CHRONIC ATRIAL FIBRILLATION (H): ICD-10-CM

## 2018-11-08 LAB — INR POINT OF CARE: 2.8 (ref 0.86–1.14)

## 2018-11-08 PROCEDURE — 99207 ZZC NO CHARGE NURSE ONLY: CPT

## 2018-11-08 PROCEDURE — 85610 PROTHROMBIN TIME: CPT | Mod: QW

## 2018-11-08 PROCEDURE — 36416 COLLJ CAPILLARY BLOOD SPEC: CPT

## 2018-11-08 NOTE — PROGRESS NOTES
ANTICOAGULATION FOLLOW-UP CLINIC VISIT    Patient Name:  Billy Stock  Date:  11/8/2018  Contact Type:  Face to Face    SUBJECTIVE:     Patient Findings     Positives No Problem Findings    Comments Pt denies any changes or missed warfarin doses since last INR visit. Pt denies any bleeding or bruising problems.              OBJECTIVE    INR Protime   Date Value Ref Range Status   11/08/2018 2.8 (A) 0.86 - 1.14 Final       ASSESSMENT / PLAN  INR assessment THER    Recheck INR In: 4 WEEKS    INR Location Clinic      Anticoagulation Summary as of 11/8/2018     INR goal 2.0-3.0   Today's INR 2.8   Warfarin maintenance plan 2.5 mg (5 mg x 0.5) on Wed; 5 mg (5 mg x 1) all other days   Full warfarin instructions 2.5 mg on Wed; 5 mg all other days   Weekly warfarin total 32.5 mg   No change documented Samantha Galeas RN   Plan last modified Marge Gale RN (7/26/2018)   Next INR check 12/6/2018   Priority INR   Target end date     Indications   Long term current use of anticoagulants with INR goal of 2.0-3.0 [Z79.01]  Chronic atrial fibrillation (H) [I48.2]         Anticoagulation Episode Summary     INR check location     Preferred lab     Send INR reminders to [facility]    Comments Pt reports taking Warfarin in the morning      Anticoagulation Care Providers     Provider Role Specialty Phone number    Malcolm Schafer MD Responsible Internal Medicine [phone]            See the Encounter Report to view Anticoagulation Flowsheet and Dosing Calendar (Go to Encounters tab in chart review, and find the Anticoagulation Therapy Visit)    Dosage adjustment made based on physician directed care plan.    Samantha Galeas RN

## 2018-11-20 ENCOUNTER — OFFICE VISIT (OUTPATIENT)
Dept: URGENT CARE | Facility: URGENT CARE | Age: 73
End: 2018-11-20
Payer: COMMERCIAL

## 2018-11-20 VITALS
BODY MASS INDEX: 30.08 KG/M2 | HEART RATE: 61 BPM | OXYGEN SATURATION: 98 % | TEMPERATURE: 98 F | DIASTOLIC BLOOD PRESSURE: 94 MMHG | WEIGHT: 228 LBS | SYSTOLIC BLOOD PRESSURE: 147 MMHG

## 2018-11-20 DIAGNOSIS — H81.12 BENIGN PAROXYSMAL POSITIONAL VERTIGO OF LEFT EAR: Primary | ICD-10-CM

## 2018-11-20 LAB
ANION GAP SERPL CALCULATED.3IONS-SCNC: 4 MMOL/L (ref 3–14)
BUN SERPL-MCNC: 18 MG/DL (ref 7–30)
CALCIUM SERPL-MCNC: 9.5 MG/DL (ref 8.5–10.1)
CHLORIDE SERPL-SCNC: 109 MMOL/L (ref 94–109)
CO2 SERPL-SCNC: 28 MMOL/L (ref 20–32)
CREAT SERPL-MCNC: 1.2 MG/DL (ref 0.66–1.25)
ERYTHROCYTE [DISTWIDTH] IN BLOOD BY AUTOMATED COUNT: 14.1 % (ref 10–15)
GFR SERPL CREATININE-BSD FRML MDRD: 60 ML/MIN/1.7M2
GLUCOSE SERPL-MCNC: 96 MG/DL (ref 70–99)
HCT VFR BLD AUTO: 43.9 % (ref 40–53)
HGB BLD-MCNC: 15.1 G/DL (ref 13.3–17.7)
MCH RBC QN AUTO: 28.5 PG (ref 26.5–33)
MCHC RBC AUTO-ENTMCNC: 34.4 G/DL (ref 31.5–36.5)
MCV RBC AUTO: 83 FL (ref 78–100)
PLATELET # BLD AUTO: 149 10E9/L (ref 150–450)
POTASSIUM SERPL-SCNC: 3.9 MMOL/L (ref 3.4–5.3)
RBC # BLD AUTO: 5.3 10E12/L (ref 4.4–5.9)
SODIUM SERPL-SCNC: 141 MMOL/L (ref 133–144)
WBC # BLD AUTO: 6.9 10E9/L (ref 4–11)

## 2018-11-20 PROCEDURE — 80048 BASIC METABOLIC PNL TOTAL CA: CPT | Performed by: PHYSICIAN ASSISTANT

## 2018-11-20 PROCEDURE — 85027 COMPLETE CBC AUTOMATED: CPT | Performed by: PHYSICIAN ASSISTANT

## 2018-11-20 PROCEDURE — 99214 OFFICE O/P EST MOD 30 MIN: CPT | Performed by: PHYSICIAN ASSISTANT

## 2018-11-20 PROCEDURE — 36415 COLL VENOUS BLD VENIPUNCTURE: CPT | Performed by: PHYSICIAN ASSISTANT

## 2018-11-20 RX ORDER — MECLIZINE HYDROCHLORIDE 25 MG/1
25 TABLET ORAL EVERY 6 HOURS PRN
Qty: 30 TABLET | Refills: 1 | Status: SHIPPED | OUTPATIENT
Start: 2018-11-20 | End: 2019-11-08

## 2018-11-20 RX ORDER — INFLUENZA A VIRUS A/VICTORIA/4897/2022 IVR-238 (H1N1) ANTIGEN (FORMALDEHYDE INACTIVATED), INFLUENZA A VIRUS A/CALIFORNIA/122/2022 SAN-022 (H3N2) ANTIGEN (FORMALDEHYDE INACTIVATED), AND INFLUENZA B VIRUS B/MICHIGAN/01/2021 ANTIGEN (FORMALDEHYDE INACTIVATED) 60; 60; 60 UG/.5ML; UG/.5ML; UG/.5ML
INJECTION, SUSPENSION INTRAMUSCULAR
Refills: 0 | COMMUNITY
Start: 2018-10-15 | End: 2019-12-09

## 2018-11-20 NOTE — MR AVS SNAPSHOT
After Visit Summary   11/20/2018    Billy Stock    MRN: [id number]           Patient Information     Date Of Birth          1945        Visit Information        Provider Department      11/20/2018 7:30 PM Ritesh Morin PA-C [facility]        Today's Diagnoses     Benign paroxysmal positional vertigo of left ear    -  1      Care Instructions    These symptoms do not appear to represent a serious or threatening condition. This is generally a self-limited temporary but uncomfortable situation. Rest, avoid potentially dangerous activities (such as driving or working with machinery or at heights), use OTC Meclizine prn.    Please call if you develop other symptoms, such as alterations of speech, swallowing, vision, motor or sensory systems, or if dizziness persists or worsens.      Benign Paroxysmal Positional Vertigo    Benign paroxysmal positional vertigo is a common condition. You feel as if the room is spinning after changing position, moving your head quickly, or even just rolling over in bed.  Vertigo is a false feeling of motion plus disorientation that makes it seem as though the room is spinning. A vertigo attack may cause sudden nausea, vomiting, and heavy sweating. Severe vertigo causes a loss of balance. You may even fall down.  Vertigo is caused by a problem with the inner ear. The inner ear is located behind the middle ear. It is a part of the balance center of the body. It contains small calcium particles within fluid-filled canals (semi-circular canals). These particles can move out of position. This may happen as a result of aging, head injury, or disease of the inner ear. Once that happens, moving your head in certain ways may cause the particles to stimulate the inner ear. This creates the feeling of vertigo.  An episode of vertigo may last seconds, minutes, or hours. Once you are over the first episode of vertigo, it may never return. Sometimes  symptoms return off and on for several weeks or longer.  Home care  Follow these guidelines when caring for yourself at home:    Rest quietly in bed if your symptoms are severe. Change position slowly. There is usually 1 position that will feel best. This might be lying on 1 side or lying on your back with your head slightly raised on pillows. Until you have no symptoms, you are at a higher risk of falling. Let someone help you when you get up. Get rid of home hazards such as loose electrical cords and throw rugs. Don t walk in unfamiliar areas that are not lighted. Use night lights in bathrooms and kitchen areas.    Do not drive or work with dangerous machinery for 1 week after symptoms go away. This is in case symptoms return suddenly.    Take medicine as prescribed to relieve your symptoms. Unless another medicine was prescribed for nausea, vomiting, and vertigo, you may use over-the-counter motion sickness medicine. Examples of this include meclizine and dimenhydrinate.  Follow-up care  Follow up with your healthcare provider, or as directed. Tell your provider about any ringing in your ear or hearing loss.  If you had a CT or MRI scan, a specialist will review it. You will be told of any new findings that may affect your care.  When to seek medical advice  Call your healthcare provider right away if any of these occur:    Vertigo gets worse even after taking prescribed medicine    Repeated vomiting even after taking prescribed medicine    Weakness that gets worse    Fainting    Severe headache or unusual drowsiness or confusion    Weakness of an arm or leg or 1 side of the face    Trouble walking    Trouble with speech or vision    Seizure    Trouble hearing    Fever of 100.4 F (38 C) or higher, or as directed by your healthcare provider    Fast heart rate    Chest pain   Date Last Reviewed: 11/1/2017 2000-2018 [email or web address]. [address] [facility], [place] [address]. All rights reserved. This  information is not intended as a substitute for professional medical care. Always follow your healthcare professional's instructions.                Follow-ups after your visit        Your next 10 appointments already scheduled     Nov 27, 2018 11:00 AM CST   UMP EP RETURN with Radha Kern PA-C   [facility] ([facility])    [address]  [facility] [address]   [phone] OPT 2            Dec 06, 2018 10:00 AM CST   Anticoagulation Visit with [facility] ANTICOAGULATION CLINIC   [facility]    [address]  [facility] [address] [phone]            Mar 05, 2019 12:00 PM CST   US RENAL COMPLETE with SHUS5   [facility] ([facility] [place])    [address] [address]   [phone]           How do I prepare for my exam? (Food and drink instructions) No Food and Drink Restrictions.  How do I prepare for my exam? (Other instructions) You do not need to do anything special to prepare for your exam.  What should I wear: Wear comfortable clothes.  How long does the exam take: Most ultrasounds take 30 to 60 minutes.  What should I bring: Bring a list of your medicines, including vitamins, minerals and over-the-counter drugs. It is safest to leave personal items at home.  Do I need a :  No  is needed.  What do I need to tell my doctor: Tell your doctor about any allergies you may have.  What should I do after the exam: No restrictions, You may resume normal activities.  What is this test: An ultrasound uses sound waves to make pictures of the body. Sound waves do not cause pain. The only discomfort may be the pressure of the wand against your skin or full bladder.  Who should I call with questions: If you have any questions, please call the Imaging Department where you will have your exam. Directions, parking instructions, and  "other information is available on our website, [email or web address].            Mar 05, 2019  1:00 PM CST   Return Visit with Hoang Huerta MD   [facility] ([facility])    [address]  [address]  [facility] [address] [phone]              Who to contact     If you have questions or need follow up information about today's clinic visit or your schedule please contact [facility] directly at [phone].  Normal or non-critical lab and imaging results will be communicated to you by [employer], letter or phone within 4 business days after the clinic has received the results. If you do not hear from us within 7 days, please contact the clinic through [employer] or phone. If you have a critical or abnormal lab result, we will notify you by phone as soon as possible.  Submit refill requests through [facility] or call your pharmacy and they will forward the refill request to us. Please allow 3 business days for your refill to be completed.          Additional Information About Your Visit        [facility] Information     [facility] lets you send messages to your doctor, view your test results, renew your prescriptions, schedule appointments and more. To sign up, go to [email or web address] . Click on \"Log in\" on the left side of the screen, which will take you to the Welcome page. Then click on \"Sign up Now\" on the right side of the page.     You will be asked to enter the access code listed below, as well as some personal information. Please follow the directions to create your username and password.     Your access code is: [email or web address]  Expires: 2019  9:02 AM     Your access code will  in 90 days. If you need help or a new code, please call your [facility] clinic or [phone].        Care EveryWhere ID     This is your Care EveryWhere ID. This could be used by other organizations to access your [facility] medical " records  [id number]        Your Vitals Were     Pulse Temperature Pulse Oximetry BMI (Body Mass Index)          61 98  F (36.7  C) (Tympanic) 98% 30.08 kg/m2         Blood Pressure from Last 3 Encounters:   11/20/18 (!) 147/94   10/29/18 122/82   09/05/18 130/80    Weight from Last 3 Encounters:   11/20/18 228 lb (103.4 kg)   10/29/18 227 lb 11.2 oz (103.3 kg)   09/05/18 227 lb (103 kg)              We Performed the Following     Basic metabolic panel     CBC with platelets          Today's Medication Changes          These changes are accurate as of 11/20/18  8:35 PM.  If you have any questions, ask your nurse or doctor.               Start taking these medicines.        Dose/Directions    meclizine 25 MG tablet   Commonly known as:  ANTIVERT   Used for:  Benign paroxysmal positional vertigo of left ear   Started by:  Ritesh Morin PA-C        Dose:  25 mg   Take 1 tablet (25 mg) by mouth every 6 hours as needed for dizziness   Quantity:  30 tablet   Refills:  1            Where to get your medicines      These medications were sent to [facility] [address] [place], [place] [address] [place] [address] [facility] [address]     Phone:  [phone]     meclizine 25 MG tablet                Primary Care Provider Office Phone # Fax #    Malcolm Schafer [phone]       [address] [place] [address]        Equal Access to Services     [place] campusAGNIESZKA AH: Hadii stan xiongo Sostephon, waaxda luqadaha, qaybta kaalmada adeegyada, waxpily jose miguel zurita. So [facility] [phone].    ATENCIÓN: Si habla español, tiene a braga disposición servicios gratuitos de asistencia lingüística. Llyash al [phone].    We comply with applicable federal civil rights laws and [place] laws. We do not discriminate on the basis of race, color, national origin, age, disability, sex, sexual orientation, or gender identity.            Thank you!     Thank you for choosing  [facility]  for your care. Our goal is always to provide you with excellent care. Hearing back from our patients is one way we can continue to improve our services. Please take a few minutes to complete the written survey that you may receive in the mail after your visit with us. Thank you!             Your Updated Medication List - Protect others around you: Learn how to safely use, store and throw away your medicines at www.disposemymeds.org.          This list is accurate as of 11/20/18  8:35 PM.  Always use your most recent med list.                   Brand Name Dispense Instructions for use Diagnosis    allopurinol 100 MG tablet    ZYLOPRIM    90 tablet    Take 1 tablet (100 mg) by mouth daily    Gout of foot, unspecified cause, unspecified chronicity, unspecified laterality       amLODIPine 10 MG tablet    NORVASC    90 tablet    TAKE ONE TABLET BY MOUTH ONCE DAILY DUE  FOR  APPOINTMENT    Essential hypertension with goal blood pressure less than 140/90       fluticasone 50 MCG/ACT spray    FLONASE    1 Bottle    Spray 1-2 sprays into both nostrils daily    Chronic seasonal allergic rhinitis, unspecified trigger       FLUZONE HIGH-DOSE 0.5 ML injection   Generic drug:  influenza Vac Split High-Dose      ADM 0.5ML IM UTD        lisinopril 10 MG tablet    PRINIVIL/ZESTRIL    180 tablet    TAKE TWO TABLETS BY MOUTH  DAILY    Benign essential hypertension       meclizine 25 MG tablet    ANTIVERT    30 tablet    Take 1 tablet (25 mg) by mouth every 6 hours as needed for dizziness    Benign paroxysmal positional vertigo of left ear       metoprolol succinate 100 MG 24 hr tablet    TOPROL-XL    90 tablet    TAKE 1 TABLET EVERY DAY    Chronic atrial fibrillation (H)       triamcinolone 0.5 % cream    KENALOG    30 g    Apply sparingly to affected area three times daily.        TYLENOL 325 MG tablet   Generic drug:  acetaminophen      Take 1-2 tablets by mouth every 6 hours as needed.        UNABLE TO  FIND      MEDICATION NAME: Tabavit, multivitmain without vitamin K in it.        warfarin 5 MG tablet    COUMADIN    90 tablet    Take 1 tablet (5 mg) daily except a half tablet (2.5 mg) on Wednesday or as instructed by INR clinic    Chronic atrial fibrillation (H)       XYZAL PO      Take 5 mg by mouth daily

## 2018-11-21 NOTE — PROGRESS NOTES
SUBJECTIVE:   Billy Stock is a 73 year old male who complains of new onset positional vertigo for 3 days. Has not had this in the past. The patient denies any other symptoms of neurological impairment or TIA's; no amaurosis, diplopia, dysphasia, or unilateral disturbance of motor or sensory function. No headaches. No hearing loss or tinnitus, nor head injury. No palpitations or syncope.  No chest pain/  Healthy in the past.    OBJECTIVE:  Appears well, in no apparent distress. Vitals normal. Ears normal. Neck supple. No adenopathy or masses in the neck or supraclavicular regions. Cranial nerves are normal.  PERRL. EOM's intact. DTR's normal and symmetric. Mental status normal. Gait and station normal. Romberg negative. Cerebellar function is normal. No internuclear ophthalmoplegia. Pulse regular. Reclined postioning with head turned to left elicits symptoms which resolve within a few seconds.     Results for orders placed or performed in visit on 11/20/18   Basic metabolic panel   Result Value Ref Range    Sodium 141 133 - 144 mmol/L    Potassium 3.9 3.4 - 5.3 mmol/L    Chloride 109 94 - 109 mmol/L    Carbon Dioxide 28 20 - 32 mmol/L    Anion Gap 4 3 - 14 mmol/L    Glucose 96 70 - 99 mg/dL    Urea Nitrogen 18 7 - 30 mg/dL    Creatinine 1.20 0.66 - 1.25 mg/dL    GFR Estimate 60 (L) >60 mL/min/1.7m2    GFR Estimate If Black 72 >60 mL/min/1.7m2    Calcium 9.5 8.5 - 10.1 mg/dL   CBC with platelets   Result Value Ref Range    WBC 6.9 4.0 - 11.0 10e9/L    RBC Count 5.30 4.4 - 5.9 10e12/L    Hemoglobin 15.1 13.3 - 17.7 g/dL    Hematocrit 43.9 40.0 - 53.0 %    MCV 83 78 - 100 fl    MCH 28.5 26.5 - 33.0 pg    MCHC 34.4 31.5 - 36.5 g/dL    RDW 14.1 10.0 - 15.0 %    Platelet Count 149 (L) 150 - 450 10e9/L         ASSESSMENT:  (H81.12) Benign paroxysmal positional vertigo of left ear  (primary encounter diagnosis)  Plan: Basic metabolic panel, CBC with platelets,         meclizine (ANTIVERT) 25 MG tablet    PLAN:  The  patient is reassured that these symptoms do not appear to represent a serious or threatening condition. This is generally a self-limited temporary but uncomfortable situation. Rest, avoid potentially dangerous activities (such as driving or working with machinery or at heights), use OTC Meclizine prn. Asked to call if develops other symptoms, such as alterations of speech, swallowing, vision, motor or sensory systems, or if dizziness persists or worsens.    Patient Instructions   These symptoms do not appear to represent a serious or threatening condition. This is generally a self-limited temporary but uncomfortable situation. Rest, avoid potentially dangerous activities (such as driving or working with machinery or at heights), use OTC Meclizine prn.    Please call if you develop other symptoms, such as alterations of speech, swallowing, vision, motor or sensory systems, or if dizziness persists or worsens.      Benign Paroxysmal Positional Vertigo    Benign paroxysmal positional vertigo is a common condition. You feel as if the room is spinning after changing position, moving your head quickly, or even just rolling over in bed.  Vertigo is a false feeling of motion plus disorientation that makes it seem as though the room is spinning. A vertigo attack may cause sudden nausea, vomiting, and heavy sweating. Severe vertigo causes a loss of balance. You may even fall down.  Vertigo is caused by a problem with the inner ear. The inner ear is located behind the middle ear. It is a part of the balance center of the body. It contains small calcium particles within fluid-filled canals (semi-circular canals). These particles can move out of position. This may happen as a result of aging, head injury, or disease of the inner ear. Once that happens, moving your head in certain ways may cause the particles to stimulate the inner ear. This creates the feeling of vertigo.  An episode of vertigo may last seconds, minutes, or  hours. Once you are over the first episode of vertigo, it may never return. Sometimes symptoms return off and on for several weeks or longer.  Home care  Follow these guidelines when caring for yourself at home:    Rest quietly in bed if your symptoms are severe. Change position slowly. There is usually 1 position that will feel best. This might be lying on 1 side or lying on your back with your head slightly raised on pillows. Until you have no symptoms, you are at a higher risk of falling. Let someone help you when you get up. Get rid of home hazards such as loose electrical cords and throw rugs. Don t walk in unfamiliar areas that are not lighted. Use night lights in bathrooms and kitchen areas.    Do not drive or work with dangerous machinery for 1 week after symptoms go away. This is in case symptoms return suddenly.    Take medicine as prescribed to relieve your symptoms. Unless another medicine was prescribed for nausea, vomiting, and vertigo, you may use over-the-counter motion sickness medicine. Examples of this include meclizine and dimenhydrinate.  Follow-up care  Follow up with your healthcare provider, or as directed. Tell your provider about any ringing in your ear or hearing loss.  If you had a CT or MRI scan, a specialist will review it. You will be told of any new findings that may affect your care.  When to seek medical advice  Call your healthcare provider right away if any of these occur:    Vertigo gets worse even after taking prescribed medicine    Repeated vomiting even after taking prescribed medicine    Weakness that gets worse    Fainting    Severe headache or unusual drowsiness or confusion    Weakness of an arm or leg or 1 side of the face    Trouble walking    Trouble with speech or vision    Seizure    Trouble hearing    Fever of 100.4 F (38 C) or higher, or as directed by your healthcare provider    Fast heart rate    Chest pain   Date Last Reviewed: 11/1/2017 2000-2018 [facility]  [employer]. [address] [place] [address], [place] [address]. All rights reserved. This information is not intended as a substitute for professional medical care. Always follow your healthcare professional's instructions.

## 2018-11-21 NOTE — PATIENT INSTRUCTIONS
These symptoms do not appear to represent a serious or threatening condition. This is generally a self-limited temporary but uncomfortable situation. Rest, avoid potentially dangerous activities (such as driving or working with machinery or at heights), use OTC Meclizine prn.    Please call if you develop other symptoms, such as alterations of speech, swallowing, vision, motor or sensory systems, or if dizziness persists or worsens.      Benign Paroxysmal Positional Vertigo    Benign paroxysmal positional vertigo is a common condition. You feel as if the room is spinning after changing position, moving your head quickly, or even just rolling over in bed.  Vertigo is a false feeling of motion plus disorientation that makes it seem as though the room is spinning. A vertigo attack may cause sudden nausea, vomiting, and heavy sweating. Severe vertigo causes a loss of balance. You may even fall down.  Vertigo is caused by a problem with the inner ear. The inner ear is located behind the middle ear. It is a part of the balance center of the body. It contains small calcium particles within fluid-filled canals (semi-circular canals). These particles can move out of position. This may happen as a result of aging, head injury, or disease of the inner ear. Once that happens, moving your head in certain ways may cause the particles to stimulate the inner ear. This creates the feeling of vertigo.  An episode of vertigo may last seconds, minutes, or hours. Once you are over the first episode of vertigo, it may never return. Sometimes symptoms return off and on for several weeks or longer.  Home care  Follow these guidelines when caring for yourself at home:    Rest quietly in bed if your symptoms are severe. Change position slowly. There is usually 1 position that will feel best. This might be lying on 1 side or lying on your back with your head slightly raised on pillows. Until you have no symptoms, you are at a higher risk of  falling. Let someone help you when you get up. Get rid of home hazards such as loose electrical cords and throw rugs. Don t walk in unfamiliar areas that are not lighted. Use night lights in bathrooms and kitchen areas.    Do not drive or work with dangerous machinery for 1 week after symptoms go away. This is in case symptoms return suddenly.    Take medicine as prescribed to relieve your symptoms. Unless another medicine was prescribed for nausea, vomiting, and vertigo, you may use over-the-counter motion sickness medicine. Examples of this include meclizine and dimenhydrinate.  Follow-up care  Follow up with your healthcare provider, or as directed. Tell your provider about any ringing in your ear or hearing loss.  If you had a CT or MRI scan, a specialist will review it. You will be told of any new findings that may affect your care.  When to seek medical advice  Call your healthcare provider right away if any of these occur:    Vertigo gets worse even after taking prescribed medicine    Repeated vomiting even after taking prescribed medicine    Weakness that gets worse    Fainting    Severe headache or unusual drowsiness or confusion    Weakness of an arm or leg or 1 side of the face    Trouble walking    Trouble with speech or vision    Seizure    Trouble hearing    Fever of 100.4 F (38 C) or higher, or as directed by your healthcare provider    Fast heart rate    Chest pain   Date Last Reviewed: 11/1/2017 2000-2018 [employer]. [address] [place] [address], [place] [address]. All rights reserved. This information is not intended as a substitute for professional medical care. Always follow your healthcare professional's instructions.

## 2018-11-27 ENCOUNTER — OFFICE VISIT (OUTPATIENT)
Dept: CARDIOLOGY | Facility: CLINIC | Age: 73
End: 2018-11-27
Attending: INTERNAL MEDICINE
Payer: COMMERCIAL

## 2018-11-27 VITALS
SYSTOLIC BLOOD PRESSURE: 129 MMHG | HEIGHT: 73 IN | WEIGHT: 230 LBS | HEART RATE: 70 BPM | DIASTOLIC BLOOD PRESSURE: 77 MMHG | BODY MASS INDEX: 30.48 KG/M2

## 2018-11-27 DIAGNOSIS — I48.20 CHRONIC ATRIAL FIBRILLATION (H): Primary | ICD-10-CM

## 2018-11-27 PROCEDURE — 93000 ELECTROCARDIOGRAM COMPLETE: CPT | Performed by: PHYSICIAN ASSISTANT

## 2018-11-27 PROCEDURE — 99214 OFFICE O/P EST MOD 30 MIN: CPT | Performed by: PHYSICIAN ASSISTANT

## 2018-11-27 NOTE — MR AVS SNAPSHOT
After Visit Summary   11/27/2018    Billy Stock    MRN: [id number]           Patient Information     Date Of Birth          1945        Visit Information        Provider Department      11/27/2018 11:00 AM Radha Kern PA-C [facility]        Today's Diagnoses     Chronic atrial fibrillation (H)    -  1      Care Instructions    1. EKG today shows atrial fibrillation with good heart rate    2. BP today looks good on your current meds    3. Let Dr. Schafer ([phone]) know if you are still having vertigo and having to take meclizine every day to see if you need to go to Therapy to get the crystals back in line.      4. See Dr. Reilly in 1 year with echocardiogram but CALL if issues! [phone]          Follow-ups after your visit        Your next 10 appointments already scheduled     Dec 06, 2018 10:00 AM CST   Anticoagulation Visit with [facility]   [facility] ([facility]    [address]  [facility] [id number] [phone]            Mar 05, 2019 12:00 PM CST   US RENAL COMPLETE with SHUS5   [facility] ([facility])    [address] [place] [address] [phone]           How do I prepare for my exam? (Food and drink instructions) No Food and Drink Restrictions.  How do I prepare for my exam? (Other instructions) You do not need to do anything special to prepare for your exam.  What should I wear: Wear comfortable clothes.  How long does the exam take: Most ultrasounds take 30 to 60 minutes.  What should I bring: Bring a list of your medicines, including vitamins, minerals and over-the-counter drugs. It is safest to leave personal items at home.  Do I need a :  No  is needed.  What do I need to tell my doctor: Tell your doctor about any allergies you may have.  What should I do after the exam: No restrictions,  "You may resume normal activities.  What is this test: An ultrasound uses sound waves to make pictures of the body. Sound waves do not cause pain. The only discomfort may be the pressure of the wand against your skin or full bladder.  Who should I call with questions: If you have any questions, please call the Imaging Department where you will have your exam. Directions, parking instructions, and other information is available on our website, [email or web address].            Mar 05, 2019  1:00 PM CST   Return Visit with Hoang Huerta MD   [facility] ([facility])    [address]  [address]  [facility] [id number] [phone]              Future tests that were ordered for you today     Open Future Orders        Priority Expected Expires Ordered    Echocardiogram Routine 11/27/2019 11/28/2019 11/27/2018            Who to contact     If you have questions or need follow up information about today's clinic visit or your schedule please contact [facility]   [facility] directly at [phone].  Normal or non-critical lab and imaging results will be communicated to you by Amorcytehart, letter or phone within 4 business days after the clinic has received the results. If you do not hear from us within 7 days, please contact the clinic through Amorcytehart or phone. If you have a critical or abnormal lab result, we will notify you by phone as soon as possible.  Submit refill requests through SpoonRocket or call your pharmacy and they will forward the refill request to us. Please allow 3 business days for your refill to be completed.          Additional Information About Your Visit        Amorcytehart Information     SpoonRocket lets you send messages to your doctor, view your test results, renew your prescriptions, schedule appointments and more. To sign up, go to [email or web address] . Click on \"Log in\" on the left side of the screen, which will take " "you to the Welcome page. Then click on \"Sign up Now\" on the right side of the page.     You will be asked to enter the access code listed below, as well as some personal information. Please follow the directions to create your username and password.     Your access code is: [email or web address]  Expires: 2019  9:02 AM     Your access code will  in 90 days. If you need help or a new code, please call your [place] clinic or [phone].        Care EveryWhere ID     This is your Care EveryWhere ID. This could be used by other organizations to access your [facility] medical records  [id number]        Your Vitals Were     Pulse Height BMI (Body Mass Index)             70 1.854 m (6' 1\") 30.34 kg/m2          Blood Pressure from Last 3 Encounters:   18 129/77   18 (!) 147/94   10/29/18 122/82    Weight from Last 3 Encounters:   18 104.3 kg (230 lb)   18 103.4 kg (228 lb)   10/29/18 103.3 kg (227 lb 11.2 oz)              We Performed the Following     EKG 12-lead complete w/read - Clinics (performed today)     Follow-Up with Cardiac Advanced Practice Provider        Primary Care Provider Office Phone # Fax #    Malcolm Schafer [phone]       [address] [facility] [address]        Equal Access to Services     [facility] Hospital[facility] AH: Hadii aad ku hadasho Sostephon, waaxda luqadaha, qaybta kaalmada spenceryada, jake zurita. So [facility] [phone].    ATENCIÓN: Si habla español, tiene a braga disposición servicios gratuitos de asistencia lingüística. Llame al [phone].    We comply with applicable federal civil rights laws and [place] laws. We do not discriminate on the basis of race, color, national origin, age, disability, sex, sexual orientation, or gender identity.            Thank you!     Thank you for choosing [facility]  for your care. Our goal is always to provide you with excellent care. Hearing back " from our patients is one way we can continue to improve our services. Please take a few minutes to complete the written survey that you may receive in the mail after your visit with us. Thank you!             Your Updated Medication List - Protect others around you: Learn how to safely use, store and throw away your medicines at www.disposemymeds.org.          This list is accurate as of 11/27/18 11:22 AM.  Always use your most recent med list.                   Brand Name Dispense Instructions for use Diagnosis    allopurinol 100 MG tablet    ZYLOPRIM    90 tablet    Take 1 tablet (100 mg) by mouth daily    Gout of foot, unspecified cause, unspecified chronicity, unspecified laterality       amLODIPine 10 MG tablet    NORVASC    90 tablet    TAKE ONE TABLET BY MOUTH ONCE DAILY DUE  FOR  APPOINTMENT    Essential hypertension with goal blood pressure less than 140/90       fluticasone 50 MCG/ACT nasal spray    FLONASE    1 Bottle    Spray 1-2 sprays into both nostrils daily    Chronic seasonal allergic rhinitis, unspecified trigger       FLUZONE HIGH-DOSE 0.5 ML injection   Generic drug:  influenza Vac Split High-Dose      ADM 0.5ML IM UTD        lisinopril 10 MG tablet    PRINIVIL/ZESTRIL    180 tablet    TAKE TWO TABLETS BY MOUTH  DAILY    Benign essential hypertension       meclizine 25 MG tablet    ANTIVERT    30 tablet    Take 1 tablet (25 mg) by mouth every 6 hours as needed for dizziness    Benign paroxysmal positional vertigo of left ear       metoprolol succinate 100 MG 24 hr tablet    TOPROL-XL    90 tablet    TAKE 1 TABLET EVERY DAY    Chronic atrial fibrillation (H)       triamcinolone 0.5 % cream    KENALOG    30 g    Apply sparingly to affected area three times daily.        TYLENOL 325 MG tablet   Generic drug:  acetaminophen      Take 1-2 tablets by mouth every 6 hours as needed.        UNABLE TO FIND      MEDICATION NAME: Tabavit, multivitmain without vitamin K in it.        warfarin 5 MG tablet     COUMADIN    90 tablet    Take 1 tablet (5 mg) daily except a half tablet (2.5 mg) on Wednesday or as instructed by INR clinic    Chronic atrial fibrillation (H)       XYZAL PO      Take 5 mg by mouth daily

## 2018-11-27 NOTE — PROGRESS NOTES
HPI:   I had the pleasure of seeing Billy when he came for follow up of atrial fibrillation. He sees Dr. Reilly for his history of:    1. Permanent AFib, noted first in 2010 during pre-operative work up for eye surgery. Briefly returned to  [id number], but subsequently has been back in AFib. Remains on warfarin and metoprolol  2. Recent vertigo, now on meclizine    Billy comes today for annual follow up and from a heart standpoint, thinks he's done really well, without palpitations or lightheadedness.  He denies edema, orthopnea or PND.  He denies chest pain.  Unfortunately, he developed vertigo for the first time just a few weeks ago.  He was seen in follow-up for this and was given meclizine.  He states that things have been getting better.    EKG today shows AFib at 63 bpm  Echo 9/2010 showed EF 55-60%    Assessment & Plan:    1. Chronic Atrial Fibrillation    Heart rate under good control on EKG    Remains on warfarin with stable INRs >2.0    PLAN    Continue metoprolol and warfarin    See Dr. Reilly 1 year with echocardiogram to check EF as last checked in 2010      2.  Hypertension    Blood pressure is under excellent control    PLAN:    Continue current medications    3.  Vertigo    Has meclizine, which helped      PLAN:    Asked him to contact Dr. Schafer's office if this worsened or if he started requiring more meclizine as he may benefit from therapy.        Deena Kern PA-C, MSPAS      Orders Placed This Encounter   Procedures     EKG 12-lead complete w/read - Clinics (performed today)     Echocardiogram     No orders of the defined types were placed in this encounter.    There are no discontinued medications.      Encounter Diagnosis   Name Primary?     Chronic atrial fibrillation (H) Yes       CURRENT MEDICATIONS:  Current Outpatient Prescriptions   Medication Sig Dispense Refill     acetaminophen (TYLENOL) 325 MG tablet Take 1-2 tablets by mouth every 6 hours as needed.       allopurinol (ZYLOPRIM)  100 MG tablet Take 1 tablet (100 mg) by mouth daily 90 tablet 4     amLODIPine (NORVASC) 10 MG tablet TAKE ONE TABLET BY MOUTH ONCE DAILY DUE  FOR  APPOINTMENT 90 tablet 3     fluticasone (FLONASE) 50 MCG/ACT spray Spray 1-2 sprays into both nostrils daily 1 Bottle 11     Levocetirizine Dihydrochloride (XYZAL PO) Take 5 mg by mouth daily        lisinopril (PRINIVIL/ZESTRIL) 10 MG tablet TAKE TWO TABLETS BY MOUTH  DAILY 180 tablet 3     meclizine (ANTIVERT) 25 MG tablet Take 1 tablet (25 mg) by mouth every 6 hours as needed for dizziness 30 tablet 1     metoprolol succinate (TOPROL-XL) 100 MG 24 hr tablet TAKE 1 TABLET EVERY DAY 90 tablet 3     UNABLE TO FIND MEDICATION NAME: Tabavit, multivitmain without vitamin K in it.       warfarin (COUMADIN) 5 MG tablet Take 1 tablet (5 mg) daily except a half tablet (2.5 mg) on Wednesday or as instructed by INR clinic 90 tablet 0     FLUZONE HIGH-DOSE 0.5 ML injection ADM 0.5ML IM UTD  0     triamcinolone (KENALOG) 0.5 % cream Apply sparingly to affected area three times daily. (Patient not taking: Reported on 11/27/2018) 30 g 0       ALLERGIES     Allergies   Allergen Reactions     Cats      Codeine      Hallucinated when taking codeine with another medication     Maple Tree      Morphine Visual Disturbance     Watermelon [Citrullus Vulgaris]      Itching throat, hives       PAST MEDICAL HISTORY:  Past Medical History:   Diagnosis Date     Bell's palsy 10/15/2002     CA IN SITU PROSTATE 10/15/2002     Chronic atrial fibrillation (H) 7/1/10     Glaucoma 4/10/2003     Problem list name updated by automated process. Provider to review     Gout 5/16/2013     Hyperlipidemia LDL goal <100 9/10/2014     Hypertension goal BP (blood pressure) < 140/90 6/28/2006     SARAH (obstructive sleep apnea) 8/28/2013     Pericarditis 2001     Renal cyst        PAST SURGICAL HISTORY:  Past Surgical History:   Procedure Laterality Date     CATARACT IOL, RT/LT  10/15, 11/15     COLONOSCOPY  2009     [facility]     COLONOSCOPY  2014    Dr. Long [facility]     COLONOSCOPY N/A 2014    Procedure: COMBINED COLONOSCOPY, SINGLE BIOPSY/POLYPECTOMY BY BIOPSY;  Surgeon: Puneet Long MD;  Location:  GI     EYE SURGERY  2007    glaucoma surgery right eye     PROSTATE SURGERY       SUPRAPUBIC PROSTATECTOMY  2002     VASECTOMY         FAMILY HISTORY:  Family History   Problem Relation Age of Onset     Hypertension Maternal Grandfather      Cerebrovascular Disease Maternal Grandfather      Hypertension Maternal Grandmother      Cerebrovascular Disease Maternal Grandmother      Hypertension Mother       age 51, MVA     Hypertension Brother      Heart Failure Brother      Bronchitis Brother      Other - See Comments Brother      multiple myeloma     Heart Surgery Brother      defibrilater     Prostate Problems Brother      Diabetes Brother      Other Cancer Brother      Multiple Myeloma     Breast Cancer Maternal Aunt      hx     Cancer Maternal Aunt      cervical cancer     Cancer - colorectal Maternal Aunt      Unknown/Adopted Father      Don't know father's history     Diabetes Son        SOCIAL HISTORY:  Social History     Social History     Marital status:      Spouse name: N/A     Number of children: 3     Years of education: N/A     Social History Main Topics     Smoking status: Never Smoker     Smokeless tobacco: Never Used     Alcohol use No     Drug use: No     Sexual activity: No     Other Topics Concern     Caffeine Concern No     Occupational Exposure No     Hobby Hazards No     Sleep Concern Yes     CPAP at night     Stress Concern No     Weight Concern No     Special Diet No     Back Care No     Exercise No     Seat Belt Yes     Social History Narrative       Review of Systems:  Skin:  Negative     Eyes:  Positive for cataracts  ENT:  Positive for vertigo  Respiratory:  Negative for shortness of breath;dyspnea on exertion;cough  Cardiovascular:  Negative  "for;palpitations;lightheadedness;dizziness;exercise intolerance;fatigue;chest pain    Gastroenterology: Negative for melena;hematochezia  Genitourinary:  Negative    Musculoskeletal:  Negative    Neurologic:  Negative    Psychiatric:  Negative    Heme/Lymph/Imm:  Negative    Endocrine:  Negative      Physical Exam:  Vitals: /77  Pulse 70  Ht 1.854 m (6' 1\")  Wt 104.3 kg (230 lb)  BMI 30.34 kg/m2    Constitutional:  cooperative, alert and oriented, well developed, well nourished, in no acute distress        Skin:  warm and dry to the touch        Head:  normocephalic        Eyes:  pupils equal and round        ENT:  no pallor or cyanosis        Neck:           Chest:  normal breath sounds, clear to auscultation, normal A-P diameter, normal symmetry, normal respiratory excursion, no use of accessory muscles        Cardiac:   irregularly irregular rhythm                Abdomen:  abdomen soft        Vascular: pulses full and equal                                      Extremities and Back:  no deformities, clubbing, cyanosis, erythema observed        Neurological:  no gross motor deficits          Recent Lab Results:  LIPID RESULTS:  Lab Results   Component Value Date    CHOL 113 01/19/2018    HDL 33 (L) 01/19/2018    LDL 51 01/19/2018    TRIG 143 01/19/2018    CHOLHDLRATIO 4.7 07/14/2015       LIVER ENZYME RESULTS:  Lab Results   Component Value Date    AST 18 10/29/2018    ALT 25 10/29/2018       CBC RESULTS:  Lab Results   Component Value Date    WBC 6.9 11/20/2018    RBC 5.30 11/20/2018    HGB 15.1 11/20/2018    HCT 43.9 11/20/2018    MCV 83 11/20/2018    MCH 28.5 11/20/2018    MCHC 34.4 11/20/2018    RDW 14.1 11/20/2018     (L) 11/20/2018       BMP RESULTS:  Lab Results   Component Value Date     11/20/2018    POTASSIUM 3.9 11/20/2018    CHLORIDE 109 11/20/2018    CO2 28 11/20/2018    ANIONGAP 4 11/20/2018    GLC 96 11/20/2018    BUN 18 11/20/2018    CR 1.20 11/20/2018    GFRESTIMATED 60 (L) " 11/20/2018    GFRESTBLACK 72 11/20/2018    BRIANA 9.5 11/20/2018        A1C RESULTS:  No results found for: A1C    INR RESULTS:  Lab Results   Component Value Date    INR 2.8 (A) 11/08/2018    INR 2.5 (A) 10/11/2018    INR 2.52 (H) 10/23/2016    INR 2.13 (H) 10/21/2016

## 2018-11-27 NOTE — LETTER
11/27/2018    Malcolm Schafer MD  [address] [facility] [address]    RE: Billy Stock       Dear Colleague,    I had the pleasure of seeing Billy Stock in the [facility].    HPI:   I had the pleasure of seeing Billy when he came for follow up of atrial fibrillation. He sees Dr. Reilly for his history of:    1. Permanent AFib, noted first in 2010 during pre-operative work up for eye surgery. Briefly returned to  [id number], but subsequently has been back in AFib. Remains on warfarin and metoprolol  2. Recent vertigo, now on meclizine    Billy comes today for annual follow up and from a heart standpoint, thinks he's done really well, without palpitations or lightheadedness.  He denies edema, orthopnea or PND.  He denies chest pain.  Unfortunately, he developed vertigo for the first time just a few weeks ago.  He was seen in follow-up for this and was given meclizine.  He states that things have been getting better.    EKG today shows AFib at 63 bpm  Echo 9/2010 showed EF 55-60%    Assessment & Plan:    1. Chronic Atrial Fibrillation    Heart rate under good control on EKG    Remains on warfarin with stable INRs >2.0    PLAN    Continue metoprolol and warfarin    See Dr. Reilly 1 year with echocardiogram to check EF as last checked in 2010      2.  Hypertension    Blood pressure is under excellent control    PLAN:    Continue current medications    3.  Vertigo    Has meclizine, which helped      PLAN:    Asked him to contact Dr. Schafer's office if this worsened or if he started requiring more meclizine as he may benefit from therapy.        Deena Kern PA-C, MSPAS      Orders Placed This Encounter   Procedures     EKG 12-lead complete w/read - Clinics (performed today)     Echocardiogram     No orders of the defined types were placed in this encounter.    There are no discontinued medications.      Encounter Diagnosis   Name Primary?     Chronic atrial fibrillation  (H) Yes       CURRENT MEDICATIONS:  Current Outpatient Prescriptions   Medication Sig Dispense Refill     acetaminophen (TYLENOL) 325 MG tablet Take 1-2 tablets by mouth every 6 hours as needed.       allopurinol (ZYLOPRIM) 100 MG tablet Take 1 tablet (100 mg) by mouth daily 90 tablet 4     amLODIPine (NORVASC) 10 MG tablet TAKE ONE TABLET BY MOUTH ONCE DAILY DUE  FOR  APPOINTMENT 90 tablet 3     fluticasone (FLONASE) 50 MCG/ACT spray Spray 1-2 sprays into both nostrils daily 1 Bottle 11     Levocetirizine Dihydrochloride (XYZAL PO) Take 5 mg by mouth daily        lisinopril (PRINIVIL/ZESTRIL) 10 MG tablet TAKE TWO TABLETS BY MOUTH  DAILY 180 tablet 3     meclizine (ANTIVERT) 25 MG tablet Take 1 tablet (25 mg) by mouth every 6 hours as needed for dizziness 30 tablet 1     metoprolol succinate (TOPROL-XL) 100 MG 24 hr tablet TAKE 1 TABLET EVERY DAY 90 tablet 3     UNABLE TO FIND MEDICATION NAME: Tabavit, multivitmain without vitamin K in it.       warfarin (COUMADIN) 5 MG tablet Take 1 tablet (5 mg) daily except a half tablet (2.5 mg) on Wednesday or as instructed by INR clinic 90 tablet 0     FLUZONE HIGH-DOSE 0.5 ML injection ADM 0.5ML IM UTD  0     triamcinolone (KENALOG) 0.5 % cream Apply sparingly to affected area three times daily. (Patient not taking: Reported on 11/27/2018) 30 g 0       ALLERGIES     Allergies   Allergen Reactions     Cats      Codeine      Hallucinated when taking codeine with another medication     Maple Tree      Morphine Visual Disturbance     Watermelon [Citrullus Vulgaris]      Itching throat, hives       PAST MEDICAL HISTORY:  Past Medical History:   Diagnosis Date     Bell's palsy 10/15/2002     CA IN SITU PROSTATE 10/15/2002     Chronic atrial fibrillation (H) 7/1/10     Glaucoma 4/10/2003     Problem list name updated by automated process. Provider to review     Gout 5/16/2013     Hyperlipidemia LDL goal <100 9/10/2014     Hypertension goal BP (blood pressure) < 140/90 6/28/2006      SARAH (obstructive sleep apnea) 2013     Pericarditis 2001     Renal cyst        PAST SURGICAL HISTORY:  Past Surgical History:   Procedure Laterality Date     CATARACT IOL, RT/LT  10/15, 11/15     COLONOSCOPY      [facility]     COLONOSCOPY  2014    Dr. Long [facility]     COLONOSCOPY N/A 2014    Procedure: COMBINED COLONOSCOPY, SINGLE BIOPSY/POLYPECTOMY BY BIOPSY;  Surgeon: Puneet Long MD;  Location:  GI     EYE SURGERY      glaucoma surgery right eye     PROSTATE SURGERY       SUPRAPUBIC PROSTATECTOMY  2002     VASECTOMY         FAMILY HISTORY:  Family History   Problem Relation Age of Onset     Hypertension Maternal Grandfather      Cerebrovascular Disease Maternal Grandfather      Hypertension Maternal Grandmother      Cerebrovascular Disease Maternal Grandmother      Hypertension Mother       age 51, MVA     Hypertension Brother      Heart Failure Brother      Bronchitis Brother      Other - See Comments Brother      multiple myeloma     Heart Surgery Brother      defibrilater     Prostate Problems Brother      Diabetes Brother      Other Cancer Brother      Multiple Myeloma     Breast Cancer Maternal Aunt      hx     Cancer Maternal Aunt      cervical cancer     Cancer - colorectal Maternal Aunt      Unknown/Adopted Father      Don't know father's history     Diabetes Son        SOCIAL HISTORY:  Social History     Social History     Marital status:      Spouse name: N/A     Number of children: 3     Years of education: N/A     Social History Main Topics     Smoking status: Never Smoker     Smokeless tobacco: Never Used     Alcohol use No     Drug use: No     Sexual activity: No     Other Topics Concern     Caffeine Concern No     Occupational Exposure No     Hobby Hazards No     Sleep Concern Yes     CPAP at night     Stress Concern No     Weight Concern No     Special Diet No     Back Care No     Exercise No     Seat Belt Yes     Social History Narrative       Review of  "Systems:  Skin:  Negative     Eyes:  Positive for cataracts  ENT:  Positive for vertigo  Respiratory:  Negative for shortness of breath;dyspnea on exertion;cough  Cardiovascular:  Negative for;palpitations;lightheadedness;dizziness;exercise intolerance;fatigue;chest pain    Gastroenterology: Negative for melena;hematochezia  Genitourinary:  Negative    Musculoskeletal:  Negative    Neurologic:  Negative    Psychiatric:  Negative    Heme/Lymph/Imm:  Negative    Endocrine:  Negative      Physical Exam:  Vitals: /77  Pulse 70  Ht 1.854 m (6' 1\")  Wt 104.3 kg (230 lb)  BMI 30.34 kg/m2    Constitutional:  cooperative, alert and oriented, well developed, well nourished, in no acute distress        Skin:  warm and dry to the touch        Head:  normocephalic        Eyes:  pupils equal and round        ENT:  no pallor or cyanosis        Neck:           Chest:  normal breath sounds, clear to auscultation, normal A-P diameter, normal symmetry, normal respiratory excursion, no use of accessory muscles        Cardiac:   irregularly irregular rhythm                Abdomen:  abdomen soft        Vascular: pulses full and equal                                      Extremities and Back:  no deformities, clubbing, cyanosis, erythema observed        Neurological:  no gross motor deficits          Recent Lab Results:  LIPID RESULTS:  Lab Results   Component Value Date    CHOL 113 01/19/2018    HDL 33 (L) 01/19/2018    LDL 51 01/19/2018    TRIG 143 01/19/2018    CHOLHDLRATIO 4.7 07/14/2015       LIVER ENZYME RESULTS:  Lab Results   Component Value Date    AST 18 10/29/2018    ALT 25 10/29/2018       CBC RESULTS:  Lab Results   Component Value Date    WBC 6.9 11/20/2018    RBC 5.30 11/20/2018    HGB 15.1 11/20/2018    HCT 43.9 11/20/2018    MCV 83 11/20/2018    MCH 28.5 11/20/2018    MCHC 34.4 11/20/2018    RDW 14.1 11/20/2018     (L) 11/20/2018       BMP RESULTS:  Lab Results   Component Value Date     11/20/2018 "    POTASSIUM 3.9 11/20/2018    CHLORIDE 109 11/20/2018    CO2 28 11/20/2018    ANIONGAP 4 11/20/2018    GLC 96 11/20/2018    BUN 18 11/20/2018    CR 1.20 11/20/2018    GFRESTIMATED 60 (L) 11/20/2018    GFRESTBLACK 72 11/20/2018    BRIANA 9.5 11/20/2018        A1C RESULTS:  No results found for: A1C    INR RESULTS:  Lab Results   Component Value Date    INR 2.8 (A) 11/08/2018    INR 2.5 (A) 10/11/2018    INR 2.52 (H) 10/23/2016    INR 2.13 (H) 10/21/2016                   Thank you for allowing me to participate in the care of your patient.      Sincerely,     ALFREDITO DalalC     [facility]    cc:   Justo Reilly MD  [address]  [address]  [place] [address]

## 2018-11-27 NOTE — PATIENT INSTRUCTIONS
1. EKG today shows atrial fibrillation with good heart rate    2. BP today looks good on your current meds    3. Let Dr. Schafer ([phone]) know if you are still having vertigo and having to take meclizine every day to see if you need to go to Therapy to get the crystals back in line.      4. See Dr. Reilly in 1 year with echocardiogram but CALL if issues! [phone]

## 2018-12-06 ENCOUNTER — ANTICOAGULATION THERAPY VISIT (OUTPATIENT)
Dept: ANTICOAGULATION | Facility: CLINIC | Age: 73
End: 2018-12-06
Payer: COMMERCIAL

## 2018-12-06 DIAGNOSIS — Z79.01 LONG TERM CURRENT USE OF ANTICOAGULANTS WITH INR GOAL OF 2.0-3.0: ICD-10-CM

## 2018-12-06 DIAGNOSIS — I48.20 CHRONIC ATRIAL FIBRILLATION (H): ICD-10-CM

## 2018-12-06 LAB — INR POINT OF CARE: 2.1 (ref 0.86–1.14)

## 2018-12-06 PROCEDURE — 99207 ZZC NO CHARGE NURSE ONLY: CPT

## 2018-12-06 PROCEDURE — 85610 PROTHROMBIN TIME: CPT | Mod: QW

## 2018-12-06 PROCEDURE — 36416 COLLJ CAPILLARY BLOOD SPEC: CPT

## 2018-12-06 NOTE — MR AVS SNAPSHOT
Billy Montrell   12/6/2018 10:00 AM   Anticoagulation Therapy Visit    Description:  73 year old male   Provider:  [facility]   Department:  [facility]           INR as of 12/6/2018     Today's INR 2.1      Anticoagulation Summary as of 12/6/2018     INR goal 2.0-3.0   Today's INR 2.1   Full warfarin instructions 2.5 mg on Wed; 5 mg all other days   Next INR check 1/3/2019    Indications   Long term current use of anticoagulants with INR goal of 2.0-3.0 [Z79.01]  Chronic atrial fibrillation (H) [I48.2]         Your next Anticoagulation Clinic appointment(s)     Jan 03, 2019  9:45 AM CST   Anticoagulation Visit with [facility]   [facility]    [address] [facility] [address]  [facility] [address] [phone]              Contact Numbers     [facility] Phone Numbers:  Anticoagulation Clinic Appointments : [phone]  Anticoagulation Nurse: [phone]         December 2018 Details    Sun Mon Tue Wed Thu Fri Sat           1                 2               3               4               5               6      5 mg   See details      7      5 mg         8      5 mg           9      5 mg         10      5 mg         11      5 mg         12      2.5 mg         13      5 mg         14      5 mg         15      5 mg           16      5 mg         17      5 mg         18      5 mg         19      2.5 mg         20      5 mg         21      5 mg         22      5 mg           23      5 mg         24      5 mg         25      5 mg         26      2.5 mg         27      5 mg         28      5 mg         29      5 mg           30      5 mg         31      5 mg               Date Details   12/06 This INR check               How to take your warfarin dose     To take:  2.5 mg Take 0.5 of a 5 mg tablet.    To take:  5 mg Take 1 of the 5 mg tablets.           January 2019 Details    Sun Mon Tue Wed Thu Fri Sat       1      5 mg          2      2.5 mg         3            4               5                 6               7               8               9               10               11               12                 13               14               15               16               17               18               19                 20               21               22               23               24               25               26                 27               28               29               30               31                  Date Details   No additional details    Date of next INR:  1/3/2019         How to take your warfarin dose     To take:  2.5 mg Take 0.5 of a 5 mg tablet.    To take:  5 mg Take 1 of the 5 mg tablets.

## 2018-12-06 NOTE — PROGRESS NOTES
ANTICOAGULATION FOLLOW-UP CLINIC VISIT    Patient Name:  Billy Stock  Date:  12/6/2018  Contact Type:  Face to Face    SUBJECTIVE:     Patient Findings     Positives No Problem Findings           OBJECTIVE    INR Protime   Date Value Ref Range Status   12/06/2018 2.1 (A) 0.86 - 1.14 Final       ASSESSMENT / PLAN  INR assessment THER    Recheck INR In: 4 WEEKS    INR Location Clinic      Anticoagulation Summary as of 12/6/2018     INR goal 2.0-3.0   Today's INR 2.1   Warfarin maintenance plan 2.5 mg (5 mg x 0.5) on Wed; 5 mg (5 mg x 1) all other days   Full warfarin instructions 2.5 mg on Wed; 5 mg all other days   Weekly warfarin total 32.5 mg   No change documented Marge Gale RN   Plan last modified Marge Gale RN (7/26/2018)   Next INR check 1/3/2019   Priority INR   Target end date     Indications   Long term current use of anticoagulants with INR goal of 2.0-3.0 [Z79.01]  Chronic atrial fibrillation (H) [I48.2]         Anticoagulation Episode Summary     INR check location     Preferred lab     Send INR reminders to [facility]    Comments Pt reports taking Warfarin in the morning      Anticoagulation Care Providers     Provider Role Specialty Phone number    Malcolm Schafer MD Responsible Internal Medicine [phone]            See the Encounter Report to view Anticoagulation Flowsheet and Dosing Calendar (Go to Encounters tab in chart review, and find the Anticoagulation Therapy Visit)    Dosage adjustment made based on physician directed care plan.    Marge Gale RN

## 2019-01-03 ENCOUNTER — TELEPHONE (OUTPATIENT)
Dept: INTERNAL MEDICINE | Facility: CLINIC | Age: 74
End: 2019-01-03

## 2019-01-03 ENCOUNTER — ANTICOAGULATION THERAPY VISIT (OUTPATIENT)
Dept: ANTICOAGULATION | Facility: CLINIC | Age: 74
End: 2019-01-03
Payer: COMMERCIAL

## 2019-01-03 DIAGNOSIS — I48.20 CHRONIC ATRIAL FIBRILLATION (H): Primary | ICD-10-CM

## 2019-01-03 DIAGNOSIS — I48.20 CHRONIC ATRIAL FIBRILLATION (H): ICD-10-CM

## 2019-01-03 DIAGNOSIS — Z79.01 LONG TERM CURRENT USE OF ANTICOAGULANTS WITH INR GOAL OF 2.0-3.0: ICD-10-CM

## 2019-01-03 LAB — INR POINT OF CARE: 2.4 (ref 0.86–1.14)

## 2019-01-03 PROCEDURE — 99207 ZZC NO CHARGE NURSE ONLY: CPT

## 2019-01-03 PROCEDURE — 85610 PROTHROMBIN TIME: CPT | Mod: QW

## 2019-01-03 PROCEDURE — 36416 COLLJ CAPILLARY BLOOD SPEC: CPT

## 2019-01-03 NOTE — TELEPHONE ENCOUNTER
For insurance purposes, an INR referral is needed.  Please sign order and route message back to [facility].  Marge Gale RN

## 2019-01-03 NOTE — PROGRESS NOTES
ANTICOAGULATION FOLLOW-UP CLINIC VISIT    Patient Name:  Billy Stock  Date:  1/3/2019  Contact Type:  Face to Face    SUBJECTIVE:     Patient Findings     Positives:   No Problem Findings           OBJECTIVE    INR Protime   Date Value Ref Range Status   2019 2.4 (A) 0.86 - 1.14 Final       ASSESSMENT / PLAN  INR assessment THER    Recheck INR In: 4 WEEKS    INR Location Clinic      Anticoagulation Summary  As of 1/3/2019    INR goal:   2.0-3.0   TTR:   78.8 % (2.7 y)   INR used for dosin.4 (1/3/2019)   Warfarin maintenance plan:   2.5 mg (5 mg x 0.5) every Wed; 5 mg (5 mg x 1) all other days   Full warfarin instructions:   2.5 mg every Wed; 5 mg all other days   Weekly warfarin total:   32.5 mg   No change documented:   Marge Gale RN   Plan last modified:   Marge Gale RN (2018)   Next INR check:   2019   Priority:   INR   Target end date:       Indications    Long term current use of anticoagulants with INR goal of 2.0-3.0 [Z79.01]  Chronic atrial fibrillation (H) [I48.2]             Anticoagulation Episode Summary     INR check location:       Preferred lab:       Send INR reminders to:   [facility]    Comments:   Pt reports taking Warfarin in the morning      Anticoagulation Care Providers     Provider Role Specialty Phone number    Malcolm Schafer MD [facility] Internal Medicine [phone]            See the Encounter Report to view Anticoagulation Flowsheet and Dosing Calendar (Go to Encounters tab in chart review, and find the Anticoagulation Therapy Visit)    Dosage adjustment made based on physician directed care plan.    Marge Gale RN

## 2019-01-29 ENCOUNTER — ANTICOAGULATION THERAPY VISIT (OUTPATIENT)
Dept: ANTICOAGULATION | Facility: CLINIC | Age: 74
End: 2019-01-29
Payer: COMMERCIAL

## 2019-01-29 DIAGNOSIS — Z79.01 LONG TERM CURRENT USE OF ANTICOAGULANTS WITH INR GOAL OF 2.0-3.0: ICD-10-CM

## 2019-01-29 DIAGNOSIS — I48.20 CHRONIC ATRIAL FIBRILLATION (H): ICD-10-CM

## 2019-01-29 LAB — INR POINT OF CARE: 3.3 (ref 0.86–1.14)

## 2019-01-29 PROCEDURE — 85610 PROTHROMBIN TIME: CPT | Mod: QW

## 2019-01-29 PROCEDURE — 99207 ZZC NO CHARGE NURSE ONLY: CPT

## 2019-01-29 PROCEDURE — 36416 COLLJ CAPILLARY BLOOD SPEC: CPT

## 2019-01-29 NOTE — PROGRESS NOTES
ANTICOAGULATION FOLLOW-UP CLINIC VISIT    Patient Name:  Billy Stock  Date:  1/29/2019  Contact Type:  Face to Face    SUBJECTIVE:     Patient Findings     Positives:   Change in diet/appetite (Pt reports has not had any green veggies, will resume usual diet. )    Comments:   Pt denies any missed warfarin doses since last INR visit. Pt denies any bleeding or bruising problems.              OBJECTIVE    INR Protime   Date Value Ref Range Status   01/29/2019 3.3 (A) 0.86 - 1.14 Final       ASSESSMENT / PLAN  INR assessment SUPRA    Recheck INR In: 3 WEEKS    INR Location Clinic      Anticoagulation Summary  As of 1/29/2019    INR goal:   2.0-3.0   TTR:   78.5 % (2.8 y)   INR used for dosing:   3.3! (1/29/2019)   Warfarin maintenance plan:   2.5 mg (5 mg x 0.5) every Wed; 5 mg (5 mg x 1) all other days   Full warfarin instructions:   2.5 mg every Wed; 5 mg all other days   Weekly warfarin total:   32.5 mg   No change documented:   Samantha Galeas RN   Plan last modified:   Marge Gale RN (7/26/2018)   Next INR check:   2/19/2019   Priority:   INR   Target end date:       Indications    Long term current use of anticoagulants with INR goal of 2.0-3.0 [Z79.01]  Chronic atrial fibrillation (H) [I48.2]             Anticoagulation Episode Summary     INR check location:       Preferred lab:       Send INR reminders to:   [facility]    Comments:   Pt reports taking Warfarin in the morning      Anticoagulation Care Providers     Provider Role Specialty Phone number    Malcolm Schafer MD [facility] Internal Medicine [phone]            See the Encounter Report to view Anticoagulation Flowsheet and Dosing Calendar (Go to Encounters tab in chart review, and find the Anticoagulation Therapy Visit)    Dosage adjustment made based on physician directed care plan.    Samantha Galeas, RN

## 2019-02-19 ENCOUNTER — ANTICOAGULATION THERAPY VISIT (OUTPATIENT)
Dept: ANTICOAGULATION | Facility: CLINIC | Age: 74
End: 2019-02-19
Payer: COMMERCIAL

## 2019-02-19 DIAGNOSIS — I48.20 CHRONIC ATRIAL FIBRILLATION (H): ICD-10-CM

## 2019-02-19 DIAGNOSIS — Z79.01 LONG TERM CURRENT USE OF ANTICOAGULANTS WITH INR GOAL OF 2.0-3.0: ICD-10-CM

## 2019-02-19 LAB — INR POINT OF CARE: 2.4 (ref 0.86–1.14)

## 2019-02-19 PROCEDURE — 36416 COLLJ CAPILLARY BLOOD SPEC: CPT

## 2019-02-19 PROCEDURE — 85610 PROTHROMBIN TIME: CPT | Mod: QW

## 2019-02-19 PROCEDURE — 99207 ZZC NO CHARGE NURSE ONLY: CPT

## 2019-02-19 RX ORDER — CARBIDOPA AND LEVODOPA 25; 100 MG/1; MG/1
1 TABLET ORAL 3 TIMES DAILY
COMMUNITY
Start: 2019-02-12

## 2019-02-19 NOTE — PROGRESS NOTES
ANTICOAGULATION FOLLOW-UP CLINIC VISIT    Patient Name:  Billy Stock  Date:  2019  Contact Type:  Face to Face    SUBJECTIVE:     Patient Findings     Positives:   Change in medications (Pt began taking Sinemet about a week ago, new diagnosis of Parkinsons. ), No Problem Findings    Comments:   Pt denies any missed warfarin doses since last INR visit. Pt denies any bleeding or bruising problems.              OBJECTIVE    INR Protime   Date Value Ref Range Status   2019 2.4 (A) 0.86 - 1.14 Final       ASSESSMENT / PLAN  INR assessment THER    Recheck INR In: 4 WEEKS    INR Location Clinic      Anticoagulation Summary  As of 2019    INR goal:   2.0-3.0   TTR:   78.3 % (2.8 y)   INR used for dosin.4 (2019)   Warfarin maintenance plan:   2.5 mg (5 mg x 0.5) every Wed; 5 mg (5 mg x 1) all other days   Full warfarin instructions:   2.5 mg every Wed; 5 mg all other days   Weekly warfarin total:   32.5 mg   No change documented:   Samantha Galeas RN   Plan last modified:   Marge Gale RN (2018)   Next INR check:   3/19/2019   Priority:   INR   Target end date:       Indications    Long term current use of anticoagulants with INR goal of 2.0-3.0 [Z79.01]  Chronic atrial fibrillation (H) [I48.2]             Anticoagulation Episode Summary     INR check location:       Preferred lab:       Send INR reminders to:   [facility]    Comments:   Pt reports taking Warfarin in the morning      Anticoagulation Care Providers     Provider Role Specialty Phone number    Malcolm Schafer MD [facility] Internal Medicine [phone]            See the Encounter Report to view Anticoagulation Flowsheet and Dosing Calendar (Go to Encounters tab in chart review, and find the Anticoagulation Therapy Visit)    Dosage adjustment made based on physician directed care plan.    Samantha Galeas, RN

## 2019-03-01 DIAGNOSIS — C61 PROSTATE CANCER (H): Primary | ICD-10-CM

## 2019-03-05 ENCOUNTER — HOSPITAL ENCOUNTER (OUTPATIENT)
Dept: ULTRASOUND IMAGING | Facility: CLINIC | Age: 74
Discharge: HOME | End: 2019-03-05
Attending: UROLOGY | Admitting: UROLOGY
Payer: COMMERCIAL

## 2019-03-05 ENCOUNTER — OFFICE VISIT (OUTPATIENT)
Dept: UROLOGY | Facility: CLINIC | Age: 74
End: 2019-03-05
Payer: COMMERCIAL

## 2019-03-05 VITALS
SYSTOLIC BLOOD PRESSURE: 130 MMHG | HEIGHT: 73 IN | DIASTOLIC BLOOD PRESSURE: 80 MMHG | BODY MASS INDEX: 30.09 KG/M2 | HEART RATE: 66 BPM | OXYGEN SATURATION: 98 % | WEIGHT: 227 LBS

## 2019-03-05 DIAGNOSIS — C61 PROSTATE CANCER (H): Primary | ICD-10-CM

## 2019-03-05 DIAGNOSIS — C61 PROSTATE CANCER (H): ICD-10-CM

## 2019-03-05 LAB — PSA SERPL-MCNC: 3.3 NG/ML (ref 0–4)

## 2019-03-05 PROCEDURE — 76770 US EXAM ABDO BACK WALL COMP: CPT

## 2019-03-05 PROCEDURE — 99214 OFFICE O/P EST MOD 30 MIN: CPT | Mod: 25 | Performed by: UROLOGY

## 2019-03-05 PROCEDURE — 36415 COLL VENOUS BLD VENIPUNCTURE: CPT | Performed by: UROLOGY

## 2019-03-05 PROCEDURE — 84153 ASSAY OF PSA TOTAL: CPT | Performed by: UROLOGY

## 2019-03-05 PROCEDURE — 96402 CHEMO HORMON ANTINEOPL SQ/IM: CPT | Performed by: UROLOGY

## 2019-03-05 ASSESSMENT — PAIN SCALES - GENERAL: PAINLEVEL: NO PAIN (0)

## 2019-03-05 ASSESSMENT — MIFFLIN-ST. JEOR: SCORE: 1833.55

## 2019-03-05 NOTE — PROGRESS NOTES
History: It is a pleasure to see this very pleasant 73-year-old gentleman in follow-up consultation today.  He had been diagnosed with Austin 7 adenocarcinoma the prostate in 2002 at that time radical prostatectomy was performed.  He had extensive disease in the prostate with positive surgical margins, and had adjuvant radiation therapy to the region on Dr. Y the prostate and has been on regular Eligard 45 therapy until 2008.  Since then he is been on intermittent hormone treatment and the last injection was given in August 2016.  We will continue to follow the PSA closely.  He has no other major complaints at this time.  He has however recently started treatment for Parkinson's disease  Results for TONY KIRBY (MRN [id number]) as of 3/5/2019 14:47   Ref. Range 2/15/2017 09:46 8/16/2017 10:11 2/19/2018 10:48 9/5/2018 11:13 3/5/2019 13:01   PSA Diag Urologic Phys Latest Ref Range: 0.00 - 4.00 ng/mL 0.44 1.30 2.00 2.50 3.30   I have reviewed the previous few years PSA results and we note that it is gradually rising and is now risen to 3.3.  Past Medical History:   Diagnosis Date     Bell's palsy 10/15/2002     CA IN SITU PROSTATE 10/15/2002     Chronic atrial fibrillation (H) 7/1/10     Glaucoma 4/10/2003     Problem list name updated by automated process. Provider to review     Gout 5/16/2013     Hyperlipidemia LDL goal <100 9/10/2014     Hypertension goal BP (blood pressure) < 140/90 6/28/2006     SARAH (obstructive sleep apnea) 8/28/2013     Pericarditis 2001     Renal cyst        Social History     Socioeconomic History     Marital status:      Spouse name: None     Number of children: 3     Years of education: None     Highest education level: None   Occupational History     None   Social Needs     Financial resource strain: None     Food insecurity:     Worry: None     Inability: None     Transportation needs:     Medical: None     Non-medical: None   Tobacco Use     Smoking status: Never Smoker      Smokeless tobacco: Never Used   Substance and Sexual Activity     Alcohol use: No     Alcohol/week: 0.0 oz     Drug use: No     Sexual activity: No   Lifestyle     Physical activity:     Days per week: None     Minutes per session: None     Stress: None   Relationships     Social connections:     Talks on phone: None     Gets together: None     Attends Church service: None     Active member of club or organization: None     Attends meetings of clubs or organizations: None     Relationship status: None     Intimate partner violence:     Fear of current or ex partner: None     Emotionally abused: None     Physically abused: None     Forced sexual activity: None   Other Topics Concern     Parent/sibling w/ CABG, MI or angioplasty before 65F 55M? Not Asked      Service Not Asked     Blood Transfusions Not Asked     Caffeine Concern No     Occupational Exposure No     Hobby Hazards No     Sleep Concern Yes     Comment: CPAP at night     Stress Concern No     Weight Concern No     Special Diet No     Back Care No     Exercise No     Bike Helmet Not Asked     Seat Belt Yes     Self-Exams Not Asked   Social History Narrative     None       Past Surgical History:   Procedure Laterality Date     CATARACT IOL, RT/LT  10/15, 11/15     COLONOSCOPY  [address] [place] [address]     COLONOSCOPY  2014    Dr. Long [facility]     COLONOSCOPY N/A 2014    Procedure: COMBINED COLONOSCOPY, SINGLE BIOPSY/POLYPECTOMY BY BIOPSY;  Surgeon: Puneet Long MD;  Location:  GI     EYE SURGERY  2007    glaucoma surgery right eye     PROSTATE SURGERY       SUPRAPUBIC PROSTATECTOMY  2002     VASECTOMY         Family History   Problem Relation Age of Onset     Hypertension Maternal Grandfather      Cerebrovascular Disease Maternal Grandfather      Hypertension Maternal Grandmother      Cerebrovascular Disease Maternal Grandmother      Hypertension Mother          age 51, MVA     Hypertension Brother      Heart Failure Brother       Bronchitis Brother      Other - See Comments Brother         multiple myeloma     Heart Surgery Brother         defibrilater     Prostate Problems Brother      Diabetes Brother      Other Cancer Brother         Multiple Myeloma     Breast Cancer Maternal Aunt         hx     Cancer Maternal Aunt         cervical cancer     Cancer - colorectal Maternal Aunt      Unknown/Adopted Father         Don't know father's history     Diabetes Son          Current Outpatient Medications:      acetaminophen (TYLENOL) 325 MG tablet, Take 1-2 tablets by mouth every 6 hours as needed., Disp: , Rfl:      allopurinol (ZYLOPRIM) 100 MG tablet, Take 1 tablet (100 mg) by mouth daily, Disp: 90 tablet, Rfl: 4     amLODIPine (NORVASC) 10 MG tablet, TAKE ONE TABLET BY MOUTH ONCE DAILY DUE  FOR  APPOINTMENT, Disp: 90 tablet, Rfl: 3     carbidopa-levodopa (SINEMET)  MG tablet, Take 1 tablet by mouth 3 times daily, Disp: , Rfl:      fluticasone (FLONASE) 50 MCG/ACT spray, Spray 1-2 sprays into both nostrils daily, Disp: 1 Bottle, Rfl: 11     lisinopril (PRINIVIL/ZESTRIL) 10 MG tablet, TAKE TWO TABLETS BY MOUTH  DAILY, Disp: 180 tablet, Rfl: 3     metoprolol succinate (TOPROL-XL) 100 MG 24 hr tablet, TAKE 1 TABLET EVERY DAY, Disp: 90 tablet, Rfl: 3     UNABLE TO FIND, MEDICATION NAME: Tabavit, multivitmain without vitamin K in it., Disp: , Rfl:      warfarin (COUMADIN) 5 MG tablet, Take 1 tablet (5 mg) daily except a half tablet (2.5 mg) on Wednesday or as instructed by INR clinic, Disp: 90 tablet, Rfl: 0     FLUZONE HIGH-DOSE 0.5 ML injection, ADM 0.5ML IM UTD, Disp: , Rfl: 0     Levocetirizine Dihydrochloride (XYZAL PO), Take 5 mg by mouth daily , Disp: , Rfl:      meclizine (ANTIVERT) 25 MG tablet, Take 1 tablet (25 mg) by mouth every 6 hours as needed for dizziness (Patient not taking: Reported on 3/5/2019), Disp: 30 tablet, Rfl: 1     triamcinolone (KENALOG) 0.5 % cream, Apply sparingly to affected area three times daily. (Patient not  "taking: Reported on 3/5/2019), Disp: 30 g, Rfl: 0  No current facility-administered medications for this visit.     10 point ROS of systems including Constitutional, Eyes, Respiratory, Cardiovascular, Gastroenterology, Genitourinary, Integumentary, Muscularskeletal, Psychiatric were all negative except for pertinent positives noted in my HPI.    Examination:   /80 (BP Location: Left arm, Patient Position: Sitting, Cuff Size: Adult Regular)   Pulse 66   Ht 1.854 m (6' 1\")   Wt 103 kg (227 lb)   SpO2 98%   BMI 29.95 kg/m    General Impression: Very pleasant gentleman in no acute distress, well oriented in time place and person  Mental Status: Normal.  HEENT.  There is no clinical evidence of jaundice in the mucous membranes are normal  Skin: Skin normal to examination  Respiratory System: Respiratory cycle normal  Lymph Nodes: Normal  Back/Flank Tenderness: Not examined  Cardiovascular System: There is no significant evidence of peripheral edema  Abdominal Examination: Abdominal examination unremarka  Extremities: Extremities are unremarkable  Genitial: Not examined  Rectal Examination: Not examined today at patient's request  Neurologic System: There are no focal abnormal clinical neurological signs in the central, peripheral nervous systems    Impression: The patient's PSA has risen from 2.5 up to 3.3 over the last 6 months and we have been gradually watching a going up.  I discussed this in detail with the patient today.  He had been concerned about hot flashes in the past but at this point I would be uncomfortable waiting for the 6 months before repeating another leuprolide 45 injection.  Therefore we will proceed with leuprolide 45 today and repeat the PSA check and examination in 6 months.  I did explain the entire situation to the patient in detail today.  I reviewed all the records in detail.  I answered all his questions    Plan: Leuprolide 45 mg today.  We will repeat PSA and examination in 6 " "months    \"This dictation was performed with voice recognition software and may contain errors,  omissions and inadvertent word substitution.\"      "

## 2019-03-05 NOTE — LETTER
RE: Tony Kirby  [address]  [place] [address]     Dear Colleague,    Thank you for referring your patient, Tony Kirby, to the [facility] at [facility]. Please see a copy of my visit note below.    History: It is a pleasure to see this very pleasant 73-year-old gentleman in follow-up consultation today.  He had been diagnosed with Elsie 7 adenocarcinoma the prostate in 2002 at that time radical prostatectomy was performed.  He had extensive disease in the prostate with positive surgical margins, and had adjuvant radiation therapy to the region on Dr. Y the prostate and has been on regular Eligard 45 therapy until 2008.  Since then he is been on intermittent hormone treatment and the last injection was given in August 2016.  We will continue to follow the PSA closely.  He has no other major complaints at this time.  He has however recently started treatment for Parkinson's disease  Results for TONY KIRBY (MRN [id number]) as of 3/5/2019 14:47   Ref. Range 2/15/2017 09:46 8/16/2017 10:11 2/19/2018 10:48 9/5/2018 11:13 3/5/2019 13:01   PSA Diag Urologic Phys Latest Ref Range: 0.00 - 4.00 ng/mL 0.44 1.30 2.00 2.50 3.30   I have reviewed the previous few years PSA results and we note that it is gradually rising and is now risen to 3.3.  Past Medical History:   Diagnosis Date     Bell's palsy 10/15/2002     CA IN SITU PROSTATE 10/15/2002     Chronic atrial fibrillation (H) 7/1/10     Glaucoma 4/10/2003     Problem list name updated by automated process. Provider to review     Gout 5/16/2013     Hyperlipidemia LDL goal <100 9/10/2014     Hypertension goal BP (blood pressure) < 140/90 6/28/2006     SARAH (obstructive sleep apnea) 8/28/2013     Pericarditis 2001     Renal cyst        Social History     Socioeconomic History     Marital status:      Spouse name: None     Number of children: 3     Years of education:  None     Highest education level: None   Occupational History     None   Social Needs     Financial resource strain: None     Food insecurity:     Worry: None     Inability: None     Transportation needs:     Medical: None     Non-medical: None   Tobacco Use     Smoking status: Never Smoker     Smokeless tobacco: Never Used   Substance and Sexual Activity     Alcohol use: No     Alcohol/week: 0.0 oz     Drug use: No     Sexual activity: No   Lifestyle     Physical activity:     Days per week: None     Minutes per session: None     Stress: None   Relationships     Social connections:     Talks on phone: None     Gets together: None     Attends [employer] service: None     Active member of club or organization: None     Attends meetings of clubs or organizations: None     Relationship status: None     Intimate partner violence:     Fear of current or ex partner: None     Emotionally abused: None     Physically abused: None     Forced sexual activity: None   Other Topics Concern     Parent/sibling w/ CABG, MI or angioplasty before 65F 55M? Not Asked      Service Not Asked     Blood Transfusions Not Asked     Caffeine Concern No     Occupational Exposure No     Hobby Hazards No     Sleep Concern Yes     Comment: CPAP at night     Stress Concern No     Weight Concern No     Special Diet No     Back Care No     Exercise No     Bike Helmet Not Asked     Seat Belt Yes     Self-Exams Not Asked   Social History Narrative     None       Past Surgical History:   Procedure Laterality Date     CATARACT IOL, RT/LT  10/15, 11/15     COLONOSCOPY  2009    [facility]     COLONOSCOPY  9/30/2014    Dr. Long [facility]     COLONOSCOPY N/A 9/30/2014    Procedure: COMBINED COLONOSCOPY, SINGLE BIOPSY/POLYPECTOMY BY BIOPSY;  Surgeon: Puneet Long MD;  Location:  GI     EYE SURGERY  2007    glaucoma surgery right eye     PROSTATE SURGERY       SUPRAPUBIC PROSTATECTOMY  2002     VASECTOMY         Family History   Problem Relation Age of  Onset     Hypertension Maternal Grandfather      Cerebrovascular Disease Maternal Grandfather      Hypertension Maternal Grandmother      Cerebrovascular Disease Maternal Grandmother      Hypertension Mother          age 51, MVA     Hypertension Brother      Heart Failure Brother      Bronchitis Brother      Other - See Comments Brother         multiple myeloma     Heart Surgery Brother         defibrilater     Prostate Problems Brother      Diabetes Brother      Other Cancer Brother         Multiple Myeloma     Breast Cancer Maternal Aunt         hx     Cancer Maternal Aunt         cervical cancer     Cancer - colorectal Maternal Aunt      Unknown/Adopted Father         Don't know father's history     Diabetes Son      Current Outpatient Medications:      acetaminophen (TYLENOL) 325 MG tablet, Take 1-2 tablets by mouth every 6 hours as needed., Disp: , Rfl:      allopurinol (ZYLOPRIM) 100 MG tablet, Take 1 tablet (100 mg) by mouth daily, Disp: 90 tablet, Rfl: 4     amLODIPine (NORVASC) 10 MG tablet, TAKE ONE TABLET BY MOUTH ONCE DAILY DUE  FOR  APPOINTMENT, Disp: 90 tablet, Rfl: 3     carbidopa-levodopa (SINEMET)  MG tablet, Take 1 tablet by mouth 3 times daily, Disp: , Rfl:      fluticasone (FLONASE) 50 MCG/ACT spray, Spray 1-2 sprays into both nostrils daily, Disp: 1 Bottle, Rfl: 11     lisinopril (PRINIVIL/ZESTRIL) 10 MG tablet, TAKE TWO TABLETS BY MOUTH  DAILY, Disp: 180 tablet, Rfl: 3     metoprolol succinate (TOPROL-XL) 100 MG 24 hr tablet, TAKE 1 TABLET EVERY DAY, Disp: 90 tablet, Rfl: 3     UNABLE TO FIND, MEDICATION NAME: Tabavit, multivitmain without vitamin K in it., Disp: , Rfl:      warfarin (COUMADIN) 5 MG tablet, Take 1 tablet (5 mg) daily except a half tablet (2.5 mg) on Wednesday or as instructed by INR clinic, Disp: 90 tablet, Rfl: 0     FLUZONE HIGH-DOSE 0.5 ML injection, ADM 0.5ML IM UTD, Disp: , Rfl: 0     Levocetirizine Dihydrochloride (XYZAL PO), Take 5 mg by mouth daily , Disp: ,  "Rfl:      meclizine (ANTIVERT) 25 MG tablet, Take 1 tablet (25 mg) by mouth every 6 hours as needed for dizziness (Patient not taking: Reported on 3/5/2019), Disp: 30 tablet, Rfl: 1     triamcinolone (KENALOG) 0.5 % cream, Apply sparingly to affected area three times daily. (Patient not taking: Reported on 3/5/2019), Disp: 30 g, Rfl: 0  No current facility-administered medications for this visit.     Examination:   /80 (BP Location: Left arm, Patient Position: Sitting, Cuff Size: Adult Regular)   Pulse 66   Ht 1.854 m (6' 1\")   Wt 103 kg (227 lb)   SpO2 98%   BMI 29.95 kg/m     General Impression: Very pleasant gentleman in no acute distress, well oriented in time place and person  Mental Status: Normal.  HEENT.  There is no clinical evidence of jaundice in the mucous membranes are normal  Skin: Skin normal to examination  Respiratory System: Respiratory cycle normal  Lymph Nodes: Normal  Back/Flank Tenderness: Not examined  Cardiovascular System: There is no significant evidence of peripheral edema  Abdominal Examination: Abdominal examination unremarka  Extremities: Extremities are unremarkable  Genitial: Not examined  Rectal Examination: Not examined today at patient's request  Neurologic System: There are no focal abnormal clinical neurological signs in the central, peripheral nervous systems    Impression: The patient's PSA has risen from 2.5 up to 3.3 over the last 6 months and we have been gradually watching a going up.  I discussed this in detail with the patient today.  He had been concerned about hot flashes in the past but at this point I would be uncomfortable waiting for the 6 months before repeating another leuprolide 45 injection.  Therefore we will proceed with leuprolide 45 today and repeat the PSA check and examination in 6 months.  I did explain the entire situation to the patient in detail today.  I reviewed all the records in detail.  I answered all his questions    Plan: Leuprolide " "45 mg today.  We will repeat PSA and examination in 6 months    \"This dictation was performed with voice recognition software and may contain errors,  omissions and inadvertent word substitution.\"    Again, thank you for allowing me to participate in the care of your patient.      Sincerely,    Hoang Huerta MD      "

## 2019-03-05 NOTE — NURSING NOTE
Chief Complaint   Patient presents with     Clinic Care Coordination - Follow-up     Pt here for same day PSA   The following medication was given:     MEDICATION:  Lupron Depot 45 mg  ROUTE: IM  SITE: [facility] - Gl[facility]us  DOSE: 45mg  LOT #: [id number]  : Elias  EXPIRATION DATE: 5/30/2021  NDC#: 5523-1576-80   Was there drug waste? No  Multi-dose vial: No    Chari Correia CMA  March 5, 2019  Chari Correia CMA

## 2019-03-15 ENCOUNTER — OFFICE VISIT (OUTPATIENT)
Dept: URGENT CARE | Facility: URGENT CARE | Age: 74
End: 2019-03-15
Payer: COMMERCIAL

## 2019-03-15 VITALS
HEART RATE: 60 BPM | OXYGEN SATURATION: 99 % | WEIGHT: 235.8 LBS | TEMPERATURE: 97.4 F | SYSTOLIC BLOOD PRESSURE: 120 MMHG | BODY MASS INDEX: 31.11 KG/M2 | DIASTOLIC BLOOD PRESSURE: 78 MMHG

## 2019-03-15 DIAGNOSIS — H61.23 BILATERAL IMPACTED CERUMEN: Primary | ICD-10-CM

## 2019-03-15 PROCEDURE — 69209 REMOVE IMPACTED EAR WAX UNI: CPT | Mod: RT | Performed by: FAMILY MEDICINE

## 2019-03-16 NOTE — PATIENT INSTRUCTIONS
Patient Education     Impacted Earwax     Inner ear structures including ear canal and eardrum.     Impacted earwax is a buildup of the natural wax in the ear (cerumen). Impacted earwax is very common. It can cause symptoms such as hearing loss. It can also make it difficult for a doctor to examine your ear.  Understanding earwax  Tiny glands in your ear make substances that combine with dead skin cells to form earwax. Earwax helps protect your ear canal from water, dirt, infection, and injury. Over time, earwax travels from the inner part of your ear canal to the entrance of the canal. Then it falls away naturally. But in some cases, it can t travel to the entrance of the canal. This may be because of a health condition or objects put in the ear. With age, earwax tends to become harder and less fluid. Older adults are more likely to have problems with earwax buildup.  What causes impacted earwax?  Earwax can build up because of many health conditions. Some cause a physical blockage. Others cause too much earwax to be made. Health conditions that can cause earwax buildup include:    Use of cotton swabs to clean deep in the ear canal    Bony blockage in the ear (osteoma or exostoses)    Infections, such as  infection of the outer ear (external otitis)    Skin disease, such as eczema    Autoimmune diseases, such as lupus    A narrowed ear canal from birth, chronic inflammation, or injury    Too much earwax because of injury    Too much earwax because of  water in the ear canal  Objects repeatedly placed in the ear can also cause impacted earwax. For example, putting cotton swabs in the ear may push the wax deeper into the ear. Over time, this may cause blockage. Hearing aids, swimming plugs, and swim molds can cause the same problem when used again and again.  In some cases, the cause of impacted earwax is not known.  Symptoms of impacted earwax  Excess earwax usually does not cause any symptoms, unless there is a  large amount of buildup. Then it may cause symptoms such as:    Hearing loss    Earache    Sense of ear fullness    Itching in the ear    Odor from the ear    Ear drainage    Dizziness    Ringing in the ears    Cough  Treatment for impacted earwax  If you don t have symptoms, you may not need treatment. Often, the earwax goes away on its own with time. If you have symptoms, you may have one or more treatments such as:    Eardrops to soften the earwax. This helps it leave the ear over time.    Rinsing (irrigation) of the ear canal with water. This is done in a doctor s office.    Removal of the earwax with small tools. This is also done in a doctor s office.  In rare cases, some treatments for earwax removal may cause complications such as:    Infection of the outer ear (otitis external)    Earache    Short-term hearing loss    Dizziness    Water trapped in the ear canal    Hole in the eardrum    Ringing in the ears    Bleeding from the ear  Talk with your healthcare provider about which risks apply most to you.  Don t use these at home  Healthcare providers do not advise use of ear candles or ear vacuum kits. These methods are not shown to work and may cause problems.   Preventing impacted earwax  You may not be able to prevent impacted earwax if you have a health condition that causes it, such as eczema. In other cases, you may be able to prevent earwax buildup by:    Using ear drops once a week    Having routine cleaning of the ear about every 6 months    Not using cotton swabs in the ear  When to call the healthcare provider  Call your healthcare provider if you have symptoms of impacted earwax. Also call right away if you have severe symptoms after earwax removal. These may include bleeding or severe ear pain.   Date Last Reviewed: 5/1/2017 2000-2018 [employer]. [address] [place] [address], [place] [address]. All rights reserved. This information is not intended as a substitute for professional  medical care. Always follow your healthcare professional's instructions.

## 2019-03-18 ENCOUNTER — ANTICOAGULATION THERAPY VISIT (OUTPATIENT)
Dept: ANTICOAGULATION | Facility: CLINIC | Age: 74
End: 2019-03-18
Payer: COMMERCIAL

## 2019-03-18 DIAGNOSIS — Z79.01 LONG TERM CURRENT USE OF ANTICOAGULANTS WITH INR GOAL OF 2.0-3.0: ICD-10-CM

## 2019-03-18 DIAGNOSIS — I48.20 CHRONIC ATRIAL FIBRILLATION (H): ICD-10-CM

## 2019-03-18 LAB — INR POINT OF CARE: 2.9 (ref 0.86–1.14)

## 2019-03-18 PROCEDURE — 36416 COLLJ CAPILLARY BLOOD SPEC: CPT

## 2019-03-18 PROCEDURE — 85610 PROTHROMBIN TIME: CPT | Mod: QW

## 2019-03-18 PROCEDURE — 99207 ZZC NO CHARGE NURSE ONLY: CPT

## 2019-03-18 NOTE — PROGRESS NOTES
ANTICOAGULATION FOLLOW-UP CLINIC VISIT    Patient Name:  Billy Stock  Date:  3/18/2019  Contact Type:  Face to Face    SUBJECTIVE:     Patient Findings     Comments:   Pt denies any changes or missed warfarin doses since last INR visit. Pt denies any bleeding or bruising problems. The patient was assessed for diet, medication, and activity level changes, missed or extra doses, bruising or bleeding, with no problem findings.             OBJECTIVE    INR Protime   Date Value Ref Range Status   2019 2.9 (A) 0.86 - 1.14 Final       ASSESSMENT / PLAN  INR assessment THER    Recheck INR In: 4 WEEKS    INR Location Clinic      Anticoagulation Summary  As of 3/18/2019    INR goal:   2.0-3.0   TTR:   78.8 % (2.9 y)   INR used for dosin.9 (3/18/2019)   Warfarin maintenance plan:   2.5 mg (5 mg x 0.5) every Wed; 5 mg (5 mg x 1) all other days   Full warfarin instructions:   2.5 mg every Wed; 5 mg all other days   Weekly warfarin total:   32.5 mg   Plan last modified:   Marge Gale RN (2018)   Next INR check:   4/15/2019   Priority:   INR   Target end date:       Indications    Long term current use of anticoagulants with INR goal of 2.0-3.0 [Z79.01]  Chronic atrial fibrillation (H) [I48.2]             Anticoagulation Episode Summary     INR check location:       Preferred lab:       Send INR reminders to:   [facility]    Comments:   Pt reports taking Warfarin in the morning      Anticoagulation Care Providers     Provider Role Specialty Phone number    Malcolm Schafer MD [facility] Internal Medicine [phone]            See the Encounter Report to view Anticoagulation Flowsheet and Dosing Calendar (Go to Encounters tab in chart review, and find the Anticoagulation Therapy Visit)    Dosage adjustment made based on physician directed care plan.    Samantha Galeas RN

## 2019-03-19 NOTE — PROGRESS NOTES
SUBJECTIVE:  Billy Stock, a 73 year old male scheduled an appointment to discuss the following issues:  Bilateral impacted cerumen    Medical, social, surgical, and family histories reviewed.    Ear Problem (Pt presents with the complaint of right ear wax build. Pt prone to wax build up. Onset of symptoms for 1 week. )  No URI sxs. Pt tells me he feels well otherwise. Pt does have Parkinsonism and seasonal allergies with some runny nose.  No purulent discharge.    ROS:  See HPI.  No nausea/vomiting.  No fever/chills.  No chest pain/SOB.  No BM/urine problems.  No dizziness or syncope.      OBJECTIVE:  /78   Pulse 60   Temp 97.4  F (36.3  C) (Tympanic)   Wt 107 kg (235 lb 12.8 oz)   SpO2 99%   BMI 31.11 kg/m    EXAM:  GENERAL APPEARANCE: alert and no distress; afebrile; moist mucus membrane  EYES: Eyes grossly normal to inspection, PERRL and conjunctivae and sclerae normal  HENT: ear canals filled with wet brown wax; no erythema or pus;  nose and mouth without ulcers or lesions  NECK: no adenopathy, no asymmetry, masses, or scars and thyroid normal to palpation  RESP: lungs clear to auscultation - no rales, rhonchi or wheezes  CV: regular rates and rhythm, normal S1 S2, no S3 or S4 and no murmur, click or rub  LYMPHATICS: no cervical adenopathy  ABDOMEN: soft, nontender, without hepatosplenomegaly or masses and bowel sounds normal  MS: extremities normal- no gross deformities noted  SKIN: no suspicious lesions or rashes  NEURO: Normal strength and tone, mentation intact and speech normal    ASSESSMENT/PLAN:  (H61.23) Bilateral impacted cerumen  (primary encounter diagnosis)  Comment: 2X irrigation removed most of the wax in both ear canals but not all; no infection noted of TM's and ear canals  Plan: REMOVE IMPACTED CERUMEN         Pt to f/up PCP if no improvement or worsening.  Warning signs and symptoms explained.

## 2019-03-21 ENCOUNTER — TELEPHONE (OUTPATIENT)
Dept: INTERNAL MEDICINE | Facility: CLINIC | Age: 74
End: 2019-03-21

## 2019-03-21 DIAGNOSIS — G20.A1 PARKINSON'S DISEASE (H): Primary | ICD-10-CM

## 2019-03-21 DIAGNOSIS — R49.0 DYSPHONIA: ICD-10-CM

## 2019-03-21 NOTE — TELEPHONE ENCOUNTER
Reason for Call: Pt needs a referral from [facility] to receive speech therapy with an ENT.      Detailed comments: For more information call Luly (Speech Therapist) at [facility] at [phone].    Phone Number Patient can be reached at: [phone]    Best Time: Any    Can we leave a detailed message on this number? YES    Call taken on 3/21/2019 at 1:22 PM by Marycarmen Lopez

## 2019-04-03 NOTE — TELEPHONE ENCOUNTER
There will be a fax coming requesting a referral to an ent that would be required for clearance to the speech program.

## 2019-04-04 NOTE — TELEPHONE ENCOUNTER
Faxed report received from Shanae Hutchison for ENT referral, placed in Dr. Schafer's in basket for review..

## 2019-04-15 ENCOUNTER — ANTICOAGULATION THERAPY VISIT (OUTPATIENT)
Dept: ANTICOAGULATION | Facility: CLINIC | Age: 74
End: 2019-04-15
Payer: COMMERCIAL

## 2019-04-15 DIAGNOSIS — I48.20 CHRONIC ATRIAL FIBRILLATION (H): ICD-10-CM

## 2019-04-15 DIAGNOSIS — Z79.01 LONG TERM CURRENT USE OF ANTICOAGULANTS WITH INR GOAL OF 2.0-3.0: ICD-10-CM

## 2019-04-15 LAB — INR POINT OF CARE: 2.4 (ref 0.86–1.14)

## 2019-04-15 PROCEDURE — 36416 COLLJ CAPILLARY BLOOD SPEC: CPT

## 2019-04-15 PROCEDURE — 99207 ZZC NO CHARGE NURSE ONLY: CPT

## 2019-04-15 PROCEDURE — 85610 PROTHROMBIN TIME: CPT | Mod: QW

## 2019-04-15 NOTE — PROGRESS NOTES
ANTICOAGULATION FOLLOW-UP CLINIC VISIT    Patient Name:  Billy Stock  Date:  4/15/2019  Contact Type:  Face to Face    SUBJECTIVE:     Patient Findings     Positives:   Other complaints (Patient reports fell at work yesterday, knee hurts, did have some scrapes on knees, more on left than right. )    Comments:   The patient was assessed for diet, medication, and activity level changes, missed or extra doses, bruising or bleeding, with no problem findings.  Pt denies any changes or missed warfarin doses since last INR visit. Pt denies any bleeding or bruising problems.              OBJECTIVE    INR Protime   Date Value Ref Range Status   04/15/2019 2.4 (A) 0.86 - 1.14 Final       ASSESSMENT / PLAN  INR assessment THER    Recheck INR In: 4 WEEKS    INR Location Clinic      Anticoagulation Summary  As of 4/15/2019    INR goal:   2.0-3.0   TTR:   79.4 % (3 y)   INR used for dosin.4 (4/15/2019)   Warfarin maintenance plan:   2.5 mg (5 mg x 0.5) every Wed; 5 mg (5 mg x 1) all other days   Full warfarin instructions:   2.5 mg every Wed; 5 mg all other days   Weekly warfarin total:   32.5 mg   No change documented:   Samantha Galeas RN   Plan last modified:   Marge Gale RN (2018)   Next INR check:   2019   Priority:   INR   Target end date:       Indications    Long term current use of anticoagulants with INR goal of 2.0-3.0 [Z79.01]  Chronic atrial fibrillation (H) [I48.2]             Anticoagulation Episode Summary     INR check location:       Preferred lab:       Send INR reminders to:   [facility]    Comments:   Pt reports taking Warfarin in the morning      Anticoagulation Care Providers     Provider Role Specialty Phone number    Malcolm Schafer MD Responsible Internal Medicine [phone]            See the Encounter Report to view Anticoagulation Flowsheet and Dosing Calendar (Go to Encounters tab in chart review, and find the Anticoagulation Therapy Visit)    Dosage adjustment made based  on physician directed care plan.    Samantha Galeas RN

## 2019-04-19 ENCOUNTER — TRANSFERRED RECORDS (OUTPATIENT)
Dept: HEALTH INFORMATION MANAGEMENT | Facility: CLINIC | Age: 74
End: 2019-04-19

## 2019-05-14 ENCOUNTER — ANTICOAGULATION THERAPY VISIT (OUTPATIENT)
Dept: ANTICOAGULATION | Facility: CLINIC | Age: 74
End: 2019-05-14
Payer: COMMERCIAL

## 2019-05-14 DIAGNOSIS — I48.20 CHRONIC ATRIAL FIBRILLATION (H): ICD-10-CM

## 2019-05-14 DIAGNOSIS — Z79.01 LONG TERM CURRENT USE OF ANTICOAGULANTS WITH INR GOAL OF 2.0-3.0: ICD-10-CM

## 2019-05-14 LAB — INR POINT OF CARE: 3.1 (ref 0.86–1.14)

## 2019-05-14 PROCEDURE — 99207 ZZC NO CHARGE NURSE ONLY: CPT

## 2019-05-14 PROCEDURE — 85610 PROTHROMBIN TIME: CPT | Mod: QW

## 2019-05-14 PROCEDURE — 36416 COLLJ CAPILLARY BLOOD SPEC: CPT

## 2019-05-14 RX ORDER — WARFARIN SODIUM 5 MG/1
TABLET ORAL
Qty: 86 TABLET | Refills: 1 | Status: SHIPPED | OUTPATIENT
Start: 2019-05-14 | End: 2019-12-12

## 2019-05-14 NOTE — TELEPHONE ENCOUNTER
"Warfarin 5 mg      Last Written Prescription Date:  10/31/18  Last Fill Quantity: 90,   # refills: 0  Last Office Visit: 10/29/18  Future Office visit:    Next 5 appointments (look out 90 days)    Jun 03, 2019  2:20 PM CDT  [facility] [address]  [facility] [address]  [phone]         Requested Prescriptions   Pending Prescriptions Disp Refills     warfarin (COUMADIN) 5 MG tablet [Pharmacy Med Name: WARFARIN SODIUM 5 MG Tablet] 90 tablet 0     Sig: TAKE 1 TABLET (5 MG) DAILY EXCEPT TAKE 1/2 TABLET (2.5 MG) ON WEDNESDAY OR AS INSTRUCTED BY INR CLINIC       Vitamin K Antagonists Failed - 5/14/2019  2:24 PM        Failed - INR is within goal in the past 6 weeks     Confirm INR is within goal in the past 6 weeks.     Recent Labs   Lab Test 05/14/19   INR 3.1*                       Passed - Recent (12 mo) or future (30 days) visit within the authorizing provider's specialty     Patient had office visit in the last 12 months or has a visit in the next 30 days with authorizing provider or within the authorizing provider's specialty.  See \"Patient Info\" tab in inbasket, or \"Choose Columns\" in Meds & Orders section of the refill encounter.              Passed - Medication is active on med list        Passed - Patient is 18 years of age or older        Prescription approved per [facility] Refill Protocol.  Samantha Galeas RN    "

## 2019-05-14 NOTE — PROGRESS NOTES
ANTICOAGULATION FOLLOW-UP CLINIC VISIT    Patient Name:  Billy Stock  Date:  5/14/2019  Contact Type:  Face to Face    SUBJECTIVE:  Patient Findings     Positives:   Change in diet/appetite (Patient reports has had fewer green veggies than usual but has had more blueberries and blackberries. )    Comments:   The patient was assessed for diet, medication, and activity level changes, missed or extra doses, bruising or bleeding, with no problem findings. Patient will schedule 6 month follow-up with PCP (was due in April).         Clinical Outcomes     Comments:   The patient was assessed for diet, medication, and activity level changes, missed or extra doses, bruising or bleeding, with no problem findings. Patient will schedule 6 month follow-up with PCP (was due in April).            OBJECTIVE    INR Protime   Date Value Ref Range Status   05/14/2019 3.1 (A) 0.86 - 1.14 Final       ASSESSMENT / PLAN  INR assessment THER    Recheck INR In: 2 WEEKS    INR Location Clinic      Anticoagulation Summary  As of 5/14/2019    INR goal:   2.0-3.0   TTR:   79.6 % (3 y)   INR used for dosing:   3.1! (5/14/2019)   Warfarin maintenance plan:   2.5 mg (5 mg x 0.5) every Wed; 5 mg (5 mg x 1) all other days   Full warfarin instructions:   2.5 mg every Wed; 5 mg all other days   Weekly warfarin total:   32.5 mg   No change documented:   Samantha Galeas RN   Plan last modified:   Marge Gale RN (7/26/2018)   Next INR check:   5/28/2019   Priority:   INR   Target end date:       Indications    Long term current use of anticoagulants with INR goal of 2.0-3.0 [Z79.01]  Chronic atrial fibrillation (H) [I48.2]             Anticoagulation Episode Summary     INR check location:       Preferred lab:       Send INR reminders to:   [facility]    Comments:   Pt reports taking Warfarin in the morning      Anticoagulation Care Providers     Provider Role Specialty Phone number    Malcolm Schafer MD [facility] Internal Medicine [phone]             See the Encounter Report to view Anticoagulation Flowsheet and Dosing Calendar (Go to Encounters tab in chart review, and find the Anticoagulation Therapy Visit)    Dosage adjustment made based on physician directed care plan.    Samantha Galeas RN

## 2019-05-28 ENCOUNTER — ANTICOAGULATION THERAPY VISIT (OUTPATIENT)
Dept: ANTICOAGULATION | Facility: CLINIC | Age: 74
End: 2019-05-28
Payer: COMMERCIAL

## 2019-05-28 DIAGNOSIS — Z79.01 LONG TERM CURRENT USE OF ANTICOAGULANTS WITH INR GOAL OF 2.0-3.0: ICD-10-CM

## 2019-05-28 DIAGNOSIS — I48.20 CHRONIC ATRIAL FIBRILLATION (H): ICD-10-CM

## 2019-05-28 LAB — INR POINT OF CARE: 2.1 (ref 0.86–1.14)

## 2019-05-28 PROCEDURE — 36416 COLLJ CAPILLARY BLOOD SPEC: CPT

## 2019-05-28 PROCEDURE — 99207 ZZC NO CHARGE NURSE ONLY: CPT

## 2019-05-28 PROCEDURE — 85610 PROTHROMBIN TIME: CPT | Mod: QW

## 2019-05-28 NOTE — PROGRESS NOTES
ANTICOAGULATION FOLLOW-UP CLINIC VISIT    Patient Name:  Billy Stock  Date:  2019  Contact Type:  Face to Face    SUBJECTIVE:  Patient Findings     Comments:   The patient was assessed for diet, medication, and activity level changes, missed or extra doses, bruising or bleeding, with no problem findings.          Clinical Outcomes     Negatives:   Major bleeding event, Thromboembolic event, Anticoagulation-related hospital admission, Anticoagulation-related ED visit, Anticoagulation-related fatality    Comments:   The patient was assessed for diet, medication, and activity level changes, missed or extra doses, bruising or bleeding, with no problem findings.             OBJECTIVE    INR Protime   Date Value Ref Range Status   2019 2.1 (A) 0.86 - 1.14 Final       ASSESSMENT / PLAN  INR assessment THER    Recheck INR In: 4 WEEKS    INR Location Clinic      Anticoagulation Summary  As of 2019    INR goal:   2.0-3.0   TTR:   79.7 % (3.1 y)   INR used for dosin.1 (2019)   Warfarin maintenance plan:   2.5 mg (5 mg x 0.5) every Wed; 5 mg (5 mg x 1) all other days   Full warfarin instructions:   2.5 mg every Wed; 5 mg all other days   Weekly warfarin total:   32.5 mg   No change documented:   Samantha aGleas RN   Plan last modified:   Marge Gale RN (2018)   Next INR check:   2019   Priority:   INR   Target end date:       Indications    Long term current use of anticoagulants with INR goal of 2.0-3.0 [Z79.01]  Chronic atrial fibrillation (H) [I48.2]             Anticoagulation Episode Summary     INR check location:       Preferred lab:       Send INR reminders to:   [facility]    Comments:   Pt reports taking Warfarin in the morning      Anticoagulation Care Providers     Provider Role Specialty Phone number    Malcolm Schafer MD Responsible Internal Medicine [phone]            See the Encounter Report to view Anticoagulation Flowsheet and Dosing Calendar (Go to Encounters  tab in chart review, and find the Anticoagulation Therapy Visit)    Dosage adjustment made based on physician directed care plan.    Samantha Galeas RN

## 2019-06-03 ENCOUNTER — OFFICE VISIT (OUTPATIENT)
Dept: INTERNAL MEDICINE | Facility: CLINIC | Age: 74
End: 2019-06-03
Payer: COMMERCIAL

## 2019-06-03 VITALS
HEART RATE: 70 BPM | HEIGHT: 73 IN | TEMPERATURE: 98.5 F | WEIGHT: 231.4 LBS | RESPIRATION RATE: 17 BRPM | DIASTOLIC BLOOD PRESSURE: 72 MMHG | SYSTOLIC BLOOD PRESSURE: 128 MMHG | BODY MASS INDEX: 30.67 KG/M2 | OXYGEN SATURATION: 100 %

## 2019-06-03 DIAGNOSIS — I10 ESSENTIAL HYPERTENSION, BENIGN: ICD-10-CM

## 2019-06-03 DIAGNOSIS — Z12.11 SPECIAL SCREENING FOR MALIGNANT NEOPLASMS, COLON: ICD-10-CM

## 2019-06-03 DIAGNOSIS — G20.A1 PARKINSON DISEASE (H): ICD-10-CM

## 2019-06-03 DIAGNOSIS — I48.20 CHRONIC ATRIAL FIBRILLATION (H): ICD-10-CM

## 2019-06-03 DIAGNOSIS — J30.0 VASOMOTOR RHINITIS: ICD-10-CM

## 2019-06-03 DIAGNOSIS — Z00.00 ENCOUNTER FOR PREVENTATIVE ADULT HEALTH CARE EXAMINATION: Primary | ICD-10-CM

## 2019-06-03 LAB
ALBUMIN UR-MCNC: >=300 MG/DL
APPEARANCE UR: CLEAR
BILIRUB UR QL STRIP: ABNORMAL
COLOR UR AUTO: YELLOW
ERYTHROCYTE [DISTWIDTH] IN BLOOD BY AUTOMATED COUNT: 14.6 % (ref 10–15)
GLUCOSE UR STRIP-MCNC: NEGATIVE MG/DL
GRAN CASTS #/AREA URNS LPF: ABNORMAL /LPF
HCT VFR BLD AUTO: 41 % (ref 40–53)
HGB BLD-MCNC: 13.7 G/DL (ref 13.3–17.7)
HGB UR QL STRIP: NEGATIVE
HYALINE CASTS #/AREA URNS LPF: ABNORMAL /LPF
KETONES UR STRIP-MCNC: 15 MG/DL
LEUKOCYTE ESTERASE UR QL STRIP: NEGATIVE
MCH RBC QN AUTO: 28.1 PG (ref 26.5–33)
MCHC RBC AUTO-ENTMCNC: 33.4 G/DL (ref 31.5–36.5)
MCV RBC AUTO: 84 FL (ref 78–100)
MUCOUS THREADS #/AREA URNS LPF: PRESENT /LPF
NITRATE UR QL: NEGATIVE
PH UR STRIP: 5.5 PH (ref 5–7)
PLATELET # BLD AUTO: 169 10E9/L (ref 150–450)
RBC # BLD AUTO: 4.87 10E12/L (ref 4.4–5.9)
RBC #/AREA URNS AUTO: ABNORMAL /HPF
SOURCE: ABNORMAL
SP GR UR STRIP: >1.03 (ref 1–1.03)
UROBILINOGEN UR STRIP-ACNC: 1 EU/DL (ref 0.2–1)
WBC # BLD AUTO: 5.3 10E9/L (ref 4–11)
WBC #/AREA URNS AUTO: ABNORMAL /HPF

## 2019-06-03 PROCEDURE — 80053 COMPREHEN METABOLIC PANEL: CPT | Performed by: INTERNAL MEDICINE

## 2019-06-03 PROCEDURE — 85027 COMPLETE CBC AUTOMATED: CPT | Performed by: INTERNAL MEDICINE

## 2019-06-03 PROCEDURE — G0439 PPPS, SUBSEQ VISIT: HCPCS | Performed by: INTERNAL MEDICINE

## 2019-06-03 PROCEDURE — 36415 COLL VENOUS BLD VENIPUNCTURE: CPT | Performed by: INTERNAL MEDICINE

## 2019-06-03 PROCEDURE — 84443 ASSAY THYROID STIM HORMONE: CPT | Performed by: INTERNAL MEDICINE

## 2019-06-03 PROCEDURE — 81001 URINALYSIS AUTO W/SCOPE: CPT | Performed by: INTERNAL MEDICINE

## 2019-06-03 PROCEDURE — 80061 LIPID PANEL: CPT | Performed by: INTERNAL MEDICINE

## 2019-06-03 RX ORDER — IPRATROPIUM BROMIDE 21 UG/1
2 SPRAY, METERED NASAL EVERY 12 HOURS
Qty: 1 BOX | Refills: 3 | Status: SHIPPED | OUTPATIENT
Start: 2019-06-03 | End: 2021-12-13

## 2019-06-03 ASSESSMENT — ENCOUNTER SYMPTOMS
PALPITATIONS: 0
DYSURIA: 0
MYALGIAS: 0
EYE PAIN: 0
ARTHRALGIAS: 1
SHORTNESS OF BREATH: 0
NAUSEA: 0
COUGH: 0
DIARRHEA: 0
WEAKNESS: 0
FREQUENCY: 1
HEADACHES: 0
FEVER: 0
ABDOMINAL PAIN: 0
DIZZINESS: 0
NERVOUS/ANXIOUS: 0
PARESTHESIAS: 0
JOINT SWELLING: 0
CHILLS: 0
HEMATOCHEZIA: 0
CONSTIPATION: 0
SORE THROAT: 0
HEARTBURN: 0
HEMATURIA: 0

## 2019-06-03 ASSESSMENT — MIFFLIN-ST. JEOR: SCORE: 1853.5

## 2019-06-03 ASSESSMENT — ACTIVITIES OF DAILY LIVING (ADL): CURRENT_FUNCTION: NO ASSISTANCE NEEDED

## 2019-06-03 NOTE — LETTER
June 4, 2019      Billy Stock  [address]  [place] [address]        Dear ,    Your lab results came back within acceptable limits except for slightly decreased kidney function but stable. The triglycerides were elevated as well. Recommendations are to exercise regularly and follow a low fat low cholesterol diet. We will repeat these labs in 3 months.     Resulted Orders   CBC with platelets   Result Value Ref Range    WBC 5.3 4.0 - 11.0 10e9/L    RBC Count 4.87 4.4 - 5.9 10e12/L    Hemoglobin 13.7 13.3 - 17.7 g/dL    Hematocrit 41.0 40.0 - 53.0 %    MCV 84 78 - 100 fl    MCH 28.1 26.5 - 33.0 pg    MCHC 33.4 31.5 - 36.5 g/dL    RDW 14.6 10.0 - 15.0 %    Platelet Count 169 150 - 450 10e9/L   Comprehensive metabolic panel   Result Value Ref Range    Sodium 145 (H) 133 - 144 mmol/L    Potassium 3.7 3.4 - 5.3 mmol/L    Chloride 109 94 - 109 mmol/L    Carbon Dioxide 27 20 - 32 mmol/L    Anion Gap 9 3 - 14 mmol/L    Glucose 93 70 - 99 mg/dL    Urea Nitrogen 21 7 - 30 mg/dL    Creatinine 1.48 (H) 0.66 - 1.25 mg/dL    GFR Estimate 46 (L) >60 mL/min/[1.73_m2]      Comment:      Non  GFR Calc  Starting 12/18/2018, serum creatinine based estimated GFR (eGFR) will be   calculated using the Chronic Kidney Disease Epidemiology Collaboration   (CKD-EPI) equation.      GFR Estimate If Black 54 (L) >60 mL/min/[1.73_m2]      Comment:       GFR Calc  Starting 12/18/2018, serum creatinine based estimated GFR (eGFR) will be   calculated using the Chronic Kidney Disease Epidemiology Collaboration   (CKD-EPI) equation.      Calcium 8.4 (L) 8.5 - 10.1 mg/dL    Bilirubin Total 0.4 0.2 - 1.3 mg/dL    Albumin 3.7 3.4 - 5.0 g/dL    Protein Total 7.7 6.8 - 8.8 g/dL    Alkaline Phosphatase 115 40 - 150 U/L    ALT 12 0 - 70 U/L    AST 26 0 - 45 U/L   Lipid panel reflex to direct LDL Non-fasting   Result Value Ref Range    Cholesterol 120 <200 mg/dL    Triglycerides 271 (H) <150 mg/dL       Comment:      Borderline high:  150-199 mg/dl  High:             200-499 mg/dl  Very high:       >499 mg/dl      HDL Cholesterol 37 (L) >39 mg/dL    LDL Cholesterol Calculated 29 <100 mg/dL      Comment:      Desirable:       <100 mg/dl    Non HDL Cholesterol 83 <130 mg/dL   TSH with free T4 reflex   Result Value Ref Range    TSH 1.61 0.40 - 4.00 mU/L   *UA reflex to Microscopic   Result Value Ref Range    Color Urine Yellow     Appearance Urine Clear     Glucose Urine Negative NEG^Negative mg/dL    Bilirubin Urine Small (A) NEG^Negative      Comment:      This is an unconfirmed screening test result. A positive result may be false.    Ketones Urine 15 (A) NEG^Negative mg/dL    Specific Gravity Urine >1.030 1.003 - 1.035    Blood Urine Negative NEG^Negative    pH Urine 5.5 5.0 - 7.0 pH    Protein Albumin Urine >=300 (A) NEG^Negative mg/dL    Urobilinogen Urine 1.0 0.2 - 1.0 EU/dL    Nitrite Urine Negative NEG^Negative    Leukocyte Esterase Urine Negative NEG^Negative    Source Midstream Urine    Urine Microscopic   Result Value Ref Range    WBC Urine 0 - 5 OTO5^0 - 5 /HPF    RBC Urine O - 2 OTO2^O - 2 /HPF    Hyaline Casts 10-25 (A) OTO2^O - 2 /LPF    Granular Casts 2-5 (A) NEG^Negative /LPF    Mucous Urine Present (A) NEG^Negative /LPF       If you have any questions or concerns, please call the clinic at the number listed above.       Sincerely,        Malcolm Schafer MD

## 2019-06-03 NOTE — PROGRESS NOTES
"SUBJECTIVE:   Billy Stock is a 73 year old male who presents for Preventive Visit.    Are you in the first 12 months of your Medicare coverage?  No    Healthy Habits:     In general, how would you rate your overall health?  Good    Frequency of exercise:  4-5 days/week    Duration of exercise:  30-45 minutes    Do you usually eat at least 4 servings of fruit and vegetables a day, include whole grains    & fiber and avoid regularly eating high fat or \"junk\" foods?  No    Taking medications regularly:  Yes    Barriers to taking medications:  None    Medication side effects:  Not applicable    Ability to successfully perform activities of daily living:  No assistance needed    Home Safety:  No safety concerns identified    Hearing Impairment:  Difficulty understanding soft or whispered speech    In the past 6 months, have you been bothered by leaking of urine? Yes    In general, how would you rate your overall mental or emotional health?  Good      PHQ-2 Total Score: 0    Additional concerns today:  No    Do you feel safe in your environment? Yes    Do you have a Health Care Directive? Yes: Advance Directive has been received and scanned.      Fall risk  Fallen 2 or more times in the past year?: No  Any fall with injury in the past year?: No    Cognitive Screening   1) Repeat 3 items (Leader, Season, Table)    2) Clock draw: NORMAL  3) 3 item recall: Recalls 2 objects   Results: NORMAL clock, 1-2 items recalled: COGNITIVE IMPAIRMENT LESS LIKELY    Mini-CogTM Copyright S Gissel. Licensed by the author for use in [facility]; reprinted with permission (elvia@.[facility]). All rights reserved.      Do you have sleep apnea, excessive snoring or daytime drowsiness?: yes    Reviewed and updated as needed this visit by clinical staff  Tobacco  Allergies  Meds  Med Hx  Surg Hx  Fam Hx  Soc Hx        Reviewed and updated as needed this visit by Provider        Social History     Tobacco Use     Smoking status: " Never Smoker     Smokeless tobacco: Never Used   Substance Use Topics     Alcohol use: No     Alcohol/week: 0.0 oz     If you drink alcohol do you typically have >3 drinks per day or >7 drinks per week? No    Alcohol Use 6/3/2019   Prescreen: >3 drinks/day or >7 drinks/week? Not Applicable   Prescreen: >3 drinks/day or >7 drinks/week? -   No flowsheet data found.        PROBLEMS TO ADD ON...  Has h/o HTN. on medical treatment. BP has been controlled. No side effects from medications. No CP, HA, dizziness. good compliance with medications and low salt diet.  Has history of atrial fibrillation. On anticoagulation with Coumadin and rate control medications. Asymptonatic - no chest pains , palpitations,  no side effects from medications.  Diagnosed with Parkinson's disease, 5 months ago, started on treatment with Sinemet. Follows with neurology. Has mild tremor, stiffness.   Concern for runny nose, clear mucus, no HA, no yellow discharge.     Current providers sharing in care for this patient include:   Patient Care Team:  Malcolm Schafer MD as PCP - General (Internal Medicine)  Malcolm Schafer MD as Assigned PCP    The following health maintenance items are reviewed in Epic and correct as of today:  Health Maintenance   Topic Date Due     AORTIC ANEURYSM SCREENING (SYSTEM ASSIGNED)  10/28/2011     ZOSTER IMMUNIZATION (2 of 3) 12/26/2013     OP ANNUAL INR REFERRAL  08/22/2017     FALL RISK ASSESSMENT  01/18/2018     ADVANCED DIRECTIVE PLANNING  08/28/2018     MEDICARE ANNUAL WELLNESS VISIT  01/19/2019     COLONOSCOPY  09/30/2019     DTAP/TDAP/TD IMMUNIZATION (3 - Td) 04/02/2022     LIPID  01/19/2023     HEPATITIS C SCREENING  Completed     PHQ-2  Completed     INFLUENZA VACCINE  Completed     IPV IMMUNIZATION  Aged Out     MENINGITIS IMMUNIZATION  Aged Out     Lab work is in process  Labs reviewed in EPIC      Review of Systems   Constitutional: Negative for chills and fever.   HENT: Negative for congestion, ear  "pain, hearing loss and sore throat.    Eyes: Negative for pain and visual disturbance.   Respiratory: Negative for cough and shortness of breath.    Cardiovascular: Negative for chest pain, palpitations and peripheral edema.   Gastrointestinal: Negative for abdominal pain, constipation, diarrhea, heartburn, hematochezia and nausea.   Genitourinary: Positive for frequency and impotence. Negative for discharge, dysuria, genital sores, hematuria and urgency.   Musculoskeletal: Positive for arthralgias. Negative for joint swelling and myalgias.   Skin: Negative for rash.   Neurological: Negative for dizziness, weakness, headaches and paresthesias.   Psychiatric/Behavioral: Negative for mood changes. The patient is not nervous/anxious.          OBJECTIVE:   There were no vitals taken for this visit. Estimated body mass index is 31.11 kg/m  as calculated from the following:    Height as of 3/5/19: 1.854 m (6' 1\").    Weight as of 3/15/19: 107 kg (235 lb 12.8 oz).  Physical Exam  GENERAL: healthy, alert and no distress  EYES: Eyes grossly normal to inspection, PERRL and conjunctivae and sclerae normal  HENT: ear canals and TM's normal, nose and mouth without ulcers or lesions  NECK: no adenopathy, no asymmetry, masses, or scars and thyroid normal to palpation  RESP: lungs clear to auscultation - no rales, rhonchi or wheezes  CV: regular rate and rhythm, normal S1 S2, no S3 or S4, no murmur, click or rub, no peripheral edema and peripheral pulses strong  ABDOMEN: soft, nontender, no hepatosplenomegaly, no masses and bowel sounds normal  MS: no gross musculoskeletal defects noted, no edema  SKIN: no suspicious lesions or rashes  NEURO: Normal strength and tone, mentation intact and speech normal  PSYCH: mentation appears normal, affect normal/bright    Diagnostic Test Results:  Labs reviewed in Epic    ASSESSMENT / PLAN:       ICD-10-CM    1. Encounter for preventative adult health care examination Z00.00 CBC with platelets " "    Comprehensive metabolic panel     Lipid panel reflex to direct LDL Non-fasting     TSH with free T4 reflex     *UA reflex to Microscopic     GASTROENTEROLOGY ADULT REF PROCEDURE ONLY [facility]  [phone]; Dr. Long   2. Special screening for malignant neoplasms, colon Z12.11 GASTROENTEROLOGY ADULT REF PROCEDURE ONLY     GASTROENTEROLOGY ADULT REF PROCEDURE ONLY [facility]  [phone]; Dr. Long   3. Chronic atrial fibrillation (H) I48.2    4. Essential hypertension, benign I10 CBC with platelets     Comprehensive metabolic panel     Lipid panel reflex to direct LDL Non-fasting     TSH with free T4 reflex     *UA reflex to Microscopic   5. Vasomotor rhinitis J30.0 ipratropium (ATROVENT) 0.03 % nasal spray   6. Parkinson disease (H) G20      Assess lab work ,   Cont treatment   Start on nasal Atrovent   Follow up with neurology     End of Life Planning:  Patient currently has an advanced directive: Yes.  Practitioner is supportive of decision.    COUNSELING:  Reviewed preventive health counseling, as reflected in patient instructions       Regular exercise       Healthy diet/nutrition       Vision screening       Hearing screening       Dental care       Bladder control       Colon cancer screening       Prostate cancer screening    Estimated body mass index is 31.11 kg/m  as calculated from the following:    Height as of 3/5/19: 1.854 m (6' 1\").    Weight as of 3/15/19: 107 kg (235 lb 12.8 oz).    Weight management plan: Discussed healthy diet and exercise guidelines     reports that he has never smoked. He has never used smokeless tobacco.      Appropriate preventive services were discussed with this patient, including applicable screening as appropriate for cardiovascular disease, diabetes, osteopenia/osteoporosis, and glaucoma.  As appropriate for age/gender, discussed screening for colorectal cancer, prostate cancer, breast cancer, and cervical cancer. Checklist reviewing preventive " services available has been given to the patient.    Reviewed patients plan of care and provided an AVS. The Intermediate Care Plan ( asthma action plan, low back pain action plan, and migraine action plan) for Billy meets the Care Plan requirement. This Care Plan has been established and reviewed with the Patient.    Counseling Resources:  ATP IV Guidelines  Pooled Cohorts Equation Calculator  Breast Cancer Risk Calculator  FRAX Risk Assessment  ICSI Preventive Guidelines  Dietary Guidelines for Americans, 2010  USDA's MyPlate  ASA Prophylaxis  Lung CA Screening    Malcolm Schafer MD  [facility]    Identified Health Risks:

## 2019-06-04 LAB
ALBUMIN SERPL-MCNC: 3.7 G/DL (ref 3.4–5)
ALP SERPL-CCNC: 115 U/L (ref 40–150)
ALT SERPL W P-5'-P-CCNC: 12 U/L (ref 0–70)
ANION GAP SERPL CALCULATED.3IONS-SCNC: 9 MMOL/L (ref 3–14)
AST SERPL W P-5'-P-CCNC: 26 U/L (ref 0–45)
BILIRUB SERPL-MCNC: 0.4 MG/DL (ref 0.2–1.3)
BUN SERPL-MCNC: 21 MG/DL (ref 7–30)
CALCIUM SERPL-MCNC: 8.4 MG/DL (ref 8.5–10.1)
CHLORIDE SERPL-SCNC: 109 MMOL/L (ref 94–109)
CHOLEST SERPL-MCNC: 120 MG/DL
CO2 SERPL-SCNC: 27 MMOL/L (ref 20–32)
CREAT SERPL-MCNC: 1.48 MG/DL (ref 0.66–1.25)
GFR SERPL CREATININE-BSD FRML MDRD: 46 ML/MIN/{1.73_M2}
GLUCOSE SERPL-MCNC: 93 MG/DL (ref 70–99)
HDLC SERPL-MCNC: 37 MG/DL
LDLC SERPL CALC-MCNC: 29 MG/DL
NONHDLC SERPL-MCNC: 83 MG/DL
POTASSIUM SERPL-SCNC: 3.7 MMOL/L (ref 3.4–5.3)
PROT SERPL-MCNC: 7.7 G/DL (ref 6.8–8.8)
SODIUM SERPL-SCNC: 145 MMOL/L (ref 133–144)
TRIGL SERPL-MCNC: 271 MG/DL
TSH SERPL DL<=0.005 MIU/L-ACNC: 1.61 MU/L (ref 0.4–4)

## 2019-06-25 ENCOUNTER — ANTICOAGULATION THERAPY VISIT (OUTPATIENT)
Dept: ANTICOAGULATION | Facility: CLINIC | Age: 74
End: 2019-06-25
Payer: COMMERCIAL

## 2019-06-25 DIAGNOSIS — I48.20 CHRONIC ATRIAL FIBRILLATION (H): ICD-10-CM

## 2019-06-25 DIAGNOSIS — Z79.01 LONG TERM CURRENT USE OF ANTICOAGULANTS WITH INR GOAL OF 2.0-3.0: ICD-10-CM

## 2019-06-25 LAB — INR POINT OF CARE: 2.9 (ref 0.86–1.14)

## 2019-06-25 PROCEDURE — 85610 PROTHROMBIN TIME: CPT | Mod: QW

## 2019-06-25 PROCEDURE — 99207 ZZC NO CHARGE NURSE ONLY: CPT

## 2019-06-25 PROCEDURE — 36416 COLLJ CAPILLARY BLOOD SPEC: CPT

## 2019-06-25 NOTE — PROGRESS NOTES
ANTICOAGULATION FOLLOW-UP CLINIC VISIT    Patient Name:  Billy Stock  Date:  2019  Contact Type:  Face to Face    SUBJECTIVE:  Patient Findings     Comments:   The patient was assessed for diet, medication, and activity level changes, missed or extra doses, bruising or bleeding, with no problem findings.          Clinical Outcomes     Negatives:   Major bleeding event, Thromboembolic event, Anticoagulation-related hospital admission, Anticoagulation-related ED visit, Anticoagulation-related fatality    Comments:   The patient was assessed for diet, medication, and activity level changes, missed or extra doses, bruising or bleeding, with no problem findings.             OBJECTIVE    INR Protime   Date Value Ref Range Status   2019 2.9 (A) 0.86 - 1.14 Final       ASSESSMENT / PLAN  INR assessment THER    Recheck INR In: 4 WEEKS    INR Location Clinic      Anticoagulation Summary  As of 2019    INR goal:   2.0-3.0   TTR:   80.2 % (3.2 y)   INR used for dosin.9 (2019)   Warfarin maintenance plan:   2.5 mg (5 mg x 0.5) every Wed; 5 mg (5 mg x 1) all other days   Full warfarin instructions:   2.5 mg every Wed; 5 mg all other days   Weekly warfarin total:   32.5 mg   No change documented:   Samantha Galeas RN   Plan last modified:   Marge Gale RN (2018)   Next INR check:   2019   Priority:   INR   Target end date:       Indications    Long term current use of anticoagulants with INR goal of 2.0-3.0 [Z79.01]  Chronic atrial fibrillation (H) [I48.2]             Anticoagulation Episode Summary     INR check location:       Preferred lab:       Send INR reminders to:   On license of [facility]    Comments:   Pt reports taking Warfarin in the morning      Anticoagulation Care Providers     Provider Role Specialty Phone number    Malcolm Schafer MD [facility] Internal Medicine [phone]            See the Encounter Report to view Anticoagulation Flowsheet and Dosing  Calendar (Go to Encounters tab in chart review, and find the Anticoagulation Therapy Visit)    Dosage adjustment made based on physician directed care plan.    Samantha Galeas RN

## 2019-07-06 ENCOUNTER — OFFICE VISIT (OUTPATIENT)
Dept: URGENT CARE | Facility: URGENT CARE | Age: 74
End: 2019-07-06
Payer: COMMERCIAL

## 2019-07-06 VITALS
HEART RATE: 76 BPM | SYSTOLIC BLOOD PRESSURE: 128 MMHG | RESPIRATION RATE: 16 BRPM | WEIGHT: 231.5 LBS | OXYGEN SATURATION: 98 % | TEMPERATURE: 96.7 F | DIASTOLIC BLOOD PRESSURE: 76 MMHG | BODY MASS INDEX: 30.54 KG/M2

## 2019-07-06 DIAGNOSIS — G47.00 ACUTE INSOMNIA: Primary | ICD-10-CM

## 2019-07-06 PROCEDURE — 99213 OFFICE O/P EST LOW 20 MIN: CPT | Performed by: FAMILY MEDICINE

## 2019-07-06 RX ORDER — LORAZEPAM 1 MG/1
1 TABLET ORAL
Qty: 7 TABLET | Refills: 0 | Status: SHIPPED | OUTPATIENT
Start: 2019-07-06 | End: 2020-09-05

## 2019-07-06 NOTE — PROGRESS NOTES
SUBJECTIVE:   Billy Stock is a 73 year old male with a history of obstructive sleep apnea (diagnosed in 1995) who is on CPAP at night.  He is presenting with a chief complaint of troubles falling and staying asleep, and has only been able to sleep 1-2 hours a night.  He has had this sleep problem for the past three days.    His working hours increased recently and he has been under a lot more stress, both of which have interfered with his sleep.        Past Medical History:   Diagnosis Date     Bell's palsy 10/15/2002     CA IN SITU PROSTATE 10/15/2002     Chronic atrial fibrillation (H) 7/1/10     Glaucoma 4/10/2003     Problem list name updated by automated process. Provider to review     Gout 5/16/2013     Hyperlipidemia LDL goal <100 9/10/2014     Hypertension goal BP (blood pressure) < 140/90 6/28/2006     SARAH (obstructive sleep apnea) 8/28/2013     Pericarditis 2001     Renal cyst      Current Outpatient Medications   Medication Sig Dispense Refill     acetaminophen (TYLENOL) 325 MG tablet Take 1-2 tablets by mouth every 6 hours as needed.       allopurinol (ZYLOPRIM) 100 MG tablet Take 1 tablet (100 mg) by mouth daily 90 tablet 4     amLODIPine (NORVASC) 10 MG tablet TAKE ONE TABLET BY MOUTH ONCE DAILY DUE  FOR  APPOINTMENT 90 tablet 3     carbidopa-levodopa (SINEMET)  MG tablet Take 1 tablet by mouth 3 times daily       fluticasone (FLONASE) 50 MCG/ACT spray Spray 1-2 sprays into both nostrils daily 1 Bottle 11     FLUZONE HIGH-DOSE 0.5 ML injection ADM 0.5ML IM UTD  0     ipratropium (ATROVENT) 0.03 % nasal spray Spray 2 sprays into both nostrils every 12 hours 1 Box 3     Levocetirizine Dihydrochloride (XYZAL PO) Take 5 mg by mouth daily        lisinopril (PRINIVIL/ZESTRIL) 10 MG tablet TAKE TWO TABLETS BY MOUTH  DAILY 180 tablet 3     meclizine (ANTIVERT) 25 MG tablet Take 1 tablet (25 mg) by mouth every 6 hours as needed for dizziness 30 tablet 1     metoprolol succinate (TOPROL-XL) 100 MG 24  hr tablet TAKE 1 TABLET EVERY DAY 90 tablet 3     triamcinolone (KENALOG) 0.5 % cream Apply sparingly to affected area three times daily. 30 g 0     UNABLE TO FIND MEDICATION NAME: Tabavit, multivitmain without vitamin K in it.       warfarin (COUMADIN) 5 MG tablet TAKE 1 TABLET (5 MG) DAILY EXCEPT TAKE 1/2 TABLET (2.5 MG) ON WEDNESDAY OR AS INSTRUCTED BY INR CLINIC 86 tablet 1     Social History     Tobacco Use     Smoking status: Never Smoker     Smokeless tobacco: Never Used   Substance Use Topics     Alcohol use: No     Alcohol/week: 0.0 oz       ROS:  CONSTITUTIONAL:POSITIVE  for insomnia.   REST:  No shortness of breath.     OBJECTIVE:  /76 (BP Location: Right arm, Patient Position: Sitting, Cuff Size: Adult Regular)   Pulse 76   Temp 96.7  F (35.9  C) (Tympanic)   Resp 16   Wt 105 kg (231 lb 8 oz)   SpO2 98%   BMI 30.54 kg/m    GENERAL APPEARANCE: healthy, alert and no distress  RESP: lungs clear to auscultation - no rales, rhonchi or wheezes  CV: regular rates and rhythm, normal S1 S2, no murmur noted    ASSESSMENT:  Acute Insomnia    PLAN:  Rx:  Lorazepam (Dispense 7 tablets)  follow up with your primary care provider if not better    Serg Morales MD

## 2019-07-23 ENCOUNTER — ANTICOAGULATION THERAPY VISIT (OUTPATIENT)
Dept: ANTICOAGULATION | Facility: CLINIC | Age: 74
End: 2019-07-23
Payer: COMMERCIAL

## 2019-07-23 DIAGNOSIS — I48.20 CHRONIC ATRIAL FIBRILLATION (H): ICD-10-CM

## 2019-07-23 DIAGNOSIS — Z79.01 LONG TERM CURRENT USE OF ANTICOAGULANTS WITH INR GOAL OF 2.0-3.0: ICD-10-CM

## 2019-07-23 LAB — INR POINT OF CARE: 2 (ref 0.86–1.14)

## 2019-07-23 PROCEDURE — 36416 COLLJ CAPILLARY BLOOD SPEC: CPT

## 2019-07-23 PROCEDURE — 85610 PROTHROMBIN TIME: CPT | Mod: QW

## 2019-07-23 PROCEDURE — 99207 ZZC NO CHARGE NURSE ONLY: CPT

## 2019-07-23 NOTE — PROGRESS NOTES
ANTICOAGULATION FOLLOW-UP CLINIC VISIT    Patient Name:  Billy Stock  Date:  2019  Contact Type:  Face to Face    SUBJECTIVE:  Patient Findings     Comments:   The patient was assessed for diet, medication, and activity level changes, missed or extra doses, bruising or bleeding, with no problem findings.          Clinical Outcomes     Negatives:   Major bleeding event, Thromboembolic event, Anticoagulation-related hospital admission, Anticoagulation-related ED visit, Anticoagulation-related fatality    Comments:   The patient was assessed for diet, medication, and activity level changes, missed or extra doses, bruising or bleeding, with no problem findings.             OBJECTIVE    INR Protime   Date Value Ref Range Status   2019 2.0 (A) 0.86 - 1.14 Final       ASSESSMENT / PLAN  INR assessment THER    Recheck INR In: 4 WEEKS    INR Location Clinic      Anticoagulation Summary  As of 2019    INR goal:   2.0-3.0   TTR:   80.6 % (3.2 y)   INR used for dosin.0 (2019)   Warfarin maintenance plan:   2.5 mg (5 mg x 0.5) every Wed; 5 mg (5 mg x 1) all other days   Full warfarin instructions:   2.5 mg every Wed; 5 mg all other days   Weekly warfarin total:   32.5 mg   No change documented:   Marge Gale RN   Plan last modified:   Marge Gale RN (2018)   Next INR check:   2019   Priority:   INR   Target end date:       Indications    Long term current use of anticoagulants with INR goal of 2.0-3.0 [Z79.01]  Chronic atrial fibrillation (H) [I48.2]             Anticoagulation Episode Summary     INR check location:       Preferred lab:       Send INR reminders to:   [facility]    Comments:   Pt reports taking Warfarin in the morning      Anticoagulation Care Providers     Provider Role Specialty Phone number    Malcolm Schafer MD Responsible Internal Medicine [phone]            See the Encounter Report to view Anticoagulation Flowsheet and Dosing Calendar  (Go to Encounters tab in chart review, and find the Anticoagulation Therapy Visit)    Dosage adjustment made based on physician directed care plan.    Marge Glae RN

## 2019-08-20 ENCOUNTER — TELEPHONE (OUTPATIENT)
Dept: ANTICOAGULATION | Facility: CLINIC | Age: 74
End: 2019-08-20

## 2019-08-20 ENCOUNTER — ANTICOAGULATION THERAPY VISIT (OUTPATIENT)
Dept: ANTICOAGULATION | Facility: CLINIC | Age: 74
End: 2019-08-20
Payer: COMMERCIAL

## 2019-08-20 DIAGNOSIS — Z79.01 LONG TERM CURRENT USE OF ANTICOAGULANTS WITH INR GOAL OF 2.0-3.0: ICD-10-CM

## 2019-08-20 DIAGNOSIS — I48.20 CHRONIC ATRIAL FIBRILLATION (H): ICD-10-CM

## 2019-08-20 LAB — INR POINT OF CARE: 2.5 (ref 0.86–1.14)

## 2019-08-20 PROCEDURE — 99207 ZZC NO CHARGE NURSE ONLY: CPT

## 2019-08-20 PROCEDURE — 85610 PROTHROMBIN TIME: CPT | Mod: QW

## 2019-08-20 PROCEDURE — 36416 COLLJ CAPILLARY BLOOD SPEC: CPT

## 2019-08-20 NOTE — PROGRESS NOTES
ANTICOAGULATION FOLLOW-UP CLINIC VISIT    Patient Name:  Billy Stock  Date:  2019  Contact Type:  Face to Face    SUBJECTIVE:  Patient Findings     Comments:   The patient was assessed for diet, medication, and activity level changes, missed or extra doses, bruising or bleeding, with no problem findings.          Clinical Outcomes     Negatives:   Major bleeding event, Thromboembolic event, Anticoagulation-related hospital admission, Anticoagulation-related ED visit, Anticoagulation-related fatality    Comments:   The patient was assessed for diet, medication, and activity level changes, missed or extra doses, bruising or bleeding, with no problem findings.             OBJECTIVE    INR Protime   Date Value Ref Range Status   2019 2.5 (A) 0.86 - 1.14 Final       ASSESSMENT / PLAN  INR assessment THER    Recheck INR In: 4 WEEKS    INR Location Clinic      Anticoagulation Summary  As of 2019    INR goal:   2.0-3.0   TTR:   81.1 % (3.3 y)   INR used for dosin.5 (2019)   Warfarin maintenance plan:   2.5 mg (5 mg x 0.5) every Wed; 5 mg (5 mg x 1) all other days   Full warfarin instructions:   : Hold; : Hold; : Hold; : Hold; : Hold; : 7.5 mg; Otherwise 2.5 mg every Wed; 5 mg all other days   Weekly warfarin total:   32.5 mg   Plan last modified:   Marge Gale RN (2018)   Next INR check:   2019   Priority:   INR   Target end date:       Indications    Long term current use of anticoagulants with INR goal of 2.0-3.0 [Z79.01]  Chronic atrial fibrillation (H) [I48.2]             Anticoagulation Episode Summary     INR check location:       Preferred lab:       Send INR reminders to:   [facility]    Comments:   Pt reports taking Warfarin in the morning      Anticoagulation Care Providers     Provider Role Specialty Phone number    Malcolm Schafer MD [facility] Internal Medicine [phone]            See the Encounter Report to view  Anticoagulation Flowsheet and Dosing Calendar (Go to Encounters tab in chart review, and find the Anticoagulation Therapy Visit)    Dosage adjustment made based on physician directed care plan.    Samantha Galeas RN

## 2019-08-20 NOTE — TELEPHONE ENCOUNTER
Patient in for INR today, reports he will have a colonoscopy 9/9/19. Will patient require Lovenox bridging?  Samantha Galeas RN

## 2019-08-30 DIAGNOSIS — C61 PROSTATE CANCER (H): Primary | ICD-10-CM

## 2019-09-05 ENCOUNTER — OFFICE VISIT (OUTPATIENT)
Dept: UROLOGY | Facility: CLINIC | Age: 74
End: 2019-09-05
Payer: COMMERCIAL

## 2019-09-05 VITALS
HEART RATE: 76 BPM | WEIGHT: 231 LBS | DIASTOLIC BLOOD PRESSURE: 100 MMHG | SYSTOLIC BLOOD PRESSURE: 144 MMHG | OXYGEN SATURATION: 98 % | HEIGHT: 73 IN | BODY MASS INDEX: 30.62 KG/M2

## 2019-09-05 DIAGNOSIS — C61 PROSTATE CANCER (H): ICD-10-CM

## 2019-09-05 LAB — PSA SERPL-MCNC: 0.12 NG/ML (ref 0–4)

## 2019-09-05 PROCEDURE — 36415 COLL VENOUS BLD VENIPUNCTURE: CPT | Performed by: UROLOGY

## 2019-09-05 PROCEDURE — 84153 ASSAY OF PSA TOTAL: CPT | Performed by: UROLOGY

## 2019-09-05 PROCEDURE — 99213 OFFICE O/P EST LOW 20 MIN: CPT | Performed by: UROLOGY

## 2019-09-05 ASSESSMENT — PAIN SCALES - GENERAL: PAINLEVEL: NO PAIN (0)

## 2019-09-05 ASSESSMENT — MIFFLIN-ST. JEOR: SCORE: 1851.69

## 2019-09-05 NOTE — NURSING NOTE
"Chief Complaint   Patient presents with     Follow Up     PSA check     Initial BP (!) 144/100 (BP Location: Left arm, Patient Position: Sitting, Cuff Size: Adult Regular)   Pulse 76   Ht 1.854 m (6' 1\")   Wt 104.8 kg (231 lb)   SpO2 98%   BMI 30.48 kg/m   Estimated body mass index is 30.48 kg/m  as calculated from the following:    Height as of this encounter: 1.854 m (6' 1\").    Weight as of this encounter: 104.8 kg (231 lb).  BP completed using cuff size:regular-left.    Parvez MCDONALD  [facility] Urology September 5, 2019 1:39 PM  "

## 2019-09-05 NOTE — LETTER
9/5/2019       RE: Billy Stock  [address] [place] [address]     Dear Colleague,    Thank you for referring your patient, Billy Stock, to the [facility] [place] at [facility]. Please see a copy of my visit note below.    History: It is a pleasure to see this very pleasant 73-year-old gentleman in follow-up consultation   He was diagnosed with Puma 7 adenocarcinoma the prostate in 2002 and a radical prostatectomy was performed.  He had extensive disease in the prostate and positive surgical margins.  This was followed by adjuvant radiation therapy and then until 2008 regular Eligard 45 therapy.  Since then has been on intermittent hormone therapy.  He received a injection in 2016 and by March 2019 the PSA risen to 3.3 and 6 months ago therefore he received another Eligard injection.  The PSA today 0.12.  The patient has no major complaints    Past Medical History:   Diagnosis Date     Bell's palsy 10/15/2002     CA IN SITU PROSTATE 10/15/2002     Chronic atrial fibrillation (H) 7/1/10     Glaucoma 4/10/2003     Problem list name updated by automated process. Provider to review     Gout 5/16/2013     Hyperlipidemia LDL goal <100 9/10/2014     Hypertension goal BP (blood pressure) < 140/90 6/28/2006     SARAH (obstructive sleep apnea) 8/28/2013     Parkinson disease (H) 2019     Pericarditis 2001     Renal cyst        Social History     Socioeconomic History     Marital status:      Spouse name: None     Number of children: 3     Years of education: None     Highest education level: None   Occupational History     None   Social Needs     Financial resource strain: None     Food insecurity:     Worry: None     Inability: None     Transportation needs:     Medical: None     Non-medical: None   Tobacco Use     Smoking status: Never Smoker     Smokeless tobacco: Never Used   Substance and Sexual Activity     Alcohol use: No      Alcohol/week: 0.0 oz     Drug use: No     Sexual activity: Never   Lifestyle     Physical activity:     Days per week: None     Minutes per session: None     Stress: None   Relationships     Social connections:     Talks on phone: None     Gets together: None     Attends [employer] service: None     Active member of club or organization: None     Attends meetings of clubs or organizations: None     Relationship status: None     Intimate partner violence:     Fear of current or ex partner: None     Emotionally abused: None     Physically abused: None     Forced sexual activity: None   Other Topics Concern     Parent/sibling w/ CABG, MI or angioplasty before 65F 55M? Not Asked      Service Not Asked     Blood Transfusions Not Asked     Caffeine Concern No     Occupational Exposure No     Hobby Hazards No     Sleep Concern Yes     Comment: CPAP at night     Stress Concern No     Weight Concern No     Special Diet No     Back Care No     Exercise No     Bike Helmet Not Asked     Seat Belt Yes     Self-Exams Not Asked   Social History Narrative     None       Past Surgical History:   Procedure Laterality Date     CATARACT IOL, RT/LT  10/15, 11/15     COLONOSCOPY  2009    [facility]     COLONOSCOPY  2014    Dr. Long [facility]     COLONOSCOPY N/A 2014    Procedure: COMBINED COLONOSCOPY, SINGLE BIOPSY/POLYPECTOMY BY BIOPSY;  Surgeon: Puneet Long MD;  Location:  GI     EYE SURGERY      glaucoma surgery right eye     HERNIA REPAIR       PROSTATE SURGERY       SUPRAPUBIC PROSTATECTOMY  2002     VASECTOMY         Family History   Problem Relation Age of Onset     Hypertension Maternal Grandfather      Cerebrovascular Disease Maternal Grandfather      Hypertension Maternal Grandmother      Cerebrovascular Disease Maternal Grandmother      Hypertension Mother          age 51, MVA     Hypertension Brother      Heart Failure Brother      Bronchitis Brother      Other - See Comments Brother         multiple  myeloma     Heart Surgery Brother         defibrilater     Prostate Problems Brother      Diabetes Brother      Other Cancer Brother         Multiple Myeloma     Breast Cancer Maternal Aunt         hx     Cancer Maternal Aunt         cervical cancer     Cancer - colorectal Maternal Aunt      Unknown/Adopted Father         Don't know father's history     Diabetes Son          Current Outpatient Medications:      acetaminophen (TYLENOL) 325 MG tablet, Take 1-2 tablets by mouth every 6 hours as needed., Disp: , Rfl:      allopurinol (ZYLOPRIM) 100 MG tablet, Take 1 tablet (100 mg) by mouth daily, Disp: 90 tablet, Rfl: 4     amLODIPine (NORVASC) 10 MG tablet, TAKE ONE TABLET BY MOUTH ONCE DAILY DUE  FOR  APPOINTMENT, Disp: 90 tablet, Rfl: 3     carbidopa-levodopa (SINEMET)  MG tablet, Take 1 tablet by mouth 3 times daily, Disp: , Rfl:      ipratropium (ATROVENT) 0.03 % nasal spray, Spray 2 sprays into both nostrils every 12 hours, Disp: 1 Box, Rfl: 3     lisinopril (PRINIVIL/ZESTRIL) 10 MG tablet, TAKE TWO TABLETS BY MOUTH  DAILY, Disp: 180 tablet, Rfl: 3     LORazepam (ATIVAN) 1 MG tablet, Take 1 tablet (1 mg) by mouth nightly as needed for sleep, Disp: 7 tablet, Rfl: 0     meclizine (ANTIVERT) 25 MG tablet, Take 1 tablet (25 mg) by mouth every 6 hours as needed for dizziness, Disp: 30 tablet, Rfl: 1     metoprolol succinate (TOPROL-XL) 100 MG 24 hr tablet, TAKE 1 TABLET EVERY DAY, Disp: 90 tablet, Rfl: 3     triamcinolone (KENALOG) 0.5 % cream, Apply sparingly to affected area three times daily., Disp: 30 g, Rfl: 0     UNABLE TO FIND, MEDICATION NAME: Tabavit, multivitmain without vitamin K in it., Disp: , Rfl:      warfarin (COUMADIN) 5 MG tablet, TAKE 1 TABLET (5 MG) DAILY EXCEPT TAKE 1/2 TABLET (2.5 MG) ON WEDNESDAY OR AS INSTRUCTED BY INR CLINIC, Disp: 86 tablet, Rfl: 1     FLUZONE HIGH-DOSE 0.5 ML injection, ADM 0.5ML IM UTD, Disp: , Rfl: 0    10 point ROS of systems including Constitutional, Eyes,  "Respiratory, Cardiovascular, Gastroenterology, Genitourinary, Integumentary, Muscularskeletal, Psychiatric and Neurologic were all negative except for pertinent positives noted in my HPI.    Examination:   BP (!) 144/100 (BP Location: Left arm, Patient Position: Sitting, Cuff Size: Adult Regular)   Pulse 76   Ht 1.854 m (6' 1\")   Wt 104.8 kg (231 lb)   SpO2 98%   BMI 30.48 kg/m     General Impression: Very pleasant patient in no acute distress, well-oriented in time place and person and quite conversational  Mental Status: Normal  HEENT: Extraocular movements intact.  No clinical evidence of jaundice on examination of eyes.  Mucous membranes are unremarkable  Skin: Warm.  No other abnormalities  Respiratory System: Unlabored on room air.  Respiratory cycle normal  Lymph Nodes: Not examined  Back/Flank Tenderness: Not examined  Cardiovascular System: No significant peripheral pitting edema  Abdominal Examination: Mildly obese abdomen  Extremities: The extremities are otherwise unremarkable  Genitial: Not examined  Rectal Examination: Not examined  Neurologic System: There are no significant acute abnormal neurological signs in the central or peripheral nervous systems    Impression: He has made an excellent response to hormone treatment once again.  I am very happy with his progress.  We will repeat the PSA in 6 months along with a further examination.  We will not repeat leuprolide today.  I did carefully discussed the situation with him in detail today.  I answered all his questions    Plan: 6 months for PSA and examination    Time: 15 minutes.  Greater than 50% in discussion and consultation    \"This dictation was performed with voice recognition software and may contain errors,  omissions and inadvertent word substitution.\"        Again, thank you for allowing me to participate in the care of your patient.      Sincerely,    Hoang Huerta MD      "

## 2019-09-05 NOTE — PROGRESS NOTES
History: It is a pleasure to see this very pleasant 73-year-old gentleman in follow-up consultation   He was diagnosed with Puma 7 adenocarcinoma the prostate in 2002 and a radical prostatectomy was performed.  He had extensive disease in the prostate and positive surgical margins.  This was followed by adjuvant radiation therapy and then until 2008 regular Eligard 45 therapy.  Since then has been on intermittent hormone therapy.  He received a injection in 2016 and by March 2019 the PSA risen to 3.3 and 6 months ago therefore he received another Eligard injection.  The PSA today 0.12.  The patient has no major complaints    Past Medical History:   Diagnosis Date     Bell's palsy 10/15/2002     CA IN SITU PROSTATE 10/15/2002     Chronic atrial fibrillation (H) 7/1/10     Glaucoma 4/10/2003     Problem list name updated by automated process. Provider to review     Gout 5/16/2013     Hyperlipidemia LDL goal <100 9/10/2014     Hypertension goal BP (blood pressure) < 140/90 6/28/2006     SARAH (obstructive sleep apnea) 8/28/2013     Parkinson disease (H) 2019     Pericarditis 2001     Renal cyst        Social History     Socioeconomic History     Marital status:      Spouse name: None     Number of children: 3     Years of education: None     Highest education level: None   Occupational History     None   Social Needs     Financial resource strain: None     Food insecurity:     Worry: None     Inability: None     Transportation needs:     Medical: None     Non-medical: None   Tobacco Use     Smoking status: Never Smoker     Smokeless tobacco: Never Used   Substance and Sexual Activity     Alcohol use: No     Alcohol/week: 0.0 oz     Drug use: No     Sexual activity: Never   Lifestyle     Physical activity:     Days per week: None     Minutes per session: None     Stress: None   Relationships     Social connections:     Talks on phone: None     Gets together: None     Attends [employer] service: None     Active  member of club or organization: None     Attends meetings of clubs or organizations: None     Relationship status: None     Intimate partner violence:     Fear of current or ex partner: None     Emotionally abused: None     Physically abused: None     Forced sexual activity: None   Other Topics Concern     Parent/sibling w/ CABG, MI or angioplasty before 65F 55M? Not Asked      Service Not Asked     Blood Transfusions Not Asked     Caffeine Concern No     Occupational Exposure No     Hobby Hazards No     Sleep Concern Yes     Comment: CPAP at night     Stress Concern No     Weight Concern No     Special Diet No     Back Care No     Exercise No     Bike Helmet Not Asked     Seat Belt Yes     Self-Exams Not Asked   Social History Narrative     None       Past Surgical History:   Procedure Laterality Date     CATARACT IOL, RT/LT  10/15, 11/15     COLONOSCOPY      [facility]     COLONOSCOPY  2014    Dr. Long [facility]     COLONOSCOPY N/A 2014    Procedure: COMBINED COLONOSCOPY, SINGLE BIOPSY/POLYPECTOMY BY BIOPSY;  Surgeon: Puneet Long MD;  Location:  GI     EYE SURGERY      glaucoma surgery right eye     HERNIA REPAIR       PROSTATE SURGERY       SUPRAPUBIC PROSTATECTOMY  2002     VASECTOMY         Family History   Problem Relation Age of Onset     Hypertension Maternal Grandfather      Cerebrovascular Disease Maternal Grandfather      Hypertension Maternal Grandmother      Cerebrovascular Disease Maternal Grandmother      Hypertension Mother          age 51, MVA     Hypertension Brother      Heart Failure Brother      Bronchitis Brother      Other - See Comments Brother         multiple myeloma     Heart Surgery Brother         defibrilater     Prostate Problems Brother      Diabetes Brother      Other Cancer Brother         Multiple Myeloma     Breast Cancer Maternal Aunt         hx     Cancer Maternal Aunt         cervical cancer     Cancer - colorectal Maternal Aunt       Unknown/Adopted Father         Don't know father's history     Diabetes Son          Current Outpatient Medications:      acetaminophen (TYLENOL) 325 MG tablet, Take 1-2 tablets by mouth every 6 hours as needed., Disp: , Rfl:      allopurinol (ZYLOPRIM) 100 MG tablet, Take 1 tablet (100 mg) by mouth daily, Disp: 90 tablet, Rfl: 4     amLODIPine (NORVASC) 10 MG tablet, TAKE ONE TABLET BY MOUTH ONCE DAILY DUE  FOR  APPOINTMENT, Disp: 90 tablet, Rfl: 3     carbidopa-levodopa (SINEMET)  MG tablet, Take 1 tablet by mouth 3 times daily, Disp: , Rfl:      ipratropium (ATROVENT) 0.03 % nasal spray, Spray 2 sprays into both nostrils every 12 hours, Disp: 1 Box, Rfl: 3     lisinopril (PRINIVIL/ZESTRIL) 10 MG tablet, TAKE TWO TABLETS BY MOUTH  DAILY, Disp: 180 tablet, Rfl: 3     LORazepam (ATIVAN) 1 MG tablet, Take 1 tablet (1 mg) by mouth nightly as needed for sleep, Disp: 7 tablet, Rfl: 0     meclizine (ANTIVERT) 25 MG tablet, Take 1 tablet (25 mg) by mouth every 6 hours as needed for dizziness, Disp: 30 tablet, Rfl: 1     metoprolol succinate (TOPROL-XL) 100 MG 24 hr tablet, TAKE 1 TABLET EVERY DAY, Disp: 90 tablet, Rfl: 3     triamcinolone (KENALOG) 0.5 % cream, Apply sparingly to affected area three times daily., Disp: 30 g, Rfl: 0     UNABLE TO FIND, MEDICATION NAME: Tabavit, multivitmain without vitamin K in it., Disp: , Rfl:      warfarin (COUMADIN) 5 MG tablet, TAKE 1 TABLET (5 MG) DAILY EXCEPT TAKE 1/2 TABLET (2.5 MG) ON WEDNESDAY OR AS INSTRUCTED BY INR CLINIC, Disp: 86 tablet, Rfl: 1     FLUZONE HIGH-DOSE 0.5 ML injection, ADM 0.5ML IM UTD, Disp: , Rfl: 0    10 point ROS of systems including Constitutional, Eyes, Respiratory, Cardiovascular, Gastroenterology, Genitourinary, Integumentary, Muscularskeletal, Psychiatric and Neurologic were all negative except for pertinent positives noted in my HPI.    Examination:   BP (!) 144/100 (BP Location: Left arm, Patient Position: Sitting, Cuff Size: Adult Regular)    "Pulse 76   Ht 1.854 m (6' 1\")   Wt 104.8 kg (231 lb)   SpO2 98%   BMI 30.48 kg/m    General Impression: Very pleasant patient in no acute distress, well-oriented in time place and person and quite conversational  Mental Status: Normal  HEENT: Extraocular movements intact.  No clinical evidence of jaundice on examination of eyes.  Mucous membranes are unremarkable  Skin: Warm.  No other abnormalities  Respiratory System: Unlabored on room air.  Respiratory cycle normal  Lymph Nodes: Not examined  Back/Flank Tenderness: Not examined  Cardiovascular System: No significant peripheral pitting edema  Abdominal Examination: Mildly obese abdomen  Extremities: The extremities are otherwise unremarkable  Genitial: Not examined  Rectal Examination: Not examined  Neurologic System: There are no significant acute abnormal neurological signs in the central or peripheral nervous systems    Impression: He has made an excellent response to hormone treatment once again.  I am very happy with his progress.  We will repeat the PSA in 6 months along with a further examination.  We will not repeat leuprolide today.  I did carefully discussed the situation with him in detail today.  I answered all his questions    Plan: 6 months for PSA and examination    Time: 15 minutes.  Greater than 50% in discussion and consultation    \"This dictation was performed with voice recognition software and may contain errors,  omissions and inadvertent word substitution.\"      "

## 2019-09-09 ENCOUNTER — HOSPITAL ENCOUNTER (OUTPATIENT)
Facility: CLINIC | Age: 74
Discharge: HOME | End: 2019-09-09
Attending: INTERNAL MEDICINE | Admitting: INTERNAL MEDICINE
Payer: COMMERCIAL

## 2019-09-09 VITALS
BODY MASS INDEX: 30.48 KG/M2 | RESPIRATION RATE: 20 BRPM | HEART RATE: 64 BPM | DIASTOLIC BLOOD PRESSURE: 91 MMHG | OXYGEN SATURATION: 95 % | SYSTOLIC BLOOD PRESSURE: 135 MMHG | HEIGHT: 73 IN | WEIGHT: 230 LBS

## 2019-09-09 LAB
COLONOSCOPY: NORMAL
INR PPP: 1.12 (ref 0.86–1.14)

## 2019-09-09 PROCEDURE — 88305 TISSUE EXAM BY PATHOLOGIST: CPT | Mod: 26 | Performed by: INTERNAL MEDICINE

## 2019-09-09 PROCEDURE — 45385 COLONOSCOPY W/LESION REMOVAL: CPT | Performed by: INTERNAL MEDICINE

## 2019-09-09 PROCEDURE — G0500 MOD SEDAT ENDO SERVICE >5YRS: HCPCS | Performed by: INTERNAL MEDICINE

## 2019-09-09 PROCEDURE — 88305 TISSUE EXAM BY PATHOLOGIST: CPT | Performed by: INTERNAL MEDICINE

## 2019-09-09 PROCEDURE — 25000128 H RX IP 250 OP 636: Performed by: INTERNAL MEDICINE

## 2019-09-09 PROCEDURE — 85610 PROTHROMBIN TIME: CPT | Performed by: INTERNAL MEDICINE

## 2019-09-09 RX ORDER — ONDANSETRON 2 MG/ML
4 INJECTION INTRAMUSCULAR; INTRAVENOUS EVERY 6 HOURS PRN
Status: DISCONTINUED | OUTPATIENT
Start: 2019-09-09 | End: 2019-09-09 | Stop reason: HOSPADM

## 2019-09-09 RX ORDER — LIDOCAINE 40 MG/G
CREAM TOPICAL
Status: DISCONTINUED | OUTPATIENT
Start: 2019-09-09 | End: 2019-09-09

## 2019-09-09 RX ORDER — ONDANSETRON 2 MG/ML
4 INJECTION INTRAMUSCULAR; INTRAVENOUS
Status: DISCONTINUED | OUTPATIENT
Start: 2019-09-09 | End: 2019-09-09 | Stop reason: HOSPADM

## 2019-09-09 RX ORDER — FLUMAZENIL 0.1 MG/ML
0.2 INJECTION, SOLUTION INTRAVENOUS
Status: DISCONTINUED | OUTPATIENT
Start: 2019-09-09 | End: 2019-09-09 | Stop reason: HOSPADM

## 2019-09-09 RX ORDER — FENTANYL CITRATE 50 UG/ML
INJECTION, SOLUTION INTRAMUSCULAR; INTRAVENOUS PRN
Status: DISCONTINUED | OUTPATIENT
Start: 2019-09-09 | End: 2019-09-09 | Stop reason: HOSPADM

## 2019-09-09 RX ORDER — ONDANSETRON 4 MG/1
4 TABLET, ORALLY DISINTEGRATING ORAL EVERY 6 HOURS PRN
Status: DISCONTINUED | OUTPATIENT
Start: 2019-09-09 | End: 2019-09-09 | Stop reason: HOSPADM

## 2019-09-09 RX ORDER — NALOXONE HYDROCHLORIDE 0.4 MG/ML
.1-.4 INJECTION, SOLUTION INTRAMUSCULAR; INTRAVENOUS; SUBCUTANEOUS
Status: DISCONTINUED | OUTPATIENT
Start: 2019-09-09 | End: 2019-09-09 | Stop reason: HOSPADM

## 2019-09-09 RX ORDER — LIDOCAINE 40 MG/G
CREAM TOPICAL
Status: DISCONTINUED | OUTPATIENT
Start: 2019-09-09 | End: 2019-09-09 | Stop reason: HOSPADM

## 2019-09-09 ASSESSMENT — MIFFLIN-ST. JEOR: SCORE: 1847.15

## 2019-09-09 NOTE — LETTER
August 8, 2019      Billy Stock  [address] [place] [address]        Dear Billy,     If you take an anticoagulant or anti-platelet medication (such as Coumadin , Lovenox , Pradaxa , Xarelto , Eliquis , etc.), please call your primary doctor for advice on holding this medication.    We ill draw and INR when you arrive   Thank you for choosing [facility]. You are scheduled for the following service(s).   Please be aware that coverage of these services is subject to the terms and limitations of your health insurance plan.  Call member services at your health plan with any benefit or coverage questions.  Date:  9-9-19       Procedure: COLONOSCOPY  Doctor:   Ethan      Arrival Time:  130   *check in at Emergency/Endoscopy desk*  Procedure Time:  230    Location:   [facility]        Endoscopy Department, First Floor (Enter through ER Doors) *         [address] [place], [place] [address] [phone] or [phone] ([facility]) to reschedule        NuLYTELY  PREP  Colonoscopy is the most accurate test to detect colon polyps and colon cancer; and the only test where polyps can be removed. During this procedure, a doctor examines the lining of your large intestine and rectum through a flexible tube.         Transportation  You must arrange for a ride for the day of your procedure with a responsible adult. A taxi , Uber, etc, is not an option unless you are accompanied by a responsible adult. If you fail to arrange transportation with a responsible adult, your procedure will be cancelled and rescheduled.    Fill your enclosed prescription for NuLYTELY  at your local pharmacy. Please call our office at [phone] if you did not receive a prescription.    COLONOSCOPY PRE-PROCEDURE CHECKLIST  If you have diabetes, ask your regular doctor for diet and medication restrictions.  If you take aspirin you may continue to do so.  If you are or may be  pregnant, please discuss the risks and benefits of this procedure with your doctor.    PREPARATION FOR COLONOSCOPY    7 days before:    Discontinue fiber supplements and medications containing iron. This includes Metamucil  and Fibercon ; and multivitamins with iron.  3 days before:    Begin a low-fiber diet. A low-fiber diet helps making the cleanout more effective. For additional details on low-fiber diet, please refer to the table on the last page.  2 days before:    Continue the low-fiber diet.     Drink at least 8 glasses of water throughout the day.     Stop eating solid foods at 11:45 pm.  1. 1 day before:    In the morning: begin a clear liquid diet (liquids you can see through).     Examples of a clear liquid diet include: water, clear broth or bouillon, Gatorade, Pedialyte or Powerade, carbonated and non-carbonated soft drinks (Sprite , 7-Up , ginger ale), strained fruit juices without pulp (apple, white grape, white cranberry), Jell-O  and popsicles.     The following are not allowed on a clear liquid diet: red liquids, alcoholic beverages,  dairy products (milk, creamer, and yogurt), protein shakes,  juice with pulp and chewing tobacco.    At 4pm: drink 1 (one) 8 oz glass of NuLYTELY  solution every 15 minutes (approximately 8 glasses of 8 oz or half the bottle). Keep the solution refrigerated. Do not drink any other liquids while you are drinking the NuLYTELY  solution.    Over the course of the evening, drink an additional   liter of clear liquids and continue clear liquid diet.    Day of procedure:    6 hours before the procedure: drink 1 (one) 8 oz glass of NuLYTELY  solution every 15 minutes until the last half of the bottle (approximately 8 glasses of 8 oz) is gone. Keep the solution refrigerated. Do not drink any other liquids while you are drinking the NuLYTELY  solution. After that, you may continue the clear liquid diet until two hours before the procedure.      You may take all of your morning  medications including blood pressure medications, blood thinners (if you have not been instructed to stop these by our office), methadone, and anti-seizure medications with sips of water 2 hours prior to your procedure or earlier. Do not take insulin or vitamins prior to your procedure.       3 hours prior:   o STOP consuming all liquids   o Do not take anything by mouth during this time.   o Allow extra time to travel to your procedure as you may need to stop and use a restroom along the way.  You are ready for the procedure, if you followed all instructions and your stool is no longer formed, but clear or yellow liquid. If you are unsure whether your colon is clean, please call our office at [phone] before you leave for your appointment.    COLON CLEANSING TIPS: drink adequate amounts of fluids before and after your colon cleansing to prevent dehydration. Stay near a toilet because you will have diarrhea. Even if you are sitting on the toilet, continue to drink the cleansing solution every 15 minutes. If you feel nauseous or vomit, rinse your mouth with water, take a 15 to 30-minute-break and then continue drinking the solution. You will be uncomfortable until the stool has flushed from your colon (in about 2 to 4 hours). You may feel chilled.    Bring the following to your procedure:  - Insurance Card/Photo ID.   - List of current medications including over-the-counter medications and supplements.   - Your rescue inhaler if you currently use one to control asthma.    Canceling or rescheduling your appointment:   If you must cancel or reschedule your appointment, please call [phone] as soon as possible.      What happens during a colonoscopy?    Plan to spend up to two hours, starting at registration time, at the endoscopy center the day of your procedure. The colonoscopy takes an average of 15 to 30 minutes. Recovery time is about 30 minutes.    Before the exam:    You will change into a gown.    Your  medical history and medication list will be reviewed with you, unless that has been done over the phone prior to the procedure.     A nurse will insert an intravenous (IV) line into your hand or arm.    The doctor will meet with you and will give you a consent form to sign.  1.   2. During the exam:     Medicine will be given through the IV line to help you relax.     Your heart rate and oxygen levels will be monitored. If your blood pressure is low, you may be given fluids through the IV line.     The doctor will insert a flexible hollow tube, called a colonoscope, into your rectum. The scope will be advanced slowly through the large intestine (colon).    You may have a feeling of fullness or pressure.     If an abnormal tissue or a polyp is found, the doctor may remove it through the endoscope for closer examination, or biopsy. Tissue removal is painless.    After the exam:           Any tissue samples removed during the exam will be sent to a lab for evaluation. It may take 5-7 working days for you to be notified of the results.     A nurse will provide you with complete discharge instructions before you leave the endoscopy center. Be sure to ask the nurse for specific instructions if you take blood thinners such as Aspirin, Coumadin or Plavix.     The doctor will prepare a full report for you and for the physician who referred you for the procedure.     Your doctor will talk with you about the initial results of your exam.      Medication given during the exam will prohibit you from driving for the rest of the day.     Following the exam, you may resume your normal diet. Your first meal should be light, no greasy foods. Avoid alcohol until the next day.     You may resume your regular activities the day after the procedure.     LOW-FIBER DIET  Foods RECOMMENDED Foods to AVOID   Breads, Cereal, Rice and Pasta:   White bread, rolls, biscuits, croissant and amaya toast.   Waffles, Yakut toast and pancakes.   White  rice, noodles, pasta, macaroni and peeled cooked potatoes.   Plain crackers and saltines.   Cooked cereals: farina, cream of rice.   Cold cereals: Puffed Rice , Rice Krispies , Corn Flakes  and Special K    Breads, Cereal, Rice and Pasta:   Breads or rolls with nuts, seeds or fruit.   Whole wheat, pumpernickel, rye breads and cornbread.   Potatoes with skin, brown or wild rice, and kasha (buckwheat).     Vegetables:   Tender cooked and canned vegetables without seeds: carrots, asparagus tips, green or wax beans, pumpkin, spinach, lima beans. Vegetables:   Raw or steamed vegetables.   Vegetables with seeds.   Sauerkraut.   Winter squash, peas, broccoli, Brussel sprouts, cabbage, onions, cauliflower, baked beans, peas and corn.   Fruits:   Strained fruit juice.   Canned fruit, except pineapple.   Ripe bananas and melon. Fruits:   Prunes and prune juice.   Raw fruits.   Dried fruits: figs, dates and raisins.   Milk/Dairy:   Milk: plain or flavored.   Yogurt, custard and ice cream.   Cheese and cottage cheese Milk/Dairy:     Meat and other proteins:   ground, well-cooked tender beef, lamb, ham, veal, pork, fish, poultry and organ meats.   Eggs.   Peanut butter without nuts. Meat and other proteins:   Tough, fibrous meats with gristle.   Dry beans, peas and lentils.   Peanut butter with nuts.   Tofu.   Fats, Snack, Sweets, Condiments and Beverages:   Margarine, butter, oils, mayonnaise, sour cream and salad dressing, plain gravy.   Sugar, hard candy, clear jelly, honey and syrup.   Spices, cooked herbs, bouillon, broth and soups made with allowed vegetable, ketchup and mustard.   Coffee, tea and carbonated drinks.   Plain cakes, cookies and pretzels.   Gelatin, plain puddings, custard, ice cream, sherbet and popsicles. Fats, Snack, Sweets, Condiments and Beverages:   Nuts, seeds and coconut.   Jam, marmalade and preserves.   Pickles, olives, relish and horseradish.   All desserts containing nuts, seeds, dried fruit and  coconut; or made from whole grains or bran.   Candy made with nuts or seeds.   Popcorn.       DIRECTIONS TO THE ENDOSCOPY DEPARTMENT    From the north ([place], [facility])  Take [address] South, exit on [address] . Get into the left hand giorgi, turn left (east), go one-half mile to [address] and turn left. Go north to the first stoplight, take a right on [address] and follow it to the Emergency entrance.    From the south ([facility])  Take [address]N to the [address]E split and exit on [address] . On [address] , turn left (west) to [address]. Turn right (north) on [address]. Go north to the first stoplight, take a right on [address] and follow it to the Emergency entrance.    From the east via 35E ([facility])  Take [address] south to [address]  exit. Turn right on [address]. Go west to [address]. Turn right (north) on [address]. Go to the first stoplight, take a right and follow on [address] to the Emergency entrance.    From the east via [address] ([facility])  Take [address] to [address]. Turn left (south) on [address] to [address]. Turn left (east) on [address] and follow it to the Emergency entrance.    From the west via [address] ([place], [place])  Take [address] east to [address]. Turn right (south) on [address] to [address]. Turn left (east) on [address] and follow it to the Emergency entrance.

## 2019-09-09 NOTE — DISCHARGE INSTRUCTIONS
Understanding Colon and Rectal Polyps     The colon has a smooth lining composed of millions of cells.     The colon (also called the large intestine) is a muscular tube that forms the last part of the digestive tract. It absorbs water and stores food waste. The colon is about 4 to 6 feet long. The rectum is the last 6 inches of the colon. The colon and rectum have a smooth lining composed of millions of cells. Changes in these cells can lead to growths in the colon that can become cancerous and should be removed.     When the Colon Lining Changes  Changes that occur in the cells that line the colon or rectum can lead to growths called polyps. Over a period of years, polyps can turn cancerous. Removing polyps early may prevent cancer from ever forming.      Polyps  Polyps are fleshy clumps of tissue that form on the lining of the colon or rectum. Small polyps are usually benign (not cancerous). However, over time, cells in a polyp can change and become cancerous. The larger a polyp grows, the more likely this is to happen. Also, certain types of polyps known as adenomatous polyps are considered premalignant. This means that they will almost always become cancerous if they re not removed.          Cancer  Almost all colorectal cancers start when polyp cells begin growing abnormally. As a cancerous tumor grows, it may involve more and more of the colon or rectum. In time, cancer can also grow beyond the colon or rectum and spread to nearby organs or to glands called lymph nodes. The cells can also travel to other parts of the body. This is known as metastasis. The earlier a cancerous tumor is removed, the better the chance of preventing its spread.        [id number] [facility], [address] [place] [address], [place] [address]. All rights reserved. This information is not intended as a substitute for professional medical care. Always follow your healthcare professional's instructions.

## 2019-09-10 LAB — COPATH REPORT: NORMAL

## 2019-09-18 ENCOUNTER — ANTICOAGULATION THERAPY VISIT (OUTPATIENT)
Dept: ANTICOAGULATION | Facility: CLINIC | Age: 74
End: 2019-09-18
Payer: COMMERCIAL

## 2019-09-18 DIAGNOSIS — I48.20 CHRONIC ATRIAL FIBRILLATION (H): ICD-10-CM

## 2019-09-18 DIAGNOSIS — Z79.01 LONG TERM CURRENT USE OF ANTICOAGULANTS WITH INR GOAL OF 2.0-3.0: ICD-10-CM

## 2019-09-18 LAB — INR POINT OF CARE: 1.8 (ref 0.86–1.14)

## 2019-09-18 PROCEDURE — 99207 ZZC NO CHARGE NURSE ONLY: CPT

## 2019-09-18 PROCEDURE — 36416 COLLJ CAPILLARY BLOOD SPEC: CPT

## 2019-09-18 PROCEDURE — 85610 PROTHROMBIN TIME: CPT | Mod: QW

## 2019-09-18 NOTE — PROGRESS NOTES
ANTICOAGULATION FOLLOW-UP CLINIC VISIT    Patient Name:  Billy Stock  Date:  2019  Contact Type:  Face to Face    SUBJECTIVE:  Patient Findings     Positives:   Missed doses (intentionally held for colonoscopy.  Restarted about 1 week ago.  )    Comments:   The patient was assessed for diet, medication, and activity level changes, extra doses, bruising or bleeding, with no problem findings.          Clinical Outcomes     Negatives:   Major bleeding event, Thromboembolic event, Anticoagulation-related hospital admission, Anticoagulation-related ED visit, Anticoagulation-related fatality    Comments:   The patient was assessed for diet, medication, and activity level changes, extra doses, bruising or bleeding, with no problem findings.             OBJECTIVE    INR Protime   Date Value Ref Range Status   2019 1.8 (A) 0.86 - 1.14 Final       ASSESSMENT / PLAN  INR assessment SUB    Recheck INR In: 5 DAYS    INR Location Clinic      Anticoagulation Summary  As of 2019    INR goal:   2.0-3.0   TTR:   79.8 % (3.4 y)   INR used for dosin.8! (2019)   Warfarin maintenance plan:   2.5 mg (5 mg x 0.5) every Wed; 5 mg (5 mg x 1) all other days   Full warfarin instructions:   : 5 mg; Otherwise 2.5 mg every Wed; 5 mg all other days   Weekly warfarin total:   32.5 mg   Plan last modified:   Marge Gale RN (2018)   Next INR check:   2019   Priority:   INR   Target end date:       Indications    Long term current use of anticoagulants with INR goal of 2.0-3.0 [Z79.01]  Chronic atrial fibrillation (H) [I48.2]             Anticoagulation Episode Summary     INR check location:       Preferred lab:       Send INR reminders to:   [facility]    Comments:   Pt reports taking Warfarin in the morning      Anticoagulation Care Providers     Provider Role Specialty Phone number    Malcolm Schafer MD [facility] [phone]            See the Encounter Report  to view Anticoagulation Flowsheet and Dosing Calendar (Go to Encounters tab in chart review, and find the Anticoagulation Therapy Visit)    Dosage adjustment made based on physician directed care plan.    Marge Gale RN

## 2019-09-23 ENCOUNTER — ANTICOAGULATION THERAPY VISIT (OUTPATIENT)
Dept: ANTICOAGULATION | Facility: CLINIC | Age: 74
End: 2019-09-23
Payer: COMMERCIAL

## 2019-09-23 DIAGNOSIS — Z79.01 LONG TERM CURRENT USE OF ANTICOAGULANTS WITH INR GOAL OF 2.0-3.0: ICD-10-CM

## 2019-09-23 DIAGNOSIS — I48.20 CHRONIC ATRIAL FIBRILLATION (H): ICD-10-CM

## 2019-09-23 LAB — INR POINT OF CARE: 2.7 (ref 0.86–1.14)

## 2019-09-23 PROCEDURE — 85610 PROTHROMBIN TIME: CPT | Mod: QW

## 2019-09-23 PROCEDURE — 99207 ZZC NO CHARGE NURSE ONLY: CPT

## 2019-09-23 PROCEDURE — 36416 COLLJ CAPILLARY BLOOD SPEC: CPT

## 2019-09-23 NOTE — PROGRESS NOTES
ANTICOAGULATION FOLLOW-UP CLINIC VISIT    Patient Name:  Billy Stock  Date:  2019  Contact Type:  Face to Face    SUBJECTIVE:  Patient Findings     Comments:   The patient was assessed for diet, medication, and activity level changes, missed or extra doses, bruising or bleeding, with no problem findings.          Clinical Outcomes     Negatives:   Major bleeding event, Thromboembolic event, Anticoagulation-related hospital admission, Anticoagulation-related ED visit, Anticoagulation-related fatality    Comments:   The patient was assessed for diet, medication, and activity level changes, missed or extra doses, bruising or bleeding, with no problem findings.             OBJECTIVE    INR Protime   Date Value Ref Range Status   2019 2.7 (A) 0.86 - 1.14 Final       ASSESSMENT / PLAN  INR assessment THER    Recheck INR In: 3 WEEKS    INR Location Clinic      Anticoagulation Summary  As of 2019    INR goal:   2.0-3.0   TTR:   79.8 % (3.4 y)   INR used for dosin.7 (2019)   Warfarin maintenance plan:   2.5 mg (5 mg x 0.5) every Wed; 5 mg (5 mg x 1) all other days   Full warfarin instructions:   2.5 mg every Wed; 5 mg all other days   Weekly warfarin total:   32.5 mg   No change documented:   Samantha Galeas RN   Plan last modified:   Marge Gale RN (2018)   Next INR check:   10/14/2019   Priority:   INR   Target end date:       Indications    Long term current use of anticoagulants with INR goal of 2.0-3.0 [Z79.01]  Chronic atrial fibrillation (H) [I48.2]             Anticoagulation Episode Summary     INR check location:       Preferred lab:       Send INR reminders to:   [facility]    Comments:   Pt reports taking Warfarin in the morning      Anticoagulation Care Providers     Provider Role Specialty Phone number    Malcolm Schafer MD [facility] Internal Medicine [phone]            See the Encounter Report to view Anticoagulation Flowsheet and Dosing  Calendar (Go to Encounters tab in chart review, and find the Anticoagulation Therapy Visit)    Dosage adjustment made based on physician directed care plan.    Samantha Galeas RN

## 2019-10-29 ENCOUNTER — ANTICOAGULATION THERAPY VISIT (OUTPATIENT)
Dept: ANTICOAGULATION | Facility: CLINIC | Age: 74
End: 2019-10-29
Payer: COMMERCIAL

## 2019-10-29 DIAGNOSIS — Z23 NEED FOR PROPHYLACTIC VACCINATION AND INOCULATION AGAINST INFLUENZA: Primary | ICD-10-CM

## 2019-10-29 DIAGNOSIS — I48.20 CHRONIC ATRIAL FIBRILLATION (H): ICD-10-CM

## 2019-10-29 DIAGNOSIS — Z79.01 LONG TERM CURRENT USE OF ANTICOAGULANTS WITH INR GOAL OF 2.0-3.0: ICD-10-CM

## 2019-10-29 LAB — INR POINT OF CARE: 3.6 (ref 0.86–1.14)

## 2019-10-29 PROCEDURE — G0008 ADMIN INFLUENZA VIRUS VAC: HCPCS

## 2019-10-29 PROCEDURE — 90662 IIV NO PRSV INCREASED AG IM: CPT

## 2019-10-29 PROCEDURE — 99207 ZZC NO CHARGE NURSE ONLY: CPT

## 2019-10-29 PROCEDURE — 36416 COLLJ CAPILLARY BLOOD SPEC: CPT

## 2019-10-29 PROCEDURE — 85610 PROTHROMBIN TIME: CPT | Mod: QW

## 2019-10-29 NOTE — PROGRESS NOTES
ANTICOAGULATION FOLLOW-UP CLINIC VISIT    Patient Name:  Billy Stock  Date:  10/29/2019  Contact Type:  Face to Face    SUBJECTIVE:  Patient Findings     Positives:   Change in diet/appetite (Was gone visiting his brother for 3 weeks.  During that time he was not eating many green veggies.  Plans to resume his normal diet.)    Comments:   The patient was assessed for medication, and activity level changes, missed or extra doses, bruising or bleeding, with no problem findings.  Flu shot also given today.            Clinical Outcomes     Negatives:   Major bleeding event, Thromboembolic event, Anticoagulation-related hospital admission, Anticoagulation-related ED visit, Anticoagulation-related fatality    Comments:   The patient was assessed for medication, and activity level changes, missed or extra doses, bruising or bleeding, with no problem findings.  Flu shot also given today.               OBJECTIVE    INR Protime   Date Value Ref Range Status   10/29/2019 3.6 (A) 0.86 - 1.14 Final       ASSESSMENT / PLAN  INR assessment SUPRA    Recheck INR In: 2 WEEKS    INR Location Clinic      Anticoagulation Summary  As of 10/29/2019    INR goal:   2.0-3.0   TTR:   78.5 % (3.5 y)   INR used for dosing:   3.6! (10/29/2019)   Warfarin maintenance plan:   2.5 mg (5 mg x 0.5) every Wed; 5 mg (5 mg x 1) all other days   Full warfarin instructions:   10/29: 2.5 mg; Otherwise 2.5 mg every Wed; 5 mg all other days   Weekly warfarin total:   32.5 mg   Plan last modified:   Marge Gale RN (7/26/2018)   Next INR check:   11/11/2019   Priority:   INR   Target end date:       Indications    Long term current use of anticoagulants with INR goal of 2.0-3.0 [Z79.01]  Chronic atrial fibrillation [I48.20]             Anticoagulation Episode Summary     INR check location:       Preferred lab:       [facility]    Comments:   Pt reports taking Warfarin in the morning      Anticoagulation Care  Providers     Provider Role Specialty Phone number    Malcolm Schafer MD Responsible Internal Medicine [phone]            See the Encounter Report to view Anticoagulation Flowsheet and Dosing Calendar (Go to Encounters tab in chart review, and find the Anticoagulation Therapy Visit)    Dosage adjustment made based on physician directed care plan.    Marge Gale RN

## 2019-10-31 DIAGNOSIS — I10 ESSENTIAL HYPERTENSION WITH GOAL BLOOD PRESSURE LESS THAN 140/90: ICD-10-CM

## 2019-10-31 DIAGNOSIS — M10.9 GOUT OF FOOT, UNSPECIFIED CAUSE, UNSPECIFIED CHRONICITY, UNSPECIFIED LATERALITY: ICD-10-CM

## 2019-10-31 DIAGNOSIS — I10 BENIGN ESSENTIAL HYPERTENSION: ICD-10-CM

## 2019-10-31 RX ORDER — ALLOPURINOL 100 MG/1
TABLET ORAL
Qty: 90 TABLET | Refills: 0 | Status: SHIPPED | OUTPATIENT
Start: 2019-10-31 | End: 2020-01-28

## 2019-10-31 RX ORDER — AMLODIPINE BESYLATE 10 MG/1
TABLET ORAL
Qty: 90 TABLET | Refills: 0 | Status: SHIPPED | OUTPATIENT
Start: 2019-10-31 | End: 2020-01-23

## 2019-10-31 RX ORDER — LISINOPRIL 10 MG/1
TABLET ORAL
Qty: 180 TABLET | Refills: 0 | Status: SHIPPED | OUTPATIENT
Start: 2019-10-31 | End: 2019-11-12

## 2019-10-31 NOTE — TELEPHONE ENCOUNTER
"Requested Prescriptions   Pending Prescriptions Disp Refills     amLODIPine (NORVASC) 10 MG tablet [Pharmacy Med Name: AMLODIPINE 10MG TAB] 90 tablet 3     Sig: TAKE 1 TABLET BY MOUTH ONCE DAILY. DUE FOR APPOINTMENT   Last Written Prescription Date:  10/29/2018  Last Fill Quantity: 90,  # refills: 03   Last office visit: 6/3/2019 with prescribing provider:     Future Office Visit:      Calcium Channel Blockers Protocol  Failed - 10/31/2019 11:29 AM        Failed - Blood pressure under 140/90 in past 12 months     BP Readings from Last 3 Encounters:   09/09/19 (!) 135/91   09/05/19 (!) 144/100   07/06/19 128/76                 Failed - Normal serum creatinine on file in past 12 months     Recent Labs   Lab Test 06/03/19  1525   CR 1.48*             Passed - Recent (12 mo) or future (30 days) visit within the authorizing provider's specialty     Patient has had an office visit with the authorizing provider or a provider within the authorizing providers department within the previous 12 mos or has a future within next 30 days. See \"Patient Info\" tab in inbasket, or \"Choose Columns\" in Meds & Orders section of the refill encounter.              Passed - Medication is active on med list        Passed - Patient is age 18 or older        lisinopril (PRINIVIL/ZESTRIL) 10 MG tablet [Pharmacy Med Name: LISINOPRIL 10MG  TAB] 180 tablet 3     Sig: TAKE 2 TABLETS BY MOUTH ONCE DAILY   Last Written Prescription Date:  10/29/2018  Last Fill Quantity: 180,  # refills: 03   Last office visit: 6/3/2019 with prescribing provider:     Future Office Visit:      ACE Inhibitors (Including Combos) Protocol Failed - 10/31/2019 11:29 AM        Failed - Blood pressure under 140/90 in past 12 months     BP Readings from Last 3 Encounters:   09/09/19 (!) 135/91   09/05/19 (!) 144/100   07/06/19 128/76                 Failed - Normal serum creatinine on file in past 12 months     Recent Labs   Lab Test 06/03/19  1525   CR 1.48*             " "Passed - Recent (12 mo) or future (30 days) visit within the authorizing provider's specialty     Patient has had an office visit with the authorizing provider or a provider within the authorizing providers department within the previous 12 mos or has a future within next 30 days. See \"Patient Info\" tab in inbasket, or \"Choose Columns\" in Meds & Orders section of the refill encounter.              Passed - Medication is active on med list        Passed - Patient is age 18 or older        Passed - Normal serum potassium on file in past 12 months     Recent Labs   Lab Test 06/03/19  1525   POTASSIUM 3.7             allopurinol (ZYLOPRIM) 100 MG tablet [Pharmacy Med Name: ALLOPURINOL 100MG TAB] 90 tablet 4     Sig: TAKE 1 TABLET BY MOUTH ONCE DAILY   Last Written Prescription Date:  10/29/2018  Last Fill Quantity: 90,  # refills: 04   Last office visit: 6/3/2019 with prescribing provider:     Future Office Visit:      Gout Agents Protocol Failed - 10/31/2019 11:29 AM        Failed - Has Uric Acid on file in past 12 months and value is less than 6     Recent Labs   Lab Test 01/19/18  0936   URIC 5.0     If level is 6mg/dL or greater, ok to refill one time and refer to provider.           Failed - Normal serum creatinine on file in the past 12 months     Recent Labs   Lab Test 06/03/19  1525   CR 1.48*             Passed - CBC on file in past 12 months     Recent Labs   Lab Test 06/03/19  1525   WBC 5.3   RBC 4.87   HGB 13.7   HCT 41.0                    Passed - ALT on file in past 12 months     Recent Labs   Lab Test 06/03/19  1525   ALT 12             Passed - Recent (12 mo) or future (30 days) visit within the authorizing provider's specialty     Patient has had an office visit with the authorizing provider or a provider within the authorizing providers department within the previous 12 mos or has a future within next 30 days. See \"Patient Info\" tab in inbasket, or \"Choose Columns\" in Meds & Orders section of " the refill encounter.              Passed - Medication is active on med list        Passed - Patient is age 18 or older

## 2019-10-31 NOTE — TELEPHONE ENCOUNTER
Routing refill request to provider for review/approval because:  Labs out of range:    Labs not current:    blood pressure out of range

## 2019-11-08 ENCOUNTER — OFFICE VISIT (OUTPATIENT)
Dept: URGENT CARE | Facility: URGENT CARE | Age: 74
End: 2019-11-08
Payer: COMMERCIAL

## 2019-11-08 VITALS
HEART RATE: 66 BPM | WEIGHT: 234 LBS | BODY MASS INDEX: 30.87 KG/M2 | TEMPERATURE: 98.5 F | RESPIRATION RATE: 14 BRPM | DIASTOLIC BLOOD PRESSURE: 84 MMHG | OXYGEN SATURATION: 98 % | SYSTOLIC BLOOD PRESSURE: 142 MMHG

## 2019-11-08 DIAGNOSIS — M25.512 ACUTE PAIN OF LEFT SHOULDER: Primary | ICD-10-CM

## 2019-11-08 PROCEDURE — 99213 OFFICE O/P EST LOW 20 MIN: CPT | Performed by: FAMILY MEDICINE

## 2019-11-08 NOTE — PROGRESS NOTES
SUBJECTIVE:   Billy Stock is a 74 year old male presenting with a chief complaint of neck pain at the base of the left lateral-posterior neck, only appearing with movement.     .  Onset of symptoms was yesterday morning upon awakening. Course of illness is still occurring today.    Pain is worse with dressing himself, when abducting the left shoulder, and closing the 's side of the door of his car and when getting out of bed in the morning.    Current and Associated symptoms: as listed above. .  No shooting pain.  No problems moving the fingers of the left hand.  No weakness/numbness at the left arm/left hand.    Treatment measures tried include Tylenol Extra Strength without much pain relief. No pain when the neck/head is still.     No obvious injuries yesterday.  .    Past Medical History:   Diagnosis Date     Bell's palsy 10/15/2002     CA IN SITU PROSTATE 10/15/2002     Chronic atrial fibrillation 7/1/10     Glaucoma 4/10/2003     Problem list name updated by automated process. Provider to review     Gout 5/16/2013     Hyperlipidemia LDL goal <100 9/10/2014     Hypertension goal BP (blood pressure) < 140/90 6/28/2006     SARAH (obstructive sleep apnea) 8/28/2013     Parkinson disease (H) 2019     Pericarditis 2001     Renal cyst      Current Outpatient Medications   Medication Sig Dispense Refill     acetaminophen (TYLENOL) 325 MG tablet Take 1-2 tablets by mouth every 6 hours as needed.       allopurinol (ZYLOPRIM) 100 MG tablet TAKE 1 TABLET BY MOUTH ONCE DAILY 90 tablet 0     amLODIPine (NORVASC) 10 MG tablet TAKE 1 TABLET BY MOUTH ONCE DAILY 90 tablet 0     carbidopa-levodopa (SINEMET)  MG tablet Take 1 tablet by mouth 3 times daily       lisinopril (PRINIVIL/ZESTRIL) 10 MG tablet TAKE 2 TABLETS BY MOUTH ONCE DAILY 180 tablet 0     metoprolol succinate (TOPROL-XL) 100 MG 24 hr tablet TAKE 1 TABLET EVERY DAY 90 tablet 3     warfarin (COUMADIN) 5 MG tablet TAKE 1 TABLET (5 MG) DAILY EXCEPT TAKE  1/2 TABLET (2.5 MG) ON WEDNESDAY OR AS INSTRUCTED BY INR CLINIC 86 tablet 1     FLUZONE HIGH-DOSE 0.5 ML injection ADM 0.5ML IM UTD  0     ipratropium (ATROVENT) 0.03 % nasal spray Spray 2 sprays into both nostrils every 12 hours (Patient not taking: Reported on 11/8/2019) 1 Box 3     LORazepam (ATIVAN) 1 MG tablet Take 1 tablet (1 mg) by mouth nightly as needed for sleep (Patient not taking: Reported on 11/8/2019) 7 tablet 0     triamcinolone (KENALOG) 0.5 % cream Apply sparingly to affected area three times daily. (Patient not taking: Reported on 11/8/2019) 30 g 0     UNABLE TO FIND MEDICATION NAME: Tabavit, multivitmain without vitamin K in it.       Social History     Tobacco Use     Smoking status: Never Smoker     Smokeless tobacco: Never Used   Substance Use Topics     Alcohol use: No     Alcohol/week: 0.0 standard drinks       ROS:  CONSTITUTIONAL:NEGATIVE for fever, chills, change in weight  INTEGUMENTARY/SKIN: NEGATIVE for worrisome rashes, moles or lesions  ENT/MOUTH: no new ear/throat problems.   RESP:NEGATIVE for new shortness of breath/cough.    CV: negative for chest pain.    GI: negative for abdominal pain.        OBJECTIVE:  BP (!) 142/84   Pulse 66   Temp 98.5  F (36.9  C) (Tympanic)   Resp 14   Wt 106.1 kg (234 lb)   SpO2 98%   BMI 30.87 kg/m    GENERAL APPEARANCE: healthy, alert and no distress  NECK:  No pain with palpation over the spinous processes.  Normal ROM without any pain.   Extremities: Left trapezius muscle and supraspinatus muscles are tender to palpation.  There is pain at the left shoulder with and limited ROM with abduction past 90 degrees.  There is also pain with extension of the left shoulder  NEURO: Normal strength and tone, sensory exam grossly normal at the left arm and left hand.   SKIN: no suspicious lesions or rashes    ASSESSMENT:  Left Shoulder Pain, most likely secondary to muscle strain.      PLAN:  Place warmth over the painful areas for 10-15 minutes every 2-3  hours while awake.   Tylenol  Shoulder Exercises  follow up with the primary care provider in 10-14 days if not better. .         Serg Morales MD

## 2019-11-08 NOTE — PATIENT INSTRUCTIONS
Marek to follow up with Primary Care provider regarding elevated blood pressure.    follow up with your primary care provider if not better in 10-14 days.     Do the shoulder exercises described below.    Continue the Tylenol for the pain    Place warmth onto the painful areas of the left shoulder for 10-15 minutes at a time, every 2-3 hours while awake.        Patient Education     Shoulder Exercises: Wall Pushup    Strengthening exercises help make your injured shoulder more stable by making the muscles that support your shoulder stronger. To warm up, do flexibility (stretching) exercises first.  Here are steps for the wall pushup:     With feet and hands shoulder-width apart, place your palms on the wall, standing about an arm s length away.    Keeping your knees straight and heels on the floor, bend your elbows and lean forward as far as you comfortably can. Your elbows should be pointing downward. Then push away from the wall to the starting position. Repeat.    Work up to 15 wall pushups.     Note: Wear shoes that keep you from slipping.   Date Last Reviewed: 12/1/2017 2000-2018 [employer]. [address] [place] [address]. All rights reserved. This information is not intended as a substitute for professional medical care. Always follow your healthcare professional's instructions.           Patient Education     Exercises for Shoulder Flexibility: Wall Walk    Improving your flexibility can reduce pain. Stretching exercises also can help increase your range of pain-free motion. Breathe normally when you exercise. Use smooth, fluid movements.  Note: Follow any special instructions you are given. If you feel pain, stop the exercise. If the pain continues after stopping, call your healthcare provider:    Stand with your shoulder about 2 feet from the wall.    Raise your arm to shoulder level and gently  walk  your fingers up the wall as high as you can.    Hold for a few seconds. Then  walk your fingers back down.    Repeat 3 times. Move closer to the wall as you repeat.    Build up to holding each stretch for 30 seconds.  Caution: Do this stretch only if your healthcare provider recommends it. Don t do it when you are first injured.  Date Last Reviewed: 3/1/2018    0719-7783 [employer]. [address] [place] [address], [place] [address]. All rights reserved. This information is not intended as a substitute for professional medical care. Always follow your healthcare professional's instructions.

## 2019-11-11 ENCOUNTER — ANTICOAGULATION THERAPY VISIT (OUTPATIENT)
Dept: ANTICOAGULATION | Facility: CLINIC | Age: 74
End: 2019-11-11
Payer: COMMERCIAL

## 2019-11-11 DIAGNOSIS — Z79.01 LONG TERM CURRENT USE OF ANTICOAGULANTS WITH INR GOAL OF 2.0-3.0: ICD-10-CM

## 2019-11-11 DIAGNOSIS — I48.20 CHRONIC ATRIAL FIBRILLATION (H): ICD-10-CM

## 2019-11-11 LAB — INR POINT OF CARE: 2.8 (ref 0.86–1.14)

## 2019-11-11 PROCEDURE — 85610 PROTHROMBIN TIME: CPT | Mod: QW

## 2019-11-11 PROCEDURE — 99207 ZZC NO CHARGE NURSE ONLY: CPT

## 2019-11-11 PROCEDURE — 36416 COLLJ CAPILLARY BLOOD SPEC: CPT

## 2019-11-11 NOTE — PROGRESS NOTES
ANTICOAGULATION FOLLOW-UP CLINIC VISIT    Patient Name:  Billy Stock  Date:  2019  Contact Type:  Face to Face    SUBJECTIVE:  Patient Findings     Comments:   The patient was assessed for diet, medication, and activity level changes, missed or extra doses, bruising or bleeding, with no problem findings.          Clinical Outcomes     Negatives:   Major bleeding event, Thromboembolic event, Anticoagulation-related hospital admission, Anticoagulation-related ED visit, Anticoagulation-related fatality    Comments:   The patient was assessed for diet, medication, and activity level changes, missed or extra doses, bruising or bleeding, with no problem findings.             OBJECTIVE    INR Protime   Date Value Ref Range Status   2019 2.8 (A) 0.86 - 1.14 Final       ASSESSMENT / PLAN  INR assessment THER    Recheck INR In: 3 WEEKS    INR Location Clinic      Anticoagulation Summary  As of 2019    INR goal:   2.0-3.0   TTR:   77.9 % (3.5 y)   INR used for dosin.8 (2019)   Warfarin maintenance plan:   2.5 mg (5 mg x 0.5) every Wed; 5 mg (5 mg x 1) all other days   Full warfarin instructions:   2.5 mg every Wed; 5 mg all other days   Weekly warfarin total:   32.5 mg   No change documented:   Samantha Galeas RN   Plan last modified:   Marge Gale RN (2018)   Next INR check:   2019   Priority:   INR   Target end date:       Indications    Long term current use of anticoagulants with INR goal of 2.0-3.0 [Z79.01]  Chronic atrial fibrillation [I48.20]             Anticoagulation Episode Summary     INR check location:       Preferred lab:       Send INR reminders to:   [facility]    Comments:   Pt reports taking Warfarin in the morning      Anticoagulation Care Providers     Provider Role Specialty Phone number    Malcolm Schafer MD Responsible Internal Medicine [phone]            See the Encounter Report to view Anticoagulation Flowsheet and Dosing  Calendar (Go to Encounters tab in chart review, and find the Anticoagulation Therapy Visit)    Dosage adjustment made based on physician directed care plan.    Samantha Galeas RN

## 2019-11-12 ENCOUNTER — OFFICE VISIT (OUTPATIENT)
Dept: CARDIOLOGY | Facility: CLINIC | Age: 74
End: 2019-11-12
Attending: INTERNAL MEDICINE
Payer: COMMERCIAL

## 2019-11-12 ENCOUNTER — HOSPITAL ENCOUNTER (OUTPATIENT)
Dept: CARDIOLOGY | Facility: CLINIC | Age: 74
Discharge: HOME | End: 2019-11-12
Attending: PHYSICIAN ASSISTANT | Admitting: PHYSICIAN ASSISTANT
Payer: COMMERCIAL

## 2019-11-12 VITALS
BODY MASS INDEX: 31.48 KG/M2 | DIASTOLIC BLOOD PRESSURE: 91 MMHG | HEART RATE: 64 BPM | WEIGHT: 237.5 LBS | SYSTOLIC BLOOD PRESSURE: 160 MMHG | HEIGHT: 73 IN

## 2019-11-12 DIAGNOSIS — I48.20 CHRONIC ATRIAL FIBRILLATION (H): ICD-10-CM

## 2019-11-12 DIAGNOSIS — I10 ESSENTIAL HYPERTENSION: Primary | ICD-10-CM

## 2019-11-12 DIAGNOSIS — I48.19 PERSISTENT ATRIAL FIBRILLATION (H): ICD-10-CM

## 2019-11-12 PROCEDURE — 93306 TTE W/DOPPLER COMPLETE: CPT | Mod: 26 | Performed by: INTERNAL MEDICINE

## 2019-11-12 PROCEDURE — 93000 ELECTROCARDIOGRAM COMPLETE: CPT | Performed by: INTERNAL MEDICINE

## 2019-11-12 PROCEDURE — 93306 TTE W/DOPPLER COMPLETE: CPT

## 2019-11-12 PROCEDURE — 99214 OFFICE O/P EST MOD 30 MIN: CPT | Mod: 25 | Performed by: INTERNAL MEDICINE

## 2019-11-12 RX ORDER — LISINOPRIL 40 MG/1
40 TABLET ORAL DAILY
Qty: 90 TABLET | Refills: 3 | Status: SHIPPED | OUTPATIENT
Start: 2019-11-12 | End: 2020-11-19

## 2019-11-12 RX ORDER — METOPROLOL SUCCINATE 25 MG/1
75 TABLET, EXTENDED RELEASE ORAL DAILY
Qty: 240 TABLET | Refills: 3 | Status: SHIPPED | OUTPATIENT
Start: 2019-11-12 | End: 2019-11-13

## 2019-11-12 ASSESSMENT — MIFFLIN-ST. JEOR: SCORE: 1876.17

## 2019-11-12 NOTE — PROGRESS NOTES
HPI and Plan:   See dictation    Orders Placed This Encounter   Procedures     Follow-Up with Cardiac Advanced Practice Provider     Follow-Up with Electrophysiologist     EKG 12-lead complete w/read - Clinics     EKG 12-lead complete w/read (Future)- to be scheduled       Orders Placed This Encounter   Medications     lisinopril (PRINIVIL/ZESTRIL) 40 MG tablet     Sig: Take 1 tablet (40 mg) by mouth daily     Dispense:  90 tablet     Refill:  3     metoprolol succinate ER (TOPROL XL) 25 MG 24 hr tablet     Sig: Take 3 tablets (75 mg) by mouth daily     Dispense:  240 tablet     Refill:  3       Medications Discontinued During This Encounter   Medication Reason     lisinopril (PRINIVIL/ZESTRIL) 10 MG tablet      metoprolol succinate (TOPROL-XL) 100 MG 24 hr tablet          Encounter Diagnoses   Name Primary?     Essential hypertension Yes     Persistent atrial fibrillation        CURRENT MEDICATIONS:  Current Outpatient Medications   Medication Sig Dispense Refill     acetaminophen (TYLENOL) 325 MG tablet Take 1-2 tablets by mouth every 6 hours as needed.       allopurinol (ZYLOPRIM) 100 MG tablet TAKE 1 TABLET BY MOUTH ONCE DAILY 90 tablet 0     amLODIPine (NORVASC) 10 MG tablet TAKE 1 TABLET BY MOUTH ONCE DAILY 90 tablet 0     carbidopa-levodopa (SINEMET)  MG tablet Take 1 tablet by mouth 3 times daily       FLUZONE HIGH-DOSE 0.5 ML injection ADM 0.5ML IM UTD  0     ipratropium (ATROVENT) 0.03 % nasal spray Spray 2 sprays into both nostrils every 12 hours 1 Box 3     lisinopril (PRINIVIL/ZESTRIL) 40 MG tablet Take 1 tablet (40 mg) by mouth daily 90 tablet 3     LORazepam (ATIVAN) 1 MG tablet Take 1 tablet (1 mg) by mouth nightly as needed for sleep 7 tablet 0     metoprolol succinate ER (TOPROL XL) 25 MG 24 hr tablet Take 3 tablets (75 mg) by mouth daily 240 tablet 3     triamcinolone (KENALOG) 0.5 % cream Apply sparingly to affected area three times daily. 30 g 0     UNABLE TO FIND MEDICATION NAME: Nathaniel  multivitmain without vitamin K in it.       warfarin (COUMADIN) 5 MG tablet TAKE 1 TABLET (5 MG) DAILY EXCEPT TAKE 1/2 TABLET (2.5 MG) ON WEDNESDAY OR AS INSTRUCTED BY INR CLINIC 86 tablet 1       ALLERGIES     Allergies   Allergen Reactions     Cats      Codeine      Hallucinated when taking codeine with another medication     Codeine Unknown     Maple Tree      Morphine Visual Disturbance and Other (See Comments)     Watermelon [Citrullus Vulgaris]      Itching throat, hives       PAST MEDICAL HISTORY:  Past Medical History:   Diagnosis Date     Bell's palsy 10/15/2002     CA IN SITU PROSTATE 10/15/2002     Chronic atrial fibrillation 7/1/10     Glaucoma 4/10/2003     Problem list name updated by automated process. Provider to review     Gout 2013     Hyperlipidemia LDL goal <100 9/10/2014     Hypertension goal BP (blood pressure) < 140/90 2006     SARAH (obstructive sleep apnea) 2013     Parkinson disease (H) 2019     Pericarditis      Renal cyst        PAST SURGICAL HISTORY:  Past Surgical History:   Procedure Laterality Date     CATARACT IOL, RT/LT  10/15, 11/15     COLONOSCOPY  2009    [facility]     COLONOSCOPY  2014    Dr. Long [facility]     COLONOSCOPY N/A 2014    Procedure: COMBINED COLONOSCOPY, SINGLE BIOPSY/POLYPECTOMY BY BIOPSY;  Surgeon: Puneet Long MD;  Location:  GI     EYE SURGERY      glaucoma surgery right eye     HERNIA REPAIR       PROSTATE SURGERY       SUPRAPUBIC PROSTATECTOMY  2002     VASECTOMY         FAMILY HISTORY:  Family History   Problem Relation Age of Onset     Hypertension Maternal Grandfather      Cerebrovascular Disease Maternal Grandfather      Hypertension Maternal Grandmother      Cerebrovascular Disease Maternal Grandmother      Hypertension Mother          age 51, MVA     Hypertension Brother      Heart Failure Brother      Bronchitis Brother      Other - See Comments Brother         multiple myeloma     Heart Surgery Brother          defibrilater     Prostate Problems Brother      Diabetes Brother      Other Cancer Brother         Multiple Myeloma     Breast Cancer Maternal Aunt         hx     Cancer Maternal Aunt         cervical cancer     Cancer - colorectal Maternal Aunt      Unknown/Adopted Father         Don't know father's history     Diabetes Son      Colon Cancer Other        SOCIAL HISTORY:  Social History     Socioeconomic History     Marital status:      Spouse name: None     Number of children: 3     Years of education: None     Highest education level: None   Occupational History     None   Social Needs     Financial resource strain: None     Food insecurity:     Worry: None     Inability: None     Transportation needs:     Medical: None     Non-medical: None   Tobacco Use     Smoking status: Never Smoker     Smokeless tobacco: Never Used   Substance and Sexual Activity     Alcohol use: No     Alcohol/week: 0.0 standard drinks     Drug use: No     Sexual activity: Never   Lifestyle     Physical activity:     Days per week: None     Minutes per session: None     Stress: None   Relationships     Social connections:     Talks on phone: None     Gets together: None     Attends Bahai service: None     Active member of club or organization: None     Attends meetings of clubs or organizations: None     Relationship status: None     Intimate partner violence:     Fear of current or ex partner: None     Emotionally abused: None     Physically abused: None     Forced sexual activity: None   Other Topics Concern     Parent/sibling w/ CABG, MI or angioplasty before 65F 55M? Not Asked      Service Not Asked     Blood Transfusions Not Asked     Caffeine Concern No     Occupational Exposure No     Hobby Hazards No     Sleep Concern Yes     Comment: CPAP at night     Stress Concern No     Weight Concern No     Special Diet No     Back Care No     Exercise No     Bike Helmet Not Asked     Seat Belt Yes     Self-Exams Not Asked  "  Social History Narrative     None       Review of Systems:  Skin:  Negative       Eyes:  Positive for cataracts    ENT:  Negative      Respiratory:  Positive for dyspnea on exertion(sob with stairs)     Cardiovascular:  Negative      Gastroenterology: Negative      Genitourinary:  Negative      Musculoskeletal:  Negative      Neurologic:  Negative      Psychiatric:  Negative      Heme/Lymph/Imm:  Negative      Endocrine:  Negative        Physical Exam:  Vitals: BP (!) 160/91   Pulse 64   Ht 1.854 m (6' 1\")   Wt 107.7 kg (237 lb 8 oz)   BMI 31.33 kg/m      Constitutional:  cooperative, alert and oriented, well developed, well nourished, in no acute distress        Skin:  warm and dry to the touch          Head:  normocephalic        Eyes:  pupils equal and round        Lymph:      ENT:  no pallor or cyanosis        Neck:           Respiratory:  normal breath sounds, clear to auscultation, normal A-P diameter, normal symmetry, normal respiratory excursion, no use of accessory muscles         Cardiac: regular rhythm, normal S1/S2, no S3 or S4, apical impulse not displaced, no murmurs, gallops or rubs irregularly irregular rhythm              pulses full and equal                                        GI:  abdomen soft        Extremities and Muscular Skeletal:  no deformities, clubbing, cyanosis, erythema observed              Neurological:  no gross motor deficits        Psych:  Alert and Oriented x 3        CC  Malcolm Schafer MD  [facility] [place] [address] [place] [address]              "

## 2019-11-12 NOTE — LETTER
11/12/2019    Malcolm Schafer MD  [address] [facility] [id number]    RE: Billy Stock       Dear Colleague,    I had the pleasure of seeing Billy Stock in the [facility].    HPI and Plan:   See dictation    Orders Placed This Encounter   Procedures     Follow-Up with Cardiac Advanced Practice Provider     Follow-Up with Electrophysiologist     EKG 12-lead complete w/read - Clinics     EKG 12-lead complete w/read (Future)- to be scheduled       Orders Placed This Encounter   Medications     lisinopril (PRINIVIL/ZESTRIL) 40 MG tablet     Sig: Take 1 tablet (40 mg) by mouth daily     Dispense:  90 tablet     Refill:  3     metoprolol succinate ER (TOPROL XL) 25 MG 24 hr tablet     Sig: Take 3 tablets (75 mg) by mouth daily     Dispense:  240 tablet     Refill:  3       Medications Discontinued During This Encounter   Medication Reason     lisinopril (PRINIVIL/ZESTRIL) 10 MG tablet      metoprolol succinate (TOPROL-XL) 100 MG 24 hr tablet          Encounter Diagnoses   Name Primary?     Essential hypertension Yes     Persistent atrial fibrillation        CURRENT MEDICATIONS:  Current Outpatient Medications   Medication Sig Dispense Refill     acetaminophen (TYLENOL) 325 MG tablet Take 1-2 tablets by mouth every 6 hours as needed.       allopurinol (ZYLOPRIM) 100 MG tablet TAKE 1 TABLET BY MOUTH ONCE DAILY 90 tablet 0     amLODIPine (NORVASC) 10 MG tablet TAKE 1 TABLET BY MOUTH ONCE DAILY 90 tablet 0     carbidopa-levodopa (SINEMET)  MG tablet Take 1 tablet by mouth 3 times daily       FLUZONE HIGH-DOSE 0.5 ML injection ADM 0.5ML IM UTD  0     ipratropium (ATROVENT) 0.03 % nasal spray Spray 2 sprays into both nostrils every 12 hours 1 Box 3     lisinopril (PRINIVIL/ZESTRIL) 40 MG tablet Take 1 tablet (40 mg) by mouth daily 90 tablet 3     LORazepam (ATIVAN) 1 MG tablet Take 1 tablet (1 mg) by mouth nightly as needed for sleep 7 tablet 0     metoprolol succinate ER  (TOPROL XL) 25 MG 24 hr tablet Take 3 tablets (75 mg) by mouth daily 240 tablet 3     triamcinolone (KENALOG) 0.5 % cream Apply sparingly to affected area three times daily. 30 g 0     UNABLE TO FIND MEDICATION NAME: Tabavit, multivitmain without vitamin K in it.       warfarin (COUMADIN) 5 MG tablet TAKE 1 TABLET (5 MG) DAILY EXCEPT TAKE 1/2 TABLET (2.5 MG) ON WEDNESDAY OR AS INSTRUCTED BY INR CLINIC 86 tablet 1       ALLERGIES     Allergies   Allergen Reactions     Cats      Codeine      Hallucinated when taking codeine with another medication     Codeine Unknown     Maple Tree      Morphine Visual Disturbance and Other (See Comments)     Watermelon [Citrullus Vulgaris]      Itching throat, hives       PAST MEDICAL HISTORY:  Past Medical History:   Diagnosis Date     Bell's palsy 10/15/2002     CA IN SITU PROSTATE 10/15/2002     Chronic atrial fibrillation 7/1/10     Glaucoma 4/10/2003     Problem list name updated by automated process. Provider to review     Gout 5/16/2013     Hyperlipidemia LDL goal <100 9/10/2014     Hypertension goal BP (blood pressure) < 140/90 6/28/2006     SARAH (obstructive sleep apnea) 8/28/2013     Parkinson disease (H) 2019     Pericarditis 2001     Renal cyst        PAST SURGICAL HISTORY:  Past Surgical History:   Procedure Laterality Date     CATARACT IOL, RT/LT  10/15, 11/15     COLONOSCOPY  2009    [facility]     COLONOSCOPY  9/30/2014    Dr. Long [facility]     COLONOSCOPY N/A 9/30/2014    Procedure: COMBINED COLONOSCOPY, SINGLE BIOPSY/POLYPECTOMY BY BIOPSY;  Surgeon: Puneet Long MD;  Location:  GI     EYE SURGERY  2007    glaucoma surgery right eye     HERNIA REPAIR       PROSTATE SURGERY       SUPRAPUBIC PROSTATECTOMY  2002     VASECTOMY         FAMILY HISTORY:  Family History   Problem Relation Age of Onset     Hypertension Maternal Grandfather      Cerebrovascular Disease Maternal Grandfather      Hypertension Maternal Grandmother      Cerebrovascular Disease Maternal Grandmother       Hypertension Mother          age 51, MVA     Hypertension Brother      Heart Failure Brother      Bronchitis Brother      Other - See Comments Brother         multiple myeloma     Heart Surgery Brother         defibrilater     Prostate Problems Brother      Diabetes Brother      Other Cancer Brother         Multiple Myeloma     Breast Cancer Maternal Aunt         hx     Cancer Maternal Aunt         cervical cancer     Cancer - colorectal Maternal Aunt      Unknown/Adopted Father         Don't know father's history     Diabetes Son      Colon Cancer Other        SOCIAL HISTORY:  Social History     Socioeconomic History     Marital status:      Spouse name: None     Number of children: 3     Years of education: None     Highest education level: None   Occupational History     None   Social Needs     Financial resource strain: None     Food insecurity:     Worry: None     Inability: None     Transportation needs:     Medical: None     Non-medical: None   Tobacco Use     Smoking status: Never Smoker     Smokeless tobacco: Never Used   Substance and Sexual Activity     Alcohol use: No     Alcohol/week: 0.0 standard drinks     Drug use: No     Sexual activity: Never   Lifestyle     Physical activity:     Days per week: None     Minutes per session: None     Stress: None   Relationships     Social connections:     Talks on phone: None     Gets together: None     Attends Orthodoxy service: None     Active member of club or organization: None     Attends meetings of clubs or organizations: None     Relationship status: None     Intimate partner violence:     Fear of current or ex partner: None     Emotionally abused: None     Physically abused: None     Forced sexual activity: None   Other Topics Concern     Parent/sibling w/ CABG, MI or angioplasty before 65F 55M? Not Asked      Service Not Asked     Blood Transfusions Not Asked     Caffeine Concern No     Occupational Exposure No     Hobby Hazards  "No     Sleep Concern Yes     Comment: CPAP at night     Stress Concern No     Weight Concern No     Special Diet No     Back Care No     Exercise No     Bike Helmet Not Asked     Seat Belt Yes     Self-Exams Not Asked   Social History Narrative     None       Review of Systems:  Skin:  Negative       Eyes:  Positive for cataracts    ENT:  Negative      Respiratory:  Positive for dyspnea on exertion(sob with stairs)     Cardiovascular:  Negative      Gastroenterology: Negative      Genitourinary:  Negative      Musculoskeletal:  Negative      Neurologic:  Negative      Psychiatric:  Negative      Heme/Lymph/Imm:  Negative      Endocrine:  Negative        Physical Exam:  Vitals: BP (!) 160/91   Pulse 64   Ht 1.854 m (6' 1\")   Wt 107.7 kg (237 lb 8 oz)   BMI 31.33 kg/m       Constitutional:  cooperative, alert and oriented, well developed, well nourished, in no acute distress        Skin:  warm and dry to the touch          Head:  normocephalic        Eyes:  pupils equal and round        Lymph:      ENT:  no pallor or cyanosis        Neck:           Respiratory:  normal breath sounds, clear to auscultation, normal A-P diameter, normal symmetry, normal respiratory excursion, no use of accessory muscles         Cardiac: regular rhythm, normal S1/S2, no S3 or S4, apical impulse not displaced, no murmurs, gallops or rubs irregularly irregular rhythm              pulses full and equal                                        GI:  abdomen soft        Extremities and Muscular Skeletal:  no deformities, clubbing, cyanosis, erythema observed              Neurological:  no gross motor deficits        Psych:  Alert and Oriented x 3        CC  Malcolm Schafer MD  [facility] [place] [address] [place] [address]                Thank you for allowing me to participate in the care of your patient.      Sincerely,     Justo Reilly MD     [facility]    cc:   Malcolm Schafer MD  [address] [place] [address]

## 2019-11-12 NOTE — LETTER
2019      Malcolm Schafer MD  [address] [facility] [address]      RE: Tony Stock       Dear Colleague,    I had the pleasure of seeing Tony Stock in the [facility].    Service Date: 2019      HISTORY OF PRESENT ILLNESS:  I saw Mr. Stock for followup of atrial fibrillation.  He is a 74-year-old black male with a history of hypertension and chronic atrial fibrillation.  He has been on rate control and anticoagulation therapy.  The patient is doing well with no shortness of breath, palpitation or chest pain.  He has no ER visit or hospitalization for cardiac problems over the last year.      PHYSICAL EXAMINATION:   VITAL SIGNS:  Blood pressure was 160/91, heart rate is 54 beats per minute, body weight 237 pounds.     CARDIOVASCULAR:  The cardiac rhythm was irregularly irregular, and the heart sounds were normal without murmur.  EKG showed atrial fibrillation with heart rate 54 beats per minute.  He does have some tracing leg edema in the left lower leg.  Otherwise, the physical examination showed no remarkable changes.      ASSESSMENT AND RECOMMENDATIONS:  Mr. Stock remains in atrial fibrillation.  The ventricular rate has been relatively slow.  For that reason, the dose of Toprol-XL has been changed from 100 to 75 mg once a day.  His blood pressure is elevated.  The dose of lisinopril has been increased from 20 to 40 mg once a day.  He may need further titration of his blood pressure medications in the next few weeks in your office.  His Cardiology followup is scheduled for a year.      cc:   Malcolm Schafer MD    [facility]    [address], [address]    [place] [id number]         JONATHAN MITTAL MD             D: 2019   T: 2019   MT: YNANI      Name:     TONY STOCK   MRN:      [id number]        Account:      [id number]   :      1945           Service Date: 2019      Document: [id number]            Outpatient Encounter Medications as of 11/12/2019   Medication Sig Dispense Refill     acetaminophen (TYLENOL) 325 MG tablet Take 1-2 tablets by mouth every 6 hours as needed.       allopurinol (ZYLOPRIM) 100 MG tablet TAKE 1 TABLET BY MOUTH ONCE DAILY 90 tablet 0     amLODIPine (NORVASC) 10 MG tablet TAKE 1 TABLET BY MOUTH ONCE DAILY 90 tablet 0     carbidopa-levodopa (SINEMET)  MG tablet Take 1 tablet by mouth 3 times daily       FLUZONE HIGH-DOSE 0.5 ML injection ADM 0.5ML IM UTD  0     ipratropium (ATROVENT) 0.03 % nasal spray Spray 2 sprays into both nostrils every 12 hours 1 Box 3     lisinopril (PRINIVIL/ZESTRIL) 40 MG tablet Take 1 tablet (40 mg) by mouth daily 90 tablet 3     LORazepam (ATIVAN) 1 MG tablet Take 1 tablet (1 mg) by mouth nightly as needed for sleep 7 tablet 0     triamcinolone (KENALOG) 0.5 % cream Apply sparingly to affected area three times daily. 30 g 0     UNABLE TO FIND MEDICATION NAME: Tabavit, multivitmain without vitamin K in it.       warfarin (COUMADIN) 5 MG tablet TAKE 1 TABLET (5 MG) DAILY EXCEPT TAKE 1/2 TABLET (2.5 MG) ON WEDNESDAY OR AS INSTRUCTED BY INR CLINIC 86 tablet 1     [DISCONTINUED] metoprolol succinate ER (TOPROL XL) 25 MG 24 hr tablet Take 3 tablets (75 mg) by mouth daily 240 tablet 3     [DISCONTINUED] allopurinol (ZYLOPRIM) 100 MG tablet Take 1 tablet (100 mg) by mouth daily 90 tablet 4     [DISCONTINUED] amLODIPine (NORVASC) 10 MG tablet TAKE ONE TABLET BY MOUTH ONCE DAILY DUE  FOR  APPOINTMENT 90 tablet 3     [DISCONTINUED] lisinopril (PRINIVIL/ZESTRIL) 10 MG tablet TAKE 2 TABLETS BY MOUTH ONCE DAILY 180 tablet 0     [DISCONTINUED] lisinopril (PRINIVIL/ZESTRIL) 10 MG tablet TAKE TWO TABLETS BY MOUTH  DAILY 180 tablet 3     [DISCONTINUED] meclizine (ANTIVERT) 25 MG tablet Take 1 tablet (25 mg) by mouth every 6 hours as needed for dizziness (Patient not taking: Reported on 11/8/2019) 30 tablet 1     [DISCONTINUED] metoprolol succinate (TOPROL-XL) 100 MG  24 hr tablet TAKE 1 TABLET EVERY DAY 90 tablet 3     No facility-administered encounter medications on file as of 11/12/2019.        Again, thank you for allowing me to participate in the care of your patient.      Sincerely,    Justo Reilly MD     [facility]

## 2019-11-13 ENCOUNTER — TELEPHONE (OUTPATIENT)
Dept: CARDIOLOGY | Facility: CLINIC | Age: 74
End: 2019-11-13

## 2019-11-13 DIAGNOSIS — I48.19 PERSISTENT ATRIAL FIBRILLATION (H): Primary | ICD-10-CM

## 2019-11-13 RX ORDER — METOPROLOL SUCCINATE 50 MG/1
75 TABLET, EXTENDED RELEASE ORAL DAILY
Qty: 135 TABLET | Refills: 3 | Status: SHIPPED | OUTPATIENT
Start: 2019-11-13 | End: 2020-12-09

## 2019-11-13 NOTE — PROGRESS NOTES
Service Date: 2019      HISTORY OF PRESENT ILLNESS:  I saw Mr. Kirby for followup of atrial fibrillation.  He is a 74-year-old black male with a history of hypertension and chronic atrial fibrillation.  He has been on rate control and anticoagulation therapy.  The patient is doing well with no shortness of breath, palpitation or chest pain.  He has no ER visit or hospitalization for cardiac problems over the last year.      PHYSICAL EXAMINATION:   VITAL SIGNS:  Blood pressure was 160/91, heart rate is 54 beats per minute, body weight 237 pounds.     CARDIOVASCULAR:  The cardiac rhythm was irregularly irregular, and the heart sounds were normal without murmur.  EKG showed atrial fibrillation with heart rate 54 beats per minute.  He does have some tracing leg edema in the left lower leg.  Otherwise, the physical examination showed no remarkable changes.      ASSESSMENT AND RECOMMENDATIONS:  Mr. Kirby remains in atrial fibrillation.  The ventricular rate has been relatively slow.  For that reason, the dose of Toprol-XL has been changed from 100 to 75 mg once a day.  His blood pressure is elevated.  The dose of lisinopril has been increased from 20 to 40 mg once a day.  He may need further titration of his blood pressure medications in the next few weeks in your office.  His Cardiology followup is scheduled for a year.      cc:   Malcolm Schafer MD    [facility]    [address] [place], [address]    [place] [address]         JONATHAN MITTAL MD             D: 2019   T: 2019   MT: YANNI      Name:     TONY KIRBY   MRN:      [id number]        Account:      [id number]   :      1945           Service Date: 2019      Document: [id number]

## 2019-11-13 NOTE — TELEPHONE ENCOUNTER
Received fax from [facility]. It says that the current metoprolol prescription will not be covered because it is 3 pills days (75mg daily, taking 25mg pills). They will cover 50mg pills so we need to change the order to that strength taking 1.5 pills daily.     Called pt to let him know, but there was no answer, and [facility] says we cannot leave any info in voicemails.     Resent prescription for the 50mg tabs per protocol.

## 2019-11-14 ENCOUNTER — COMMUNICATION - HEALTHEAST (OUTPATIENT)
Dept: CARDIOLOGY | Facility: CLINIC | Age: 74
End: 2019-11-14

## 2019-11-18 ENCOUNTER — AMBULATORY - HEALTHEAST (OUTPATIENT)
Dept: CARDIOLOGY | Facility: CLINIC | Age: 74
End: 2019-11-18

## 2019-11-18 DIAGNOSIS — Z00.6 EXAM FOR CLINICAL TRIAL: ICD-10-CM

## 2019-11-18 DIAGNOSIS — I48.19 ATRIAL FIBRILLATION, PERSISTENT (H): ICD-10-CM

## 2019-11-18 LAB
ATRIAL RATE - MUSE: 312 BPM
DIASTOLIC BLOOD PRESSURE - MUSE: NORMAL
INTERPRETATION ECG - MUSE: NORMAL
P AXIS - MUSE: NORMAL
PR INTERVAL - MUSE: NORMAL
QRS DURATION - MUSE: 100 MS
QT - MUSE: 442 MS
QTC - MUSE: 394 MS
R AXIS - MUSE: -9 DEGREES
SYSTOLIC BLOOD PRESSURE - MUSE: NORMAL
T AXIS - MUSE: 62 DEGREES
VENTRICULAR RATE- MUSE: 48 BPM

## 2019-11-18 ASSESSMENT — MIFFLIN-ST. JEOR: SCORE: 1995.22

## 2019-11-21 ENCOUNTER — AMBULATORY - HEALTHEAST (OUTPATIENT)
Dept: CARDIOLOGY | Facility: CLINIC | Age: 74
End: 2019-11-21

## 2019-12-02 ENCOUNTER — ANTICOAGULATION THERAPY VISIT (OUTPATIENT)
Dept: ANTICOAGULATION | Facility: CLINIC | Age: 74
End: 2019-12-02
Payer: COMMERCIAL

## 2019-12-02 DIAGNOSIS — Z79.01 LONG TERM CURRENT USE OF ANTICOAGULANTS WITH INR GOAL OF 2.0-3.0: ICD-10-CM

## 2019-12-02 DIAGNOSIS — I48.20 CHRONIC ATRIAL FIBRILLATION (H): ICD-10-CM

## 2019-12-02 LAB — INR POINT OF CARE: 2.5 (ref 0.86–1.14)

## 2019-12-02 PROCEDURE — 36416 COLLJ CAPILLARY BLOOD SPEC: CPT

## 2019-12-02 PROCEDURE — 99207 ZZC NO CHARGE NURSE ONLY: CPT

## 2019-12-02 PROCEDURE — 85610 PROTHROMBIN TIME: CPT | Mod: QW

## 2019-12-02 NOTE — PROGRESS NOTES
ANTICOAGULATION FOLLOW-UP CLINIC VISIT    Patient Name:  Billy Stock  Date:  2019  Contact Type:  Face to Face    SUBJECTIVE:  Patient Findings     Comments:   The patient was assessed for diet, medication, and activity level changes, missed or extra doses, bruising or bleeding, with no problem findings.          Clinical Outcomes     Negatives:   Major bleeding event, Thromboembolic event, Anticoagulation-related hospital admission, Anticoagulation-related ED visit, Anticoagulation-related fatality    Comments:   The patient was assessed for diet, medication, and activity level changes, missed or extra doses, bruising or bleeding, with no problem findings.             OBJECTIVE    INR Protime   Date Value Ref Range Status   2019 2.5 (A) 0.86 - 1.14 Final       ASSESSMENT / PLAN  INR assessment THER    Recheck INR In: 4 WEEKS    INR Location Clinic      Anticoagulation Summary  As of 2019    INR goal:   2.0-3.0   TTR:   78.7 % (1 y)   INR used for dosin.5 (2019)   Warfarin maintenance plan:   2.5 mg (5 mg x 0.5) every Wed; 5 mg (5 mg x 1) all other days   Full warfarin instructions:   2.5 mg every Wed; 5 mg all other days   Weekly warfarin total:   32.5 mg   No change documented:   Samantha Galeas RN   Plan last modified:   Marge Gale RN (2018)   Next INR check:   2019   Priority:   INR   Target end date:       Indications    Long term current use of anticoagulants with INR goal of 2.0-3.0 [Z79.01]  Chronic atrial fibrillation [I48.20]             Anticoagulation Episode Summary     INR check location:       Preferred lab:       Send INR reminders to:   [facility]    Comments:   Pt reports taking Warfarin in the morning      Anticoagulation Care Providers     Provider Role Specialty Phone number    Malcolm Schafer MD Responsible Internal Medicine [phone]            See the Encounter Report to view Anticoagulation Flowsheet and Dosing Calendar  (Go to Encounters tab in chart review, and find the Anticoagulation Therapy Visit)    Dosage adjustment made based on physician directed care plan.    Samantha Galeas RN

## 2019-12-09 ENCOUNTER — OFFICE VISIT (OUTPATIENT)
Dept: INTERNAL MEDICINE | Facility: CLINIC | Age: 74
End: 2019-12-09
Payer: COMMERCIAL

## 2019-12-09 VITALS
BODY MASS INDEX: 31.54 KG/M2 | DIASTOLIC BLOOD PRESSURE: 74 MMHG | SYSTOLIC BLOOD PRESSURE: 126 MMHG | RESPIRATION RATE: 16 BRPM | TEMPERATURE: 97.2 F | HEIGHT: 73 IN | HEART RATE: 56 BPM | WEIGHT: 238 LBS | OXYGEN SATURATION: 99 %

## 2019-12-09 DIAGNOSIS — M1A.0720 CHRONIC GOUT OF LEFT FOOT, UNSPECIFIED CAUSE: ICD-10-CM

## 2019-12-09 DIAGNOSIS — L60.0 INGROWN TOENAIL: ICD-10-CM

## 2019-12-09 DIAGNOSIS — I10 HYPERTENSION GOAL BP (BLOOD PRESSURE) < 140/90: Primary | ICD-10-CM

## 2019-12-09 DIAGNOSIS — N18.30 CKD (CHRONIC KIDNEY DISEASE) STAGE 3, GFR 30-59 ML/MIN (H): ICD-10-CM

## 2019-12-09 DIAGNOSIS — I48.20 CHRONIC ATRIAL FIBRILLATION (H): ICD-10-CM

## 2019-12-09 LAB
ALBUMIN UR-MCNC: 100 MG/DL
APPEARANCE UR: CLEAR
BILIRUB UR QL STRIP: NEGATIVE
COLOR UR AUTO: YELLOW
GLUCOSE UR STRIP-MCNC: NEGATIVE MG/DL
HGB UR QL STRIP: ABNORMAL
KETONES UR STRIP-MCNC: NEGATIVE MG/DL
LEUKOCYTE ESTERASE UR QL STRIP: NEGATIVE
NITRATE UR QL: NEGATIVE
PH UR STRIP: 6.5 PH (ref 5–7)
RBC #/AREA URNS AUTO: NORMAL /HPF
SOURCE: ABNORMAL
SP GR UR STRIP: 1.02 (ref 1–1.03)
UROBILINOGEN UR STRIP-ACNC: 0.2 EU/DL (ref 0.2–1)
WBC #/AREA URNS AUTO: NORMAL /HPF

## 2019-12-09 PROCEDURE — 80048 BASIC METABOLIC PNL TOTAL CA: CPT | Performed by: INTERNAL MEDICINE

## 2019-12-09 PROCEDURE — 99207 C PAF COMPLETED  NO CHARGE: CPT | Performed by: INTERNAL MEDICINE

## 2019-12-09 PROCEDURE — 36415 COLL VENOUS BLD VENIPUNCTURE: CPT | Performed by: INTERNAL MEDICINE

## 2019-12-09 PROCEDURE — 99214 OFFICE O/P EST MOD 30 MIN: CPT | Performed by: INTERNAL MEDICINE

## 2019-12-09 PROCEDURE — 81001 URINALYSIS AUTO W/SCOPE: CPT | Performed by: INTERNAL MEDICINE

## 2019-12-09 ASSESSMENT — MIFFLIN-ST. JEOR: SCORE: 1878.44

## 2019-12-09 NOTE — NURSING NOTE
"Vital signs:  Temp: 97.2  F (36.2  C) Temp src: Oral BP: 126/74 Pulse: 56   Resp: 16 SpO2: 99 %     Height: 185.4 cm (6' 1\") Weight: 108 kg (238 lb)  Estimated body mass index is 31.4 kg/m  as calculated from the following:    Height as of this encounter: 1.854 m (6' 1\").    Weight as of this encounter: 108 kg (238 lb).          "

## 2019-12-09 NOTE — PROGRESS NOTES
Subjective     Billy Stock is a 74 year old male who presents to clinic today for the following health issues:    HPI   Hyperlipidemia Follow-Up  Has H/O hyperlipidemia. On medical treatment and diet. No side effects. No muscle weakness, myalgias or upset stomach.       Are you regularly taking any medication or supplement to lower your cholesterol?   No    Are you having muscle aches or other side effects that you think could be caused by your cholesterol lowering medication?  n/a    Hypertension Follow-up  Has h/o HTN. on medical treatment. BP has been controlled. No side effects from medications. No CP, HA, dizziness. good compliance with medications and low salt diet.      Do you check your blood pressure regularly outside of the clinic? No     Are you following a low salt diet? Don't add extra salt    Are your blood pressures ever more than 140 on the top number (systolic) OR more   than 90 on the bottom number (diastolic), for example 140/90? N/A      How many servings of fruits and vegetables do you eat daily?  1 serving    On average, how many sweetened beverages do you drink each day (Examples: soda, juice, sweet tea, etc.  Do NOT count diet or artificially sweetened beverages)?   0    How many days per week do you miss taking your medication? 0    PROBLEMS TO ADD ON...  Has history of atrial fibrillation. On anticoagulation with Coumadin and rate control medications. Asymptonatic - no chest pains , palpitations,  no side effects from medications.  Has h/o CRF. Symptoms include mild LE edema, fatigue, no h/o anemia. Monitoring BP, BG, medications, avoiding OTC NSAIDs. Needs periodic recheck of kidney function.    Concern for pain in the toes, has ingrown toenail on the right great toe, has pain in the left foot related to gout. No acute flare.     Concern for dripping of the nose with clear , watery secretions.     Patient Active Problem List   Diagnosis     Allergic rhinitis due to pollen     Carcinoma  in situ of prostate     Bell's palsy     Glaucoma     Hypertension goal BP (blood pressure) < 140/90     CARDIOVASCULAR SCREENING; LDL GOAL LESS THAN 100     Colon polyps     Gout     HCD (health care directive)     SARAH (obstructive sleep apnea)     Hyperlipidemia LDL goal <100     Gout of foot, unspecified cause, unspecified chronicity, unspecified laterality     Chronic atrial fibrillation     Long term current use of anticoagulants with INR goal of 2.0-3.0     Essential hypertension, benign     Psoas hematoma, right, secondary to anticoagulant therapy     Kidney cyst, acquired     Prostate cancer (H)     Left shoulder pain     Parkinson disease (H)     CKD (chronic kidney disease) stage 3, GFR 30-59 ml/min (H)     Past Surgical History:   Procedure Laterality Date     CATARACT IOL, RT/LT  10/15, 11/15     COLONOSCOPY      [facility]     COLONOSCOPY  2014    Dr. Long [facility]     COLONOSCOPY N/A 2014    Procedure: COMBINED COLONOSCOPY, SINGLE BIOPSY/POLYPECTOMY BY BIOPSY;  Surgeon: Puneet Long MD;  Location:  GI     EYE SURGERY      glaucoma surgery right eye     HERNIA REPAIR       PROSTATE SURGERY       SUPRAPUBIC PROSTATECTOMY  2002     VASECTOMY         Social History     Tobacco Use     Smoking status: Never Smoker     Smokeless tobacco: Never Used   Substance Use Topics     Alcohol use: No     Alcohol/week: 0.0 standard drinks     Family History   Problem Relation Age of Onset     Hypertension Maternal Grandfather      Cerebrovascular Disease Maternal Grandfather      Hypertension Maternal Grandmother      Cerebrovascular Disease Maternal Grandmother      Hypertension Mother          age 51, MVA     Hypertension Brother      Heart Failure Brother      Bronchitis Brother      Other - See Comments Brother         multiple myeloma     Heart Surgery Brother         defibrilater     Prostate Problems Brother      Diabetes Brother      Other Cancer Brother         Multiple Myeloma     Breast  "Cancer Maternal Aunt         hx     Cancer Maternal Aunt         cervical cancer     Cancer - colorectal Maternal Aunt      Unknown/Adopted Father         Don't know father's history     Diabetes Son      Colon Cancer Other          Current Outpatient Medications   Medication Sig Dispense Refill     acetaminophen (TYLENOL) 325 MG tablet Take 1-2 tablets by mouth every 6 hours as needed.       allopurinol (ZYLOPRIM) 100 MG tablet TAKE 1 TABLET BY MOUTH ONCE DAILY 90 tablet 0     amLODIPine (NORVASC) 10 MG tablet TAKE 1 TABLET BY MOUTH ONCE DAILY 90 tablet 0     carbidopa-levodopa (SINEMET)  MG tablet Take 1 tablet by mouth 3 times daily       ipratropium (ATROVENT) 0.03 % nasal spray Spray 2 sprays into both nostrils every 12 hours 1 Box 3     lisinopril (PRINIVIL/ZESTRIL) 40 MG tablet Take 1 tablet (40 mg) by mouth daily 90 tablet 3     LORazepam (ATIVAN) 1 MG tablet Take 1 tablet (1 mg) by mouth nightly as needed for sleep 7 tablet 0     metoprolol succinate ER (TOPROL-XL) 50 MG 24 hr tablet Take 1.5 tablets (75 mg) by mouth daily 135 tablet 3     triamcinolone (KENALOG) 0.5 % cream Apply sparingly to affected area three times daily. 30 g 0     UNABLE TO FIND MEDICATION NAME: Tabavit, multivitmain without vitamin K in it.       warfarin (COUMADIN) 5 MG tablet TAKE 1 TABLET (5 MG) DAILY EXCEPT TAKE 1/2 TABLET (2.5 MG) ON WEDNESDAY OR AS INSTRUCTED BY INR CLINIC 86 tablet 1         Reviewed and updated as needed this visit by Provider         Review of Systems   ROS COMP: Constitutional, HEENT, cardiovascular, pulmonary, GI, , musculoskeletal, neuro, skin, endocrine and psych systems are negative, except as otherwise noted.      Objective    Ht 1.854 m (6' 1\")   BMI 31.33 kg/m    Body mass index is 31.33 kg/m .  Physical Exam   GENERAL: healthy, alert and no distress  EYES: Eyes grossly normal to inspection, PERRL and conjunctivae and sclerae normal  HENT: ear canals and TM's normal, nose and mouth without " "ulcers or lesions  NECK: no adenopathy, no asymmetry, masses, or scars and thyroid normal to palpation  RESP: lungs clear to auscultation - no rales, rhonchi or wheezes  CV: irregular rate and rhythm, normal S1 S2, no S3 or S4, no murmur, click or rub, no peripheral edema and peripheral pulses strong  ABDOMEN: soft, nontender, no hepatosplenomegaly, no masses and bowel sounds normal  MS: no gross musculoskeletal defects noted, no edema        Right great toenail ingrown in the paronychium , onychomycosis changed in the toenail.   SKIN: no suspicious lesions or rashes    Diagnostic Test Results:  Labs reviewed in Epic        Assessment & Plan   Problem List Items Addressed This Visit     Hypertension goal BP (blood pressure) < 140/90 - Primary    Gout    Relevant Orders    ORTHO  REFERRAL (Completed)    Chronic atrial fibrillation    CKD (chronic kidney disease) stage 3, GFR 30-59 ml/min (H)    Relevant Orders    Basic metabolic panel (Completed)    *UA reflex to Microscopic (Completed)      Other Visit Diagnoses     Ingrown toenail        Relevant Orders    ORTHO  REFERRAL (Completed)         Continue treatment  Recently increased Lisinopril to 40 mg, improved BP control. Assess kidney function with h/o CKD  Refer to podiatry   Assess UA     BMI:   Estimated body mass index is 31.4 kg/m  as calculated from the following:    Height as of this encounter: 1.854 m (6' 1\").    Weight as of this encounter: 108 kg (238 lb).           See Patient Instructions  Return in about 6 months (around 6/9/2020) for Physical Exam.    Malcolm Schafer MD  [facility]        "

## 2019-12-10 LAB
ANION GAP SERPL CALCULATED.3IONS-SCNC: 5 MMOL/L (ref 3–14)
BUN SERPL-MCNC: 18 MG/DL (ref 7–30)
CALCIUM SERPL-MCNC: 8.6 MG/DL (ref 8.5–10.1)
CHLORIDE SERPL-SCNC: 110 MMOL/L (ref 94–109)
CO2 SERPL-SCNC: 27 MMOL/L (ref 20–32)
CREAT SERPL-MCNC: 1.31 MG/DL (ref 0.66–1.25)
GFR SERPL CREATININE-BSD FRML MDRD: 54 ML/MIN/{1.73_M2}
GLUCOSE SERPL-MCNC: 87 MG/DL (ref 70–99)
POTASSIUM SERPL-SCNC: 3.7 MMOL/L (ref 3.4–5.3)
SODIUM SERPL-SCNC: 142 MMOL/L (ref 133–144)

## 2019-12-12 DIAGNOSIS — I48.20 CHRONIC ATRIAL FIBRILLATION (H): ICD-10-CM

## 2019-12-12 RX ORDER — WARFARIN SODIUM 5 MG/1
TABLET ORAL
Qty: 86 TABLET | Refills: 0 | Status: SHIPPED | OUTPATIENT
Start: 2019-12-12 | End: 2020-03-11

## 2019-12-12 NOTE — TELEPHONE ENCOUNTER
"Requested Prescriptions   Pending Prescriptions Disp Refills     warfarin ANTICOAGULANT (COUMADIN) 5 MG tablet [Pharmacy Med Name: WARFARIN SODIUM 5 MG Tablet] 86 tablet 0     Sig: TAKE 1 TABLET (5 MG) DAILY EXCEPT TAKE 1/2 TABLET (2.5 MG) ON WEDNESDAY OR AS INSTRUCTED BY INR CLINIC   Last Written Prescription Date:  05/14/2019  Last Fill Quantity: 86,  # refills: 01   Last office visit: 12/9/2019 with prescribing provider:     Future Office Visit:      Vitamin K Antagonists Failed - 12/12/2019 11:05 AM        Failed - INR is within goal in the past 6 weeks     Confirm INR is within goal in the past 6 weeks.     Recent Labs   Lab Test 12/02/19   INR 2.5*                       Failed - Medication is active on med list        Passed - Recent (12 mo) or future (30 days) visit within the authorizing provider's specialty     Patient has had an office visit with the authorizing provider or a provider within the authorizing providers department within the previous 12 mos or has a future within next 30 days. See \"Patient Info\" tab in inbasket, or \"Choose Columns\" in Meds & Orders section of the refill encounter.              Passed - Patient is 18 years of age or older        "

## 2019-12-12 NOTE — TELEPHONE ENCOUNTER
Anticoagulation Monitoring Return Date Recheck   Latest Ref Rng & Units     12/2/2019 12/30/2019      Anticoagulation Monitoring Instructions   Latest Ref Rng & Units    12/2/2019 2.5 mg every Wed; 5 mg all other days   Prescription approved per [facility] Refill Protocol.  Analia Talley, RN  Anticoagulation Nurse - [facility]

## 2019-12-26 DIAGNOSIS — I10 BENIGN ESSENTIAL HYPERTENSION: ICD-10-CM

## 2019-12-27 RX ORDER — LISINOPRIL 10 MG/1
TABLET ORAL
Qty: 90 TABLET | Refills: 1 | Status: SHIPPED | OUTPATIENT
Start: 2019-12-27 | End: 2020-02-26

## 2019-12-27 NOTE — TELEPHONE ENCOUNTER
"Requested Prescriptions   Pending Prescriptions Disp Refills     lisinopril (PRINIVIL/ZESTRIL) 10 MG tablet [Pharmacy Med Name: Lisinopril 10 MG Oral Tablet]  0     Sig: TAKE 2 TABLETS BY MOUTH ONCE DAILY       ACE Inhibitors (Including Combos) Protocol Failed - 12/26/2019 10:31 AM        Failed - Normal serum creatinine on file in past 12 months     Recent Labs   Lab Test 12/09/19  1021   CR 1.31*             Passed - Blood pressure under 140/90 in past 12 months     BP Readings from Last 3 Encounters:   12/09/19 126/74   11/12/19 (!) 160/91   11/08/19 (!) 142/84                 Passed - Recent (12 mo) or future (30 days) visit within the authorizing provider's specialty     Patient has had an office visit with the authorizing provider or a provider within the authorizing providers department within the previous 12 mos or has a future within next 30 days. See \"Patient Info\" tab in inbasket, or \"Choose Columns\" in Meds & Orders section of the refill encounter.              Passed - Medication is active on med list        Passed - Patient is age 18 or older        Passed - Normal serum potassium on file in past 12 months     Recent Labs   Lab Test 12/09/19  1021   POTASSIUM 3.7             Routing refill request to provider for review/approval because:  BP out of range          "

## 2019-12-30 ENCOUNTER — OFFICE VISIT (OUTPATIENT)
Dept: PODIATRY | Facility: CLINIC | Age: 74
End: 2019-12-30
Payer: COMMERCIAL

## 2019-12-30 ENCOUNTER — ANTICOAGULATION THERAPY VISIT (OUTPATIENT)
Dept: ANTICOAGULATION | Facility: CLINIC | Age: 74
End: 2019-12-30
Payer: COMMERCIAL

## 2019-12-30 VITALS
BODY MASS INDEX: 31.54 KG/M2 | WEIGHT: 238 LBS | DIASTOLIC BLOOD PRESSURE: 70 MMHG | HEIGHT: 73 IN | SYSTOLIC BLOOD PRESSURE: 122 MMHG

## 2019-12-30 DIAGNOSIS — Z79.01 LONG TERM CURRENT USE OF ANTICOAGULANTS WITH INR GOAL OF 2.0-3.0: ICD-10-CM

## 2019-12-30 DIAGNOSIS — G57.60 MORTON'S NEUROMA, UNSPECIFIED LATERALITY: ICD-10-CM

## 2019-12-30 DIAGNOSIS — B35.1 ONYCHOMYCOSIS: ICD-10-CM

## 2019-12-30 DIAGNOSIS — I48.20 CHRONIC ATRIAL FIBRILLATION (H): ICD-10-CM

## 2019-12-30 DIAGNOSIS — L60.0 ONYCHOCRYPTOSIS: Primary | ICD-10-CM

## 2019-12-30 LAB — INR POINT OF CARE: 2.8 (ref 0.86–1.14)

## 2019-12-30 PROCEDURE — 11720 DEBRIDE NAIL 1-5: CPT | Performed by: PODIATRIST

## 2019-12-30 PROCEDURE — 99203 OFFICE O/P NEW LOW 30 MIN: CPT | Mod: 25 | Performed by: PODIATRIST

## 2019-12-30 PROCEDURE — 85610 PROTHROMBIN TIME: CPT | Mod: QW

## 2019-12-30 PROCEDURE — 99207 ZZC NO CHARGE NURSE ONLY: CPT

## 2019-12-30 PROCEDURE — 36416 COLLJ CAPILLARY BLOOD SPEC: CPT

## 2019-12-30 ASSESSMENT — MIFFLIN-ST. JEOR: SCORE: 1878.44

## 2019-12-30 NOTE — PROGRESS NOTES
ANTICOAGULATION FOLLOW-UP CLINIC VISIT    Patient Name:  Billy Stock  Date:  2019  Contact Type:  Face to Face    SUBJECTIVE:  Patient Findings     Comments:   The patient was assessed for diet, medication, and activity level changes, missed or extra doses, bruising or bleeding, with no problem findings.          Clinical Outcomes     Negatives:   Major bleeding event, Thromboembolic event, Anticoagulation-related hospital admission, Anticoagulation-related ED visit, Anticoagulation-related fatality    Comments:   The patient was assessed for diet, medication, and activity level changes, missed or extra doses, bruising or bleeding, with no problem findings.             OBJECTIVE    INR Protime   Date Value Ref Range Status   2019 2.8 (A) 0.86 - 1.14 Final       ASSESSMENT / PLAN  INR assessment THER    Recheck INR In: 4 WEEKS    INR Location Clinic      Anticoagulation Summary  As of 2019    INR goal:   2.0-3.0   TTR:   78.7 % (1 y)   INR used for dosin.8 (2019)   Warfarin maintenance plan:   2.5 mg (5 mg x 0.5) every Wed; 5 mg (5 mg x 1) all other days   Full warfarin instructions:   2.5 mg every Wed; 5 mg all other days   Weekly warfarin total:   32.5 mg   No change documented:   Samantha Galeas RN   Plan last modified:   Marge Gale RN (2018)   Next INR check:   2020   Priority:   Maintenance   Target end date:       Indications    Long term current use of anticoagulants with INR goal of 2.0-3.0 [Z79.01]  Chronic atrial fibrillation [I48.20]             Anticoagulation Episode Summary     INR check location:       Preferred lab:       Send INR reminders to:   [facility]    Comments:   Pt reports taking Warfarin in the morning      Anticoagulation Care Providers     Provider Role Specialty Phone number    Malcolm Schafer MD Responsible Internal Medicine [phone]            See the Encounter Report to view Anticoagulation Flowsheet and Dosing  Calendar (Go to Encounters tab in chart review, and find the Anticoagulation Therapy Visit)    Dosage adjustment made based on physician directed care plan.    Samantha Galeas RN

## 2019-12-30 NOTE — PATIENT INSTRUCTIONS
Thank you for choosing [facility]!    DR. CONTI'S CLINIC LOCATIONS:   MONDAY - [place] TUESDAY AM - [place]   [address] [facility]  [address]   [place] [address] [place] [address]   [phone]       THURSDAY AM - [place] THURSDAY PM - [place]   [address]   [place] [address] [place], [place] [address] [phone]       FRIDAY AM - [place] SET UP SURGERY: [phone] [address] APPOINTMENTS: [phone]   [place] [address] BILLING QUESTIONS: [phone] FAX NUMBER: [phone]     Follow Up: as needed    OVER THE COUNTER INSERTS    Most of these can be found at your local [employer], [employer], or online:    [employer]   Sofsole Fit [employer]   Power Step   Walk-Fit  ([employer]) Arch [employer]radles       **A good high quality over the counter insert should cost around $40-$50            TAN'S NEUROMA   Tan's neuroma is an enlargement or thickening of a nerve in the foot. It is also sometimes referred to as an intermetatarsal neuroma, interdigital neuroma, Tan's metatarsalgia (pain in the metatarsal head area), antelmo-neural fibrosis (scar tissue around a nerve) or entrapment neuropathy (abnormal nerve due to compression). A Tan's neuroma most commonly occurs in the third interspace between the third and fourth toes, followed by the second interspace between the second and third toes. Tan's neuromas have also occurred in the fourth and first interspaces, but these are rare. If you have a Tan's neuroma, there is a 15% chance it will occur bilaterally (on both feet). Tan's neuromas occur most commonly in women who are between 30 to 50 years old. The reason they are more common in women is thought to be due to the shoes women wear.   CAUSES: A Tan's neuroma is thought to be caused by trauma to the nerve, but scientists are  "still not sure about the exact cause of the trauma. The trauma may be caused by the metatarsal heads, the deep transverse intermetatarsal ligament (holds the metatarsal heads together) or an intermetatarsal bursa (fluid-filled sac). All of these structures can cause compression/trauma on the nerve which initially causes swelling and injury in the nerve. Over time if the compression/trauma continues, the nerve repairs itself with very fibrous tissue that leads to enlargement and thickening of the nerve. Other causes of trauma to the nerve may include; overpronation (foot rolls inward), hypermobility (too much motion), cavo varus (high arch foot) and excessive dorsiflexion (toes bend upward) of the toes. These biomechanical (howthe foot moves) factors may cause trauma to the nerve with every step. If the nerve becomes irritated and enlarged then it takes up more space and gets even more compressed and irritated. It becomes a vicious cycle.   SIGNS & SYMPTOMS  - Pain (sharp, stabbing, throbbing, shooting)   - Numbness   - Tingling or \"pins & needles\"   - Burning   - Cramping   - Feeling that you are stepping on something or that something is in your shoe   - Initially the symptoms may happen once in a while, but as the condition gets     worse, the symptoms may happen all of the time   - It usually feels better by taking off your shoe and massaging your foot     DIAGNOSIS: Your podiatrist will ask many questions about your signs and symptoms and will perform a physical exam. Some of the exams may include a web space compression test. This is done by squeezing the metatarsals together with one hand and using the thumb and index finger of the other hand to compress the affected web space to reproduce the pain/symptoms. A palpable click (Charley's click) is usually present. This test may also cause pain to shoot into the toes and that is called a Tinel's sign. Finesse's test involves squeezing the metatarsals together and " moving the toes up and down for 30 seconds. This will usually cause pain or it will bring on your other symptoms. Espinoza's sign is positive when you stand and the affected toes spread apart. A Tan's neuroma is usually diagnosed based on the history and physical exam findings, but sometimes other tests such as an x-ray, ultrasound or an MRI are needed.   TREATMENT  1.  Footwear Changes: Wear shoes that are wide and deep in the toe box so they  do not put pressure on your toes and metatarsals. Avoid wearing high heels because they cause increased pressure on the ball of your foot (forefoot).    2.  Metatarsal Pads: These help to lift and separate the metatarsal heads to take pressure off of the nerve. They are placed just behind where you feel the pain, not on top of the painful spot.   3.  Activity modification: For example, you may try swimming instead of running until your symptoms go away.   4.  Taping   5.  Icing   6.  NSAIDs (anti-inflammatories): aleve, ibuprofen, etc.   7.  Arch Supports or Orthotics: These help to control some of the abnormal motion in your feet. The abnormal motion can lead to extra torque and pressure on the nerve.   8.  Physical Therapy  9.  Cortisone Injection: Helps to decrease the size of the irritated, enlarged nerve.   10.  Sclerosing Alcohol Injection: Helps to destroy the nerve chemically, which causes permanent numbness    SURGERY  If conservative treatment does not help surgery may be needed. Surgery may involve cutting out the nerve or cutting the intermetatarsalligament. Studies have shown surgery has an 80-85% success rate.  Will result in numbness    PREVENTION  -Avoid wearing narrow, pointed toe shoes, or high heels    PRICE THERAPY  Many aches and pains throughout the foot and ankle can be helped with many simple treatments. This is usually described as PRICE Therapy.      P - Protection - often times, inflammation/pain in the lower extremity is not able to improve  simply because the areas involved are never allowed to rest. Every step we take can bother the problematic area. Protecting those areas is an important step in the healing process. This may involve a walking cast boot, a special insert/orthotic device, an ankle brace, or simply avoiding barefoot walking.    R - Rest - in addition to protecting the foot/ankle, resting is an important, but often times difficult, treatment option. Getting off your feet when they bother you, and specifically avoiding activities that cause pain/discomfort, are very beneficial to prevent, and treat, foot/ankle pain.      I - Ice - icing regularly can help to decrease inflammation and swelling in the foot, thus decreasing pain. Using an ice pack or a bag of frozen veggies works very well. Ice for 20 minutes multiple times per day as needed.  Do not place the ice directly on the skin as this can cause tissue damage.    C - Compression - using a compression wrap or an ACE wrap can help to decrease swelling, which can help to decrease pain. Wearing the wraps is generally not needed at night, but they should be worn on a regular basis when you are going to be on your feet for prolonged periods as gravity tends to pull fluids down to your feet/ankles.    E - Elevation - elevating your lower extremities multiple times daily for 15-20 minutes can help to decrease swelling, which works well in decreasing pain levels.    NSAID/Tylenol - Anti-inflammatories like Aleve or ibuprofen, and/or a pain medication, such as Tylenol, can help to improve pain levels and get the issue resolved sooner rather than later. Anyone with liver issues should be careful with Tylenol, and anyone with high blood pressure or heart, stomach or kidney issues should be careful with anti-inflammatories. Please ask if you have questions about these medications, including dosage.    ROUTINE FOOT CARE (NAIL TRIMMING / CALLUSES)    Go to afcna.org ([employer]  Association) and search for providers near you.  Otherwise, this is a list we have complied of  recommended locations/providers in [place].    [facility]   [phone]   [facility]  [phone]  [email or web address]   [facility]  [phone]  [place] + [place] mile radius [facility]  [phone]  suraj.   Gloria Kapadia, DPM  [address] [place] [address] [phone] Hoang Gilmore, DPM  [address] [place] [address] [phone]   [facility]  [phone] [address] [place] [address]  [facility]  Dr. Randolph Horn  [phone]  [place], [place]   [facility]  [phone]  [place] & [place] Locations  (does not take [employer]) FYI:  *Some providers accept insurance while others are out of pocket. Please contact them for details*

## 2019-12-30 NOTE — PROGRESS NOTES
"Foot & Ankle Surgery  December 30, 2019    CC: gout, ingrown nail    I was asked to see Billy Stock regarding the chief complaint by:  Dr. RUDOLPH Schafer    HPI:  Pt is a 74 year old male who presents with above complaint.  \"I've got gout\".  States he has trouble with his right great toenail.  Can be tender and thinks there may be nail fungus.  He also states it feels like he's walking \"bone on bone\" in his foot.  No specific treatments for his issues.    ROS:   Pos for CC.  The patient denies current nausea, vomiting, chills, fevers, belly pain, calf pain, chest pain or SOB.  Complete remainder of ROS is otherwise neg.    VITALS:    Vitals:    12/30/19 1326   BP: 122/70   Weight: 108 kg (238 lb)   Height: 1.854 m (6' 1\")       PMH:    Past Medical History:   Diagnosis Date     Bell's palsy 10/15/2002     CA IN SITU PROSTATE 10/15/2002     Chronic atrial fibrillation 7/1/10     Glaucoma 4/10/2003     Problem list name updated by automated process. Provider to review     Gout 5/16/2013     Hyperlipidemia LDL goal <100 9/10/2014     Hypertension goal BP (blood pressure) < 140/90 6/28/2006     SARAH (obstructive sleep apnea) 8/28/2013     Parkinson disease (H) 2019     Pericarditis 2001     Renal cyst        SXHX:    Past Surgical History:   Procedure Laterality Date     CATARACT IOL, RT/LT  10/15, 11/15     COLONOSCOPY  2009    [facility]     COLONOSCOPY  9/30/2014    Dr. Long [facility]     COLONOSCOPY N/A 9/30/2014    Procedure: COMBINED COLONOSCOPY, SINGLE BIOPSY/POLYPECTOMY BY BIOPSY;  Surgeon: Puneet Long MD;  Location:  GI     EYE SURGERY  2007    glaucoma surgery right eye     HERNIA REPAIR       PROSTATE SURGERY       SUPRAPUBIC PROSTATECTOMY  2002     VASECTOMY          MEDS:    Current Outpatient Medications   Medication     acetaminophen (TYLENOL) 325 MG tablet     allopurinol (ZYLOPRIM) 100 MG tablet     amLODIPine (NORVASC) 10 MG tablet     carbidopa-levodopa (SINEMET)  MG tablet     ipratropium " (ATROVENT) 0.03 % nasal spray     lisinopril (PRINIVIL/ZESTRIL) 10 MG tablet     lisinopril (PRINIVIL/ZESTRIL) 40 MG tablet     LORazepam (ATIVAN) 1 MG tablet     metoprolol succinate ER (TOPROL-XL) 50 MG 24 hr tablet     triamcinolone (KENALOG) 0.5 % cream     UNABLE TO FIND     warfarin ANTICOAGULANT (COUMADIN) 5 MG tablet     No current facility-administered medications for this visit.        ALL:     Allergies   Allergen Reactions     Cats      Codeine      Hallucinated when taking codeine with another medication     Codeine Unknown     Maple Tree      Morphine Visual Disturbance and Other (See Comments)     Watermelon [Citrullus Vulgaris]      Itching throat, hives       FMH:    Family History   Problem Relation Age of Onset     Hypertension Maternal Grandfather      Cerebrovascular Disease Maternal Grandfather      Hypertension Maternal Grandmother      Cerebrovascular Disease Maternal Grandmother      Hypertension Mother          age 51, MVA     Hypertension Brother      Heart Failure Brother      Bronchitis Brother      Other - See Comments Brother         multiple myeloma     Heart Surgery Brother         defibrilater     Prostate Problems Brother      Diabetes Brother      Other Cancer Brother         Multiple Myeloma     Breast Cancer Maternal Aunt         hx     Cancer Maternal Aunt         cervical cancer     Cancer - colorectal Maternal Aunt      Unknown/Adopted Father         Don't know father's history     Diabetes Son      Colon Cancer Other        SocHx:    Social History     Socioeconomic History     Marital status:      Spouse name: Not on file     Number of children: 3     Years of education: Not on file     Highest education level: Not on file   Occupational History     Not on file   Social Needs     Financial resource strain: Not on file     Food insecurity:     Worry: Not on file     Inability: Not on file     Transportation needs:     Medical: Not on file     Non-medical: Not on  file   Tobacco Use     Smoking status: Never Smoker     Smokeless tobacco: Never Used   Substance and Sexual Activity     Alcohol use: No     Alcohol/week: 0.0 standard drinks     Drug use: No     Sexual activity: Never   Lifestyle     Physical activity:     Days per week: Not on file     Minutes per session: Not on file     Stress: Not on file   Relationships     Social connections:     Talks on phone: Not on file     Gets together: Not on file     Attends Confucianism service: Not on file     Active member of club or organization: Not on file     Attends meetings of clubs or organizations: Not on file     Relationship status: Not on file     Intimate partner violence:     Fear of current or ex partner: Not on file     Emotionally abused: Not on file     Physically abused: Not on file     Forced sexual activity: Not on file   Other Topics Concern     Parent/sibling w/ CABG, MI or angioplasty before 65F 55M? Not Asked      Service Not Asked     Blood Transfusions Not Asked     Caffeine Concern No     Occupational Exposure No     Hobby Hazards No     Sleep Concern Yes     Comment: CPAP at night     Stress Concern No     Weight Concern No     Special Diet No     Back Care No     Exercise No     Bike Helmet Not Asked     Seat Belt Yes     Self-Exams Not Asked   Social History Narrative     Not on file           EXAMINATION:  Gen:   No apparent distress  Neuro:   A&Ox3, no deficits  Psych:    Answering questions appropriately for age and situation with normal affect  Head:    NCAT  Eye:    Visual scanning without deficit  Ear:    Response to auditory stimuli wnl  Lung:    Non-labored breathing on RA noted  Abd:    NTND per patient report  Lymph:    Neg for pitting/non-pitting edema BLE  Vasc:    Pulses palpable, CFT minimally delayed  Neuro:    Light touch sensation intact to all sensory nerve distributions without paresthesias  Derm:    R hallux nail is mycotic, thick, incurvatd without paronychia.    MSK:     Painful 3rd interspace > 3rd/4th MPJ tenderness.  Bilateral bunion without obvious gouty tophaceous deposits.    Calf:    Neg for redness, swelling or tenderness    Assessment:  74 year old male with right hallux nail onychocryptosis and onychomycosis; forefoot pain including Tan's neuroma and MPJ capsulitis; gout      Plan:  Discussed etiologies, anatomy and options  1.  Right hallux nail onychocryptosis and onychomycosis  -regarding the ingrown nail, we discussed procedure options.  A slantback was performed, removing the leading edge of the nail with a nail clipper.    -nail care handout dispensed for future routine nail care/slantbacks  -consider P&A of the offending border should this fail to provide sufficient relief  -regarding the onychomycosis, we discussed the difficulty in eliminating nail fungus.  We discussed topical vs oral medication options.  We discussed addressing the skin, shower, shoes, etc.  No treatment started at this time    2.  Forefoot pain, including Tan's neuroma and 3rd/4th MPJ capsulitis   -discussed comfortable accommodative shoe gear  -OTC insert for arch support and nerve decompression.  Discussed that if the insert, by consuming more space in the shoe, may increase the compression on the nerve, and should then be discontinued  -RICE/NSAID vs tylenol prn  -discussed diagnostic/therapeutic injection.  Declined today.    3.  Gout 1st MPJ in setting of bunion bilateral   -no evidence of an acute gouty flare  -no evidence of tophaceous deposits  -continue treatment of allopurinol, but no acute treatment indicated currently.       Follow up:  prn or sooner with acute issues      Patient's medical history was reviewed today    Body mass index is 31.4 kg/m .  Weight management plan: Patient was referred to their PCP to discuss a diet and exercise plan.        Agustin Pretty DPM FACFAS FACFAOM  Podiatric Foot & Ankle Surgeon  [facility]  [phone]

## 2019-12-30 NOTE — LETTER
"    12/30/2019         RE: Billy Stock  [address]  [place] [address]        Dear Colleague,    Thank you for referring your patient, Billy Stock, to the [facility]. Please see a copy of my visit note below.    Foot & Ankle Surgery  December 30, 2019    CC: gout, ingrown nail    I was asked to see Billy Stock regarding the chief complaint by:  Dr. RUDOLPH Schafer    HPI:  Pt is a 74 year old male who presents with above complaint.  \"I've got gout\".  States he has trouble with his right great toenail.  Can be tender and thinks there may be nail fungus.  He also states it feels like he's walking \"bone on bone\" in his foot.  No specific treatments for his issues.    ROS:   Pos for CC.  The patient denies current nausea, vomiting, chills, fevers, belly pain, calf pain, chest pain or SOB.  Complete remainder of ROS is otherwise neg.    VITALS:    Vitals:    12/30/19 1326   BP: 122/70   Weight: 108 kg (238 lb)   Height: 1.854 m (6' 1\")       PMH:    Past Medical History:   Diagnosis Date     Bell's palsy 10/15/2002     CA IN SITU PROSTATE 10/15/2002     Chronic atrial fibrillation 7/1/10     Glaucoma 4/10/2003     Problem list name updated by automated process. Provider to review     Gout 5/16/2013     Hyperlipidemia LDL goal <100 9/10/2014     Hypertension goal BP (blood pressure) < 140/90 6/28/2006     SARAH (obstructive sleep apnea) 8/28/2013     Parkinson disease (H) 2019     Pericarditis 2001     Renal cyst        SXHX:    Past Surgical History:   Procedure Laterality Date     CATARACT IOL, RT/LT  10/15, 11/15     COLONOSCOPY  2009    [facility]     COLONOSCOPY  9/30/2014    Dr. Long [facility]     COLONOSCOPY N/A 9/30/2014    Procedure: COMBINED COLONOSCOPY, SINGLE BIOPSY/POLYPECTOMY BY BIOPSY;  Surgeon: Puneet Long MD;  Location:  GI     EYE SURGERY  2007    glaucoma surgery right eye     HERNIA REPAIR       PROSTATE SURGERY       SUPRAPUBIC PROSTATECTOMY  2002     VASECTOMY          MEDS:  "   Current Outpatient Medications   Medication     acetaminophen (TYLENOL) 325 MG tablet     allopurinol (ZYLOPRIM) 100 MG tablet     amLODIPine (NORVASC) 10 MG tablet     carbidopa-levodopa (SINEMET)  MG tablet     ipratropium (ATROVENT) 0.03 % nasal spray     lisinopril (PRINIVIL/ZESTRIL) 10 MG tablet     lisinopril (PRINIVIL/ZESTRIL) 40 MG tablet     LORazepam (ATIVAN) 1 MG tablet     metoprolol succinate ER (TOPROL-XL) 50 MG 24 hr tablet     triamcinolone (KENALOG) 0.5 % cream     UNABLE TO FIND     warfarin ANTICOAGULANT (COUMADIN) 5 MG tablet     No current facility-administered medications for this visit.        ALL:     Allergies   Allergen Reactions     Cats      Codeine      Hallucinated when taking codeine with another medication     Codeine Unknown     Maple Tree      Morphine Visual Disturbance and Other (See Comments)     Watermelon [Citrullus Vulgaris]      Itching throat, hives       FMH:    Family History   Problem Relation Age of Onset     Hypertension Maternal Grandfather      Cerebrovascular Disease Maternal Grandfather      Hypertension Maternal Grandmother      Cerebrovascular Disease Maternal Grandmother      Hypertension Mother          age 51, MVA     Hypertension Brother      Heart Failure Brother      Bronchitis Brother      Other - See Comments Brother         multiple myeloma     Heart Surgery Brother         defibrilater     Prostate Problems Brother      Diabetes Brother      Other Cancer Brother         Multiple Myeloma     Breast Cancer Maternal Aunt         hx     Cancer Maternal Aunt         cervical cancer     Cancer - colorectal Maternal Aunt      Unknown/Adopted Father         Don't know father's history     Diabetes Son      Colon Cancer Other        SocHx:    Social History     Socioeconomic History     Marital status:      Spouse name: Not on file     Number of children: 3     Years of education: Not on file     Highest education level: Not on file    Occupational History     Not on file   Social Needs     Financial resource strain: Not on file     Food insecurity:     Worry: Not on file     Inability: Not on file     Transportation needs:     Medical: Not on file     Non-medical: Not on file   Tobacco Use     Smoking status: Never Smoker     Smokeless tobacco: Never Used   Substance and Sexual Activity     Alcohol use: No     Alcohol/week: 0.0 standard drinks     Drug use: No     Sexual activity: Never   Lifestyle     Physical activity:     Days per week: Not on file     Minutes per session: Not on file     Stress: Not on file   Relationships     Social connections:     Talks on phone: Not on file     Gets together: Not on file     Attends Anabaptism service: Not on file     Active member of club or organization: Not on file     Attends meetings of clubs or organizations: Not on file     Relationship status: Not on file     Intimate partner violence:     Fear of current or ex partner: Not on file     Emotionally abused: Not on file     Physically abused: Not on file     Forced sexual activity: Not on file   Other Topics Concern     Parent/sibling w/ CABG, MI or angioplasty before 65F 55M? Not Asked      Service Not Asked     Blood Transfusions Not Asked     Caffeine Concern No     Occupational Exposure No     Hobby Hazards No     Sleep Concern Yes     Comment: CPAP at night     Stress Concern No     Weight Concern No     Special Diet No     Back Care No     Exercise No     Bike Helmet Not Asked     Seat Belt Yes     Self-Exams Not Asked   Social History Narrative     Not on file           EXAMINATION:  Gen:   No apparent distress  Neuro:   A&Ox3, no deficits  Psych:    Answering questions appropriately for age and situation with normal affect  Head:    NCAT  Eye:    Visual scanning without deficit  Ear:    Response to auditory stimuli wnl  Lung:    Non-labored breathing on RA noted  Abd:    NTND per patient report  Lymph:    Neg for pitting/non-pitting  edema BLE  Vasc:    Pulses palpable, CFT minimally delayed  Neuro:    Light touch sensation intact to all sensory nerve distributions without paresthesias  Derm:    R hallux nail is mycotic, thick, incurvatd without paronychia.    MSK:    Painful 3rd interspace > 3rd/4th MPJ tenderness.  Bilateral bunion without obvious gouty tophaceous deposits.    Calf:    Neg for redness, swelling or tenderness    Assessment:  74 year old male with right hallux nail onychocryptosis and onychomycosis; forefoot pain including Tan's neuroma and MPJ capsulitis; gout      Plan:  Discussed etiologies, anatomy and options  1.  Right hallux nail onychocryptosis and onychomycosis  -regarding the ingrown nail, we discussed procedure options.  A slantback was performed, removing the leading edge of the nail with a nail clipper.    -nail care handout dispensed for future routine nail care/slantbacks  -consider P&A of the offending border should this fail to provide sufficient relief  -regarding the onychomycosis, we discussed the difficulty in eliminating nail fungus.  We discussed topical vs oral medication options.  We discussed addressing the skin, shower, shoes, etc.  No treatment started at this time    2.  Forefoot pain, including Tan's neuroma and 3rd/4th MPJ capsulitis   -discussed comfortable accommodative shoe gear  -OTC insert for arch support and nerve decompression.  Discussed that if the insert, by consuming more space in the shoe, may increase the compression on the nerve, and should then be discontinued  -RICE/NSAID vs tylenol prn  -discussed diagnostic/therapeutic injection.  Declined today.    3.  Gout 1st MPJ in setting of bunion bilateral   -no evidence of an acute gouty flare  -no evidence of tophaceous deposits  -continue treatment of allopurinol, but no acute treatment indicated currently.       Follow up:  *** or sooner with acute issues      Patient's medical history was reviewed today    Body mass index is  31.4 kg/m .  Weight management plan: Patient was referred to their PCP to discuss a diet and exercise plan.        Agustin Pretty DPM [facility] FACFA  Podiatric Foot & Ankle Surgeon  [facility]  [phone]      Again, thank you for allowing me to participate in the care of your patient.        Sincerely,        Agustin Pretty DPM, DPVANE

## 2020-01-22 DIAGNOSIS — I10 ESSENTIAL HYPERTENSION WITH GOAL BLOOD PRESSURE LESS THAN 140/90: ICD-10-CM

## 2020-01-22 NOTE — TELEPHONE ENCOUNTER
"Requested Prescriptions   Pending Prescriptions Disp Refills     amLODIPine (NORVASC) 10 MG tablet [Pharmacy Med Name: amLODIPine  Last Written Prescription Date:  10/31/2019  Last Fill Quantity: 90,  # refills: 0   Last office visit: 12/9/2019 with prescribing provider:     Future Office Visit:   Besylate 10 MG Oral Tablet]  0     Sig: TAKE 1 TABLET BY MOUTH ONCE DAILY       Calcium Channel Blockers Protocol  Failed - 1/22/2020 11:37 AM        Failed - Normal serum creatinine on file in past 12 months     Recent Labs   Lab Test 12/09/19  1021   CR 1.31*             Passed - Blood pressure under 140/90 in past 12 months     BP Readings from Last 3 Encounters:   12/30/19 122/70   12/09/19 126/74   11/12/19 (!) 160/91                 Passed - Recent (12 mo) or future (30 days) visit within the authorizing provider's specialty     Patient has had an office visit with the authorizing provider or a provider within the authorizing providers department within the previous 12 mos or has a future within next 30 days. See \"Patient Info\" tab in inbasket, or \"Choose Columns\" in Meds & Orders section of the refill encounter.              Passed - Medication is active on med list        Passed - Patient is age 18 or older        "

## 2020-01-23 NOTE — TELEPHONE ENCOUNTER
Refill request for Amlodipine     Last OV on 12/9/19     BP Readings from Last 3 Encounters:   12/30/19 122/70   12/09/19 126/74   11/12/19 (!) 160/91       Creatinine   Date Value Ref Range Status   12/09/2019 1.31 (H) 0.66 - 1.25 mg/dL Final       Provider approval needed.

## 2020-01-24 RX ORDER — AMLODIPINE BESYLATE 10 MG/1
TABLET ORAL
Qty: 90 TABLET | Refills: 3 | Status: SHIPPED | OUTPATIENT
Start: 2020-01-24 | End: 2021-01-13

## 2020-01-27 ENCOUNTER — TELEPHONE (OUTPATIENT)
Dept: ANTICOAGULATION | Facility: CLINIC | Age: 75
End: 2020-01-27

## 2020-01-27 ENCOUNTER — ANTICOAGULATION THERAPY VISIT (OUTPATIENT)
Dept: ANTICOAGULATION | Facility: CLINIC | Age: 75
End: 2020-01-27
Payer: COMMERCIAL

## 2020-01-27 DIAGNOSIS — I48.20 CHRONIC ATRIAL FIBRILLATION (H): ICD-10-CM

## 2020-01-27 DIAGNOSIS — Z79.01 LONG TERM CURRENT USE OF ANTICOAGULANTS WITH INR GOAL OF 2.0-3.0: ICD-10-CM

## 2020-01-27 DIAGNOSIS — Z79.01 LONG TERM CURRENT USE OF ANTICOAGULANTS WITH INR GOAL OF 2.0-3.0: Primary | ICD-10-CM

## 2020-01-27 DIAGNOSIS — M10.9 GOUT OF FOOT, UNSPECIFIED CAUSE, UNSPECIFIED CHRONICITY, UNSPECIFIED LATERALITY: ICD-10-CM

## 2020-01-27 LAB — INR POINT OF CARE: 2.3 (ref 0.86–1.14)

## 2020-01-27 PROCEDURE — 36416 COLLJ CAPILLARY BLOOD SPEC: CPT

## 2020-01-27 PROCEDURE — 85610 PROTHROMBIN TIME: CPT | Mod: QW

## 2020-01-27 PROCEDURE — 99207 ZZC NO CHARGE NURSE ONLY: CPT

## 2020-01-27 NOTE — TELEPHONE ENCOUNTER
"Requested Prescriptions   Pending Prescriptions Disp Refills     allopurinol (ZYLOPRIM) 100 MG tablet [Pharmacy Med Name: Allopurinol 100 MG  Last Written Prescription Date:  10/31/2019  Last Fill Quantity: 90,  # refills: 0   Last office visit: 12/9/2019 with prescribing provider:     Future Office Visit:   Oral Tablet]  0     Sig: TAKE 1 TABLET BY MOUTH ONCE DAILY       Gout Agents Protocol Failed - 1/27/2020  1:14 PM        Failed - Has Uric Acid on file in past 12 months and value is less than 6     Recent Labs   Lab Test 01/19/18  0936   URIC 5.0     If level is 6mg/dL or greater, ok to refill one time and refer to provider.           Failed - Normal serum creatinine on file in the past 12 months     Recent Labs   Lab Test 12/09/19  1021   CR 1.31*             Passed - CBC on file in past 12 months     Recent Labs   Lab Test 06/03/19  1525   WBC 5.3   RBC 4.87   HGB 13.7   HCT 41.0                    Passed - ALT on file in past 12 months     Recent Labs   Lab Test 06/03/19  1525   ALT 12             Passed - Recent (12 mo) or future (30 days) visit within the authorizing provider's specialty     Patient has had an office visit with the authorizing provider or a provider within the authorizing providers department within the previous 12 mos or has a future within next 30 days. See \"Patient Info\" tab in inbasket, or \"Choose Columns\" in Meds & Orders section of the refill encounter.              Passed - Medication is active on med list        Passed - Patient is age 18 or older        "

## 2020-01-27 NOTE — TELEPHONE ENCOUNTER
For insurance purposes, an annual Anticoagulation referral is required. Please complete and sign the order then route back to [facility]. Samantha Galeas RN

## 2020-01-27 NOTE — PROGRESS NOTES
ANTICOAGULATION FOLLOW-UP CLINIC VISIT    Patient Name:  Billy Stock  Date:  2020  Contact Type:  Face to Face    SUBJECTIVE:  Patient Findings     Comments:   The patient was assessed for diet, medication, and activity level changes, missed or extra doses, bruising or bleeding, with no problem findings.          Clinical Outcomes     Negatives:   Major bleeding event, Thromboembolic event, Anticoagulation-related hospital admission, Anticoagulation-related ED visit, Anticoagulation-related fatality    Comments:   The patient was assessed for diet, medication, and activity level changes, missed or extra doses, bruising or bleeding, with no problem findings.             OBJECTIVE    INR Protime   Date Value Ref Range Status   2020 2.3 (A) 0.86 - 1.14 Final       ASSESSMENT / PLAN  INR assessment THER    Recheck INR In: 4 WEEKS    INR Location Clinic      Anticoagulation Summary  As of 2020    INR goal:   2.0-3.0   TTR:   80.6 % (1 y)   INR used for dosin.3 (2020)   Warfarin maintenance plan:   2.5 mg (5 mg x 0.5) every Wed; 5 mg (5 mg x 1) all other days   Full warfarin instructions:   2.5 mg every Wed; 5 mg all other days   Weekly warfarin total:   32.5 mg   No change documented:   Samantha Galeas RN   Plan last modified:   Marge Gale RN (2018)   Next INR check:   2020   Priority:   Maintenance   Target end date:       Indications    Long term current use of anticoagulants with INR goal of 2.0-3.0 [Z79.01]  Chronic atrial fibrillation [I48.20]             Anticoagulation Episode Summary     INR check location:       Preferred lab:       Send INR reminders to:   [facility]    Comments:   Pt reports taking Warfarin in the morning      Anticoagulation Care Providers     Provider Role Specialty Phone number    Malcolm Schafer MD Responsible Internal Medicine [phone]            See the Encounter Report to view Anticoagulation Flowsheet and Dosing  Calendar (Go to Encounters tab in chart review, and find the Anticoagulation Therapy Visit)    Dosage adjustment made based on physician directed care plan.    Samantha Galeas RN

## 2020-01-28 RX ORDER — ALLOPURINOL 100 MG/1
100 TABLET ORAL DAILY
Qty: 90 TABLET | Refills: 3 | Status: SHIPPED | OUTPATIENT
Start: 2020-01-28 | End: 2021-01-13

## 2020-01-28 NOTE — TELEPHONE ENCOUNTER
Last OV on 12/9/19    Provider approval needed.     Creatinine   Date Value Ref Range Status   12/09/2019 1.31 (H) 0.66 - 1.25 mg/dL Final     Uric Acid   Date Value Ref Range Status   01/19/2018 5.0 3.5 - 7.2 mg/dL Final

## 2020-02-10 ENCOUNTER — TELEPHONE (OUTPATIENT)
Dept: PODIATRY | Facility: CLINIC | Age: 75
End: 2020-02-10

## 2020-02-10 NOTE — TELEPHONE ENCOUNTER
Reason for Call:  Form, our goal is to have forms completed with 7 days, however, some forms may require a visit or additional information.    Type of letter, form or note:  FMLA    Who is the form from?: [employer]    Where did the form come from: Patient or family brought in       How will form be returned?:  fax to [phone] and copy mailed to pts home address    Forms were completed, faxed, and copy mailed to pt as well as placed in abstracting.    Pt notified via voicemail. (AIDA ok per pt)

## 2020-02-24 ENCOUNTER — ANTICOAGULATION THERAPY VISIT (OUTPATIENT)
Dept: ANTICOAGULATION | Facility: CLINIC | Age: 75
End: 2020-02-24
Payer: COMMERCIAL

## 2020-02-24 DIAGNOSIS — I10 BENIGN ESSENTIAL HYPERTENSION: ICD-10-CM

## 2020-02-24 DIAGNOSIS — I48.20 CHRONIC ATRIAL FIBRILLATION (H): ICD-10-CM

## 2020-02-24 DIAGNOSIS — Z79.01 LONG TERM CURRENT USE OF ANTICOAGULANTS WITH INR GOAL OF 2.0-3.0: ICD-10-CM

## 2020-02-24 LAB — INR POINT OF CARE: 2.4 (ref 0.86–1.14)

## 2020-02-24 PROCEDURE — 85610 PROTHROMBIN TIME: CPT | Mod: QW

## 2020-02-24 PROCEDURE — 36416 COLLJ CAPILLARY BLOOD SPEC: CPT

## 2020-02-24 PROCEDURE — 99207 ZZC NO CHARGE NURSE ONLY: CPT

## 2020-02-24 NOTE — PROGRESS NOTES
ANTICOAGULATION FOLLOW-UP CLINIC VISIT    Patient Name:  Billy Stock  Date:  2020  Contact Type:  Face to Face    SUBJECTIVE:  Patient Findings     Comments:   The patient was assessed for diet, medication, and activity level changes, missed or extra doses, bruising or bleeding, with no problem findings.          Clinical Outcomes     Negatives:   Major bleeding event, Thromboembolic event, Anticoagulation-related hospital admission, Anticoagulation-related ED visit, Anticoagulation-related fatality    Comments:   The patient was assessed for diet, medication, and activity level changes, missed or extra doses, bruising or bleeding, with no problem findings.             OBJECTIVE    INR Protime   Date Value Ref Range Status   2020 2.4 (A) 0.86 - 1.14 Final       ASSESSMENT / PLAN  INR assessment THER    Recheck INR In: 4 WEEKS    INR Location Clinic      Anticoagulation Summary  As of 2020    INR goal:   2.0-3.0   TTR:   83.0 % (1 y)   INR used for dosin.4 (2020)   Warfarin maintenance plan:   2.5 mg (5 mg x 0.5) every Wed; 5 mg (5 mg x 1) all other days   Full warfarin instructions:   2.5 mg every Wed; 5 mg all other days   Weekly warfarin total:   32.5 mg   No change documented:   Saamntha Galeas RN   Plan last modified:   Marge Gale RN (2018)   Next INR check:   3/23/2020   Priority:   Maintenance   Target end date:   Indefinite    Indications    Long term current use of anticoagulants with INR goal of 2.0-3.0 [Z79.01]  Chronic atrial fibrillation [I48.20]             Anticoagulation Episode Summary     INR check location:       Preferred lab:       Send INR reminders to:   [facility]    Comments:   Pt reports taking Warfarin in the morning      Anticoagulation Care Providers     Provider Role Specialty Phone number    Malcolm Schafer MD [facility] Internal Medicine [phone]            See the Encounter Report to view Anticoagulation Flowsheet and  Dosing Calendar (Go to Encounters tab in chart review, and find the Anticoagulation Therapy Visit)    Dosage adjustment made based on physician directed care plan.    Samantha Galeas RN

## 2020-02-25 NOTE — TELEPHONE ENCOUNTER
"Requested Prescriptions   Pending Prescriptions Disp Refills     lisinopril (ZESTRIL) 10 MG tablet [Pharmacy Med Name: Lisinopril 10 MG Oral   Tablet]    Last Written Prescription Date:  12/27/19  Last Fill Quantity: 90,  # refills: 1   Last office visit: 12/9/2019 with prescribing provider:  Hanh   Future Office Visit:      0     Sig: TAKE 2 TABLETS BY MOUTH ONCE DAILY       ACE Inhibitors (Including Combos) Protocol Failed - 2/24/2020 10:39 AM        Failed - Normal serum creatinine on file in past 12 months     Recent Labs   Lab Test 12/09/19  1021   CR 1.31*             Passed - Blood pressure under 140/90 in past 12 months     BP Readings from Last 3 Encounters:   12/30/19 122/70   12/09/19 126/74   11/12/19 (!) 160/91                 Passed - Recent (12 mo) or future (30 days) visit within the authorizing provider's specialty     Patient has had an office visit with the authorizing provider or a provider within the authorizing providers department within the previous 12 mos or has a future within next 30 days. See \"Patient Info\" tab in inbasket, or \"Choose Columns\" in Meds & Orders section of the refill encounter.              Passed - Medication is active on med list        Passed - Patient is age 18 or older        Passed - Normal serum potassium on file in past 12 months     Recent Labs   Lab Test 12/09/19  1021   POTASSIUM 3.7                "

## 2020-02-26 RX ORDER — LISINOPRIL 10 MG/1
TABLET ORAL
Qty: 180 TABLET | Refills: 3 | Status: SHIPPED | OUTPATIENT
Start: 2020-02-26 | End: 2021-01-13 | Stop reason: DRUGHIGH

## 2020-03-10 DIAGNOSIS — I48.20 CHRONIC ATRIAL FIBRILLATION (H): ICD-10-CM

## 2020-03-10 NOTE — TELEPHONE ENCOUNTER
"Requested Prescriptions   Pending Prescriptions Disp Refills     warfarin ANTICOAGULANT (COUMADIN) 5 MG tablet [Pharmacy Med Name: WARFARIN SODIUM 5 MG Tablet] 86 tablet 0     Sig: TAKE 1 TABLET EVERY DAY EXCEPT TAKE 1/2 TABLET ON WEDNESDAY OR AS INSTRUCTED BY INR CLINIC   Last Written Prescription Date:  12/12/19  Last Fill Quantity: 86,  # refills: 0   Last office visit: 2/24/2020 with prescribing provider:     Future Office Visit:        Vitamin K Antagonists Failed - 3/10/2020  3:01 PM        Failed - INR is within goal in the past 6 weeks     Confirm INR is within goal in the past 6 weeks.     Recent Labs   Lab Test 02/24/20   INR 2.4*                       Passed - Recent (12 mo) or future (30 days) visit within the authorizing provider's specialty     Patient has had an office visit with the authorizing provider or a provider within the authorizing providers department within the previous 12 mos or has a future within next 30 days. See \"Patient Info\" tab in inbasket, or \"Choose Columns\" in Meds & Orders section of the refill encounter.              Passed - Medication is active on med list        Passed - Patient is 18 years of age or older           Moose Marte , [place] [facility]  03/10/20 5:05 PM     "

## 2020-03-11 RX ORDER — WARFARIN SODIUM 5 MG/1
TABLET ORAL
Qty: 86 TABLET | Refills: 1 | Status: SHIPPED | OUTPATIENT
Start: 2020-03-11 | End: 2020-09-10

## 2020-03-19 ENCOUNTER — TELEPHONE (OUTPATIENT)
Dept: ANTICOAGULATION | Facility: CLINIC | Age: 75
End: 2020-03-19

## 2020-03-19 DIAGNOSIS — Z79.01 LONG TERM CURRENT USE OF ANTICOAGULANTS WITH INR GOAL OF 2.0-3.0: Primary | ICD-10-CM

## 2020-03-19 DIAGNOSIS — I48.20 CHRONIC ATRIAL FIBRILLATION (H): ICD-10-CM

## 2020-03-19 NOTE — TELEPHONE ENCOUNTER
Patient has an INR appointment scheduled on 3/23/20. Patient's INR has been stable every 4 weeks Please advised if PCP agrees to let patient go an additional 4 weeks before checking the next INR to limit possible COVID-19 exposure. Below is an INR history.  Samantha Galeas RN    Lab Results   Component Value Date    INR 2.4 02/24/2020    INR 2.3 01/27/2020    INR 2.8 12/30/2019    INR 2.5 12/02/2019    INR 2.8 11/11/2019    INR 3.6 10/29/2019    INR 2.7 09/23/2019    INR 1.8 09/18/2019    INR 1.12 09/09/2019    INR 2.5 08/20/2019    INR 2.0 07/23/2019    INR 2.9 06/25/2019    INR 2.1 05/28/2019

## 2020-03-23 ENCOUNTER — TELEPHONE (OUTPATIENT)
Dept: INTERNAL MEDICINE | Facility: CLINIC | Age: 75
End: 2020-03-23

## 2020-03-23 DIAGNOSIS — I48.20 CHRONIC ATRIAL FIBRILLATION (H): Primary | ICD-10-CM

## 2020-03-23 NOTE — TELEPHONE ENCOUNTER
Attempted to call patient to reschedule INR if patient is not having any problems. Left message advising this writer would call back later today.   Samantha Galeas RN

## 2020-03-23 NOTE — TELEPHONE ENCOUNTER
Left patient another message to call clinic. If patient is not having any problems, ok to schedule him on 4/20/20 for a lab INR.

## 2020-03-23 NOTE — TELEPHONE ENCOUNTER
Patient returning call to reschedule appt. Denies any changes or concerns, rescheduled to 4/13 in lab at [place]. INR standing order placed.  Mary Del Valle RN  Anticoagulation Clinic

## 2020-04-13 ENCOUNTER — ANTICOAGULATION THERAPY VISIT (OUTPATIENT)
Dept: ANTICOAGULATION | Facility: CLINIC | Age: 75
End: 2020-04-13

## 2020-04-13 DIAGNOSIS — Z79.01 LONG TERM CURRENT USE OF ANTICOAGULANTS WITH INR GOAL OF 2.0-3.0: ICD-10-CM

## 2020-04-13 DIAGNOSIS — I48.20 CHRONIC ATRIAL FIBRILLATION (H): ICD-10-CM

## 2020-04-13 LAB
CAPILLARY BLOOD COLLECTION: NORMAL
INR PPP: 2.5 (ref 0.86–1.14)

## 2020-04-13 PROCEDURE — 85610 PROTHROMBIN TIME: CPT | Performed by: INTERNAL MEDICINE

## 2020-04-13 PROCEDURE — 99207 ZZC NO CHARGE NURSE ONLY: CPT | Performed by: INTERNAL MEDICINE

## 2020-04-13 PROCEDURE — 36416 COLLJ CAPILLARY BLOOD SPEC: CPT | Performed by: INTERNAL MEDICINE

## 2020-04-13 NOTE — PROGRESS NOTES
ANTICOAGULATION FOLLOW-UP CLINIC VISIT    Patient Name:  Billy Stock  Date:  2020  Contact Type:  Telephone/ Called patient, denies any changes. Orders discussed with patient.     SUBJECTIVE:  Patient Findings     Comments:   The patient was assessed for diet, medication, and activity level changes, missed or extra doses, bruising or bleeding, with no problem findings.          Clinical Outcomes     Negatives:   Major bleeding event, Thromboembolic event, Anticoagulation-related hospital admission, Anticoagulation-related ED visit, Anticoagulation-related fatality    Comments:   The patient was assessed for diet, medication, and activity level changes, missed or extra doses, bruising or bleeding, with no problem findings.             OBJECTIVE    INR   Date Value Ref Range Status   2020 2.50 (H) 0.86 - 1.14 Final     Comment:     This test is intended for monitoring Coumadin therapy.  Results are not   accurate in patients with prolonged INR due to factor deficiency.         ASSESSMENT / PLAN  INR assessment THER    Recheck INR In: 6 WEEKS    INR Location Outside lab      Anticoagulation Summary  As of 2020    INR goal:   2.0-3.0   TTR:   83.0 % (1 y)   INR used for dosin.50 (2020)   Warfarin maintenance plan:   2.5 mg (5 mg x 0.5) every Wed; 5 mg (5 mg x 1) all other days   Full warfarin instructions:   2.5 mg every Wed; 5 mg all other days   Weekly warfarin total:   32.5 mg   No change documented:   Samantha Galeas RN   Plan last modified:   Marge Gale RN (2018)   Next INR check:   2020   Priority:   Maintenance   Target end date:   Indefinite    Indications    Long term current use of anticoagulants with INR goal of 2.0-3.0 [Z79.01]  Chronic atrial fibrillation [I48.20]             Anticoagulation Episode Summary     INR check location:       Preferred lab:       Send INR reminders to:   [facility]    Comments:   Pt reports taking Warfarin in the  morning      Anticoagulation Care Providers     Provider Role Specialty Phone number    Malcolm Schafer MD Responsible Internal Medicine [phone]            See the Encounter Report to view Anticoagulation Flowsheet and Dosing Calendar (Go to Encounters tab in chart review, and find the Anticoagulation Therapy Visit)    Dosage adjustment made based on physician directed care plan.    Samantha Galeas RN

## 2020-04-14 ENCOUNTER — TELEPHONE (OUTPATIENT)
Dept: ANTICOAGULATION | Facility: CLINIC | Age: 75
End: 2020-04-14

## 2020-04-14 NOTE — TELEPHONE ENCOUNTER
Chart reviewed for appropriateness of extended INR interval.    TTR 83.0%    INR stable past 5 months, with stable warfarin dose 32.5mg/week  [facility] INR   Latest Ref Rng & Units 0.86 - 1.14 0.86 - 1.14   4/13/2020 2.50 (H)    2/24/2020  2.4 (A)   1/27/2020  2.3 (A)   12/30/2019  2.8 (A)   12/2/2019  2.5 (A)   11/11/2019  2.8 (A)   10/29/2019  3.6 (A)   9/23/2019  2.7 (A)   9/18/2019  1.8 (A)       Patient may extend INR interval up to 8 weeks, and +2 weeks with each subsequent INR in range up to 12 weeks max if no S/S bleeding, clotting, illness or changes in medications that affect warfarin levels    Nidia Galeas, PharmD BCACP  Anticoagulation Clinical Pharmacist

## 2020-05-26 ENCOUNTER — ANTICOAGULATION THERAPY VISIT (OUTPATIENT)
Dept: ANTICOAGULATION | Facility: CLINIC | Age: 75
End: 2020-05-26

## 2020-05-26 DIAGNOSIS — I48.20 CHRONIC ATRIAL FIBRILLATION (H): ICD-10-CM

## 2020-05-26 DIAGNOSIS — Z79.01 LONG TERM CURRENT USE OF ANTICOAGULANTS WITH INR GOAL OF 2.0-3.0: ICD-10-CM

## 2020-05-26 LAB
CAPILLARY BLOOD COLLECTION: NORMAL
INR PPP: 2.6 (ref 0.86–1.14)

## 2020-05-26 PROCEDURE — 99207 ZZC NO CHARGE NURSE ONLY: CPT | Performed by: INTERNAL MEDICINE

## 2020-05-26 PROCEDURE — 85610 PROTHROMBIN TIME: CPT | Performed by: INTERNAL MEDICINE

## 2020-05-26 PROCEDURE — 36416 COLLJ CAPILLARY BLOOD SPEC: CPT | Performed by: INTERNAL MEDICINE

## 2020-05-26 NOTE — PROGRESS NOTES
ANTICOAGULATION FOLLOW-UP CLINIC VISIT    Patient Name:  Billy Stock  Date:  2020  Contact Type:  Telephone/ discussed with patient    SUBJECTIVE:  Patient Findings     Comments:   The patient was assessed for diet, medication, and activity level changes, missed or extra doses, bruising or bleeding, with no problem findings.  Extended next INR to 8 weeks since patient has not had any changes with his health.  Reminded him that if he is having any bleeding/bruising, clotting symptoms, illness or changes in medication then he will need to have INR checked sooner.         Clinical Outcomes     Negatives:   Major bleeding event, Thromboembolic event, Anticoagulation-related hospital admission, Anticoagulation-related ED visit, Anticoagulation-related fatality    Comments:   The patient was assessed for diet, medication, and activity level changes, missed or extra doses, bruising or bleeding, with no problem findings.  Extended next INR to 8 weeks since patient has not had any changes with his health.  Reminded him that if he is having any bleeding/bruising, clotting symptoms, illness or changes in medication then he will need to have INR checked sooner.            OBJECTIVE    Recent labs: (last 7 days)     20  0948   INR 2.60*       ASSESSMENT / PLAN  INR assessment THER    Recheck INR In: 8 WEEKS    INR Location Clinic      Anticoagulation Summary  As of 2020    INR goal:   2.0-3.0   TTR:   84.6 % (1 y)   INR used for dosin.60 (2020)   Warfarin maintenance plan:   2.5 mg (5 mg x 0.5) every Wed; 5 mg (5 mg x 1) all other days   Full warfarin instructions:   2.5 mg every Wed; 5 mg all other days   Weekly warfarin total:   32.5 mg   Plan last modified:   Marge Gale RN (2018)   Next INR check:   2020   Priority:   Maintenance   Target end date:   Indefinite    Indications    Long term current use of anticoagulants with INR goal of 2.0-3.0 [Z79.01]  Chronic atrial fibrillation  [I48.20]             Anticoagulation Episode Summary     INR check location:       Preferred lab:       Send INR reminders to:   [facility]    Comments:   Pt reports taking Warfarin in the morning, d/t COVID - Patient may extend INR interval up to 8 weeks, and +2 weeks with each subsequent INR in range up to 12 weeks max if no S/S bleeding, clotting, illness or changes in medications that affect warfarin l      Anticoagulation Care Providers     Provider Role Specialty Phone number    Malcolm Schafer MD [facility] Internal Medicine [phone]            See the Encounter Report to view Anticoagulation Flowsheet and Dosing Calendar (Go to Encounters tab in chart review, and find the Anticoagulation Therapy Visit)    Dosage adjustment made based on physician directed care plan.    Marge Gale RN

## 2020-07-21 ENCOUNTER — ANTICOAGULATION THERAPY VISIT (OUTPATIENT)
Dept: ANTICOAGULATION | Facility: CLINIC | Age: 75
End: 2020-07-21

## 2020-07-21 DIAGNOSIS — Z79.01 LONG TERM CURRENT USE OF ANTICOAGULANTS WITH INR GOAL OF 2.0-3.0: ICD-10-CM

## 2020-07-21 DIAGNOSIS — I48.20 CHRONIC ATRIAL FIBRILLATION (H): ICD-10-CM

## 2020-07-21 LAB
CAPILLARY BLOOD COLLECTION: NORMAL
INR PPP: 2.5 (ref 0.86–1.14)

## 2020-07-21 PROCEDURE — 99207 ZZC NO CHARGE NURSE ONLY: CPT | Performed by: INTERNAL MEDICINE

## 2020-07-21 PROCEDURE — 85610 PROTHROMBIN TIME: CPT | Performed by: INTERNAL MEDICINE

## 2020-07-21 PROCEDURE — 36416 COLLJ CAPILLARY BLOOD SPEC: CPT | Performed by: INTERNAL MEDICINE

## 2020-07-21 NOTE — PROGRESS NOTES
ANTICOAGULATION FOLLOW-UP CLINIC VISIT    Patient Name:  Billy Stock  Date:  2020  Contact Type:  Telephone/ discussed with patient    SUBJECTIVE:  Patient Findings     Comments:   The patient was assessed for diet, medication, and activity level changes, missed or extra doses, bruising or bleeding, with no problem findings.          Clinical Outcomes     Negatives:   Major bleeding event, Thromboembolic event, Anticoagulation-related hospital admission, Anticoagulation-related ED visit, Anticoagulation-related fatality    Comments:   The patient was assessed for diet, medication, and activity level changes, missed or extra doses, bruising or bleeding, with no problem findings.             OBJECTIVE    Recent labs: (last 7 days)     20  0953   INR 2.50*       ASSESSMENT / PLAN  INR assessment THER    Recheck INR In: 8 WEEKS 10 week check   INR Location Clinic      Anticoagulation Summary  As of 2020    INR goal:   2.0-3.0   TTR:   84.6 % (1 y)   INR used for dosin.50 (2020)   Warfarin maintenance plan:   2.5 mg (5 mg x 0.5) every Wed; 5 mg (5 mg x 1) all other days   Full warfarin instructions:   2.5 mg every Wed; 5 mg all other days   Weekly warfarin total:   32.5 mg   Plan last modified:   Marge Gale RN (2018)   Next INR check:   2020   Priority:   Maintenance   Target end date:   Indefinite    Indications    Long term current use of anticoagulants with INR goal of 2.0-3.0 [Z79.01]  Chronic atrial fibrillation (H) [I48.20]             Anticoagulation Episode Summary     INR check location:       Preferred lab:       Send INR reminders to:   [facility]    Comments:   Pt reports taking Warfarin in the morning, d/t COVID - Patient may extend INR interval up to 8 weeks, and +2 weeks with each subsequent INR in range up to 12 weeks max if no S/S bleeding, clotting, illness or changes in medications that affect warfarin l      Anticoagulation Care Providers      Provider Role Specialty Phone number    Malcolm Schafer MD Responsible Internal Medicine [phone]            See the Encounter Report to view Anticoagulation Flowsheet and Dosing Calendar (Go to Encounters tab in chart review, and find the Anticoagulation Therapy Visit)    Dosage adjustment made based on physician directed care plan.    Marge Gale RN

## 2020-08-23 ENCOUNTER — NURSE TRIAGE (OUTPATIENT)
Dept: NURSING | Facility: CLINIC | Age: 75
End: 2020-08-23

## 2020-08-23 NOTE — TELEPHONE ENCOUNTER
Patient calling as he discovered a bruise on his left ankle on the inside this morning.  He woke up with his leg hanging out of the bed.  He has A-fib and takes Warfarin.  He denies fever, swelling, or pain at the site or when walking.    He reports that the size is larger than a quarter, but smaller than his hand.     Advised patient that would be sending a note to PCP per protocol to see if they would like to see him tomorrow or do any lab work.    Also encouraged patient to utilize a warm towel on the area for 10 minutes a few times a day to assist with the blood reabsorption in the area of the bruise.      Wilma Saldana RN on 8/23/2020 at 9:47 AM        Additional Information    Negative: Shock suspected (e.g., cold/pale/clammy skin, too weak to stand, low BP, rapid pulse)    Negative: Sounds like a life-threatening emergency to the triager    Negative: Bruises with fever    Negative: Tiny bruises (spots or dots) of unknown cause    Negative: Bruise(s) of forehead or head    Negative: Bruise(s) of face or jaw    Negative: Followed an injury, and triager doesn't know which injury guideline to use first    Negative: Post-operative bruising    Negative: Dizziness or lightheadedness    Negative: [1] Bruise on head/face, chest, or abdomen AND [2]  taking Coumadin (warfarin) or other strong blood thinner, or known bleeding disorder (e.g., thrombocytopenia)    Negative: Unexplained bleeding from another site (e.g., gums, nose, urine) as well    Negative: Patient sounds very sick or weak to the triager    Negative: [1] Not caused by an injury AND [2] 5 or more bruises now    Negative: [1] Raised bruise AND [2] size > 2 inches (5 cm) AND [3] getting bigger    Negative: [1] SEVERE pain AND [2] not improved 2 hours after pain medicine/ice packs    Negative: Suspicious history for the injury    Taking Coumadin (warfarin) or other strong blood thinner, or known bleeding disorder (e.g., thrombocytopenia)    Protocols  used: BRUISES-A-AH

## 2020-08-24 NOTE — TELEPHONE ENCOUNTER
He likely bruised it some how in bed. He should be using ice not heat. And as long as it is getting no worse I think he can just keep an eye on it and wait for it to resolve.

## 2020-09-04 ENCOUNTER — OFFICE VISIT (OUTPATIENT)
Dept: URGENT CARE | Facility: URGENT CARE | Age: 75
End: 2020-09-04
Payer: COMMERCIAL

## 2020-09-04 VITALS
TEMPERATURE: 97.7 F | HEART RATE: 83 BPM | SYSTOLIC BLOOD PRESSURE: 130 MMHG | DIASTOLIC BLOOD PRESSURE: 96 MMHG | OXYGEN SATURATION: 100 %

## 2020-09-04 DIAGNOSIS — M54.50 ACUTE BILATERAL LOW BACK PAIN WITHOUT SCIATICA: Primary | ICD-10-CM

## 2020-09-04 PROCEDURE — 99214 OFFICE O/P EST MOD 30 MIN: CPT | Performed by: PHYSICIAN ASSISTANT

## 2020-09-04 RX ORDER — METHYLPREDNISOLONE 4 MG
TABLET, DOSE PACK ORAL
Qty: 21 TABLET | Refills: 0 | Status: SHIPPED | OUTPATIENT
Start: 2020-09-04 | End: 2021-01-13

## 2020-09-04 NOTE — PROGRESS NOTES
CHIEF COMPLAINT:   Chief Complaint   Patient presents with     Urgent Care     Back Pain     1 week ago pt carried 2 of the 5lb gallon water home. Now he is having lower back pain       HPI: Billy Stock is a 74 year old male who presents to clinic today for evaluation of back pain.  Patient reports the back pain started 6 days ago when he was carrying some water at home.  Pain is located in his lower back bilaterally.  He has been taking Tylenol without improvement in pain.  Pain is made worse with standing and changing position.  Patient denies fever, chills, fatigue, weight loss, fecal or urinary incontinence, dysuria/hematuria, abdominal pain, lower extremity numbness or tingling, or lower extremity weakness.      Past Medical History:   Diagnosis Date     Bell's palsy 10/15/2002     CA IN SITU PROSTATE 10/15/2002     Chronic atrial fibrillation (H) 7/1/10     Glaucoma 4/10/2003     Problem list name updated by automated process. Provider to review     Gout 5/16/2013     Hyperlipidemia LDL goal <100 9/10/2014     Hypertension goal BP (blood pressure) < 140/90 6/28/2006     SARAH (obstructive sleep apnea) 8/28/2013     Parkinson disease (H) 2019     Pericarditis 2001     Renal cyst      Past Surgical History:   Procedure Laterality Date     CATARACT IOL, RT/LT  10/15, 11/15     COLONOSCOPY  2009    [facility]     COLONOSCOPY  9/30/2014    Dr. Long [facility]     COLONOSCOPY N/A 9/30/2014    Procedure: COMBINED COLONOSCOPY, SINGLE BIOPSY/POLYPECTOMY BY BIOPSY;  Surgeon: Puneet Long MD;  Location:  GI     EYE SURGERY  2007    glaucoma surgery right eye     HERNIA REPAIR       PROSTATE SURGERY       SUPRAPUBIC PROSTATECTOMY  2002     VASECTOMY       Social History     Tobacco Use     Smoking status: Never Smoker     Smokeless tobacco: Never Used   Substance Use Topics     Alcohol use: No     Alcohol/week: 0.0 standard drinks     Current Outpatient Medications   Medication     acetaminophen (TYLENOL) 325 MG tablet      allopurinol (ZYLOPRIM) 100 MG tablet     amLODIPine (NORVASC) 10 MG tablet     carbidopa-levodopa (SINEMET)  MG tablet     ipratropium (ATROVENT) 0.03 % nasal spray     lisinopril (PRINIVIL/ZESTRIL) 40 MG tablet     methylPREDNISolone (MEDROL DOSEPAK) 4 MG tablet therapy pack     metoprolol succinate ER (TOPROL-XL) 50 MG 24 hr tablet     warfarin ANTICOAGULANT (COUMADIN) 5 MG tablet     lisinopril (ZESTRIL) 10 MG tablet     LORazepam (ATIVAN) 1 MG tablet     triamcinolone (KENALOG) 0.5 % cream     UNABLE TO FIND     No current facility-administered medications for this visit.      Allergies   Allergen Reactions     Cats      Codeine      Hallucinated when taking codeine with another medication     Codeine Unknown     Maple Tree      Morphine Visual Disturbance and Other (See Comments)     Watermelon [Citrullus Vulgaris]      Itching throat, hives       10 point ROS of systems including Constitutional, Eyes, Respiratory, Cardiovascular, Gastroenterology, Genitourinary, Integumentary, Muscularskeletal, Psychiatric were all negative except for pertinent positives noted in my HPI.        Exam:  BP (!) 130/96   Pulse 83   Temp 97.7  F (36.5  C) (Tympanic)   SpO2 100%   Constitutional: healthy, alert and no distress  Head: Normocephalic, atraumatic.  Eyes: conjunctiva clear, no drainage  ENT: MMM  Neck: neck is supple, no cervical lymphadenopathy or nuchal rigidity  Cardiovascular: RRR  Respiratory: CTA bilaterally, no rhonchi or rales  Gastrointestinal: soft and nontender  BACK: TTP in lower back B/L. Generalized tenderness. NO CVA tenderness.   Skin: no rashes  Neurologic: Speech clear, gait normal. Moves all extremities.      ASSESSMENT/PLAN:  1. Acute bilateral low back pain without sciatica  Patient with back pain for the past 6 days not improving with Tylenol.  On exam, he has generalized tenderness bilaterally in lower back.  Straight leg test is negative.  Reflexes, strength and sensation are all  normal.  He is on warfarin so unable to take NSAIDs.  Will use Medrol Dosepak and advised to continue using Tylenol for pain.  Additionally if symptoms do not improve over the next week, I would like him to follow-up for physical therapy.  - methylPREDNISolone (MEDROL DOSEPAK) 4 MG tablet therapy pack; Follow Package Directions  Dispense: 21 tablet; Refill: 0    Diagnosis and treatment plan was reviewed with patient and/or family.   Patient verbalizes understanding. All questions were addressed and answered.   Terri Yancey PA-C

## 2020-09-04 NOTE — PATIENT INSTRUCTIONS
Start taking steroid pack for back pain.  OK to continue with Tylenol for pain.  Alternate ice and heat.   Try light stretching.   If symptoms have not improved in one week, please follow-up for Physical therapy.       Patient Education     Back Care Tips    Caring for your back  These are things you can do to prevent a recurrence of acute back pain and to reduce symptoms from chronic back pain:    Stay at a healthy weight. If you are overweight, losing weight will help most types of back pain.    Exercise is an important part of recovery from most types of back pain. The muscles behind and in front of the spine support the back. This means strengthening both the back muscles and the abdominal muscles will provide better support for your spine.     Swimming and brisk walking are good overall exercises to improve your fitness level.    Practice safe lifting methods (see below).    Practice good posture when sitting, standing, and walking. Don't sit for a long time. This puts more stress on the lower back than standing or walking.    Wear quality shoes with good arch support. Foot and ankle alignment can affect back symptoms. Don't wear high heels.    Therapeutic massage can help relax the back muscles without stretching them.    During the first 24 to 72 hours after an acute injury or flare-up of chronic back pain, put an ice pack on the painful area for 20 minutes and then remove it for 20 minutes. Do thisover a period of 60 to 90 minutes, or several times a day. As a safety precaution, don't use a heating pad at bedtime. Sleeping on a heating pad can lead to skin burns or tissue damage.    You can alternate using ice and heat.  Medicines  Talk with your healthcare provider before using medicines, especially if you have other health problems or are taking other medicines.    You may use over-the-counter medicines, such as acetaminophen, ibuprofen, or naprosyn to control pain, unless your healthcare provider  prescribed other pain medicine. Talk with your healthcare provider before taking any medicines if you have a chronic condition such ass diabetes, liver or kidney disease, stomach ulcers, or digestive bleeding, or are taking blood thinners.    Be careful if you are given prescription pain medicines, opioids, or medicine for muscle spasm. They can cause drowsiness, and affect your coordination, reflexes, and judgment. Don't drive or operate heavy machinery while taking these types of medicines. Take prescription pain medicine only as prescribed by your healthcare provider.  Lumbar stretch  This simple stretch will help relax muscle spasm and keep your back more limber. If exercise makes your back pain worse, don t do it.    Lie on your back with your knees bent and both feet on the ground.    Slowly raise your left knee to your chest as you flatten your lower back against the floor. Hold for 5 seconds.    Relax and repeat the exercise with your right knee.    Do 10 of these exercises for each leg.  Safe lifting method    Don t bend over at the waist to lift an object off the floor.  Instead, bend your knees and hips in a squat.     Keep your back and head upright    Hold the object close to your body, directly in front of you.    Straighten your legs to lift the object.     Lower the object to the floor in the reverse fashion.    If you must slide something across the floor, push it.    Posture tips  Sitting  Sit in chairs with straight backs or low-back support. Keep your knees lower than your hips, with your feet flat on the floor.  When driving, sit up straight. Adjust the seat forward so you are not leaning toward the steering wheel.  A small pillow or rolled towel behind your lower back may help if you are driving long distances.   Standing  When standing for long periods, shift most of your weight to one leg at a time. Switch legs every few minutes.   Sleeping  The best way to sleep is on your side with your  knees bent. Put a low pillow under your head to support your neck in a neutral spine position. Don't use thick pillows that bend your neck to one side. Put a pillow between your legs to further relax your lower back. If you sleep on your back, put pillows under your knees to support your legs in a slightly flexed position. Use a firm mattress. If your mattress sags, replace it, or use a 1/2-inch plywood board under the mattress to add support.  Follow-up care  Follow up with your healthcare provider, or as advised.  If X-rays, a CT scan or an MRI scan were taken, they may be reviewed by a radiologist. You will be told of any new findings that may affect your care.  Call 911  Call 911 if any of the following occur:    Trouble breathing    Confusion    Very drowsy    Fainting or loss of consciousness    Rapid or very slow heart rate    Loss of  bowel or bladder control  When to seek medical advice  Call your healthcare provider right away if any of the following occur:    Pain becomes worse or spreads to your arms or legs    Weakness or numbness in one or both arms or legs    Numbness in the groin area  Date Last Reviewed: 6/1/2016 2000-2019 [employer]. [address] [place] [address], [place] [address]. All rights reserved. This information is not intended as a substitute for professional medical care. Always follow your healthcare professional's instructions.

## 2020-09-09 DIAGNOSIS — Z79.01 LONG TERM CURRENT USE OF ANTICOAGULANTS WITH INR GOAL OF 2.0-3.0: Primary | ICD-10-CM

## 2020-09-09 DIAGNOSIS — I48.20 CHRONIC ATRIAL FIBRILLATION (H): ICD-10-CM

## 2020-09-10 RX ORDER — WARFARIN SODIUM 5 MG/1
TABLET ORAL
Qty: 86 TABLET | Refills: 1 | Status: SHIPPED | OUTPATIENT
Start: 2020-09-10 | End: 2021-04-01

## 2020-09-10 NOTE — TELEPHONE ENCOUNTER
Pending Prescriptions:                       Disp   Refills    warfarin ANTICOAGULANT (COUMADIN) 5 MG tab*86 tab*1        Sig: TAKE 1 TABLET EVERY DAY EXCEPT TAKE 1/2 TABLET ON           WEDNESDAY OR AS INSTRUCTED BY INR CLINIC

## 2020-09-10 NOTE — TELEPHONE ENCOUNTER
Last INR: 07/21/20=2.5  Next INR: 09/29/20    Prescription approved per [facility] Refill Protocol.    Samantha Richards RN

## 2020-09-29 ENCOUNTER — TRANSFERRED RECORDS (OUTPATIENT)
Dept: HEALTH INFORMATION MANAGEMENT | Facility: CLINIC | Age: 75
End: 2020-09-29

## 2020-09-29 ENCOUNTER — ANTICOAGULATION THERAPY VISIT (OUTPATIENT)
Dept: ANTICOAGULATION | Facility: CLINIC | Age: 75
End: 2020-09-29

## 2020-09-29 DIAGNOSIS — I48.20 CHRONIC ATRIAL FIBRILLATION (H): ICD-10-CM

## 2020-09-29 DIAGNOSIS — Z79.01 LONG TERM CURRENT USE OF ANTICOAGULANTS WITH INR GOAL OF 2.0-3.0: ICD-10-CM

## 2020-09-29 LAB
CAPILLARY BLOOD COLLECTION: NORMAL
INR PPP: 3.5 (ref 0.86–1.14)

## 2020-09-29 PROCEDURE — 85610 PROTHROMBIN TIME: CPT | Performed by: INTERNAL MEDICINE

## 2020-09-29 PROCEDURE — 99207 ZZC NO CHARGE NURSE ONLY: CPT | Performed by: INTERNAL MEDICINE

## 2020-09-29 PROCEDURE — 36416 COLLJ CAPILLARY BLOOD SPEC: CPT | Performed by: INTERNAL MEDICINE

## 2020-09-29 NOTE — PROGRESS NOTES
ANTICOAGULATION FOLLOW-UP CLINIC VISIT    Patient Name:  Billy Stock  Date:  9/29/2020  Contact Type:  Telephone/ Called patient, he reports diet change, he denies any other changes. Orders discussed with the patient, he agrees with the plan. Lab INR appointment scheduled on 10/14/20.    SUBJECTIVE:  Patient Findings     Positives:   Change in diet/appetite (Patient reports decreased green veggies. )    Comments:   The patient was assessed for diet, medication, and activity level changes, missed or extra doses, bruising or bleeding, with no problem findings. Maintenance warfarin dosing was reviewed with patient, he will decrease his dose for today then he will remain on the same dose until next INR check. Patient did not have any questions or concerns.           Clinical Outcomes     Comments:   The patient was assessed for diet, medication, and activity level changes, missed or extra doses, bruising or bleeding, with no problem findings. Maintenance warfarin dosing was reviewed with patient, he will decrease his dose for today then he will remain on the same dose until next INR check. Patient did not have any questions or concerns.              OBJECTIVE    Recent labs: (last 7 days)     09/29/20  1503   INR 3.50*       ASSESSMENT / PLAN  INR assessment SUPRA    Recheck INR In: 2 WEEKS    INR Location Outside lab      Anticoagulation Summary  As of 9/29/2020    INR goal:   2.0-3.0   TTR:   81.2 % (1 y)   INR used for dosing:   3.50! (9/29/2020)   Warfarin maintenance plan:   2.5 mg (5 mg x 0.5) every Wed; 5 mg (5 mg x 1) all other days   Full warfarin instructions:   9/29: 2.5 mg; Otherwise 2.5 mg every Wed; 5 mg all other days   Weekly warfarin total:   32.5 mg   Plan last modified:   Marge Gale RN (7/26/2018)   Next INR check:   10/14/2020   Priority:   Maintenance   Target end date:   Indefinite    Indications    Long term current use of anticoagulants with INR goal of 2.0-3.0 [Z79.01]  Chronic atrial  fibrillation (H) [I48.20]             Anticoagulation Episode Summary     INR check location:       Preferred lab:       Send INR reminders to:   WANDYJACKELIN [facility]    Comments:   Pt reports taking Warfarin in the morning, d/t COVID - Patient may extend INR interval up to 8 weeks, and +2 weeks with each subsequent INR in range up to 12 weeks max if no S/S bleeding, clotting, illness or changes in medications that affect warfarin l      Anticoagulation Care Providers     Provider Role Specialty Phone number    Malcolm Schafer MD [facility] Internal Medicine [phone]            See the Encounter Report to view Anticoagulation Flowsheet and Dosing Calendar (Go to Encounters tab in chart review, and find the Anticoagulation Therapy Visit)    Dosage adjustment made based on physician directed care plan.    Samantha Galeas RN

## 2020-10-14 ENCOUNTER — ANTICOAGULATION THERAPY VISIT (OUTPATIENT)
Dept: ANTICOAGULATION | Facility: CLINIC | Age: 75
End: 2020-10-14

## 2020-10-14 DIAGNOSIS — Z79.01 LONG TERM CURRENT USE OF ANTICOAGULANTS WITH INR GOAL OF 2.0-3.0: ICD-10-CM

## 2020-10-14 DIAGNOSIS — I48.20 CHRONIC ATRIAL FIBRILLATION (H): ICD-10-CM

## 2020-10-14 LAB
CAPILLARY BLOOD COLLECTION: NORMAL
INR PPP: 2.9 (ref 0.86–1.14)

## 2020-10-14 PROCEDURE — 85610 PROTHROMBIN TIME: CPT | Performed by: INTERNAL MEDICINE

## 2020-10-14 PROCEDURE — 99207 PR NO CHARGE NURSE ONLY: CPT | Performed by: INTERNAL MEDICINE

## 2020-10-14 PROCEDURE — 36416 COLLJ CAPILLARY BLOOD SPEC: CPT | Performed by: INTERNAL MEDICINE

## 2020-10-14 NOTE — PROGRESS NOTES
Left message to call [phone]. Please transfer call to Samantha at [phone].  Samantha Galeas RN  ANTICOAGULATION FOLLOW-UP CLINIC VISIT    Patient Name:  Billy Stock  Date:  10/14/2020  Contact Type:  Telephone/ Patient returned call, he denies any changes. Orders discussed with the patient, he agrees with the plan. Lab INR appointment scheduled on 20.    SUBJECTIVE:  Patient Findings     Comments:  The patient was assessed for diet, medication, and activity level changes, missed or extra doses, bruising or bleeding, with no problem findings. Maintenance warfarin dosing was reviewed with patient and will remain on the same dose until next INR check. Patient did not have any questions or concerns.           Clinical Outcomes     Negatives:  Major bleeding event, Thromboembolic event, Anticoagulation-related hospital admission, Anticoagulation-related ED visit, Anticoagulation-related fatality    Comments:  The patient was assessed for diet, medication, and activity level changes, missed or extra doses, bruising or bleeding, with no problem findings. Maintenance warfarin dosing was reviewed with patient and will remain on the same dose until next INR check. Patient did not have any questions or concerns.              OBJECTIVE    Recent labs: (last 7 days)     10/14/20  1200   INR 2.90*       ASSESSMENT / PLAN  INR assessment THER    Recheck INR In: 4 WEEKS    INR Location Outside lab      Anticoagulation Summary  As of 10/14/2020    INR goal:  2.0-3.0   TTR:  80.5 % (1 y)   INR used for dosin.90 (10/14/2020)   Warfarin maintenance plan:  2.5 mg (5 mg x 0.5) every Wed; 5 mg (5 mg x 1) all other days   Full warfarin instructions:  2.5 mg every Wed; 5 mg all other days   Weekly warfarin total:  32.5 mg   No change documented:  Samantha Galeas RN   Plan last modified:  Marge Gale RN (2018)   Next INR check:  2020   Priority:  Maintenance   Target end date:  Indefinite    Indications     Long term current use of anticoagulants with INR goal of 2.0-3.0 [Z79.01]  Chronic atrial fibrillation (H) [I48.20]             Anticoagulation Episode Summary     INR check location:      Preferred lab:      Send INR reminders to:  KRISTINE CHAVES KARMAS    Comments:  Pt reports taking Warfarin in the morning, d/t COVID - Patient may extend INR interval up to 8 weeks, and +2 weeks with each subsequent INR in range up to 12 weeks max if no S/S bleeding, clotting, illness or changes in medications that affect warfarin l      Anticoagulation Care Providers     Provider Role Specialty Phone number    Malcolm Schafer MD [facility] Internal Medicine [phone]            See the Encounter Report to view Anticoagulation Flowsheet and Dosing Calendar (Go to Encounters tab in chart review, and find the Anticoagulation Therapy Visit)    Dosage adjustment made based on physician directed care plan.    Samantha Galeas RN

## 2020-11-11 ENCOUNTER — ANTICOAGULATION THERAPY VISIT (OUTPATIENT)
Dept: ANTICOAGULATION | Facility: CLINIC | Age: 75
End: 2020-11-11
Payer: COMMERCIAL

## 2020-11-11 DIAGNOSIS — Z79.01 LONG TERM CURRENT USE OF ANTICOAGULANTS WITH INR GOAL OF 2.0-3.0: ICD-10-CM

## 2020-11-11 DIAGNOSIS — I48.20 CHRONIC ATRIAL FIBRILLATION (H): ICD-10-CM

## 2020-11-11 LAB
CAPILLARY BLOOD COLLECTION: NORMAL
INR PPP: 2.7 (ref 0.86–1.14)

## 2020-11-11 PROCEDURE — 36416 COLLJ CAPILLARY BLOOD SPEC: CPT | Performed by: INTERNAL MEDICINE

## 2020-11-11 PROCEDURE — 99207 PR NO CHARGE NURSE ONLY: CPT | Performed by: INTERNAL MEDICINE

## 2020-11-11 PROCEDURE — 85610 PROTHROMBIN TIME: CPT | Performed by: INTERNAL MEDICINE

## 2020-11-11 NOTE — PROGRESS NOTES
ANTICOAGULATION FOLLOW-UP CLINIC VISIT    Patient Name:  Billy Stock  Date:  2020  Contact Type:  Telephone    SUBJECTIVE:  Patient Findings     Comments:  No changes in medications, activity, health, or diet noted. No bleeding or increased bruising noted. Took warfarin as prescribed.  Patient will continue weekly maintenance dose. INR is therapeutic.   Recheck in 6 weeks.   Patient verbalizes understanding and agrees to plan. No further questions or concerns.          Clinical Outcomes     Negatives:  Major bleeding event, Thromboembolic event, Anticoagulation-related hospital admission, Anticoagulation-related ED visit, Anticoagulation-related fatality    Comments:  No changes in medications, activity, health, or diet noted. No bleeding or increased bruising noted. Took warfarin as prescribed.  Patient will continue weekly maintenance dose. INR is therapeutic.   Recheck in 6 weeks.   Patient verbalizes understanding and agrees to plan. No further questions or concerns.             OBJECTIVE    Recent labs: (last 7 days)     20  0904   INR 2.70*       ASSESSMENT / PLAN  INR assessment THER    Recheck INR In: 6 WEEKS    INR Location Outside lab      Anticoagulation Summary  As of 2020    INR goal:  2.0-3.0   TTR:  87.0 % (1 y)   INR used for dosin.70 (2020)   Warfarin maintenance plan:  2.5 mg (5 mg x 0.5) every Wed; 5 mg (5 mg x 1) all other days   Full warfarin instructions:  2.5 mg every Wed; 5 mg all other days   Weekly warfarin total:  32.5 mg   No change documented:  Ninfa Pardo RN   Plan last modified:  Marge Gale RN (2018)   Next INR check:  2020   Priority:  Maintenance   Target end date:  Indefinite    Indications    Long term current use of anticoagulants with INR goal of 2.0-3.0 [Z79.01]  Chronic atrial fibrillation (H) [I48.20]             Anticoagulation Episode Summary     INR check location:      Preferred lab:      Send INR reminders to:   KRISTINE [facility]    Comments:  Pt reports taking Warfarin in the morning, d/t COVID - Patient may extend INR interval up to 8 weeks, and +2 weeks with each subsequent INR in range up to 12 weeks max if no S/S bleeding, clotting, illness or changes in medications that affect warfarin l      Anticoagulation Care Providers     Provider Role Specialty Phone number    Malcolm Schafer MD [facility] Internal Medicine [phone]            See the Encounter Report to view Anticoagulation Flowsheet and Dosing Calendar (Go to Encounters tab in chart review, and find the Anticoagulation Therapy Visit)        Ninfa Pardo RN

## 2020-11-19 DIAGNOSIS — I10 ESSENTIAL HYPERTENSION: ICD-10-CM

## 2020-11-19 RX ORDER — LISINOPRIL 40 MG/1
40 TABLET ORAL DAILY
Qty: 90 TABLET | Refills: 0 | Status: SHIPPED | OUTPATIENT
Start: 2020-11-19 | End: 2021-01-13

## 2020-12-07 DIAGNOSIS — I48.19 PERSISTENT ATRIAL FIBRILLATION (H): ICD-10-CM

## 2020-12-09 RX ORDER — METOPROLOL SUCCINATE 50 MG/1
75 TABLET, EXTENDED RELEASE ORAL DAILY
Qty: 135 TABLET | Refills: 3 | Status: SHIPPED | OUTPATIENT
Start: 2020-12-09 | End: 2021-12-06

## 2020-12-23 ENCOUNTER — ANTICOAGULATION THERAPY VISIT (OUTPATIENT)
Dept: ANTICOAGULATION | Facility: CLINIC | Age: 75
End: 2020-12-23

## 2020-12-23 ENCOUNTER — TELEPHONE (OUTPATIENT)
Dept: INTERNAL MEDICINE | Facility: CLINIC | Age: 75
End: 2020-12-23

## 2020-12-23 DIAGNOSIS — Z79.01 LONG TERM CURRENT USE OF ANTICOAGULANTS WITH INR GOAL OF 2.0-3.0: ICD-10-CM

## 2020-12-23 DIAGNOSIS — I48.20 CHRONIC ATRIAL FIBRILLATION (H): ICD-10-CM

## 2020-12-23 DIAGNOSIS — Z79.01 LONG TERM CURRENT USE OF ANTICOAGULANTS WITH INR GOAL OF 2.0-3.0: Primary | ICD-10-CM

## 2020-12-23 LAB
CAPILLARY BLOOD COLLECTION: NORMAL
INR PPP: 2.3 (ref 0.86–1.14)

## 2020-12-23 PROCEDURE — 99207 PR NO CHARGE NURSE ONLY: CPT

## 2020-12-23 PROCEDURE — 36416 COLLJ CAPILLARY BLOOD SPEC: CPT | Performed by: INTERNAL MEDICINE

## 2020-12-23 PROCEDURE — 85610 PROTHROMBIN TIME: CPT | Performed by: INTERNAL MEDICINE

## 2020-12-23 NOTE — PROGRESS NOTES
Anticoagulation Management    Unable to reach Marek today.    Today's INR result of 2.3 is therapeutic (goal INR of 2.0-3.0).  Result received from: Clinic Lab    Follow up required to confirm warfarin dose taken and assess for changes    Left message to continue current dose of warfarin 2.5 mg tonight.      Anticoagulation clinic to follow up    Marge Gale RN

## 2020-12-23 NOTE — TELEPHONE ENCOUNTER
ANTICOAGULATION MANAGEMENT      Billy Stock due for annual renewal of referral to anticoagulation monitoring. Order pended for your review and signature.      ANTICOAGULATION SUMMARY      Warfarin indication(s)     Atrial fibrillation    Heart valve present?  NO       Current goal range   INR: 2.0-3.0     Goal appropriate for indication? Yes, INR 2-3 appropriate for hx of DVT, PE, hypercoagulable state, Afib, LVAD, or bileaflet AVR without risk factors     Current duration of therapy Indefinite/long term therapy   Time in Therapeutic Range (TTR)  (Goal > 60%) 87 %       Office visit with referring provider's group within last year no on 12/9/19.  Future visit scheduled 1/19/21.       Marge Gale RN

## 2020-12-24 NOTE — PROGRESS NOTES
ANTICOAGULATION FOLLOW-UP CLINIC VISIT    Patient Name:  Billy Stock  Date:  2020  Contact Type:  Telephone    SUBJECTIVE:  Patient Findings     Comments:  Patient will continue weekly maintenance dose. INR is therapeutic.   Recheck in 6 weeks.   Patient verbalizes understanding and agrees to plan. No further questions or concerns.          Clinical Outcomes     Negatives:  Major bleeding event, Thromboembolic event, Anticoagulation-related hospital admission, Anticoagulation-related ED visit, Anticoagulation-related fatality    Comments:  Patient will continue weekly maintenance dose. INR is therapeutic.   Recheck in 6 weeks.   Patient verbalizes understanding and agrees to plan. No further questions or concerns.             OBJECTIVE    Recent labs: (last 7 days)     20  0900   INR 2.30*       ASSESSMENT / PLAN  INR assessment THER    Recheck INR In: 6 WEEKS    INR Location Outside lab      Anticoagulation Summary  As of 2020    INR goal:  2.0-3.0   TTR:  87.0 % (1 y)   INR used for dosin.30 (2020)   Warfarin maintenance plan:  2.5 mg (5 mg x 0.5) every Wed; 5 mg (5 mg x 1) all other days   Full warfarin instructions:  2.5 mg every Wed; 5 mg all other days   Weekly warfarin total:  32.5 mg   Plan last modified:  Marge Gale RN (2018)   Next INR check:  2021   Priority:  Maintenance   Target end date:  Indefinite    Indications    Long term current use of anticoagulants with INR goal of 2.0-3.0 [Z79.01]  Chronic atrial fibrillation (H) [I48.20]             Anticoagulation Episode Summary     INR check location:      Preferred lab:      Send INR reminders to:  [facility]    Comments:  Pt reports taking Warfarin in the morning, d/t COVID - Patient may extend INR interval up to 8 weeks, and +2 weeks with each subsequent INR in range up to 12 weeks max if no S/S bleeding, clotting, illness or changes in medications that affect warfarin l      Anticoagulation  Care Providers     Provider Role Specialty Phone number    Malcolm Schafer MD Responsible Internal Medicine [phone]            See the Encounter Report to view Anticoagulation Flowsheet and Dosing Calendar (Go to Encounters tab in chart review, and find the Anticoagulation Therapy Visit)        Ninfa Pardo RN

## 2021-01-13 ENCOUNTER — OFFICE VISIT (OUTPATIENT)
Dept: INTERNAL MEDICINE | Facility: CLINIC | Age: 76
End: 2021-01-13
Payer: COMMERCIAL

## 2021-01-13 VITALS
TEMPERATURE: 98.1 F | WEIGHT: 226 LBS | HEIGHT: 73 IN | HEART RATE: 67 BPM | SYSTOLIC BLOOD PRESSURE: 138 MMHG | BODY MASS INDEX: 29.95 KG/M2 | OXYGEN SATURATION: 98 % | DIASTOLIC BLOOD PRESSURE: 82 MMHG

## 2021-01-13 DIAGNOSIS — M10.9 GOUT OF FOOT, UNSPECIFIED CAUSE, UNSPECIFIED CHRONICITY, UNSPECIFIED LATERALITY: ICD-10-CM

## 2021-01-13 DIAGNOSIS — G20.A1 PARKINSON DISEASE (H): ICD-10-CM

## 2021-01-13 DIAGNOSIS — Z12.5 SCREENING FOR PROSTATE CANCER: ICD-10-CM

## 2021-01-13 DIAGNOSIS — Z00.00 ENCOUNTER FOR PREVENTATIVE ADULT HEALTH CARE EXAMINATION: Primary | ICD-10-CM

## 2021-01-13 DIAGNOSIS — I10 ESSENTIAL HYPERTENSION: ICD-10-CM

## 2021-01-13 DIAGNOSIS — I10 ESSENTIAL HYPERTENSION WITH GOAL BLOOD PRESSURE LESS THAN 140/90: ICD-10-CM

## 2021-01-13 LAB
ALBUMIN UR-MCNC: >=300 MG/DL
APPEARANCE UR: CLEAR
BILIRUB UR QL STRIP: NEGATIVE
COLOR UR AUTO: YELLOW
ERYTHROCYTE [DISTWIDTH] IN BLOOD BY AUTOMATED COUNT: 14.9 % (ref 10–15)
GLUCOSE UR STRIP-MCNC: NEGATIVE MG/DL
HCT VFR BLD AUTO: 42.3 % (ref 40–53)
HGB BLD-MCNC: 13.9 G/DL (ref 13.3–17.7)
HGB UR QL STRIP: ABNORMAL
KETONES UR STRIP-MCNC: NEGATIVE MG/DL
LEUKOCYTE ESTERASE UR QL STRIP: NEGATIVE
MCH RBC QN AUTO: 28.3 PG (ref 26.5–33)
MCHC RBC AUTO-ENTMCNC: 32.9 G/DL (ref 31.5–36.5)
MCV RBC AUTO: 86 FL (ref 78–100)
NITRATE UR QL: NEGATIVE
PH UR STRIP: 5 PH (ref 5–7)
PLATELET # BLD AUTO: 144 10E9/L (ref 150–450)
RBC # BLD AUTO: 4.91 10E12/L (ref 4.4–5.9)
RBC #/AREA URNS AUTO: NORMAL /HPF
SOURCE: ABNORMAL
SP GR UR STRIP: >1.03 (ref 1–1.03)
UROBILINOGEN UR STRIP-ACNC: 1 EU/DL (ref 0.2–1)
WBC # BLD AUTO: 5.7 10E9/L (ref 4–11)
WBC #/AREA URNS AUTO: NORMAL /HPF

## 2021-01-13 PROCEDURE — 85027 COMPLETE CBC AUTOMATED: CPT | Performed by: INTERNAL MEDICINE

## 2021-01-13 PROCEDURE — 84550 ASSAY OF BLOOD/URIC ACID: CPT | Performed by: INTERNAL MEDICINE

## 2021-01-13 PROCEDURE — 99213 OFFICE O/P EST LOW 20 MIN: CPT | Mod: 25 | Performed by: INTERNAL MEDICINE

## 2021-01-13 PROCEDURE — 84443 ASSAY THYROID STIM HORMONE: CPT | Performed by: INTERNAL MEDICINE

## 2021-01-13 PROCEDURE — 36415 COLL VENOUS BLD VENIPUNCTURE: CPT | Performed by: INTERNAL MEDICINE

## 2021-01-13 PROCEDURE — 99397 PER PM REEVAL EST PAT 65+ YR: CPT | Performed by: INTERNAL MEDICINE

## 2021-01-13 PROCEDURE — 80061 LIPID PANEL: CPT | Performed by: INTERNAL MEDICINE

## 2021-01-13 PROCEDURE — G0103 PSA SCREENING: HCPCS | Performed by: INTERNAL MEDICINE

## 2021-01-13 PROCEDURE — 81001 URINALYSIS AUTO W/SCOPE: CPT | Performed by: INTERNAL MEDICINE

## 2021-01-13 PROCEDURE — 80053 COMPREHEN METABOLIC PANEL: CPT | Performed by: INTERNAL MEDICINE

## 2021-01-13 RX ORDER — AMLODIPINE BESYLATE 10 MG/1
10 TABLET ORAL DAILY
Qty: 90 TABLET | Refills: 3 | Status: SHIPPED | OUTPATIENT
Start: 2021-01-13 | End: 2022-01-21

## 2021-01-13 RX ORDER — LISINOPRIL 40 MG/1
40 TABLET ORAL DAILY
Qty: 90 TABLET | Refills: 3 | Status: SHIPPED | OUTPATIENT
Start: 2021-01-13 | End: 2022-02-16

## 2021-01-13 RX ORDER — ALLOPURINOL 100 MG/1
100 TABLET ORAL DAILY
Qty: 90 TABLET | Refills: 3 | Status: SHIPPED | OUTPATIENT
Start: 2021-01-13 | End: 2021-12-13

## 2021-01-13 ASSESSMENT — ACTIVITIES OF DAILY LIVING (ADL): CURRENT_FUNCTION: NO ASSISTANCE NEEDED

## 2021-01-13 ASSESSMENT — MIFFLIN-ST. JEOR: SCORE: 1819.01

## 2021-01-13 NOTE — PROGRESS NOTES
"SUBJECTIVE:   Billy Stock is a 75 year old male who presents for Preventive Visit.      Patient has been advised of split billing requirements and indicates understanding: No   Are you in the first 12 months of your Medicare coverage?  No    Healthy Habits:     In general, how would you rate your overall health?  Good    Frequency of exercise:  1 day/week    Duration of exercise:  15-30 minutes    Do you usually eat at least 4 servings of fruit and vegetables a day, include whole grains    & fiber and avoid regularly eating high fat or \"junk\" foods?  No    Taking medications regularly:  Yes    Medication side effects:  Muscle aches and Lightheadedness    Ability to successfully perform activities of daily living:  No assistance needed    Home Safety:  No safety concerns identified    Hearing Impairment:  No hearing concerns    In the past 6 months, have you been bothered by leaking of urine?  No    In general, how would you rate your overall mental or emotional health?  Good      PHQ-2 Total Score: 0    Additional concerns today:  No    Do you feel safe in your environment? Yes    Have you ever done Advance Care Planning? (For example, a Health Directive, POLST, or a discussion with a medical provider or your loved ones about your wishes): Yes, patient states has an Advance Care Planning document and will bring a copy to the clinic.      Fall risk  Fallen 2 or more times in the past year?: No  Any fall with injury in the past year?: No    Cognitive Screening   1) Repeat 3 items (Leader, Season, Table)    2) Clock draw: NORMAL  3) 3 item recall: Recalls 3 objects  Results: 3 items recalled: COGNITIVE IMPAIRMENT LESS LIKELY    Mini-CogTM Copyright JO ANN Chauhan. Licensed by the author for use in [facility]; reprinted with permission (elvia@.[facility]). All rights reserved.      Do you have sleep apnea, excessive snoring or daytime drowsiness?: yes, sleep apnea ( Has C-pap machine)    Reviewed and updated as " needed this visit by clinical staff  Tobacco  Allergies  Meds   Med Hx  Surg Hx  Fam Hx  Soc Hx        Reviewed and updated as needed this visit by Provider                Social History     Tobacco Use     Smoking status: Never Smoker     Smokeless tobacco: Never Used   Substance Use Topics     Alcohol use: No     Alcohol/week: 0.0 standard drinks     If you drink alcohol do you typically have >3 drinks per day or >7 drinks per week? No    Alcohol Use 1/13/2021   Prescreen: >3 drinks/day or >7 drinks/week? No   Prescreen: >3 drinks/day or >7 drinks/week? -           PROBLEMS TO ADD ON...  Has h/o HTN. on medical treatment. BP has been controlled. No side effects from medications. No CP, HA, dizziness. good compliance with medications and low salt diet.  Has h/o gout. On Allopurinol for prevention. No flare ups.   Has h/o Parkinson's disease, follows with neurology. Has symptoms of tremor, muscles rigidity and abnormality of gait.       Current providers sharing in care for this patient include:   Patient Care Team:  Malcolm Schafer MD as PCP - General (Internal Medicine)  Malcolm Schafer MD as Assigned PCP  Hoang Huerta MD as Assigned Surgical Provider  Agustin Pretty DPM as Assigned Musculoskeletal Provider    The following health maintenance items are reviewed in Epic and correct as of today:  Health Maintenance   Topic Date Due     AORTIC ANEURYSM SCREENING (SYSTEM ASSIGNED)  10/28/2011     ZOSTER IMMUNIZATION (2 of 3) 12/26/2013     ADVANCE CARE PLANNING  08/28/2018     FALL RISK ASSESSMENT  06/03/2020     MEDICARE ANNUAL WELLNESS VISIT  01/13/2022     DTAP/TDAP/TD IMMUNIZATION (3 - Td) 04/02/2022     LIPID  06/03/2024     COLORECTAL CANCER SCREENING  09/09/2024     HEPATITIS C SCREENING  Completed     PHQ-2  Completed     INFLUENZA VACCINE  Completed     Pneumococcal Vaccine: 65+ Years  Completed     Pneumococcal Vaccine: Pediatrics (0 to 5 Years) and At-Risk Patients (6 to 64  "Years)  Aged Out     IPV IMMUNIZATION  Aged Out     MENINGITIS IMMUNIZATION  Aged Out     HEPATITIS B IMMUNIZATION  Aged Out     Lab work is in process  Labs reviewed in EPIC      Review of Systems  CONSTITUTIONAL: NEGATIVE for fever, chills, change in weight  INTEGUMENTARY/SKIN: NEGATIVE for worrisome rashes, moles or lesions  EYES: NEGATIVE for vision changes or irritation  ENT/MOUTH: NEGATIVE for ear, mouth and throat problems  RESP: NEGATIVE for significant cough or SOB  CV: NEGATIVE for chest pain, palpitations or peripheral edema  GI: NEGATIVE for nausea, abdominal pain, heartburn, or change in bowel habits  : NEGATIVE for frequency, dysuria, or hematuria  MUSCULOSKELETAL: NEGATIVE for significant arthralgias or myalgia, + for left knee pain  NEURO: NEGATIVE for weakness, dizziness or paresthesias, + for tremor and impaired gait   ENDOCRINE: NEGATIVE for temperature intolerance, skin/hair changes  HEME: NEGATIVE for bleeding problems  PSYCHIATRIC: NEGATIVE for changes in mood or affect    OBJECTIVE:   /82 (BP Location: Left arm, Patient Position: Sitting, Cuff Size: Adult Regular)   Pulse 67   Temp 98.1  F (36.7  C) (Oral)   Ht 1.854 m (6' 1\")   Wt 102.5 kg (226 lb)   SpO2 98%   BMI 29.82 kg/m   Estimated body mass index is 29.82 kg/m  as calculated from the following:    Height as of this encounter: 1.854 m (6' 1\").    Weight as of this encounter: 102.5 kg (226 lb).  Physical Exam  GENERAL: healthy, alert and no distress  EYES: Eyes grossly normal to inspection, PERRL and conjunctivae and sclerae normal  HENT: ear canals and TM's normal, nose and mouth without ulcers or lesions  NECK: no adenopathy, no asymmetry, masses, or scars and thyroid normal to palpation  RESP: lungs clear to auscultation - no rales, rhonchi or wheezes  CV: regular rate and rhythm, normal S1 S2, no S3 or S4, no murmur, click or rub,  peripheral pulses strong  ABDOMEN: soft, nontender, no hepatosplenomegaly, no masses and " "bowel sounds normal  MS: no gross musculoskeletal defects noted, trace LE  edema  SKIN: no suspicious lesions or rashes  NEURO: Normal strength and tone, mentation intact and speech normal, tremor of the left arm, unstable gait   PSYCH: mentation appears normal, affect normal/bright    Diagnostic Test Results:  Labs reviewed in Epic    ASSESSMENT / PLAN:       ICD-10-CM    1. Encounter for preventative adult health care examination  Z00.00 CBC with platelets     Comprehensive metabolic panel     Lipid panel reflex to direct LDL Non-fasting     TSH with free T4 reflex     Prostate spec antigen screen     *UA reflex to Microscopic   2. Essential hypertension with goal blood pressure less than 140/90  I10 amLODIPine (NORVASC) 10 MG tablet     CBC with platelets     Comprehensive metabolic panel     Lipid panel reflex to direct LDL Non-fasting     TSH with free T4 reflex     *UA reflex to Microscopic     OFFICE/OUTPT VISIT,EST,LEVL III   3. Gout of foot, unspecified cause, unspecified chronicity, unspecified laterality  M10.9 allopurinol (ZYLOPRIM) 100 MG tablet     Uric acid     OFFICE/OUTPT VISIT,EST,LEVL III   4. Essential hypertension  I10 lisinopril (ZESTRIL) 40 MG tablet   5. Screening for prostate cancer  Z12.5 Prostate spec antigen screen   6. Parkinson disease (H)  G20      Assess lab work   Continue treatment   Follow up with neurology     Patient has been advised of split billing requirements and indicates understanding: Yes  COUNSELING:  Reviewed preventive health counseling, as reflected in patient instructions       Regular exercise       Healthy diet/nutrition       Vision screening       Colon cancer screening       Prostate cancer screening    Estimated body mass index is 29.82 kg/m  as calculated from the following:    Height as of this encounter: 1.854 m (6' 1\").    Weight as of this encounter: 102.5 kg (226 lb).        He reports that he has never smoked. He has never used smokeless " tobacco.      Appropriate preventive services were discussed with this patient, including applicable screening as appropriate for cardiovascular disease, diabetes, osteopenia/osteoporosis, and glaucoma.  As appropriate for age/gender, discussed screening for colorectal cancer, prostate cancer, breast cancer, and cervical cancer. Checklist reviewing preventive services available has been given to the patient.    Reviewed patients plan of care and provided an AVS. The Intermediate Care Plan ( asthma action plan, low back pain action plan, and migraine action plan) for Billy meets the Care Plan requirement. This Care Plan has been established and reviewed with the Patient.    Counseling Resources:  ATP IV Guidelines  Pooled Cohorts Equation Calculator  Breast Cancer Risk Calculator  Breast Cancer: Medication to Reduce Risk  FRAX Risk Assessment  ICSI Preventive Guidelines  Dietary Guidelines for Americans, 2010  USDA's MyPlate  ASA Prophylaxis  Lung CA Screening    Malcolm Schafer MD  [facility]    Identified Health Risks:

## 2021-01-13 NOTE — LETTER
[facility]  [address], [address]  [place] [address]  [phone]        January 18, 2021    Billy Stock  [address] [place] [address]            Dear Mr. Billy Stock:      The results of your recent labs show slightly decreased kidney function and elevated PSA. Recommend to see your urologist.   Rest of the results are normal.   If you have any further questions or problems, please contact our office.    Sincerely,        Malcolm Schafer M.D.    Results for orders placed or performed in visit on 01/13/21   CBC with platelets     Status: Abnormal   Result Value Ref Range    WBC 5.7 4.0 - 11.0 10e9/L    RBC Count 4.91 4.4 - 5.9 10e12/L    Hemoglobin 13.9 13.3 - 17.7 g/dL    Hematocrit 42.3 40.0 - 53.0 %    MCV 86 78 - 100 fl    MCH 28.3 26.5 - 33.0 pg    MCHC 32.9 31.5 - 36.5 g/dL    RDW 14.9 10.0 - 15.0 %    Platelet Count 144 (L) 150 - 450 10e9/L   Comprehensive metabolic panel     Status: Abnormal   Result Value Ref Range    Sodium 145 (H) 133 - 144 mmol/L    Potassium 4.0 3.4 - 5.3 mmol/L    Chloride 112 (H) 94 - 109 mmol/L    Carbon Dioxide 27 20 - 32 mmol/L    Anion Gap 6 3 - 14 mmol/L    Glucose 88 70 - 99 mg/dL    Urea Nitrogen 23 7 - 30 mg/dL    Creatinine 1.50 (H) 0.66 - 1.25 mg/dL    GFR Estimate 45 (L) >60 mL/min/[1.73_m2]    GFR Estimate If Black 52 (L) >60 mL/min/[1.73_m2]    Calcium 8.4 (L) 8.5 - 10.1 mg/dL    Bilirubin Total 0.4 0.2 - 1.3 mg/dL    Albumin 3.4 3.4 - 5.0 g/dL    Protein Total 7.0 6.8 - 8.8 g/dL    Alkaline Phosphatase 130 40 - 150 U/L    ALT 12 0 - 70 U/L    AST 19 0 - 45 U/L   Lipid panel reflex to direct LDL Non-fasting     Status: None   Result Value Ref Range    Cholesterol 110 <200 mg/dL    Triglycerides 106 <150 mg/dL    HDL Cholesterol 46 >39 mg/dL    LDL Cholesterol Calculated 43 <100 mg/dL    Non HDL Cholesterol 64 <130 mg/dL   TSH with free T4 reflex     Status: None   Result Value Ref Range    TSH 1.51 0.40 - 4.00 mU/L   Prostate  spec antigen screen     Status: Abnormal   Result Value Ref Range    PSA 6.34 (H) 0 - 4 ug/L   *UA reflex to Microscopic     Status: Abnormal   Result Value Ref Range    Color Urine Yellow     Appearance Urine Clear     Glucose Urine Negative NEG^Negative mg/dL    Bilirubin Urine Negative NEG^Negative    Ketones Urine Negative NEG^Negative mg/dL    Specific Gravity Urine >1.030 1.003 - 1.035    Blood Urine Trace (A) NEG^Negative    pH Urine 5.0 5.0 - 7.0 pH    Protein Albumin Urine >=300 (A) NEG^Negative mg/dL    Urobilinogen Urine 1.0 0.2 - 1.0 EU/dL    Nitrite Urine Negative NEG^Negative    Leukocyte Esterase Urine Negative NEG^Negative    Source Midstream Urine    Uric acid     Status: None   Result Value Ref Range    Uric Acid 5.3 3.5 - 7.2 mg/dL   Urine Microscopic     Status: None   Result Value Ref Range    WBC Urine 0 - 5 OTO5^0 - 5 /HPF    RBC Urine O - 2 OTO2^O - 2 /HPF

## 2021-01-14 LAB
ALBUMIN SERPL-MCNC: 3.4 G/DL (ref 3.4–5)
ALP SERPL-CCNC: 130 U/L (ref 40–150)
ALT SERPL W P-5'-P-CCNC: 12 U/L (ref 0–70)
ANION GAP SERPL CALCULATED.3IONS-SCNC: 6 MMOL/L (ref 3–14)
AST SERPL W P-5'-P-CCNC: 19 U/L (ref 0–45)
BILIRUB SERPL-MCNC: 0.4 MG/DL (ref 0.2–1.3)
BUN SERPL-MCNC: 23 MG/DL (ref 7–30)
CALCIUM SERPL-MCNC: 8.4 MG/DL (ref 8.5–10.1)
CHLORIDE SERPL-SCNC: 112 MMOL/L (ref 94–109)
CHOLEST SERPL-MCNC: 110 MG/DL
CO2 SERPL-SCNC: 27 MMOL/L (ref 20–32)
CREAT SERPL-MCNC: 1.5 MG/DL (ref 0.66–1.25)
GFR SERPL CREATININE-BSD FRML MDRD: 45 ML/MIN/{1.73_M2}
GLUCOSE SERPL-MCNC: 88 MG/DL (ref 70–99)
HDLC SERPL-MCNC: 46 MG/DL
LDLC SERPL CALC-MCNC: 43 MG/DL
NONHDLC SERPL-MCNC: 64 MG/DL
POTASSIUM SERPL-SCNC: 4 MMOL/L (ref 3.4–5.3)
PROT SERPL-MCNC: 7 G/DL (ref 6.8–8.8)
PSA SERPL-ACNC: 6.34 UG/L (ref 0–4)
SODIUM SERPL-SCNC: 145 MMOL/L (ref 133–144)
TRIGL SERPL-MCNC: 106 MG/DL
TSH SERPL DL<=0.005 MIU/L-ACNC: 1.51 MU/L (ref 0.4–4)
URATE SERPL-MCNC: 5.3 MG/DL (ref 3.5–7.2)

## 2021-01-24 ENCOUNTER — OFFICE VISIT (OUTPATIENT)
Dept: URGENT CARE | Facility: URGENT CARE | Age: 76
End: 2021-01-24
Payer: COMMERCIAL

## 2021-01-24 VITALS
RESPIRATION RATE: 20 BRPM | DIASTOLIC BLOOD PRESSURE: 92 MMHG | HEART RATE: 55 BPM | OXYGEN SATURATION: 98 % | TEMPERATURE: 98 F | SYSTOLIC BLOOD PRESSURE: 138 MMHG

## 2021-01-24 DIAGNOSIS — M79.89 LEFT LEG SWELLING: ICD-10-CM

## 2021-01-24 DIAGNOSIS — H61.21 IMPACTED CERUMEN OF RIGHT EAR: ICD-10-CM

## 2021-01-24 DIAGNOSIS — M25.562 LEFT KNEE PAIN, UNSPECIFIED CHRONICITY: Primary | ICD-10-CM

## 2021-01-24 PROCEDURE — 99214 OFFICE O/P EST MOD 30 MIN: CPT | Performed by: FAMILY MEDICINE

## 2021-01-24 RX ORDER — PREDNISONE 20 MG/1
40 TABLET ORAL DAILY
Qty: 10 TABLET | Refills: 0 | Status: SHIPPED | OUTPATIENT
Start: 2021-01-24 | End: 2021-01-29

## 2021-01-24 NOTE — PROGRESS NOTES
SUBJECTIVE:  Chief Complaint   Patient presents with     Urgent Care     Musculoskeletal Problem     L leg pain and knee pain X12 days     Billy Stock is a 75 year old male who presents with a chief complaint of left knee and ankle pain for 12 days.    Had underlying gout and arthritis, also has parkinson.  Denies any recent falls or trauma.  Usually has gout in foot.  Is on allopurinol, had uric acid level checked and was normal range.  Unable to take NSAIDs.  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  Was seen by primary couple of weeks ago for routine physical and commented that due to swelling in left leg that should try compression stocking.  Patient states that was too painful, tried support hose instead and was okay but symptoms persisted.  Is off work for couple of days.  Patient endorsed pain due to swelling, did not notice any redness to leg.      In addition, complain of right ear feeling plugged up, has had ear wax build up before.    Past Medical History:   Diagnosis Date     Bell's palsy 10/15/2002     CA IN SITU PROSTATE 10/15/2002     Chronic atrial fibrillation (H) 7/1/10     Glaucoma 4/10/2003     Problem list name updated by automated process. Provider to review     Gout 5/16/2013     Hyperlipidemia LDL goal <100 9/10/2014     Hypertension goal BP (blood pressure) < 140/90 6/28/2006     SARAH (obstructive sleep apnea) 8/28/2013     Parkinson disease (H) 2019     Pericarditis 2001     Renal cyst      Current Outpatient Medications   Medication Sig Dispense Refill     acetaminophen (TYLENOL) 325 MG tablet Take 1-2 tablets by mouth every 6 hours as needed.       allopurinol (ZYLOPRIM) 100 MG tablet Take 1 tablet (100 mg) by mouth daily 90 tablet 3     amLODIPine (NORVASC) 10 MG tablet Take 1 tablet (10 mg) by mouth daily 90 tablet 3     carbidopa-levodopa (SINEMET)  MG tablet Take 1 tablet by mouth 3 times daily       ipratropium  (ATROVENT) 0.03 % nasal spray Spray 2 sprays into both nostrils every 12 hours 1 Box 3     lisinopril (ZESTRIL) 40 MG tablet Take 1 tablet (40 mg) by mouth daily 90 tablet 3     metoprolol succinate ER (TOPROL-XL) 50 MG 24 hr tablet Take 1.5 tablets (75 mg) by mouth daily 135 tablet 3     triamcinolone (KENALOG) 0.5 % cream Apply sparingly to affected area three times daily. 30 g 0     UNABLE TO FIND MEDICATION NAME: Tabavit, multivitmain without vitamin K in it.       warfarin ANTICOAGULANT (COUMADIN) 5 MG tablet TAKE 1 TABLET EVERY DAY EXCEPT TAKE 1/2 TABLET ON WEDNESDAY OR AS INSTRUCTED BY INR CLINIC 86 tablet 1     Social History     Tobacco Use     Smoking status: Never Smoker     Smokeless tobacco: Never Used   Substance Use Topics     Alcohol use: No     Alcohol/week: 0.0 standard drinks       ROS:  Review of systems negative except as stated above.    EXAM:   BP (!) 157/98   Pulse 78   Temp 98  F (36.7  C) (Tympanic)   Resp 20   SpO2 98%   M/S Exam:left leg - mild tenderness knee with faint swelling and decrease ROM, ankle with moderate swelling and decrease ROM, edema 2+.  Pulse strong on foot.  No erythema  GENERAL APPEARANCE: healthy, alert and no distress  EARS: right ear canal with cerumen impaction, TM normal after successful removal of cerumen.  Left ear canal and TM normal  PSYCH: alert, affect bright    X-RAY was not done.    ASSESSMENT/PLAN:  (M25.562) Left knee pain, unspecified chronicity  (primary encounter diagnosis)  Comment: arthritis vs gout flare  Plan: predniSONE (DELTASONE) 20 MG tablet            (M79.89) Left leg swelling  Plan: elevate, compression    (H61.21) Impacted cerumen of right ear  Plan: successful irrigation of cerumen by nursing staff      Reviewed left leg symptoms and suspect that due to underlying arthritis and gout that may have had gout flare.  Patient is unable to take NSAIDs due to warfarin use.  Due to duration of symptoms, RX prednisone burst given to help with  possible gout flare.  Encourage to elevate and wear compression stocking if able to tolerate to help with swelling.  Okay for tylenol for discomfort.    Follow up with primary provider in 1 week for recheck if no improving.    William James MD  January 24, 2021 2:46 PM

## 2021-01-24 NOTE — PATIENT INSTRUCTIONS
Take prednisone burst for possible gout flare causing knee and ankle pain and swelling    Okay to take tylenol 500 mg - 2 tablets (1000 mg) every 6-8 hours as needed, do not exceed 3000 mg in 24 hours.    Try to elevate leg as much as possible  Try to wear support hose or compression stocking      Patient Education     Gout    Gout is an inflammation of a joint caused by an inflammatory response to gout crystals in the joint fluid. This occurs when there is excess uric acid. Uric acid is a normal waste product in the body. It builds up in the body when the kidneys can't filter enough of it from the blood. This may occur with age. It is also associated with kidney disease. Gout occurs more often in people with obesity, diabetes, high blood pressure, or high levels of fats in the blood. It may run in families. Gout tends to come and go. A flare up of gout is called an attack. Drinking alcohol or eating certain foods (such as shellfish or foods with additives such as high-fructose corn syrup) may increase uric acid levels in the blood and cause a gout attack.   During a gout attack, the affected joint may become hot, red, swollen, and painful. If you have had one attack of gout, you are likely to have another. An attack of gout can be treated with medicine. If these attacks become frequent, a daily medicine may be prescribed to help the kidneys remove uric acid from the body.   Home care  During a gout attack:    Contact your healthcare provider for advice and therapy options at the early stages of a gout flare. Treating the flare right away can prevent it from getting worse.    Rest painful joints. If gout affects the joints of your foot or leg, you may want to use crutches for the first few days to keep from bearing weight on the affected joint.    When sitting or lying down, raise the painful joint to a level higher than your heart.    Apply an ice pack (ice cubes in a plastic bag wrapped in a thin towel) over the  injured area for 20 minutes every 1 to 2 hours the first day for pain relief. Continue this 3 to 4 times a day for swelling and pain.    Avoid alcohol and foods listed below (see Preventing attacks) during a gout attack. Drink extra fluid to help flush the uric acid through your kidneys.    If you were prescribed a medicine to treat gout, take it as your healthcare provider has instructed. Don't skip doses.    Take anti-inflammatory medicine as directed by your healthcare provider.    If pain medicines have been prescribed, take them exactly as directed.    Preventing attacks    Limit or stop  alcohol use. Excess alcohol intake can cause a gout attack.    Limit these foods and beverages:  ? Organ meats, such as kidneys and liver  ? Certain seafoods (anchovies, sardines, shrimp, scallops, herring, mackerel)  ? Wild game, meat extracts and meat gravies  ? Foods and beverages sweetened with high-fructose corn syrup, such as sodas    Eat a healthy diet including low-fat and nonfat dairy, whole grains, and vegetables.    If you are overweight, talk to your healthcare provider about a weight reduction plan. Avoid fasting or extreme low calorie diets (less than 900 calories per day). This will increase uric acid levels in the body.    If you have diabetes or high blood pressure, work with your doctor to manage these conditions.    Protect the joint from injury. Wear good fitting socks and shoes. Injury can trigger a gout attack.    Follow-up care  Follow up with your healthcare provider, or as advised.   When to seek medical advice  Call your healthcare provider if you have any of the following:    Fever over 100.4 F (38. C) with worsening joint pain    Increasing redness around the joint    Pain developing in another joint    Repeated vomiting, abdominal pain, or blood in the vomit or stool (black or red color)  [employer] last reviewed this educational content on 6/1/2019 2000-2020 [employer] [address]  [place] [address]. All rights reserved. This information is not intended as a substitute for professional medical care. Always follow your healthcare professional's instructions.

## 2021-02-01 ENCOUNTER — ANTICOAGULATION THERAPY VISIT (OUTPATIENT)
Dept: ANTICOAGULATION | Facility: CLINIC | Age: 76
End: 2021-02-01
Payer: COMMERCIAL

## 2021-02-01 DIAGNOSIS — Z79.01 LONG TERM CURRENT USE OF ANTICOAGULANTS WITH INR GOAL OF 2.0-3.0: ICD-10-CM

## 2021-02-01 DIAGNOSIS — I48.20 CHRONIC ATRIAL FIBRILLATION (H): ICD-10-CM

## 2021-02-01 LAB
CAPILLARY BLOOD COLLECTION: NORMAL
INR PPP: 4 (ref 0.86–1.14)

## 2021-02-01 PROCEDURE — 99207 PR NO CHARGE NURSE ONLY: CPT

## 2021-02-01 PROCEDURE — 36416 COLLJ CAPILLARY BLOOD SPEC: CPT | Performed by: INTERNAL MEDICINE

## 2021-02-01 PROCEDURE — 85610 PROTHROMBIN TIME: CPT | Performed by: INTERNAL MEDICINE

## 2021-02-15 ENCOUNTER — ANTICOAGULATION THERAPY VISIT (OUTPATIENT)
Dept: ANTICOAGULATION | Facility: CLINIC | Age: 76
End: 2021-02-15

## 2021-02-15 DIAGNOSIS — I48.20 CHRONIC ATRIAL FIBRILLATION (H): ICD-10-CM

## 2021-02-15 DIAGNOSIS — Z79.01 LONG TERM CURRENT USE OF ANTICOAGULANTS WITH INR GOAL OF 2.0-3.0: ICD-10-CM

## 2021-02-15 LAB
CAPILLARY BLOOD COLLECTION: NORMAL
INR PPP: 2.1 (ref 0.86–1.14)

## 2021-02-15 PROCEDURE — 85610 PROTHROMBIN TIME: CPT | Performed by: INTERNAL MEDICINE

## 2021-02-15 PROCEDURE — 36416 COLLJ CAPILLARY BLOOD SPEC: CPT | Performed by: INTERNAL MEDICINE

## 2021-02-15 PROCEDURE — 99207 PR NO CHARGE NURSE ONLY: CPT

## 2021-02-15 NOTE — PROGRESS NOTES
ANTICOAGULATION FOLLOW-UP CLINIC VISIT    Patient Name:  Billy Stock  Date:  2/15/2021  Contact Type:  Telephone/ Patient returned call, he denies any changes. Orders discussed with the patient, he agrees with the plan. Lab INR appointment scheduled on 3/8/21.    SUBJECTIVE:  Patient Findings     Comments:  The patient was assessed for diet, medication, and activity level changes, missed or extra doses, bruising or bleeding, with no problem findings. Maintenance warfarin dosing was reviewed with patient and will remain on the same dose until next INR check. Patient did not have any questions or concerns.           Clinical Outcomes     Negatives:  Major bleeding event, Thromboembolic event, Anticoagulation-related hospital admission, Anticoagulation-related ED visit, Anticoagulation-related fatality    Comments:  The patient was assessed for diet, medication, and activity level changes, missed or extra doses, bruising or bleeding, with no problem findings. Maintenance warfarin dosing was reviewed with patient and will remain on the same dose until next INR check. Patient did not have any questions or concerns.              OBJECTIVE    Recent labs: (last 7 days)     02/15/21  1339   INR 2.10*       ASSESSMENT / PLAN  INR assessment THER    Recheck INR In: 3 WEEKS    INR Location Outside lab      Anticoagulation Summary  As of 2/15/2021    INR goal:  2.0-3.0   TTR:  78.5 % (1 y)   INR used for dosin.10 (2/15/2021)   Warfarin maintenance plan:  2.5 mg (5 mg x 0.5) every Wed; 5 mg (5 mg x 1) all other days   Full warfarin instructions:  2.5 mg every Wed; 5 mg all other days   Weekly warfarin total:  32.5 mg   No change documented:  Samantha Galeas RN   Plan last modified:  Marge Gale RN (2018)   Next INR check:  3/8/2021   Priority:  Maintenance   Target end date:  Indefinite    Indications    Long term current use of anticoagulants with INR goal of 2.0-3.0 [Z79.01]  Chronic atrial fibrillation (H)  [I48.20]             Anticoagulation Episode Summary     INR check location:      Preferred lab:      Send INR reminders to:  KRISTINE CHAVES YESI    Comments:  Pt reports taking Warfarin in the morning, d/t COVID - Patient may extend INR interval up to 8 weeks, and +2 weeks with each subsequent INR in range up to 12 weeks max if no S/S bleeding, clotting, illness or changes in medications that affect warfarin l      Anticoagulation Care Providers     Provider Role Specialty Phone number    Malcolm Schafer MD Referring Internal Medicine [phone]            See the Encounter Report to view Anticoagulation Flowsheet and Dosing Calendar (Go to Encounters tab in chart review, and find the Anticoagulation Therapy Visit)    Dosage adjustment made based on physician directed care plan.    Samantah Galeas RN

## 2021-02-15 NOTE — PROGRESS NOTES
Left message to call [phone]. Please transfer call to Samantha at [phone].    Anticoagulation Management    Unable to reach Marek today.    Today's INR result of 2.1 is therapeutic (goal INR of 2.0-3.0).  Result received from: Clinic Lab    Follow up required to confirm warfarin dose taken and assess for changes    Left message to continue current dose of warfarin 5 mg tonight.      Anticoagulation clinic to follow up    Samantha Galeas RN

## 2021-02-15 NOTE — PROGRESS NOTES
ANTICOAGULATION FOLLOW-UP CLINIC VISIT    Patient Name:  Billy Stock  Date:  2/15/2021  Contact Type:  Telephone/ Patient reported medication and health changes, he denies any other changes. Orders discussed with the patient, he agrees with the plan Lab INR appointment scheduled on 2/15/21.    SUBJECTIVE:  Patient Findings     Positives:  Change in health (Left knee and foot pain, patient went to  and was told gout flare and arthritis. ), Change in medications (Patient was taking Prednisone for left knee pain. completed 21.)    Comments:  The patient was assessed for diet, medication, and activity level changes, missed or extra doses, bruising or bleeding, with no problem findings.          Clinical Outcomes     Comments:  The patient was assessed for diet, medication, and activity level changes, missed or extra doses, bruising or bleeding, with no problem findings.             OBJECTIVE    Recent labs: (last 7 days)     02/15/21  1339   INR 2.10*       ASSESSMENT / PLAN  INR assessment SUPRA    Recheck INR In: 2 WEEKS    INR Location Outside lab      Anticoagulation Summary  As of 2021    INR goal:  2.0-3.0   TTR:  80.5 % (1 y)   INR used for dosin.00 (2021)   Warfarin maintenance plan:  2.5 mg (5 mg x 0.5) every Wed; 5 mg (5 mg x 1) all other days   Full warfarin instructions:  : Hold; Otherwise 2.5 mg every Wed; 5 mg all other days   Weekly warfarin total:  32.5 mg   Plan last modified:  Marge Gale RN (2018)   Next INR check:  2/15/2021   Priority:  Maintenance   Target end date:  Indefinite    Indications    Long term current use of anticoagulants with INR goal of 2.0-3.0 [Z79.01]  Chronic atrial fibrillation (H) [I48.20]             Anticoagulation Episode Summary     INR check location:      Preferred lab:      Send INR reminders to:  KRISTINE KEY    Comments:  Pt reports taking Warfarin in the morning, d/t COVID - Patient may extend INR interval up to 8 weeks,  and +2 weeks with each subsequent INR in range up to 12 weeks max if no S/S bleeding, clotting, illness or changes in medications that affect warfarin l      Anticoagulation Care Providers     Provider Role Specialty Phone number    Malcolm Schafer MD Referring Internal Medicine [phone]            See the Encounter Report to view Anticoagulation Flowsheet and Dosing Calendar (Go to Encounters tab in chart review, and find the Anticoagulation Therapy Visit)    Dosage adjustment made based on physician directed care plan.    Samantha Galeas RN

## 2021-02-19 ENCOUNTER — TELEPHONE (OUTPATIENT)
Dept: ANTICOAGULATION | Facility: CLINIC | Age: 76
End: 2021-02-19

## 2021-02-19 ENCOUNTER — ANCILLARY PROCEDURE (OUTPATIENT)
Dept: GENERAL RADIOLOGY | Facility: CLINIC | Age: 76
End: 2021-02-19
Attending: PHYSICIAN ASSISTANT
Payer: COMMERCIAL

## 2021-02-19 ENCOUNTER — NURSE TRIAGE (OUTPATIENT)
Dept: NURSING | Facility: CLINIC | Age: 76
End: 2021-02-19

## 2021-02-19 ENCOUNTER — HOSPITAL ENCOUNTER (OUTPATIENT)
Dept: ULTRASOUND IMAGING | Facility: CLINIC | Age: 76
Discharge: HOME | End: 2021-02-19
Attending: PHYSICIAN ASSISTANT | Admitting: PHYSICIAN ASSISTANT
Payer: COMMERCIAL

## 2021-02-19 ENCOUNTER — OFFICE VISIT (OUTPATIENT)
Dept: URGENT CARE | Facility: URGENT CARE | Age: 76
End: 2021-02-19
Payer: COMMERCIAL

## 2021-02-19 VITALS
DIASTOLIC BLOOD PRESSURE: 87 MMHG | SYSTOLIC BLOOD PRESSURE: 159 MMHG | WEIGHT: 226 LBS | TEMPERATURE: 97.1 F | OXYGEN SATURATION: 99 % | HEART RATE: 59 BPM | BODY MASS INDEX: 29.82 KG/M2

## 2021-02-19 DIAGNOSIS — M79.662 PAIN OF LEFT LOWER LEG: ICD-10-CM

## 2021-02-19 DIAGNOSIS — M79.662 PAIN OF LEFT LOWER LEG: Primary | ICD-10-CM

## 2021-02-19 PROCEDURE — 73562 X-RAY EXAM OF KNEE 3: CPT | Mod: LT | Performed by: RADIOLOGY

## 2021-02-19 PROCEDURE — 93971 EXTREMITY STUDY: CPT | Mod: LT

## 2021-02-19 PROCEDURE — 99215 OFFICE O/P EST HI 40 MIN: CPT | Performed by: PHYSICIAN ASSISTANT

## 2021-02-19 RX ORDER — PREDNISONE 20 MG/1
40 TABLET ORAL DAILY
Qty: 10 TABLET | Refills: 0 | Status: SHIPPED | OUTPATIENT
Start: 2021-02-19 | End: 2021-02-23

## 2021-02-19 ASSESSMENT — PAIN SCALES - GENERAL: PAINLEVEL: MODERATE PAIN (5)

## 2021-02-19 NOTE — TELEPHONE ENCOUNTER
"Patient says he was seen this morning in urgent care by Terri Yancey PA-C for his swollen leg and ankle.     Patient reports he takes warfarin. Patient says in the past when he was put on prednisone, his INR level increased to 4.     Patient says his most recent INR level was 2.1 so he wants to know if he should make any changes to the way he is taking warfarin while on prednisone.    Patient says he called his INR nurse, Samantha, but missed her call so does not know what he should be doing.    FNA advised will route message to urgent care provider who may be able to answer his question.      Reason for Disposition    Caller has urgent medication question about med that PCP prescribed and triager unable to answer question    Additional Information    Negative: MORE THAN A DOUBLE DOSE of a prescription or over-the-counter (OTC) drug    Negative: [1] DOUBLE DOSE (an extra dose or lesser amount) of over-the-counter (OTC) drug AND [2] any symptoms (e.g., dizziness, nausea, pain, sleepiness)    Negative: [1] DOUBLE DOSE (an extra dose or lesser amount) of prescription drug AND [2] any symptoms (e.g., dizziness, nausea, pain, sleepiness)    Negative: Took another person's prescription drug    Negative: [1] DOUBLE DOSE (an extra dose or lesser amount) of prescription drug AND [2] NO symptoms (Exception: a double dose of antibiotics)    Negative: Diabetes drug error or overdose (e.g., insulin or extra dose)    Negative: [1] Request for URGENT new prescription or refill of \"essential\" medication (i.e., likelihood of harm to patient if not taken) AND [2] triager unable to fill per unit policy    Negative: [1] Prescription not at pharmacy AND [2] was prescribed today by PCP    Negative: Pharmacy calling with prescription questions and triager unable to answer question    Protocols used: MEDICATION QUESTION CALL-A-AH      "

## 2021-02-19 NOTE — LETTER
[facility] [place]  [address]  [address]  [place] [address]  Phone: [phone]  Fax: [phone]    February 19, 2021        Billy Stock  [address]  [place] [address]          To whom it may concern:    RE: Billy Stock    Patient was seen and treated today at our clinic. Please excuse patient from work 2/19/2021 - 2/26/2019.   He may return to work sooner if symptoms resolve.     Please contact me for questions or concerns.      Sincerely,        Terri Yancey PA-C

## 2021-02-19 NOTE — TELEPHONE ENCOUNTER
Received message that patient called to report a medication change. Called patient, no answer. Left message to call [phone]. Please transfer call to Samantha at [phone].  Samantha Galeas RN, BSN

## 2021-02-19 NOTE — PROGRESS NOTES
CHIEF COMPLAINT:   Chief Complaint   Patient presents with     Urgent Care     Knee Pain     left knee and left foot gout flare up for a few days.        HPI: Billy Stock is a 75 year old male who presents to clinic today for evaluation of Left knee pain.  Left knee pain started about one week. Worse with walking and movement of his leg.  He has noted increased swelling in his knee, calf and ankle.  Was advised to use compression stockings, but reported that these were too painful.    Works at [employer] and and spends a lot of time on his feet, walking on concrete floors.  He has a history of gout, typically in his ankle and foot. Last Uric acid (01/2021) was within normal limits. He does take allopurinol for recurrent gout.  He was treated on 1/24/2021, for similar symptoms.  He was treated with prednisone, given his warfarin use which resolved his symptoms. He denies having numbness/tingling or icy sensation into his toes.     Past Medical History:   Diagnosis Date     Bell's palsy 10/15/2002     CA IN SITU PROSTATE 10/15/2002     Chronic atrial fibrillation (H) 7/1/10     Glaucoma 4/10/2003     Problem list name updated by automated process. Provider to review     Gout 5/16/2013     Hyperlipidemia LDL goal <100 9/10/2014     Hypertension goal BP (blood pressure) < 140/90 6/28/2006     SARAH (obstructive sleep apnea) 8/28/2013     Parkinson disease (H) 2019     Pericarditis 2001     Renal cyst      Past Surgical History:   Procedure Laterality Date     CATARACT IOL, RT/LT  10/15, 11/15     COLONOSCOPY  2009    [facility]     COLONOSCOPY  9/30/2014    Dr. Long [facility]     COLONOSCOPY N/A 9/30/2014    Procedure: COMBINED COLONOSCOPY, SINGLE BIOPSY/POLYPECTOMY BY BIOPSY;  Surgeon: Puneet Long MD;  Location:  GI     EYE SURGERY  2007    glaucoma surgery right eye     HERNIA REPAIR       PROSTATE SURGERY       SUPRAPUBIC PROSTATECTOMY  2002     VASECTOMY       Social History     Tobacco Use     Smoking status:  Never Smoker     Smokeless tobacco: Never Used   Substance Use Topics     Alcohol use: No     Alcohol/week: 0.0 standard drinks     Current Outpatient Medications   Medication     acetaminophen (TYLENOL) 325 MG tablet     allopurinol (ZYLOPRIM) 100 MG tablet     amLODIPine (NORVASC) 10 MG tablet     carbidopa-levodopa (SINEMET)  MG tablet     ipratropium (ATROVENT) 0.03 % nasal spray     lisinopril (ZESTRIL) 40 MG tablet     metoprolol succinate ER (TOPROL-XL) 50 MG 24 hr tablet     predniSONE (DELTASONE) 20 MG tablet     triamcinolone (KENALOG) 0.5 % cream     UNABLE TO FIND     warfarin ANTICOAGULANT (COUMADIN) 5 MG tablet     No current facility-administered medications for this visit.      Allergies   Allergen Reactions     Cats      Codeine      Hallucinated when taking codeine with another medication     Codeine Unknown     Maple Tree      Morphine Visual Disturbance and Other (See Comments)     Watermelon [Citrullus Vulgaris]      Itching throat, hives       10 point ROS of systems negative or as states above.       Exam:  BP (!) 159/87   Pulse 59   Temp 97.1  F (36.2  C) (Tympanic)   Wt 102.5 kg (226 lb)   SpO2 99%   BMI 29.82 kg/m    Constitutional: healthy, alert and no distress  Head: Normocephalic, atraumatic.  Cardiovascular: RRR  Respiratory: CTA bilaterally, no rhonchi or rales  M/S Exam:Left knee has TTP medially. Swelling noted proximal to patella to left ankle. No erythema. FROM in knee and ankle.   Extremities: peripheral pulses normal  Neuro: Normal strength and tone, sensory exam grossly normal, mentation intact and speech normal  Skin: no rashes    X-RAY No evidence of fracture or effusion as read by me.       Results for orders placed or performed during the hospital encounter of 02/19/21   US Lower Extremity Venous Duplex Left     Status: None    Narrative    US LOWER EXTREMITY VENOUS DUPLEX LEFT   2/19/2021 11:03 AM     HISTORY: Left leg pain.    COMPARISON: None.    TECHNIQUE:  Spectral doppler and waveform analysis were performed on  the left lower extremity veins.    FINDINGS: The left common femoral, femoral, and popliteal veins are  patent, compressible, and negative for deep venous thrombosis. Calf  veins are segmentally seen but appear negative for DVT where  visualized.      Impression    IMPRESSION: No evidence for deep venous thrombosis in the left lower  extremity veins.    KIAN GANDHI MD         ASSESSMENT/PLAN:  1. Pain of left lower leg  75-year-old male presents the clinic for evaluation of left knee pain, and left lower leg swelling.  On exam, he has tenderness medial aspect of his patella.  Swelling is noted from proximal patella down to ankle.  He does have calf swelling, but is not in pain in his calf.  Peripheral pulses are intact.  X-ray shows joint space narrowing, but is without fracture or effusion. Although I thought it less likely for leg swelling to be from DVT, given the fact that he takes Warfarin and INR at last check (2/15) was therapeutic, an ultrasound of the left lower extremity was performed. Tthankfully, DVT was ruled out.   Will treat with prednisone, as he had great relief with this last time. OK to continue with Tylenol for pain. Note given for work. Encouraged rest and elevation.   We discussed follow-up with Orthopedics for a recheck. He prefers to follow-up with Dr. Schafer, and will plan to do that in the upcoming weeks.   Discussed return precautions.   - XR Knee Left 3 Views  - US Lower Extremity Venous Duplex Left; Future  - predniSONE (DELTASONE) 20 MG tablet; Take 2 tablets (40 mg) by mouth daily for 5 days  Dispense: 10 tablet; Refill: 0    Diagnosis and treatment plan was reviewed with patient and/or family.   We went over any labs or imaging.   Patient verbalizes understanding. All questions were addressed and answered.   Terri Yancey PA-C

## 2021-02-20 NOTE — TELEPHONE ENCOUNTER
Pt called in. Provider message was relayed for the Pt.  Pt verbalized understand, no other concern at this time.      Neeraj Brown Nurse Advisor 2/19/2021 7:17 PM

## 2021-02-20 NOTE — TELEPHONE ENCOUNTER
Called patient, he reports he is taking Prednisone for 1 week. Last INR check was on 2/15/21, result was 2.1.  Per [employer], concurrent use of PREDNISONE and WARFARIN may result in increased risk of bleeding or diminished effects of warfarin. Lab INR appointment scheduled for 2/23/21.  Samantha Galeas RN, BSN

## 2021-02-20 NOTE — TELEPHONE ENCOUNTER
Left general message for patient to call back.    Alexsander Du RN/Kevin Nurse Advisors, 2/19/21, 7:04 PM.

## 2021-02-23 ENCOUNTER — ANTICOAGULATION THERAPY VISIT (OUTPATIENT)
Dept: NURSING | Facility: CLINIC | Age: 76
End: 2021-02-23

## 2021-02-23 DIAGNOSIS — Z79.01 LONG TERM CURRENT USE OF ANTICOAGULANTS WITH INR GOAL OF 2.0-3.0: ICD-10-CM

## 2021-02-23 DIAGNOSIS — I48.20 CHRONIC ATRIAL FIBRILLATION (H): ICD-10-CM

## 2021-02-23 LAB
CAPILLARY BLOOD COLLECTION: NORMAL
INR PPP: 3.7 (ref 0.86–1.14)

## 2021-02-23 PROCEDURE — 85610 PROTHROMBIN TIME: CPT | Performed by: INTERNAL MEDICINE

## 2021-02-23 PROCEDURE — 99207 PR NO CHARGE NURSE ONLY: CPT

## 2021-02-23 PROCEDURE — 36416 COLLJ CAPILLARY BLOOD SPEC: CPT | Performed by: INTERNAL MEDICINE

## 2021-02-23 NOTE — PROGRESS NOTES
Anticoagulation Management    Unable to reach Marek today.    Today's INR result of 3.7 is supratherapeutic (goal INR of 2.0-3.0).  Result received from: Clinic Lab    Follow up required to assess for changes     Left message to hold warfarin tonight.   Left VM to call Nain with transfer to Samantha at: [phone]      Anticoagulation clinic to follow up    Samantha Richards RN    ANTICOAGULATION FOLLOW-UP CLINIC VISIT    Patient Name:  Billy Stock  Date:  2/23/2021  Contact Type:  Telephone--spoke to pt    SUBJECTIVE:  Patient Findings     Positives:  Change in health (Pt was seen in  on 02/19/21 for L leg pain and swelling in knee/leg due to likely gout flare, Doppler U/S was negative for DVT.  Pt states his leg is feeling better, swelling has improved and he is able to bend his leg now.), Change in medications (Prednisone 40mg x 5 days 2/19-2/24/21; however, pt states he finished the PDN today.)        Clinical Outcomes     Negatives:  Major bleeding event, Thromboembolic event, Anticoagulation-related hospital admission, Anticoagulation-related ED visit, Anticoagulation-related fatality           OBJECTIVE    Recent labs: (last 7 days)     02/23/21  1038   INR 3.70*       ASSESSMENT / PLAN  INR assessment SUPRA    Recheck INR In: 2 WEEKS    INR Location Clinic      Anticoagulation Summary  As of 2/23/2021    INR goal:  2.0-3.0   TTR:  77.6 % (1 y)   INR used for dosing:  3.70 (2/23/2021)   Warfarin maintenance plan:  2.5 mg (5 mg x 0.5) every Wed; 5 mg (5 mg x 1) all other days   Full warfarin instructions:  2/23: Hold; Otherwise 2.5 mg every Wed; 5 mg all other days   Weekly warfarin total:  32.5 mg   Plan last modified:  Samantha Richards RN (2/23/2021)   Next INR check:  3/8/2021   Priority:  High   Target end date:  Indefinite    Indications    Long term current use of anticoagulants with INR goal of 2.0-3.0 [Z79.01]  Chronic atrial fibrillation (H) [I48.20]             Anticoagulation Episode Summary     INR  check location:      Preferred lab:      Send INR reminders to:  KRISTINE KEY    Comments:  Pt reports taking Warfarin in the morning, d/t COVID - Patient may extend INR interval up to 8 weeks, and +2 weeks with each subsequent INR in range up to 12 weeks max if no S/S bleeding, clotting, illness or changes in medications that affect warfarin l      Anticoagulation Care Providers     Provider Role Specialty Phone number    Malcolm Schafer MD Referring Internal Medicine [phone]            See the Encounter Report to view Anticoagulation Flowsheet and Dosing Calendar (Go to Encounters tab in chart review, and find the Anticoagulation Therapy Visit)        Samantha Richards RN

## 2021-02-23 NOTE — PROGRESS NOTES
Late entry: patient did note 1 bruise on his L knee, otherwise no other signs/symtoms of bleeding.    Samantha Richards RN

## 2021-02-25 ENCOUNTER — OFFICE VISIT (OUTPATIENT)
Dept: INTERNAL MEDICINE | Facility: CLINIC | Age: 76
End: 2021-02-25
Payer: COMMERCIAL

## 2021-02-25 VITALS
HEIGHT: 73 IN | WEIGHT: 221 LBS | OXYGEN SATURATION: 98 % | RESPIRATION RATE: 16 BRPM | HEART RATE: 82 BPM | TEMPERATURE: 98.1 F | DIASTOLIC BLOOD PRESSURE: 72 MMHG | BODY MASS INDEX: 29.29 KG/M2 | SYSTOLIC BLOOD PRESSURE: 124 MMHG

## 2021-02-25 DIAGNOSIS — M17.0 PRIMARY OSTEOARTHRITIS OF BOTH KNEES: Primary | ICD-10-CM

## 2021-02-25 PROCEDURE — 99213 OFFICE O/P EST LOW 20 MIN: CPT | Performed by: INTERNAL MEDICINE

## 2021-02-25 ASSESSMENT — MIFFLIN-ST. JEOR: SCORE: 1796.33

## 2021-03-08 ENCOUNTER — ANTICOAGULATION THERAPY VISIT (OUTPATIENT)
Dept: ANTICOAGULATION | Facility: CLINIC | Age: 76
End: 2021-03-08

## 2021-03-08 DIAGNOSIS — I48.20 CHRONIC ATRIAL FIBRILLATION (H): ICD-10-CM

## 2021-03-08 DIAGNOSIS — Z79.01 LONG TERM CURRENT USE OF ANTICOAGULANTS WITH INR GOAL OF 2.0-3.0: ICD-10-CM

## 2021-03-08 LAB
CAPILLARY BLOOD COLLECTION: NORMAL
INR PPP: 2.8 (ref 0.86–1.14)

## 2021-03-08 PROCEDURE — 85610 PROTHROMBIN TIME: CPT | Performed by: INTERNAL MEDICINE

## 2021-03-08 PROCEDURE — 99207 PR NO CHARGE NURSE ONLY: CPT

## 2021-03-08 PROCEDURE — 36416 COLLJ CAPILLARY BLOOD SPEC: CPT | Performed by: INTERNAL MEDICINE

## 2021-03-08 NOTE — PROGRESS NOTES
ANTICOAGULATION FOLLOW-UP CLINIC VISIT    Patient Name:  Billy Stock  Date:  3/8/2021  Contact Type:  Telephone/ Called patient, he denies any changes. Orders discussed with the patient, he agrees with the plan. Lab INR appointment scheduled on 3/29/21.    SUBJECTIVE:  Patient Findings     Comments:  The patient was assessed for diet, medication, and activity level changes, missed or extra doses, bruising or bleeding, with no problem findings. Maintenance warfarin dosing was reviewed with patient and will remain on the same dose until next INR check. Patient did not have any questions or concerns.           Clinical Outcomes     Negatives:  Major bleeding event, Thromboembolic event, Anticoagulation-related hospital admission, Anticoagulation-related ED visit, Anticoagulation-related fatality    Comments:  The patient was assessed for diet, medication, and activity level changes, missed or extra doses, bruising or bleeding, with no problem findings. Maintenance warfarin dosing was reviewed with patient and will remain on the same dose until next INR check. Patient did not have any questions or concerns.              OBJECTIVE    Recent labs: (last 7 days)     21  1256   INR 2.80*       ASSESSMENT / PLAN  INR assessment THER    Recheck INR In: 3 WEEKS    INR Location Outside lab      Anticoagulation Summary  As of 3/8/2021    INR goal:  2.0-3.0   TTR:  74.8 % (1 y)   INR used for dosin.80 (3/8/2021)   Warfarin maintenance plan:  2.5 mg (5 mg x 0.5) every Wed; 5 mg (5 mg x 1) all other days   Full warfarin instructions:  2.5 mg every Wed; 5 mg all other days   Weekly warfarin total:  32.5 mg   No change documented:  Samantha Galeas RN   Plan last modified:  Samantha Richards RN (2021)   Next INR check:  3/29/2021   Priority:  Maintenance   Target end date:  Indefinite    Indications    Long term current use of anticoagulants with INR goal of 2.0-3.0 [Z79.01]  Chronic atrial fibrillation (H)  [I48.20]             Anticoagulation Episode Summary     INR check location:      Preferred lab:      Send INR reminders to:  KRISTINE CHAVES YESI    Comments:  Pt reports taking Warfarin in the morning, d/t COVID - Patient may extend INR interval up to 8 weeks, and +2 weeks with each subsequent INR in range up to 12 weeks max if no S/S bleeding, clotting, illness or changes in medications that affect warfarin l      Anticoagulation Care Providers     Provider Role Specialty Phone number    Malcolm Schafer MD Referring Internal Medicine [phone]            See the Encounter Report to view Anticoagulation Flowsheet and Dosing Calendar (Go to Encounters tab in chart review, and find the Anticoagulation Therapy Visit)    Dosage adjustment made based on physician directed care plan.    Samantha Galeas RN

## 2021-03-29 ENCOUNTER — ANTICOAGULATION THERAPY VISIT (OUTPATIENT)
Dept: ANTICOAGULATION | Facility: CLINIC | Age: 76
End: 2021-03-29

## 2021-03-29 DIAGNOSIS — I48.20 CHRONIC ATRIAL FIBRILLATION (H): ICD-10-CM

## 2021-03-29 DIAGNOSIS — Z79.01 LONG TERM CURRENT USE OF ANTICOAGULANTS WITH INR GOAL OF 2.0-3.0: ICD-10-CM

## 2021-03-29 LAB
CAPILLARY BLOOD COLLECTION: NORMAL
INR PPP: 2.6 (ref 0.86–1.14)

## 2021-03-29 PROCEDURE — 85610 PROTHROMBIN TIME: CPT | Performed by: INTERNAL MEDICINE

## 2021-03-29 PROCEDURE — 36416 COLLJ CAPILLARY BLOOD SPEC: CPT | Performed by: INTERNAL MEDICINE

## 2021-03-29 NOTE — PROGRESS NOTES
Patient left a VM returning a call.  Please call back.  Thank you.  Analia Talley, RN  Anticoagulation Nurse - [facility]

## 2021-03-29 NOTE — PROGRESS NOTES
Anticoagulation Management    Unable to reach Marek today.    Today's INR result of 2.6 is therapeutic (goal INR of 2.0-3.0).  Result received from: Clinic Lab    Follow up required to confirm warfarin dose taken and assess for changes    Left message to continue current dose of warfarin 5 mg tonight.      Anticoagulation clinic to follow up    Samantha Galeas RN

## 2021-03-29 NOTE — PROGRESS NOTES
Left message to call [phone]. Please transfer call to Samantha at [phone].  Samantha Galeas RN, BSN

## 2021-03-29 NOTE — PROGRESS NOTES
ANTICOAGULATION MANAGEMENT     Patient Name:  Billy Stock  Date:  3/29/2021    ASSESSMENT /SUBJECTIVE:    Today's INR result of 2.6 is therapeutic. Goal INR of 2.0-3.0      Warfarin dose taken: Warfarin taken as instructed    Diet: No new diet changes affecting INR    Medication changes/ interactions: No new medications/supplements affecting INR    Previous INR: Therapeutic     S/S of bleeding or thromboembolism: No    New injury or illness: No    Upcoming surgery, procedure or cardioversion: No    Additional findings: None      PLAN:    Telephone call with Billy regarding INR result and instructed:     Warfarin Dosing Instructions: Continue your current warfarin dose 2.5 mg Wed and 5 mg all other days of the week    Instructed patient to follow up no later than: 4 weeks  Lab visit scheduled    Education provided: Potential interaction between warfarin and alcohol, Target INR goal and significance of current INR result, Importance of therapeutic range, Importance of following up for INR monitoring at instructed interval and Importance of taking warfarin as instructed      Marek verbalizes understanding and agrees to warfarin dosing plan.    Instructed to call the Anticoagulation Clinic for any changes, questions or concerns. (#[phone])        Wilma Al RN      OBJECTIVE:  Recent labs: (last 7 days)     21  1302   INR 2.60*         No question data found.  Anticoagulation Summary  As of 3/29/2021    INR goal:  2.0-3.0   TTR:  74.8 % (1 y)   INR used for dosin.60 (3/29/2021)   Warfarin maintenance plan:  2.5 mg (5 mg x 0.5) every Wed; 5 mg (5 mg x 1) all other days   Full warfarin instructions:  2.5 mg every Wed; 5 mg all other days   Weekly warfarin total:  32.5 mg   Plan last modified:  Samantha Richards, RN (2021)   Next INR check:     Priority:  Maintenance   Target end date:  Indefinite    Indications    Long term current use of anticoagulants with INR goal of 2.0-3.0  [Z79.01]  Chronic atrial fibrillation (H) [I48.20]             Anticoagulation Episode Summary     INR check location:      Preferred lab:      Send INR reminders to:  KRISTINE KEY    Comments:  Pt reports taking Warfarin in the morning, d/t COVID - Patient may extend INR interval up to 8 weeks, and +2 weeks with each subsequent INR in range up to 12 weeks max if no S/S bleeding, clotting, illness or changes in medications that affect warfarin l      Anticoagulation Care Providers     Provider Role Specialty Phone number    Malcolm Schafer MD Referring Internal Medicine [phone]

## 2021-03-31 ENCOUNTER — TELEPHONE (OUTPATIENT)
Dept: OTHER | Facility: CLINIC | Age: 76
End: 2021-03-31

## 2021-03-31 DIAGNOSIS — Z79.01 LONG TERM CURRENT USE OF ANTICOAGULANTS WITH INR GOAL OF 2.0-3.0: ICD-10-CM

## 2021-03-31 DIAGNOSIS — I48.20 CHRONIC ATRIAL FIBRILLATION (H): ICD-10-CM

## 2021-04-01 RX ORDER — WARFARIN SODIUM 5 MG/1
TABLET ORAL
Qty: 86 TABLET | Refills: 1 | Status: SHIPPED | OUTPATIENT
Start: 2021-04-01 | End: 2021-09-29

## 2021-04-01 NOTE — TELEPHONE ENCOUNTER
Anticoagulation Monitoring Instructions   Latest Ref Rng & Units    3/29/2021 2.5 mg every Wed; 5 mg all other days   Prescription approved per [facility] Refill Protocol  Analia Talley, RN  Anticoagulation Nurse - [facility], [place]

## 2021-04-05 ENCOUNTER — VIRTUAL VISIT (OUTPATIENT)
Dept: UROLOGY | Facility: CLINIC | Age: 76
End: 2021-04-05
Payer: COMMERCIAL

## 2021-04-05 VITALS — BODY MASS INDEX: 30.09 KG/M2 | HEIGHT: 73 IN | WEIGHT: 227 LBS

## 2021-04-05 DIAGNOSIS — Z79.890 HISTORY OF ONGOING TREATMENT WITH HORMONAL THERAPY: ICD-10-CM

## 2021-04-05 DIAGNOSIS — R97.20 ELEVATED PROSTATE SPECIFIC ANTIGEN (PSA): ICD-10-CM

## 2021-04-05 DIAGNOSIS — Z92.3 HISTORY OF RADIATION THERAPY: ICD-10-CM

## 2021-04-05 DIAGNOSIS — Z90.79 HISTORY OF RADICAL PROSTATECTOMY: ICD-10-CM

## 2021-04-05 DIAGNOSIS — C61 PROSTATE CANCER (H): Primary | ICD-10-CM

## 2021-04-05 PROCEDURE — 99213 OFFICE O/P EST LOW 20 MIN: CPT | Mod: 95 | Performed by: UROLOGY

## 2021-04-05 ASSESSMENT — MIFFLIN-ST. JEOR: SCORE: 1823.55

## 2021-04-05 ASSESSMENT — PAIN SCALES - GENERAL: PAINLEVEL: MILD PAIN (2)

## 2021-04-05 NOTE — LETTER
4/5/2021       RE: Billy Stock  [address] [place] [address]     Dear Colleague,    Thank you for referring your patient, Billy Stock, to the [facility] at [facility]. Please see a copy of my visit note below.    *SEND LINK TO CELL PHONE*    Marek is a 75 year old who is being evaluated via a billable video visit.      How would you like to obtain your AVS? MyChart  If the video visit is dropped, the invitation should be resent by: Text to cell phone: [phone]  Will anyone else be joining your video visit? No      History: It is a great pleasure to see this very pleasant 75-year-old gentleman with the aid of a video consultation and follow-up today.  He was diagnosed with [place] 7 adenocarcinoma the prostate in 2002 and a radical prostatectomy was performed.  He had extensive disease in the prostate and positive surgical margins.  This was followed by adjuvant radiation therapy and then until 2008 regular Eligard 45 therapy.  Since then has been on intermittent hormone therapy.  He received a injection in 2016 and by March 2019 the PSA risen to 3.3 and 6 months ago therefore he received another Eligard injection.  The last PSA was done in September 2019 and was 0.12.  The pandemic is intervened.  He has had a PSA done in January of this year  The PSA then was 6.34.  It is now 2 years since the last leuprolide injection.  The patient has few symptoms at this time and feels well.  The patient has no other major complaints  Past Medical History:   Diagnosis Date     Bell's palsy 10/15/2002     CA IN SITU PROSTATE 10/15/2002     Chronic atrial fibrillation (H) 7/1/10     Glaucoma 4/10/2003     Problem list name updated by automated process. Provider to review     Gout 5/16/2013     Hyperlipidemia LDL goal <100 9/10/2014     Hypertension goal BP (blood pressure) < 140/90 6/28/2006     SARAH (obstructive sleep apnea) 8/28/2013      Parkinson disease (H) 2019     Pericarditis 2001     Renal cyst        Social History     Socioeconomic History     Marital status:      Spouse name: None     Number of children: 3     Years of education: None     Highest education level: None   Occupational History     None   Social Needs     Financial resource strain: None     Food insecurity     Worry: None     Inability: None     Transportation needs     Medical: None     Non-medical: None   Tobacco Use     Smoking status: Never Smoker     Smokeless tobacco: Never Used   Substance and Sexual Activity     Alcohol use: No     Alcohol/week: 0.0 standard drinks     Drug use: No     Sexual activity: Never   Lifestyle     Physical activity     Days per week: None     Minutes per session: None     Stress: None   Relationships     Social connections     Talks on phone: None     Gets together: None     Attends [employer] service: None     Active member of club or organization: None     Attends meetings of clubs or organizations: None     Relationship status: None     Intimate partner violence     Fear of current or ex partner: None     Emotionally abused: None     Physically abused: None     Forced sexual activity: None   Other Topics Concern     Parent/sibling w/ CABG, MI or angioplasty before 65F 55M? Not Asked      Service Not Asked     Blood Transfusions Not Asked     Caffeine Concern No     Occupational Exposure No     Hobby Hazards No     Sleep Concern Yes     Comment: CPAP at night     Stress Concern No     Weight Concern No     Special Diet No     Back Care No     Exercise No     Bike Helmet Not Asked     Seat Belt Yes     Self-Exams Not Asked   Social History Narrative     None       Past Surgical History:   Procedure Laterality Date     CATARACT IOL, RT/LT  10/15, 11/15     COLONOSCOPY  2009    [facility]     COLONOSCOPY  9/30/2014    Dr. Long [facility]     COLONOSCOPY N/A 9/30/2014    Procedure: COMBINED COLONOSCOPY, SINGLE BIOPSY/POLYPECTOMY BY  BIOPSY;  Surgeon: Puneet Long MD;  Location:  GI     EYE SURGERY  2007    glaucoma surgery right eye     HERNIA REPAIR       PROSTATE SURGERY       SUPRAPUBIC PROSTATECTOMY  2002     VASECTOMY         Family History   Problem Relation Age of Onset     Hypertension Maternal Grandfather      Cerebrovascular Disease Maternal Grandfather      Hypertension Maternal Grandmother      Cerebrovascular Disease Maternal Grandmother      Hypertension Mother          age 51, MVA     Hypertension Brother      Heart Failure Brother      Bronchitis Brother      Other - See Comments Brother         multiple myeloma     Heart Surgery Brother         defibrilater     Prostate Problems Brother      Diabetes Brother      Other Cancer Brother         Multiple Myeloma     Breast Cancer Maternal Aunt         hx     Cancer Maternal Aunt         cervical cancer     Cancer - colorectal Maternal Aunt      Unknown/Adopted Father         Don't know father's history     Diabetes Son      Colon Cancer Other          Current Outpatient Medications:      acetaminophen (TYLENOL) 325 MG tablet, Take 1-2 tablets by mouth every 6 hours as needed., Disp: , Rfl:      allopurinol (ZYLOPRIM) 100 MG tablet, Take 1 tablet (100 mg) by mouth daily, Disp: 90 tablet, Rfl: 3     amLODIPine (NORVASC) 10 MG tablet, Take 1 tablet (10 mg) by mouth daily, Disp: 90 tablet, Rfl: 3     carbidopa-levodopa (SINEMET)  MG tablet, Take 1 tablet by mouth 3 times daily, Disp: , Rfl:      ipratropium (ATROVENT) 0.03 % nasal spray, Spray 2 sprays into both nostrils every 12 hours, Disp: 1 Box, Rfl: 3     lisinopril (ZESTRIL) 40 MG tablet, Take 1 tablet (40 mg) by mouth daily, Disp: 90 tablet, Rfl: 3     Loratadine (CLARITIN PO), Take by mouth daily, Disp: , Rfl:      metoprolol succinate ER (TOPROL-XL) 50 MG 24 hr tablet, Take 1.5 tablets (75 mg) by mouth daily, Disp: 135 tablet, Rfl: 3     UNABLE TO FIND, MEDICATION NAME: Tabavit, multivitmain without vitamin K  "in it., Disp: , Rfl:      warfarin ANTICOAGULANT (COUMADIN) 5 MG tablet, TAKE 1 TABLET EVERY DAY EXCEPT TAKE 1/2 TABLET ON WEDNESDAY OR AS INSTRUCTED BY INR CLINIC, Disp: 86 tablet, Rfl: 1     triamcinolone (KENALOG) 0.5 % cream, Apply sparingly to affected area three times daily. (Patient not taking: Reported on 2/25/2021), Disp: 30 g, Rfl: 0    10 point ROS of systems including Constitutional, Eyes, Respiratory, Cardiovascular, Gastroenterology, Genitourinary, Integumentary, Muscularskeletal, Psychiatric and Neurologic were all negative except for pertinent positives noted in my HPI.    Examination:   Ht 1.854 m (6' 1\")   Wt 103 kg (227 lb)   BMI 29.95 kg/m    General Impression: Very pleasant patient in no acute distress, well-oriented in time place and person and quite conversational  Mental Status: Normal  HEENT: Extraocular movements intact.  No clinical evidence of jaundice on examination of eyes.  Mucous membranes are unremarkable  Skin: No other visible abnormalities  Respiratory System: Unlabored on room air.  Respiratory cycle normal  Lymph Nodes: Not examined  Back/Flank Tenderness: Not examined  Cardiovascular System: Not examined  Abdominal Examination: Not examined  Extremities: Not examined  Genitial: Not examined  Rectal Examination: Not examined  Neurologic System: There are no visible significant acute abnormal neurological signs in the central or peripheral nervous systems    Impression: The PSA has risen significantly again to 6.34 from 0.12 about 18 months ago.  In my opinion he should have another leuprolide injection at this time.  We did have a discussion about intermittent hormone treatment but we both agreed that this seems the very best trigger him at the present time as it seems to work well in the past.  If, in the future, he developed resistance to leuprolide we may have to consider adding bicalutamide to his treatment or conceivably also arranging for consultation with a medical " oncologist.  We will arrange for leuprolide 45 to be given the very near future and then repeating PSA and examination in about 6 months.  I am hopeful that he may not need another leuprolide injection for another 2 years but we will have to see how this develops.  I have explained to the patient in addition that I am retiring in 3 months time, and that I have entrusting his follow-up to my colleagues who of outstanding quality and I am assured him that he will be in extremely good hands.  I went over the entire situation with the patient in detail today.  I addressed and answered all his questions    Plan: We will arrange for PSA immediately before his next leuprolide injection in the very near future with leuprolide 45.  We will then see him for PSA and examination 6 months after that injection    Video time with review of records etc. no other issues.  15 minutes      Hoang Huerta MD

## 2021-04-05 NOTE — PROGRESS NOTES
*SEND LINK TO CELL PHONE*    Marek is a 75 year old who is being evaluated via a billable video visit.      How would you like to obtain your AVS? [id number]  If the video visit is dropped, the invitation should be resent by: Text to cell phone: [phone]  Will anyone else be joining your video visit? No      History: It is a great pleasure to see this very pleasant 75-year-old gentleman with the aid of a video consultation and follow-up today.  He was diagnosed with [place] 7 adenocarcinoma the prostate in 2002 and a radical prostatectomy was performed.  He had extensive disease in the prostate and positive surgical margins.  This was followed by adjuvant radiation therapy and then until 2008 regular Eligard 45 therapy.  Since then has been on intermittent hormone therapy.  He received a injection in 2016 and by March 2019 the PSA risen to 3.3 and 6 months ago therefore he received another Eligard injection.  The last PSA was done in September 2019 and was 0.12.  The pandemic is intervened.  He has had a PSA done in January of this year  The PSA then was 6.34.  It is now 2 years since the last leuprolide injection.  The patient has few symptoms at this time and feels well.  The patient has no other major complaints  Past Medical History:   Diagnosis Date     Bell's palsy 10/15/2002     CA IN SITU PROSTATE 10/15/2002     Chronic atrial fibrillation (H) 7/1/10     Glaucoma 4/10/2003     Problem list name updated by automated process. Provider to review     Gout 5/16/2013     Hyperlipidemia LDL goal <100 9/10/2014     Hypertension goal BP (blood pressure) < 140/90 6/28/2006     SARAH (obstructive sleep apnea) 8/28/2013     Parkinson disease (H) 2019     Pericarditis 2001     Renal cyst        Social History     Socioeconomic History     Marital status:      Spouse name: None     Number of children: 3     Years of education: None     Highest education level: None   Occupational History     None   Social Needs      Financial resource strain: None     Food insecurity     Worry: None     Inability: None     Transportation needs     Medical: None     Non-medical: None   Tobacco Use     Smoking status: Never Smoker     Smokeless tobacco: Never Used   Substance and Sexual Activity     Alcohol use: No     Alcohol/week: 0.0 standard drinks     Drug use: No     Sexual activity: Never   Lifestyle     Physical activity     Days per week: None     Minutes per session: None     Stress: None   Relationships     Social connections     Talks on phone: None     Gets together: None     Attends [employer] service: None     Active member of club or organization: None     Attends meetings of clubs or organizations: None     Relationship status: None     Intimate partner violence     Fear of current or ex partner: None     Emotionally abused: None     Physically abused: None     Forced sexual activity: None   Other Topics Concern     Parent/sibling w/ CABG, MI or angioplasty before 65F 55M? Not Asked      Service Not Asked     Blood Transfusions Not Asked     Caffeine Concern No     Occupational Exposure No     Hobby Hazards No     Sleep Concern Yes     Comment: CPAP at night     Stress Concern No     Weight Concern No     Special Diet No     Back Care No     Exercise No     Bike Helmet Not Asked     Seat Belt Yes     Self-Exams Not Asked   Social History Narrative     None       Past Surgical History:   Procedure Laterality Date     CATARACT IOL, RT/LT  10/15, 11/15     COLONOSCOPY  2009    [facility]     COLONOSCOPY  9/30/2014    Dr. Long [facility]     COLONOSCOPY N/A 9/30/2014    Procedure: COMBINED COLONOSCOPY, SINGLE BIOPSY/POLYPECTOMY BY BIOPSY;  Surgeon: Puneet Long MD;  Location:  GI     EYE SURGERY  2007    glaucoma surgery right eye     HERNIA REPAIR       PROSTATE SURGERY       SUPRAPUBIC PROSTATECTOMY  2002     VASECTOMY         Family History   Problem Relation Age of Onset     Hypertension Maternal Grandfather       Cerebrovascular Disease Maternal Grandfather      Hypertension Maternal Grandmother      Cerebrovascular Disease Maternal Grandmother      Hypertension Mother          age 51, MVA     Hypertension Brother      Heart Failure Brother      Bronchitis Brother      Other - See Comments Brother         multiple myeloma     Heart Surgery Brother         defibrilater     Prostate Problems Brother      Diabetes Brother      Other Cancer Brother         Multiple Myeloma     Breast Cancer Maternal Aunt         hx     Cancer Maternal Aunt         cervical cancer     Cancer - colorectal Maternal Aunt      Unknown/Adopted Father         Don't know father's history     Diabetes Son      Colon Cancer Other          Current Outpatient Medications:      acetaminophen (TYLENOL) 325 MG tablet, Take 1-2 tablets by mouth every 6 hours as needed., Disp: , Rfl:      allopurinol (ZYLOPRIM) 100 MG tablet, Take 1 tablet (100 mg) by mouth daily, Disp: 90 tablet, Rfl: 3     amLODIPine (NORVASC) 10 MG tablet, Take 1 tablet (10 mg) by mouth daily, Disp: 90 tablet, Rfl: 3     carbidopa-levodopa (SINEMET)  MG tablet, Take 1 tablet by mouth 3 times daily, Disp: , Rfl:      ipratropium (ATROVENT) 0.03 % nasal spray, Spray 2 sprays into both nostrils every 12 hours, Disp: 1 Box, Rfl: 3     lisinopril (ZESTRIL) 40 MG tablet, Take 1 tablet (40 mg) by mouth daily, Disp: 90 tablet, Rfl: 3     Loratadine (CLARITIN PO), Take by mouth daily, Disp: , Rfl:      metoprolol succinate ER (TOPROL-XL) 50 MG 24 hr tablet, Take 1.5 tablets (75 mg) by mouth daily, Disp: 135 tablet, Rfl: 3     UNABLE TO FIND, MEDICATION NAME: Tabavit, multivitmain without vitamin K in it., Disp: , Rfl:      warfarin ANTICOAGULANT (COUMADIN) 5 MG tablet, TAKE 1 TABLET EVERY DAY EXCEPT TAKE 1/2 TABLET ON WEDNESDAY OR AS INSTRUCTED BY INR CLINIC, Disp: 86 tablet, Rfl: 1     triamcinolone (KENALOG) 0.5 % cream, Apply sparingly to affected area three times daily. (Patient not  "taking: Reported on 2/25/2021), Disp: 30 g, Rfl: 0    10 point ROS of systems including Constitutional, Eyes, Respiratory, Cardiovascular, Gastroenterology, Genitourinary, Integumentary, Muscularskeletal, Psychiatric and Neurologic were all negative except for pertinent positives noted in my HPI.    Examination:   Ht 1.854 m (6' 1\")   Wt 103 kg (227 lb)   BMI 29.95 kg/m    General Impression: Very pleasant patient in no acute distress, well-oriented in time place and person and quite conversational  Mental Status: Normal  HEENT: Extraocular movements intact.  No clinical evidence of jaundice on examination of eyes.  Mucous membranes are unremarkable  Skin: No other visible abnormalities  Respiratory System: Unlabored on room air.  Respiratory cycle normal  Lymph Nodes: Not examined  Back/Flank Tenderness: Not examined  Cardiovascular System: Not examined  Abdominal Examination: Not examined  Extremities: Not examined  Genitial: Not examined  Rectal Examination: Not examined  Neurologic System: There are no visible significant acute abnormal neurological signs in the central or peripheral nervous systems    Impression: The PSA has risen significantly again to 6.34 from 0.12 about 18 months ago.  In my opinion he should have another leuprolide injection at this time.  We did have a discussion about intermittent hormone treatment but we both agreed that this seems the very best trigger him at the present time as it seems to work well in the past.  If, in the future, he developed resistance to leuprolide we may have to consider adding bicalutamide to his treatment or conceivably also arranging for consultation with a medical oncologist.  We will arrange for leuprolide 45 to be given the very near future and then repeating PSA and examination in about 6 months.  I am hopeful that he may not need another leuprolide injection for another 2 years but we will have to see how this develops.  I have explained to the patient " in addition that I am retiring in 3 months time, and that I have entrusting his follow-up to my colleagues who of outstanding quality and I am assured him that he will be in extremely good hands.  I went over the entire situation with the patient in detail today.  I addressed and answered all his questions    Plan: We will arrange for PSA immediately before his next leuprolide injection in the very near future with leuprolide 45.  We will then see him for PSA and examination 6 months after that injection    Video time with review of records etc. no other issues.  15 minutes

## 2021-04-07 ENCOUNTER — TELEPHONE (OUTPATIENT)
Dept: UROLOGY | Facility: CLINIC | Age: 76
End: 2021-04-07

## 2021-04-07 NOTE — TELEPHONE ENCOUNTER
[id number]  [facility]    Phone Message    May a detailed message be left on voicemail: yes     Reason for Call: Other: Pt calling to schedule his leuprolide injection- also he will need to schedule a PSA appointment follow-up 6 months from now (will be a former pt of Dr. Huerta). Please contact pt to discuss at [phone]. Thank you.     Action Taken: Other: uro phys sched pool    Travel Screening: Not Applicable

## 2021-04-08 DIAGNOSIS — C61 PROSTATE CANCER (H): Primary | ICD-10-CM

## 2021-04-08 DIAGNOSIS — C61 PROSTATE CANCER (H): ICD-10-CM

## 2021-04-08 LAB — PSA SERPL-MCNC: 7.2 NG/ML (ref 0–4)

## 2021-04-08 PROCEDURE — 84153 ASSAY OF PSA TOTAL: CPT | Performed by: UROLOGY

## 2021-04-12 ENCOUNTER — ALLIED HEALTH/NURSE VISIT (OUTPATIENT)
Dept: UROLOGY | Facility: CLINIC | Age: 76
End: 2021-04-12
Payer: COMMERCIAL

## 2021-04-12 DIAGNOSIS — C61 PROSTATE CANCER (H): Primary | ICD-10-CM

## 2021-04-12 PROCEDURE — 96402 CHEMO HORMON ANTINEOPL SQ/IM: CPT | Performed by: UROLOGY

## 2021-04-12 NOTE — PROGRESS NOTES
Billy Stock comes into clinic today at the request of  Ordering Provider for lupron injection.  This service provided today was under the supervising provider of the day , who was available if needed.  The following medication was given:     MEDICATION: Lupron [place] 45 mg  ROUTE: IM  SITE: RUQ - Gluteus  DOSE: 45 mg  LOT #: [id number]   alike  EXPIRATION DATE:  9-1-2023  Gina Norman LPN

## 2021-04-26 ENCOUNTER — ANTICOAGULATION THERAPY VISIT (OUTPATIENT)
Dept: ANTICOAGULATION | Facility: CLINIC | Age: 76
End: 2021-04-26

## 2021-04-26 DIAGNOSIS — I48.20 CHRONIC ATRIAL FIBRILLATION (H): ICD-10-CM

## 2021-04-26 DIAGNOSIS — Z79.01 LONG TERM CURRENT USE OF ANTICOAGULANTS WITH INR GOAL OF 2.0-3.0: ICD-10-CM

## 2021-04-26 LAB
CAPILLARY BLOOD COLLECTION: NORMAL
INR PPP: 2.2 (ref 0.86–1.14)

## 2021-04-26 PROCEDURE — 85610 PROTHROMBIN TIME: CPT | Performed by: INTERNAL MEDICINE

## 2021-04-26 PROCEDURE — 99207 PR NO CHARGE NURSE ONLY: CPT

## 2021-04-26 PROCEDURE — 36416 COLLJ CAPILLARY BLOOD SPEC: CPT | Performed by: INTERNAL MEDICINE

## 2021-04-26 NOTE — PROGRESS NOTES
ANTICOAGULATION FOLLOW-UP CLINIC VISIT    Patient Name:  Billy Stock  Date:  2021  Contact Type:  Telephone/ Called patient, he denies any changes. Orders discussed with the patient, he agrees with the plan. Lab INR appointment scheduled on 21.    SUBJECTIVE:  Patient Findings     Comments:  The patient was assessed for diet, medication, and activity level changes, missed or extra doses, bruising or bleeding, with no problem findings. Maintenance warfarin dosing was reviewed with patient and will remain on the same dose until next INR check. Patient did not have any questions or concerns.           Clinical Outcomes     Negatives:  Major bleeding event, Thromboembolic event, Anticoagulation-related hospital admission, Anticoagulation-related ED visit, Anticoagulation-related fatality    Comments:  The patient was assessed for diet, medication, and activity level changes, missed or extra doses, bruising or bleeding, with no problem findings. Maintenance warfarin dosing was reviewed with patient and will remain on the same dose until next INR check. Patient did not have any questions or concerns.              OBJECTIVE    Recent labs: (last 7 days)     21  1343   INR 2.20*       ASSESSMENT / PLAN  INR assessment THER    Recheck INR In: 4 WEEKS    INR Location Outside lab      Anticoagulation Summary  As of 2021    INR goal:  2.0-3.0   TTR:  74.8 % (1 y)   INR used for dosin.20 (2021)   Warfarin maintenance plan:  2.5 mg (5 mg x 0.5) every Wed; 5 mg (5 mg x 1) all other days   Full warfarin instructions:  2.5 mg every Wed; 5 mg all other days   Weekly warfarin total:  32.5 mg   No change documented:  Samantha Galeas RN   Plan last modified:  Samantha Richards RN (2021)   Next INR check:  2021   Priority:  Maintenance   Target end date:  Indefinite    Indications    Long term current use of anticoagulants with INR goal of 2.0-3.0 [Z79.01]  Chronic atrial fibrillation (H)  [I48.20]             Anticoagulation Episode Summary     INR check location:      Preferred lab:      Send INR reminders to:  KRISTINE CHAVES YESI    Comments:  Pt reports taking Warfarin in the morning, d/t COVID - Patient may extend INR interval up to 8 weeks, and +2 weeks with each subsequent INR in range up to 12 weeks max if no S/S bleeding, clotting, illness or changes in medications that affect warfarin l      Anticoagulation Care Providers     Provider Role Specialty Phone number    Malcolm Schafer MD Referring Internal Medicine [phone]            See the Encounter Report to view Anticoagulation Flowsheet and Dosing Calendar (Go to Encounters tab in chart review, and find the Anticoagulation Therapy Visit)    Dosage adjustment made based on physician directed care plan.    Samantha Galeas RN

## 2021-04-26 NOTE — PROGRESS NOTES
Left message to call [phone]. Please transfer call to Samantha at [phone].      Anticoagulation Management    Unable to reach Marek today.    Today's INR result of 2.2 is therapeutic (goal INR of 2.0-3.0).  Result received from: Clinic Lab    Follow up required to confirm warfarin dose taken and assess for changes    Left message to continue current dose of warfarin 5 mg tonight.      Anticoagulation clinic to follow up    Samantha Galeas RN

## 2021-05-16 ENCOUNTER — APPOINTMENT (OUTPATIENT)
Dept: CT IMAGING | Facility: CLINIC | Age: 76
End: 2021-05-16
Attending: EMERGENCY MEDICINE
Payer: COMMERCIAL

## 2021-05-16 ENCOUNTER — HOSPITAL ENCOUNTER (EMERGENCY)
Facility: CLINIC | Age: 76
Discharge: HOME | End: 2021-05-16
Attending: EMERGENCY MEDICINE | Admitting: EMERGENCY MEDICINE
Payer: COMMERCIAL

## 2021-05-16 ENCOUNTER — APPOINTMENT (OUTPATIENT)
Dept: GENERAL RADIOLOGY | Facility: CLINIC | Age: 76
End: 2021-05-16
Attending: EMERGENCY MEDICINE
Payer: COMMERCIAL

## 2021-05-16 VITALS
OXYGEN SATURATION: 100 % | WEIGHT: 225 LBS | HEIGHT: 73 IN | SYSTOLIC BLOOD PRESSURE: 155 MMHG | TEMPERATURE: 97.9 F | HEART RATE: 63 BPM | RESPIRATION RATE: 14 BRPM | DIASTOLIC BLOOD PRESSURE: 90 MMHG | BODY MASS INDEX: 29.82 KG/M2

## 2021-05-16 DIAGNOSIS — R79.1 SUBTHERAPEUTIC INTERNATIONAL NORMALIZED RATIO (INR): ICD-10-CM
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
## 2021-05-16 DIAGNOSIS — S02.2XXA CLOSED FRACTURE OF NASAL BONE, INITIAL ENCOUNTER: ICD-10-CM

## 2021-05-16 LAB — INR PPP: 1.76 (ref 0.86–1.14)

## 2021-05-16 PROCEDURE — 250N000013 HC RX MED GY IP 250 OP 250 PS 637: Performed by: EMERGENCY MEDICINE

## 2021-05-16 PROCEDURE — 73562 X-RAY EXAM OF KNEE 3: CPT | Mod: 50

## 2021-05-16 PROCEDURE — 85610 PROTHROMBIN TIME: CPT | Performed by: EMERGENCY MEDICINE

## 2021-05-16 PROCEDURE — 99285 EMERGENCY DEPT VISIT HI MDM: CPT | Mod: 25

## 2021-05-16 PROCEDURE — 36415 COLL VENOUS BLD VENIPUNCTURE: CPT | Performed by: EMERGENCY MEDICINE

## 2021-05-16 PROCEDURE — 70450 CT HEAD/BRAIN W/O DYE: CPT

## 2021-05-16 PROCEDURE — 70486 CT MAXILLOFACIAL W/O DYE: CPT

## 2021-05-16 RX ORDER — ACETAMINOPHEN 325 MG/1
650 TABLET ORAL ONCE
Status: COMPLETED | OUTPATIENT
Start: 2021-05-16 | End: 2021-05-16

## 2021-05-16 RX ADMIN — ACETAMINOPHEN 650 MG: 325 TABLET, FILM COATED ORAL at 15:56

## 2021-05-16 ASSESSMENT — ENCOUNTER SYMPTOMS: WOUND: 1

## 2021-05-16 ASSESSMENT — MIFFLIN-ST. JEOR: SCORE: 1814.47

## 2021-05-16 NOTE — ED TRIAGE NOTES
75 year old male presents after a fall. Patient's dog was on her leash and wrapped his legs up, causing him to fall. Patient fell onto his knees and then hit his head. Multiple abrasions on patient's nose, bleeding controlled. Patient is on blood thinners. No loss of consciousness. GCS 15.

## 2021-05-16 NOTE — DISCHARGE INSTRUCTIONS
What do you do next:   Continue your home medications unless we have specifically changed them  Continue to use bacitracin and a bandage over the abrasions on your nose.  Try to position your CPAP in a way that causes the least discomfort at night.  Follow up as indicated below    When do you return: If you have uncontrollable pain, lightheadedness or persistent falls, or any other symptoms that concern you, please return to the ED for reevaluation.    Thank you for allowing us to care for you today.

## 2021-05-16 NOTE — ED PROVIDER NOTES
History   Chief Complaint:  Fall       The history is provided by the patient.      Billy Stock is a 75 year old male anticoagulated on Coumadin with history of chronic atrial fibrillation, hyperlipidemia, and hypertension who presents with a fall. The patient reports that prior to arrival his dog wrapped its leash around the patient's legs which caused him to trip and fall onto his knees and hit his head on the ground. He now has multiple abrasions to the nose but has controlled the bleeding. He also has bilateral knee pain with movement and palpation, right worse than left. No loss of consciousness, dental problems, facial pain, or pain/injuries anywhere else.    Review of Systems   HENT: Negative for dental problem.    Musculoskeletal:        Positive for bilateral knee pain   Skin: Positive for wound (facial abrasions).   Neurological: Negative for syncope.   All other systems reviewed and are negative.    Allergies:  Codeine   Morphine    Medications:  Norvasc  Sinemet  Zestril   Toprol XL  Coumadin   Lisinopril  Allopurinol   Lopressor     Past Medical History:    Bell's palsy  Prostate cancer  Chronic atrial fibrillation  Glaucoma  Gout  Hyperlipidemia  Hypertension   Obstructive sleep apnea   Parkinson disease  Pericarditis    Renal cyst  Chronic kidney disease stage 3  Psoas hematoma     Past Surgical History:    Bilateral cataract removal   Glaucoma right eye   Hernia repair   Prostatectomy   Vasectomy      Family History:    Hypertension  Cerebrovascular disease  Heart failure: brother  Multiple myeloma   Diabetes  Breast cancer   Cervical cancer  Colorectal cancer  Diabetes     Social History:  Presents to the ED with his ex-wife    Physical Exam     Patient Vitals for the past 24 hrs:   BP Temp Temp src Pulse Resp SpO2 Height Weight   05/16/21 1730 -- -- -- -- -- 100 % -- --   05/16/21 1715 -- -- -- -- -- 98 % -- --   05/16/21 1700 (!) 170/96 -- -- -- -- 100 % -- --   05/16/21 1526 (!) 175/122  "97.9  F (36.6  C) Temporal 73 14 99 % 1.854 m (6' 1\") 102.1 kg (225 lb)       Physical Exam    Constitutional: Vital signs reviewed as above.   Head:  Head is without raccoon's eyes and without Alejandro's sign. No external signs of basilar skull fracture. No signs of facial bone instability.  Eyes: Conjunctivae and EOM are normal. Pupils are equal, round, and reactive to light.  ENT:  Right Ear: External ear and ear canal normal. No lacerations. No mastoid tenderness. No hemotympanum.   Left Ear: External ear and ear canal normal. No lacerations. No mastoid tenderness. No hemotympanum.   Nose: No nose lacerations, nasal deformity, septal deviation or nasal septal hematoma noted. No epistaxis. There are abrasions on the nose  Mouth/Throat: Uvula is midline, oropharynx is clear and moist and mucous membranes are normal.   Neck: Normal range of motion and full passive range of motion without pain. Neck supple. No spinous process tenderness present. No tracheal deviation present. No maxillary or mandibular tenderness  Cardiovascular: Normal rate, irregular rhythm, S1 normal, S2 normal and normal pulses.   Pulmonary/Chest: Effort normal and breath sounds normal. No accessory muscle usage. No respiratory distress. Patient has no decreased breath sounds. Patient has no wheezes. Patient has no rhonchi. Patient has no rales. Patient exhibits no bony tenderness and no retraction.   Abdominal: Soft. Normal appearance and bowel sounds are normal. Patient exhibits no distension. There is no tenderness. There is no rebound.   Musculoskeletal/Extremities:        Back:   Cervical back: Normal. No midline TTP.    Thoracic back: Normal. No midline TTP.   Lumbar back: Normal.  No midline TTP.       Pelvis:   No ASIS tenderness to palpation   No pelvic pain or instability to compression       Extremities:   MAEx4.  There is notable tenderness on the left patella.  There is some slight tenderness at the proximal left tibia.  Minimal " discomfort on the right patella.  No focal medial or lateral knee tenderness on either knee.   Neurological: Patient is alert and oriented to person, place, and time.  There are tremors (slight) in all 4 extremities.  The patient has a narrow based gait but it does not seem grossly antalgic.  Skin: Abrasions noted on the nose.      Emergency Department Course   Imaging:  Head CT without contrast:  IMPRESSION: Diffuse cerebral volume loss and cerebral white matter  changes consistent with chronic small vessel ischemic disease. No  evidence for acute intracranial pathology.  Report per radiology.     CT Facial Bones without contrast:  IMPRESSION: There are nondisplaced, mildly angulated fractures of the  nasal bones bilaterally with generalized flattening of the nasal  bridge. No other facial bone fractures. The paranasal sinuses are well  aerated. Temporal mandibular joint alignment bilaterally is normal.  The orbits and globes bilaterally are unremarkable. There is soft  tissue swelling of the midline forehead and bridge of the nose.  Reading per radiology.    Knee XR, 3 views, Bilateral:   IMPRESSION:   RIGHT KNEE: Mild medial compartment narrowing. No fracture or joint effusion. Prominent spur arising from the superior pole of the patella. Mild medial compartment narrowing.     LEFT KNEE: No fracture or joint effusion. Mild medial compartment narrowing. Chondrocalcinosis in the medial and lateral compartments. Small spur arising from the superior pole of the patella.  Reading per radiology.     Laboratory:  INR: 1.76 (H)    Emergency Department Course:    Reviewed:  I reviewed nursing notes, vitals, past medical history and care everywhere    Assessments/Consults:  1537 I obtained history and examined the patient as noted above.   ED Course as of May 16 1829   Sun May 16, 2021   1732 Rechecked and updated the patient      1829 recheck            Interventions:  1556 Tylenol 650 mg tablet PO  Medications    acetaminophen (TYLENOL) tablet 650 mg (650 mg Oral Given 5/16/21 4718)       Disposition:  The patient was discharged to home.       Impression & Plan   CMS Diagnoses: None    Medical Decision Making:  This 75-year-old male patient presents to the ED after a fall.  Please see the HPI and exam for specifics.  The patient remained stable in the ED.  Imaging results are noted above.  Fortunately there is no evidence of intracranial injury or hemorrhage.  There are, however, nondisplaced mildly angulated nasal bone fractures bilaterally.  The patient states that he sleeps with a CPAP but I still think it is reasonable to use this and perhaps he can move the top portion of the mask up slightly to avoid his nasal bridge.  I think he should also follow-up with ENT and primary care as well.  At this time, the patient and his wife are comfortable with discharge.  Anticipatory guidance given prior to discharge.    Diagnosis:    ICD-10-CM    1. Fall, initial encounter  W19.XXXA    2. Subtherapeutic international normalized ratio (INR)  R79.1    3. Closed fracture of nasal bone, initial encounter  S02.2XXA        Discharge Medications:  New Prescriptions    No medications on file       Scribe Disclosure:  I, Newton Mckinnon, am serving as a scribe at 3:37 PM on 5/16/2021 to document services personally performed by Travis Tilley DO based on my observations and the provider's statements to me.              Travis Tilley DO  05/16/21 0519

## 2021-05-17 ENCOUNTER — TELEPHONE (OUTPATIENT)
Dept: INTERNAL MEDICINE | Facility: CLINIC | Age: 76
End: 2021-05-17

## 2021-05-17 DIAGNOSIS — I48.20 CHRONIC ATRIAL FIBRILLATION (H): ICD-10-CM

## 2021-05-17 DIAGNOSIS — Z79.01 LONG TERM CURRENT USE OF ANTICOAGULANTS WITH INR GOAL OF 2.0-3.0: ICD-10-CM

## 2021-05-17 NOTE — TELEPHONE ENCOUNTER
ANTICOAGULATION  MANAGEMENT: Discharge Review    Billy Stock chart reviewed for anticoagulation continuity of care    Emergency room visit on 05/16/21 for mechanical fall resulting in facial abrasion and bilateral knee pain.  All x-rays and CT scans were normal.    Discharge disposition: Home    Results:    Recent labs: (last 7 days)     05/16/21  1601   INR 1.76*     Anticoagulation inpatient management:     not applicable     Anticoagulation discharge instructions:     Warfarin dosing: home regimen continued   Bridging: No   INR goal change: No      Medication changes affecting anticoagulation: Yes, Tylenol 650mg given x 1 at ER    Additional factors affecting anticoagulation: Yes: pain, inflammation, sub-therapeutic INR at ER.    Plan     Recommend to check INR on 05/20/21-patient has office visit then.    Unable to reach patient x 2, left VM to inform patient to have INR checked after his office visit on 05/20/21--note placed on appt.    Anticoagulation Calendar updated    Samantha Richards RN

## 2021-05-18 NOTE — TELEPHONE ENCOUNTER
Patient called back, he states his knees are tender to the touch and notices a bruise on his L knee. Patient has a bandage on his nose and is scheduled to see ENT on 05/20/21. Patient states he is taking Tylenol, prn pain.  Patient is not blowing his nose, just cleaning out his nostrils. On 05/17, patient  did clean out bloody nasal discharge, today discharge is clear.  Patient will continue on same weekly warfarin dose and will go to lab on 05/20/21 for INR check after his appt. With provider.    Samantha Richards RN

## 2021-05-18 NOTE — MR AVS SNAPSHOT
Billy Moore   11/8/2018 10:00 AM   Anticoagulation Therapy Visit    Description:  73 year old male   Provider:  [facility]   Department:  [facility]           INR as of 11/8/2018     Today's INR 2.8      Anticoagulation Summary as of 11/8/2018     INR goal 2.0-3.0   Today's INR 2.8   Full warfarin instructions 2.5 mg on Wed; 5 mg all other days   Next INR check 12/6/2018    Indications   Long term current use of anticoagulants with INR goal of 2.0-3.0 [Z79.01]  Chronic atrial fibrillation (H) [I48.2]         Your next Anticoagulation Clinic appointment(s)     Dec 06, 2018 10:00 AM CST   [facility]    [address] [facility] [address]  [facility] [address] [phone]              Contact Numbers     [facility] Phone Numbers:  Anticoagulation Clinic Appointments : [phone]  Anticoagulation Nurse: [phone]         November 2018 Details    Sun Mon Tue Wed Thu Fri Sat         1               2               3                 4               5               6               7               8      5 mg   See details      9      5 mg         10      5 mg           11      5 mg         12      5 mg         13      5 mg         14      2.5 mg         15      5 mg         16      5 mg         17      5 mg           18      5 mg         19      5 mg         20      5 mg         21      2.5 mg         22      5 mg         23      5 mg         24      5 mg           25      5 mg         26      5 mg         27      5 mg         28      2.5 mg         29      5 mg         30      5 mg           Date Details   11/08 This INR check               How to take your warfarin dose     To take:  2.5 mg Take 0.5 of a 5 mg tablet.    To take:  5 mg Take 1 of the 5 mg tablets.           December 2018 Details    Sun Mon Tue Wed Thu Fri Sat           1      5 mg           2      5 mg         3     ----- Message from Beryle Douglas sent at 5/18/2021  4:29 PM EDT -----  Regarding: /telephone  Contact: [phone]  General Message/Vendor Calls    Caller's first and last name: Ayanna Mejias      Reason for call: She would like a call back regarding the My chart message that was sent yesterday with no response.        Callback required yes/no and why: Yes      Best contact number(s): [phone]      Details to clarify the request: N/A      Beryle Douglas   5 mg         4      5 mg         5      2.5 mg         6            7               8                 9               10               11               12               13               14               15                 16               17               18               19               20               21               22                 23               24               25               26               27               28               29                 30               31                     Date Details   No additional details    Date of next INR:  12/6/2018         How to take your warfarin dose     To take:  2.5 mg Take 0.5 of a 5 mg tablet.    To take:  5 mg Take 1 of the 5 mg tablets.

## 2021-05-20 ENCOUNTER — TRANSFERRED RECORDS (OUTPATIENT)
Dept: HEALTH INFORMATION MANAGEMENT | Facility: CLINIC | Age: 76
End: 2021-05-20

## 2021-05-20 ENCOUNTER — ANTICOAGULATION THERAPY VISIT (OUTPATIENT)
Dept: ANTICOAGULATION | Facility: CLINIC | Age: 76
End: 2021-05-20

## 2021-05-20 ENCOUNTER — OFFICE VISIT (OUTPATIENT)
Dept: INTERNAL MEDICINE | Facility: CLINIC | Age: 76
End: 2021-05-20
Payer: COMMERCIAL

## 2021-05-20 VITALS
RESPIRATION RATE: 16 BRPM | SYSTOLIC BLOOD PRESSURE: 136 MMHG | DIASTOLIC BLOOD PRESSURE: 84 MMHG | HEART RATE: 77 BPM | OXYGEN SATURATION: 99 % | TEMPERATURE: 98.6 F | HEIGHT: 73 IN | BODY MASS INDEX: 29 KG/M2 | WEIGHT: 218.8 LBS

## 2021-05-20 DIAGNOSIS — I48.20 CHRONIC ATRIAL FIBRILLATION (H): ICD-10-CM

## 2021-05-20 DIAGNOSIS — Z79.01 LONG TERM CURRENT USE OF ANTICOAGULANTS WITH INR GOAL OF 2.0-3.0: ICD-10-CM
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
## 2021-05-20 DIAGNOSIS — S02.2XXD CLOSED FRACTURE OF NASAL BONE WITH ROUTINE HEALING, SUBSEQUENT ENCOUNTER: ICD-10-CM

## 2021-05-20 LAB
CAPILLARY BLOOD COLLECTION: NORMAL
INR PPP: 1.7 (ref 0.86–1.14)

## 2021-05-20 PROCEDURE — 85610 PROTHROMBIN TIME: CPT | Performed by: INTERNAL MEDICINE

## 2021-05-20 PROCEDURE — 99207 PR NO CHARGE NURSE ONLY: CPT

## 2021-05-20 PROCEDURE — 36416 COLLJ CAPILLARY BLOOD SPEC: CPT | Performed by: INTERNAL MEDICINE

## 2021-05-20 PROCEDURE — 99213 OFFICE O/P EST LOW 20 MIN: CPT | Performed by: NURSE PRACTITIONER

## 2021-05-20 ASSESSMENT — MIFFLIN-ST. JEOR: SCORE: 1786.35

## 2021-05-20 NOTE — PROGRESS NOTES
ANTICOAGULATION FOLLOW-UP CLINIC VISIT    Patient Name:  Billy Stock  Date:  2021  Contact Type:  Telephone/ Patient returned call, he reports diet change, he denies any other changes or missed warfarin doses. Orders discussed with the patient, he agrees with the plan. Lab INR appointment scheduled on 6/3/21.    SUBJECTIVE:  Patient Findings     Positives:  Change in diet/appetite (Patient reports has been eating turnip greens every other day for the last week. ), Bruising (Bruising on knees and nose from fall.)    Comments:  The patient was assessed for medication, and activity level changes, missed or extra doses, bruising or bleeding, with no problem findings.         Clinical Outcomes     Comments:  The patient was assessed for medication, and activity level changes, missed or extra doses, bruising or bleeding, with no problem findings.            OBJECTIVE    Recent labs: (last 7 days)     21  1557   INR 1.70*       ASSESSMENT / PLAN  INR assessment SUB    Recheck INR In: 2 WEEKS    INR Location Outside lab      Anticoagulation Summary  As of 2021    INR goal:  2.0-3.0   TTR:  70.7 % (1 y)   INR used for dosin.70 (2021)   Warfarin maintenance plan:  2.5 mg (5 mg x 0.5) every Wed; 5 mg (5 mg x 1) all other days   Full warfarin instructions:  : 7.5 mg; Otherwise 2.5 mg every Wed; 5 mg all other days   Weekly warfarin total:  32.5 mg   Plan last modified:  Samantha Richards RN (2021)   Next INR check:  6/3/2021   Priority:  High   Target end date:  Indefinite    Indications    Long term current use of anticoagulants with INR goal of 2.0-3.0 [Z79.01]  Chronic atrial fibrillation (H) [I48.20]             Anticoagulation Episode Summary     INR check location:      Preferred lab:      Send INR reminders to:  KRISTINE KEY    Comments:  Pt reports taking Warfarin in the morning, d/t COVID - Patient may extend INR interval up to 8 weeks, and +2 weeks with each subsequent  INR in range up to 12 weeks max if no S/S bleeding, clotting, illness or changes in medications that affect warfarin l      Anticoagulation Care Providers     Provider Role Specialty Phone number    Malcolm Schafer MD Referring Internal Medicine [phone]            See the Encounter Report to view Anticoagulation Flowsheet and Dosing Calendar (Go to Encounters tab in chart review, and find the Anticoagulation Therapy Visit)    Dosage adjustment made based on physician directed care plan.    Samantha Galeas RN

## 2021-05-20 NOTE — PROGRESS NOTES
"    Assessment & Plan     Fall, subsequent encounter  He fell with dog leash   doing well   ENT was ok with nose healing   He is able to use cpap    Closed fracture of nasal bone with routine healing, subsequent encounter  Healing   Saw ENT     Long term current use of anticoagulants with INR goal of 2.0-3.0  Stable   - INR  - Capillary Blood Collection    Chronic atrial fibrillation (H)  Stable   - INR      20 minutes spent on the date of the encounter doing chart review, history and exam, documentation and further activities per the note       Patient Instructions   INR today           Return in about 6 months (around 11/20/2021).    ZAYDA Adam CNP  [facility] ANASTACIO Jara is a 75 year old who presents for the following health issues     HPI     ED/UC Followup:    Facility:  [facility]  Date of visit: 5/16/21  Reason for visit: Fall, on knee first then face, fx nose  Current Status: saw ENT don't need surgery, INR low on Sunday  Fell with dog leash wrapped around his feet and he fell on face - Pixie is dog chihuahua and terrier   Saw ENT and felt healing ok - no follow up needed   He can use the CPAP ok with this              Review of Systems   Constitutional, HEENT, cardiovascular, pulmonary, GI, , musculoskeletal, neuro, skin, endocrine and psych systems are negative, except as otherwise noted.      Objective    /84 (BP Location: Left arm, Patient Position: Sitting, Cuff Size: Adult Large)   Pulse 77   Temp 98.6  F (37  C) (Oral)   Resp 16   Ht 1.854 m (6' 1\")   Wt 99.2 kg (218 lb 12.8 oz)   SpO2 99%   BMI 28.87 kg/m    Body mass index is 28.87 kg/m .  Physical Exam   GENERAL: alert and no distress  RESP: lungs clear to auscultation - no rales, rhonchi or wheezes  CV: irregular rate and rhythm  MS: nose with mild bruising   PSYCH: mentation appears normal, affect normal/bright                "

## 2021-05-20 NOTE — PROGRESS NOTES
Left message to call [phone]. Please transfer call to Samantha at [phone]    Anticoagulation Management    Unable to reach Marek today.    Today's INR result of 1.7 is subtherapeutic (goal INR of 2.0-3.0).  Result received from: Clinic Lab    Follow up required to confirm warfarin dose taken and assess for changes    Left message to take a booster dose of warfarin,  7.5 mg tonight.      Anticoagulation clinic to follow up    Samantha Galeas RN

## 2021-05-24 ENCOUNTER — ANTICOAGULATION THERAPY VISIT (OUTPATIENT)
Dept: ANTICOAGULATION | Facility: CLINIC | Age: 76
End: 2021-05-24

## 2021-05-24 DIAGNOSIS — I48.20 CHRONIC ATRIAL FIBRILLATION (H): ICD-10-CM

## 2021-05-24 DIAGNOSIS — Z79.01 LONG TERM CURRENT USE OF ANTICOAGULANTS WITH INR GOAL OF 2.0-3.0: ICD-10-CM

## 2021-05-24 LAB
CAPILLARY BLOOD COLLECTION: NORMAL
INR PPP: 2.1 (ref 0.86–1.14)

## 2021-05-24 PROCEDURE — 85610 PROTHROMBIN TIME: CPT | Performed by: INTERNAL MEDICINE

## 2021-05-24 PROCEDURE — 36416 COLLJ CAPILLARY BLOOD SPEC: CPT | Performed by: INTERNAL MEDICINE

## 2021-05-24 PROCEDURE — 99207 PR NO CHARGE NURSE ONLY: CPT

## 2021-05-24 NOTE — PROGRESS NOTES
Left message to call [phone]. Please transfer call to Samantha at [phone]  Samantha Galeas RN, BSN  Anticoagulation Clinic

## 2021-05-24 NOTE — PROGRESS NOTES
ANTICOAGULATION FOLLOW-UP CLINIC VISIT    Patient Name:  Billy Stock  Date:  2021  Contact Type:  Telephone/ Patient returned call, he reports diet change, he denies any other changes. Orders discussed with the patient, he agrees with the plan. Lab INR appointment was previously scheduled on 6/3/21.    SUBJECTIVE:  Patient Findings     Positives:  Change in diet/appetite (Patient reports he will be consistent with his green veggies and have them 3 times a week.)    Comments:  The patient was assessed for diet, medication, and activity level changes, missed or extra doses, bruising or bleeding, with no problem findings. Maintenance warfarin dosing was reviewed with patient and will remain on the same dose until next INR check. Patient did not have any questions or concerns.           Clinical Outcomes     Comments:  The patient was assessed for diet, medication, and activity level changes, missed or extra doses, bruising or bleeding, with no problem findings. Maintenance warfarin dosing was reviewed with patient and will remain on the same dose until next INR check. Patient did not have any questions or concerns.              OBJECTIVE    Recent labs: (last 7 days)     21  1302   INR 2.10*       ASSESSMENT / PLAN  INR assessment THER    Recheck INR In: 10 DAYS    INR Location Outside lab      Anticoagulation Summary  As of 2021    INR goal:  2.0-3.0   TTR:  69.9 % (1 y)   INR used for dosin.10 (2021)   Warfarin maintenance plan:  2.5 mg (5 mg x 0.5) every Wed; 5 mg (5 mg x 1) all other days   Full warfarin instructions:  2.5 mg every Wed; 5 mg all other days   Weekly warfarin total:  32.5 mg   No change documented:  Samantha Galeas RN   Plan last modified:  Samantha Richards RN (2021)   Next INR check:  6/3/2021   Priority:  High   Target end date:  Indefinite    Indications    Long term current use of anticoagulants with INR goal of 2.0-3.0 [Z79.01]  Chronic atrial fibrillation (H)  [I48.20]             Anticoagulation Episode Summary     INR check location:      Preferred lab:      Send INR reminders to:  KRISTINE CHAVES YESI    Comments:  Pt reports taking Warfarin in the morning, d/t COVID - Patient may extend INR interval up to 8 weeks, and +2 weeks with each subsequent INR in range up to 12 weeks max if no S/S bleeding, clotting, illness or changes in medications that affect warfarin l      Anticoagulation Care Providers     Provider Role Specialty Phone number    Malcolm Schafer MD Referring Internal Medicine [phone]            See the Encounter Report to view Anticoagulation Flowsheet and Dosing Calendar (Go to Encounters tab in chart review, and find the Anticoagulation Therapy Visit)    Dosage adjustment made based on physician directed care plan.    Samantha Galeas RN

## 2021-06-03 ENCOUNTER — ANTICOAGULATION THERAPY VISIT (OUTPATIENT)
Dept: INTERNAL MEDICINE | Facility: CLINIC | Age: 76
End: 2021-06-03

## 2021-06-03 VITALS
HEIGHT: 78 IN | RESPIRATION RATE: 16 BRPM | SYSTOLIC BLOOD PRESSURE: 122 MMHG | TEMPERATURE: 98.1 F | DIASTOLIC BLOOD PRESSURE: 75 MMHG | WEIGHT: 235.1 LBS | HEART RATE: 51 BPM | BODY MASS INDEX: 27.2 KG/M2

## 2021-06-03 DIAGNOSIS — Z79.01 LONG TERM CURRENT USE OF ANTICOAGULANTS WITH INR GOAL OF 2.0-3.0: ICD-10-CM

## 2021-06-03 DIAGNOSIS — I48.20 CHRONIC ATRIAL FIBRILLATION (H): ICD-10-CM

## 2021-06-03 LAB
CAPILLARY BLOOD COLLECTION: NORMAL
INR PPP: 1.5 (ref 0.86–1.14)

## 2021-06-03 PROCEDURE — 36416 COLLJ CAPILLARY BLOOD SPEC: CPT | Performed by: INTERNAL MEDICINE

## 2021-06-03 PROCEDURE — 99207 PR NO CHARGE NURSE ONLY: CPT | Performed by: INTERNAL MEDICINE

## 2021-06-03 PROCEDURE — 85610 PROTHROMBIN TIME: CPT | Performed by: INTERNAL MEDICINE

## 2021-06-03 NOTE — PROGRESS NOTES
Anticoagulation Management    Unable to reach Marek today.    Today's INR result of 1.5 is subtherapeutic (goal INR of 2.0-3.0).  Result received from: Clinic Lab    Follow up required to discuss out of range INR     Left message to take a booster dose of warfarin,  10 mg tonight.    Anticoagulation clinic to follow up    Zoë Radford RN    ANTICOAGULATION FOLLOW-UP CLINIC VISIT    Patient Name:  Billy Stock  Date:  6/3/2021  Contact Type:  Telephone    SUBJECTIVE:  Patient Findings     Comments:  Patient returned phone call. Patient denies any identifiable changes that caused the subtherapeutic INR. The patient was assessed for diet, medication, and activity level changes, missed or extra doses, bruising or bleeding, with no problem findings. At last INR visit he agreed to about 3 servings of green veggies per week, but patient states he only had 2 this past week. Even when therapeutic last week, INR was on the low end. Two weeks ago he had 2 subtherapeutic INRs, possible due to diet changes. Patient did have leuprolide injection 4/12, while there is no noted interaction with warfarin, per Micromedex, wonder if that changed something in his body how he is metabolizing his warfarin? Per protocol, for ongoing/no factors, advised patient to take booster dose of 10 mg tonight, then increase maintenance dosing by about 15% and recheck INR in 1 week. Patient stated understanding and is in agreement with plan.  Zoë RUIZ RN  Anticoagulation Team            Clinical Outcomes     Negatives:  Major bleeding event, Thromboembolic event, Anticoagulation-related hospital admission, Anticoagulation-related ED visit, Anticoagulation-related fatality    Comments:  Patient returned phone call. Patient denies any identifiable changes that caused the subtherapeutic INR. The patient was assessed for diet, medication, and activity level changes, missed or extra doses, bruising or bleeding, with no problem findings. At last  INR visit he agreed to about 3 servings of green veggies per week, but patient states he only had 2 this past week. Even when therapeutic last week, INR was on the low end. Two weeks ago he had 2 subtherapeutic INRs, possible due to diet changes. Patient did have leuprolide injection , while there is no noted interaction with warfarin, per Micromedex, wonder if that changed something in his body how he is metabolizing his warfarin? Per protocol, for ongoing/no factors, advised patient to take booster dose of 10 mg tonight, then increase maintenance dosing by about 15% and recheck INR in 1 week. Patient stated understanding and is in agreement with plan.  Zoë RUIZ RN  Anticoagulation Team               OBJECTIVE    Recent labs: (last 7 days)     21  1314   INR 1.50*       ASSESSMENT / PLAN  INR assessment SUB    Recheck INR In: 1 WEEK    INR Location Clinic      Anticoagulation Summary  As of 6/3/2021    INR goal:  2.0-3.0   TTR:  67.6 % (1 y)   INR used for dosin.50 (6/3/2021)   Warfarin maintenance plan:  7.5 mg (5 mg x 1.5) every Thu; 5 mg (5 mg x 1) all other days   Full warfarin instructions:  6/3: 10 mg; Otherwise 7.5 mg every Thu; 5 mg all other days   Weekly warfarin total:  37.5 mg   Plan last modified:  Zoë Radford RN (6/3/2021)   Next INR check:  6/10/2021   Priority:  High   Target end date:  Indefinite    Indications    Long term current use of anticoagulants with INR goal of 2.0-3.0 [Z79.01]  Chronic atrial fibrillation (H) [I48.20]             Anticoagulation Episode Summary     INR check location:      Preferred lab:      Send INR reminders to:  [facility]    Comments:  Pt reports taking Warfarin in the morning, d/t COVID - Patient may extend INR interval up to 8 weeks, and +2 weeks with each subsequent INR in range up to 12 weeks max if no S/S bleeding, clotting, illness or changes in medications that affect warfarin l      Anticoagulation Care Providers      Provider Role Specialty Phone number    Malcolm Schafer MD Referring Internal Medicine [phone]            See the Encounter Report to view Anticoagulation Flowsheet and Dosing Calendar (Go to Encounters tab in chart review, and find the Anticoagulation Therapy Visit)    Dosage adjustment made based on physician directed care plan.    Zoë Radford RN

## 2021-06-10 ENCOUNTER — ANTICOAGULATION THERAPY VISIT (OUTPATIENT)
Dept: ANTICOAGULATION | Facility: CLINIC | Age: 76
End: 2021-06-10

## 2021-06-10 DIAGNOSIS — Z79.01 LONG TERM CURRENT USE OF ANTICOAGULANTS WITH INR GOAL OF 2.0-3.0: ICD-10-CM

## 2021-06-10 DIAGNOSIS — I48.20 CHRONIC ATRIAL FIBRILLATION (H): ICD-10-CM

## 2021-06-10 LAB
CAPILLARY BLOOD COLLECTION: NORMAL
INR PPP: 2 (ref 0.86–1.14)

## 2021-06-10 PROCEDURE — 99207 PR NO CHARGE NURSE ONLY: CPT

## 2021-06-10 PROCEDURE — 36416 COLLJ CAPILLARY BLOOD SPEC: CPT | Performed by: INTERNAL MEDICINE

## 2021-06-10 PROCEDURE — 85610 PROTHROMBIN TIME: CPT | Performed by: INTERNAL MEDICINE

## 2021-06-10 NOTE — PROGRESS NOTES
ANTICOAGULATION FOLLOW-UP CLINIC VISIT    Patient Name:  Billy Stock  Date:  6/10/2021  Contact Type:  Telephone/ Called patient, he denies any changes. Orders discussed with the patient, he agrees with the plan. Lab INR appointment scheduled on 21.    SUBJECTIVE:  Patient Findings     Positives:  Extra doses (Warfarin boost given 6/3/21 and maintenance dose increased. ), Change in diet/appetite (Patient reports he has been having green veggies M, W, F. )    Comments:  The patient was assessed for diet, medication, and activity level changes, missed or extra doses, bruising or bleeding, with no problem findings. Will continue with current weekly warfarin maintenance dose since patient is more consistent with green veggies and will recheck in 1 week.           Clinical Outcomes     Comments:  The patient was assessed for diet, medication, and activity level changes, missed or extra doses, bruising or bleeding, with no problem findings. Will continue with current weekly warfarin maintenance dose since patient is more consistent with green veggies and will recheck in 1 week.              OBJECTIVE    Recent labs: (last 7 days)     06/10/21  1310   INR 2.00*       ASSESSMENT / PLAN  INR assessment THER    Recheck INR In: 1 WEEK    INR Location Outside lab      Anticoagulation Summary  As of 6/10/2021    INR goal:  2.0-3.0   TTR:  65.7 % (1 y)   INR used for dosin.00 (6/10/2021)   Warfarin maintenance plan:  7.5 mg (5 mg x 1.5) every Thu; 5 mg (5 mg x 1) all other days   Full warfarin instructions:  7.5 mg every Thu; 5 mg all other days   Weekly warfarin total:  37.5 mg   Plan last modified:  Samantha Galeas, RN (6/10/2021)   Next INR check:  2021   Priority:  High   Target end date:  Indefinite    Indications    Long term current use of anticoagulants with INR goal of 2.0-3.0 [Z79.01]  Chronic atrial fibrillation (H) [I48.20]             Anticoagulation Episode Summary     INR check location:       Preferred lab:      Send INR reminders to:  [facility]    Comments:  Pt reports taking Warfarin in the morning, d/t COVID - Patient may extend INR interval up to 8 weeks, and +2 weeks with each subsequent INR in range up to 12 weeks max if no S/S bleeding, clotting, illness or changes in medications that affect warfarin l      Anticoagulation Care Providers     Provider Role Specialty Phone number    Malcolm Schafer MD Referring Internal Medicine [phone]            See the Encounter Report to view Anticoagulation Flowsheet and Dosing Calendar (Go to Encounters tab in chart review, and find the Anticoagulation Therapy Visit)    Dosage adjustment made based on physician directed care plan.    Samantha Galeas RN

## 2021-06-17 ENCOUNTER — ANTICOAGULATION THERAPY VISIT (OUTPATIENT)
Dept: ANTICOAGULATION | Facility: CLINIC | Age: 76
End: 2021-06-17

## 2021-06-17 DIAGNOSIS — Z79.01 LONG TERM CURRENT USE OF ANTICOAGULANTS WITH INR GOAL OF 2.0-3.0: ICD-10-CM

## 2021-06-17 DIAGNOSIS — I48.20 CHRONIC ATRIAL FIBRILLATION (H): ICD-10-CM

## 2021-06-17 LAB
CAPILLARY BLOOD COLLECTION: NORMAL
INR PPP: 2 (ref 0.86–1.14)

## 2021-06-17 PROCEDURE — 85610 PROTHROMBIN TIME: CPT | Performed by: INTERNAL MEDICINE

## 2021-06-17 PROCEDURE — 99207 PR NO CHARGE NURSE ONLY: CPT

## 2021-06-17 PROCEDURE — 36416 COLLJ CAPILLARY BLOOD SPEC: CPT | Performed by: INTERNAL MEDICINE

## 2021-06-17 NOTE — PROGRESS NOTES
Left message to call [phone]. Please transfer call to Samantha at [phone]    Anticoagulation Management    Unable to reach Marek today.    Today's INR result of 2.0 is therapeutic (goal INR of 2.0-3.0).  Result received from: Clinic Lab    Follow up required to confirm warfarin dose taken and assess for changes    Left message to continue current dose of warfarin 7.5 mg tonight.      Anticoagulation clinic to follow up    Samantha Galeas RN

## 2021-06-17 NOTE — PROGRESS NOTES
ANTICOAGULATION FOLLOW-UP CLINIC VISIT    Patient Name:  Billy Stock  Date:  2021  Contact Type:  Telephone--Spoke to patient    SUBJECTIVE:  Patient Findings     Positives:  Change in activity (NO change, pt states he walks at work but the amount has not changed and he does not exercise otherwise.)    Comments:  Patient states he is now wearing Diabetic socks, they are more comfortable than compression stockings.        Clinical Outcomes     Negatives:  Major bleeding event, Thromboembolic event, Anticoagulation-related hospital admission, Anticoagulation-related ED visit, Anticoagulation-related fatality    Comments:  Patient states he is now wearing Diabetic socks, they are more comfortable than compression stockings.           OBJECTIVE    Recent labs: (last 7 days)     21  1400   INR 2.00*       ASSESSMENT / PLAN  INR assessment THER    Recheck INR In: 10 DAYS    INR Location Clinic      Anticoagulation Summary  As of 2021    INR goal:  2.0-3.0   TTR:  65.7 % (1 y)   INR used for dosin.00 (2021)   Warfarin maintenance plan:  7.5 mg (5 mg x 1.5) every Thu; 5 mg (5 mg x 1) all other days   Full warfarin instructions:  7.5 mg every Thu; 5 mg all other days   Weekly warfarin total:  37.5 mg   Plan last modified:  Samantha Richards RN (2021)   Next INR check:  2021   Priority:  High   Target end date:  Indefinite    Indications    Long term current use of anticoagulants with INR goal of 2.0-3.0 [Z79.01]  Chronic atrial fibrillation (H) [I48.20]             Anticoagulation Episode Summary     INR check location:      Preferred lab:      Send INR reminders to:  [facility]    Comments:  Pt reports taking Warfarin in the morning, d/t COVID - Patient may extend INR interval up to 8 weeks, and +2 weeks with each subsequent INR in range up to 12 weeks max if no S/S bleeding, clotting, illness or changes in medications that affect warfarin l      Anticoagulation Care  Providers     Provider Role Specialty Phone number    Malcolm Schafer MD Referring Internal Medicine [phone]            See the Encounter Report to view Anticoagulation Flowsheet and Dosing Calendar (Go to Encounters tab in chart review, and find the Anticoagulation Therapy Visit)        Samantha Richards RN

## 2021-06-28 ENCOUNTER — ANTICOAGULATION THERAPY VISIT (OUTPATIENT)
Dept: ANTICOAGULATION | Facility: CLINIC | Age: 76
End: 2021-06-28

## 2021-06-28 DIAGNOSIS — I48.20 CHRONIC ATRIAL FIBRILLATION (H): ICD-10-CM

## 2021-06-28 DIAGNOSIS — Z79.01 LONG TERM CURRENT USE OF ANTICOAGULANTS WITH INR GOAL OF 2.0-3.0: ICD-10-CM

## 2021-06-28 LAB
CAPILLARY BLOOD COLLECTION: NORMAL
INR PPP: 1.4 (ref 0.86–1.14)

## 2021-06-28 PROCEDURE — 36416 COLLJ CAPILLARY BLOOD SPEC: CPT | Performed by: INTERNAL MEDICINE

## 2021-06-28 PROCEDURE — 85610 PROTHROMBIN TIME: CPT | Performed by: INTERNAL MEDICINE

## 2021-06-28 NOTE — PROGRESS NOTES
Progress Notes by Yissel Lenz RN at 11/18/2019 12:30 PM     Author: Yissel Lenz RN Service: -- Author Type: Registered Nurse    Filed: 11/22/2019  8:37 PM Encounter Date: 11/18/2019 Status: Signed    : Mert Lomas MD (Physician)    Related Notes: Original Note by Yissel Lenz RN (Registered Nurse) filed at 11/18/2019  3:47 PM           [facility] Study Inclusion / Exclusion Criteria  Protocol Version 3.0 ([id number])    Inclusion Criteria    Yes No Criteria # Subject must meet all inclusion criteria:      [x]    []  1 At least 18 years old for NSR and 22 years old for Non-NSR. Inclusive for both cohorts, at time of screening, with no upper limit on age.        [x]      []  2 To be enrolled as a non-NSR subject the volunteer must have one of the following conditions: Permanent/Persistent AF, Hx of paroxysmal AF, Hx of High-rate AF, AF + rate control medication, Hx Atrial Flutter, PVC burden >1%, Frequent PACs, Hx BBB, Hx 2nd degree block (any type), Hx Bigeminy/Trigeminy/Quadgeminy, Hx Tachycardia. For subjects with any of the following diagnoses, the condition must be present at the time of screening:   ? Permanent/persistent AF  ? PVC [place]  ? Frequent PACs   Note: If not present at screening, subjects may not be enrolled as a non-NSR subject or NSR subject.         3  [x] N/A HE site For Subjects to be enrolled as NSR they must be in NSR at the time of screening as determined by the investigator. For subjects ?65 years old with PVC burden of ?1% or infrequent PACs (<3 ectopic beats in 30 seconds), they may qualify as individuals in the NSR cohort as long as they don't have a medical history/diagnosis of significant ectopic burden. For subjects ? 66 years old with PVC burden > 1% but ?10%, they may qualify as individuals in the NSR cohort as long as they don't have a medical history/diagnosis of significant ectopic burden.    [x]  []  4 Able to read and understand a written ICF    [x]  []  5  Willing and able to participate in the study procedures and comply with its restrictions    [x]  []  6 Able to communicate effectively with study staff as well as understand and follow directions    All must be Yes    Exclusion Criteria-all must be no  Yes No Criteria # Subject must not meet any exclusion criteria:    []  [x]  1 Physical disability that prevents safe and adequate testing.   []  [x]  2 Pregnant women or women planning to become pregnant   []  [x]  3 Any acute illness or condition that may interfere with study procedures (e.g. cough, fever, sore throat, headache, sunburn, etc.)   []  [x]  4 Clinically significant hand tremors, as judged by the Investigator   []  [x]  5 Resting hypertension with systolic blood pressure ?161 mmHg or diastolic blood pressure ?101 mmHg (if at least 2 of 3 measurements meet this criteria)   []  [x]  6 Subjects with implanted cardiac devices such as a pacemaker or an automated implantable cardioverter- defibrillator (AICD)   []  [x]  7 Acute myocardial infarction (MI) within 90 days from the screening visit   []  [x]  8 Other cardiovascular disease that increases the risk to the subject or would render the data uninterpretable in the opinion of the Investigator (e.g., recent or ongoing unstable angina, significant valvular heart disease or chronic heart failure, myocarditis or pericarditis)    []  [x]  9 Acute pulmonary embolism, pulmonary infarction, or deep vein thrombosis within 90 days from the screening visit    []  [x]  10 Stroke or transient ischemic attack within 90 days from the screening visit    []  [x]  11 Known untreated medical conditions as determined by the Investigator, such as but not limited to significant anemia, important electrolyte imbalance and untreated or uncontrolled thyroid disease.    []  [x]  12 Any history of wrist surgery with scarring in the area of the sensor location on the wrist where the subject will be wearing the watch;    []  [x]  13  Open wound(s) on the wrist and forearm where the subject will be wearing the watch    []  [x]  14 Severe symptomatic (or active) overly dry/injured skin, skin disorders, or allergic skin reactions such as eczema, rosacea, impetigo, dermatomyositis or allergic contact dermatitis on wrist and locations where the electrodes will be placed (e.g. chest, forearms, stomach), as determined by the investigator.    []  [x]  15 Tattoos, scars or moles in the area of the sensor location on the wrist where the subject will be wearing the watch    []  [x]  16 Device wearing Wrist circumference ? 129 mm or ? 246 mm    []  [x]  17 Known significant sensitivity to medical adhesives or isopropyl alcohol (for ECG electrode placement)    []  [x]  18 Known allergy or sensitivity to fluorocarbon-based synthetic rubber, such as contact dermatitis with fluoroelastomer bands primarily used in wrist worn fitness devices    []  [x]  19 Subjects with any Medical History, Physical exam, vital sign or any other study procedure finding/assessment that in the opinion of the investigator could compromise subject safety during study participation or interfere with the study integrity and/or the accurate assessment of the study objectives    []  [x]  20 Subject works for a company that develops or sells medical and/or fitness devices (e.g., ECG monitors, wearable fitness bands, sleep monitors, etc.) or are technology journalists (e.g., professional bloggers, TV, magazine, newspaper reporters, etc.)    []  [x]  21 Weight > 181 kg for subjects using the stationary bike and/or treadmill. Weight of ?138 kg for NSR subjects.    []  [x]  22 Subject is employed in shift work, or otherwise does not maintain a reasonably consistent day/night schedule (e.g. Subjects who go to bed after 4am).    []  [x]  23 Overnight travel planned during data collection nights    []  [x]  24 Non-NSR subjects should not have partaken in strenuous physical activity within 12  hours prior to screening    []  [x]  25 Non-NSR subjects with Atrial fibrillation categories: Subjects taking Class 1 or Class 3 antiarrhythmic agents such as the following may not take part in any stage of the study: amiodarone, sotalol, dronedarone, ibutilide, dofetilide, propafenone, quinidine, procainamide, disopyramide, flecainide (Subjects taking class 2, 4 or 5 antiarrhythmic agents may take part the study).    []  [x]  26 Subjects who have both a history of paroxysmal AF and a Owens skin type measurement of VI    []  [x]  27 Subjects who have missing index fingers on both hands      Subject has met all inclusion criteria and no exclusion criteria have been met.   Subject is ready to fully enrolled in the [facility] study.    Yissel Lenz RN

## 2021-06-28 NOTE — PROGRESS NOTES
Progress Notes by Durga Magana at 11/18/2019 12:30 PM     Author: Durga Magana Service: -- Author Type: --    Filed: 11/18/2019  3:47 PM Encounter Date: 11/18/2019 Status: Signed    : Durga Magana Study    Physical Examination  For abnormal findings, please evaluate if the finding is Clinically Significant (by 'CS') or Not Clinically Significant (by 'NCS')  General Appearance Normal  Head and Neck  Normal  Lungs    Normal  Cardiovascular  Abnormal- Irregularly irregular rhythm, slow rate - NCS.  Abdomen   Normal  Musculoskeletal/Extremities Abnormal- Trace bilateral ankle edema - NCS.   Lymph Nodes  Normal  Skin    Normal  Neurological   Normal   Tremor absent     If present, evaluate severity on 1-10 scale    Durga Magana, ZAYDA, CNP

## 2021-06-28 NOTE — PROGRESS NOTES
Progress Notes by Niecy Torres at 11/21/2019  9:56 AM     Author: Niecy Torres Service: -- Author Type: Patient Access    Filed: 11/21/2019  9:56 AM Encounter Date: 11/21/2019 Status: Signed    : Niecy Torres (Patient Access)         [facility] Study Device Return    Billy Stock returned all the devices for the [facility] study.  Billydeepa Stock denies medication changes or adverse events since last visit.    Billy Stock has now completed their participation in the [facility] study.   Thank you for your gift of participation.    Niecy Torres

## 2021-06-28 NOTE — PROGRESS NOTES
Progress Notes by Yissel Lenz RN at 11/18/2019 12:30 PM     Author: Yissel Lenz RN Service: -- Author Type: Registered Nurse    Filed: 11/18/2019  3:47 PM Encounter Date: 11/18/2019 Status: Signed    : Yissel Lenz RN (Registered Nurse)            Coryar Study Consent Visit    Study description: ECG and PPG Study: Zestar Study      Note time seated: 13:00.     Billy Stock a 74 y.o. male , was seen in [facility] today to discuss participation in the Zestar study.   The consent discussion began on 11/18/2019. Research RN was unable to get in touch with subject by phone prior to visit. The consent form was reviewed with the patient.     The review of the study included:    Study purpose     Conflict of interest    Device description      Study visits    Risks of participation    Benefits (if any)    Alternatives    Voluntary participation    Confidentiality     Compensation/costs of participation    Study stipends    Injury and legal rights    The subject was provided time to review the consent form and consider participation. his questions were answered to his satisfaction.   The patient has voluntarily agreed to participate in the above noted study.     The consent form version 12 Nov 2019 and HIPAA form version 11 Jun 2019 was signed 11/18/19 at 13:28.    The subject was provided with a copy of the consent form and HIPAA. A copy of the signed forms was forwarded to medical records.    No study procedures were done prior to Billy Stock providing informed consent.     Yissel Lenz RN    Subject Restrictions During Study -Confirmed with Subject prior to any study procedures completed    Restrictions on jewelry, recreational drugs, caffeine, and exercise few days prior and during study.   1. Subjects should not consume excessive amount of caffeine (6 or more 8-oz cups of coffee, or more than 570 mg of caffeine from energy drinks, pills or similar substance) during their participation in the  "study.   2. Subjects should not consume excessive amount of alcohol for the duration of their participation in the study. A typical moderate amount is allowed during stage 3.   3. Subjects should not take any recreational drugs (including, but not limited to methamphetamines, cocaine, opioids, cannabis, LSD) for the duration of their participation in the study.   4. Subjects should not wear underwire bra or jewelry during the in-lab study (to not interfere with electrode placement and ECG data recordings).   5. Subject will not be permitted to have their cell phone or any electronic recording device on or with them during the in-lab test session(s).   6. Subjects under 22 years old will not be permitted to take ECG recordings through the ECG gregory on the wrist-worn devices.     For study stage 3 only   1. Subjects should only do high intensity exercise (e.g. sprinting, heavy lifting, etc.) in the morning upon awakening or else not at all   2. Subjects should abstain from swimming during the time of the study   3. Subjects should only shower in the morning upon awakening (or else not at all)   4. Female subjects are strongly suggested to wear non-underwire bras throughout this stage of the study     Yissel Lenz RN      Study Data collections   Vitals  (TPBP)     Vitals:    11/18/19 1330 11/18/19 1333 11/18/19 1336   BP: 124/79 135/82 122/75   Pulse: (!) 52 (!) 55 (!) 51   Resp: 16     Temp: 98.1  F (36.7  C)     Weight:   (!) 235 lb 1.6 oz (106.6 kg)   Height:   6' 9.5\" (2.07 m)      VS taken after 5 min rest     MAP 1    90  MAP 2      96   MAP 3             94          Body mass index is 24.89 kg/m .  male  1945  74 y.o.      Note time patient placed in supine position: 13:38    Ethnicity   []   or    [x]  Not  or   Race   []   or    []    [x]  Black or   []   or Other   []  White  Physical Activity " Level  per subject report:   []  0- Extremely Inactive []  1- Sedentary [x]  2- Moderately Active  []  3- Vigorously Active []  4- Extremely Active  Trained Athlete   [] Yes  [x] No     Owens's' Skin type   [] Type 1 [] Type 2 [] Type 3    [] Type 4 [] Type 5 [x] Type 6    Subject participated in previous ECG study at [facility]: [] Yes    [x] No    Past Medical History:   Diagnosis Date   ? Atrial fibrillation (H)    ? Glaucoma    ? Gout    ? HTN (hypertension)    ? Hyperlipidemia    ? SARAH (obstructive sleep apnea)    ? Parkinson disease (H)    ? Prostate cancer (H)        HISTORY OF HEART RHYTHM ABNORMALITIES (check only group subject is 'randomized[' to-only 1)  []  Group 1: History of paroxysmal atrial fibrillation (no excluded medications)  []  Group 2: History of paroxysmal atrial fibrillation (on excluded medications)    []  Group 3: History of high-rate atrial fibrillation   []  Group 4: History of atrial fibrillation with rate control medications    [x]  Group 5: Permanent/persistent atrial fibrillation    []  Group 6: history of atrial flutter  []  Group 7: Frequent PVCs  []  Group 8: Frequent PACs  []  Group 9: BBB (left or right)  []  Group 10: History of 2nd Heart Block (any type)  []  Group 11: History of bigeminy, trigeminy, and/or quadgeminy  []  Group 12: History of tachycardia     Special interest allergies: active allergic skin reactions  Allergies not on file    Current Outpatient Medications:   ?  acetaminophen (TYLENOL) 325 MG tablet, Take 650 mg by mouth every 6 (six) hours as needed for pain., Disp: , Rfl:   ?  amLODIPine (NORVASC) 10 MG tablet, Take 10 mg by mouth daily., Disp: , Rfl:   ?  ipratropium (ATROVENT) 0.03 % nasal spray, 2 sprays into each nostril every 12 (twelve) hours., Disp: , Rfl:   ?  lisinopril (PRINIVIL,ZESTRIL) 40 MG tablet, Take 40 mg by mouth daily., Disp: , Rfl:   ?  LORazepam (ATIVAN) 1 MG tablet, Take 1 mg by mouth every 6 (six) hours as needed for anxiety.,  "Disp: , Rfl:   ?  metoprolol succinate (TOPROL-XL) 50 MG 24 hr tablet, Take 50 mg by mouth daily. Take 1.5 tabs/day (75mg ), Disp: , Rfl:   ?  warfarin (COUMADIN/JANTOVEN) 5 MG tablet, Take 5 mg by mouth See Admin Instructions. Take 5mg  Daily except weds take 2.5 mg by mouth daily. 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   Predominately works outdoors  [] Yes    [x] No      Hours/week spent outdoors (total, not only for work): 6    Frequently participates in \"hand intensive\" activities [] Yes [x] No  Caffeine  []  Never  [x] Occasionally        []  Daily (1 cup/day)     []  Daily (>1 cup/day)    Alcohol   [x]  Never  [] Light (drink or 2 occasionally) []  Moderate (a drink or 2 almost daily)   []  Occasional-heavy (more than a few drinks <2x / month)  [] Heavy (more than a few drinks >2x / month)    Tobacco/nicotine  [x]  Never  [] Rarely  []  Frequently/ Daily     Mattress Information    Mattress type:  []  Memory foam [] Gel  [x] Innerspring (coil)  [] Airbed  [] Waterbed [] Shikibuton  [] Hybrid  [] No mattress  [] Other (comment):    Mattress foundation   [] Mattress on floor/ground    [] Mattress on foundation/box spring on floor/ground  [x] Mattress on foundation/box spring on bed frame  [] Mattress on tatami on floor/ground  [] Other (comment):    Mattress topper   [x] No mattress topper  [] Pillow top  [] Foam top - flat style  [] Foam top - \"egg crate\" style  [] Other (comment):    Co-sleeper    []  Yes  [x]  No    CPAP use   [x] Yes  [] No      Dominant hand [] left  [x] right [] ambidextrous  Preferred Wrist to wear band on   [x]  left   []  right   Were screening day & study day: [x]  same [] different   Same: wrist circumference:      190   mm   Device wearing wrist skin fold thickness:       5.4  mm  Wrist " Band Size:     []  Flush Fit S/M  []  Non-Flush Fit S/M   [x]  Flush Fit M/L  []  Non-Flush Fit M/L  []  Flush Fit XL  []  Non-Flush Fit XL    Preferred/natural band notch: 5  Secure band notch: 5  Crown orientation:  [x]  left   []  right  Device wearing wrist hairiness:     [x]  Light []  Medium       []  Heavy  Spectophotometer   L A B   Reading #1  38.84  9.93  11.59   Reading #2  38.18  9.71  10.96   Reading #3  38.51  9.75  11.63     Pregnancy test    [] WOCBP (age <55 yrs, no tubal ligation, no hysterectomy)    [x] n/a male or female not child bearing potential  For Stage 2: subject will use exercise bike for exercise portion of the study  Room Temperature: 24 C  CS Laptop ID: [id number]  CS Cam ID:[id number]  Device Set ID:[id number]  Wrist Device ID:[id number]  Subject has now completed their in-house participation in the Zestar study. Subject will complete Stage 3 at home for the next 3 days and return the equipment on Thursday 21 nov 2019.  Yissel Lenz RN

## 2021-06-28 NOTE — PROGRESS NOTES
Left message to call [phone]. Please transfer call to Samantha at [phone]    Anticoagulation Management    Unable to reach Marek today.    Today's INR result of 1.4 is subtherapeutic (goal INR of 2.0-3.0).  Result received from: Clinic Lab    Follow up required to confirm warfarin dose taken and assess for changes    Left message to take a booster dose of warfarin,  10 mg tonight.      Anticoagulation clinic to follow up    Samantha Galeas RN

## 2021-06-28 NOTE — PROGRESS NOTES
ANTICOAGULATION MANAGEMENT     Billy Stock 75 year old male is on warfarin with subtherapeutic INR result. (Goal INR 2.0-3.0)    Recent labs: (last 7 days)     06/28/21  1410   INR 1.40*       ASSESSMENT     Source(s): Patient/Caregiver Call       Warfarin doses taken: Less warfarin taken than planned which may be affecting INR    Diet: Patient reports he may have had more green veggies than usual and also had blueberry lemonade    New illness, injury, or hospitalization: No    Medication/supplement changes: None noted    Signs or symptoms of bleeding or clotting: No    Previous INR: Therapeutic last visit; previously outside of goal range    Additional findings: None     PLAN     Recommended plan for no diet, medication or health factor changes affecting INR     Dosing Instructions: Booster dose then continue your current warfarin dose with next INR in 3 days       Summary  As of 6/28/2021    Full warfarin instructions:  6/28: 10 mg; Otherwise 7.5 mg every Thu; 5 mg all other days   Next INR check:  7/1/2021             Telephone call with Billy whom verbalizes understanding and agrees to plan    Lab visit scheduled    Education provided: Please call back if any changes to your diet, medications or how you've been taking warfarin and Monitoring for clotting signs and symptoms    Plan made per [facility] anticoagulation protocol    Samantha Galeas RN  Anticoagulation Clinic  6/28/2021    _______________________________________________________________________     Anticoagulation Episode Summary     Current INR goal:  2.0-3.0   TTR:  62.7 % (1 y)   Target end date:  Indefinite   Send INR reminders to:  On license of [facility]    Indications    Long term current use of anticoagulants with INR goal of 2.0-3.0 [Z79.01]  Chronic atrial fibrillation (H) [I48.20]           Comments:  Pt reports taking Warfarin in the morning, d/t COVID - Patient may extend INR interval up to 8 weeks, and +2 weeks with each subsequent  INR in range up to 12 weeks max if no S/S bleeding, clotting, illness or changes in medications that affect warfarin l         Anticoagulation Care Providers     Provider Role Specialty Phone number    Malcolm Schafer MD Referring Internal Medicine [phone]

## 2021-07-01 ENCOUNTER — ANTICOAGULATION THERAPY VISIT (OUTPATIENT)
Dept: ANTICOAGULATION | Facility: CLINIC | Age: 76
End: 2021-07-01

## 2021-07-01 DIAGNOSIS — I48.20 CHRONIC ATRIAL FIBRILLATION (H): ICD-10-CM

## 2021-07-01 DIAGNOSIS — Z79.01 LONG TERM CURRENT USE OF ANTICOAGULANTS WITH INR GOAL OF 2.0-3.0: Primary | ICD-10-CM

## 2021-07-01 DIAGNOSIS — Z79.01 LONG TERM CURRENT USE OF ANTICOAGULANTS WITH INR GOAL OF 2.0-3.0: ICD-10-CM

## 2021-07-01 LAB
CAPILLARY BLOOD COLLECTION: NORMAL
INR PPP: 1.9 (ref 0.86–1.14)

## 2021-07-01 PROCEDURE — 36416 COLLJ CAPILLARY BLOOD SPEC: CPT | Performed by: INTERNAL MEDICINE

## 2021-07-01 PROCEDURE — 85610 PROTHROMBIN TIME: CPT | Performed by: INTERNAL MEDICINE

## 2021-07-01 NOTE — PROGRESS NOTES
Patient left a VM @ 1211 PM regarding returning call for dosing.     Routing to managing ACC.      Malinda Trent RN  [facility] Anticoagulation Clinic

## 2021-07-01 NOTE — PROGRESS NOTES
ANTICOAGULATION MANAGEMENT     Billy Stock 75 year old male is on warfarin with subtherapeutic INR result. (Goal INR 2.0-3.0)    Recent labs: (last 7 days)     07/01/21  1044   INR 1.90*       ASSESSMENT     Source(s): Patient/Caregiver Call       Warfarin doses taken: Warfarin taken as instructed    Diet: No new diet changes identified and patient is going to work on consistency with Vit K foods    New illness, injury, or hospitalization: No    Medication/supplement changes: None noted    Signs or symptoms of bleeding or clotting: No    Previous INR: Subtherapeutic    Additional findings: None     PLAN     Recommended plan for no diet, medication or health factor changes affecting INR     Dosing Instructions:  Increase your warfarin dose (6.7% change) with next INR in 2 weeks   Last couple INR's have been subtherapeutic or at 2.0.    Summary  As of 7/1/2021    Full warfarin instructions:  7.5 mg every Mon, Thu; 5 mg all other days   Next INR check:  7/12/2021             Telephone call with Billy whom verbalizes understanding and agrees to plan    Lab visit scheduled    Education provided: Please call back if any changes to your diet, medications or how you've been taking warfarin and Impact of vitamin K foods on INR    Plan made per [facility] anticoagulation protocol    Samantha Galeas RN  Anticoagulation Clinic  7/1/2021    _______________________________________________________________________     Anticoagulation Episode Summary     Current INR goal:  2.0-3.0   TTR:  61.9 % (1 y)   Target end date:  Indefinite   Send INR reminders to:  [facility]    Indications    Long term current use of anticoagulants with INR goal of 2.0-3.0 [Z79.01]  Chronic atrial fibrillation (H) [I48.20]           Comments:  Pt reports taking Warfarin in the morning, d/t COVID - Patient may extend INR interval up to 8 weeks, and +2 weeks with each subsequent INR in range up to 12 weeks max if no S/S bleeding, clotting,  illness or changes in medications that affect warfarin l         Anticoagulation Care Providers     Provider Role Specialty Phone number    Malcolm Schafer MD Referring Internal Medicine [phone]

## 2021-07-01 NOTE — PROGRESS NOTES
Left message to call [phone]. Please transfer call to Samantha at [phone]  Samantha Galeas RN, BSN  Anticoagulation Clinic

## 2021-07-03 ENCOUNTER — OFFICE VISIT (OUTPATIENT)
Dept: URGENT CARE | Facility: URGENT CARE | Age: 76
End: 2021-07-03
Payer: COMMERCIAL

## 2021-07-03 VITALS
HEART RATE: 77 BPM | TEMPERATURE: 97.4 F | DIASTOLIC BLOOD PRESSURE: 88 MMHG | SYSTOLIC BLOOD PRESSURE: 140 MMHG | BODY MASS INDEX: 28.76 KG/M2 | OXYGEN SATURATION: 100 % | WEIGHT: 218 LBS

## 2021-07-03 DIAGNOSIS — S39.012A STRAIN OF LUMBAR REGION, INITIAL ENCOUNTER: Primary | ICD-10-CM

## 2021-07-03 DIAGNOSIS — Z79.01 LONG TERM CURRENT USE OF ANTICOAGULANTS WITH INR GOAL OF 2.0-3.0: ICD-10-CM

## 2021-07-03 DIAGNOSIS — G20.A1 PARKINSON DISEASE (H): ICD-10-CM

## 2021-07-03 DIAGNOSIS — I48.20 CHRONIC ATRIAL FIBRILLATION (H): ICD-10-CM

## 2021-07-03 PROCEDURE — 99214 OFFICE O/P EST MOD 30 MIN: CPT | Performed by: PHYSICIAN ASSISTANT

## 2021-07-03 NOTE — PROGRESS NOTES
ASSESSMENT/PLAN:      (S39.012A) Strain of lumbar region, initial encounter  (primary encounter diagnosis)    MDM: Extremely pleasant 75 year old male, with a significant past-medical history (please see full history below), with acute onset right lower paraspinous back pain. Patient feels quite certain it is related to lifting a 5 gallon jug of water. His history and exam is consistent with acute muscle strain. Due to his personal history, additional screening is undertaken. At this time, will treat for presumed lumbar strain. Please see below patient discharge plan (which I personally reviewed with him verbally and provided in printed form today).     Plan:         July 3, 2021 [facility] Plan:     1. Tylenol, as needed, for pain. Please do not take more than 3000 mg in a 24 hour period.     2. Do not take over-the-counter Ibuprofen/Motrin/Advil or Aleve. Due to your use of blood thinners, this can increase your risk of bleeding problems.     3. As we discussed, due to the side effects of prescription muscle relaxing medications, and your personal history of Parkinson's, I do not recommend this medication for you today.     4. Please alternate heat and ice on your low back.     5. Do very gentle massage to the area (as we discussed  Today).      6. You may purchase an over-the-counter low back support brace (I recommend the soft variety) to try for a few days.     7. Follow-up with your primary care provider if your symptoms do not start improving over the next 3 to 5 days.     8. Go directly to the emergency room if you develop any of the below:       Fever of 100.4 F (38 C) or higher, or as directed by your rrovider    Chills    Numbness, tingling, or weakness    Problems with bowel or bladder control, or problems having sex    Pain that does not go away, or gets worse    New symptoms      (G20) Parkinson disease (H)      (Z79.01) Long term current use of anticoagulants with INR goal of 2.0-3.0  Plan:  "Patient states he is aware INR is subtherapeutic and is working with INR nurse     (I48.20) Chronic atrial fibrillation (H)  --------------------------------------------------------------------------------------------    SUBJECTIVE:      HPI: Billy Stock is a 75 year old male, with a significant past medical history that includes Parkinson's Disease, Prostate Cancer, Chronic A-Fib (rate controlled), use of chronic anti-coagulant, hypertension and CKD (please see past medical history list and problem list in Epic for full medical history), who presents to urgent care today for what he tells me is a suspected \"pulled back muscle\".     HPI: Patient states he bent forward to  a heavy  5 gallon water jug several days ago--the following day he developed right lower back pain (points to paraspinous area as siteof pain today). He was able to go to work today, despite pain, but presents now due to fact it is not yet improving.      Denies any other other overt injury or trauma.      Pain is located: see above HPI   Perceived Pain Level:  Best 1/10 sitting or laying and -7/10 with bending, twisting (for example getting out of bed and in and out of car)  Recent injury: As per above   No personal hx of back problems on questioning today   Pain is exacerbated by:  Trunk twisting, lifting and bending forward .  Pain is relieved by: Patient states he used \"icy hot\" and took Tylenol 650 mg last night before bed and reports good response to that treatment     Cauda Equina Review: Denies any fecal incontinence, lower extremity numbness, LE tingling, urinary incontinence, saddle area paraesthesias.     Impingement Review: Denies any radicular lower extremity symptoms or acute lower extremity weakness.     ROS     CONSTITUTIONAL: negative for and associated acute onset  fever, chills, night sweats, weight loss, dizziness, fatigue, weakness.  CARDIAC: Denies any CP or SOB. Denies any syncope or near syncope  RESP: Denies any " cough, wheezing, SOB or hemoptysis  GI: Denies any abdominal pain. Denies any F/C/N/V/D  SKIN: Denies rash. Denies any vesicles or lesions   URINARY REVIEW:  No change in longstanding urinary stream. Denies any acute dysuria, urinary urgency/frequency, hematuria, fever, chills, nausea, vomiting  RHEUM: Denies any acute onset red, warm or swollen joints   LE: Denies any LE edema       Past Medical History:   Diagnosis Date     Bell's palsy 10/15/2002     CA IN SITU PROSTATE 10/15/2002     Chronic atrial fibrillation (H) 7/1/10     Glaucoma 4/10/2003     Problem list name updated by automated process. Provider to review     Gout 5/16/2013     Hyperlipidemia LDL goal <100 9/10/2014     Hypertension goal BP (blood pressure) < 140/90 6/28/2006     SARAH (obstructive sleep apnea) 8/28/2013     Parkinson disease (H) 2019     Pericarditis 2001     Renal cyst        Patient Active Problem List   Diagnosis     Allergic rhinitis due to pollen     Carcinoma in situ of prostate     Bell's palsy     Glaucoma     Hypertension goal BP (blood pressure) < 140/90     CARDIOVASCULAR SCREENING; LDL GOAL LESS THAN 100     Colon polyps     Gout     HCD (health care directive)     SARAH (obstructive sleep apnea)     Hyperlipidemia LDL goal <100     Gout of foot, unspecified cause, unspecified chronicity, unspecified laterality     Chronic atrial fibrillation (H)     Long term current use of anticoagulants with INR goal of 2.0-3.0     Essential hypertension, benign     Psoas hematoma, right, secondary to anticoagulant therapy     Kidney cyst, acquired     Prostate cancer (H)     Left shoulder pain     Parkinson disease (H)     CKD (chronic kidney disease) stage 3, GFR 30-59 ml/min       Past Surgical History:   Procedure Laterality Date     CATARACT IOL, RT/LT  10/15, 11/15     COLONOSCOPY  2009    [facility]     COLONOSCOPY  9/30/2014    Dr. Long [facility]     COLONOSCOPY N/A 9/30/2014    Procedure: COMBINED COLONOSCOPY, SINGLE BIOPSY/POLYPECTOMY BY  BIOPSY;  Surgeon: Puneet Long MD;  Location:  GI     EYE SURGERY  2007    glaucoma surgery right eye     HERNIA REPAIR       PROSTATE SURGERY       SUPRAPUBIC PROSTATECTOMY  2002     VASECTOMY           Current Outpatient Medications   Medication     acetaminophen (TYLENOL) 325 MG tablet     allopurinol (ZYLOPRIM) 100 MG tablet     amLODIPine (NORVASC) 10 MG tablet     carbidopa-levodopa (SINEMET)  MG tablet     ipratropium (ATROVENT) 0.03 % nasal spray     lisinopril (ZESTRIL) 40 MG tablet     Loratadine (CLARITIN PO)     metoprolol succinate ER (TOPROL-XL) 50 MG 24 hr tablet     triamcinolone (KENALOG) 0.5 % cream     UNABLE TO FIND     warfarin ANTICOAGULANT (COUMADIN) 5 MG tablet     No current facility-administered medications for this visit.          Allergies   Allergen Reactions     Cats      Codeine      Hallucinated when taking codeine with another medication     Codeine Unknown     Maple Tree      Morphine Visual Disturbance and Other (See Comments)     Watermelon [Citrullus Vulgaris]      Itching throat, hives           OBJECTIVE:  BP (!) 140/88   Pulse 77   Temp 97.4  F (36.3  C) (Tympanic)   Wt 98.9 kg (218 lb)   SpO2 100%   BMI 28.76 kg/m          GENERAL:  Very pleasant, comfortable and generally well appearing.    Back examination: Back symmetric, no curvature.  Positive for mild right sided lumbar paravertebral muscle tenderness on exam today. No mid-line thoracic or lumbar tenderness on exam. Neuro:  Gait within normal limits.  Quadriceps and great toe strength good and equal bilaterally.  Patellar  reflexes good and equal bilaterally. Sensation in multiple dermatomal distributions LE good and equal bilaterally.  Able to forward bend with fingertips to just below knees.    STRAIGHT LEG RAISE: Negative.   RESP: lungs clear to auscultation - no rales, rhonchi or wheezes  CV: irregularly irregular with rate of 80 bpm. No murmur or extra heart sounds noted.   ABDOMEN:  soft,  nontender, no HSM or masses and bowel sounds normal  NEURO: Normal strength and tone with no weakness or sensory deficit noted, reflexes normal   SKIN: no suspicious lesions or rashes    Epic LAB Review:     Component      Latest Ref Rng & Units 7/1/2021   INR      0.86 - 1.14 1.90 (H)     Component      Latest Ref Rng & Units 1/13/2021   Sodium      133 - 144 mmol/L 145 (H)   Potassium      3.4 - 5.3 mmol/L 4.0   Chloride      94 - 109 mmol/L 112 (H)   Carbon Dioxide      20 - 32 mmol/L 27   Anion Gap      3 - 14 mmol/L 6   Glucose      70 - 99 mg/dL 88   Urea Nitrogen      7 - 30 mg/dL 23   Creatinine      0.66 - 1.25 mg/dL 1.50 (H)   GFR Estimate      >60 mL/min/1.73:m2 45 (L)   GFR Estimate If Black      >60 mL/min/1.73:m2 52 (L)   Calcium      8.5 - 10.1 mg/dL 8.4 (L)   Bilirubin Total      0.2 - 1.3 mg/dL 0.4   Albumin      3.4 - 5.0 g/dL 3.4   Protein Total      6.8 - 8.8 g/dL 7.0   Alkaline Phosphatase      40 - 150 U/L 130   ALT      0 - 70 U/L 12   AST      0 - 45 U/L 19

## 2021-07-03 NOTE — PATIENT INSTRUCTIONS
Patient Education     Understanding Lumbosacral Strain    Lumbosacral strain is a medical term for an injury that causes low back pain. The lumbosacral area (low back) is between the bottom of the ribcage and the top of the buttocks. A strain is tearing of muscles and tendons. These tears can be very small but still cause pain.   How a lumbosacral strain happens  Muscles and tendons connected to the spine can be strained in a number of ways:    Sitting or standing in the same position for long periods of time. This can harm the low back over time. Poor posture can make low back pain more likely.    Moving the muscles and tendons past their usual range of motion. This can cause a sudden injury. This can happen when you twist, bend over, or lift something heavy. Not using correct technique for sports or tasks like lifting can make back injury more likely.    Accidents or falls  Lumbosacral strain can be caused by other problems, but these are less common.  Symptoms of lumbosacral strain  Symptoms may include:    Pain in the back, often on one side    Pain that gets worse with movement and gets better with rest    Inability to move as freely as usual    Swelling, slight redness, and skin warmth in the painful area  Treatment for lumbosacral strain  Low back pain often goes away by itself within several weeks. But it often comes back. Treatment focuses on reducing pain and avoiding further injury. Bed rest is usually not recommended for low back pain. Treatments may include:     Avoiding or changing the action that caused the problem. This helps prevent injuring the tissues again.    Prescription or over-the-counter medicines. These help reduce inflammation, swelling, and pain. NSAIDs (nonsteroidal anti-inflammatory drugs) are the most common medicines used. Medicines may be prescribed or bought over the counter. They may be given as pills. Or they may be put on the skin a gel, cream, or patch.    Cold or heat packs.  These help reduce pain and swelling.    Stretching and other exercises.  These improve flexibility and strength.    Physical therapy. This usually includes exercises and other treatments.    Injections of medicine. This may relieve symptoms. The medicine is usually a corticosteroid. This is a strong anti-inflammatory medicine.  If these treatments don't relieve symptoms, your healthcare provider may order imaging tests to learn more about the problem. Sometimes you may need surgery.   Possible complications of lumbosacral strain  If the cause of the pain is not addressed, symptoms may return or get worse. Follow your healthcare provider s instructions on lifestyle changes and treating your back.   When to call your healthcare provider  Call your healthcare provider right away if you have any of these:    Fever of 100.4 F (38 C) or higher, or as directed by your rrovider    Chills    Numbness, tingling, or weakness    Problems with bowel or bladder control, or problems having sex    Pain that does not go away, or gets worse    New symptoms  Sonja last reviewed this educational content on 6/1/2019 2000-2021 [employer]. All rights reserved. This information is not intended as a substitute for professional medical care. Always follow your healthcare professional's instructions.             July 3, 2021 [facility] Plan:     1. Tylenol, as needed, for pain. Please do not take more than 3000 mg in a 24 hour period.     2. Do not take over-the-counter Ibuprofen/Motrin/Advil or Aleve. Due to your use of blood thinners, this can increase your risk of bleeding problems.     3. As we discussed, due to the side effects of prescription muscle relaxing medications, and your personal history of Parkinson's, I do not recommend this medication for you today.     4. Please alternate heat and ice on your low back.     5. Do very gentle massage to the area (as we discussed  Today).      6. You may purchase an  over-the-counter low back support brace (I recommend the soft variety) to try for a few days.     7. Follow-up with your primary care provider if your symptoms do not start improving over the next 3 to 5 days.     8. Go directly to the emergency room if you develop any of the below:       Fever of 100.4 F (38 C) or higher, or as directed by your rrovider    Chills    Numbness, tingling, or weakness    Problems with bowel or bladder control, or problems having sex    Pain that does not go away, or gets worse    New symptoms

## 2021-07-12 ENCOUNTER — LAB (OUTPATIENT)
Dept: LAB | Facility: CLINIC | Age: 76
End: 2021-07-12
Payer: COMMERCIAL

## 2021-07-12 ENCOUNTER — ANTICOAGULATION THERAPY VISIT (OUTPATIENT)
Dept: ANTICOAGULATION | Facility: CLINIC | Age: 76
End: 2021-07-12

## 2021-07-12 DIAGNOSIS — I48.20 CHRONIC ATRIAL FIBRILLATION (H): ICD-10-CM

## 2021-07-12 DIAGNOSIS — Z79.01 LONG TERM CURRENT USE OF ANTICOAGULANTS WITH INR GOAL OF 2.0-3.0: Primary | ICD-10-CM

## 2021-07-12 DIAGNOSIS — Z79.01 LONG TERM CURRENT USE OF ANTICOAGULANTS WITH INR GOAL OF 2.0-3.0: ICD-10-CM

## 2021-07-12 LAB — INR BLD: 2.6 (ref 0.9–1.1)

## 2021-07-12 PROCEDURE — 85610 PROTHROMBIN TIME: CPT

## 2021-07-12 PROCEDURE — 36416 COLLJ CAPILLARY BLOOD SPEC: CPT

## 2021-07-12 NOTE — PROGRESS NOTES
Anticoagulation Management    Unable to reach Marek today.    Today's INR result of 2.6 is therapeutic (goal INR of 2.0-3.0).  Result received from: Clinic Lab    Follow up required to confirm warfarin dose taken and assess for changes    Left message to continue current dose of warfarin 7.5 mg tonight.      Anticoagulation clinic to follow up    Marge Gale RN

## 2021-07-13 NOTE — PROGRESS NOTES
Anticoagulation Management    Unable to reach Marek x2 today.    Yesterday's INR result of 2.6 is therapeutic (goal INR of 2.0-3.0).  Result received from: Clinic Lab    Follow up required to confirm warfarin dose taken and assess for changes    Left message to continue current dose of warfarin 5 mg tonight.      Anticoagulation clinic to follow up    Marge Gale RN

## 2021-07-14 NOTE — PROGRESS NOTES
ANTICOAGULATION MANAGEMENT     Billy Stock 75 year old male is on warfarin with therapeutic INR result. (Goal INR 2.0-3.0)    Recent labs: (last 7 days)     07/12/21  1425   INR 2.6*       ASSESSMENT     Source(s): Patient/Caregiver Call       Warfarin doses taken: Warfarin taken as instructed    Diet: No new diet changes identified    New illness, injury, or hospitalization: Yes, recent back pain.  Patient rotated using ice and heat and now back pain is resolved    Medication/supplement changes: None noted    Signs or symptoms of bleeding or clotting: No    Previous INR: Subtherapeutic    Additional findings: None     PLAN     Recommended plan for no diet, medication or health factor changes affecting INR     Dosing Instructions: Continue your current warfarin dose with next INR in 3 weeks       Summary  As of 7/12/2021    Full warfarin instructions:  7.5 mg every Mon, Thu; 5 mg all other days   Next INR check:               Telephone call with Billy who verbalizes understanding and agrees to plan    Lab visit scheduled    Education provided: None required    Plan made per [facility] anticoagulation protocol    Marge Gale RN  Anticoagulation Clinic  7/14/2021    _______________________________________________________________________     Anticoagulation Episode Summary     Current INR goal:  2.0-3.0   TTR:  61.3 % (1 y)   Target end date:  Indefinite   Send INR reminders to:  [facility]    Indications    Long term current use of anticoagulants with INR goal of 2.0-3.0 [Z79.01]  Chronic atrial fibrillation (H) [I48.20]           Comments:  Pt reports taking Warfarin in the morning, d/t COVID - Patient may extend INR interval up to 8 weeks, and +2 weeks with each subsequent INR in range up to 12 weeks max if no S/S bleeding, clotting, illness or changes in medications that affect warfarin l         Anticoagulation Care Providers     Provider Role Specialty Phone number    Malcolm Schafer MD  Referring Internal Medicine [phone]

## 2021-08-02 ENCOUNTER — ANTICOAGULATION THERAPY VISIT (OUTPATIENT)
Dept: ANTICOAGULATION | Facility: CLINIC | Age: 76
End: 2021-08-02

## 2021-08-02 ENCOUNTER — LAB (OUTPATIENT)
Dept: LAB | Facility: CLINIC | Age: 76
End: 2021-08-02
Payer: COMMERCIAL

## 2021-08-02 DIAGNOSIS — Z79.01 LONG TERM CURRENT USE OF ANTICOAGULANTS WITH INR GOAL OF 2.0-3.0: Primary | ICD-10-CM

## 2021-08-02 DIAGNOSIS — I48.20 CHRONIC ATRIAL FIBRILLATION (H): ICD-10-CM

## 2021-08-02 DIAGNOSIS — Z79.01 LONG TERM CURRENT USE OF ANTICOAGULANTS WITH INR GOAL OF 2.0-3.0: ICD-10-CM

## 2021-08-02 LAB — INR BLD: 3.4 (ref 0.9–1.1)

## 2021-08-02 PROCEDURE — 85610 PROTHROMBIN TIME: CPT

## 2021-08-02 PROCEDURE — 36416 COLLJ CAPILLARY BLOOD SPEC: CPT

## 2021-08-02 NOTE — PROGRESS NOTES
ANTICOAGULATION MANAGEMENT     Billy Stock 75 year old male is on warfarin with supratherapeutic INR result. (Goal INR 2.0-3.0)    Recent labs: (last 7 days)     08/02/21  1406   INR 3.4*       ASSESSMENT     Source(s): Chart Review and Patient/Caregiver Call       Warfarin doses taken: Warfarin taken as instructed    Diet: No new diet changes identified    New illness, injury, or hospitalization: No    Medication/supplement changes: None noted    Signs or symptoms of bleeding or clotting: Yes: Patient reports he has some bruising on his right leg below the knee. Patient reports he noticed this yesterday, does not hurt, does not recall bumping leg on anything    Previous INR: Therapeutic last visit; previously outside of goal range    Additional findings: None     PLAN     Recommended plan for no diet, medication or health factor changes affecting INR     Dosing Instructions: Partial hold then continue your current warfarin dose with next INR in 2 weeks       Summary  As of 8/2/2021    Full warfarin instructions:  8/2: 5 mg; Otherwise 7.5 mg every Mon, Thu; 5 mg all other days   Next INR check:  8/16/2021             Telephone call with Billy who verbalizes understanding and agrees to plan    Lab visit scheduled    Education provided: Please call back if any changes to your diet, medications or how you've been taking warfarin and Contact [phone]  with any changes, questions or concerns.     Plan made per [facility] anticoagulation protocol    Samantha Galeas RN  Anticoagulation Clinic  8/3/2021    _______________________________________________________________________     Anticoagulation Episode Summary     Current INR goal:  2.0-3.0   TTR:  58.5 % (1 y)   Target end date:  Indefinite   Send INR reminders to:  [facility]    Indications    Long term current use of anticoagulants with INR goal of 2.0-3.0 [Z79.01]  Chronic atrial fibrillation (H) [I48.20]           Comments:  Pt reports taking  Warfarin in the morning, d/t COVID - Patient may extend INR interval up to 8 weeks, and +2 weeks with each subsequent INR in range up to 12 weeks max if no S/S bleeding, clotting, illness or changes in medications that affect warfarin l         Anticoagulation Care Providers     Provider Role Specialty Phone number    Malcolm Schafer MD Referring Internal Medicine [phone]        Left message to call [phone].     Anticoagulation Management    Unable to reach Marek today.    Today's INR result of 3.4 is supratherapeutic (goal INR of 2.0-3.0).  Result received from: Clinic Lab    Follow up required to confirm warfarin dose taken and assess for changes    Left message to take reduced dose of warfarin, 5 mg tonight.      Anticoagulation clinic to follow up    Samantha Galeas RN

## 2021-08-05 ENCOUNTER — OFFICE VISIT (OUTPATIENT)
Dept: CARDIOLOGY | Facility: CLINIC | Age: 76
End: 2021-08-05
Payer: COMMERCIAL

## 2021-08-05 VITALS
SYSTOLIC BLOOD PRESSURE: 138 MMHG | BODY MASS INDEX: 29 KG/M2 | DIASTOLIC BLOOD PRESSURE: 87 MMHG | OXYGEN SATURATION: 98 % | WEIGHT: 218.8 LBS | HEIGHT: 73 IN | HEART RATE: 71 BPM

## 2021-08-05 DIAGNOSIS — I48.19 PERSISTENT ATRIAL FIBRILLATION (H): Primary | ICD-10-CM

## 2021-08-05 PROCEDURE — 93000 ELECTROCARDIOGRAM COMPLETE: CPT | Performed by: INTERNAL MEDICINE

## 2021-08-05 PROCEDURE — 99214 OFFICE O/P EST MOD 30 MIN: CPT | Performed by: INTERNAL MEDICINE

## 2021-08-05 ASSESSMENT — MIFFLIN-ST. JEOR: SCORE: 1786.35

## 2021-08-05 NOTE — PROGRESS NOTES
HPI and Plan:   See dictation    Orders Placed This Encounter   Procedures     Follow-Up with Cardiac Advanced Practice Provider     EKG 12-lead complete w/read - Clinics (performed today)     EKG 12-lead complete w/read (Future)- to be scheduled       No orders of the defined types were placed in this encounter.      Medications Discontinued During This Encounter   Medication Reason     triamcinolone (KENALOG) 0.5 % cream Stopped by Patient         Encounter Diagnosis   Name Primary?     Persistent atrial fibrillation (H) Yes       CURRENT MEDICATIONS:  Current Outpatient Medications   Medication Sig Dispense Refill     acetaminophen (TYLENOL) 325 MG tablet Take 1-2 tablets by mouth every 6 hours as needed.       allopurinol (ZYLOPRIM) 100 MG tablet Take 1 tablet (100 mg) by mouth daily 90 tablet 3     amLODIPine (NORVASC) 10 MG tablet Take 1 tablet (10 mg) by mouth daily 90 tablet 3     carbidopa-levodopa (SINEMET)  MG tablet Take 1 tablet by mouth 3 times daily       ipratropium (ATROVENT) 0.03 % nasal spray Spray 2 sprays into both nostrils every 12 hours 1 Box 3     lisinopril (ZESTRIL) 40 MG tablet Take 1 tablet (40 mg) by mouth daily 90 tablet 3     Loratadine (CLARITIN PO) Take by mouth daily       metoprolol succinate ER (TOPROL-XL) 50 MG 24 hr tablet Take 1.5 tablets (75 mg) by mouth daily 135 tablet 3     UNABLE TO FIND MEDICATION NAME: Tab-A-Celio, multivitmain with Iron by [facility]       warfarin ANTICOAGULANT (COUMADIN) 5 MG tablet TAKE 1 TABLET EVERY DAY EXCEPT TAKE 1/2 TABLET ON WEDNESDAY OR AS INSTRUCTED BY INR CLINIC (Patient taking differently: TAKE 1.5 TABLET E Monand Thurs all other days are  1 TABLETAS INSTRUCTED BY INR CLINIC) 86 tablet 1       ALLERGIES     Allergies   Allergen Reactions     Cats      Codeine      Hallucinated when taking codeine with another medication     Codeine Unknown     Maple Tree      Morphine Visual Disturbance and Other (See Comments)     Watermelon [Citrullus  Vulgaris]      Itching throat, hives       PAST MEDICAL HISTORY:  Past Medical History:   Diagnosis Date     Bell's palsy 10/15/2002     CA IN SITU PROSTATE 10/15/2002     Chronic atrial fibrillation (H) 2010     Chronic renal insufficiency      Glaucoma 04/10/2003     Problem list name updated by automated process. Provider to review     Gout 2013     Hyperlipidemia LDL goal <100 09/10/2014     Hypertension goal BP (blood pressure) < 140/90 2006     SARAH (obstructive sleep apnea) 2013     Parkinson disease (H) 2019     Pericarditis      Renal cyst        PAST SURGICAL HISTORY:  Past Surgical History:   Procedure Laterality Date     CATARACT IOL, RT/LT  10/15, 11/15     COLONOSCOPY      [facility]     COLONOSCOPY  2014    Dr. Long [facility]     COLONOSCOPY N/A 2014    Procedure: COMBINED COLONOSCOPY, SINGLE BIOPSY/POLYPECTOMY BY BIOPSY;  Surgeon: Puneet Long MD;  Location:  GI     EYE SURGERY      glaucoma surgery right eye     HERNIA REPAIR       PROSTATE SURGERY       SUPRAPUBIC PROSTATECTOMY       VASECTOMY         FAMILY HISTORY:  Family History   Problem Relation Age of Onset     Hypertension Maternal Grandfather      Cerebrovascular Disease Maternal Grandfather      Hypertension Maternal Grandmother      Cerebrovascular Disease Maternal Grandmother      Hypertension Mother          age 51, MVA     Hypertension Brother      Heart Failure Brother      Bronchitis Brother      Other - See Comments Brother         multiple myeloma     Heart Surgery Brother         defibrilater     Prostate Problems Brother      Diabetes Brother      Other Cancer Brother         Multiple Myeloma     Breast Cancer Maternal Aunt         hx     Cancer Maternal Aunt         cervical cancer     Cancer - colorectal Maternal Aunt      Unknown/Adopted Father         Don't know father's history     Diabetes Son      Cerebrovascular Disease Son      Colon Cancer Other      No Known Problems  Daughter        SOCIAL HISTORY:  Social History     Socioeconomic History     Marital status:      Spouse name: None     Number of children: 3     Years of education: None     Highest education level: None   Occupational History     None   Tobacco Use     Smoking status: Never Smoker     Smokeless tobacco: Never Used   Substance and Sexual Activity     Alcohol use: No     Alcohol/week: 0.0 standard drinks     Drug use: No     Sexual activity: Not Currently   Other Topics Concern     Parent/sibling w/ CABG, MI or angioplasty before 65F 55M? Not Asked      Service Not Asked     Blood Transfusions Not Asked     Caffeine Concern No     Occupational Exposure No     Hobby Hazards No     Sleep Concern Yes     Comment: CPAP at night     Stress Concern No     Weight Concern No     Special Diet No     Back Care No     Exercise No     Bike Helmet Not Asked     Seat Belt Yes     Self-Exams Not Asked   Social History Narrative     None     Social Determinants of Health     Financial Resource Strain:      Difficulty of Paying Living Expenses:    Food Insecurity:      Worried About Running Out of Food in the Last Year:      Ran Out of Food in the Last Year:    Transportation Needs:      Lack of Transportation (Medical):      Lack of Transportation (Non-Medical):    Physical Activity:      Days of Exercise per Week:      Minutes of Exercise per Session:    Stress:      Feeling of Stress :    Social Connections:      Frequency of Communication with Friends and Family:      Frequency of Social Gatherings with Friends and Family:      Attends [employer] Services:      Active Member of Clubs or Organizations:      Attends Club or Organization Meetings:      Marital Status:    Intimate Partner Violence:      Fear of Current or Ex-Partner:      Emotionally Abused:      Physically Abused:      Sexually Abused:        Review of Systems:  Skin:  Positive for   skin pigmentation   Eyes:  Negative for cataracts pateint had  "cateract surgery 2015 and 2016  ENT:  Positive for   Fell and broke nose in May.  Respiratory:  Positive for dyspnea on exertion (sob with stairs)     Cardiovascular:  palpitations;Negative for;chest pain;fatigue;dizziness lightheadedness;Positive for;edema    Gastroenterology: Negative for reflux;nausea;heartburn    Genitourinary:  Positive for incontinence    Musculoskeletal:  Positive for arthritis Parkinson  Neurologic:  Negative      Psychiatric:  Positive for excessive stress    Heme/Lymph/Imm:  Negative      Endocrine:  Negative        Physical Exam:  Vitals: /87   Pulse 71   Ht 1.854 m (6' 1\")   Wt 99.2 kg (218 lb 12.8 oz)   SpO2 98%   BMI 28.87 kg/m      Constitutional:  cooperative, alert and oriented, well developed, well nourished, in no acute distress        Skin:  warm and dry to the touch          Head:  normocephalic        Eyes:  pupils equal and round        Lymph:      ENT:  no pallor or cyanosis        Neck:           Respiratory:  normal breath sounds, clear to auscultation, normal A-P diameter, normal symmetry, normal respiratory excursion, no use of accessory muscles         Cardiac: regular rhythm, normal S1/S2, no S3 or S4, apical impulse not displaced, no murmurs, gallops or rubs irregularly irregular rhythm              pulses full and equal                                        GI:  abdomen soft        Extremities and Muscular Skeletal:  no deformities, clubbing, cyanosis, erythema observed              Neurological:  no gross motor deficits        Psych:  Alert and Oriented x 3        CC  Justo Reilly MD  [address]  [id number]  [place] [id number]              "

## 2021-08-05 NOTE — PROGRESS NOTES
Service Date: 08/05/2021    HISTORY OF PRESENT ILLNESS:  I saw Mr. Stock for followup of atrial fibrillation.  He is a 75-year-old black male with a history of persistent atrial fibrillation.  He has been on rate control and warfarin anticoagulation.  According to the patient, his INR appears to be somewhat unstable and he seemed to require relatively frequent titration of the dose of warfarin.  He has not had any bleeding issues.    During the last clinic visit previously, I lowered the dose of metoprolol to 75 mg p.o. b.i.d. and added lisinopril.  Over the last year, he has been doing quite well.  Currently, he has no palpitation, shortness of breath or fatigue.    PHYSICAL EXAMINATION:  On examination, blood pressure was 138/87, heart rate 71 beats per minute, body weight 218 pounds.  The eyes and ENT were unremarkable.  The lungs had no crackles or wheezing.  The cardiac rhythm was irregularly irregular.  Heart sounds were normal with no murmur.  Abdominal examination showed moderate obesity.  He has trace leg edema over the right lower leg.    EKG today showed atrial fibrillation with controlled ventricular rate.    ASSESSMENT AND RECOMMENDATIONS:  Mr. Stock is doing well symptomatically.  He has a controlled ventricular rate on current medications.  His INR is somewhat variable, but he has not had any bleeding issues.  He has underlying kidney dysfunction and the creatinine level has been relatively stable.  For the time being, he will continue the same medications.  If he has further issues with INR titration, it may be beneficial for him to consider a new anticoagulant.    The patient is tentatively scheduled for followup in 1 year.  He stated that he may retire and move to [place] at some point.  I informed him that if he moves, he can find a new cardiologist and sign the release of information so that we can transfer his medical record.    cc:  Malcolm Schafer MD  [facility] [address] [place] [address]    Justo Reilly MD        D: 2021   T: 2021   MT: al    Name:     TONY KIRBY  MRN:      [id number]        Account:      [id number]   :      1945           Service Date: 2021       Document: [id number]

## 2021-08-05 NOTE — LETTER
8/5/2021    Malcolm Schafer MD  [address] [facility] [address]    RE: Billy Montrell       Dear Colleague,    I had the pleasure of seeing Billy Stock in the [facility].    HPI and Plan:   See dictation    Orders Placed This Encounter   Procedures     Follow-Up with Cardiac Advanced Practice Provider     EKG 12-lead complete w/read - Clinics (performed today)     EKG 12-lead complete w/read (Future)- to be scheduled       No orders of the defined types were placed in this encounter.      Medications Discontinued During This Encounter   Medication Reason     triamcinolone (KENALOG) 0.5 % cream Stopped by Patient         Encounter Diagnosis   Name Primary?     Persistent atrial fibrillation (H) Yes       CURRENT MEDICATIONS:  Current Outpatient Medications   Medication Sig Dispense Refill     acetaminophen (TYLENOL) 325 MG tablet Take 1-2 tablets by mouth every 6 hours as needed.       allopurinol (ZYLOPRIM) 100 MG tablet Take 1 tablet (100 mg) by mouth daily 90 tablet 3     amLODIPine (NORVASC) 10 MG tablet Take 1 tablet (10 mg) by mouth daily 90 tablet 3     carbidopa-levodopa (SINEMET)  MG tablet Take 1 tablet by mouth 3 times daily       ipratropium (ATROVENT) 0.03 % nasal spray Spray 2 sprays into both nostrils every 12 hours 1 Box 3     lisinopril (ZESTRIL) 40 MG tablet Take 1 tablet (40 mg) by mouth daily 90 tablet 3     Loratadine (CLARITIN PO) Take by mouth daily       metoprolol succinate ER (TOPROL-XL) 50 MG 24 hr tablet Take 1.5 tablets (75 mg) by mouth daily 135 tablet 3     UNABLE TO FIND MEDICATION NAME: Tab-A-Celio, multivitmain with Iron by [facility]       warfarin ANTICOAGULANT (COUMADIN) 5 MG tablet TAKE 1 TABLET EVERY DAY EXCEPT TAKE 1/2 TABLET ON WEDNESDAY OR AS INSTRUCTED BY INR CLINIC (Patient taking differently: TAKE 1.5 TABLET E Monand Thurs all other days are  1 TABLETAS INSTRUCTED BY INR CLINIC) 86 tablet 1        ALLERGIES     Allergies   Allergen Reactions     Cats      Codeine      Hallucinated when taking codeine with another medication     Codeine Unknown     Maple Tree      Morphine Visual Disturbance and Other (See Comments)     Watermelon [Citrullus Vulgaris]      Itching throat, hives       PAST MEDICAL HISTORY:  Past Medical History:   Diagnosis Date     Bell's palsy 10/15/2002     CA IN SITU PROSTATE 10/15/2002     Chronic atrial fibrillation (H) 2010     Chronic renal insufficiency      Glaucoma 04/10/2003     Problem list name updated by automated process. Provider to review     Gout 2013     Hyperlipidemia LDL goal <100 09/10/2014     Hypertension goal BP (blood pressure) < 140/90 2006     SARAH (obstructive sleep apnea) 2013     Parkinson disease (H) 2019     Pericarditis 2001     Renal cyst        PAST SURGICAL HISTORY:  Past Surgical History:   Procedure Laterality Date     CATARACT IOL, RT/LT  10/15, 11/15     COLONOSCOPY      [facility]     COLONOSCOPY  2014    Dr. Long [facility]     COLONOSCOPY N/A 2014    Procedure: COMBINED COLONOSCOPY, SINGLE BIOPSY/POLYPECTOMY BY BIOPSY;  Surgeon: Puneet Long MD;  Location:  GI     EYE SURGERY      glaucoma surgery right eye     HERNIA REPAIR       PROSTATE SURGERY       SUPRAPUBIC PROSTATECTOMY  2002     VASECTOMY         FAMILY HISTORY:  Family History   Problem Relation Age of Onset     Hypertension Maternal Grandfather      Cerebrovascular Disease Maternal Grandfather      Hypertension Maternal Grandmother      Cerebrovascular Disease Maternal Grandmother      Hypertension Mother          age 51, MVA     Hypertension Brother      Heart Failure Brother      Bronchitis Brother      Other - See Comments Brother         multiple myeloma     Heart Surgery Brother         defibrilater     Prostate Problems Brother      Diabetes Brother      Other Cancer Brother         Multiple Myeloma     Breast Cancer Maternal Aunt          hx     Cancer Maternal Aunt         cervical cancer     Cancer - colorectal Maternal Aunt      Unknown/Adopted Father         Don't know father's history     Diabetes Son      Cerebrovascular Disease Son      Colon Cancer Other      No Known Problems Daughter        SOCIAL HISTORY:  Social History     Socioeconomic History     Marital status:      Spouse name: None     Number of children: 3     Years of education: None     Highest education level: None   Occupational History     None   Tobacco Use     Smoking status: Never Smoker     Smokeless tobacco: Never Used   Substance and Sexual Activity     Alcohol use: No     Alcohol/week: 0.0 standard drinks     Drug use: No     Sexual activity: Not Currently   Other Topics Concern     Parent/sibling w/ CABG, MI or angioplasty before 65F 55M? Not Asked      Service Not Asked     Blood Transfusions Not Asked     Caffeine Concern No     Occupational Exposure No     Hobby Hazards No     Sleep Concern Yes     Comment: CPAP at night     Stress Concern No     Weight Concern No     Special Diet No     Back Care No     Exercise No     Bike Helmet Not Asked     Seat Belt Yes     Self-Exams Not Asked   Social History Narrative     None     Social Determinants of Health     Financial Resource Strain:      Difficulty of Paying Living Expenses:    Food Insecurity:      Worried About Running Out of Food in the Last Year:      Ran Out of Food in the Last Year:    Transportation Needs:      Lack of Transportation (Medical):      Lack of Transportation (Non-Medical):    Physical Activity:      Days of Exercise per Week:      Minutes of Exercise per Session:    Stress:      Feeling of Stress :    Social Connections:      Frequency of Communication with Friends and Family:      Frequency of Social Gatherings with Friends and Family:      Attends Sikhism Services:      Active Member of Clubs or Organizations:      Attends Club or Organization Meetings:      Marital  "Status:    Intimate Partner Violence:      Fear of Current or Ex-Partner:      Emotionally Abused:      Physically Abused:      Sexually Abused:        Review of Systems:  Skin:  Positive for   skin pigmentation   Eyes:  Negative for cataracts pateint had cateract surgery 2015 and 2016  ENT:  Positive for   Fell and broke nose in May.  Respiratory:  Positive for dyspnea on exertion (sob with stairs)     Cardiovascular:  palpitations;Negative for;chest pain;fatigue;dizziness lightheadedness;Positive for;edema    Gastroenterology: Negative for reflux;nausea;heartburn    Genitourinary:  Positive for incontinence    Musculoskeletal:  Positive for arthritis Parkinson  Neurologic:  Negative      Psychiatric:  Positive for excessive stress    Heme/Lymph/Imm:  Negative      Endocrine:  Negative        Physical Exam:  Vitals: /87   Pulse 71   Ht 1.854 m (6' 1\")   Wt 99.2 kg (218 lb 12.8 oz)   SpO2 98%   BMI 28.87 kg/m      Constitutional:  cooperative, alert and oriented, well developed, well nourished, in no acute distress        Skin:  warm and dry to the touch          Head:  normocephalic        Eyes:  pupils equal and round        Lymph:      ENT:  no pallor or cyanosis        Neck:           Respiratory:  normal breath sounds, clear to auscultation, normal A-P diameter, normal symmetry, normal respiratory excursion, no use of accessory muscles         Cardiac: regular rhythm, normal S1/S2, no S3 or S4, apical impulse not displaced, no murmurs, gallops or rubs irregularly irregular rhythm              pulses full and equal                                        GI:  abdomen soft        Extremities and Muscular Skeletal:  no deformities, clubbing, cyanosis, erythema observed              Neurological:  no gross motor deficits        Psych:  Alert and Oriented x 3        CC  Justo Reilly MD  [address]  [address]  [place] [id number]                  Thank you for allowing me to participate in the care of your " patient.      Sincerely,     Justo Reilly MD     [facility]  cc:   Justo Reilly MD  [address]  [address]  [place] [id number]

## 2021-08-12 ENCOUNTER — OFFICE VISIT (OUTPATIENT)
Dept: URGENT CARE | Facility: URGENT CARE | Age: 76
End: 2021-08-12
Payer: COMMERCIAL

## 2021-08-12 VITALS
SYSTOLIC BLOOD PRESSURE: 133 MMHG | DIASTOLIC BLOOD PRESSURE: 81 MMHG | HEART RATE: 82 BPM | TEMPERATURE: 98.2 F | OXYGEN SATURATION: 100 %

## 2021-08-12 DIAGNOSIS — J01.90 ACUTE SINUSITIS WITH COEXISTING CONDITION REQUIRING PROPHYLACTIC TREATMENT: Primary | ICD-10-CM

## 2021-08-12 PROCEDURE — 99214 OFFICE O/P EST MOD 30 MIN: CPT | Performed by: FAMILY MEDICINE

## 2021-08-12 PROCEDURE — U0003 INFECTIOUS AGENT DETECTION BY NUCLEIC ACID (DNA OR RNA); SEVERE ACUTE RESPIRATORY SYNDROME CORONAVIRUS 2 (SARS-COV-2) (CORONAVIRUS DISEASE [COVID-19]), AMPLIFIED PROBE TECHNIQUE, MAKING USE OF HIGH THROUGHPUT TECHNOLOGIES AS DESCRIBED BY CMS-2020-01-R: HCPCS | Performed by: FAMILY MEDICINE

## 2021-08-12 PROCEDURE — U0005 INFEC AGEN DETEC AMPLI PROBE: HCPCS | Performed by: FAMILY MEDICINE

## 2021-08-12 NOTE — LETTER
August 12, 2021      Billy Stock  [address]  [place] [address]        To Whom It May Concern:    Billy Stock  was seen on 8/12.  Please excuse him from work 8/12 -8/13 due to illness.  He is cleared to return to work 8/16 if COVID screen result is negative and improvement of symptoms.  If his COVID screen is positive, then he will need to quarantine for total of 10 days.        Sincerely,        William James MD

## 2021-08-12 NOTE — PATIENT INSTRUCTIONS
Okay to take tylenol 500 mg - 2 tablets (1000 mg) every 6-8 hours as needed, do not exceed 3000 mg in 24 hours.  Take full course of antibiotic - Augmentin for early sinus infection    Please call INR clinic regarding coumadin adjustment due to antibiotic use    Quarantine for total of 10 days if positive for COVID test.      Patient Education     Sinusitis (Antibiotic Treatment)    The sinuses are air-filled spaces within the bones of the face. They connect to the inside of the nose. Sinusitis is an inflammation of the tissue that lines the sinuses. Sinusitis can occur during a cold. It can also happen due to allergies to pollens and other particles in the air. Sinusitis can cause symptoms of sinus congestion and a feeling of fullness. A sinus infection causes fever, headache, and facial pain. There is often green or yellow fluid draining from the nose or into the back of the throat (post-nasal drip). You have been given antibiotics to treat this condition.   Home care    Take the full course of antibiotics as instructed. Don't stop taking them, even when you feel better.    Drink plenty of water, hot tea, and other liquids as directed by the healthcare provider. This may help thin nasal mucus. It also may help your sinuses drain fluids.    Heat may help soothe painful areas of your face. Use a towel soaked in hot water. Or,  the shower and direct the warm spray onto your face. Using a vaporizer along with a menthol rub at night may also help soothe symptoms.     An expectorant with guaifenesin may help thin nasal mucus and help your sinuses drain fluids. Talk with your provider or pharmacists before taking an over-the-counter (OTC) medicine if you have any questions about it or its side effects..    You can use an OTC decongestant, unless a similar medicine was prescribed to you. Nasal sprays work the fastest. Use one that contains phenylephrine or oxymetazoline. First blow your nose gently. Then use the  spray. Don't use these medicines more often than directed on the label. If you do, your symptoms may get worse. You may also take pills that contain pseudoephedrine. Don t use products that combine multiple medicines. This is because side effects may be increased. Read labels. You can also ask the pharmacist for help. (People with high blood pressure should not use decongestants. They can raise blood pressure.) Talk with your provider or pharmacist if you have any questions about the medicine..    OTC antihistamines may help if allergies contributed to your sinusitis. Talk with your provider or pharmacist if you have any questions about the medicine..    Don't use nasal rinses or irrigation during an acute sinus infection, unless your healthcare provider tells you to. Rinsing may spread the infection to other areas in your sinuses.    Use acetaminophen or ibuprofen to control pain, unless another pain medicine was prescribed to you. If you have chronic liver or kidney disease or ever had a stomach ulcer, talk with your healthcare provider before using these medicines. Never give aspirin to anyone under age 18 who is ill with a fever. It may cause severe liver damage.    Don't smoke. This can make symptoms worse.    Follow-up care  Follow up with your healthcare provider, or as advised.   When to seek medical advice  Call your healthcare provider if any of these occur:     Facial pain or headache that gets worse    Stiff neck    Unusual drowsiness or confusion    Swelling of your forehead or eyelids    Symptoms don't go away in 10 days    Vision problems, such as blurred or double vision    Fever of 100.4 F (38 C) or higher, or as directed by your healthcare provider  Call 911  Call 911 if any of these occur:     Seizure    Trouble breathing    Feeling dizzy or faint    Fingernails, skin or lips look blue, purple , or gray  Prevention  Here are steps you can take to help prevent an infection:     Keep good hand  washing habits.    Don t have close contact with people who have sore throats, colds, or other upper respiratory infections.    Don t smoke, and stay away from secondhand smoke.    Stay up to date with of your vaccines.  [employer] last reviewed this educational content on 12/1/2019 2000-2021 [employer]. All rights reserved. This information is not intended as a substitute for professional medical care. Always follow your healthcare professional's instructions.           Patient Education     Understanding COVID-19 (Coronavirus Disease 2019)  COVID-19 is an illness of the lungs. It causes fever, coughing and trouble breathing. The illness is caused by a new virus in the coronavirus family called SARS-CoV-2.  First seen in late 2019, this virus has spread to many cities and countries around the world. Scientists are working to understand it better.      To help prevent spreading the infection, wash your hands often, or use an alcohol-based hand .   For the latest information, visit the CDC website at www.cdc.gov/coronavirus/2019-ncov. Or call 752-WQS-AMXU (725-329-4877).   How does COVID-19 spread?  The virus seems to spread and infect people fairly easily. It may spread by:    Breathing in droplets of fluid that someone coughs or sneezes into the air.    Touching your eyes, nose or mouth after touching an infected surface. (The virus can live on handles, objects, and other surfaces.)  What are the symptoms of COVID-19?  Some people have no symptoms or only mild symptoms. Symptoms can vary from person to person. As experts learn more about COVID-19, new symptoms are being reported.  Symptoms may appear 2 to 14 days after contact with the virus. They include:    Fever or chills    Coughing    Trouble breathing or feeling short of breath    Sore throat    Stuffy or runny nose    Headache and body aches    Fatigue (feeling very tired)    Nausea or vomiting (feeling sick to your stomach or  throwing up)    Diarrhea (loose, watery poop)    Abdominal (belly) pain    Loss of smell or taste  You can check your symptoms with the [facility] s Coronavirus Self-.   What are possible problems from COVID-19?  In many cases, this virus can cause infection (pneumonia) in both lungs. This can make a person very ill, and it can even cause death.  Your chances of severe illness are higher if:    You re an older adult    You have a serious health issue, such as heart or lung disease, diabetes or kidney disease    You have a health problem (or take a medicine) that suppresses the immune system  Rarely, some children develop a severe problem called MIS-C. This is similar to [place] disease, which causes blood vessels and body organs to be inflamed. We don t yet know if MIS-C happens only in children, or if adults are also at risk. We also don t know if it's related to COVID-19--many children with MIS-C test positive for the virus, but not all.  Experts continue to study MIS-C. The [facility] has asked hospitals to report any person under 21 who is ill enough to be in the hospital and has all of the following:    A fever over 100.4 F (38.0 C) for more than 24 hours and either a positive COVID-19 test or exposure to the virus in the last 4 weeks    Inflammation in at least 2 organs such as the heart, lungs or kidneys, with lab tests that show inflammation    No other diagnoses besides COVID-19 explain the child's symptoms  How is COVID-19 diagnosed?  Your care team will ask about your symptoms, recent travel and contact with sick people.  Not everyone will be tested for the virus. Tests that check for current COVID-19 infection include:    Viral (PCR) tests. Viral tests are very accurate. Testers may use a cotton swab to take a sample of cells from your nose and throat, or they may take a sample of your saliva (spit). Some tests can be done at home, while others are done at testing sites. Depending on the test, results might  come back in about 30 minutes, or they may take several days (because they must be sent to a lab). Home test kits are now available in some areas, often with prescription. If you use a home kit, follow the instructions closely.    Antigen tests. Testers may use a cotton swab to take a sample of cells from your nose and throat. Depending on the test, some results are back within an hour. This test is good at finding COVID-19, but it sometimes shows that someone has the virus when they don t (false positive), especially in places where few people have the virus. Antigen tests are more likely to miss a COVID-19 infection than a viral test. If your antigen test is negative (shows you don t have the virus), but you have symptoms of COVID-19, your care team may order a viral test.  You may have other tests as well, such as:    Antibody (blood test).  This test may show if you ve had the virus in the past--it won t tell us if you have it right now. (It takes a few weeks for the antibodies to show up in your blood). If you ve had the virus, you may have immunity (protection from the virus) in the future. We don t know yet how long you would be immune. Antibody tests aren t always accurate.    Sputum test (we may collect mucus that you have coughed up from your lungs).    Chest X-ray or CT scan.  Note about immunity  We don t yet know if people can catch COVID-19 more than once. If a person who has fully recovered is re-tested within 3 months, the test may still show low levels of the virus in their body. (In other words, the test may show that they still have COVID-19, even though they aren t spreading it to others.)  This doesn't mean they can't catch COVID-19 again. They may be protected from the virus for a time, but we don t know how long immunity might last.  How is COVID-19 treated?  The FDA has approved a vaccine to prevent COVID-19 in people 16 years and older who are not pregnant or breastfeeding. It's not yet  available to the entire public. The first people to get the vaccine are healthcare staff and those living in long-term care facilities. The vaccine is given as a shot (injection) in the arm muscle. Two doses are needed. The second dose is given several weeks after the first.  For those who have COVID-19, the most proven treatment is to support your body while it fights the virus.    Getting extra rest.    Drink extra fluids (6 to 8 glasses of liquids each day), unless a doctor has told you not to. Ask your care team which fluids are best for you. Avoid fluids that contain caffeine or alcohol.    Take over-the-counter (OTC) pain medicine to reduce pain and fever. Your care team will tell you which medicine to use.  If you are very sick, you may need to stay in the hospital. Your care may include:    IV (intravenous) fluids. We may give you fluids through a needle in your vein to keep hydrated..    Oxygen. To make sure your body gets enough oxygen, we may give you an oxygen mask or a breathing machine (ventilator).    Prone positioning. We may turn you onto your stomach. This will increase the oxygen in your lungs.    Remdesivir. We may give you a medicine through a vein (IV medicine) called remdesivir. It works by stopping the spread of the virus in the body. It's FDA-approved for people being treated in the hospital. This medicine is for those 12 years and older who weigh more than about 88 pounds (40 kgs). In certain cases, it may also be used for people younger than 12 years or who weigh less than about 88 pounds (40 kgs).  Experts are researching experimental treatments as well. These include:    COVID-19 convalescent plasma. Those who have recovered from COVID-19 may be asked to donate plasma. The plasma may contain antibodies to help fight the virus in others. Experts don't know if the plasma will work well as a treatment. Research continues, and the FDA has approved it for emergency use in certain people with  serious or life-threatening COVID-19. For more information, talk to your care team.    Bamlanivimab. The FDA recently approved this treatment (monoclonal antibody therapy) for emergency use for certain people who have COVID-19 but are not in the hospital. It's not widely available and is still being investigated. It s approved for people 12 years and older who weigh about 88 pounds (40 kgs) and are at high risk for severe COVID-19 and a hospital stay. This includes people who are 65 years or older and people with certain chronic conditions.  Are you at risk for COVID-19?  Some studies suggest that people without symptoms may spread COVID-19.  You re at risk for getting COVID-19 if:    You live in (or recently traveled to) an area with a COVID-19 outbreak.    You had close contact with someone who has or may have COVID-19. Close contact means being within 6 feet for a total of 15 minutes or more. This includes several short contacts that add up to at least 15 minutes over a 24-hour period.  Date last modified: 1/15/2021  Sonja last reviewed this educational content on 1/1/2020  This information has been modified by your health care provider with permission from the publisher.    1187-5898 [employer]. [address] [place] [address], [place] [address]. All rights reserved. This information is not intended as a substitute for professional medical care. Always follow your healthcare professional's instructions.

## 2021-08-12 NOTE — PROGRESS NOTES
SUBJECTIVE:   Billy Stock is a 75 year old male presenting with a chief complaint of sinus congestion.  No fever.  Felt warm.  Onset of symptoms was 3 day(s) ago.  Course of illness is worsening.    Severity moderate  Current and Associated symptoms: sinus congestion  Treatment measures tried include: bendaryl Fluids and Rest.  Predisposing factors include:afib, coumadin, HTN, SARAH.    Was out walking the dog and ended up getting mosquito bitten on both arms, endorsed itchiness to area.  Developed more congestion afterwards.    Works at [employer] and is exposed to a lot of people.    Completed J&J COVID vaccination in March    Past Medical History:   Diagnosis Date     Bell's palsy 10/15/2002     CA IN SITU PROSTATE 10/15/2002     Chronic atrial fibrillation (H) 07/01/2010     Chronic renal insufficiency      Glaucoma 04/10/2003     Problem list name updated by automated process. Provider to review     Gout 05/16/2013     Hyperlipidemia LDL goal <100 09/10/2014     Hypertension goal BP (blood pressure) < 140/90 06/28/2006     SARAH (obstructive sleep apnea) 08/28/2013     Parkinson disease (H) 2019     Pericarditis 2001     Renal cyst      Current Outpatient Medications   Medication Sig Dispense Refill     acetaminophen (TYLENOL) 325 MG tablet Take 1-2 tablets by mouth every 6 hours as needed.       allopurinol (ZYLOPRIM) 100 MG tablet Take 1 tablet (100 mg) by mouth daily 90 tablet 3     amLODIPine (NORVASC) 10 MG tablet Take 1 tablet (10 mg) by mouth daily 90 tablet 3     carbidopa-levodopa (SINEMET)  MG tablet Take 1 tablet by mouth 3 times daily       ipratropium (ATROVENT) 0.03 % nasal spray Spray 2 sprays into both nostrils every 12 hours 1 Box 3     lisinopril (ZESTRIL) 40 MG tablet Take 1 tablet (40 mg) by mouth daily 90 tablet 3     Loratadine (CLARITIN PO) Take by mouth daily       metoprolol succinate ER (TOPROL-XL) 50 MG 24 hr tablet Take 1.5 tablets (75 mg) by mouth daily 135 tablet 3      warfarin ANTICOAGULANT (COUMADIN) 5 MG tablet TAKE 1 TABLET EVERY DAY EXCEPT TAKE 1/2 TABLET ON WEDNESDAY OR AS INSTRUCTED BY INR CLINIC (Patient taking differently: TAKE 1.5 TABLET E Monand Thurs all other days are  1 TABLETAS INSTRUCTED BY INR CLINIC) 86 tablet 1     UNABLE TO FIND MEDICATION NAME: Tab-A-Celio, multivitmain with Iron by [facility]       Social History     Tobacco Use     Smoking status: Never Smoker     Smokeless tobacco: Never Used   Substance Use Topics     Alcohol use: No     Alcohol/week: 0.0 standard drinks       ROS:  Review of systems negative except as stated above.    OBJECTIVE:  /81   Pulse 82   Temp 98.2  F (36.8  C)   SpO2 100%   GENERAL APPEARANCE: healthy, alert and no distress  EYES: EOMI,  PERRL, conjunctiva clear  HENT: ear canals and TM's normal.  Nose and mouth without ulcers, erythema or lesions  RESP: lungs clear to auscultation - no rales, rhonchi or wheezes  PSYCH: mentation appears normal and affect normal/bright    ASSESSMENT/PLAN:  (J01.90) Acute sinusitis with coexisting condition requiring prophylactic treatment  (primary encounter diagnosis)  Plan: amoxicillin-clavulanate (AUGMENTIN) 875-125 MG         tablet, Symptomatic COVID-19 Virus         (Coronavirus) by PCR Nasopharyngeal              Reassurance given, reviewed symptomatic treatment with tylenol for discomfort.  Due to age and co-morbid medical conditions, will empirically treat with antibiotic for bacterial etiology for sinus infection - RX Augmentin given.      Will have patient contact INR clinic for coumadin adjustment.    COVID screen obtained due to possible exposure and higher risk medical condition.  Reviewed that needs to quarantine until COVID screen results returns, if symptoms improving with negative screen then okay to return to work.    Follow up with primary provider if no improvement of symptoms in 1 week    William James MD

## 2021-08-13 ENCOUNTER — TELEPHONE (OUTPATIENT)
Dept: INTERNAL MEDICINE | Facility: CLINIC | Age: 76
End: 2021-08-13

## 2021-08-13 LAB — SARS-COV-2 RNA RESP QL NAA+PROBE: NEGATIVE

## 2021-08-13 NOTE — TELEPHONE ENCOUNTER
Returned call to patient.     ANTICOAGULATION  MANAGEMENT     Interacting Medication Review    Interacting medication(s): Augmentin with warfarin.    Duration: 10 days  (8/12/21 to 8/22/21)    Indication: possible sinus infection    New medication?: Yes, interaction may increase INR and risk of bleeding     PLAN     Continue current warfarin dose. Recommend to check INR on 8/16/21, lab INR appointment previously scheduled    Spoke with Billy    No adjustment to Anticoagulation Calendar was required    Samantha Galeas RN

## 2021-08-14 ENCOUNTER — TELEPHONE (OUTPATIENT)
Dept: NURSING | Facility: CLINIC | Age: 76
End: 2021-08-14

## 2021-08-14 NOTE — TELEPHONE ENCOUNTER

## 2021-08-16 ENCOUNTER — LAB (OUTPATIENT)
Dept: LAB | Facility: CLINIC | Age: 76
End: 2021-08-16
Payer: COMMERCIAL

## 2021-08-16 ENCOUNTER — ANTICOAGULATION THERAPY VISIT (OUTPATIENT)
Dept: ANTICOAGULATION | Facility: CLINIC | Age: 76
End: 2021-08-16

## 2021-08-16 DIAGNOSIS — Z79.01 LONG TERM CURRENT USE OF ANTICOAGULANTS WITH INR GOAL OF 2.0-3.0: ICD-10-CM

## 2021-08-16 DIAGNOSIS — Z79.01 LONG TERM CURRENT USE OF ANTICOAGULANTS WITH INR GOAL OF 2.0-3.0: Primary | ICD-10-CM

## 2021-08-16 DIAGNOSIS — I48.20 CHRONIC ATRIAL FIBRILLATION (H): ICD-10-CM

## 2021-08-16 LAB — INR BLD: 2.7 (ref 0.9–1.1)

## 2021-08-16 PROCEDURE — 85610 PROTHROMBIN TIME: CPT

## 2021-08-16 PROCEDURE — 36416 COLLJ CAPILLARY BLOOD SPEC: CPT

## 2021-08-16 NOTE — PROGRESS NOTES
Left message to call [phone].     Anticoagulation Management    Unable to reach Marek today.    Today's INR result of 2.7 is therapeutic (goal INR of 2.0-3.0).  Result received from: Clinic Lab    Follow up required to confirm warfarin dose taken and assess for changes    Left message to continue current dose of warfarin 7.5 mg tonight.      Anticoagulation clinic to follow up    Samantha Galeas RN

## 2021-08-17 ENCOUNTER — TELEPHONE (OUTPATIENT)
Dept: INTERNAL MEDICINE | Facility: CLINIC | Age: 76
End: 2021-08-17

## 2021-08-17 NOTE — TELEPHONE ENCOUNTER
Call returned to patient, see 8/16/21 Anticoagulation Encounter for warfarin dosing instruction.  Samantha Galeas RN, BSN  Anticoagulation Clinic

## 2021-08-17 NOTE — TELEPHONE ENCOUNTER
Reason for Call:  Other call back    Detailed comments: Patient is returning call to INR nurse Samantha. Please call again. Thank you.    Phone Number Patient can be reached at: Home number on file [phone] (home)    Best Time: any    Can we leave a detailed message on this number? YES    Call taken on 8/17/2021 at 9:28 AM by Sallie Robles

## 2021-08-17 NOTE — PROGRESS NOTES
ANTICOAGULATION MANAGEMENT     Billy Stock 75 year old male is on warfarin with therapeutic INR result. (Goal INR 2.0-3.0)    Recent labs: (last 7 days)     08/16/21  1112   INR 2.7*       ASSESSMENT     Source(s): Chart Review and Patient/Caregiver Call       Warfarin doses taken: Warfarin taken as instructed    Diet: No new diet changes identified    New illness, injury, or hospitalization: Sinus infection, seen in  8/12/21, patient states he is feeling better    Medication/supplement changes: Augmentin for 10 days, started on 8/12/21.    Signs or symptoms of bleeding or clotting: No    Previous INR: Supratherapeutic    Additional findings: None     PLAN     Recommended plan for no diet, medication or health factor changes affecting INR     Dosing Instructions: Continue your current warfarin dose with next INR in 1 week       Summary  As of 8/16/2021    Full warfarin instructions:  7.5 mg every Mon, Thu; 5 mg all other days   Next INR check:  8/24/2021             Telephone call with Billy who verbalizes understanding and agrees to plan    Lab visit scheduled    Education provided: Please call back if any changes to your diet, medications or how you've been taking warfarin and Contact [phone]  with any changes, questions or concerns.     Plan made per [facility] anticoagulation protocol    Samantha Galeas RN  Anticoagulation Clinic  8/17/2021    _______________________________________________________________________     Anticoagulation Episode Summary     Current INR goal:  2.0-3.0   TTR:  56.3 % (1 y)   Target end date:  Indefinite   Send INR reminders to:  [facility]    Indications    Long term current use of anticoagulants with INR goal of 2.0-3.0 [Z79.01]  Chronic atrial fibrillation (H) [I48.20]           Comments:  Pt reports taking Warfarin in the morning, d/t COVID - Patient may extend INR interval up to 8 weeks, and +2 weeks with each subsequent INR in range up to 12 weeks max if  no S/S bleeding, clotting, illness or changes in medications that affect warfarin l         Anticoagulation Care Providers     Provider Role Specialty Phone number    Malcolm Schafer MD Referring Internal Medicine [phone]

## 2021-08-24 ENCOUNTER — LAB (OUTPATIENT)
Dept: LAB | Facility: CLINIC | Age: 76
End: 2021-08-24
Payer: COMMERCIAL

## 2021-08-24 ENCOUNTER — ANTICOAGULATION THERAPY VISIT (OUTPATIENT)
Dept: ANTICOAGULATION | Facility: CLINIC | Age: 76
End: 2021-08-24

## 2021-08-24 DIAGNOSIS — Z79.01 LONG TERM CURRENT USE OF ANTICOAGULANTS WITH INR GOAL OF 2.0-3.0: Primary | ICD-10-CM

## 2021-08-24 DIAGNOSIS — I48.20 CHRONIC ATRIAL FIBRILLATION (H): ICD-10-CM

## 2021-08-24 DIAGNOSIS — Z79.01 LONG TERM CURRENT USE OF ANTICOAGULANTS WITH INR GOAL OF 2.0-3.0: ICD-10-CM

## 2021-08-24 LAB — INR BLD: 2.8 (ref 0.9–1.1)

## 2021-08-24 PROCEDURE — 36416 COLLJ CAPILLARY BLOOD SPEC: CPT

## 2021-08-24 PROCEDURE — 85610 PROTHROMBIN TIME: CPT

## 2021-08-24 NOTE — PROGRESS NOTES
Anticoagulation Management    Unable to reach Marek today.    Today's INR result of 2.8 is therapeutic (goal INR of 2.0-3.0).  Result received from: Clinic Lab    Follow up required to confirm warfarin dose taken and assess for changes    Left message to continue current dose of warfarin 5 mg tonight.      Anticoagulation clinic to follow up    Marge Gale RN

## 2021-08-25 ENCOUNTER — TELEPHONE (OUTPATIENT)
Dept: INTERNAL MEDICINE | Facility: CLINIC | Age: 76
End: 2021-08-25

## 2021-08-25 NOTE — PROGRESS NOTES
ANTICOAGULATION MANAGEMENT     Billy Stock 75 year old male is on warfarin with therapeutic INR result. (Goal INR 2.0-3.0)    Recent labs: (last 7 days)     08/24/21  1021   INR 2.8*       ASSESSMENT     Source(s): Chart Review and Patient/Caregiver Call       Warfarin doses taken: Warfarin taken as instructed    Diet: No new diet changes identified    New illness, injury, or hospitalization: No    Medication/supplement changes: completed augmentin for sinus infection on Sunday.  Symptoms have improved.    Signs or symptoms of bleeding or clotting: No    Previous INR: Therapeutic last visit; previously outside of goal range    Additional findings: None     PLAN     Recommended plan for no diet, medication or health factor changes affecting INR     Dosing Instructions: Continue your current warfarin dose with next INR in 3 weeks       Summary  As of 8/24/2021    Full warfarin instructions:  7.5 mg every Mon, Thu; 5 mg all other days   Next INR check:  9/13/2021             Telephone call with Billy who verbalizes understanding and agrees to plan    Lab visit scheduled    Education provided: Please call back if any changes to your diet, medications or how you've been taking warfarin    Plan made per [facility] anticoagulation protocol    Marge Gale RN  Anticoagulation Clinic  8/25/2021    _______________________________________________________________________     Anticoagulation Episode Summary     Current INR goal:  2.0-3.0   TTR:  56.3 % (1 y)   Target end date:  Indefinite   Send INR reminders to:  [facility]    Indications    Long term current use of anticoagulants with INR goal of 2.0-3.0 [Z79.01]  Chronic atrial fibrillation (H) [I48.20]           Comments:  Pt reports taking Warfarin in the morning, d/t COVID - Patient may extend INR interval up to 8 weeks, and +2 weeks with each subsequent INR in range up to 12 weeks max if no S/S bleeding, clotting, illness or changes in medications that  affect warfarin l         Anticoagulation Care Providers     Provider Role Specialty Phone number    Malcolm Schafer MD Referring Internal Medicine [phone]

## 2021-08-25 NOTE — TELEPHONE ENCOUNTER
Reason for Call:  Other Appt    Detailed comments: Pt is calling asking when his next INR appointment should be.  Please advise.    Phone Number Patient can be reached at: Home number on file [phone] (home)    Best Time: any    Can we leave a detailed message on this number? YES    Call taken on 8/25/2021 at 8:41 AM by Keila Rowe

## 2021-09-13 ENCOUNTER — LAB (OUTPATIENT)
Dept: LAB | Facility: CLINIC | Age: 76
End: 2021-09-13
Payer: COMMERCIAL

## 2021-09-13 ENCOUNTER — ANTICOAGULATION THERAPY VISIT (OUTPATIENT)
Dept: ANTICOAGULATION | Facility: CLINIC | Age: 76
End: 2021-09-13

## 2021-09-13 DIAGNOSIS — I48.20 CHRONIC ATRIAL FIBRILLATION (H): ICD-10-CM

## 2021-09-13 DIAGNOSIS — Z79.01 LONG TERM CURRENT USE OF ANTICOAGULANTS WITH INR GOAL OF 2.0-3.0: ICD-10-CM

## 2021-09-13 DIAGNOSIS — Z79.01 LONG TERM CURRENT USE OF ANTICOAGULANTS WITH INR GOAL OF 2.0-3.0: Primary | ICD-10-CM

## 2021-09-13 LAB — INR BLD: 2.4 (ref 0.9–1.1)

## 2021-09-13 PROCEDURE — 36416 COLLJ CAPILLARY BLOOD SPEC: CPT

## 2021-09-13 PROCEDURE — 85610 PROTHROMBIN TIME: CPT

## 2021-09-13 NOTE — PROGRESS NOTES
ANTICOAGULATION MANAGEMENT     Billy Stock 75 year old male is on warfarin with therapeutic INR result. (Goal INR 2.0-3.0)    Recent labs: (last 7 days)     09/13/21  1321   INR 2.4*       ASSESSMENT     Source(s): Chart Review and Patient/Caregiver Call       Warfarin doses taken: Warfarin taken as instructed    Diet: No new diet changes identified    New illness, injury, or hospitalization: No    Medication/supplement changes: None noted    Signs or symptoms of bleeding or clotting: No    Previous INR: Therapeutic last 2 visits    Additional findings: None     PLAN     Recommended plan for no diet, medication or health factor changes affecting INR     Dosing Instructions: Continue your current warfarin dose with next INR in 4 weeks       Summary  As of 9/13/2021    Full warfarin instructions:  7.5 mg every Mon, Thu; 5 mg all other days   Next INR check:  10/11/2021             Telephone call with Billy who verbalizes understanding and agrees to plan    Lab visit scheduled    Education provided: Please call back if any changes to your diet, medications or how you've been taking warfarin and Contact [phone]  with any changes, questions or concerns.     Plan made per [facility] anticoagulation protocol    Samantha Galeas RN  Anticoagulation Clinic  9/13/2021    _______________________________________________________________________     Anticoagulation Episode Summary     Current INR goal:  2.0-3.0   TTR:  61.5 % (1 y)   Target end date:  Indefinite   Send INR reminders to:  [facility]    Indications    Long term current use of anticoagulants with INR goal of 2.0-3.0 [Z79.01]  Chronic atrial fibrillation (H) [I48.20]           Comments:  Pt reports taking Warfarin in the morning, d/t COVID - Patient may extend INR interval up to 8 weeks, and +2 weeks with each subsequent INR in range up to 12 weeks max if no S/S bleeding, clotting, illness or changes in medications that affect warfarin l          Anticoagulation Care Providers     Provider Role Specialty Phone number    Malcolm Schafer MD Referring Internal Medicine [phone]

## 2021-09-29 DIAGNOSIS — I48.20 CHRONIC ATRIAL FIBRILLATION (H): ICD-10-CM

## 2021-09-29 DIAGNOSIS — Z79.01 LONG TERM CURRENT USE OF ANTICOAGULANTS WITH INR GOAL OF 2.0-3.0: ICD-10-CM

## 2021-10-01 RX ORDER — WARFARIN SODIUM 5 MG/1
TABLET ORAL
Qty: 100 TABLET | Refills: 1 | Status: SHIPPED | OUTPATIENT
Start: 2021-10-01 | End: 2021-10-05

## 2021-10-01 NOTE — TELEPHONE ENCOUNTER
Prescription approved per [facility] Refill Protocol.  Samantha Galeas RN, BSN  Anticoagulation Clinic

## 2021-10-02 DIAGNOSIS — Z79.01 LONG TERM CURRENT USE OF ANTICOAGULANTS WITH INR GOAL OF 2.0-3.0: ICD-10-CM

## 2021-10-02 DIAGNOSIS — I48.20 CHRONIC ATRIAL FIBRILLATION (H): ICD-10-CM

## 2021-10-05 RX ORDER — WARFARIN SODIUM 5 MG/1
TABLET ORAL
Qty: 100 TABLET | Refills: 0 | Status: SHIPPED | OUTPATIENT
Start: 2021-10-05 | End: 2022-04-04

## 2021-10-05 NOTE — TELEPHONE ENCOUNTER
"Requested Prescriptions   Pending Prescriptions Disp Refills     warfarin ANTICOAGULANT (COUMADIN) 5 MG tablet [Pharmacy Med Name: Warfarin Sodium 5 MG Oral Tablet] 86 tablet 0     Sig: TAKE 1 TABLET BY MOUTH ONCE DAILY EXCEPT  TAKE  ONE-HALF  TABLET  ON  WEDNESDAY  OR  AS  INSTRUCTED  BY  INR  CLINIC       Vitamin K Antagonists Failed - 10/5/2021 10:43 AM        Failed - INR is within goal in the past 6 weeks     Confirm INR is within goal in the past 6 weeks.     Recent Labs   Lab Test 09/13/21  1321   INR 2.4*                       Passed - Recent (12 mo) or future (30 days) visit within the authorizing provider's specialty     Patient has had an office visit with the authorizing provider or a provider within the authorizing providers department within the previous 12 mos or has a future within next 30 days. See \"Patient Info\" tab in inbasket, or \"Choose Columns\" in Meds & Orders section of the refill encounter.              Passed - Medication is active on med list        Passed - Patient is 18 years of age or older           Last office visit 5/20/21.  Warfarin dosing is managed by INR Clinic.  Prescription approved per [facility] Refill Protocol.    "

## 2021-10-11 ENCOUNTER — LAB (OUTPATIENT)
Dept: LAB | Facility: CLINIC | Age: 76
End: 2021-10-11
Payer: COMMERCIAL

## 2021-10-11 ENCOUNTER — OFFICE VISIT (OUTPATIENT)
Dept: UROLOGY | Facility: CLINIC | Age: 76
End: 2021-10-11
Payer: COMMERCIAL

## 2021-10-11 ENCOUNTER — ANTICOAGULATION THERAPY VISIT (OUTPATIENT)
Dept: ANTICOAGULATION | Facility: CLINIC | Age: 76
End: 2021-10-11

## 2021-10-11 VITALS
HEIGHT: 73 IN | DIASTOLIC BLOOD PRESSURE: 80 MMHG | BODY MASS INDEX: 28.89 KG/M2 | SYSTOLIC BLOOD PRESSURE: 130 MMHG | OXYGEN SATURATION: 98 % | WEIGHT: 218 LBS | HEART RATE: 60 BPM

## 2021-10-11 DIAGNOSIS — I48.20 CHRONIC ATRIAL FIBRILLATION (H): ICD-10-CM

## 2021-10-11 DIAGNOSIS — C61 PROSTATE CANCER (H): Primary | ICD-10-CM

## 2021-10-11 DIAGNOSIS — R35.1 NOCTURIA: ICD-10-CM

## 2021-10-11 DIAGNOSIS — Z79.01 LONG TERM CURRENT USE OF ANTICOAGULANTS WITH INR GOAL OF 2.0-3.0: ICD-10-CM

## 2021-10-11 DIAGNOSIS — Z79.01 LONG TERM CURRENT USE OF ANTICOAGULANTS WITH INR GOAL OF 2.0-3.0: Primary | ICD-10-CM

## 2021-10-11 LAB
INR BLD: 2 (ref 0.9–1.1)
PSA SERPL-MCNC: 0.05 UG/L (ref 0–4)

## 2021-10-11 PROCEDURE — 36416 COLLJ CAPILLARY BLOOD SPEC: CPT

## 2021-10-11 PROCEDURE — 99213 OFFICE O/P EST LOW 20 MIN: CPT | Performed by: STUDENT IN AN ORGANIZED HEALTH CARE EDUCATION/TRAINING PROGRAM

## 2021-10-11 PROCEDURE — 85610 PROTHROMBIN TIME: CPT

## 2021-10-11 PROCEDURE — 36415 COLL VENOUS BLD VENIPUNCTURE: CPT | Performed by: STUDENT IN AN ORGANIZED HEALTH CARE EDUCATION/TRAINING PROGRAM

## 2021-10-11 PROCEDURE — 84153 ASSAY OF PSA TOTAL: CPT | Performed by: STUDENT IN AN ORGANIZED HEALTH CARE EDUCATION/TRAINING PROGRAM

## 2021-10-11 ASSESSMENT — PAIN SCALES - GENERAL: PAINLEVEL: NO PAIN (0)

## 2021-10-11 ASSESSMENT — MIFFLIN-ST. JEOR: SCORE: 1782.72

## 2021-10-11 NOTE — PROGRESS NOTES
"CHIEF COMPLAINT   Billy Stock who is a 75 year old male returns today for follow-up of prostate cancer s/p RRP 2002 w/ PSM, adjuvant radiation, on intermittent ADT for biochemical recurrence, last 6 month [facility] 4/12/2021    HPI   Billy Stock is a 75 year old male returns today for follow-up of prostate cancer s/p RRP 2002 w/ PSM, adjuvant radiation, on intermittent ADT for biochemical recurrence, last 6 month [facility] 4/12/2021    PSA today back down to 0.05 ng/ml    Is not having significant side effects from the ADT. He has Parkinson's disease and can't really tell a difference     Has nocturia a few times per night starting around 3am otherwise no significant symptoms    PHYSICAL EXAM  Patient is a 75 year old  male   Vitals: Blood pressure 130/80, pulse 60, height 1.854 m (6' 1\"), weight 98.9 kg (218 lb), SpO2 98 %.  Body mass index is 28.76 kg/m .  General Appearance Adult:   Alert, no acute distress, oriented  HENT: throat/mouth:normal, good dentition  Lungs: no respiratory distress, or pursed lip breathing  Heart: No obvious jugular venous distension present  Abdomen: soft, nontender, no organomegaly or masses  Musculoskeltal: extremities normal, no peripheral edema  Skin: no suspicious lesions or rashes  Neuro: Alert, oriented, speech and mentation normal  Psych: affect and mood normal  Gait: slow gait      Component PSA PSA Diag Urologic Phys   Latest Ref Rng & Units 0 - 4 ug/L 0.00 - 4.00 ng/mL   9/27/2002 <0.1    11/8/2002 <0.1    4/10/2003 0.3    6/3/2004 1.20    5/11/2009 <0.10    2/21/2011 0.08    2/27/2012 0.45    5/21/2012 0.66    6/11/2013 1.03    1/12/2015 0.09    2/25/2016 0.23    2/15/2017  0.44   8/16/2017  1.30   2/19/2018  2.00   9/5/2018  2.50   3/5/2019  3.30   9/5/2019  0.12   1/13/2021 6.34 (H)    4/8/2021  7.20 (H)     10/11/2021 0.05 ng/ml    ASSESSMENT and PLAN  75 year old male returns today for follow-up of prostate cancer s/p RRP 2002 w/ PSM, adjuvant radiation, on " intermittent ADT for biochemical recurrence, last 6 month [facility] 4/12/2021. Good PSA response to the ADT, PSA down to 0.05 ng/ml. Discussed continuing ADT and only reimaging if/when castrate resistance develops, given that he has already had adjuvant radiation in the past.    6 month ADT depot injection today  Reduce fluid intake prior to bedtime  Return in 6 months with PSA prior, for 6 month ADT.      Ángel Guthrie MD   [facility] Urology  [facility] Phone: [phone]

## 2021-10-11 NOTE — LETTER
"10/11/2021       RE: Billy Stock  [address]  [place] [address]     Dear Colleague,    Thank you for referring your patient, Billy Stock, to the [facility]OLOG[facility] [place] at [facility]. Please see a copy of my visit note below.    CHIEF COMPLAINT   Blily Stock who is a 75 year old male returns today for follow-up of prostate cancer s/p RRP 2002 w/ PSM, adjuvant radiation, on intermittent ADT for biochemical recurrence, last 6 month [facility] 4/12/2021    HPI   Billy Stock is a 75 year old male returns today for follow-up of prostate cancer s/p RRP 2002 w/ PSM, adjuvant radiation, on intermittent ADT for biochemical recurrence, last 6 month [facility] 4/12/2021    PSA today back down to 0.05 ng/ml    Is not having significant side effects from the ADT. He has Parkinson's disease and can't really tell a difference     Has nocturia a few times per night starting around 3am otherwise no significant symptoms    PHYSICAL EXAM  Patient is a 75 year old  male   Vitals: Blood pressure 130/80, pulse 60, height 1.854 m (6' 1\"), weight 98.9 kg (218 lb), SpO2 98 %.  Body mass index is 28.76 kg/m .  General Appearance Adult:   Alert, no acute distress, oriented  HENT: throat/mouth:normal, good dentition  Lungs: no respiratory distress, or pursed lip breathing  Heart: No obvious jugular venous distension present  Abdomen: soft, nontender, no organomegaly or masses  Musculoskeltal: extremities normal, no peripheral edema  Skin: no suspicious lesions or rashes  Neuro: Alert, oriented, speech and mentation normal  Psych: affect and mood normal  Gait: slow gait      Component PSA PSA Diag Urologic Phys   Latest Ref Rng & Units 0 - 4 ug/L 0.00 - 4.00 ng/mL   9/27/2002 <0.1    11/8/2002 <0.1    4/10/2003 0.3    6/3/2004 1.20    5/11/2009 <0.10    2/21/2011 0.08    2/27/2012 0.45    5/21/2012 0.66    6/11/2013 1.03    1/12/2015 0.09    2/25/2016 0.23  "   2/15/2017  0.44   8/16/2017  1.30   2/19/2018  2.00   9/5/2018  2.50   3/5/2019  3.30   9/5/2019  0.12   1/13/2021 6.34 (H)    4/8/2021  7.20 (H)     10/11/2021 0.05 ng/ml    ASSESSMENT and PLAN  75 year old male returns today for follow-up of prostate cancer s/p RRP 2002 w/ PSM, adjuvant radiation, on intermittent ADT for biochemical recurrence, last 6 month [facility] 4/12/2021. Good PSA response to the ADT, PSA down to 0.05 ng/ml. Discussed continuing ADT and only reimaging if/when castrate resistance develops, given that he has already had adjuvant radiation in the past.    6 month ADT depot injection today  Reduce fluid intake prior to bedtime  Return in 6 months with PSA prior, for 6 month ADT.      Ángel Guthrie MD   [facility] Urology  [facility] Phone: [phone]

## 2021-10-11 NOTE — NURSING NOTE
The following medication was given:     MEDICATION: Lupron Depot 45 mg  ROUTE: IM  SITE: [facility]  DOSE: 45 mg  LOT #: [id number]  : carlosvie  EXPIRATION DATE:  3-6-2023  Gina Norman LPN

## 2021-10-11 NOTE — PROGRESS NOTES
ANTICOAGULATION MANAGEMENT     Billy Stock 75 year old male is on warfarin with therapeutic INR result. (Goal INR 2.0-3.0)    Recent labs: (last 7 days)     10/11/21  1059   INR 2.0*       ASSESSMENT     Source(s): Chart Review and Patient/Caregiver Call       Warfarin doses taken: Warfarin taken as instructed    Diet: No new diet changes identified    New illness, injury, or hospitalization: No    Medication/supplement changes: None noted    Signs or symptoms of bleeding or clotting: No    Previous INR: Therapeutic last 2(+) visits    Additional findings: None     PLAN     Recommended plan for no diet, medication or health factor changes affecting INR. INR has been trending down, will recheck INR in 2 weeks.    Dosing Instructions: Continue your current warfarin dose with next INR in 2 weeks       Summary  As of 10/11/2021    Full warfarin instructions:  7.5 mg every Mon, Thu; 5 mg all other days   Next INR check:  10/25/2021             Telephone call with Billy who verbalizes understanding and agrees to plan    Lab visit scheduled    Education provided: Please call back if any changes to your diet, medications or how you've been taking warfarin and Contact [phone]  with any changes, questions or concerns.     Plan made per [facility] anticoagulation protocol    Samantha Galeas RN  Anticoagulation Clinic  10/11/2021    _______________________________________________________________________     Anticoagulation Episode Summary     Current INR goal:  2.0-3.0   TTR:  69.1 % (1 y)   Target end date:  Indefinite   Send INR reminders to:  [facility]    Indications    Long term current use of anticoagulants with INR goal of 2.0-3.0 [Z79.01]  Chronic atrial fibrillation (H) [I48.20]           Comments:  Pt reports taking Warfarin in the morning, d/t COVID - Patient may extend INR interval up to 8 weeks, and +2 weeks with each subsequent INR in range up to 12 weeks max if no S/S bleeding, clotting,  illness or changes in medications that affect warfarin l         Anticoagulation Care Providers     Provider Role Specialty Phone number    Malcolm Schafer MD Referring Internal Medicine [phone]

## 2021-10-25 ENCOUNTER — ANTICOAGULATION THERAPY VISIT (OUTPATIENT)
Dept: ANTICOAGULATION | Facility: CLINIC | Age: 76
End: 2021-10-25

## 2021-10-25 ENCOUNTER — LAB (OUTPATIENT)
Dept: LAB | Facility: CLINIC | Age: 76
End: 2021-10-25
Payer: COMMERCIAL

## 2021-10-25 DIAGNOSIS — Z79.01 LONG TERM CURRENT USE OF ANTICOAGULANTS WITH INR GOAL OF 2.0-3.0: Primary | ICD-10-CM

## 2021-10-25 DIAGNOSIS — I48.20 CHRONIC ATRIAL FIBRILLATION (H): ICD-10-CM

## 2021-10-25 DIAGNOSIS — Z79.01 LONG TERM CURRENT USE OF ANTICOAGULANTS WITH INR GOAL OF 2.0-3.0: ICD-10-CM

## 2021-10-25 LAB — INR BLD: 3.4 (ref 0.9–1.1)

## 2021-10-25 PROCEDURE — 36416 COLLJ CAPILLARY BLOOD SPEC: CPT

## 2021-10-25 PROCEDURE — 85610 PROTHROMBIN TIME: CPT

## 2021-10-25 NOTE — PROGRESS NOTES
ANTICOAGULATION MANAGEMENT     Billy Stock 75 year old male is on warfarin with supratherapeutic INR result. (Goal INR 2.0-3.0)    Recent labs: (last 7 days)     10/25/21  1057   INR 3.4*       ASSESSMENT     Source(s): Patient/Caregiver Call       Warfarin doses taken: Warfarin taken as instructed    Diet: No new diet changes identified    New illness, injury, or hospitalization: No    Medication/supplement changes: Taking generic Claritin    Signs or symptoms of bleeding or clotting: No    Previous INR: Therapeutic last 2(+) visits    Additional findings: Seasonal allergies have been bothering him--itchy eyes and rhinorrhea     PLAN     Recommended plan for temporary change(s) affecting INR     Dosing Instructions: Partial hold then continue your current warfarin dose with next INR in 2 weeks       Summary  As of 10/25/2021    Full warfarin instructions:  10/25: 5 mg; Otherwise 7.5 mg every Mon, Thu; 5 mg all other days   Next INR check:  11/8/2021             Telephone call with iBlly who verbalizes understanding and agrees to plan    Lab visit scheduled    Education provided: Monitoring for bleeding signs and symptoms and Contact [phone]  with any changes, questions or concerns.     Plan made per [facility] anticoagulation protocol    Samantha Richards, RN  Anticoagulation Clinic  10/25/2021    _______________________________________________________________________     Anticoagulation Episode Summary     Current INR goal:  2.0-3.0   TTR:  68.3 % (1 y)   Target end date:  Indefinite   Send INR reminders to:  [facility]    Indications    Long term current use of anticoagulants with INR goal of 2.0-3.0 [Z79.01]  Chronic atrial fibrillation (H) [I48.20]           Comments:  Pt reports taking Warfarin in the morning, d/t COVID - Patient may extend INR interval up to 8 weeks, and +2 weeks with each subsequent INR in range up to 12 weeks max if no S/S bleeding, clotting, illness or changes in  medications that affect warfarin l         Anticoagulation Care Providers     Provider Role Specialty Phone number    Malcolm Schafer MD Referring Internal Medicine [phone]

## 2021-11-08 ENCOUNTER — LAB (OUTPATIENT)
Dept: LAB | Facility: CLINIC | Age: 76
End: 2021-11-08

## 2021-11-08 ENCOUNTER — ANTICOAGULATION THERAPY VISIT (OUTPATIENT)
Dept: ANTICOAGULATION | Facility: CLINIC | Age: 76
End: 2021-11-08

## 2021-11-08 DIAGNOSIS — I48.20 CHRONIC ATRIAL FIBRILLATION (H): ICD-10-CM

## 2021-11-08 DIAGNOSIS — Z79.01 LONG TERM CURRENT USE OF ANTICOAGULANTS WITH INR GOAL OF 2.0-3.0: ICD-10-CM

## 2021-11-08 DIAGNOSIS — Z79.01 LONG TERM CURRENT USE OF ANTICOAGULANTS WITH INR GOAL OF 2.0-3.0: Primary | ICD-10-CM

## 2021-11-08 LAB — INR BLD: 2.4 (ref 0.9–1.1)

## 2021-11-08 PROCEDURE — 85610 PROTHROMBIN TIME: CPT

## 2021-11-08 PROCEDURE — 36416 COLLJ CAPILLARY BLOOD SPEC: CPT

## 2021-11-08 NOTE — PROGRESS NOTES
Anticoagulation Management    Unable to reach Marek today.    Today's INR result of 2.4 is therapeutic (goal INR of 2.0-3.0).  Result received from: Clinic Lab    Follow up required to confirm warfarin dose taken and assess for changes    Left VM to continue SAME dose and to call [phone] with transfer to Samantha Singh:[phone] [facility]      Anticoagulation clinic to follow up    Samantha Richards RN

## 2021-11-08 NOTE — PROGRESS NOTES
ANTICOAGULATION MANAGEMENT     Billy Stock 76 year old male is on warfarin with therapeutic INR result. (Goal INR 2.0-3.0)    Recent labs: (last 7 days)     11/08/21  1455   INR 2.4*       ASSESSMENT     Source(s): Patient/Caregiver Call       Warfarin doses taken: Warfarin taken as instructed    Diet: No new diet changes identified    New illness, injury, or hospitalization: No but seasonal allergies are still bothering him    Medication/supplement changes: Taking Claritin prn versus daily    Signs or symptoms of bleeding or clotting: No    Previous INR: Supratherapeutic    Additional findings: None     PLAN     Recommended plan for no diet, medication or health factor changes affecting INR     Dosing Instructions: Continue your current warfarin dose with next INR in 3 weeks       Summary  As of 11/8/2021    Full warfarin instructions:  7.5 mg every Mon, Thu; 5 mg all other days   Next INR check:  11/29/2021             Telephone call with Billy who verbalizes understanding and agrees to plan    Lab visit scheduled    Education provided: Contact [phone]  with any changes, questions or concerns.     Plan made per [facility] anticoagulation protocol    Samantha Richards, RN  Anticoagulation Clinic  11/8/2021    _______________________________________________________________________     Anticoagulation Episode Summary     Current INR goal:  2.0-3.0   TTR:  66.8 % (1 y)   Target end date:  Indefinite   Send INR reminders to:  [facility]    Indications    Long term current use of anticoagulants with INR goal of 2.0-3.0 [Z79.01]  Chronic atrial fibrillation (H) [I48.20]           Comments:  Patient may extend INR interval up to 8 weeks, and +2 weeks with each subsequent INR in range up to 12 weeks max         Anticoagulation Care Providers     Provider Role Specialty Phone number    Malcolm Schafer MD Referring Internal Medicine [phone]

## 2021-11-29 ENCOUNTER — ANTICOAGULATION THERAPY VISIT (OUTPATIENT)
Dept: ANTICOAGULATION | Facility: CLINIC | Age: 76
End: 2021-11-29

## 2021-11-29 ENCOUNTER — LAB (OUTPATIENT)
Dept: LAB | Facility: CLINIC | Age: 76
End: 2021-11-29
Payer: COMMERCIAL

## 2021-11-29 DIAGNOSIS — Z79.01 LONG TERM CURRENT USE OF ANTICOAGULANTS WITH INR GOAL OF 2.0-3.0: ICD-10-CM

## 2021-11-29 DIAGNOSIS — I48.20 CHRONIC ATRIAL FIBRILLATION (H): ICD-10-CM

## 2021-11-29 DIAGNOSIS — Z79.01 LONG TERM CURRENT USE OF ANTICOAGULANTS WITH INR GOAL OF 2.0-3.0: Primary | ICD-10-CM

## 2021-11-29 LAB — INR BLD: 3 (ref 0.9–1.1)

## 2021-11-29 PROCEDURE — 85610 PROTHROMBIN TIME: CPT

## 2021-11-29 NOTE — PROGRESS NOTES
ANTICOAGULATION MANAGEMENT     Billy Stock 76 year old male is on warfarin with therapeutic INR result. (Goal INR 2.0-3.0)    Recent labs: (last 7 days)     11/29/21  1347   INR 3.0*       ASSESSMENT     Source(s): Chart Review       Warfarin doses taken: Warfarin taken as instructed    Diet: No new diet changes identified    New illness, injury, or hospitalization: Yes: Patient reports he fell about 3 weeks ago, tripped over some shoes, states soreness on left side, no bruising and denies hitting his head.    Medication/supplement changes: None noted    Signs or symptoms of bleeding or clotting: No    Previous INR: Therapeutic last visit; previously outside of goal range    Additional findings: None     PLAN     Recommended plan for no diet, medication or health factor changes affecting INR     Dosing Instructions: Continue your current warfarin dose with next INR in 3 weeks       Summary  As of 11/29/2021    Full warfarin instructions:  7.5 mg every Mon, Thu; 5 mg all other days   Next INR check:  12/20/2021             Telephone call with Billy who verbalizes understanding and agrees to plan    Lab visit scheduled    Education provided: Please call back if any changes to your diet, medications or how you've been taking warfarin and Contact [phone]  with any changes, questions or concerns.     Plan made per [facility] anticoagulation protocol    Samantha Galeas RN  Anticoagulation Clinic  11/29/2021    _______________________________________________________________________     Anticoagulation Episode Summary     Current INR goal:  2.0-3.0   TTR:  66.8 % (1 y)   Target end date:  Indefinite   Send INR reminders to:  [facility]    Indications    Long term current use of anticoagulants with INR goal of 2.0-3.0 [Z79.01]  Chronic atrial fibrillation (H) [I48.20]           Comments:  Patient may extend INR interval up to 8 weeks, and +2 weeks with each subsequent INR in range up to 12 weeks max          Anticoagulation Care Providers     Provider Role Specialty Phone number    Malcolm Schafer MD Referring Internal Medicine [phone]

## 2021-11-29 NOTE — PROGRESS NOTES
Left message to call [phone].     Anticoagulation Management    Unable to reach Marek today.    Today's INR result of 3.0 is therapeutic (goal INR of 2.0-3.0).  Result received from: Clinic Lab    Follow up required to confirm warfarin dose taken and assess for changes    Left message to continue current dose of warfarin 7.5 mg tonight.      Anticoagulation clinic to follow up    Samantha Galeas RN

## 2021-12-03 DIAGNOSIS — I48.19 PERSISTENT ATRIAL FIBRILLATION (H): ICD-10-CM

## 2021-12-06 RX ORDER — METOPROLOL SUCCINATE 50 MG/1
TABLET, EXTENDED RELEASE ORAL
Qty: 135 TABLET | Refills: 0 | Status: SHIPPED | OUTPATIENT
Start: 2021-12-06 | End: 2022-03-02

## 2021-12-07 NOTE — TELEPHONE ENCOUNTER
Pending Prescriptions:                       Disp   Refills    metoprolol succinate ER (TOPROL-XL) 50 MG*135 ta*0            Sig: TAKE 1 & 1/2 (ONE & ONE-HALF) TABLETS BY MOUTH           ONCE DAILY    Prescription approved per [facility] Refill Protocol.

## 2021-12-09 ENCOUNTER — TELEPHONE (OUTPATIENT)
Dept: INTERNAL MEDICINE | Facility: CLINIC | Age: 76
End: 2021-12-09
Payer: COMMERCIAL

## 2021-12-09 NOTE — TELEPHONE ENCOUNTER
Pt calls stating one week ago, had dark urine. Drinking more water. This is better. Has been going to the bathroom more at night for some time, few months. Pt has Parkinson's.   He wears pullups. Some incontinence, leaking when he doesn't make it in time.   No burning or blood, no fevers.     Schedule an appt for Monday. Advised UC if sx worsen, pain, fevers, blood. He agrees with the plan.

## 2021-12-13 ENCOUNTER — OFFICE VISIT (OUTPATIENT)
Dept: INTERNAL MEDICINE | Facility: CLINIC | Age: 76
End: 2021-12-13
Payer: COMMERCIAL

## 2021-12-13 VITALS
OXYGEN SATURATION: 98 % | BODY MASS INDEX: 29.39 KG/M2 | SYSTOLIC BLOOD PRESSURE: 140 MMHG | TEMPERATURE: 97.8 F | HEART RATE: 77 BPM | RESPIRATION RATE: 20 BRPM | DIASTOLIC BLOOD PRESSURE: 70 MMHG | HEIGHT: 72 IN | WEIGHT: 217 LBS

## 2021-12-13 DIAGNOSIS — M10.9 GOUT OF FOOT, UNSPECIFIED CAUSE, UNSPECIFIED CHRONICITY, UNSPECIFIED LATERALITY: ICD-10-CM

## 2021-12-13 DIAGNOSIS — R35.1 NOCTURIA: Primary | ICD-10-CM

## 2021-12-13 LAB
ALBUMIN UR-MCNC: >=300 MG/DL
APPEARANCE UR: CLEAR
BILIRUB UR QL STRIP: NEGATIVE
COLOR UR AUTO: YELLOW
GLUCOSE UR STRIP-MCNC: NEGATIVE MG/DL
HGB UR QL STRIP: NEGATIVE
KETONES UR STRIP-MCNC: NEGATIVE MG/DL
LEUKOCYTE ESTERASE UR QL STRIP: NEGATIVE
NITRATE UR QL: NEGATIVE
PH UR STRIP: 5 [PH] (ref 5–7)
RBC #/AREA URNS AUTO: NORMAL /HPF
SP GR UR STRIP: >=1.03 (ref 1–1.03)
UROBILINOGEN UR STRIP-ACNC: 0.2 E.U./DL
WBC #/AREA URNS AUTO: NORMAL /HPF

## 2021-12-13 PROCEDURE — 99213 OFFICE O/P EST LOW 20 MIN: CPT | Performed by: PHYSICIAN ASSISTANT

## 2021-12-13 PROCEDURE — 81001 URINALYSIS AUTO W/SCOPE: CPT | Performed by: PHYSICIAN ASSISTANT

## 2021-12-13 RX ORDER — ALLOPURINOL 100 MG/1
100 TABLET ORAL DAILY
Qty: 90 TABLET | Refills: 3 | Status: SHIPPED | OUTPATIENT
Start: 2021-12-13 | End: 2022-10-06

## 2021-12-13 ASSESSMENT — MIFFLIN-ST. JEOR: SCORE: 1757.31

## 2021-12-13 NOTE — PATIENT INSTRUCTIONS
Try to take your nighttime Sinemet with a small amount of water instead of a full glass of water    Try to avoid drinking water within 3 hours of going to bed at night

## 2021-12-13 NOTE — PROGRESS NOTES
"  Assessment & Plan     Nocturia  UA is reassuring today.  Has known CKD, which explains his proteinuria.  No signs of infection.  We discussed that urinary symptoms may be a side effect of his Lupron.  Also discussed that fluid intake before bed could contribute to nocturia.  Advised him to minimize his water intake in the 3 hours prior to bedtime.  Discussed that bubbles in the urine are not concerning if there are otherwise no signs of infection.  Patient does not have a history of diabetes.  - UA macro with reflex to Microscopic and Culture - Clinc Collect  - Urine Microscopic    Gout of foot, unspecified cause, unspecified chronicity, unspecified laterality  Needs a refill on his allopurinol.  - allopurinol (ZYLOPRIM) 100 MG tablet; Take 1 tablet (100 mg) by mouth daily    25 minutes spent on the date of the encounter doing chart review, history and exam, documentation and further activities per the note       No follow-ups on file.    CATRINA Delgado [facility] ANASTACIO Jara is a 76 year old who presents for the following health issues     HPI     Nocturia:  Noted \"bubbles in urine\"   Dark urine 2 weeks ago  Started drinking more water and urine became a pale yellow  Increased nocturia, coinciding with increased fluid intake  No blood in urine. No odor. No dysuria  No abd pain  No fevers    Sometimes drinks water (full glass) with PM Sinemet dose    Restarted Lupron in April '21 (had been off for a few years)  Last injection October 2021  Doing injections every 6 months    Prostate surgery in 2002  Did radiation in 2005    Has some urinary incontinence problems, mostly at night when he can't get to the bathroom in time  No recent increase in incontinence    Past Medical History:   Diagnosis Date     Bell's palsy 10/15/2002     CA IN SITU PROSTATE 10/15/2002     Chronic atrial fibrillation (H) 07/01/2010     Chronic renal insufficiency      Glaucoma 04/10/2003     " Problem list name updated by automated process. Provider to review     Gout 05/16/2013     Hyperlipidemia LDL goal <100 09/10/2014     Hypertension goal BP (blood pressure) < 140/90 06/28/2006     SARAH (obstructive sleep apnea) 08/28/2013     Parkinson disease (H) 2019     Pericarditis 2001     Renal cyst        Review of Systems   Constitutional, HEENT, cardiovascular, pulmonary, gi and gu systems are negative, except as otherwise noted.      Objective    BP (!) 140/70   Pulse 77   Temp 97.8  F (36.6  C) (Oral)   Resp 20   Ht 1.829 m (6')   Wt 98.4 kg (217 lb)   SpO2 98%   BMI 29.43 kg/m    Body mass index is 29.43 kg/m .  Physical Exam   GENERAL: healthy, alert and no distress  RESP: lungs clear to auscultation - no rales, rhonchi or wheezes  CV: regular rate and rhythm, normal S1 S2, no S3 or S4, no murmur, click or rub, no peripheral edema and peripheral pulses strong  ABDOMEN: soft, nontender, no hepatosplenomegaly, no masses and bowel sounds normal  MS: no gross musculoskeletal defects noted, no edema  SKIN: no suspicious lesions or rashes  NEURO: resting tremor  PSYCH: mentation appears normal, affect normal/bright    Results for orders placed or performed in visit on 12/13/21 (from the past 24 hour(s))   UA macro with reflex to Microscopic and Culture - Clinc Collect    Specimen: Urine, Midstream   Result Value Ref Range    Color Urine Yellow Colorless, Straw, Light Yellow, Yellow    Appearance Urine Clear Clear    Glucose Urine Negative Negative mg/dL    Bilirubin Urine Negative Negative    Ketones Urine Negative Negative mg/dL    Specific Gravity Urine >=1.030 1.003 - 1.035    Blood Urine Negative Negative    pH Urine 5.0 5.0 - 7.0    Protein Albumin Urine >=300 (A) Negative mg/dL    Urobilinogen Urine 0.2 0.2, 1.0 E.U./dL    Nitrite Urine Negative Negative    Leukocyte Esterase Urine Negative Negative   Urine Microscopic   Result Value Ref Range    RBC Urine 0-2 0-2 /HPF /HPF    WBC Urine 0-5 0-5  /HPF /HPF    Narrative    Urine Culture not indicated

## 2021-12-20 ENCOUNTER — DOCUMENTATION ONLY (OUTPATIENT)
Dept: ANTICOAGULATION | Facility: CLINIC | Age: 76
End: 2021-12-20

## 2021-12-20 ENCOUNTER — ANTICOAGULATION THERAPY VISIT (OUTPATIENT)
Dept: ANTICOAGULATION | Facility: CLINIC | Age: 76
End: 2021-12-20

## 2021-12-20 ENCOUNTER — LAB (OUTPATIENT)
Dept: LAB | Facility: CLINIC | Age: 76
End: 2021-12-20
Payer: COMMERCIAL

## 2021-12-20 DIAGNOSIS — I48.20 CHRONIC ATRIAL FIBRILLATION (H): ICD-10-CM

## 2021-12-20 DIAGNOSIS — Z79.01 LONG TERM CURRENT USE OF ANTICOAGULANTS WITH INR GOAL OF 2.0-3.0: ICD-10-CM

## 2021-12-20 DIAGNOSIS — Z79.01 LONG TERM CURRENT USE OF ANTICOAGULANTS WITH INR GOAL OF 2.0-3.0: Primary | ICD-10-CM

## 2021-12-20 LAB — INR BLD: 3.3 (ref 0.9–1.1)

## 2021-12-20 PROCEDURE — 36416 COLLJ CAPILLARY BLOOD SPEC: CPT

## 2021-12-20 PROCEDURE — 85610 PROTHROMBIN TIME: CPT

## 2021-12-20 NOTE — PROGRESS NOTES
ANTICOAGULATION MANAGEMENT      Billy Stock due for annual renewal of referral to anticoagulation monitoring. Order pended for your review and signature.      ANTICOAGULATION SUMMARY      Warfarin indication(s)     Atrial fibrillation    Heart valve present?  NO       Current goal range   INR: 2.0-3.0     Goal appropriate for indication? Yes, INR 2-3 appropriate for hx of DVT, PE, hypercoagulable state, Afib, LVAD, or bileaflet AVR without risk factors     Current duration of therapy Indefinite/long term therapy   Time in Therapeutic Range (TTR)  (Goal > 60%) 31.0%       Office visit with referring provider's group within last year yes on 2/25/21       Samantha Galeas RN

## 2021-12-20 NOTE — PROGRESS NOTES
ANTICOAGULATION MANAGEMENT     Billy Stock 76 year old male is on warfarin with supratherapeutic INR result. (Goal INR 2.0-3.0)    Recent labs: (last 7 days)     12/20/21  1408   INR 3.3*       ASSESSMENT     Source(s): Chart Review and Patient/Caregiver Call       Warfarin doses taken: Warfarin taken as instructed    Diet: Decreased greens/vitamin K in diet; plans to resume previous intake    New illness, injury, or hospitalization: No    Medication/supplement changes: None noted    Signs or symptoms of bleeding or clotting: No    Previous INR: Therapeutic last 2 visits    Additional findings: None     PLAN     Recommended plan for no diet, medication or health factor changes affecting INR     Dosing Instructions: Partial hold then continue your current warfarin dose with next INR in 2 weeks       Summary  As of 12/20/2021    Full warfarin instructions:  12/20: 5 mg; Otherwise 7.5 mg every Mon, Thu; 5 mg all other days   Next INR check:  1/3/2022             Telephone call with Billy who verbalizes understanding and agrees to plan    Lab visit scheduled    Education provided: Please call back if any changes to your diet, medications or how you've been taking warfarin and Contact [phone]  with any changes, questions or concerns.     Plan made per [facility] anticoagulation protocol    Samantha Galeas RN  Anticoagulation Clinic  12/20/2021    _______________________________________________________________________     Anticoagulation Episode Summary     Current INR goal:  2.0-3.0   TTR:  61.0 % (1 y)   Target end date:  Indefinite   Send INR reminders to:  [facility]    Indications    Long term current use of anticoagulants with INR goal of 2.0-3.0 [Z79.01]  Chronic atrial fibrillation (H) [I48.20]           Comments:  Patient may extend INR interval up to 8 weeks, and +2 weeks with each subsequent INR in range up to 12 weeks max         Anticoagulation Care Providers     Provider Role Specialty  Phone number    Malcolm Schafer MD Referring Internal Medicine [phone]

## 2021-12-20 NOTE — PROGRESS NOTES
Left message to call [phone].       Anticoagulation Management    Unable to reach Marek today.    Today's INR result of 3.3 is supratherapeutic (goal INR of 2.0-3.0).  Result received from: Clinic Lab    Follow up required to confirm warfarin dose taken and assess for changes    Left message to take reduced dose of warfarin, 5 mg tonight.      Anticoagulation clinic to follow up    Samantha Galeas RN

## 2022-01-03 ENCOUNTER — LAB (OUTPATIENT)
Dept: LAB | Facility: CLINIC | Age: 77
End: 2022-01-03
Payer: COMMERCIAL

## 2022-01-03 ENCOUNTER — ANTICOAGULATION THERAPY VISIT (OUTPATIENT)
Dept: ANTICOAGULATION | Facility: CLINIC | Age: 77
End: 2022-01-03

## 2022-01-03 DIAGNOSIS — Z79.01 LONG TERM CURRENT USE OF ANTICOAGULANTS WITH INR GOAL OF 2.0-3.0: Primary | ICD-10-CM

## 2022-01-03 DIAGNOSIS — I48.20 CHRONIC ATRIAL FIBRILLATION (H): ICD-10-CM

## 2022-01-03 DIAGNOSIS — Z79.01 LONG TERM CURRENT USE OF ANTICOAGULANTS WITH INR GOAL OF 2.0-3.0: ICD-10-CM

## 2022-01-03 LAB — INR BLD: 3 (ref 0.9–1.1)

## 2022-01-03 PROCEDURE — 36416 COLLJ CAPILLARY BLOOD SPEC: CPT

## 2022-01-03 PROCEDURE — 85610 PROTHROMBIN TIME: CPT

## 2022-01-03 NOTE — PROGRESS NOTES
Left message to call [phone].    Anticoagulation Management    Unable to reach Marek today.    Today's INR result of 3.0 is therapeutic (goal INR of 2.0-3.0).  Result received from: Clinic Lab    Follow up required to confirm warfarin dose taken and assess for changes    Left message to continue current dose of warfarin 7.5 mg tonight.      Anticoagulation clinic to follow up    Samantha Galeas RN

## 2022-01-03 NOTE — PROGRESS NOTES
ANTICOAGULATION MANAGEMENT     Billy Stock 76 year old male is on warfarin with therapeutic INR result. (Goal INR 2.0-3.0)    Recent labs: (last 7 days)     01/03/22  1304   INR 3.0*       ASSESSMENT     Source(s): Chart Review and Patient/Caregiver Call       Warfarin doses taken: Warfarin taken as instructed    Diet: Decreased greens/vitamin K in diet; plans to resume previous intake    New illness, injury, or hospitalization: No    Medication/supplement changes: None noted    Signs or symptoms of bleeding or clotting: No    Previous INR: Supratherapeutic    Additional findings: None     PLAN     Recommended plan for no diet, medication or health factor changes affecting INR     Dosing Instructions: Continue your current warfarin dose with next INR in 2 weeks       Summary  As of 1/3/2022    Full warfarin instructions:  7.5 mg every Mon, Thu; 5 mg all other days   Next INR check:  1/17/2022             Telephone call with Billy who verbalizes understanding and agrees to plan    Lab visit scheduled    Education provided: Please call back if any changes to your diet, medications or how you've been taking warfarin and Contact [phone]  with any changes, questions or concerns.     Plan made per [facility] anticoagulation protocol    Samantha Galeas RN  Anticoagulation Clinic  1/3/2022    _______________________________________________________________________     Anticoagulation Episode Summary     Current INR goal:  2.0-3.0   TTR:  57.2 % (1 y)   Target end date:  Indefinite   Send INR reminders to:  [facility]    Indications    Long term current use of anticoagulants with INR goal of 2.0-3.0 [Z79.01]  Chronic atrial fibrillation (H) [I48.20]           Comments:  Patient may extend INR interval up to 8 weeks, and +2 weeks with each subsequent INR in range up to 12 weeks max         Anticoagulation Care Providers     Provider Role Specialty Phone number    Malcolm Schafer MD Referring Internal  Medicine [phone]

## 2022-01-07 ENCOUNTER — OFFICE VISIT (OUTPATIENT)
Dept: URGENT CARE | Facility: URGENT CARE | Age: 77
End: 2022-01-07
Payer: COMMERCIAL

## 2022-01-07 VITALS
SYSTOLIC BLOOD PRESSURE: 120 MMHG | RESPIRATION RATE: 20 BRPM | DIASTOLIC BLOOD PRESSURE: 78 MMHG | HEART RATE: 78 BPM | OXYGEN SATURATION: 98 % | TEMPERATURE: 98 F

## 2022-01-07 DIAGNOSIS — S39.012A STRAIN OF LUMBAR REGION, INITIAL ENCOUNTER: ICD-10-CM

## 2022-01-07 DIAGNOSIS — M54.50 ACUTE RIGHT-SIDED LOW BACK PAIN WITHOUT SCIATICA: Primary | ICD-10-CM

## 2022-01-07 LAB
ALBUMIN UR-MCNC: >=300 MG/DL
APPEARANCE UR: CLEAR
BACTERIA #/AREA URNS HPF: ABNORMAL /HPF
BILIRUB UR QL STRIP: NEGATIVE
COLOR UR AUTO: YELLOW
GLUCOSE UR STRIP-MCNC: NEGATIVE MG/DL
HGB UR QL STRIP: ABNORMAL
HYALINE CASTS #/AREA URNS LPF: ABNORMAL /LPF
KETONES UR STRIP-MCNC: NEGATIVE MG/DL
LEUKOCYTE ESTERASE UR QL STRIP: NEGATIVE
NITRATE UR QL: NEGATIVE
PH UR STRIP: 5.5 [PH] (ref 5–7)
RBC #/AREA URNS AUTO: ABNORMAL /HPF
SP GR UR STRIP: 1.02 (ref 1–1.03)
UROBILINOGEN UR STRIP-ACNC: 0.2 E.U./DL
WBC #/AREA URNS AUTO: ABNORMAL /HPF

## 2022-01-07 PROCEDURE — 99213 OFFICE O/P EST LOW 20 MIN: CPT | Performed by: FAMILY MEDICINE

## 2022-01-07 PROCEDURE — 81001 URINALYSIS AUTO W/SCOPE: CPT | Performed by: FAMILY MEDICINE

## 2022-01-07 NOTE — PATIENT INSTRUCTIONS
For the first three days, apply ice onto the painful areas for 15 minutes at a time, every 2-3 hours while awake.  On the fourth day, start applying warmth onto the right lower back for 15-20 minutes at a time, every 2-3 hours while awake.      Take Tylenol for the back pain.      follow up if the back pain fails to improve in 10-14 days.  follow up sooner if you experience vomiting/nausea/worsening back pain, fevers.

## 2022-01-07 NOTE — LETTER
[facility] [place]  [address]  [address]  [place] [address] [phone]      January 7, 2022    RE:  Billy Stock                                                                                                                                                       [address]  [place] [facility] [place] [address]            To whom it may concern:    Billy Stock is under my professional care at the [facility] on January 7, 2022.  He has muscle strain at the right lower back.  As a result, he may return to work on January 12, 2022; however, I recommend that he not bend over to  objects from below waist level and to avoid reaching for things with the right arm from January 12 to January 19, 2022.             Sincerely,        Serg Morales MD    [facility]

## 2022-01-07 NOTE — PROGRESS NOTES
SUBJECTIVE  HPI: Billy Stock is a 76 year old right-handed male who presents for evaluation of sudden-onset, right lower/right lower lateral back pain  Symptoms began one day ago and are improving after taking Tylenol.  Pain is located in the right lower back region, with radiation to none, and are at worst a 3-4 on a scale of 1-10.  Recent injury  No obvious injury.    Personal hx of back pain is self-limiting back pain.  No history of herniated disks/back surgeries.  .  Pain is exacerbated by: reaching for objects with the right hand and getting into a car and placing a leash onto his pet dog.   .  Pain is relieved by: Tylenol, Icee Hot.  .  Red flag symptoms: No urinary/fecal incontinence.  .    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 Provider to review     Gout 05/16/2013     Hyperlipidemia LDL goal <100 09/10/2014     Hypertension goal BP (blood pressure) < 140/90 06/28/2006     SARAH (obstructive sleep apnea) 08/28/2013     Parkinson disease (H) 2019     Pericarditis 2001     Renal cyst      Current Outpatient Medications   Medication Sig Dispense Refill     acetaminophen (TYLENOL) 325 MG tablet Take 1-2 tablets by mouth every 6 hours as needed.       allopurinol (ZYLOPRIM) 100 MG tablet Take 1 tablet (100 mg) by mouth daily 90 tablet 3     amLODIPine (NORVASC) 10 MG tablet Take 1 tablet (10 mg) by mouth daily 90 tablet 3     carbidopa-levodopa (SINEMET)  MG tablet Take 1 tablet by mouth 3 times daily       lisinopril (ZESTRIL) 40 MG tablet Take 1 tablet (40 mg) by mouth daily 90 tablet 3     Loratadine (CLARITIN PO) Take by mouth daily       metoprolol succinate ER (TOPROL-XL) 50 MG 24 hr tablet TAKE 1 & 1/2 (ONE & ONE-HALF) TABLETS BY MOUTH ONCE DAILY 135 tablet 0     UNABLE TO FIND  MEDICATION NAME: Tab-A-Celio, multivitmain with Iron by [facility]       warfarin ANTICOAGULANT (COUMADIN) 5 MG tablet Take one tablet (5 mg) by mouth daily except take one and a half tablets (7.5 mg) Mon, Th or as directed by INR Clinic 100 tablet 0     Social History     Tobacco Use     Smoking status: Never Smoker     Smokeless tobacco: Never Used   Substance Use Topics     Alcohol use: No     Alcohol/week: 0.0 standard drinks       ROS:  CONSTITUTIONAL:NEGATIVE  for fevers.   INTEGUMENTARY/SKIN: No rashes.    MUSCULOSKELETAL: positive for right low back pain.   NEURO: No numbness / weakness.      OBJECTIVE:  /78   Pulse 78   Temp 98  F (36.7  C)   Resp 20   SpO2 98%   Back examination: Back symmetric, no curvature.No CVA tenderness., positive findings: limitation of motion - flexion: Marked, extension: Severe and lateral bending: Moderate  [unfilled] leg raise test: negative.  No pain with palpation over the right lumbar region.    GEN:  Patient has no acute distress.    RESP: lungs clear to auscultation - no rales, rhonchi or wheezes  NEURO: Normal strength and tone, sensory exam grossly normal, mentation intact, speech normal, gait normal including heel/toe/tandem walking, mentation intact, light touch normal, normal strength throughout and DTRs 2/4 at the patellas.    SKIN: no suspicious lesions or rashes.  No vesicles/crusting on the right lumbar region.      LAB:    Results for orders placed or performed in visit on 01/07/22   UA Macro with Reflex to Micro and Culture - lab collect     Status: Abnormal    Specimen: Urine, Midstream   Result Value Ref Range    Color Urine Yellow Colorless, Straw, Light Yellow, Yellow    Appearance Urine Clear Clear    Glucose Urine Negative Negative mg/dL    Bilirubin Urine Negative Negative    Ketones Urine Negative Negative mg/dL    Specific Gravity Urine 1.025 1.003 - 1.035    Blood Urine Trace (A) Negative    pH Urine 5.5 5.0 - 7.0    Protein Albumin Urine >=300  (A) Negative mg/dL    Urobilinogen Urine 0.2 0.2, 1.0 E.U./dL    Nitrite Urine Negative Negative    Leukocyte Esterase Urine Negative Negative   Urine Microscopic     Status: Abnormal   Result Value Ref Range    Bacteria Urine Few (A) None Seen /HPF    RBC Urine 0-2 0-2 /HPF /HPF    WBC Urine 0-5 0-5 /HPF /HPF    Hyaline Casts Urine 0-2 (A) None Seen /LPF    Narrative    Urine Culture not indicated         ASSESSMENT/IMPRESSION:  Right Low Back Pain, worse with reaching and getting into the car.  The pain is most likely secondary to right low back strain.  .      Right low back strain.            PLAN:  For the first three days, apply ice onto the painful areas for 15 minutes at a time, every 2-3 hours while awake.  On the fourth day, start applying warmth onto the right lower back for 15-20 minutes at a time, every 2-3 hours while awake.      Take Tylenol for the back pain.      follow up if the back pain fails to improve in 10-14 days.  follow up sooner if you experience vomiting/nausea/worsening back pain, fevers.      Serg Morales MD

## 2022-01-17 ENCOUNTER — LAB (OUTPATIENT)
Dept: LAB | Facility: CLINIC | Age: 77
End: 2022-01-17
Payer: COMMERCIAL

## 2022-01-17 ENCOUNTER — ANTICOAGULATION THERAPY VISIT (OUTPATIENT)
Dept: ANTICOAGULATION | Facility: CLINIC | Age: 77
End: 2022-01-17

## 2022-01-17 DIAGNOSIS — I48.20 CHRONIC ATRIAL FIBRILLATION (H): ICD-10-CM

## 2022-01-17 DIAGNOSIS — Z79.01 LONG TERM CURRENT USE OF ANTICOAGULANTS WITH INR GOAL OF 2.0-3.0: ICD-10-CM

## 2022-01-17 DIAGNOSIS — Z79.01 LONG TERM CURRENT USE OF ANTICOAGULANTS WITH INR GOAL OF 2.0-3.0: Primary | ICD-10-CM

## 2022-01-17 LAB — INR BLD: 3.2 (ref 0.9–1.1)

## 2022-01-17 PROCEDURE — 85610 PROTHROMBIN TIME: CPT

## 2022-01-17 PROCEDURE — 36416 COLLJ CAPILLARY BLOOD SPEC: CPT

## 2022-01-17 NOTE — PROGRESS NOTES
ANTICOAGULATION MANAGEMENT     Billy Stock 76 year old male is on warfarin with supratherapeutic INR result. (Goal INR 2.0-3.0)    Recent labs: (last 7 days)     01/17/22  1136   INR 3.2*       ASSESSMENT     Source(s): Chart Review and Patient/Caregiver Call       Warfarin doses taken: Warfarin taken as instructed    Diet: No new diet changes identified    New illness, injury, or hospitalization: No    Medication/supplement changes: Patient denies    Signs or symptoms of bleeding or clotting: No    Previous INR: Therapeutic last visit; previously outside of goal range    Additional findings: None     PLAN     Recommended plan for no diet, medication or health factor changes affecting INR. The last few INR's have been supratherapeutic or at the top of patient's goal range, if INR continues to be elevated at next check in 2 weeks, consider a dose decrease.     Dosing Instructions: Partial hold then continue your current warfarin dose with next INR in 2 weeks       Summary  As of 1/17/2022    Full warfarin instructions:  1/17: 5 mg; Otherwise 7.5 mg every Mon, Thu; 5 mg all other days   Next INR check:  1/31/2022             Telephone call with Billy who verbalizes understanding and agrees to plan    Lab visit scheduled    Education provided: Please call back if any changes to your diet, medications or how you've been taking warfarin and Contact [phone]  with any changes, questions or concerns.     Plan made per [facility] anticoagulation protocol    Samantha Galeas RN  Anticoagulation Clinic  1/17/2022    _______________________________________________________________________     Anticoagulation Episode Summary     Current INR goal:  2.0-3.0   TTR:  55.6 % (1 y)   Target end date:  Indefinite   [facility]ers [facility]  [facility]    Indications    Long term current use of anticoagulants with INR goal of 2.0-3.0 [Z79.01]  Chronic atrial fibrillation (H) [I48.20]           Comments:  Patient may  extend INR interval up to 8 weeks, and +2 weeks with each subsequent INR in range up to 12 weeks max         Anticoagulation Care Providers     Provider Role Specialty Phone number    Malcolm Schafer MD Referring Internal Medicine [phone]

## 2022-01-19 DIAGNOSIS — I10 ESSENTIAL HYPERTENSION WITH GOAL BLOOD PRESSURE LESS THAN 140/90: ICD-10-CM

## 2022-01-21 RX ORDER — AMLODIPINE BESYLATE 10 MG/1
TABLET ORAL
Qty: 90 TABLET | Refills: 0 | Status: SHIPPED | OUTPATIENT
Start: 2022-01-21 | End: 2022-06-15

## 2022-01-21 NOTE — TELEPHONE ENCOUNTER
Routing refill request to provider for review/approval because:  Patient needs to be seen because:  Due for preventative visit in January 2022

## 2022-01-31 ENCOUNTER — LAB (OUTPATIENT)
Dept: LAB | Facility: CLINIC | Age: 77
End: 2022-01-31
Payer: COMMERCIAL

## 2022-01-31 ENCOUNTER — ANTICOAGULATION THERAPY VISIT (OUTPATIENT)
Dept: ANTICOAGULATION | Facility: CLINIC | Age: 77
End: 2022-01-31

## 2022-01-31 DIAGNOSIS — Z79.01 LONG TERM CURRENT USE OF ANTICOAGULANTS WITH INR GOAL OF 2.0-3.0: ICD-10-CM

## 2022-01-31 DIAGNOSIS — I48.20 CHRONIC ATRIAL FIBRILLATION (H): ICD-10-CM

## 2022-01-31 DIAGNOSIS — Z79.01 LONG TERM CURRENT USE OF ANTICOAGULANTS WITH INR GOAL OF 2.0-3.0: Primary | ICD-10-CM

## 2022-01-31 LAB — INR BLD: 2.3 (ref 0.9–1.1)

## 2022-01-31 PROCEDURE — 36416 COLLJ CAPILLARY BLOOD SPEC: CPT

## 2022-01-31 PROCEDURE — 85610 PROTHROMBIN TIME: CPT

## 2022-01-31 NOTE — PROGRESS NOTES
ANTICOAGULATION MANAGEMENT     Billy Stock 76 year old male is on warfarin with therapeutic INR result. (Goal INR 2.0-3.0)    Recent labs: (last 7 days)     01/31/22  1109   INR 2.3*       ASSESSMENT     Source(s): Chart Review and Patient/Caregiver Call       Warfarin doses taken: Warfarin taken as instructed    Diet: No new diet changes identified    New illness, injury, or hospitalization: No    Medication/supplement changes: None noted    Signs or symptoms of bleeding or clotting: No    Previous INR: Supratherapeutic    Additional findings: None     PLAN     Recommended plan for no diet, medication or health factor changes affecting INR     Dosing Instructions: Continue your current warfarin dose with next INR in 3 weeks       Summary  As of 1/31/2022    Full warfarin instructions:  7.5 mg every Mon, Thu; 5 mg all other days   Next INR check:  2/21/2022             Telephone call with Billy who verbalizes understanding and agrees to plan    Lab visit scheduled    Education provided: Please call back if any changes to your diet, medications or how you've been taking warfarin and Contact [phone]  with any changes, questions or concerns.     Plan made per [facility] anticoagulation protocol    Samantha Galeas RN  Anticoagulation Clinic  1/31/2022    _______________________________________________________________________     Anticoagulation Episode Summary     Current INR goal:  2.0-3.0   TTR:  58.6 % (1 y)   Target end date:  Indefinite   Send INR reminders to:  [facility]    Indications    Long term current use of anticoagulants with INR goal of 2.0-3.0 [Z79.01]  Chronic atrial fibrillation (H) [I48.20]           Comments:  Patient may extend INR interval up to 8 weeks, and +2 weeks with each subsequent INR in range up to 12 weeks max         Anticoagulation Care Providers     Provider Role Specialty Phone number    Malcolm Schafer MD Referring Internal Medicine [phone]

## 2022-02-14 DIAGNOSIS — I10 ESSENTIAL HYPERTENSION: ICD-10-CM

## 2022-02-16 RX ORDER — LISINOPRIL 40 MG/1
TABLET ORAL
Qty: 90 TABLET | Refills: 0 | Status: SHIPPED | OUTPATIENT
Start: 2022-02-16 | End: 2022-07-27

## 2022-02-16 NOTE — TELEPHONE ENCOUNTER
Routing refill request to provider for review/approval because:  Labs out of range:  Creatinine  Labs not current:  Creatinine, Potassium  Patient has future appointment.    Appointments in Next Year      Feb 21, 2022  1:45 PM  INR LAB with [facility]  [facility] ([facility] ) [phone]     Mar 07, 2022  9:00 AM  (Arrive by 8:40 AM)  Adult Preventative Visit with Malcolm Schafer MD  [facility] ([facility] ) [phone]     Apr 11, 2022  3:00 PM  Return Visit with Ángel Guthrie MD  [facility] ([facility] [place] ) [phone]

## 2022-02-21 ENCOUNTER — LAB (OUTPATIENT)
Dept: LAB | Facility: CLINIC | Age: 77
End: 2022-02-21
Payer: COMMERCIAL

## 2022-02-21 ENCOUNTER — ANTICOAGULATION THERAPY VISIT (OUTPATIENT)
Dept: ANTICOAGULATION | Facility: CLINIC | Age: 77
End: 2022-02-21

## 2022-02-21 DIAGNOSIS — I48.20 CHRONIC ATRIAL FIBRILLATION (H): ICD-10-CM

## 2022-02-21 DIAGNOSIS — Z79.01 LONG TERM CURRENT USE OF ANTICOAGULANTS WITH INR GOAL OF 2.0-3.0: ICD-10-CM

## 2022-02-21 DIAGNOSIS — Z79.01 LONG TERM CURRENT USE OF ANTICOAGULANTS WITH INR GOAL OF 2.0-3.0: Primary | ICD-10-CM

## 2022-02-21 LAB — INR BLD: 2.3 (ref 0.9–1.1)

## 2022-02-21 PROCEDURE — 85610 PROTHROMBIN TIME: CPT

## 2022-02-21 PROCEDURE — 36416 COLLJ CAPILLARY BLOOD SPEC: CPT

## 2022-02-21 NOTE — PROGRESS NOTES
ANTICOAGULATION MANAGEMENT     Billy Stock 76 year old male is on warfarin with therapeutic INR result. (Goal INR 2.0-3.0)    Recent labs: (last 7 days)     02/21/22  1351   INR 2.3*       ASSESSMENT     Source(s): Chart Review and Patient/Caregiver Call       Warfarin doses taken: Warfarin taken as instructed    Diet: No new diet changes identified    New illness, injury, or hospitalization: No    Medication/supplement changes: None noted - Patient reports he is going to start taking Prevagen in about 1 week, per [employer]    Signs or symptoms of bleeding or clotting: No    Previous INR: Therapeutic last visit; previously outside of goal range    Additional findings: None     PLAN     Recommended plan for no diet, medication or health factor changes affecting INR     Dosing Instructions: Continue your current warfarin dose with next INR in 3 weeks       Summary  As of 2/21/2022    Full warfarin instructions:  7.5 mg every Mon, Thu; 5 mg all other days   Next INR check:  3/14/2022             Telephone call with Billy who verbalizes understanding and agrees to plan    Lab visit scheduled    Education provided: Please call back if any changes to your diet, medications or how you've been taking warfarin and Contact [phone]  with any changes, questions or concerns.     Plan made per [facility] anticoagulation protocol    Samantha Galeas RN  Anticoagulation Clinic  2/21/2022    _______________________________________________________________________     Anticoagulation Episode Summary     Current INR goal:  2.0-3.0   TTR:  61.3 % (1 y)   Target end date:  Indefinite   Send INR reminders to:  [facility]    Indications    Long term current use of anticoagulants with INR goal of 2.0-3.0 [Z79.01]  Chronic atrial fibrillation (H) [I48.20]           Comments:  Patient may extend INR interval up to 8 weeks, and +2 weeks with each subsequent INR in range up to 12 weeks max         Anticoagulation Care  Providers     Provider Role Specialty Phone number    Malcolm Schafer MD Referring Internal Medicine [phone]

## 2022-02-25 ENCOUNTER — OFFICE VISIT (OUTPATIENT)
Dept: URGENT CARE | Facility: URGENT CARE | Age: 77
End: 2022-02-25
Payer: COMMERCIAL

## 2022-02-25 VITALS — DIASTOLIC BLOOD PRESSURE: 92 MMHG | HEART RATE: 62 BPM | SYSTOLIC BLOOD PRESSURE: 150 MMHG

## 2022-02-25 DIAGNOSIS — M25.512 ACUTE PAIN OF LEFT SHOULDER: ICD-10-CM

## 2022-02-25 DIAGNOSIS — M54.2 NECK PAIN: Primary | ICD-10-CM

## 2022-02-25 PROCEDURE — 99213 OFFICE O/P EST LOW 20 MIN: CPT | Performed by: FAMILY MEDICINE

## 2022-02-25 ASSESSMENT — PAIN SCALES - GENERAL: PAINLEVEL: SEVERE PAIN (7)

## 2022-02-26 NOTE — PROGRESS NOTES
SUBJECTIVE:   Billy Stock is a 76 year old male presenting with a chief complaint of , atraumatic pain at the left posterior neck and left superior shoulder.  .  Onset of symptoms was two days ago.  No obvious injury to the neck or left shoulder.    Course of illness is worsening over the past two days. .  Pain is worse with turning the head to the left and with flexing the neck.    Severity mild to moderate pain.    Current and Associated symptoms: as listed above.    Treatment measures tried include Tylenol.  Predisposing factors include none.  No obvious lifting injuries.  No recent falls.  .Patient sleeps on his side.      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 Provider to review     Gout 05/16/2013     Hyperlipidemia LDL goal <100 09/10/2014     Hypertension goal BP (blood pressure) < 140/90 06/28/2006     SARAH (obstructive sleep apnea) 08/28/2013     Parkinson disease (H) 2019     Pericarditis 2001     Renal cyst      Current Outpatient Medications   Medication Sig Dispense Refill     acetaminophen (TYLENOL) 325 MG tablet Take 1-2 tablets by mouth every 6 hours as needed.       allopurinol (ZYLOPRIM) 100 MG tablet Take 1 tablet (100 mg) by mouth daily 90 tablet 3     amLODIPine (NORVASC) 10 MG tablet Take 1 tablet by mouth once daily 90 tablet 0     carbidopa-levodopa (SINEMET)  MG tablet Take 1 tablet by mouth 3 times daily       lisinopril (ZESTRIL) 40 MG tablet Take 1 tablet by mouth once daily 90 tablet 0     Loratadine (CLARITIN PO) Take by mouth daily       metoprolol succinate ER (TOPROL-XL) 50 MG 24 hr tablet TAKE 1 & 1/2 (ONE & ONE-HALF) TABLETS BY MOUTH ONCE DAILY 135 tablet 0     UNABLE TO FIND MEDICATION NAME: Tab-A-Celio, multivitmain with Iron by [facility]       warfarin  ANTICOAGULANT (COUMADIN) 5 MG tablet Take one tablet (5 mg) by mouth daily except take one and a half tablets (7.5 mg) Mon, Th or as directed by INR Clinic 100 tablet 0     Social History     Tobacco Use     Smoking status: Never Smoker     Smokeless tobacco: Never Used   Substance Use Topics     Alcohol use: No     Alcohol/week: 0.0 standard drinks       ROS:  CONSTITUTIONAL:negative for fevers.    MUSCULOSKELETAL:  Positive for left posterior neck pain and left superior shoulder pain.   NEURO: NEGATIVE for weakness/numbness    OBJECTIVE:  BP (!) 150/92   Pulse 62   GENERAL APPEARANCE: healthy, alert and no distress  NECK: There is decreased range of motion with turning to the left and with flexion due to pain.  There is no pain with palpation over the cervical spine.  There is, however, pain over the muscles of the left posterior neck and over the muscles of the superior left shoulder.    SKIN: no suspicious lesions or rashes  NEURO:  No weakness.  Light touch sensation is intact at the arms and hands.      ASSESSMENT:  Left Posterior Neck Pain  Left Superior Shoulder Pain.  Both conditions have pain corresponding to the muscles of the left posterior neck and left superior shoulder.      PLAN:  Place warmth onto the painful areas of the left neck and left shoulder for 20 minutes at a time, every 2 hours while awake.      Take Tylenol for the pain.      Do neck exercises    You can try to massage the painful areas of the neck and shoulder.      Sleep on a pillow that has a height that ensures that the spine of the neck is aligned with the rest of the spine.      follow up if not better in 10-14 days.     Serg Morales MD

## 2022-02-26 NOTE — PATIENT INSTRUCTIONS
Place warmth onto the painful areas of the left neck and left shoulder for 20 minutes at a time, every 2 hours while awake.      Take Tylenol for the pain.      Do neck exercises    You can try to massage the painful areas of the neck and shoulder.      Sleep on a pillow that has a height that ensures that the spine of the neck is aligned with the rest of the spine.      follow up if not better in 10-14 days.   Patient Education     Neck Exercises: Neck Flex  To start, sit in a chair with your feet flat on the floor. Your weight should be slightly forward so that you re balanced evenly on your buttocks. Relax your shoulders and keep your head level. Avoid arching your back or rounding your shoulders. Using a chair with arms may help you keep your balance:    Rest the back of your left hand against your lower back. Place your right palm on the top of your head.    Gently pull your head forward and down until you feel a stretch in the muscles in the back of your neck. Don t force the motion.    Hold for 20 seconds, then return to starting position. Switch arms.    Repeat 5 to 10 times.    Sojna last reviewed this educational content on 3/1/2018    9116-0366 [employer] All rights reserved. This information is not intended as a substitute for professional medical care. Always follow your healthcare professional's instructions.           Patient Education     Neck Exercises: Head Lifts    Do this exercise on your hands and knees. Keep your knees under your hips and your hands under your shoulders. Keep your spine in a neutral position (not arched or sagging). Keep your ears in line with your shoulders. Hold for a few seconds before starting the exercise:    Keeping your back straight, slowly drop your chin toward your chest. Tuck in your chin.    Hold for 5 seconds. Then lift your head until your neck is level with your back.    Hold for 5 seconds. Repeat 5 to10 times.  Sonja last reviewed this  educational content on 3/1/2018    9249-9155 [employer]. All rights reserved. This information is not intended as a substitute for professional medical care. Always follow your healthcare professional's instructions.           Patient Education     Neck Exercises: Active Neck Rotation    To start, lie on your back, knees bent and feet flat on the floor. Keep your ears, shoulders, and hips aligned, but don t press your lower back to the floor. Rest your hands on your pelvis. Breathe deeply and relax.  Here are the steps for the active neck rotation:    Use your neck muscles to turn your head to one side until you feel a stretch in the muscles.    Hold for  5 seconds. Then turn to the other side.    Repeat  5 times on each side.  Note: Keep your shoulders on the floor. Don t lift or tuck your chin as you turn your head.  Sonja last reviewed this educational content on 7/1/2020 2000-2021 [employer]. All rights reserved. This information is not intended as a substitute for professional medical care. Always follow your healthcare professional's instructions.

## 2022-03-01 ENCOUNTER — TELEPHONE (OUTPATIENT)
Dept: UROLOGY | Facility: CLINIC | Age: 77
End: 2022-03-01
Payer: COMMERCIAL

## 2022-03-01 NOTE — TELEPHONE ENCOUNTER
Health Call Center    Phone Message    May a detailed message be left on voicemail: yes     Reason for Call: Patient has an appointment on 4/11/22 at the [place] location.  He is wondering if he can change this appointment to the [place] location at same time and date with same provider.  Please reach out to patient.    Action Taken: Message routed to:  [facility] ([facility] Urology    Travel Screening: Not Applicable

## 2022-03-02 DIAGNOSIS — I48.19 PERSISTENT ATRIAL FIBRILLATION (H): ICD-10-CM

## 2022-03-02 RX ORDER — METOPROLOL SUCCINATE 50 MG/1
TABLET, EXTENDED RELEASE ORAL
Qty: 135 TABLET | Refills: 1 | Status: SHIPPED | OUTPATIENT
Start: 2022-03-02 | End: 2022-10-06

## 2022-03-02 NOTE — TELEPHONE ENCOUNTER
Pt has upcoming appt with Dr Schafer.     BP Readings from Last 3 Encounters:   02/25/22 (!) 150/92   01/07/22 120/78   12/13/21 (!) 140/70

## 2022-03-06 ENCOUNTER — APPOINTMENT (OUTPATIENT)
Dept: GENERAL RADIOLOGY | Facility: CLINIC | Age: 77
DRG: 522 | End: 2022-03-06
Attending: EMERGENCY MEDICINE
Payer: COMMERCIAL

## 2022-03-06 ENCOUNTER — APPOINTMENT (OUTPATIENT)
Dept: GENERAL RADIOLOGY | Facility: CLINIC | Age: 77
DRG: 522 | End: 2022-03-06
Attending: INTERNAL MEDICINE
Payer: COMMERCIAL

## 2022-03-06 ENCOUNTER — HOSPITAL ENCOUNTER (INPATIENT)
Facility: CLINIC | Age: 77
LOS: 5 days | Discharge: NURSING, REHAB OR LONG-TERM CARE | DRG: 522 | End: 2022-03-11
Attending: EMERGENCY MEDICINE | Admitting: INTERNAL MEDICINE
Payer: COMMERCIAL

## 2022-03-06 DIAGNOSIS — S72.001A HIP FRACTURE, RIGHT, CLOSED, INITIAL ENCOUNTER (H): ICD-10-CM

## 2022-03-06 DIAGNOSIS — Z79.01 LONG TERM CURRENT USE OF ANTICOAGULANT THERAPY: ICD-10-CM

## 2022-03-06 DIAGNOSIS — Z96.649 S/P HIP HEMIARTHROPLASTY: Primary | ICD-10-CM

## 2022-03-06 DIAGNOSIS — I48.11 LONGSTANDING PERSISTENT ATRIAL FIBRILLATION (H): ICD-10-CM

## 2022-03-06 DIAGNOSIS — N18.9 CHRONIC KIDNEY DISEASE, UNSPECIFIED CKD STAGE: ICD-10-CM

## 2022-03-06 DIAGNOSIS — G20.A1 PARKINSON'S DISEASE (H): ICD-10-CM

## 2022-03-06 DIAGNOSIS — S72.009A HIP FRACTURE (H): ICD-10-CM

## 2022-03-06 LAB
ANION GAP SERPL CALCULATED.3IONS-SCNC: 6 MMOL/L (ref 3–14)
BASOPHILS # BLD AUTO: 0 10E3/UL (ref 0–0.2)
BASOPHILS NFR BLD AUTO: 0 %
BUN SERPL-MCNC: 28 MG/DL (ref 7–30)
CALCIUM SERPL-MCNC: 8.8 MG/DL (ref 8.5–10.1)
CHLORIDE BLD-SCNC: 109 MMOL/L (ref 94–109)
CO2 SERPL-SCNC: 27 MMOL/L (ref 20–32)
CREAT SERPL-MCNC: 1.47 MG/DL (ref 0.66–1.25)
EOSINOPHIL # BLD AUTO: 0.1 10E3/UL (ref 0–0.7)
EOSINOPHIL NFR BLD AUTO: 1 %
ERYTHROCYTE [DISTWIDTH] IN BLOOD BY AUTOMATED COUNT: 14.1 % (ref 10–15)
GFR SERPL CREATININE-BSD FRML MDRD: 49 ML/MIN/1.73M2
GLUCOSE BLD-MCNC: 99 MG/DL (ref 70–99)
HCT VFR BLD AUTO: 40.7 % (ref 40–53)
HGB BLD-MCNC: 13.2 G/DL (ref 13.3–17.7)
IMM GRANULOCYTES # BLD: 0 10E3/UL
IMM GRANULOCYTES NFR BLD: 1 %
INR PPP: 2.43 (ref 0.85–1.15)
INR PPP: 2.43 (ref 0.85–1.15)
LYMPHOCYTES # BLD AUTO: 1.2 10E3/UL (ref 0.8–5.3)
LYMPHOCYTES NFR BLD AUTO: 26 %
MAGNESIUM SERPL-MCNC: 2.1 MG/DL (ref 1.6–2.3)
MCH RBC QN AUTO: 27.8 PG (ref 26.5–33)
MCHC RBC AUTO-ENTMCNC: 32.4 G/DL (ref 31.5–36.5)
MCV RBC AUTO: 86 FL (ref 78–100)
MONOCYTES # BLD AUTO: 0.3 10E3/UL (ref 0–1.3)
MONOCYTES NFR BLD AUTO: 7 %
NEUTROPHILS # BLD AUTO: 3.1 10E3/UL (ref 1.6–8.3)
NEUTROPHILS NFR BLD AUTO: 65 %
NRBC # BLD AUTO: 0 10E3/UL
NRBC BLD AUTO-RTO: 0 /100
PLATELET # BLD AUTO: 182 10E3/UL (ref 150–450)
POTASSIUM BLD-SCNC: 3.6 MMOL/L (ref 3.4–5.3)
POTASSIUM BLD-SCNC: 3.7 MMOL/L (ref 3.4–5.3)
RBC # BLD AUTO: 4.74 10E6/UL (ref 4.4–5.9)
SARS-COV-2 RNA RESP QL NAA+PROBE: NEGATIVE
SODIUM SERPL-SCNC: 142 MMOL/L (ref 133–144)
WBC # BLD AUTO: 4.8 10E3/UL (ref 4–11)

## 2022-03-06 PROCEDURE — C9803 HOPD COVID-19 SPEC COLLECT: HCPCS

## 2022-03-06 PROCEDURE — 250N000013 HC RX MED GY IP 250 OP 250 PS 637: Performed by: EMERGENCY MEDICINE

## 2022-03-06 PROCEDURE — 250N000013 HC RX MED GY IP 250 OP 250 PS 637: Performed by: INTERNAL MEDICINE

## 2022-03-06 PROCEDURE — 80048 BASIC METABOLIC PNL TOTAL CA: CPT | Performed by: EMERGENCY MEDICINE

## 2022-03-06 PROCEDURE — 85610 PROTHROMBIN TIME: CPT | Performed by: EMERGENCY MEDICINE

## 2022-03-06 PROCEDURE — 93005 ELECTROCARDIOGRAM TRACING: CPT

## 2022-03-06 PROCEDURE — 83735 ASSAY OF MAGNESIUM: CPT | Performed by: INTERNAL MEDICINE

## 2022-03-06 PROCEDURE — 84132 ASSAY OF SERUM POTASSIUM: CPT | Performed by: INTERNAL MEDICINE

## 2022-03-06 PROCEDURE — 96374 THER/PROPH/DIAG INJ IV PUSH: CPT

## 2022-03-06 PROCEDURE — 96376 TX/PRO/DX INJ SAME DRUG ADON: CPT

## 2022-03-06 PROCEDURE — 258N000003 HC RX IP 258 OP 636: Performed by: INTERNAL MEDICINE

## 2022-03-06 PROCEDURE — 87635 SARS-COV-2 COVID-19 AMP PRB: CPT | Performed by: EMERGENCY MEDICINE

## 2022-03-06 PROCEDURE — 73502 X-RAY EXAM HIP UNI 2-3 VIEWS: CPT

## 2022-03-06 PROCEDURE — 36415 COLL VENOUS BLD VENIPUNCTURE: CPT | Performed by: INTERNAL MEDICINE

## 2022-03-06 PROCEDURE — 36415 COLL VENOUS BLD VENIPUNCTURE: CPT | Performed by: EMERGENCY MEDICINE

## 2022-03-06 PROCEDURE — 85025 COMPLETE CBC W/AUTO DIFF WBC: CPT | Performed by: EMERGENCY MEDICINE

## 2022-03-06 PROCEDURE — 250N000011 HC RX IP 250 OP 636: Performed by: INTERNAL MEDICINE

## 2022-03-06 PROCEDURE — 250N000011 HC RX IP 250 OP 636: Performed by: EMERGENCY MEDICINE

## 2022-03-06 PROCEDURE — 99285 EMERGENCY DEPT VISIT HI MDM: CPT | Mod: 25

## 2022-03-06 PROCEDURE — 120N000001 HC R&B MED SURG/OB

## 2022-03-06 PROCEDURE — 85610 PROTHROMBIN TIME: CPT | Performed by: INTERNAL MEDICINE

## 2022-03-06 PROCEDURE — 99223 1ST HOSP IP/OBS HIGH 75: CPT | Mod: AI | Performed by: INTERNAL MEDICINE

## 2022-03-06 PROCEDURE — 71045 X-RAY EXAM CHEST 1 VIEW: CPT

## 2022-03-06 RX ORDER — HYDROMORPHONE HYDROCHLORIDE 1 MG/ML
INJECTION, SOLUTION INTRAMUSCULAR; INTRAVENOUS; SUBCUTANEOUS
Status: DISCONTINUED
Start: 2022-03-06 | End: 2022-03-07 | Stop reason: HOSPADM

## 2022-03-06 RX ORDER — NALOXONE HYDROCHLORIDE 0.4 MG/ML
0.4 INJECTION, SOLUTION INTRAMUSCULAR; INTRAVENOUS; SUBCUTANEOUS
Status: DISCONTINUED | OUTPATIENT
Start: 2022-03-06 | End: 2022-03-11 | Stop reason: HOSPADM

## 2022-03-06 RX ORDER — HYDROMORPHONE HYDROCHLORIDE 1 MG/ML
0.5 INJECTION, SOLUTION INTRAMUSCULAR; INTRAVENOUS; SUBCUTANEOUS
Status: DISCONTINUED | OUTPATIENT
Start: 2022-03-06 | End: 2022-03-06

## 2022-03-06 RX ORDER — PROCHLORPERAZINE 25 MG
12.5 SUPPOSITORY, RECTAL RECTAL EVERY 12 HOURS PRN
Status: DISCONTINUED | OUTPATIENT
Start: 2022-03-06 | End: 2022-03-08

## 2022-03-06 RX ORDER — ACETAMINOPHEN 500 MG
1000 TABLET ORAL ONCE
Status: COMPLETED | OUTPATIENT
Start: 2022-03-06 | End: 2022-03-06

## 2022-03-06 RX ORDER — OXYCODONE HYDROCHLORIDE 5 MG/1
10 TABLET ORAL EVERY 4 HOURS PRN
Status: DISCONTINUED | OUTPATIENT
Start: 2022-03-06 | End: 2022-03-07

## 2022-03-06 RX ORDER — ONDANSETRON 2 MG/ML
4 INJECTION INTRAMUSCULAR; INTRAVENOUS EVERY 6 HOURS PRN
Status: DISCONTINUED | OUTPATIENT
Start: 2022-03-06 | End: 2022-03-08

## 2022-03-06 RX ORDER — LIDOCAINE 40 MG/G
CREAM TOPICAL
Status: DISCONTINUED | OUTPATIENT
Start: 2022-03-06 | End: 2022-03-08

## 2022-03-06 RX ORDER — HYDROMORPHONE HYDROCHLORIDE 1 MG/ML
0.3 INJECTION, SOLUTION INTRAMUSCULAR; INTRAVENOUS; SUBCUTANEOUS
Status: DISCONTINUED | OUTPATIENT
Start: 2022-03-06 | End: 2022-03-08

## 2022-03-06 RX ORDER — AMLODIPINE BESYLATE 10 MG/1
10 TABLET ORAL DAILY
Status: DISCONTINUED | OUTPATIENT
Start: 2022-03-07 | End: 2022-03-11 | Stop reason: HOSPADM

## 2022-03-06 RX ORDER — OXYCODONE HYDROCHLORIDE 5 MG/1
10 TABLET ORAL ONCE
Status: COMPLETED | OUTPATIENT
Start: 2022-03-06 | End: 2022-03-06

## 2022-03-06 RX ORDER — ONDANSETRON 4 MG/1
4 TABLET, ORALLY DISINTEGRATING ORAL EVERY 6 HOURS PRN
Status: DISCONTINUED | OUTPATIENT
Start: 2022-03-06 | End: 2022-03-08

## 2022-03-06 RX ORDER — CARBIDOPA AND LEVODOPA 25; 100 MG/1; MG/1
1 TABLET ORAL 3 TIMES DAILY
Status: DISCONTINUED | OUTPATIENT
Start: 2022-03-06 | End: 2022-03-11 | Stop reason: HOSPADM

## 2022-03-06 RX ORDER — NALOXONE HYDROCHLORIDE 0.4 MG/ML
0.2 INJECTION, SOLUTION INTRAMUSCULAR; INTRAVENOUS; SUBCUTANEOUS
Status: DISCONTINUED | OUTPATIENT
Start: 2022-03-06 | End: 2022-03-11 | Stop reason: HOSPADM

## 2022-03-06 RX ORDER — ACETAMINOPHEN 325 MG/1
325-650 TABLET ORAL EVERY 6 HOURS PRN
Status: DISCONTINUED | OUTPATIENT
Start: 2022-03-06 | End: 2022-03-08

## 2022-03-06 RX ORDER — PROCHLORPERAZINE MALEATE 5 MG
5 TABLET ORAL EVERY 6 HOURS PRN
Status: DISCONTINUED | OUTPATIENT
Start: 2022-03-06 | End: 2022-03-08

## 2022-03-06 RX ORDER — ALLOPURINOL 100 MG/1
100 TABLET ORAL DAILY
Status: DISCONTINUED | OUTPATIENT
Start: 2022-03-07 | End: 2022-03-11 | Stop reason: HOSPADM

## 2022-03-06 RX ORDER — DEXTROSE MONOHYDRATE, SODIUM CHLORIDE, AND POTASSIUM CHLORIDE 50; 1.49; 4.5 G/1000ML; G/1000ML; G/1000ML
INJECTION, SOLUTION INTRAVENOUS CONTINUOUS
Status: DISCONTINUED | OUTPATIENT
Start: 2022-03-06 | End: 2022-03-08

## 2022-03-06 RX ADMIN — HYDROMORPHONE HYDROCHLORIDE 0.3 MG: 1 INJECTION, SOLUTION INTRAMUSCULAR; INTRAVENOUS; SUBCUTANEOUS at 21:08

## 2022-03-06 RX ADMIN — OXYCODONE HYDROCHLORIDE 10 MG: 5 TABLET ORAL at 19:02

## 2022-03-06 RX ADMIN — HYDROMORPHONE HYDROCHLORIDE 0.5 MG: 1 INJECTION, SOLUTION INTRAMUSCULAR; INTRAVENOUS; SUBCUTANEOUS at 18:59

## 2022-03-06 RX ADMIN — CARBIDOPA AND LEVODOPA 1 TABLET: 25; 100 TABLET ORAL at 21:40

## 2022-03-06 RX ADMIN — HYDROMORPHONE HYDROCHLORIDE 0.3 MG: 1 INJECTION, SOLUTION INTRAMUSCULAR; INTRAVENOUS; SUBCUTANEOUS at 23:59

## 2022-03-06 RX ADMIN — POTASSIUM CHLORIDE, DEXTROSE MONOHYDRATE AND SODIUM CHLORIDE: 150; 5; 450 INJECTION, SOLUTION INTRAVENOUS at 21:40

## 2022-03-06 RX ADMIN — HYDROMORPHONE HYDROCHLORIDE 0.5 MG: 1 INJECTION, SOLUTION INTRAMUSCULAR; INTRAVENOUS; SUBCUTANEOUS at 18:07

## 2022-03-06 RX ADMIN — ACETAMINOPHEN 1000 MG: 500 TABLET, FILM COATED ORAL at 18:05

## 2022-03-06 RX ADMIN — PHYTONADIONE 2 MG: 2 INJECTION, EMULSION INTRAMUSCULAR; INTRAVENOUS; SUBCUTANEOUS at 21:39

## 2022-03-06 ASSESSMENT — ACTIVITIES OF DAILY LIVING (ADL)
ADLS_ACUITY_SCORE: 3
ADLS_ACUITY_SCORE: 7
ADLS_ACUITY_SCORE: 12
ADLS_ACUITY_SCORE: 12

## 2022-03-06 NOTE — ED PROVIDER NOTES
"  History   Chief Complaint:  Fall and Hip Pain    HPI   Billy Stock is a 76 year old male with history of Parkinson's disease who presents for evaluation of fall and right hip pain.  Patient states that he was beginning to shovel snow this afternoon when he slipped and fell onto his right hip.  He denies striking anything else including his head.  He did not lose consciousness and the family never called 911 immediately after he fell.  He has been unable to get up or ambulate due to ongoing right hip pain.  He denies pain in the head, chest, back, arms, left leg, right knee or ankle.  He has been well otherwise in the last several days.    Review of Systems  MSK: + as per above  All other systems reviewed and negative    Allergies:  Cats  Codeine  Codeine  Maple Tree  Morphine  Watermelon [Citrullus Vulgaris]    Medications:  Allopurinol  Amlodipine  Sinemet  Lisinopril  Loratadine  Metoprolol succinate  Warfarin  Prinzide  Cetirizine  Colcrys  Combigan  Brinzolamide  Fluticasone  Mitomycin  Sinemet  Lorazepam  Polyethylene glycol  CPAP    Past Medical History:     Bell's palsy  Chronic atrial fibrillation  Chronic renal insufficiency  Glaucoma  Gout  Hyperlipidemia  Hypertension  SARAH  Parkinson disease  Pericarditis  Renal cyst    Past Surgical History:    Cataract IOL, bilateral  Colonoscopy x3  Glaucoma surgery in right eye  Hernia repair  Prostate surgery  Suprapubic prostatectomy  Vasectomy    Family History:    Hypertension  Heart failure  Multiple myeloma  Prostate problems  Diabetes mellitus  Colon cancer  Cerebrovascular disease    Social History:  The patient presented alone.  The patient arrived via EMS    Physical Exam     Patient Vitals for the past 24 hrs:   BP Temp Temp src Pulse Resp SpO2 Height Weight   03/06/22 1717 (!) 149/125 97.4  F (36.3  C) Oral 79 20 100 % 1.854 m (6' 1\") 98.4 kg (217 lb)     Physical Exam  Constitutional: Alert, attentive, GCS 15  HENT:    Nose: Nose normal.    " Mouth/Throat: Oropharynx is clear, mucous membranes are moist   Eyes: EOM are normal.   CV: regular rate and rhythm; no murmurs, rubs or gallups  Chest: Effort normal and breath sounds normal.   GI:  There is no tenderness. No distension. Normal bowel sounds  MSK: Normal range of motion to the upper extremities   Pelvis stable   Right hip tenderness without deformity; right femur, knee, lower leg, ankle, foot nontender and normal inspection   C-spine cleared by NEXUS  Neurological: Alert, attentive  Skin: Skin is warm and dry.    Emergency Department Course     ECG taken at 1926, ECG read at 1926  Atrial fibrillation with a competing junctional pacemaker.  Nonspecific T wave abnormality.  Abnormal ECG.  No significant change as compared to prior EKG dated 8/5/21   Rate 62 bpm. KY interval * ms. QRS duration 102 ms. QT/QTc 444/450 ms. P-R-T axis * -4 66.     Results for orders placed or performed during the hospital encounter of 03/06/22 (from the past 24 hour(s))   CBC + differential    Narrative    The following orders were created for panel order CBC + differential.  Procedure                               Abnormality         Status                     ---------                               -----------         ------                     CBC with platelets and d...[[id number]]  Abnormal            Final result                 Please view results for these tests on the individual orders.   Basic metabolic panel (BMP)   Result Value Ref Range    Sodium 142 133 - 144 mmol/L    Potassium 3.7 3.4 - 5.3 mmol/L    Chloride 109 94 - 109 mmol/L    Carbon Dioxide (CO2) 27 20 - 32 mmol/L    Anion Gap 6 3 - 14 mmol/L    Urea Nitrogen 28 7 - 30 mg/dL    Creatinine 1.47 (H) 0.66 - 1.25 mg/dL    Calcium 8.8 8.5 - 10.1 mg/dL    Glucose 99 70 - 99 mg/dL    GFR Estimate 49 (L) >60 mL/min/1.73m2   CBC with platelets and differential   Result Value Ref Range    WBC Count 4.8 4.0 - 11.0 10e3/uL    RBC Count 4.74 4.40 - 5.90 10e6/uL     Hemoglobin 13.2 (L) 13.3 - 17.7 g/dL    Hematocrit 40.7 40.0 - 53.0 %    MCV 86 78 - 100 fL    MCH 27.8 26.5 - 33.0 pg    MCHC 32.4 31.5 - 36.5 g/dL    RDW 14.1 10.0 - 15.0 %    Platelet Count 182 150 - 450 10e3/uL    % Neutrophils 65 %    % Lymphocytes 26 %    % Monocytes 7 %    % Eosinophils 1 %    % Basophils 0 %    % Immature Granulocytes 1 %    NRBCs per 100 WBC 0 <1 /100    Absolute Neutrophils 3.1 1.6 - 8.3 10e3/uL    Absolute Lymphocytes 1.2 0.8 - 5.3 10e3/uL    Absolute Monocytes 0.3 0.0 - 1.3 10e3/uL    Absolute Eosinophils 0.1 0.0 - 0.7 10e3/uL    Absolute Basophils 0.0 0.0 - 0.2 10e3/uL    Absolute Immature Granulocytes 0.0 <=0.4 10e3/uL    Absolute NRBCs 0.0 10e3/uL   XR Pelvis and Hip Right 1 View    Narrative    EXAM: XR PELVIS AND HIP RIGHT 1 VIEW  LOCATION: [facility]  DATE/TIME: 3/6/2022 6:24 PM    INDICATION: Right hip pain.  COMPARISON: None.      Impression    IMPRESSION: Fracture through the right femoral neck without evidence for intertrochanteric extension. No dislocation at the hip joint. There is superimposed degenerative change. Left hip negative for fracture. Additional mild degenerative change. Pelvis   negative for fracture.   Asymptomatic COVID-19 Virus (Coronavirus) by PCR Nasopharyngeal    Specimen: Nasopharyngeal; Swab   Result Value Ref Range    SARS CoV2 PCR Negative Negative    Narrative    Testing was performed using the michele  SARS-CoV-2 & Influenza A/B Assay on the michele  Jacy  System.  This test should be ordered for the detection of SARS-COV-2 in individuals who meet SARS-CoV-2 clinical and/or epidemiological criteria. Test performance is unknown in asymptomatic patients.  This test is for in vitro diagnostic use under the FDA EUA for laboratories certified under CLIA to perform moderate and/or high complexity testing. This test has not been FDA cleared or approved.  A negative test does not rule out the presence of PCR inhibitors in the specimen or  target RNA in concentration below the limit of detection for the assay. The possibility of a false negative should be considered if the patient's recent exposure or clinical presentation suggests COVID-19.  [facility] are certified under the Clinical Laboratory Improvement Amendments of 1988 (CLIA-88) as qualified to perform moderate and/or high complexity laboratory testing.   INR   Result Value Ref Range    INR 2.43 (H) 0.85 - 1.15   EKG 12 lead   Result Value Ref Range    Systolic Blood Pressure  mmHg    Diastolic Blood Pressure  mmHg    Ventricular Rate 62 BPM    Atrial Rate 66 BPM    MI Interval  ms    QRS Duration 102 ms     ms    QTc 450 ms    P Axis  degrees    R AXIS -4 degrees    T Axis 66 degrees    Interpretation ECG       Atrial fibrillation with a competing junctional pacemaker  Nonspecific T wave abnormality  Abnormal ECG  When compared with ECG of 18-NOV-2019 13:44,  Atrial fibrillation has replaced Atrial flutter  QT has lengthened     XR Chest Port 1 View    Narrative    EXAM: XR CHEST PORT 1 VIEW  LOCATION: [facility]  DATE/TIME: 3/6/2022 9:04 PM    INDICATION: preop  COMPARISON: 05/24/2013      Impression    IMPRESSION: Cardiomegaly. Pulmonary vascularity normal. Artifact overlying the upper right chest. Lungs otherwise clear. No infiltrates or effusions. Tortuous descending thoracic aorta.     Emergency Department Course:     Reviewed:  I reviewed nursing notes, vitals, past medical history, Care Everywhere and [facility].    Assessments:  I obtained history and examined the patient as noted above.     I rechecked the patient and explained findings.     Interventions:  Medications   HYDROmorphone (PF) (DILAUDID) injection 0.5 mg (0.5 mg Intravenous Given 3/6/22 1859)   acetaminophen (TYLENOL) tablet 1,000 mg (1,000 mg Oral Given 3/6/22 1805)   oxyCODONE (ROXICODONE) tablet 10 mg (10 mg Oral Given 3/6/22 1902)     I spoke to Dr. Lundberg,  ZENONO.    Disposition:  The patient was admitted to the hospital under the care of Dr. Bro.     Impression & Plan     Medical Decision Making:  This is a very pleasant 76 year old male with history of CKD and parkinsons who presented with a closed right hip fracture, confirmed on x-ray.  No other concomitant injuries identified. Screening labs are reassuring. He is anticoagulated for AFib and the hospitalist will plan for reversal and OR in the morning. Pain is improved with supportive cares. Plan admit for risk stratification and orthopedics consult.     Diagnosis:    ICD-10-CM    1. Hip fracture, right, closed, initial encounter (H)  S72.001A    2. Longstanding persistent atrial fibrillation (H)  I48.11    3. Parkinson's disease (H)  G20    4. Long term current use of anticoagulant therapy  Z79.01    5. Chronic kidney disease, unspecified CKD stage  N18.9        Disposition:   Admit       Hoang Cuba MD  [facility], [facility] Emergency Department       Hoang Cuba MD  03/06/22 8022

## 2022-03-06 NOTE — ED TRIAGE NOTES
Arrives via EMS from home after falling while shoveling. Right hip pain. No rotation or shortening noted. History Parkinsons and tremor at baseline. Denies hitting head or LOC. On blood thinners. EMS placed 22g right hand and gave 100mcg Fentanyl IV.

## 2022-03-07 LAB
ANION GAP SERPL CALCULATED.3IONS-SCNC: 2 MMOL/L (ref 3–14)
ATRIAL RATE - MUSE: 66 BPM
BUN SERPL-MCNC: 23 MG/DL (ref 7–30)
CALCIUM SERPL-MCNC: 8.9 MG/DL (ref 8.5–10.1)
CHLORIDE BLD-SCNC: 108 MMOL/L (ref 94–109)
CO2 SERPL-SCNC: 28 MMOL/L (ref 20–32)
CREAT SERPL-MCNC: 1.14 MG/DL (ref 0.66–1.25)
DIASTOLIC BLOOD PRESSURE - MUSE: NORMAL MMHG
ERYTHROCYTE [DISTWIDTH] IN BLOOD BY AUTOMATED COUNT: 13.8 % (ref 10–15)
GFR SERPL CREATININE-BSD FRML MDRD: 67 ML/MIN/1.73M2
GLUCOSE BLD-MCNC: 136 MG/DL (ref 70–99)
HCT VFR BLD AUTO: 38.7 % (ref 40–53)
HGB BLD-MCNC: 12.7 G/DL (ref 13.3–17.7)
HOLD SPECIMEN: NORMAL
HOLD SPECIMEN: NORMAL
INR PPP: 1.83 (ref 0.85–1.15)
INR PPP: 1.9 (ref 0.85–1.15)
INTERPRETATION ECG - MUSE: NORMAL
MAGNESIUM SERPL-MCNC: 2 MG/DL (ref 1.6–2.3)
MCH RBC QN AUTO: 28 PG (ref 26.5–33)
MCHC RBC AUTO-ENTMCNC: 32.8 G/DL (ref 31.5–36.5)
MCV RBC AUTO: 85 FL (ref 78–100)
P AXIS - MUSE: NORMAL DEGREES
PLATELET # BLD AUTO: 171 10E3/UL (ref 150–450)
POTASSIUM BLD-SCNC: 4.1 MMOL/L (ref 3.4–5.3)
PR INTERVAL - MUSE: NORMAL MS
QRS DURATION - MUSE: 102 MS
QT - MUSE: 444 MS
QTC - MUSE: 450 MS
R AXIS - MUSE: -4 DEGREES
RBC # BLD AUTO: 4.53 10E6/UL (ref 4.4–5.9)
SODIUM SERPL-SCNC: 138 MMOL/L (ref 133–144)
SYSTOLIC BLOOD PRESSURE - MUSE: NORMAL MMHG
T AXIS - MUSE: 66 DEGREES
VENTRICULAR RATE- MUSE: 62 BPM
WBC # BLD AUTO: 6.5 10E3/UL (ref 4–11)

## 2022-03-07 PROCEDURE — 999N000157 HC STATISTIC RCP TIME EA 10 MIN

## 2022-03-07 PROCEDURE — 250N000011 HC RX IP 250 OP 636: Performed by: INTERNAL MEDICINE

## 2022-03-07 PROCEDURE — 120N000001 HC R&B MED SURG/OB

## 2022-03-07 PROCEDURE — 250N000009 HC RX 250: Performed by: INTERNAL MEDICINE

## 2022-03-07 PROCEDURE — 80048 BASIC METABOLIC PNL TOTAL CA: CPT | Performed by: INTERNAL MEDICINE

## 2022-03-07 PROCEDURE — 85610 PROTHROMBIN TIME: CPT | Performed by: INTERNAL MEDICINE

## 2022-03-07 PROCEDURE — 85027 COMPLETE CBC AUTOMATED: CPT | Performed by: INTERNAL MEDICINE

## 2022-03-07 PROCEDURE — 99233 SBSQ HOSP IP/OBS HIGH 50: CPT | Performed by: INTERNAL MEDICINE

## 2022-03-07 PROCEDURE — 83735 ASSAY OF MAGNESIUM: CPT | Performed by: INTERNAL MEDICINE

## 2022-03-07 PROCEDURE — 250N000013 HC RX MED GY IP 250 OP 250 PS 637: Performed by: PHYSICIAN ASSISTANT

## 2022-03-07 PROCEDURE — 36415 COLL VENOUS BLD VENIPUNCTURE: CPT | Performed by: INTERNAL MEDICINE

## 2022-03-07 PROCEDURE — 94660 CPAP INITIATION&MGMT: CPT

## 2022-03-07 PROCEDURE — 250N000013 HC RX MED GY IP 250 OP 250 PS 637: Performed by: INTERNAL MEDICINE

## 2022-03-07 PROCEDURE — 258N000003 HC RX IP 258 OP 636: Performed by: INTERNAL MEDICINE

## 2022-03-07 RX ORDER — OXYCODONE HYDROCHLORIDE 5 MG/1
5-10 TABLET ORAL EVERY 4 HOURS PRN
Status: DISCONTINUED | OUTPATIENT
Start: 2022-03-07 | End: 2022-03-08

## 2022-03-07 RX ORDER — METHOCARBAMOL 500 MG/1
500 TABLET, FILM COATED ORAL 4 TIMES DAILY PRN
Status: DISCONTINUED | OUTPATIENT
Start: 2022-03-07 | End: 2022-03-11 | Stop reason: HOSPADM

## 2022-03-07 RX ORDER — LISINOPRIL 40 MG/1
40 TABLET ORAL DAILY
Status: DISCONTINUED | OUTPATIENT
Start: 2022-03-07 | End: 2022-03-11 | Stop reason: HOSPADM

## 2022-03-07 RX ORDER — MULTIPLE VITAMINS W/ MINERALS TAB 9MG-400MCG
1 TAB ORAL DAILY
COMMUNITY
End: 2022-09-07

## 2022-03-07 RX ADMIN — POTASSIUM CHLORIDE, DEXTROSE MONOHYDRATE AND SODIUM CHLORIDE: 150; 5; 450 INJECTION, SOLUTION INTRAVENOUS at 09:27

## 2022-03-07 RX ADMIN — HYDROMORPHONE HYDROCHLORIDE 0.3 MG: 1 INJECTION, SOLUTION INTRAMUSCULAR; INTRAVENOUS; SUBCUTANEOUS at 03:17

## 2022-03-07 RX ADMIN — OXYCODONE HYDROCHLORIDE 10 MG: 5 TABLET ORAL at 20:23

## 2022-03-07 RX ADMIN — METOPROLOL SUCCINATE 75 MG: 50 TABLET, EXTENDED RELEASE ORAL at 14:18

## 2022-03-07 RX ADMIN — OXYCODONE HYDROCHLORIDE 10 MG: 5 TABLET ORAL at 06:21

## 2022-03-07 RX ADMIN — POTASSIUM CHLORIDE, DEXTROSE MONOHYDRATE AND SODIUM CHLORIDE: 150; 5; 450 INJECTION, SOLUTION INTRAVENOUS at 19:51

## 2022-03-07 RX ADMIN — CARBIDOPA AND LEVODOPA 1 TABLET: 25; 100 TABLET ORAL at 14:18

## 2022-03-07 RX ADMIN — CARBIDOPA AND LEVODOPA 1 TABLET: 25; 100 TABLET ORAL at 19:50

## 2022-03-07 RX ADMIN — CARBIDOPA AND LEVODOPA 1 TABLET: 25; 100 TABLET ORAL at 08:02

## 2022-03-07 RX ADMIN — LISINOPRIL 40 MG: 40 TABLET ORAL at 14:18

## 2022-03-07 RX ADMIN — ACETAMINOPHEN 325 MG: 325 TABLET, FILM COATED ORAL at 19:50

## 2022-03-07 RX ADMIN — ALLOPURINOL 100 MG: 100 TABLET ORAL at 09:23

## 2022-03-07 RX ADMIN — PHYTONADIONE 5 MG: 10 INJECTION, EMULSION INTRAMUSCULAR; INTRAVENOUS; SUBCUTANEOUS at 14:19

## 2022-03-07 RX ADMIN — ACETAMINOPHEN 650 MG: 325 TABLET, FILM COATED ORAL at 08:02

## 2022-03-07 RX ADMIN — OXYCODONE HYDROCHLORIDE 10 MG: 5 TABLET ORAL at 14:18

## 2022-03-07 RX ADMIN — AMLODIPINE BESYLATE 10 MG: 10 TABLET ORAL at 09:23

## 2022-03-07 ASSESSMENT — ACTIVITIES OF DAILY LIVING (ADL)
ADLS_ACUITY_SCORE: 9
ADLS_ACUITY_SCORE: 9
DEPENDENT_IADLS:: INDEPENDENT
ADLS_ACUITY_SCORE: 7
ADLS_ACUITY_SCORE: 9
ADLS_ACUITY_SCORE: 7
ADLS_ACUITY_SCORE: 9
ADLS_ACUITY_SCORE: 7
ADLS_ACUITY_SCORE: 7
ADLS_ACUITY_SCORE: 11
ADLS_ACUITY_SCORE: 7
ADLS_ACUITY_SCORE: 9
ADLS_ACUITY_SCORE: 11
ADLS_ACUITY_SCORE: 7
ADLS_ACUITY_SCORE: 9
ADLS_ACUITY_SCORE: 7
ADLS_ACUITY_SCORE: 9

## 2022-03-07 NOTE — PHARMACY-ADMISSION MEDICATION HISTORY
Admission medication history interview status for this patient is complete. See [facility] admission navigator for allergy information, prior to admission medications and immunization status.     Medication history interview source(s):Patient  Medication history resources (including written lists, pill bottles, clinic record):EPIC list, Sure Scripts  [facility]    Changes made to PTA medication list:  Added: Prevagen, MVI  Deleted: nonformulary entry for MVI  Changed: loratadine sig per pt    Actions taken by pharmacist (provider contacted, etc):None     Additional medication history information:None    Medication reconciliation/reorder completed by provider prior to medication history? Yes, sticky note left for MD      For patients on insulin therapy:N        Prior to Admission Medications   Prescriptions Last Dose Informant Patient Reported? Taking?   Apoaequorin (PREVAGEN PO) 3/6/2022 at am  Yes Yes   Sig: Take 1 tablet by mouth daily   Loratadine (CLARITIN PO) Past Month at Unknown time  Yes Yes   Sig: Take 10 mg by mouth daily as needed    acetaminophen (TYLENOL) 325 MG tablet 3/6/2022 at Unknown time  Yes Yes   Sig: Take 1-2 tablets by mouth every 6 hours as needed.   allopurinol (ZYLOPRIM) 100 MG tablet 3/6/2022 at am  No Yes   Sig: Take 1 tablet (100 mg) by mouth daily   amLODIPine (NORVASC) 10 MG tablet 3/6/2022 at am  No Yes   Sig: Take 1 tablet by mouth once daily   carbidopa-levodopa (SINEMET)  MG tablet 3/6/2022 at 1400  Yes Yes   Sig: Take 1 tablet by mouth 3 times daily   lisinopril (ZESTRIL) 40 MG tablet 3/6/2022 at am  No Yes   Sig: Take 1 tablet by mouth once daily   metoprolol succinate ER (TOPROL-XL) 50 MG 24 hr tablet 3/6/2022 at am  No Yes   Sig: TAKE 1 & 1/2 (ONE & ONE-HALF) TABLETS BY MOUTH ONCE DAILY   multivitamin w/minerals (MULTI-VITAMIN) tablet 3/6/2022 at Unknown time  Yes Yes   Sig: Take 1 tablet by mouth daily   warfarin ANTICOAGULANT (COUMADIN) 5 MG tablet 3/6/2022  at 5mg in am  No Yes   Sig: Take one tablet (5 mg) by mouth daily except take one and a half tablets (7.5 mg) Mon, Th or as directed by INR Clinic      Facility-Administered Medications: None

## 2022-03-07 NOTE — PLAN OF CARE
"AOx4. Bedrest. Using prn dilaudid x2, oxy x1, ice packs with good effect. CMS intact. Room air, wore CPAP briefly overnight. Pt's INR was above 1.8. Surgery postponed until 3/8 in AM. Now regular diet. D5 1/2NS w KCL running at 100/hr. Other PIV SL. External cath in place.      BP (!) 153/91 (BP Location: Left arm)   Pulse 69   Temp 97.1  F (36.2  C) (Temporal)   Resp 12   Ht 1.854 m (6' 1\")   Wt 100 kg (220 lb 7.4 oz)   SpO2 99%   BMI 29.09 kg/m      "

## 2022-03-07 NOTE — CONSULTS
[facility]    Orthopedics Consultation    Date of Admission:  3/6/2022    Assessment & Plan     1. Right displaced femoral neck fracture    Plan:    I recommended operative treatment of the patient's right displaced femoral neck fracture by hemiarthroplasty given his activity level and Parkinson's disease.  We discussed the natural history of this diagnosis, and the plan for surgery.  We discussed the risks and benefits of the procedure, as well as the expected post-operative course.  We specifically discussed that he is at elevated risk of dislocation.    Active Problems:    Hip fracture, right, closed, initial encounter (H)    Hip fracture (H)    Bandar Lundberg MD     Code Status    Full Code    Reason for Consult   Reason for consult: I was asked by Dr. Cuba to evaluate this patient for a right hip fracture.    Primary Care Physician   Malcolm Schafer    Chief Complaint   Right hip pain    History is obtained from the patient    History of Present Illness   Billy Stock is a 76 year old male who presents with a right displaced femoral neck fracture secondary to mechanical ground-level fall.  The patient was outside trying to shovel snow when he fell onto his right side.  He denies any antecedent pain.  He normally walks without any gait aids.  He lives independently in a split-level home.  He does have Parkinson's disease which somewhat limits his activity.  He is on Coumadin for chronic atrial fibrillation with a therapeutic INR.  He has some left-sided facial and ear pain which she noticed this morning but otherwise his hip is his only complaint.  He specifically denies any numbness or weakness in the right lower extremity, chest pain, or loss of consciousness.    Past Medical History   I have reviewed this patient's medical history and updated it with pertinent information if needed.   Past Medical History:   Diagnosis Date     Bell's palsy 10/15/2002     CA IN SITU  PROSTATE 10/15/2002     Chronic atrial fibrillation (H) 07/01/2010     Chronic renal insufficiency      Glaucoma 04/10/2003     Problem list name updated by automated process. Provider to review     Gout 05/16/2013     Hyperlipidemia LDL goal <100 09/10/2014     Hypertension goal BP (blood pressure) < 140/90 06/28/2006     SARAH (obstructive sleep apnea) 08/28/2013     Parkinson disease (H) 2019     Pericarditis 2001     Renal cyst        Past Surgical History   I have reviewed this patient's surgical history and updated it with pertinent information if needed.  Past Surgical History:   Procedure Laterality Date     CATARACT IOL, RT/LT  10/15, 11/15     COLONOSCOPY  2009    [facility]     COLONOSCOPY  9/30/2014    Dr. Long [facility]     COLONOSCOPY N/A 9/30/2014    Procedure: COMBINED COLONOSCOPY, SINGLE BIOPSY/POLYPECTOMY BY BIOPSY;  Surgeon: Puneet Long MD;  Location:  GI     EYE SURGERY  2007    glaucoma surgery right eye     HERNIA REPAIR       PROSTATE SURGERY       SUPRAPUBIC PROSTATECTOMY  2002     VASECTOMY         Prior to Admission Medications   Prior to Admission Medications   Prescriptions Last Dose Informant Patient Reported? Taking?   Loratadine (CLARITIN PO)   Yes No   Sig: Take by mouth daily   UNABLE TO FIND   Yes No   Sig: MEDICATION NAME: Tab-A-Celio, multivitmain with Iron by [facility]   acetaminophen (TYLENOL) 325 MG tablet   Yes No   Sig: Take 1-2 tablets by mouth every 6 hours as needed.   allopurinol (ZYLOPRIM) 100 MG tablet   No No   Sig: Take 1 tablet (100 mg) by mouth daily   amLODIPine (NORVASC) 10 MG tablet   No No   Sig: Take 1 tablet by mouth once daily   carbidopa-levodopa (SINEMET)  MG tablet   Yes No   Sig: Take 1 tablet by mouth 3 times daily   lisinopril (ZESTRIL) 40 MG tablet   No No   Sig: Take 1 tablet by mouth once daily   metoprolol succinate ER (TOPROL-XL) 50 MG 24 hr tablet   No No   Sig: TAKE 1 & 1/2 (ONE & ONE-HALF) TABLETS BY MOUTH ONCE DAILY   warfarin ANTICOAGULANT  (COUMADIN) 5 MG tablet   No No   Sig: Take one tablet (5 mg) by mouth daily except take one and a half tablets (7.5 mg) Mon,  or as directed by INR Clinic      Facility-Administered Medications: None     Allergies   Allergies   Allergen Reactions     Cats      Codeine      Hallucinated when taking codeine with another medication     Codeine Unknown     Maple Tree      Morphine Visual Disturbance and Other (See Comments)     Watermelon [Citrullus Vulgaris]      Itching throat, hives       Social History   I have reviewed this patient's social history and updated it with pertinent information if needed. Billy Stock  reports that he has never smoked. He has never used smokeless tobacco. He reports that he does not drink alcohol and does not use drugs.    Family History   I have reviewed this patient's family history and updated it with pertinent information if needed.   Family History   Problem Relation Age of Onset     Hypertension Maternal Grandfather      Cerebrovascular Disease Maternal Grandfather      Hypertension Maternal Grandmother      Cerebrovascular Disease Maternal Grandmother      Hypertension Mother          age 51, MVA     Hypertension Brother      Heart Failure Brother      Bronchitis Brother      Other - See Comments Brother         multiple myeloma     Heart Surgery Brother         defibrilater     Prostate Problems Brother      Diabetes Brother      Other Cancer Brother         Multiple Myeloma     Breast Cancer Maternal Aunt         hx     Cancer Maternal Aunt         cervical cancer     Cancer - colorectal Maternal Aunt      Unknown/Adopted Father         Don't know father's history     Diabetes Son      Cerebrovascular Disease Son      Colon Cancer Other      No Known Problems Daughter        Review of Systems   The 10 point Review of Systems is negative other than noted in the HPI or here.     Physical Exam   Temp: 97.6  F (36.4  C) Temp src: Temporal BP: (!) 160/96 (notified RN)  Pulse: 56   Resp: 16 SpO2: 98 % O2 Device: None (Room air)    Vital Signs with Ranges  Temp:  [97.1  F (36.2  C)-97.6  F (36.4  C)] 97.6  F (36.4  C)  Pulse:  [56-79] 56  Resp:  [12-20] 16  BP: (143-161)/() 160/96  FiO2 (%):  [21 %] 21 %  SpO2:  [96 %-100 %] 98 %  218 lbs 9.6 oz    Constitutional: awake, alert, cooperative, no apparent distress, and appears stated age  Eyes: extra-ocular muscles intact, no scleral icterus  ENT: normocepalic, atraumatic without obvious abnormality  Hematologic / Lymphatic: No lymphedema   Respiratory: no increased work of breathing, symmetric chest wall expansion  Skin: no bruising or bleeding and normal skin color, texture, turgor  Musculoskeletal: full range of motion noted  with exception of  RIGHT HIP:  Any attempt at ROM is painful.  Slightly shortened right lower extremity  Neurologic: Cranial Nerves:  cranial nerves II-XII are grossly intact  Motor Exam:  moves all extremities well and symmetrically  Except right lower extremity, not tested secondary to pain  Sensory:  Sensory intact  Neuropsychiatric: General: normal, calm and normal eye contact  Affect: normal  Orientation: oriented to self, place, time and situation    Data   I personally reviewed the hip image(s) showing a right displaced femoral neck fracture with little to no degenerative changes present.

## 2022-03-07 NOTE — PROGRESS NOTES
[facility]    Hospitalist Progress Note    Assessment & Plan   Billy Stock is a 76 year old -American male with a significant past medical history of Parkinson's disease, anticoagulation for atrial fibrillation, chronic kidney disease and hypertension who presents with fall while was shoveling snow which resulted in a right hip fracture.  .Mechanical fall with Fracture through the right femoral neck     X-ray of the hip showed fracture through the right femoral neck without evidence of intertrochanteric extension.  --Appreciate Ortho input, plan was to do surgery today but postponed it due to elevated INR, will try another dose of vitamin K 5 mg p.o. today, repeat INR in a.m.  --Keep him n.p.o. after midnight tonight.  --Continue as needed pain medications.     2.Anticoagulation with coumadin for A fib   On admission INR was 2.43, came down to 1.83 today with 2 mg of IV vitamin K, additional dose of p.o. ordered today to bring it down below 1.8.  --Need to restart warfarin as soon as possible following surgery as patient 's [id number] to score is high.        Active comorbid medical conditions:    CKD - baseline creatinine 1.5, currently 1.14     H/o prostate cancer s/p RRP 2002 w/ PSM, adjuvant radiation, on intermittent ADT for biochemical recurrence    Persistent atrial fibrillation on coumadin , metoprolol    HTN (hypertension)-on PTA lisinopril, amlodipine    Glaucoma    Parkinson's disease    SARAH (obstructive sleep apnea)-he wears CPAP at home    Bell's palsy  PTA medications reviewed and restarted.     COVID-19 Testing : Negative    DVT Prophylaxis: PTA on warfarin, currently anticoagulation is on hold for surgery.  INR today is 1.8  Code Status: Full Code     Disposition: Expected discharge in 2 to 3 days    Meggan Dickson MD  Text Page   (7am to 6pm)    Interval History   Patient is resting, pain well controlled, he was n.p.o. overnight for possible surgery today,  surgery postponed due to still elevated INR, his INR is 1.83, Ortho 1 7 INR below 1.8.  Denies any chest pain or shortness of breath.    -Data reviewed today: I reviewed all new labs and imaging results over the last 24 hours.Physical Exam   Temp: 97.6  F (36.4  C) Temp src: Temporal BP: 129/67 Pulse: 61   Resp: 16 SpO2: 98 % O2 Device: None (Room air)    Vitals:    03/06/22 1717 03/06/22 2120 03/07/22 0700   Weight: 98.4 kg (217 lb) 100 kg (220 lb 7.4 oz) 99.2 kg (218 lb 9.6 oz)     Vital Signs with Ranges  Temp:  [97.1  F (36.2  C)-97.6  F (36.4  C)] 97.6  F (36.4  C)  Pulse:  [56-79] 61  Resp:  [12-20] 16  BP: (129-161)/() 129/67  FiO2 (%):  [21 %] 21 %  SpO2:  [96 %-100 %] 98 %  I/O last 3 completed shifts:  In: -   Out: 900 [Urine:900]    Constitutional: Awake, alert, cooperative, no apparent distress  Respiratory: Clear to auscultation bilaterally, no crackles or wheezing  Cardiovascular: Regular rate and rhythm, normal S1 and S2, and no murmur noted  GI: Normal bowel sounds, soft, non-distended, non-tender  Skin/Integumen/MSK: No rashes, no cyanosis, no edema  Neuro : moving all 4 extremities, no focal deficit noted     Medications     dextrose 5% and 0.45% NaCl + KCl 20 mEq/L 100 mL/hr at 03/07/22 0927       allopurinol  100 mg Oral Daily     amLODIPine  10 mg Oral Daily     carbidopa-levodopa  1 tablet Oral TID     sodium chloride (PF)  3 mL Intracatheter Q8H       Data   Recent Labs   Lab 03/07/22  0545 03/07/22  0449 03/06/22 2125 03/06/22  1912 03/06/22  1817   WBC  --  6.5  --   --  4.8   HGB  --  12.7*  --   --  13.2*   MCV  --  85  --   --  86   PLT  --  171  --   --  182   INR 1.83* 1.90* 2.43*   < >  --    NA  --  138  --   --  142   POTASSIUM  --  4.1 3.6  --  3.7   CHLORIDE  --  108  --   --  109   CO2  --  28  --   --  27   BUN  --  23  --   --  28   CR  --  1.14  --   --  1.47*   ANIONGAP  --  2*  --   --  6   BRIANA  --  8.9  --   --  8.8   GLC  --  136*  --   --  99    < > = values in this  interval not displayed.     Recent Labs   Lab 03/07/22  0449 03/06/22  1817   * 99       Imaging:   Recent Results (from the past 24 hour(s))   XR Pelvis and Hip Right 1 View    Narrative    EXAM: XR PELVIS AND HIP RIGHT 1 VIEW  LOCATION: [facility]  DATE/TIME: 3/6/2022 6:24 PM    INDICATION: Right hip pain.  COMPARISON: None.      Impression    IMPRESSION: Fracture through the right femoral neck without evidence for intertrochanteric extension. No dislocation at the hip joint. There is superimposed degenerative change. Left hip negative for fracture. Additional mild degenerative change. Pelvis   negative for fracture.   XR Chest Port 1 View    Narrative    EXAM: XR CHEST PORT 1 VIEW  LOCATION: [facility]  DATE/TIME: 3/6/2022 9:04 PM    INDICATION: preop  COMPARISON: 05/24/2013      Impression    IMPRESSION: Cardiomegaly. Pulmonary vascularity normal. Artifact overlying the upper right chest. Lungs otherwise clear. No infiltrates or effusions. Tortuous descending thoracic aorta.

## 2022-03-07 NOTE — CONSULTS
Care Management Initial Consult    General Information  Assessment completed with: Patient, and son, Edi, at bedside  Type of CM/SW Visit: CM Role Introduction    Primary Care Provider verified and updated as needed: Yes   Readmission within the last 30 days:        Reason for Consult: discharge planning  Advance Care Planning:            Communication Assessment  Patient's communication style: spoken language (English or Bilingual)    Hearing Difficulty or Deaf: no   Wear Glasses or Blind: no    Cognitive  Cognitive/Neuro/Behavioral: WDL                      Living Environment:   People in home: lives with ex-wife     Current living Arrangements: house ([facility])      Able to return to prior arrangements:         Family/Social Support:  Care provided by: self  Provides care for: no one  Marital Status:   Children, Other (specify) (ex-wife)          Description of Support System: Supportive, Involved         Current Resources:   Patient receiving home care services: No     Community Resources: None  Equipment currently used at home: none  Supplies currently used at home:  none    Employment/Financial:  Employment Status: retired        Financial Concerns: No concerns identified           Lifestyle & Psychosocial Needs:  Social Determinants of Health     Tobacco Use: Low Risk      Smoking Tobacco Use: Never Smoker     Smokeless Tobacco Use: Never Used   Alcohol Use: Not on file   Financial Resource Strain: Not on file   Food Insecurity: Not on file   Transportation Needs: Not on file   Physical Activity: Not on file   Stress: Not on file   Social Connections: Not on file   Intimate Partner Violence: Not on file   Depression: Not at risk     PHQ-2 Score: 0   Housing Stability: Not on file       Functional Status:  Prior to admission patient needed assistance:   Dependent ADLs:: Independent  Dependent IADLs:: Independent       Mental Health Status:  Mental Health Status: No Current Concerns       Chemical  Dependency Status:  Chemical Dependency Status: No Current Concerns             Values/Beliefs:  Spiritual, Cultural Beliefs, Anglican Practices, Values that affect care: no               Additional Information:  Patient admitted with fall, rt femur fx and noted to have high URR. Surg planned 3/8. Met with patient and son at bedside. Patient alert, oriented and communicates appropriately. States he lives in [facility] with his ex-wife.  [employer] has ~15 stairs to bedrooms. States prior to admission, he was independent in all ADLs and drives. Patient denies having any equipment or services.     Introduced CM role and discussed CM will follow along for discharge planning.    Pt/family would be interested in recommended post surgical discharge options: TCU vs HHC. CTS will follow when surgery completed for discharge plan of care.     Deuce Abdul RN Case Manager  Inpatient Care Coordination   [facility]   [phone]        Deuce Abdul RN

## 2022-03-07 NOTE — H&P
[facility]    Hospitalist Admission Note    Name: Billy Stock    MRN: [id number]  YOB: 1945    Age: 76 year old  Date of admission: 3/6/2022  Primary care provider: Malcolm Schafer  Admitting physician : Jasiel Bro M.D. ,M.B.A.            Assessment:       Brief summary of admission assessment:    Billy Stock is a 76 year old -American male with a significant past medical history of Parkinson's disease, anticoagulation for atrial fibrillation, chronic kidney disease and hypertension who presents with fall while was shoveling snow which resulted in a right hip fracture.    He was hypertensive on arrival otherwise stable vital signs.  He denies chest pain or shortness of breath.  EKG showed atrial fibrillation with competing junctional pacemaker.        Admission diagnoses:       #1.Mechanical fall with Fracture through the right femoral neck   --No complaint of chest pain or shortness of breath.  He denies head trauma or loss of consciousness.  X-ray of the hip showed fracture through the right femoral neck without evidence of intertrochanteric extension.    #2.Anticoagulation with coumadin for A fib   --INR 2.43      Active comorbid medical conditions:    CKD - baseline creatinine 1.5    H/o prostate cancer s/p RRP 2002 w/ PSM, adjuvant radiation, on intermittent ADT for biochemical recurrence    Persistent atrial fibrillation on coumadin , metoprolol    HTN (hypertension)-on PTA lisinopril, amlodipine    Glaucoma    Parkinson's disease    SARAH (obstructive sleep apnea)-he wears CPAP at home    Bell's palsy      COVID-19 Testing : Negative                 Plan/MDM:       # Admission Status: Will admit patient to hospitalist service as inpatient as patient likely need over two mid night stay  in the hospital.     # Care plan(MDM):      Admit to medical ortho floor     Pain medication , bed rest     Hold coumadin, administer 2 mg iv  vitamin K to  reverse coagulopathy    Ortho consult     Keep NPO after midnight    preop - hgb  13.2, will get CXR , fair functional status , no coronary artery disease, preserved EF    Continue home CPAP    # Supportive care:Pain management: oral narcotics and IV narcotics    # Diet:NPO after midnight    # Activity:Advance activity as tolerated    # Education/Counseling :Discussed treatment plan with the patient    # Consults:Inpatient consult with orthopedics    # VTE prophylactic measures:prophylaxis against venous thromboembolism with PCD     # Therapies:Occupational therapy and Physical therapy postop       # Additional orders:    --Care plan discussed with the patient/family and agreed to care plan   --Patient will be transferred to care of hospitalist attending for further evaluation and management as appropriate   --Old medical orders reviewed   --imaging result independently reviewed by me     (See orders placed for this visit by me )     - Home medication reviewed and will be continued as appropriate once pharmacy reconciliation is completed         Code Status/Disposition:     # Code Status:Full Code      # Disposition:anticipate discharge to skilled care facility and Anticipate discharge in 3 days        Disclaimer: This note consists of symbols derived from keyboarding, dictation and/or voice recognition software. As a result, there may be errors in the script that have gone undetected. Please consider this when interpreting information found in this chart.             Chief Complaint:     Fall  History is obtained from the patient          History of Present Illness:      This patient is a 76 year old  male with a significant past medical history of Parkinson's  disease, obstructive sleep apnea, anticoagulation for A. fib, hypertension and chronic kidney disease who presents with the following condition requiring a hospital admission:      Right hip fracture  A 76-year-old male who was shoveling snow when he slipped  and fell on his right hip.  He developed right hip pain and had difficulty with movement.  Did not lose consciousness or hit his head.  He was transported to the hospital which revealed evidence of right hip fracture.  He denies chest pain or shortness of breath.  No fever or chills.  He denies history of coronary artery disease but he has history of atrial fibrillation on anticoagulation as well as obstructive sleep apnea wearing CPAP at night.           Past Medical History:     Past Medical History:   Diagnosis Date     Bell's palsy 10/15/2002     CA IN SITU PROSTATE 10/15/2002     Chronic atrial fibrillation (H) 07/01/2010     Chronic renal insufficiency      Glaucoma 04/10/2003     Problem list name updated by automated process. Provider to review     Gout 05/16/2013     Hyperlipidemia LDL goal <100 09/10/2014     Hypertension goal BP (blood pressure) < 140/90 06/28/2006     SARAH (obstructive sleep apnea) 08/28/2013     Parkinson disease (H) 2019     Pericarditis 2001     Renal cyst             Past Surgical History:     Past Surgical History:   Procedure Laterality Date     CATARACT IOL, RT/LT  10/15, 11/15     COLONOSCOPY  2009    [facility]     COLONOSCOPY  9/30/2014    Dr. Long [facility]     COLONOSCOPY N/A 9/30/2014    Procedure: COMBINED COLONOSCOPY, SINGLE BIOPSY/POLYPECTOMY BY BIOPSY;  Surgeon: Puneet Long MD;  Location:  GI     EYE SURGERY  2007    glaucoma surgery right eye     HERNIA REPAIR       PROSTATE SURGERY       SUPRAPUBIC PROSTATECTOMY  2002     VASECTOMY               Social History:     Social History     Tobacco Use     Smoking status: Never Smoker     Smokeless tobacco: Never Used   Substance Use Topics     Alcohol use: No     Alcohol/week: 0.0 standard drinks             Family History:     Family History   Problem Relation Age of Onset     Hypertension Maternal Grandfather      Cerebrovascular Disease Maternal Grandfather      Hypertension Maternal Grandmother      Cerebrovascular  Disease Maternal Grandmother      Hypertension Mother          age 51, MVA     Hypertension Brother      Heart Failure Brother      Bronchitis Brother      Other - See Comments Brother         multiple myeloma     Heart Surgery Brother         defibrilater     Prostate Problems Brother      Diabetes Brother      Other Cancer Brother         Multiple Myeloma     Breast Cancer Maternal Aunt         hx     Cancer Maternal Aunt         cervical cancer     Cancer - colorectal Maternal Aunt      Unknown/Adopted Father         Don't know father's history     Diabetes Son      Cerebrovascular Disease Son      Colon Cancer Other      No Known Problems Daughter             Allergies:     Allergies   Allergen Reactions     Cats      Codeine      Hallucinated when taking codeine with another medication     Codeine Unknown     Maple Tree      Morphine Visual Disturbance and Other (See Comments)     Watermelon [Citrullus Vulgaris]      Itching throat, hives             Medications:        Prior to Admission medications    Medication Sig Last Dose Taking? Auth Provider   acetaminophen (TYLENOL) 325 MG tablet Take 1-2 tablets by mouth every 6 hours as needed.   Reported, Patient   allopurinol (ZYLOPRIM) 100 MG tablet Take 1 tablet (100 mg) by mouth daily   Deena Russell PA-C   amLODIPine (NORVASC) 10 MG tablet Take 1 tablet by mouth once daily   Malcolm Schafer MD   carbidopa-levodopa (SINEMET)  MG tablet Take 1 tablet by mouth 3 times daily   Reported, Patient   lisinopril (ZESTRIL) 40 MG tablet Take 1 tablet by mouth once daily   Malcolm Scahfer MD   Loratadine (CLARITIN PO) Take by mouth daily   Reported, Patient   metoprolol succinate ER (TOPROL-XL) 50 MG 24 hr tablet TAKE 1 & 1/2 (ONE & ONE-HALF) TABLETS BY MOUTH ONCE DAILY   Malcolm Schafer MD   UNABLE TO FIND MEDICATION NAME: Tab-A-Celio, multivitmain with Iron by [facility]   Reported, Patient   warfarin ANTICOAGULANT (COUMADIN) 5 MG tablet Take one  tablet (5 mg) by mouth daily except take one and a half tablets (7.5 mg) Mon, Th or as directed by INR Clinic   Malcolm Schafer MD          Review of Systems:     A Comprehensive greater than 10 system review of systems was carried out.  Pertinent positives and negatives are noted above in HPI.  Otherwise negative for contributory information.           Physical Exam:     Vital signs were reviewed    Temp:  [97.4  F (36.3  C)] 97.4  F (36.3  C)  Pulse:  [69-79] 69  Resp:  [18-20] 18  BP: (143-154)/(104-125) 154/105  SpO2:  [98 %-100 %] 98 %        GEN: awake, alert, cooperative, no apparent distress, oriented x 3, has resting tremor    NECK:Supple ,no mass or thyromegaly     HEENT:  Normocephalic/atraumatic, no scleral icterus, no nasal discharge, mouth moist.    CV:  Regular rate and rhythm, no murmur or JVD.  S1 + S2 noted, no S3 or S4.    LUNGS:  Clear to auscultation bilaterally without rales/rhonchi/wheezing/retractions.  Symmetric chest rise on inhalation noted.    ABD:  Active bowel sounds, soft, non-tender/non-distended.  No rebound/guarding/rigidity.    EXT: No extremity deformity, right hip tenderness limiting mobility    LGS: No cervical or axillary lymphadenopathy     SKIN:  Dry to touch, warm ,no exanthems noted in the visualized areas.    Neurologic:Grossly intact,non focal .  Tremor noted    Psychaitric exam: Mood and affect normal     Additional significant  Findings:             Data:       All laboratory and imaging data in the past 24 hours reviewed     Results for orders placed or performed during the hospital encounter of 03/06/22   XR Pelvis and Hip Right 1 View     Status: None    Narrative    EXAM: XR PELVIS AND HIP RIGHT 1 VIEW  LOCATION: [facility]  DATE/TIME: 3/6/2022 6:24 PM    INDICATION: Right hip pain.  COMPARISON: None.      Impression    IMPRESSION: Fracture through the right femoral neck without evidence for intertrochanteric extension. No dislocation at  the hip joint. There is superimposed degenerative change. Left hip negative for fracture. Additional mild degenerative change. Pelvis   negative for fracture.   Basic metabolic panel (BMP)     Status: Abnormal   Result Value Ref Range    Sodium 142 133 - 144 mmol/L    Potassium 3.7 3.4 - 5.3 mmol/L    Chloride 109 94 - 109 mmol/L    Carbon Dioxide (CO2) 27 20 - 32 mmol/L    Anion Gap 6 3 - 14 mmol/L    Urea Nitrogen 28 7 - 30 mg/dL    Creatinine 1.47 (H) 0.66 - 1.25 mg/dL    Calcium 8.8 8.5 - 10.1 mg/dL    Glucose 99 70 - 99 mg/dL    GFR Estimate 49 (L) >60 mL/min/1.73m2   CBC with platelets and differential     Status: Abnormal   Result Value Ref Range    WBC Count 4.8 4.0 - 11.0 10e3/uL    RBC Count 4.74 4.40 - 5.90 10e6/uL    Hemoglobin 13.2 (L) 13.3 - 17.7 g/dL    Hematocrit 40.7 40.0 - 53.0 %    MCV 86 78 - 100 fL    MCH 27.8 26.5 - 33.0 pg    MCHC 32.4 31.5 - 36.5 g/dL    RDW 14.1 10.0 - 15.0 %    Platelet Count 182 150 - 450 10e3/uL    % Neutrophils 65 %    % Lymphocytes 26 %    % Monocytes 7 %    % Eosinophils 1 %    % Basophils 0 %    % Immature Granulocytes 1 %    NRBCs per 100 WBC 0 <1 /100    Absolute Neutrophils 3.1 1.6 - 8.3 10e3/uL    Absolute Lymphocytes 1.2 0.8 - 5.3 10e3/uL    Absolute Monocytes 0.3 0.0 - 1.3 10e3/uL    Absolute Eosinophils 0.1 0.0 - 0.7 10e3/uL    Absolute Basophils 0.0 0.0 - 0.2 10e3/uL    Absolute Immature Granulocytes 0.0 <=0.4 10e3/uL    Absolute NRBCs 0.0 10e3/uL   Asymptomatic COVID-19 Virus (Coronavirus) by PCR Nasopharyngeal     Status: Normal    Specimen: Nasopharyngeal; Swab   Result Value Ref Range    SARS CoV2 PCR Negative Negative    Narrative    Testing was performed using the michele  SARS-CoV-2 & Influenza A/B Assay on the michele  Jacy  System.  This test should be ordered for the detection of SARS-COV-2 in individuals who meet SARS-CoV-2 clinical and/or epidemiological criteria. Test performance is unknown in asymptomatic patients.  This test is for in vitro  diagnostic use under the FDA EUA for laboratories certified under CLIA to perform moderate and/or high complexity testing. This test has not been FDA cleared or approved.  A negative test does not rule out the presence of PCR inhibitors in the specimen or target RNA in concentration below the limit of detection for the assay. The possibility of a false negative should be considered if the patient's recent exposure or clinical presentation suggests COVID-19.  [facility] are certified under the Clinical Laboratory Improvement Amendments of 1988 (CLIA-88) as qualified to perform moderate and/or high complexity laboratory testing.   INR     Status: Abnormal   Result Value Ref Range    INR 2.43 (H) 0.85 - 1.15   EKG 12 lead     Status: None (Preliminary result)   Result Value Ref Range    Systolic Blood Pressure  mmHg    Diastolic Blood Pressure  mmHg    Ventricular Rate 62 BPM    Atrial Rate 66 BPM    OH Interval  ms    QRS Duration 102 ms     ms    QTc 450 ms    P Axis  degrees    R AXIS -4 degrees    T Axis 66 degrees    Interpretation ECG       Atrial fibrillation with a competing junctional pacemaker  Nonspecific T wave abnormality  Abnormal ECG  When compared with ECG of 18-NOV-2019 13:44,  Atrial fibrillation has replaced Atrial flutter  QT has lengthened     CBC + differential     Status: Abnormal    Narrative    The following orders were created for panel order CBC + differential.  Procedure                               Abnormality         Status                     ---------                               -----------         ------                     CBC with platelets and d...[[id number]]  Abnormal            Final result                 Please view results for these tests on the individual orders.          Recent Results (from the past 48 hour(s))   XR Pelvis and Hip Right 1 View    Narrative    EXAM: XR PELVIS AND HIP RIGHT 1 VIEW  LOCATION: [facility]  HOSPITAL  DATE/TIME: 3/6/2022 6:24 PM    INDICATION: Right hip pain.  COMPARISON: None.      Impression    IMPRESSION: Fracture through the right femoral neck without evidence for intertrochanteric extension. No dislocation at the hip joint. There is superimposed degenerative change. Left hip negative for fracture. Additional mild degenerative change. Pelvis   negative for fracture.       EKG results: Atrial fibrillation, controlled ventricular response       All imaging studies reviewed by me.         Patient`s old medical records reviewed and case discussed with the ED physician.    ED course-Reviewed  and care plan discussed with Hoang Cuba MD

## 2022-03-07 NOTE — PROGRESS NOTES
SP[facility]UAL [facility] Progress Note   Ortho/Spine Unit    Saw pt Marek per an admission request.  Marek reported that he had no particular need for  support at this time but welcomed prayer.  Afterward, he reported that he was admitted with a femur fracture and will have surgery tomorrow.  He named his three children (two sons and a daughter) and their families as being core to his support network.      Plan: Informed pt how he can request further  support.  This author and other chaplains remain available per pt/family request.    Jose Daniel M.Div., [facility]  Staff   Phone [phone]

## 2022-03-07 NOTE — PROGRESS NOTES
Ortho aware of patient admission.  Right displaced femoral neck fracture.  History of chronic a-fib on Coumadin.  Also h/o Parkinson's.    Plan:  - Admit to medicine  - NPO p MN  - Reverse INR with Vitamin K  - Ortho consult to follow  - To OR tomorrow for R hip hemiarthroplasty if INR <1.8

## 2022-03-07 NOTE — ED NOTES
"[facility]  ED Nurse Handoff Report    Billy Stock is a 76 year old male   ED Chief complaint: Fall and Hip Pain  . ED Diagnosis:   Final diagnoses:   None     Allergies:   Allergies   Allergen Reactions     Cats      Codeine      Hallucinated when taking codeine with another medication     Codeine Unknown     Maple Tree      Morphine Visual Disturbance and Other (See Comments)     Watermelon [Citrullus Vulgaris]      Itching throat, hives       Code Status: Full Code  Activity level - Baseline/Home:  Independent. Activity Level - Current:   Assist X 1. Lift room needed: No. Bariatric: No   Needed: No   Isolation: No. Infection: Not Applicable.     Vital Signs:   Vitals:    03/06/22 1717 03/06/22 1835 03/06/22 1900   BP: (!) 149/125 (!) 143/104 (!) 154/105   Pulse: 79 72 69   Resp: 20 18 18   Temp: 97.4  F (36.3  C)     TempSrc: Oral     SpO2: 100% 99% 98%   Weight: 98.4 kg (217 lb)     Height: 1.854 m (6' 1\")         Cardiac Rhythm:  ,      Pain level:    Patient confused: No. Patient Falls Risk: Yes.   Elimination Status: Has voided   Patient Report - Initial Complaint: Fall; Right hip pain. Focused Assessment: Billy Stock is a 76 year old male with history of Parkinson's disease who presents for evaluation of fall and right hip pain.  Patient states that he was beginning to shovel snow this afternoon when he slipped and fell onto his right hip.  He denies striking anything else including his head.  He did not lose consciousness and the family never called 911 immediately after he fell.  He has been unable to get up or ambulate due to ongoing right hip pain.  He denies pain in the head, chest, back, arms, left leg, right knee or ankle.  He has been well otherwise in the last several days.   Tests Performed: radiographs. Abnormal Results:   XR Pelvis and Hip Right 1 View   Final Result   IMPRESSION: Fracture through the right femoral neck without evidence for intertrochanteric " extension. No dislocation at the hip joint. There is superimposed degenerative change. Left hip negative for fracture. Additional mild degenerative change. Pelvis    negative for fracture.        Labs Ordered and Resulted from Time of ED Arrival to Time of ED Departure   BASIC METABOLIC PANEL - Abnormal       Result Value    Sodium 142      Potassium 3.7      Chloride 109      Carbon Dioxide (CO2) 27      Anion Gap 6      Urea Nitrogen 28      Creatinine 1.47 (*)     Calcium 8.8      Glucose 99      GFR Estimate 49 (*)    CBC WITH PLATELETS AND DIFFERENTIAL - Abnormal    WBC Count 4.8      RBC Count 4.74      Hemoglobin 13.2 (*)     Hematocrit 40.7      MCV 86      MCH 27.8      MCHC 32.4      RDW 14.1      Platelet Count 182      % Neutrophils 65      % Lymphocytes 26      % Monocytes 7      % Eosinophils 1      % Basophils 0      % Immature Granulocytes 1      NRBCs per 100 WBC 0      Absolute Neutrophils 3.1      Absolute Lymphocytes 1.2      Absolute Monocytes 0.3      Absolute Eosinophils 0.1      Absolute Basophils 0.0      Absolute Immature Granulocytes 0.0      Absolute NRBCs 0.0     COVID-19 VIRUS (CORONAVIRUS) BY PCR   INR   .   Treatments provided: IV pain medication  Family Comments: Son in room  OBS brochure/video discussed/provided to patient:  No  ED Medications:   Medications   HYDROmorphone (PF) (DILAUDID) injection 0.5 mg (0.5 mg Intravenous Given 3/6/22 1859)   acetaminophen (TYLENOL) tablet 1,000 mg (1,000 mg Oral Given 3/6/22 1805)   oxyCODONE (ROXICODONE) tablet 10 mg (10 mg Oral Given 3/6/22 1902)     Drips infusing:  No  For the majority of the shift, the patient's behavior Green. Interventions performed were NA.    Sepsis treatment initiated: No     Patient tested for COVID 19 prior to admission: YES    ED Nurse Name/Phone Number: Amarilis Pollard RN,   6:52 PM    RECEIVING UNIT ED HANDOFF REVIEW    Above ED Nurse Handoff Report was reviewed: Yes  Reviewed by: Deena Ramires RN on March 6,  2022 at 8:25 PM

## 2022-03-07 NOTE — PLAN OF CARE
Goal Outcome Evaluation:    Plan of Care Reviewed With: patient     Overall Patient Progress: no change       Patient alert and oriented, admitted to the floor around 9pm. Currently bedrest, CMS WNL, will be NPO at midnight, no surgery time. Orthopaedic consult in.  PIV x2, forearm infusing fluids. Continue to assess and monitor.       Patient vital signs are at baseline: Yes  Patient able to ambulate as they were prior to admission or with assist devices provided by therapies during their stay:  No,  Reason:  Currently bedrest  Patient MUST void prior to discharge:  Yes-using male primo external cath  Patient able to tolerate oral intake:  No,  Reason:  Will be NPO at midnight, anticipating surgery tomorrow  Pain has adequate pain control using Oral analgesics:  No,  Reason:  Given IV dilaudid 0.3mg.  Does patient have an identified :  No,  Reason:  Unknown at this time  Has goal D/C date and time been discussed with patient:  No,  Reason:  New admission

## 2022-03-08 ENCOUNTER — ANESTHESIA (OUTPATIENT)
Dept: SURGERY | Facility: CLINIC | Age: 77
DRG: 522 | End: 2022-03-08
Payer: COMMERCIAL

## 2022-03-08 ENCOUNTER — APPOINTMENT (OUTPATIENT)
Dept: GENERAL RADIOLOGY | Facility: CLINIC | Age: 77
DRG: 522 | End: 2022-03-08
Payer: COMMERCIAL

## 2022-03-08 ENCOUNTER — ANESTHESIA EVENT (OUTPATIENT)
Dept: SURGERY | Facility: CLINIC | Age: 77
DRG: 522 | End: 2022-03-08
Payer: COMMERCIAL

## 2022-03-08 LAB
INR PPP: 1.38 (ref 0.85–1.15)
MAGNESIUM SERPL-MCNC: 1.8 MG/DL (ref 1.6–2.3)
POTASSIUM BLD-SCNC: 3.4 MMOL/L (ref 3.4–5.3)
POTASSIUM BLD-SCNC: 4.1 MMOL/L (ref 3.4–5.3)

## 2022-03-08 PROCEDURE — 88305 TISSUE EXAM BY PATHOLOGIST: CPT | Mod: 26 | Performed by: PATHOLOGY

## 2022-03-08 PROCEDURE — 710N000010 HC RECOVERY PHASE 1, LEVEL 2, PER MIN: Performed by: ORTHOPAEDIC SURGERY

## 2022-03-08 PROCEDURE — 88305 TISSUE EXAM BY PATHOLOGIST: CPT | Mod: TC | Performed by: ORTHOPAEDIC SURGERY

## 2022-03-08 PROCEDURE — 250N000011 HC RX IP 250 OP 636: Performed by: INTERNAL MEDICINE

## 2022-03-08 PROCEDURE — 360N000077 HC SURGERY LEVEL 4, PER MIN: Performed by: ORTHOPAEDIC SURGERY

## 2022-03-08 PROCEDURE — 84132 ASSAY OF SERUM POTASSIUM: CPT | Performed by: INTERNAL MEDICINE

## 2022-03-08 PROCEDURE — 278N000051 HC OR IMPLANT GENERAL: Performed by: ORTHOPAEDIC SURGERY

## 2022-03-08 PROCEDURE — 85610 PROTHROMBIN TIME: CPT | Performed by: INTERNAL MEDICINE

## 2022-03-08 PROCEDURE — 250N000009 HC RX 250: Performed by: PHYSICIAN ASSISTANT

## 2022-03-08 PROCEDURE — 258N000003 HC RX IP 258 OP 636

## 2022-03-08 PROCEDURE — 250N000009 HC RX 250: Performed by: NURSE ANESTHETIST, CERTIFIED REGISTERED

## 2022-03-08 PROCEDURE — 99207 PR CDG-MDM COMPONENT: MEETS LOW - DOWN CODED: CPT | Performed by: INTERNAL MEDICINE

## 2022-03-08 PROCEDURE — 250N000013 HC RX MED GY IP 250 OP 250 PS 637: Performed by: PHYSICIAN ASSISTANT

## 2022-03-08 PROCEDURE — 250N000013 HC RX MED GY IP 250 OP 250 PS 637: Performed by: INTERNAL MEDICINE

## 2022-03-08 PROCEDURE — 99232 SBSQ HOSP IP/OBS MODERATE 35: CPT | Performed by: INTERNAL MEDICINE

## 2022-03-08 PROCEDURE — 272N000001 HC OR GENERAL SUPPLY STERILE: Performed by: ORTHOPAEDIC SURGERY

## 2022-03-08 PROCEDURE — 250N000009 HC RX 250: Performed by: ANESTHESIOLOGY

## 2022-03-08 PROCEDURE — 0SRR0J9 REPLACEMENT OF RIGHT HIP JOINT, FEMORAL SURFACE WITH SYNTHETIC SUBSTITUTE, CEMENTED, OPEN APPROACH: ICD-10-PCS | Performed by: ORTHOPAEDIC SURGERY

## 2022-03-08 PROCEDURE — 999N000065 XR PELVIS AD HIP PORTABLE RIGHT 1 VIEW

## 2022-03-08 PROCEDURE — C1713 ANCHOR/SCREW BN/BN,TIS/BN: HCPCS | Performed by: ORTHOPAEDIC SURGERY

## 2022-03-08 PROCEDURE — 88311 DECALCIFY TISSUE: CPT | Mod: 26 | Performed by: PATHOLOGY

## 2022-03-08 PROCEDURE — 250N000013 HC RX MED GY IP 250 OP 250 PS 637

## 2022-03-08 PROCEDURE — 999N000141 HC STATISTIC PRE-PROCEDURE NURSING ASSESSMENT: Performed by: ORTHOPAEDIC SURGERY

## 2022-03-08 PROCEDURE — 258N000003 HC RX IP 258 OP 636: Performed by: NURSE ANESTHETIST, CERTIFIED REGISTERED

## 2022-03-08 PROCEDURE — 250N000011 HC RX IP 250 OP 636

## 2022-03-08 PROCEDURE — 250N000011 HC RX IP 250 OP 636: Performed by: PHYSICIAN ASSISTANT

## 2022-03-08 PROCEDURE — 250N000011 HC RX IP 250 OP 636: Performed by: ORTHOPAEDIC SURGERY

## 2022-03-08 PROCEDURE — 83735 ASSAY OF MAGNESIUM: CPT | Performed by: INTERNAL MEDICINE

## 2022-03-08 PROCEDURE — 370N000017 HC ANESTHESIA TECHNICAL FEE, PER MIN: Performed by: ORTHOPAEDIC SURGERY

## 2022-03-08 PROCEDURE — 250N000011 HC RX IP 250 OP 636: Performed by: NURSE ANESTHETIST, CERTIFIED REGISTERED

## 2022-03-08 PROCEDURE — 258N000001 HC RX 258: Performed by: ORTHOPAEDIC SURGERY

## 2022-03-08 PROCEDURE — 258N000003 HC RX IP 258 OP 636: Performed by: ORTHOPAEDIC SURGERY

## 2022-03-08 PROCEDURE — 120N000001 HC R&B MED SURG/OB

## 2022-03-08 PROCEDURE — 36415 COLL VENOUS BLD VENIPUNCTURE: CPT | Performed by: INTERNAL MEDICINE

## 2022-03-08 PROCEDURE — C1776 JOINT DEVICE (IMPLANTABLE): HCPCS | Performed by: ORTHOPAEDIC SURGERY

## 2022-03-08 DEVICE — IMP SPACER OSTEONICS DISTAL CEMENT 13MM 1067-0013: Type: IMPLANTABLE DEVICE | Site: HIP | Status: FUNCTIONAL

## 2022-03-08 DEVICE — IMP HEAD STRK FEMORAL UHR BIPOLAR 28X52MM UH1-52-28: Type: IMPLANTABLE DEVICE | Site: HIP | Status: FUNCTIONAL

## 2022-03-08 DEVICE — BONE CEMENT RESTRICTOR BUCK FEMORAL 18.5MM 129418: Type: IMPLANTABLE DEVICE | Site: HIP | Status: FUNCTIONAL

## 2022-03-08 DEVICE — IMPLANTABLE DEVICE: Type: IMPLANTABLE DEVICE | Site: HIP | Status: FUNCTIONAL

## 2022-03-08 DEVICE — IMP HEAD STRK FEMORAL C-TAPER COCR LFIT 28MM +0MM 06-2800: Type: IMPLANTABLE DEVICE | Site: HIP | Status: FUNCTIONAL

## 2022-03-08 DEVICE — BONE CEMENT SIMPLEX FULL DOSE 6191-1-001: Type: IMPLANTABLE DEVICE | Site: HIP | Status: FUNCTIONAL

## 2022-03-08 RX ORDER — ONDANSETRON 4 MG/1
4 TABLET, ORALLY DISINTEGRATING ORAL EVERY 30 MIN PRN
Status: DISCONTINUED | OUTPATIENT
Start: 2022-03-08 | End: 2022-03-08 | Stop reason: HOSPADM

## 2022-03-08 RX ORDER — ONDANSETRON 4 MG/1
4 TABLET, ORALLY DISINTEGRATING ORAL EVERY 6 HOURS PRN
Status: DISCONTINUED | OUTPATIENT
Start: 2022-03-08 | End: 2022-03-11 | Stop reason: HOSPADM

## 2022-03-08 RX ORDER — DEXAMETHASONE SODIUM PHOSPHATE 4 MG/ML
INJECTION, SOLUTION INTRA-ARTICULAR; INTRALESIONAL; INTRAMUSCULAR; INTRAVENOUS; SOFT TISSUE PRN
Status: DISCONTINUED | OUTPATIENT
Start: 2022-03-08 | End: 2022-03-08

## 2022-03-08 RX ORDER — ONDANSETRON 2 MG/ML
4 INJECTION INTRAMUSCULAR; INTRAVENOUS EVERY 30 MIN PRN
Status: DISCONTINUED | OUTPATIENT
Start: 2022-03-08 | End: 2022-03-08 | Stop reason: HOSPADM

## 2022-03-08 RX ORDER — SODIUM CHLORIDE, SODIUM LACTATE, POTASSIUM CHLORIDE, CALCIUM CHLORIDE 600; 310; 30; 20 MG/100ML; MG/100ML; MG/100ML; MG/100ML
INJECTION, SOLUTION INTRAVENOUS CONTINUOUS
Status: DISCONTINUED | OUTPATIENT
Start: 2022-03-08 | End: 2022-03-08 | Stop reason: HOSPADM

## 2022-03-08 RX ORDER — TRANEXAMIC ACID 10 MG/ML
1 INJECTION, SOLUTION INTRAVENOUS ONCE
Status: COMPLETED | OUTPATIENT
Start: 2022-03-08 | End: 2022-03-08

## 2022-03-08 RX ORDER — ONDANSETRON 2 MG/ML
INJECTION INTRAMUSCULAR; INTRAVENOUS PRN
Status: DISCONTINUED | OUTPATIENT
Start: 2022-03-08 | End: 2022-03-08

## 2022-03-08 RX ORDER — PROCHLORPERAZINE MALEATE 5 MG
5 TABLET ORAL EVERY 6 HOURS PRN
Status: DISCONTINUED | OUTPATIENT
Start: 2022-03-08 | End: 2022-03-11 | Stop reason: HOSPADM

## 2022-03-08 RX ORDER — FENTANYL CITRATE 50 UG/ML
25 INJECTION, SOLUTION INTRAMUSCULAR; INTRAVENOUS EVERY 5 MIN PRN
Status: DISCONTINUED | OUTPATIENT
Start: 2022-03-08 | End: 2022-03-08 | Stop reason: HOSPADM

## 2022-03-08 RX ORDER — OXYCODONE HYDROCHLORIDE 5 MG/1
5 TABLET ORAL EVERY 4 HOURS PRN
Status: DISCONTINUED | OUTPATIENT
Start: 2022-03-08 | End: 2022-03-11 | Stop reason: HOSPADM

## 2022-03-08 RX ORDER — KETOROLAC TROMETHAMINE 15 MG/ML
15 INJECTION, SOLUTION INTRAMUSCULAR; INTRAVENOUS EVERY 6 HOURS
Status: DISPENSED | OUTPATIENT
Start: 2022-03-08 | End: 2022-03-09

## 2022-03-08 RX ORDER — CEFAZOLIN SODIUM 2 G/100ML
2 INJECTION, SOLUTION INTRAVENOUS EVERY 8 HOURS
Status: COMPLETED | OUTPATIENT
Start: 2022-03-08 | End: 2022-03-09

## 2022-03-08 RX ORDER — FENTANYL CITRATE 50 UG/ML
INJECTION, SOLUTION INTRAMUSCULAR; INTRAVENOUS PRN
Status: DISCONTINUED | OUTPATIENT
Start: 2022-03-08 | End: 2022-03-08

## 2022-03-08 RX ORDER — POTASSIUM CHLORIDE 7.45 MG/ML
10 INJECTION INTRAVENOUS
Status: DISCONTINUED | OUTPATIENT
Start: 2022-03-08 | End: 2022-03-08

## 2022-03-08 RX ORDER — SODIUM CHLORIDE, SODIUM LACTATE, POTASSIUM CHLORIDE, CALCIUM CHLORIDE 600; 310; 30; 20 MG/100ML; MG/100ML; MG/100ML; MG/100ML
INJECTION, SOLUTION INTRAVENOUS CONTINUOUS
Status: DISCONTINUED | OUTPATIENT
Start: 2022-03-08 | End: 2022-03-09

## 2022-03-08 RX ORDER — POTASSIUM CHLORIDE 7.45 MG/ML
10 INJECTION INTRAVENOUS
Status: DISPENSED | OUTPATIENT
Start: 2022-03-08 | End: 2022-03-08

## 2022-03-08 RX ORDER — HYDRALAZINE HYDROCHLORIDE 20 MG/ML
10 INJECTION INTRAMUSCULAR; INTRAVENOUS EVERY 4 HOURS PRN
Status: DISCONTINUED | OUTPATIENT
Start: 2022-03-08 | End: 2022-03-11 | Stop reason: HOSPADM

## 2022-03-08 RX ORDER — POLYETHYLENE GLYCOL 3350 17 G/17G
17 POWDER, FOR SOLUTION ORAL DAILY
Status: DISCONTINUED | OUTPATIENT
Start: 2022-03-09 | End: 2022-03-11 | Stop reason: HOSPADM

## 2022-03-08 RX ORDER — NEOSTIGMINE METHYLSULFATE 1 MG/ML
VIAL (ML) INJECTION PRN
Status: DISCONTINUED | OUTPATIENT
Start: 2022-03-08 | End: 2022-03-08

## 2022-03-08 RX ORDER — HYDROMORPHONE HCL IN WATER/PF 6 MG/30 ML
0.4 PATIENT CONTROLLED ANALGESIA SYRINGE INTRAVENOUS
Status: DISCONTINUED | OUTPATIENT
Start: 2022-03-08 | End: 2022-03-11 | Stop reason: HOSPADM

## 2022-03-08 RX ORDER — ONDANSETRON 2 MG/ML
4 INJECTION INTRAMUSCULAR; INTRAVENOUS EVERY 6 HOURS PRN
Status: DISCONTINUED | OUTPATIENT
Start: 2022-03-08 | End: 2022-03-11 | Stop reason: HOSPADM

## 2022-03-08 RX ORDER — SODIUM CHLORIDE, SODIUM LACTATE, POTASSIUM CHLORIDE, CALCIUM CHLORIDE 600; 310; 30; 20 MG/100ML; MG/100ML; MG/100ML; MG/100ML
500 INJECTION, SOLUTION INTRAVENOUS CONTINUOUS
Status: DISCONTINUED | OUTPATIENT
Start: 2022-03-08 | End: 2022-03-08 | Stop reason: HOSPADM

## 2022-03-08 RX ORDER — LABETALOL 20 MG/4 ML (5 MG/ML) INTRAVENOUS SYRINGE
PRN
Status: DISCONTINUED | OUTPATIENT
Start: 2022-03-08 | End: 2022-03-08

## 2022-03-08 RX ORDER — OXYCODONE HYDROCHLORIDE 5 MG/1
10 TABLET ORAL EVERY 4 HOURS PRN
Status: DISCONTINUED | OUTPATIENT
Start: 2022-03-08 | End: 2022-03-11 | Stop reason: HOSPADM

## 2022-03-08 RX ORDER — HYDROMORPHONE HCL IN WATER/PF 6 MG/30 ML
0.2 PATIENT CONTROLLED ANALGESIA SYRINGE INTRAVENOUS EVERY 5 MIN PRN
Status: DISCONTINUED | OUTPATIENT
Start: 2022-03-08 | End: 2022-03-08 | Stop reason: HOSPADM

## 2022-03-08 RX ORDER — BISACODYL 10 MG
10 SUPPOSITORY, RECTAL RECTAL DAILY PRN
Status: DISCONTINUED | OUTPATIENT
Start: 2022-03-08 | End: 2022-03-11 | Stop reason: HOSPADM

## 2022-03-08 RX ORDER — LIDOCAINE 40 MG/G
CREAM TOPICAL
Status: DISCONTINUED | OUTPATIENT
Start: 2022-03-08 | End: 2022-03-08 | Stop reason: HOSPADM

## 2022-03-08 RX ORDER — ACETAMINOPHEN 325 MG/1
975 TABLET ORAL EVERY 8 HOURS
Status: DISPENSED | OUTPATIENT
Start: 2022-03-08 | End: 2022-03-11

## 2022-03-08 RX ORDER — DIPHENHYDRAMINE HCL 12.5MG/5ML
12.5 LIQUID (ML) ORAL EVERY 6 HOURS PRN
Status: DISCONTINUED | OUTPATIENT
Start: 2022-03-08 | End: 2022-03-11 | Stop reason: HOSPADM

## 2022-03-08 RX ORDER — SODIUM CHLORIDE, SODIUM LACTATE, POTASSIUM CHLORIDE, CALCIUM CHLORIDE 600; 310; 30; 20 MG/100ML; MG/100ML; MG/100ML; MG/100ML
INJECTION, SOLUTION INTRAVENOUS CONTINUOUS PRN
Status: DISCONTINUED | OUTPATIENT
Start: 2022-03-08 | End: 2022-03-08

## 2022-03-08 RX ORDER — POTASSIUM CHLORIDE 1500 MG/1
40 TABLET, EXTENDED RELEASE ORAL ONCE
Status: COMPLETED | OUTPATIENT
Start: 2022-03-08 | End: 2022-03-08

## 2022-03-08 RX ORDER — LIDOCAINE 40 MG/G
CREAM TOPICAL
Status: DISCONTINUED | OUTPATIENT
Start: 2022-03-08 | End: 2022-03-11 | Stop reason: HOSPADM

## 2022-03-08 RX ORDER — AMOXICILLIN 250 MG
1 CAPSULE ORAL 2 TIMES DAILY
Status: DISCONTINUED | OUTPATIENT
Start: 2022-03-08 | End: 2022-03-11 | Stop reason: HOSPADM

## 2022-03-08 RX ORDER — GLYCOPYRROLATE 0.2 MG/ML
INJECTION, SOLUTION INTRAMUSCULAR; INTRAVENOUS PRN
Status: DISCONTINUED | OUTPATIENT
Start: 2022-03-08 | End: 2022-03-08

## 2022-03-08 RX ORDER — ACETAMINOPHEN 325 MG/1
650 TABLET ORAL EVERY 4 HOURS PRN
Status: DISCONTINUED | OUTPATIENT
Start: 2022-03-11 | End: 2022-03-11 | Stop reason: HOSPADM

## 2022-03-08 RX ORDER — CEFAZOLIN SODIUM/WATER 2 G/20 ML
2 SYRINGE (ML) INTRAVENOUS SEE ADMIN INSTRUCTIONS
Status: DISCONTINUED | OUTPATIENT
Start: 2022-03-08 | End: 2022-03-08 | Stop reason: HOSPADM

## 2022-03-08 RX ORDER — CEFAZOLIN SODIUM/WATER 2 G/20 ML
2 SYRINGE (ML) INTRAVENOUS
Status: COMPLETED | OUTPATIENT
Start: 2022-03-08 | End: 2022-03-08

## 2022-03-08 RX ORDER — PROPOFOL 10 MG/ML
INJECTION, EMULSION INTRAVENOUS PRN
Status: DISCONTINUED | OUTPATIENT
Start: 2022-03-08 | End: 2022-03-08

## 2022-03-08 RX ORDER — HYDROMORPHONE HCL IN WATER/PF 6 MG/30 ML
0.2 PATIENT CONTROLLED ANALGESIA SYRINGE INTRAVENOUS
Status: DISCONTINUED | OUTPATIENT
Start: 2022-03-08 | End: 2022-03-11 | Stop reason: HOSPADM

## 2022-03-08 RX ADMIN — LABETALOL 20 MG/4 ML (5 MG/ML) INTRAVENOUS SYRINGE 7.5 MG: at 08:22

## 2022-03-08 RX ADMIN — CEFAZOLIN SODIUM 2 G: 2 INJECTION, SOLUTION INTRAVENOUS at 23:33

## 2022-03-08 RX ADMIN — CEFAZOLIN SODIUM 2 G: 2 INJECTION, SOLUTION INTRAVENOUS at 15:43

## 2022-03-08 RX ADMIN — LABETALOL 20 MG/4 ML (5 MG/ML) INTRAVENOUS SYRINGE 10 MG: at 10:07

## 2022-03-08 RX ADMIN — POTASSIUM CHLORIDE 10 MEQ: 7.46 INJECTION, SOLUTION INTRAVENOUS at 13:30

## 2022-03-08 RX ADMIN — ONDANSETRON HYDROCHLORIDE 4 MG: 2 INJECTION, SOLUTION INTRAVENOUS at 09:21

## 2022-03-08 RX ADMIN — LABETALOL 20 MG/4 ML (5 MG/ML) INTRAVENOUS SYRINGE 5 MG: at 08:49

## 2022-03-08 RX ADMIN — WARFARIN SODIUM 7.5 MG: 5 TABLET ORAL at 17:19

## 2022-03-08 RX ADMIN — LIDOCAINE HYDROCHLORIDE 50 MG: 10 INJECTION, SOLUTION EPIDURAL; INFILTRATION; INTRACAUDAL; PERINEURAL at 07:40

## 2022-03-08 RX ADMIN — TRANEXAMIC ACID 1 G: 10 INJECTION, SOLUTION INTRAVENOUS at 07:47

## 2022-03-08 RX ADMIN — ACETAMINOPHEN 975 MG: 325 TABLET, FILM COATED ORAL at 15:34

## 2022-03-08 RX ADMIN — KETOROLAC TROMETHAMINE 15 MG: 15 INJECTION, SOLUTION INTRAMUSCULAR; INTRAVENOUS at 14:48

## 2022-03-08 RX ADMIN — ROCURONIUM BROMIDE 20 MG: 50 INJECTION, SOLUTION INTRAVENOUS at 08:25

## 2022-03-08 RX ADMIN — PROPOFOL 150 MG: 10 INJECTION, EMULSION INTRAVENOUS at 07:40

## 2022-03-08 RX ADMIN — SODIUM CHLORIDE, POTASSIUM CHLORIDE, SODIUM LACTATE AND CALCIUM CHLORIDE: 600; 310; 30; 20 INJECTION, SOLUTION INTRAVENOUS at 08:39

## 2022-03-08 RX ADMIN — POTASSIUM CHLORIDE 40 MEQ: 1500 TABLET, EXTENDED RELEASE ORAL at 14:48

## 2022-03-08 RX ADMIN — HYDROMORPHONE HYDROCHLORIDE 0.3 MG: 1 INJECTION, SOLUTION INTRAMUSCULAR; INTRAVENOUS; SUBCUTANEOUS at 01:15

## 2022-03-08 RX ADMIN — CARBIDOPA AND LEVODOPA 1 TABLET: 25; 100 TABLET ORAL at 20:14

## 2022-03-08 RX ADMIN — LABETALOL 20 MG/4 ML (5 MG/ML) INTRAVENOUS SYRINGE 10 MG: at 09:59

## 2022-03-08 RX ADMIN — LABETALOL 20 MG/4 ML (5 MG/ML) INTRAVENOUS SYRINGE 2.5 MG: at 08:03

## 2022-03-08 RX ADMIN — SENNOSIDES AND DOCUSATE SODIUM 1 TABLET: 50; 8.6 TABLET ORAL at 20:14

## 2022-03-08 RX ADMIN — NEOSTIGMINE METHYLSULFATE 5 MG: 1 INJECTION, SOLUTION INTRAVENOUS at 09:41

## 2022-03-08 RX ADMIN — ROCURONIUM BROMIDE 40 MG: 50 INJECTION, SOLUTION INTRAVENOUS at 07:40

## 2022-03-08 RX ADMIN — SODIUM CHLORIDE, POTASSIUM CHLORIDE, SODIUM LACTATE AND CALCIUM CHLORIDE: 600; 310; 30; 20 INJECTION, SOLUTION INTRAVENOUS at 07:03

## 2022-03-08 RX ADMIN — LABETALOL 20 MG/4 ML (5 MG/ML) INTRAVENOUS SYRINGE 5 MG: at 08:39

## 2022-03-08 RX ADMIN — DEXAMETHASONE SODIUM PHOSPHATE 4 MG: 4 INJECTION, SOLUTION INTRA-ARTICULAR; INTRALESIONAL; INTRAMUSCULAR; INTRAVENOUS; SOFT TISSUE at 07:46

## 2022-03-08 RX ADMIN — GLYCOPYRROLATE 0.4 MCG: 0.2 INJECTION, SOLUTION INTRAMUSCULAR; INTRAVENOUS at 09:41

## 2022-03-08 RX ADMIN — HYDRALAZINE HYDROCHLORIDE 10 MG: 20 INJECTION, SOLUTION INTRAMUSCULAR; INTRAVENOUS at 00:21

## 2022-03-08 RX ADMIN — HYDROMORPHONE HYDROCHLORIDE 0.3 MG: 1 INJECTION, SOLUTION INTRAMUSCULAR; INTRAVENOUS; SUBCUTANEOUS at 04:36

## 2022-03-08 RX ADMIN — HYDROMORPHONE HYDROCHLORIDE 0.5 MG: 1 INJECTION, SOLUTION INTRAMUSCULAR; INTRAVENOUS; SUBCUTANEOUS at 07:50

## 2022-03-08 RX ADMIN — CARBIDOPA AND LEVODOPA 1 TABLET: 25; 100 TABLET ORAL at 14:48

## 2022-03-08 RX ADMIN — POTASSIUM CHLORIDE 10 MEQ: 7.46 INJECTION, SOLUTION INTRAVENOUS at 05:48

## 2022-03-08 RX ADMIN — TRANEXAMIC ACID 1 G: 10 INJECTION, SOLUTION INTRAVENOUS at 09:20

## 2022-03-08 RX ADMIN — OXYCODONE HYDROCHLORIDE 10 MG: 5 TABLET ORAL at 03:00

## 2022-03-08 RX ADMIN — FENTANYL CITRATE 100 MCG: 50 INJECTION, SOLUTION INTRAMUSCULAR; INTRAVENOUS at 07:40

## 2022-03-08 RX ADMIN — Medication 2 G: at 07:38

## 2022-03-08 RX ADMIN — SODIUM CHLORIDE, POTASSIUM CHLORIDE, SODIUM LACTATE AND CALCIUM CHLORIDE: 600; 310; 30; 20 INJECTION, SOLUTION INTRAVENOUS at 15:54

## 2022-03-08 RX ADMIN — HYDROMORPHONE HYDROCHLORIDE 0.5 MG: 1 INJECTION, SOLUTION INTRAMUSCULAR; INTRAVENOUS; SUBCUTANEOUS at 08:03

## 2022-03-08 ASSESSMENT — ACTIVITIES OF DAILY LIVING (ADL)
ADLS_ACUITY_SCORE: 11

## 2022-03-08 ASSESSMENT — ENCOUNTER SYMPTOMS: DYSRHYTHMIAS: 1

## 2022-03-08 NOTE — PLAN OF CARE
Evening RN    Patient vital signs are at baseline: Yes  Patient able to ambulate as they were prior to admission or with assist devices provided by therapies during their stay:  No,  Reason: Strict bedrest pending surgery.  Patient MUST void prior to discharge:  Yes  Patient able to tolerate oral intake:  Yes  Pain has adequate pain control using Oral analgesics:  Yes  Does patient have an identified :  Yes  Has goal D/C date and time been discussed with patient:  No,  Reason:  Pending post-surgery plan     Pt A/O x4 forgetful at times.  VSS and afebrile.  Pain managed adequately with oxycodone and tylenol .  CMS intact.  Blanchable redness to his buttocks.  Strict bedrest.  Voiding adequately using external catheter.  Tolerating regular diet well but will be NPO @ midnight.  Plan is surgery at 0730 on  3/8/22.  Completed surgical scrubs this shift. Will continue to monitor.

## 2022-03-08 NOTE — PROGRESS NOTES
[facility]  Hospitalist Progress Note  Bev Alonzo MD 03/08/2022    Reason for Stay (Diagnosis): hip fx after a fall         Assessment and Plan:      Summary of Stay: Billy Stock is a 76 year old male with a history of SARAH on CPAP, AFib chronically anticoagulated with warfarin, CKD with baseline creat 1.5, htn, remote hx of prostate cancer, parkinsonism,  admitted on 3/6/2022 with right hip fx after a mechanical fall while shoveling snow    Admit notable for therapeutic INR that underwent reversal and so to OR today for surgical repair.     Problem List:   Mechanical fall with right hip fx  Underwent surgical repair with right hip hemiarthroplasty 3/8/2022  Mildly confused in PACU  PT/OT/weight bearing per ortho    Afib on warfarin for stroke ppx, CHADsVASC score is 3 (age/htn).  On BB for stroke ppx  INR reversed with  2 mg IV and then 5 mg oral Vitamin K.  -resume warfarin when ok with surgery and will have pharm D manage    SARAH  CPAP as at home    htn on amlodipine 10 mg every day, lisinopril 40 mg every day, metop xl 75 mg every day  -resumed all with staggered parameters    Parkinson's dz  Resume pta carbi/levodopa  tid    CKD with baseline creat 1.5    Hx of prostate cancer s/p radical prostatectomy in 2002 with positive surgical margins so underwent adjuvant radiation, and now on androgen deprivation therapy     Covid negative  S/p J&J 3/2021 followed by moderna booster 10/2021        DVT Prophylaxis: PCDs->warfarin when cleared with ortho  Code Status: Full Code  Functional Status:  Independent at baseline  Diet: adat back to regular anesthesia clears  Cedeño: not clearly needed but did have some retention in post op of 350 mg, will monitor with bladder scan   Access  PIV    Disposition Plan   Expected discharge in 2 days to tbd once recovered from anesthesia and know what PT needs will be.     Entered: Bev Alonzo 03/08/2022, 7:55 AM           Interval History (Subjective):      I  "see him in the PACU.  He denies any pain/sob.  In speaking with the PACU RN he was very confused at first but it is clearing although he knows he's still not thinking normally                   Physical Exam:      Last Vital Signs:  BP (!) 170/98   Pulse 85   Temp 97.5  F (36.4  C) (Temporal)   Resp 16   Ht 1.854 m (6' 1\")   Wt 99.2 kg (218 lb 9.6 oz)   SpO2 95%   BMI 28.84 kg/m      Moderately sedated although awakens to voice and then will slwoly drift back to sleep, lungs ctab nl effort RRR no mrg no le edema skin warm and dry no cyanosis or clubbing belly s/nt/nd            Medications:      All current medications were reviewed with changes reflected in problem list.         Data:      All new lab and imaging data was reviewed.   Labs:  Recent Labs   Lab 03/08/22 0417 03/07/22 0449   NA  --  138   POTASSIUM 3.4 4.1   CHLORIDE  --  108   CO2  --  28   ANIONGAP  --  2*   GLC  --  136*   BUN  --  23   CR  --  1.14   GFRESTIMATED  --  67   BRIANA  --  8.9     Recent Labs   Lab 03/07/22 0449   WBC 6.5   HGB 12.7*   HCT 38.7*   MCV 85        Recent Labs   Lab 03/07/22 0449 03/06/22  1817   * 99     Recent Labs   Lab 03/08/22 0417 03/07/22  0545 03/07/22 0449   INR 1.38* 1.83* 1.90*      Imaging:       "

## 2022-03-08 NOTE — PLAN OF CARE
NPO @ midnight, surgery tomorrow @ 0730 Dr. Lundberg pending INR <1.8.  High max SBPs 160s, improving after sched.  BP meds.  Pain managed with PRN PO pain meds + cold.  No nausea.  LS clear bilaterally, RA, encouraged hourly CDB/IS x10.  Denies N/T.  Bedrest.  Voiding.

## 2022-03-08 NOTE — OR NURSING
Pt is voiding per external catheter. Alert and oriented this morning,able to tell staff about his fall,time, date and events.

## 2022-03-08 NOTE — ANESTHESIA CARE TRANSFER NOTE
Patient: Billy Stock    Procedure: Procedure(s):  Right hip hemiarthroplasty       Diagnosis: Closed fracture of right hip, initial encounter (H) [S72.001A]  Diagnosis Additional Information: No value filed.    Anesthesia Type:   General     Note:    Oropharynx: oropharynx clear of all foreign objects  Level of Consciousness: drowsy  Oxygen Supplementation: face mask  Level of Supplemental Oxygen (L/min / FiO2): 6  Independent Airway: airway patency satisfactory and stable  Dentition: dentition unchanged  Vital Signs Stable: post-procedure vital signs reviewed and stable  Report to RN Given: handoff report given  Patient transferred to: PACU    Handoff Report: Identifed the Patient, Identified the Reponsible Provider, Reviewed the pertinent medical history, Discussed the surgical course, Reviewed Intra-OP anesthesia mangement and issues during anesthesia, Set expectations for post-procedure period and Allowed opportunity for questions and acknowledgement of understanding      Vitals:  Vitals Value Taken Time   /87 03/08/22 1011   Temp 99.3  F (37.4  C) 03/08/22 1008   Pulse 77 03/08/22 1013   Resp 9 03/08/22 1013   SpO2 98 % 03/08/22 1013   Vitals shown include unvalidated device data.    Electronically Signed By: ZAYDA Sanchez CRNA  March 8, 2022  10:14 AM

## 2022-03-08 NOTE — ANESTHESIA POSTPROCEDURE EVALUATION
Patient: Billy Stock    Procedure: Procedure(s):  Right hip hemiarthroplasty       Anesthesia Type:  General    Note:  Disposition: Inpatient   Postop Pain Control: Uneventful            Sign Out: Well controlled pain   PONV: No   Neuro/Psych: Uneventful            Sign Out: Acceptable/Baseline neuro status   Airway/Respiratory: Uneventful            Sign Out: Acceptable/Baseline resp. status   CV/Hemodynamics: Uneventful            Sign Out: Acceptable CV status; No obvious hypovolemia; No obvious fluid overload   Other NRE: NONE   DID A NON-ROUTINE EVENT OCCUR? No           Last vitals:  Vitals Value Taken Time   /72 03/08/22 1130   Temp 97.3  F (36.3  C) 03/08/22 1120   Pulse 59 03/08/22 1137   Resp 8 03/08/22 1137   SpO2 95 % 03/08/22 1135   Vitals shown include unvalidated device data.    Electronically Signed By: Hoang Adrian MD  March 8, 2022  3:57 PM

## 2022-03-08 NOTE — ANESTHESIA PREPROCEDURE EVALUATION
Anesthesia Pre-Procedure Evaluation    Patient: Billy Stock   MRN: [id number] : 1945        Procedure : Procedure(s):  Right hip hemiarthroplasty          Past Medical History:   Diagnosis Date     Bell's palsy 10/15/2002     CA IN SITU PROSTATE 10/15/2002     Chronic atrial fibrillation (H) 2010     Chronic renal insufficiency      Glaucoma 04/10/2003     Problem list name updated by automated process. Provider to review     Gout 2013     Hyperlipidemia LDL goal <100 09/10/2014     Hypertension goal BP (blood pressure) < 140/90 2006     SARAH (obstructive sleep apnea) 2013     SARAH (obstructive sleep apnea)      Parkinson disease (H) 2019     Pericarditis      Renal cyst       Past Surgical History:   Procedure Laterality Date     CATARACT IOL, RT/LT  10/15, 11/15     COLONOSCOPY      [facility]     COLONOSCOPY  2014    Dr. Long [facility]     COLONOSCOPY N/A 2014    Procedure: COMBINED COLONOSCOPY, SINGLE BIOPSY/POLYPECTOMY BY BIOPSY;  Surgeon: Puneet Long MD;  Location:  GI     EYE SURGERY      glaucoma surgery right eye     HERNIA REPAIR       PROSTATE SURGERY       SUPRAPUBIC PROSTATECTOMY  2002     VASECTOMY        Allergies   Allergen Reactions     Cats      Codeine      Hallucinated when taking codeine with another medication     Codeine Unknown     Maple Tree      Morphine Visual Disturbance and Other (See Comments)     Watermelon [Citrullus Vulgaris]      Itching throat, hives      Social History     Tobacco Use     Smoking status: Never Smoker     Smokeless tobacco: Never Used   Substance Use Topics     Alcohol use: No     Alcohol/week: 0.0 standard drinks      Wt Readings from Last 1 Encounters:   22 99.2 kg (218 lb 9.6 oz)        Anesthesia Evaluation            ROS/MED HX  ENT/Pulmonary:     (+) sleep apnea, uses CPAP,  (-) asthma   Neurologic:     (+) Parkinson's disease,     Cardiovascular:     (+) Dyslipidemia hypertension-----Taking blood  thinners dysrhythmias, a-fib,  (-) CAD, CHF and pulmonary hypertension   METS/Exercise Tolerance:     Hematologic:       Musculoskeletal:       GI/Hepatic:    (-) GERD   Renal/Genitourinary:     (+) renal disease, type: CRI, Pt does not require dialysis,     Endo:       Psychiatric/Substance Use:       Infectious Disease:  - neg infectious disease ROS     Malignancy:   (+) Malignancy, History of Prostate.Prostate CA Remission status post.        Other:            Physical Exam    Airway        Mallampati: II   TM distance: > 3 FB   Neck ROM: full   Mouth opening: > 3 cm    Respiratory Devices and Support         Dental           Cardiovascular   cardiovascular exam normal          Pulmonary   pulmonary exam normal            Other findings: Lab Test        03/08/22 03/07/22 03/07/22 03/06/22 03/06/22 02/01/21 01/13/21                       0417          0545          0449          1912          1817          1439          1443          WBC           --           --          6.5           --          4.8           --          5.7           HGB           --           --          12.7*         --          13.2*         --          13.9          MCV           --           --          85            --          86            --          86            PLT           --           --          171           --          182           --          144*          INR          1.38*        1.83*        1.90*          < >         --            < >         --            < > = values in this interval not displayed.                  Lab Test        03/08/22 03/07/22 03/06/22 03/06/22 03/06/22 01/13/21                       0417          0449          2125          1817          1817          1443          NA            --          138           --           --          142          145*          POTASSIUM    3.4          4.1          3.6            < >        3.7          4.0            CHLORIDE      --          108           --           --          109          112*          CO2           --          28            --           --          27           27            BUN           --          23            --           --          28           23            CR            --          1.14          --           --          1.47*        1.50*         ANIONGAP      --          2*            --           --          6            6             BRIANA           --          8.9           --           --          8.8          8.4*          GLC           --          136*          --           --          99           88             < > = values in this interval not displayed.                   OUTSIDE LABS:  CBC:   Lab Results   Component Value Date    WBC 6.5 03/07/2022    WBC 4.8 03/06/2022    HGB 12.7 (L) 03/07/2022    HGB 13.2 (L) 03/06/2022    HCT 38.7 (L) 03/07/2022    HCT 40.7 03/06/2022     03/07/2022     03/06/2022     BMP:   Lab Results   Component Value Date     03/07/2022     03/06/2022    POTASSIUM 3.4 03/08/2022    POTASSIUM 4.1 03/07/2022    CHLORIDE 108 03/07/2022    CHLORIDE 109 03/06/2022    CO2 28 03/07/2022    CO2 27 03/06/2022    BUN 23 03/07/2022    BUN 28 03/06/2022    CR 1.14 03/07/2022    CR 1.47 (H) 03/06/2022     (H) 03/07/2022    GLC 99 03/06/2022     COAGS:   Lab Results   Component Value Date    INR 1.38 (H) 03/08/2022     POC: No results found for: BGM, HCG, HCGS  HEPATIC:   Lab Results   Component Value Date    ALBUMIN 3.4 01/13/2021    PROTTOTAL 7.0 01/13/2021    ALT 12 01/13/2021    AST 19 01/13/2021    ALKPHOS 130 01/13/2021    BILITOTAL 0.4 01/13/2021     OTHER:   Lab Results   Component Value Date    BRIANA 8.9 03/07/2022    MAG 1.8 03/08/2022    TSH 1.51 01/13/2021       Anesthesia Plan    ASA Status:  3      Anesthesia Type: General.     - Airway: ETT   Induction: Propofol.   Maintenance: Balanced.   Techniques and Equipment:     - Airway:  Video-Laryngoscope         Consents    Anesthesia Plan(s) and associated risks, benefits, and realistic alternatives discussed. Questions answered and patient/representative(s) expressed understanding.    - Discussed:     - Discussed with:  Patient      - Extended Intubation/Ventilatory Support Discussed: No.      - Patient is DNR/DNI Status: No    Use of blood products discussed: Yes.     - Discussed with: Patient.     - Consented: consented to blood products            Reason for refusal: other.     Postoperative Care    Pain management: IV analgesics, Oral pain medications.   PONV prophylaxis: Ondansetron (or other 5HT-3), Background Propofol Infusion     Comments:                Hoang Adrian MD

## 2022-03-08 NOTE — PLAN OF CARE
AO with some confusion/forgetfulness but pleasant and easily reoriented. VSS except B/P of 200/95 on (R) arm, 183/91 on (L) arm. Asymptomatic. Josue RASMUSSEN. Gave PRN 10mg IV Hydralazine. Recheck B/P 165/89. Complained of pain rating 3/10-6/10. PRN IV dilaudid 0.3mg x2 and 10mg of oxy given. Reported having nightmares and feeling stressed about the surgery. Is restless. Denies N/V or diarrhea. Bowel sounds active in all four quads. No BM during shift. External cath in place with good output. NPO at midnight. On bedrest. replaced first dose of IV potassium. Surgical procedure scheduled for 0730 today. Transferred pt to PACU.

## 2022-03-08 NOTE — OP NOTE
[facility]  Orthopedic Operative Note    Anterior Lateral Hip Hemiarthroplasty     Billy Stock MRN# [id number]   YOB: 1945  Procedure Date:  3/8/2022  Age: 76 year old       PREOPERATIVE DIAGNOSIS:  Displaced femoral neck fracture, right hip.    POSTOPERATIVE DIAGNOSIS:  Displaced femoral neck fracture,right hip.    PROCEDURE PERFORMED:  right  hip hemiarthroplasty.  Anterolateral approach.    SURGEON:  Bandar Lundberg MD    FIRST ASSISTANT:  Capo Cardoza PA-C whose was critical for positioning, retraction during exposure, placement of implants, and closure.     ANESTHESIA:  General     ESTIMATED BLOOD LOSS:  150 mL.       IMPLANTS:  .  Implant Name Type Inv. Item Serial No.  Lot No. LRB No. Used Action   BONE CEMENT SIMPLEX FULL DOSE 6191-1-001 - [id number] Cement, Bone BONE CEMENT SIMPLEX FULL DOSE 6191-1-001  [employer] [id number] Right 2 Implanted   BONE CEMENT RESTRICTOR BARTLETT FEMORAL 18.5MM [id number] - [id number] Cement, Bone BONE CEMENT RESTRICTOR BARTLETT FEMORAL 18.5MM [id number]  [employer]-R [id number] Right 1 Implanted   IMP SPACER OSTEONICS DISTAL CEMENT 13MM 4063-1682 - [id number] Metallic Hardware/[place] IMP SPACER OSTEONICS DISTAL CEMENT 13MM 5469-6401  [employer] [id number] Right 1 Implanted   Omnifit LEYLA 132 degree cemented hip stem size #8, C-TPR, 35mm neck length, stem length 135mm    [employer] [id number] Right 1 Implanted   IMP HEAD STRK FEMORAL C-TAPER COCR LFIT 28MM +0MM  - [id number] Total Joint Component/Insert IMP HEAD STRK FEMORAL C-TAPER COCR LFIT 28MM +0MM   [employer] [id number] Right 1 Implanted   IMP HEAD STRK FEMORAL UHR BIPOLAR [id number] UH1-52-28 - [id number] Total Joint Component/Insert IMP HEAD STRK FEMORAL UHR BIPOLAR [id number] UH1-52-28  [employer] [id number] Right 1 Implanted        INDICATIONS FOR PROCEDURE: @ is a 76 year old [unfilled] with right displaced femoral neck fracture. We  discussed nonoperative and various operative treatment options including hemiarthroplasty and total hip arthroplasty. Risks of bleeding, infection, damage to surrounding neurovascular structures, hip dislocation, intraoperative or postoperative periprosthetic fracture, leg length inequality, blood clots including deep vein thrombosis and pulmonary embolism and anesthetic complications were discussed with patient.  Benefits of surgery discussed included improved function, reduction medical risk of hip fractures, and pain relief.  Alternatives include nonoperative management, which was not recommended.  The patient and family understands and wishes to proceed.  Consent signed by the patient.      DESCRIPTION OF PROCEDURE:  The patient was identified in the preoperative holding area per hospital policy and the correct operative site marked. Patient was brought into the to the operating room and placed supine on the operating room table.  After induction of general anesthesia he was placed into a left lateral decubitus position on hip positioner and all bony prominences well-padded. Chlorhexidine prescrub was performed followed by prepping and draping in normal sterile fashion.  Antibiotic administration and trans-examined acid administration were confirmed and timeout was performed per standard protocol.       A lateral incision was made centered over the greater trochanter proceeding sharply the skin and subtenons tissue down the fascia.  Fascia was incised in line with the incision.  A bursectomy was performed.  The anterior one third of the abductors were lifted off the anterior aspect of the femur using cautery.  A T-capsulotomy was performed.  Posteromedial release was performed extending to the superior aspect the lesser trochanter. Capsule was tagged with #1 Vicryl.  The femoral neck was cut maintaining approximately 12 mm of neck proximal to the lesser trochanter. The head was removed with a corkscrew and noted  to be sized at a 52.  We found a 52 mm head to fit most appropriately in the socket.  We copiously irrigated the hip socket, removing all bony debris.  The box osteotome utilized to access the proximal femoral canal.  We reamed and broached to a size 8 broach trial.  We trialed with the appropriate length neck trial and +0mm inner diameter head.  Limb lengths were symmetric.  The hip was stable in all positions including position sleep.  Minimal shuck was noted.  Without complete we cemented the size 8 stem using second generation cement technique.  Excess cement was removed and the cup was cleared of any cement.  The stem advanced the same level as the broach as such the real head and bipolar shell were impacted and placed in standard fashion.  The hip was reduced.    We performed a Betadine lavage per protocol with 15 cc of 10% Betadine and 500 cc of sterile saline.  Thoroughly irrigated the wound.  The capsule closed with #1 Vicryl.  We repaired down the gluteus minimus, and gluteus medius through bony tunnels with #5 Ethibond.  A local anesthetic mixture was infiltrated.  We then closed the wound in layers with #2 Quill or stratafix in the fascia, 2-0 Monocryl in the subcutaneous tissue, and staples in the skin.  Sterile dressings were applied with Aquacel Ag.  The patient was awoken from anesthesia and transported to the recovery room in stable condition, sustaining no complications.     Plan:  1.  Weightbearing as tolerated with assistive devices as needed x 6 weeks and and as needed thereafter.   2.  Ancef x 24 hours.   3.  Aspirin enteric coated 325 mg daily for DVT prophylaxis for 6 weeks.   4.  PACU x-rays AP pelvis  5.  Physical therapy/occupational therapy.   6.  Osteoporosis workup and treatment.   7.  Follow-up in 2-week wound check with x-rays .

## 2022-03-08 NOTE — BRIEF OP NOTE
[facility]    Brief Operative Note    Pre-operative diagnosis: Closed fracture of right hip, initial encounter (H) [S72.001A]  Post-operative diagnosis Same as pre-operative diagnosis    Procedure: Procedure(s):  Right hip hemiarthroplasty  Surgeon: Surgeon(s) and Role:     * Bandar Lundberg MD - Primary     * Capo Cardoza PA-C - Assisting  Anesthesia: General   Estimated Blood Loss: 150 mL from 3/8/2022  7:37 AM to 3/8/2022 10:06 AM      Drains: None  Specimens:   ID Type Source Tests Collected by Time Destination   1 : Right Femoral Head Bone Fracture Femoral Head, Right SURGICAL PATHOLOGY EXAM Bandar Lundberg MD 3/8/2022  8:30 AM      Findings:   None.  Complications: None.  Implants:   Implant Name Type Inv. Item Serial No.  Lot No. LRB No. Used Action   BONE CEMENT SIMPLEX FULL DOSE 6191-1-001 - [id number] Cement, Bone BONE CEMENT SIMPLEX FULL DOSE 6191-1-001  [employer] [id number] Right 2 Implanted   BONE CEMENT RESTRICTOR BARTLETT FEMORAL 18.5MM 000859 - [id number] Cement, Bone BONE CEMENT RESTRICTOR BARTLETT FEMORAL 18.5MM [id number]  [employer]-R [id number] Right 1 Implanted   IMP SPACER OSTEONICS DISTAL CEMENT 13MM 3029-6093 - [id number] Metallic Hardware/[place] IMP SPACER OSTEONICS DISTAL CEMENT 13MM 2562-6068  [employer] [id number] Right 1 Implanted   Omnifit LEYLA 132 degree cemented hip stem size #8, C-TPR, 35mm neck length, stem length 135mm    [employer] [id number] Right 1 Implanted   IMP HEAD STRK FEMORAL C-TAPER COCR LFIT 28MM +0MM  - [id number] Total Joint Component/Insert IMP HEAD STRK FEMORAL C-TAPER COCR LFIT 28MM +0MM   [employer] [id number] Right 1 Implanted   IMP HEAD STRK FEMORAL UHR BIPOLAR [id number] UH1-52-28 - [id number] Total Joint Component/Insert IMP HEAD STRK FEMORAL UHR BIPOLAR [id number] UH1-52-28  [employer] [id number] Right 1 Implanted

## 2022-03-08 NOTE — PHARMACY-ANTICOAGULATION SERVICE
Clinical Pharmacy - Warfarin Dosing Consult     Pharmacy has been consulted to manage this patient s warfarin therapy.  Indication: Atrial Fibrillation  Therapy Goal: INR 2-3  Warfarin Prior to Admission: Yes  Warfarin PTA Regimen: 7.5mg Mon/Thu; 5mg ROW  Recent documented change in oral intake/nutrition: No    INR   Date Value Ref Range Status   03/08/2022 1.38 (H) 0.85 - 1.15 Final   03/07/2022 1.83 (H) 0.85 - 1.15 Final       Recommend warfarin 7.5 mg today.  Pharmacy will monitor Billy Stock daily and order warfarin doses to achieve specified goal.      Please contact pharmacy as soon as possible if the warfarin needs to be held for a procedure or if the warfarin goals change.

## 2022-03-09 ENCOUNTER — APPOINTMENT (OUTPATIENT)
Dept: PHYSICAL THERAPY | Facility: CLINIC | Age: 77
DRG: 522 | End: 2022-03-09
Payer: COMMERCIAL

## 2022-03-09 LAB
ANION GAP SERPL CALCULATED.3IONS-SCNC: 6 MMOL/L (ref 3–14)
BUN SERPL-MCNC: 27 MG/DL (ref 7–30)
CALCIUM SERPL-MCNC: 8.6 MG/DL (ref 8.5–10.1)
CHLORIDE BLD-SCNC: 109 MMOL/L (ref 94–109)
CO2 SERPL-SCNC: 25 MMOL/L (ref 20–32)
CREAT SERPL-MCNC: 1.4 MG/DL (ref 0.66–1.25)
ERYTHROCYTE [DISTWIDTH] IN BLOOD BY AUTOMATED COUNT: 14.2 % (ref 10–15)
GFR SERPL CREATININE-BSD FRML MDRD: 52 ML/MIN/1.73M2
GLUCOSE BLD-MCNC: 102 MG/DL (ref 70–99)
GLUCOSE BLD-MCNC: 102 MG/DL (ref 70–99)
HCT VFR BLD AUTO: 34.1 % (ref 40–53)
HGB BLD-MCNC: 11.1 G/DL (ref 13.3–17.7)
INR PPP: 1.37 (ref 0.85–1.15)
MAGNESIUM SERPL-MCNC: 2.1 MG/DL (ref 1.6–2.3)
MCH RBC QN AUTO: 27.1 PG (ref 26.5–33)
MCHC RBC AUTO-ENTMCNC: 32.6 G/DL (ref 31.5–36.5)
MCV RBC AUTO: 83 FL (ref 78–100)
PLATELET # BLD AUTO: 147 10E3/UL (ref 150–450)
POTASSIUM BLD-SCNC: 4.3 MMOL/L (ref 3.4–5.3)
RBC # BLD AUTO: 4.09 10E6/UL (ref 4.4–5.9)
SODIUM SERPL-SCNC: 140 MMOL/L (ref 133–144)
WBC # BLD AUTO: 11.4 10E3/UL (ref 4–11)

## 2022-03-09 PROCEDURE — 82947 ASSAY GLUCOSE BLOOD QUANT: CPT | Performed by: INTERNAL MEDICINE

## 2022-03-09 PROCEDURE — 97530 THERAPEUTIC ACTIVITIES: CPT | Mod: GP | Performed by: PHYSICAL THERAPIST

## 2022-03-09 PROCEDURE — 85610 PROTHROMBIN TIME: CPT | Performed by: INTERNAL MEDICINE

## 2022-03-09 PROCEDURE — 99232 SBSQ HOSP IP/OBS MODERATE 35: CPT | Performed by: INTERNAL MEDICINE

## 2022-03-09 PROCEDURE — 85027 COMPLETE CBC AUTOMATED: CPT | Performed by: INTERNAL MEDICINE

## 2022-03-09 PROCEDURE — 97162 PT EVAL MOD COMPLEX 30 MIN: CPT | Mod: GP | Performed by: PHYSICAL THERAPIST

## 2022-03-09 PROCEDURE — 250N000013 HC RX MED GY IP 250 OP 250 PS 637: Performed by: INTERNAL MEDICINE

## 2022-03-09 PROCEDURE — 120N000001 HC R&B MED SURG/OB

## 2022-03-09 PROCEDURE — 250N000011 HC RX IP 250 OP 636

## 2022-03-09 PROCEDURE — 36415 COLL VENOUS BLD VENIPUNCTURE: CPT | Performed by: INTERNAL MEDICINE

## 2022-03-09 PROCEDURE — 82310 ASSAY OF CALCIUM: CPT | Performed by: INTERNAL MEDICINE

## 2022-03-09 PROCEDURE — 83735 ASSAY OF MAGNESIUM: CPT | Performed by: INTERNAL MEDICINE

## 2022-03-09 PROCEDURE — 250N000013 HC RX MED GY IP 250 OP 250 PS 637

## 2022-03-09 RX ADMIN — CARBIDOPA AND LEVODOPA 1 TABLET: 25; 100 TABLET ORAL at 08:03

## 2022-03-09 RX ADMIN — OXYCODONE HYDROCHLORIDE 5 MG: 5 TABLET ORAL at 16:36

## 2022-03-09 RX ADMIN — AMLODIPINE BESYLATE 10 MG: 10 TABLET ORAL at 08:02

## 2022-03-09 RX ADMIN — ALLOPURINOL 100 MG: 100 TABLET ORAL at 08:02

## 2022-03-09 RX ADMIN — ACETAMINOPHEN 975 MG: 325 TABLET, FILM COATED ORAL at 08:02

## 2022-03-09 RX ADMIN — SENNOSIDES AND DOCUSATE SODIUM 1 TABLET: 50; 8.6 TABLET ORAL at 19:59

## 2022-03-09 RX ADMIN — CARBIDOPA AND LEVODOPA 1 TABLET: 25; 100 TABLET ORAL at 19:59

## 2022-03-09 RX ADMIN — SENNOSIDES AND DOCUSATE SODIUM 1 TABLET: 50; 8.6 TABLET ORAL at 08:02

## 2022-03-09 RX ADMIN — LISINOPRIL 40 MG: 40 TABLET ORAL at 08:03

## 2022-03-09 RX ADMIN — KETOROLAC TROMETHAMINE 15 MG: 15 INJECTION, SOLUTION INTRAMUSCULAR; INTRAVENOUS at 08:03

## 2022-03-09 RX ADMIN — CARBIDOPA AND LEVODOPA 1 TABLET: 25; 100 TABLET ORAL at 14:02

## 2022-03-09 RX ADMIN — POLYETHYLENE GLYCOL 3350 17 G: 17 POWDER, FOR SOLUTION ORAL at 08:03

## 2022-03-09 RX ADMIN — ACETAMINOPHEN 975 MG: 325 TABLET, FILM COATED ORAL at 00:13

## 2022-03-09 RX ADMIN — METOPROLOL SUCCINATE 75 MG: 50 TABLET, EXTENDED RELEASE ORAL at 08:01

## 2022-03-09 RX ADMIN — KETOROLAC TROMETHAMINE 15 MG: 15 INJECTION, SOLUTION INTRAMUSCULAR; INTRAVENOUS at 03:45

## 2022-03-09 RX ADMIN — OXYCODONE HYDROCHLORIDE 5 MG: 5 TABLET ORAL at 22:28

## 2022-03-09 RX ADMIN — ACETAMINOPHEN 975 MG: 325 TABLET, FILM COATED ORAL at 16:36

## 2022-03-09 RX ADMIN — WARFARIN SODIUM 7.5 MG: 5 TABLET ORAL at 17:22

## 2022-03-09 ASSESSMENT — ACTIVITIES OF DAILY LIVING (ADL)
ADLS_ACUITY_SCORE: 14
ADLS_ACUITY_SCORE: 11
ADLS_ACUITY_SCORE: 9
ADLS_ACUITY_SCORE: 9
ADLS_ACUITY_SCORE: 13
ADLS_ACUITY_SCORE: 11
ADLS_ACUITY_SCORE: 13
ADLS_ACUITY_SCORE: 9
ADLS_ACUITY_SCORE: 9
ADLS_ACUITY_SCORE: 11
ADLS_ACUITY_SCORE: 11
ADLS_ACUITY_SCORE: 13
ADLS_ACUITY_SCORE: 11
ADLS_ACUITY_SCORE: 13
ADLS_ACUITY_SCORE: 11
ADLS_ACUITY_SCORE: 9

## 2022-03-09 NOTE — PLAN OF CARE
Patient vital signs are at baseline: Yes  Patient able to ambulate as they were prior to admission or with assist devices provided by therapies during their stay:  No A1 with walker and gait belt   Patient MUST void prior to discharge:  Yes  Patient able to tolerate oral intake:  Yes  Pain has adequate pain control using Oral analgesics:  Yes  Does patient have an identified :  Yes  Has goal D/C date and time been discussed with patient:  Yes    Aox4, VSS, RA, pain management effective with tylenol, A1 with gait belt and walker and ambulated to bathroom and back. Voided this shift. Last BM 3/6 received Miralax and Senna this am, bowel sounds normoactive, no pain or discomfort. Discharge plan to TCU for further rehab d/t lack of caregivers at home.

## 2022-03-09 NOTE — PLAN OF CARE
Goal Outcome Evaluation:    Patient vital signs are at baseline: Yes, SBPs are at 150s.  Patient able to ambulate as they were prior to admission or with assist devices provided by therapies during their stay:  No,  Reason:  Ax2 with walker and belt. 30 ft ambulated.   Patient MUST void prior to discharge:  Yes  Patient able to tolerate oral intake:  Yes  Pain has adequate pain control using Oral analgesics:  Yes  Does patient have an identified :  No,  Reason:  Unsure.   Has goal D/C date and time been discussed with patient:  No,  Reason:  PT to see patient today.     Pt A&O x4. SBPs are at 150s. RA. Pain managed with Tylenol and Toradol. No N/V. Pt got up and sat in the recliner x2-3 hours.  Drsg CDI. x2 pea size abrasion R inner thigh, foam dressing applied.  Continue to monitor.

## 2022-03-09 NOTE — PROGRESS NOTES
[facility]  Hospitalist Progress Note  Bev Alonzo MD 03/09/2022    Reason for Stay (Diagnosis): hip fx after a fall         Assessment and Plan:      Summary of Stay: Billy Stock is a 76 year old male with a history of SARAH on CPAP, AFib chronically anticoagulated with warfarin, CKD with baseline creat 1.5, htn, remote hx of prostate cancer, parkinsonism,  admitted on 3/6/2022 with right hip fx after a mechanical fall while shoveling snow    Admit notable for therapeutic INR that underwent reversal and so to OR today for surgical repair.     Problem List:   Mechanical fall with right hip fx  Underwent surgical repair with right hip hemiarthroplasty 3/8/2022  PT/OT/weight bearing per ortho  Will need tcu at discharge  Warfarin for dvt ppx    Afib on warfarin for stroke ppx, CHADsVASC score is 3 (age/htn).  On BB for stroke ppx  INR reversed with  2 mg IV and then 5 mg oral Vitamin K.  Warfarin resumed    SARAH  CPAP as at home    htn on amlodipine 10 mg every day, lisinopril 40 mg every day, metop xl 75 mg every day  -resumed all with staggered parameters    Parkinson's dz  Resume pta carbi/levodopa  tid    CKD with baseline creat 1.5    Hx of prostate cancer s/p radical prostatectomy in 2002 with positive surgical margins so underwent adjuvant radiation, and now on androgen deprivation therapy     Covid negative  S/p J&J 3/2021 followed by moderna booster 10/2021        DVT Prophylaxis: PCDs->warfarin when cleared with ortho  Code Status: Full Code  Functional Status:  Independent at baseline  Diet: adat back to regular anesthesia clears  Cedeño: not clearly needed but did have some retention in post op of 350 mg, will monitor with bladder scan   Access  PIV    Disposition Plan   Expected discharge in 1 days to TCU once recovered from anesthesia and know what PT needs will be.     Entered: Bev Alonzo 03/09/2022, 3:02 PM           Interval History (Subjective):      Doing ok today, does have  "some pain, no cp or sob no n/v/d.  No bm though for 4-5 days                   Physical Exam:      Last Vital Signs:  BP (!) 162/86 (BP Location: Left arm)   Pulse 85   Temp 97.7  F (36.5  C) (Temporal)   Resp 23   Ht 1.854 m (6' 1\")   Wt 100 kg (220 lb 8 oz)   SpO2 97%   BMI 29.09 kg/m      Awake and sitting in reclining chair,nad looks stated age head nc/at sclera clear , lungs ctab nl effort RRR no mrg no le edema skin warm and dry no cyanosis or clubbing belly s/nt/nd            Medications:      All current medications were reviewed with changes reflected in problem list.         Data:      All new lab and imaging data was reviewed.   Labs:  Recent Labs   Lab 03/09/22  0725      POTASSIUM 4.3   CHLORIDE 109   CO2 25   ANIONGAP 6   *  102*   BUN 27   CR 1.40*   GFRESTIMATED 52*   BRIANA 8.6     Recent Labs   Lab 03/09/22  0725   WBC 11.4*   HGB 11.1*   HCT 34.1*   MCV 83   *     Recent Labs   Lab 03/09/22  0725 03/07/22  0449 03/06/22  1817   *  102* 136* 99     Recent Labs   Lab 03/09/22  0725 03/08/22  0417 03/07/22  0545   INR 1.37* 1.38* 1.83*      Imaging:     "

## 2022-03-09 NOTE — PROGRESS NOTES
"   03/09/22 1000   Quick Adds   Type of Visit Initial PT Evaluation       Present no   Language English   Living Environment   Living Environment Comments Pt lives in [facility] with 1 flight of stairs to access home env. Endorses he lives with ex-wife and she is not in great health, reports she wouuld not be able to assist pt at d/c.    Self-Care   Usual Activity Tolerance good   Current Activity Tolerance moderate   Equipment Currently Used at Home none   Fall history within last six months yes   Number of times patient has fallen within last six months 2   Activity/Exercise/Self-Care Comment Pt indep with all fn mob/ADLs at baseline.    General Information   Onset of Illness/Injury or Date of Surgery 03/06/22   Referring Physician Capo Cardoza PA-C   Patient/Family Therapy Goals Statement (PT) Feel better, return to PLOF   Pertinent History of Current Problem (include personal factors and/or comorbidities that impact the POC) Pt is a 76 year old male with a history of SARAH on CPAP, AFib chronically anticoagulated with warfarin, CKD with baseline creat 1.5, htn, remote hx of prostate cancer, parkinsonism,  admitted on 3/6/2022 with right hip fx after a mechanical fall while shoveling snow..   Existing Precautions/Restrictions fall   Weight-Bearing Status - RLE weight-bearing as tolerated   Cognition   Orientation Status (Cognition) oriented x 4   Cognitive Status Comments Pt endorsing hx of hallucinations in last two weeks. Doing this evaluation, asked \"Did someone knock at the door. I heard a knock.\" There was no knock. Pt also endorsing \"I think I just feel a little looney lately, it's hard to describe it. \"    Pain Assessment   Patient Currently in Pain No   Integumentary/Edema   Integumentary/Edema Comments WFL, s/p R hip sx   Posture    Posture Forward head position;Protracted shoulders;Kyphosis   Range of Motion (ROM)   Range of Motion ROM deficits secondary to weakness   ROM Comment " B LE limited by bradykinesia/stiffness   Strength (Manual Muscle Testing)   Strength (Manual Muscle Testing) Deficits observed during functional mobility   Strength Comments Grossly 4/5 w/ fn mobility    Bed Mobility   Bed Mobility supine-sit   Comment, (Bed Mobility) Nina, increased time for processing cues, moving slowly, decr safety awareness    Transfers   Transfers sit-stand transfer   Comment, (Transfers) CGA from elevated surface, Nina from lower recliner surface   Gait/Stairs (Locomotion)   Mora Level (Gait) contact guard   Assistive Device (Gait) walker, front-wheeled   Distance in Feet (Required for LE Total Joints) 6', 20'   Pattern (Gait) step-through   Comment, (Gait/Stairs) Tremulous, shuffling gait, thoracic kyphosis, bradykinesia, no festination or clyde LOB noted   Balance   Balance Comments Needs B UE support    Sensory Examination   Sensory Perception patient reports no sensory changes   Coordination   Coordination Comments No deficits in excess of weakness    Muscle Tone   Muscle Tone Comments Incr stiffness through B UE/LE noted with fn mobility, did not formally assess tone, pt with hx of Parkinson's.    Clinical Impression   Criteria for Skilled Therapeutic Intervention Yes, treatment indicated   PT Diagnosis (PT) Impaired fn mobility    Influenced by the following impairments Strength, balance, ROM, nm control    Functional limitations due to impairments Bed mob, transfers, amb, stairs    Clinical Presentation (PT Evaluation Complexity) Evolving/Changing   Clinical Presentation Rationale Multiple affecting comorbidities, assessment incl strength, balance, fn mobility.    Clinical Decision Making (Complexity) moderate complexity   Planned Therapy Interventions (PT) balance training;bed mobility training;gait training;patient/family education;neuromuscular re-education;transfer training;ROM (range of motion);stair training;strengthening;home exercise program   Anticipated Equipment  "Needs at Discharge (PT)   (2WW if d/c home)   Risk & Benefits of therapy have been explained evaluation/treatment results reviewed;care plan/treatment goals reviewed;current/potential barriers reviewed;participants voiced agreement with care plan;participants included;patient;risks/benefits reviewed   PT Discharge Planning   PT Discharge Recommendation (DC Rec) Transitional Care Facility;home with assist;home with home care physical therapy   PT Rationale for DC Rec Pt indep at baseline with fn mobility and ADLs, fell while shoveling snow, has hx of Parkinson's disease but does not use assistive device, no chronic hx of falling. Reports lives w/ex spouse who is not able to assist at d/c, has 1 flight of stairs to access home env.  During PT evaluation, pt completed bed mob, transfers with 2WW and amb 20' with Nina. Pt unsteady with shuffling gait, decr postural stability with several minor posterior losses of balance needing steadying assistance. TCU recommended to reduce falls risk, improve fn mobility. If d/c home, pt would require 24/7 Ax1. Of note, pt endorsing hallucinations in last two weeks, possibly having mild hallucinations during this session as well. RN made aware. Per chart review, \"Hallucinated when taking codeine with another medication. \"    PT Brief overview of current status Nina with transfers, CG/Nina with amb w/2WW   Plan of Care Review   Plan of Care Reviewed With patient   Total Evaluation Time   Total Evaluation Time (Minutes) 10   Physical Therapy Goals   PT Frequency Daily   PT Predicated Duration/Target Date for Goal Attainment 03/18/22   PT Goals Bed Mobility;Transfers;Gait;Stairs   PT: Bed Mobility Modified independent   PT: Transfers Modified independent   PT: Gait Modified independent   PT: Stairs Modified independent;Greater than 10 stairs   Psychosocial Support   Trust Relationship/Rapport emotional support provided;choices provided;care explained;empathic listening " provided;questions answered;questions encouraged;reassurance provided

## 2022-03-09 NOTE — PROGRESS NOTES
Orthopedic Surgery  Billy Stock  3/9/2022  Admit Date:  3/6/2022  POD: 1 Day Post-Op   Procedure(s):  Right  hip hemiarthroplasty, anterolateral approach    Alert and oriented.   Patient resting comfortably in bed.    Pain controlled.  Tolerating oral intake.    Denies nausea or vomiting  Denies chest pain or shortness of breath    Vital Sign Ranges  Temperature Temp  Av.9  F (36.6  C)  Min: 97.1  F (36.2  C)  Max: 99.4  F (37.4  C)   Blood pressure Systolic (24hrs), Av , Min:144 , Max:184        Diastolic (24hrs), Av, Min:64, Max:96      Pulse Pulse  Av.5  Min: 62  Max: 85   Respirations Resp  Av.2  Min: 8  Max: 23   Pulse oximetry SpO2  Av.4 %  Min: 92 %  Max: 99 %       Dressing is clean, dry, and intact.   Minimal erythema of the surrounding skin.   Bilateral calves are soft, non-tender.  Right lower extremity is NVI.  Sensation intact bilateral lower extremities  Patient able to resist dorsi and plantar flexion bilaterally  +Dp pulse    Labs:  Recent Labs   Lab Test 22  0725 22  0449 22  1817   WBC 11.4* 6.5 4.8     Recent Labs   Lab Test 22  0725 22  0449 22  1817   HGB 11.1* 12.7* 13.2*     Recent Labs   Lab Test 22  0725 22  0417 22  0545   INR 1.37* 1.38* 1.83*     Recent Labs   Lab Test 22  0725 22  0449 22  1817   * 171 182       1. PLAN:   Continue coumadin for DVT prophylaxis.     Mobilize with PT/OT    WBAT   Continue current pain regiment.   Dressings: Keep intact.  Change if >60% saturated or peeling off.    Follow-up: 2 weeks with Dr. Lundberg in clinic     2. Disposition   Anticipate d/c to TCU when medically cleared and progressing in PT.    Capo Cardoza PA-C

## 2022-03-09 NOTE — PLAN OF CARE
Returned to room from PACU at 1200.  Pleasantly disoriented, cooperative.  Interm. pain managed with scheduled meds + cold, declined PRN narc.  No nausea.  LS clear bilaterally, RA, encouraged CDB hourly.  BS faint, gas-, voiding.  Denies N/T.  Drsg CDI.  R inner thigh abrasion x2 pea sized.  Dangled at bedside, up with A2 belt + walker.  Reoriented to room and call system, denies questions.  Reeducated on IS use hourly, need reinforcements.

## 2022-03-10 ENCOUNTER — APPOINTMENT (OUTPATIENT)
Dept: OCCUPATIONAL THERAPY | Facility: CLINIC | Age: 77
DRG: 522 | End: 2022-03-10
Payer: COMMERCIAL

## 2022-03-10 ENCOUNTER — APPOINTMENT (OUTPATIENT)
Dept: PHYSICAL THERAPY | Facility: CLINIC | Age: 77
DRG: 522 | End: 2022-03-10
Payer: COMMERCIAL

## 2022-03-10 LAB
ANION GAP SERPL CALCULATED.3IONS-SCNC: 3 MMOL/L (ref 3–14)
BUN SERPL-MCNC: 35 MG/DL (ref 7–30)
CALCIUM SERPL-MCNC: 8.8 MG/DL (ref 8.5–10.1)
CHLORIDE BLD-SCNC: 110 MMOL/L (ref 94–109)
CO2 SERPL-SCNC: 26 MMOL/L (ref 20–32)
CREAT SERPL-MCNC: 1.49 MG/DL (ref 0.66–1.25)
ERYTHROCYTE [DISTWIDTH] IN BLOOD BY AUTOMATED COUNT: 14.6 % (ref 10–15)
GFR SERPL CREATININE-BSD FRML MDRD: 49 ML/MIN/1.73M2
GLUCOSE BLD-MCNC: 95 MG/DL (ref 70–99)
GLUCOSE BLD-MCNC: 95 MG/DL (ref 70–99)
HCT VFR BLD AUTO: 32.9 % (ref 40–53)
HGB BLD-MCNC: 10.5 G/DL (ref 13.3–17.7)
INR PPP: 1.62 (ref 0.85–1.15)
MAGNESIUM SERPL-MCNC: 2.1 MG/DL (ref 1.6–2.3)
MCH RBC QN AUTO: 27.6 PG (ref 26.5–33)
MCHC RBC AUTO-ENTMCNC: 31.9 G/DL (ref 31.5–36.5)
MCV RBC AUTO: 87 FL (ref 78–100)
PATH REPORT.COMMENTS IMP SPEC: NORMAL
PATH REPORT.COMMENTS IMP SPEC: NORMAL
PATH REPORT.FINAL DX SPEC: NORMAL
PATH REPORT.GROSS SPEC: NORMAL
PATH REPORT.MICROSCOPIC SPEC OTHER STN: NORMAL
PATH REPORT.RELEVANT HX SPEC: NORMAL
PHOTO IMAGE: NORMAL
PLATELET # BLD AUTO: 142 10E3/UL (ref 150–450)
POTASSIUM BLD-SCNC: 4.4 MMOL/L (ref 3.4–5.3)
RBC # BLD AUTO: 3.8 10E6/UL (ref 4.4–5.9)
SODIUM SERPL-SCNC: 139 MMOL/L (ref 133–144)
WBC # BLD AUTO: 9.3 10E3/UL (ref 4–11)

## 2022-03-10 PROCEDURE — 83735 ASSAY OF MAGNESIUM: CPT | Performed by: INTERNAL MEDICINE

## 2022-03-10 PROCEDURE — 97530 THERAPEUTIC ACTIVITIES: CPT | Mod: GP | Performed by: PHYSICAL THERAPIST

## 2022-03-10 PROCEDURE — G0463 HOSPITAL OUTPT CLINIC VISIT: HCPCS

## 2022-03-10 PROCEDURE — 250N000013 HC RX MED GY IP 250 OP 250 PS 637: Performed by: INTERNAL MEDICINE

## 2022-03-10 PROCEDURE — 82310 ASSAY OF CALCIUM: CPT | Performed by: INTERNAL MEDICINE

## 2022-03-10 PROCEDURE — 99232 SBSQ HOSP IP/OBS MODERATE 35: CPT | Performed by: INTERNAL MEDICINE

## 2022-03-10 PROCEDURE — 97165 OT EVAL LOW COMPLEX 30 MIN: CPT | Mod: GO

## 2022-03-10 PROCEDURE — 82947 ASSAY GLUCOSE BLOOD QUANT: CPT | Performed by: INTERNAL MEDICINE

## 2022-03-10 PROCEDURE — 120N000001 HC R&B MED SURG/OB

## 2022-03-10 PROCEDURE — 250N000013 HC RX MED GY IP 250 OP 250 PS 637

## 2022-03-10 PROCEDURE — 97116 GAIT TRAINING THERAPY: CPT | Mod: GP | Performed by: PHYSICAL THERAPIST

## 2022-03-10 PROCEDURE — 250N000013 HC RX MED GY IP 250 OP 250 PS 637: Performed by: PHYSICIAN ASSISTANT

## 2022-03-10 PROCEDURE — 85027 COMPLETE CBC AUTOMATED: CPT | Performed by: INTERNAL MEDICINE

## 2022-03-10 PROCEDURE — 85610 PROTHROMBIN TIME: CPT | Performed by: INTERNAL MEDICINE

## 2022-03-10 PROCEDURE — 97535 SELF CARE MNGMENT TRAINING: CPT | Mod: GO

## 2022-03-10 PROCEDURE — 36415 COLL VENOUS BLD VENIPUNCTURE: CPT | Performed by: INTERNAL MEDICINE

## 2022-03-10 RX ORDER — OXYCODONE HYDROCHLORIDE 5 MG/1
5-10 TABLET ORAL EVERY 4 HOURS PRN
Qty: 30 TABLET | Refills: 0 | Status: SHIPPED | OUTPATIENT
Start: 2022-03-10 | End: 2022-03-23

## 2022-03-10 RX ORDER — AMOXICILLIN 250 MG
1 CAPSULE ORAL 2 TIMES DAILY PRN
Qty: 20 TABLET | Refills: 0 | DISCHARGE
Start: 2022-03-10 | End: 2022-03-14

## 2022-03-10 RX ORDER — POLYETHYLENE GLYCOL 3350 17 G/17G
170 POWDER, FOR SOLUTION ORAL ONCE
Status: DISCONTINUED | OUTPATIENT
Start: 2022-03-10 | End: 2022-03-10

## 2022-03-10 RX ORDER — ACETAMINOPHEN 325 MG/1
650 TABLET ORAL EVERY 4 HOURS PRN
Qty: 30 TABLET | DISCHARGE
Start: 2022-03-11 | End: 2022-03-14

## 2022-03-10 RX ORDER — WARFARIN SODIUM 3 MG/1
6 TABLET ORAL
Status: COMPLETED | OUTPATIENT
Start: 2022-03-10 | End: 2022-03-10

## 2022-03-10 RX ORDER — POLYETHYLENE GLYCOL 3350 17 G/17G
170 POWDER, FOR SOLUTION ORAL ONCE
Status: COMPLETED | OUTPATIENT
Start: 2022-03-10 | End: 2022-03-10

## 2022-03-10 RX ADMIN — OXYCODONE HYDROCHLORIDE 10 MG: 5 TABLET ORAL at 05:19

## 2022-03-10 RX ADMIN — POLYETHYLENE GLYCOL 3350 17 G: 17 POWDER, FOR SOLUTION ORAL at 08:36

## 2022-03-10 RX ADMIN — ACETAMINOPHEN 975 MG: 325 TABLET, FILM COATED ORAL at 15:56

## 2022-03-10 RX ADMIN — POLYETHYLENE GLYCOL 3350 170 G: 17 POWDER, FOR SOLUTION ORAL at 19:05

## 2022-03-10 RX ADMIN — MAGNESIUM HYDROXIDE 30 ML: 400 SUSPENSION ORAL at 14:32

## 2022-03-10 RX ADMIN — OXYCODONE HYDROCHLORIDE 10 MG: 5 TABLET ORAL at 12:04

## 2022-03-10 RX ADMIN — METOPROLOL SUCCINATE 75 MG: 50 TABLET, EXTENDED RELEASE ORAL at 08:33

## 2022-03-10 RX ADMIN — CARBIDOPA AND LEVODOPA 1 TABLET: 25; 100 TABLET ORAL at 13:58

## 2022-03-10 RX ADMIN — METHOCARBAMOL 500 MG: 500 TABLET ORAL at 15:56

## 2022-03-10 RX ADMIN — AMLODIPINE BESYLATE 10 MG: 10 TABLET ORAL at 08:33

## 2022-03-10 RX ADMIN — SENNOSIDES AND DOCUSATE SODIUM 1 TABLET: 50; 8.6 TABLET ORAL at 08:35

## 2022-03-10 RX ADMIN — CARBIDOPA AND LEVODOPA 1 TABLET: 25; 100 TABLET ORAL at 08:35

## 2022-03-10 RX ADMIN — ACETAMINOPHEN 975 MG: 325 TABLET, FILM COATED ORAL at 08:31

## 2022-03-10 RX ADMIN — WARFARIN SODIUM 6 MG: 3 TABLET ORAL at 18:57

## 2022-03-10 RX ADMIN — ACETAMINOPHEN 975 MG: 325 TABLET, FILM COATED ORAL at 00:40

## 2022-03-10 RX ADMIN — ALLOPURINOL 100 MG: 100 TABLET ORAL at 08:35

## 2022-03-10 RX ADMIN — LISINOPRIL 40 MG: 40 TABLET ORAL at 08:33

## 2022-03-10 RX ADMIN — CARBIDOPA AND LEVODOPA 1 TABLET: 25; 100 TABLET ORAL at 20:25

## 2022-03-10 ASSESSMENT — ACTIVITIES OF DAILY LIVING (ADL)
ADLS_ACUITY_SCORE: 14
ADLS_ACUITY_SCORE: 14
ADLS_ACUITY_SCORE: 13
ADLS_ACUITY_SCORE: 13
ADLS_ACUITY_SCORE: 14
DEPENDENT_IADLS:: INDEPENDENT
ADLS_ACUITY_SCORE: 14
PREVIOUS_RESPONSIBILITIES: MEAL PREP;HOUSEKEEPING;LAUNDRY;MEDICATION MANAGEMENT
ADLS_ACUITY_SCORE: 14
ADLS_ACUITY_SCORE: 13
ADLS_ACUITY_SCORE: 14
ADLS_ACUITY_SCORE: 13
ADLS_ACUITY_SCORE: 14
ADLS_ACUITY_SCORE: 14
ADLS_ACUITY_SCORE: 13
ADLS_ACUITY_SCORE: 13
ADLS_ACUITY_SCORE: 14
ADLS_ACUITY_SCORE: 13
ADLS_ACUITY_SCORE: 14
ADLS_ACUITY_SCORE: 14
ADLS_ACUITY_SCORE: 13
ADLS_ACUITY_SCORE: 14
ADLS_ACUITY_SCORE: 13

## 2022-03-10 NOTE — CONSULTS
Care Management Initial Consult    General Information  Assessment completed with: Patient, and son, Edi, at bedside  Type of CM/SW Visit: Initial Assessment    Primary Care Provider verified and updated as needed: Yes   Readmission within the last 30 days: no previous admission in last 30 days   Return Category: New Diagnosis  Reason for Consult: discharge planning  Advance Care Planning: Advance Care Planning Reviewed: questions answered          Communication Assessment  Patient's communication style: spoken language (English or Bilingual)    Hearing Difficulty or Deaf: no   Wear Glasses or Blind: no    Cognitive  Cognitive/Neuro/Behavioral: WDL  Level of Consciousness: somnolent  Arousal Level: opens eyes spontaneously  Orientation: oriented x 4  Mood/Behavior: calm, cooperative  Best Language: 0 - No aphasia  Speech: clear, spontaneous, logical    Living Environment:   People in home: significant other  Ex spouse  Current living Arrangements: house ([facility] level)      Able to return to prior arrangements: yes       Family/Social Support:  Care provided by: self, child(pepper)  Provides care for: no one, unable/limited ability to care for self  Marital Status:   Children          Description of Support System: Supportive, Involved    Support Assessment: Adequate family and caregiver support, Adequate social supports    Current Resources:   Patient receiving home care services: No     Community Resources: None  Equipment currently used at home: none  Supplies currently used at home: None    Employment/Financial:  Employment Status: employed part-time        Financial Concerns: No concerns identified   Referral to Financial Worker: No       Lifestyle & Psychosocial Needs:  Social Determinants of Health     Tobacco Use: Low Risk      Smoking Tobacco Use: Never Smoker     Smokeless Tobacco Use: Never Used   Alcohol Use: Not on file   Financial Resource Strain: Not on file   Food Insecurity: Not on  "file   Transportation Needs: Not on file   Physical Activity: Not on file   Stress: Not on file   Social Connections: Not on file   Intimate Partner Violence: Not on file   Depression: Not at risk     PHQ-2 Score: 0   Housing Stability: Not on file       Functional Status:  Prior to admission patient needed assistance:   Dependent ADLs:: Independent  Dependent IADLs:: Independent  Assesssment of Functional Status: Not at baseline with ADL Functioning, Needs placement in a SNF/TCF for rehabilitation    Mental Health Status:  Mental Health Status: No Current Concerns       Chemical Dependency Status:  Chemical Dependency Status: No Current Concerns             Values/Beliefs:  Spiritual, Cultural Beliefs, Taoism Practices, Values that affect care: no       Cultural/Taoism Practices Patient Routinely Participates In: prayer       Additional Information:  Writer met and introduced self to pt. Pt was sitting up in the recliner watching TV. Pt appeared to be A&O, pleasant. Pt willing to engage with this writer on discharge planning needs. Pt reports he lives with his ex-spouse in a [facility] house. Pt reports his ex-spouse has her own health issues and cannot care for him. Pt has 15 steps with a railing to go upstairs to his bedroom. At baseline pt is independent with all ADL's and drives. Pt works part time. Discussed ARU vs TCU. Pt reports his family works during the day and feels he would need help prior to returning home. Sw provided ARU and TCU information. Pt agreeable to ARU referral and the following TCU referrals.       -[facility] (First choice)-Accepted to a shared room. Per admission an injection \"Lupron\" pt receives every 6th months would need to be put on hold. Pt is suppose to get an injection this month. Messaged Dr. Alonzo who doesn't think pt will be there for more than a week so shouldn't be an issue.   Per the pt, he receives the Lupron injection every 6th months. He said his " next injection is 4/11. Pt can call his clinic if it needs to be rescheduled.   -[facility]-Accepted- Private room with shared bath. No private room fee.   -[facility] Admission [phone] Fax: [phone]-Accept, shared room.   -[facility]     Pt is not certain if his son could provide transportation at discharge. Informed pt of Wheelchair rate. Pt will talk with his family.    ADDENDUM 3/10/2022 1:39 PM:  Updated pt who would like to discharge to [facility]. Pt will be calling his son to see if he could provide transport tomorrow.   MD medeiros with pt discharging tomorrow to TCU.    SILVANA Thompson

## 2022-03-10 NOTE — PROGRESS NOTES
Orthopedic Surgery  Billy Stock  3/10/2022  Admit Date:  3/6/2022  POD: 2 Days Post-Op   Procedure(s):  Right  hip hemiarthroplasty, anterolateral approach    Alert and oriented.   Patient resting comfortably in chair.    Pain controlled.  Tolerating oral intake.    Denies nausea or vomiting  Denies chest pain or shortness of breath    Vital Sign Ranges  Temperature Temp  Av  F (36.7  C)  Min: 97.7  F (36.5  C)  Max: 98.6  F (37  C)   Blood pressure Systolic (24hrs), Av , Min:152 , Max:173        Diastolic (24hrs), Av, Min:79, Max:86      Pulse Pulse  Av.3  Min: 66  Max: 85   Respirations Resp  Av  Min: 16  Max: 20   Pulse oximetry SpO2  Av.7 %  Min: 99 %  Max: 100 %       Dressing is clean, dry, and intact.   Minimal erythema of the surrounding skin.   Bilateral calves are soft, non-tender.  Right lower extremity is NVI.  Sensation intact bilateral lower extremities  Patient able to resist dorsi and plantar flexion bilaterally  +Dp pulse    Labs:  Recent Labs   Lab Test 03/10/22  0722 22  0725 22  0449   WBC 9.3 11.4* 6.5     Recent Labs   Lab Test 03/10/22  0722 22  0725 22  0449   HGB 10.5* 11.1* 12.7*     Recent Labs   Lab Test 03/10/22  0722 22  0725 22  0417   INR 1.62* 1.37* 1.38*     Recent Labs   Lab Test 03/10/22  0722 22  0725 22  0449   * 147* 171       1. PLAN:   Continue coumadin for DVT prophylaxis.     Mobilize with PT/OT    WBAT Right LE with walker.     Continue current pain regiment.   Dressings: Keep intact.  Change if >60% saturated or peeling off.     2. Disposition   Anticipate d/c to TCU when medically cleared and progressing in PT.    Vaishali Herr PA-C

## 2022-03-10 NOTE — PROGRESS NOTES
[facility]  Hospitalist Progress Note  Bev Alonzo MD 03/10/2022    Reason for Stay (Diagnosis): hip fx after a fall         Assessment and Plan:      Summary of Stay: Billy Stock is a 76 year old male with a history of SARAH on CPAP, AFib chronically anticoagulated with warfarin, CKD with baseline creat 1.5, htn, remote hx of prostate cancer, parkinsonism,  admitted on 3/6/2022 with right hip fx after a mechanical fall while shoveling snow    Admit notable for therapeutic INR that underwent reversal and so to OR 3/8/22 for surgical repair.     Problem List:   Mechanical fall with right hip fx  Underwent surgical repair with right hip hemiarthroplasty 3/8/2022  PT/OT/weight bearing per ortho  Will need tcu at discharge-bed available 3/11/22  Warfarin for dvt ppx still subtherapeutic so will cover with ppx enox    Constipation has gotten  Senna s 1 tab bid and miralax every day for the past 2 days, and dose of MOM today without relief.  Will increase senna to 3 tabs bid, and give single dose high dose miralax to get him moving     Afib on warfarin for stroke ppx, CHADsVASC score is 3 (age/htn).  On BB for stroke ppx  INR reversed with  2 mg IV and then 5 mg oral Vitamin K.  Warfarin resumed will overlap with ppx every day enox until therapeutic     SARAH  CPAP as at home    htn on amlodipine 10 mg every day, lisinopril 40 mg every day, metop xl 75 mg every day  -resumed all with staggered parameters    Parkinson's dz  Resume pta carbi/levodopa  tid    Two small abrasions on right inner thigh  WOC consulted    CKD with baseline creat 1.5  Creat at baseline, recheck in am     Hx of prostate cancer s/p radical prostatectomy in 2002 with positive surgical margins so underwent adjuvant radiation, and now on androgen deprivation therapy     Covid negative  S/p J&J 3/2021 followed by moderna booster 10/2021    DVT Prophylaxis: PCDs->warfarin when cleared with ortho  Code Status: Full Code  Functional  "Status:  Independent at baseline  Diet: adat back to regular anesthesia clears  Cedeño: not clearly needed but did have some retention in post op of 350 mg, will monitor with bladder scan   Access  PIV    Disposition Plan   Expected discharge in 1 days to TCU once recovered from anesthesia and know what PT needs will be.     Entered: Bev Alonzo 03/10/2022, 4:37 PM           Interval History (Subjective):      Doing ok, no cp or sob, does have these abrasions on his inner right thigh that are painful.  Po is good , still no bm                   Physical Exam:      Last Vital Signs:  /57 (BP Location: Left arm)   Pulse 51   Temp 96.8  F (36  C) (Temporal)   Resp 18   Ht 1.854 m (6' 1\")   Wt 100 kg (220 lb 8 oz)   SpO2 100%   BMI 29.09 kg/m      Awake and sitting in reclining chair,nad looks stated age head nc/at sclera clear , lungs ctab nl effort RRR no mrg no le edema skin warm and dry no cyanosis or clubbing belly s/nt/nd            Medications:      All current medications were reviewed with changes reflected in problem list.         Data:      All new lab and imaging data was reviewed.   Labs:  Recent Labs   Lab 03/10/22  0722      POTASSIUM 4.4   CHLORIDE 110*   CO2 26   ANIONGAP 3   GLC 95  95   BUN 35*   CR 1.49*   GFRESTIMATED 49*   BRIANA 8.8     Recent Labs   Lab 03/10/22  0722   WBC 9.3   HGB 10.5*   HCT 32.9*   MCV 87   *     Recent Labs   Lab 03/10/22  0722 03/09/22  0725 03/07/22  0449 03/06/22  1817   GLC 95  95 102*  102* 136* 99     Recent Labs   Lab 03/10/22  0722 03/09/22  0725 03/08/22  0417   INR 1.62* 1.37* 1.38*      Imaging:     "

## 2022-03-10 NOTE — PROGRESS NOTES
"   03/10/22 1400   Quick Adds   Type of Visit Initial Occupational Therapy Evaluation   Living Environment   People in Home other (see comments)  (ex wife)   Current Living Arrangements house  ([facility])   Transportation Anticipated car, drives self;family or friend will provide   Living Environment Comments Pt lives with ex-wife in [facility], 1 LIA, full flight to upstairs to bedrooms/full bathroom, tub/shower, standard height toilets. Pt reports ex is not in great health, reports she wouuld not be able to assist pt at d/c.   Self-Care   Usual Activity Tolerance good   Current Activity Tolerance moderate   Equipment Currently Used at Home none   Fall history within last six months yes   Number of times patient has fallen within last six months 2   Activity/Exercise/Self-Care Comment Pt indep in all ADLs and mobility tasks with no AD at baseline. Indep in IADLs, shares household tasks with ex. Pt works at [employer] as \"\" for 12 hours per week.    Instrumental Activities of Daily Living (IADL)   Previous Responsibilities meal prep;housekeeping;laundry;medication management   General Information   Onset of Illness/Injury or Date of Surgery 03/06/22   Referring Physician Capo Cardoza PA-C   Patient/Family Therapy Goal Statement (OT) Pt is open to rehab options   Additional Occupational Profile Info/Pertinent History of Current Problem Per chart: Pt is a 76 year old male s/p Right  hip hemiarthroplasty, anterolateral approach after hip fracture sustained from fall on ice.    Existing Precautions/Restrictions fall   Right Lower Extremity (Weight-bearing Status) weight-bearing as tolerated (WBAT)   Cognitive Status Examination   Orientation Status orientation to person, place and time   Visual Perception   Visual Impairment/Limitations corrective lenses for reading   Sensory   Sensory Quick Adds No deficits were identified   Pain Assessment   Patient Currently in Pain Yes, see Vital Sign " flowsheet  (pain in RLE)   Range of Motion Comprehensive   Comment, General Range of Motion BUEs WFL    Strength Comprehensive (MMT)   Comment, General Manual Muscle Testing (MMT) Assessment [facility]    Bed Mobility   Bed Mobility sit-supine   Sit-Supine [place] (Bed Mobility) moderate assist (50% patient effort)   Transfers   Transfers sit-stand transfer;bed-chair transfer   Transfer Skill: Bed to Chair/Chair to Bed   Bed-Chair [place] (Transfers) minimum assist (75% patient effort)   Assistive Device (Bed-Chair Transfers) rolling walker   Sit-Stand Transfer   Sit-Stand [place] (Transfers) moderate assist (50% patient effort)   Assistive Device (Sit-Stand Transfers) walker, front-wheeled   Balance   Balance Comments Unsteadiness noted while in stance, shuffled gait, small steps, narrow YANIRA at times, significant slowed pace   Activities of Daily Living   BADL Assessment/Intervention lower body dressing   Lower Body Dressing Assessment/Training   [place] Level (Lower Body Dressing) maximum assist (25% patient effort)   Clinical Impression   Criteria for Skilled Therapeutic Interventions Met (OT) Yes, treatment indicated   OT Diagnosis Impaired ADLs, IADls and mobility tasks    OT Problem List-Impairments impacting ADL problems related to;activity tolerance impaired;balance;strength;pain;post-surgical precautions   ADL comments/analysis Pt significantly below indep PLOF    Assessment of Occupational Performance 5 or more Performance Deficits   Identified Performance Deficits Bathing, dressing, grooming, toileting, homemaking, transfers, work    Planned Therapy Interventions (OT) ADL retraining;IADL retraining;strengthening;transfer training   Clinical Decision Making Complexity (OT) low complexity   Risk & Benefits of therapy have been explained evaluation/treatment results reviewed;care plan/treatment goals reviewed;risks/benefits reviewed;current/potential barriers reviewed;participants voiced  agreement with care plan;participants included;patient   OT Discharge Planning   OT Discharge Recommendation (DC Rec) Acute [facility]hab [facility]-Motivated patient will benefit from intensive, interdisciplinary therapy.  Anticipate will be able to tolerate 3 hours of therapy per day   OT Rationale for DC Rec Pt is significantly below indep baseline PLOF. Recommend ongoing skilled OT while IP and in ARU setting to improve strength, functional activity tolerance, balance and safety needed for daily tasks. Pt motivated to return to indep functioning. Active and still works part-time at [employer].    Total Evaluation Time (Minutes)   Total Evaluation Time (Minutes) 8   OT Goals   Therapy Frequency (OT) Daily   OT Predicated Duration/Target Date for Goal Attainment 03/15/22   OT Goals Hygiene/Grooming;Lower Body Dressing;Toilet Transfer/Toileting   OT: Hygiene/Grooming supervision/stand-by assist;using adaptive equipment;while standing   OT: Lower Body Dressing Supervision/stand-by assist;using adaptive equipment   OT: Toilet Transfer/Toileting Supervision/stand-by assist;toilet transfer;cleaning and garment management;using adaptive equipment

## 2022-03-10 NOTE — PROGRESS NOTES
VSS, pt on room air. Up alert and oriented x 4. Up assist of 1 with walker, gait belt. Flatus positive. Tolerating diet, good appetite. Pain controlled w/ oral oxycodone PRN. Incontinent of urine x 1. PIV SL. Discharge pending.

## 2022-03-10 NOTE — PLAN OF CARE
Goal Outcome Evaluation:      VSS.  Pt oriented, forgetful.  Rates pain in hip 6/10, given scheduled tylenol and prn oxycodone x1.  Ambulates assist of one with walker and gaitbelt. Denies nausea, good appetite.  Voiding adeq.  Hasn't had BM for multiple days, bs hypoactive. Given miralax, senna, and milk of magnesia. No results yet, is passing flatus.  CMS intact, dressing to right hip clean, dry, intact. Has abrasion to right thigh, red and moist.  WOC consulted and put dressing on. PT/OT working with patient.  Plan for discharge tomorrow.

## 2022-03-10 NOTE — CONSULTS
[facility] Nurse Inpatient Wound Assessment   Reason for consultation: Evaluate and treat  leg wounds    Assessment  Leg wounds wounds due to unknown etiology, appear like unroofed blisters  Status: initial assessment    2 kidney bean sized areas of exposed dermis.   Treatment Plan  Right thigh wounds: Every 3 days   1. Cleanse with soap and water. Pat dry.  2. Apply oil emulsion gauze (adaptic) over wounds and secure with silicone foam (optifoam with border or mepilex.)  3. Time and Date   Orders Written  Recommended provider order: None, at this time  [facility] Nurse follow-up plan:weekly  Nursing to notify the Provider(s) and re-consult the WO Nurse if wound(s) deteriorates or new skin concern.    Patient History  According to provider note(s):  Billy Stock is a 76 year old male with a history of SARAH on CPAP, AFib chronically anticoagulated with warfarin, CKD with baseline creat 1.5, htn, remote hx of prostate cancer, parkinsonism,  admitted on 3/6/2022 with right hip fx after a mechanical fall while shoveling snow     Admit notable for therapeutic INR that underwent reversal and so to OR today for surgical repair.    Objective Data  Containment of urine/stool: Brief    Active Diet Order  Orders Placed This Encounter      Advance Diet as Tolerated: Regular Diet Adult      Output:   I/O last 3 completed shifts:  In: 480 [P.O.:480]  Out: 1400 [Urine:1400]    Risk Assessment:   Sensory Perception: 3-->slightly limited  Moisture: 3-->occasionally moist  Activity: 3-->walks occasionally  Mobility: 2-->very limited  Nutrition: 3-->adequate  Friction and Shear: 2-->potential problem  Ancelmo Score: 16                          Labs:   Recent Labs   Lab 03/10/22  0722   HGB 10.5*   INR 1.62*   WBC 9.3       Physical Exam  Areas of skin assessed: focused BLE    Wound Location:  Right inner thigh  Date of last photo 3/10      Wound History: Patient with two open areas to thigh from unknown etiology, possibly from legs rubbing  together    Wound Base: 100 % dermis     Palpation of the wound bed: normal      Drainage: scant     Description of drainage: serous     Measurements (length x width x depth, in cm) two open areas each measuring 0.5  x 1 x 0.1cm     Tunneling N/A     Undermining N/A  Periwound skin: intact      Color: normal and consistent with surrounding tissue      Temperature: normal   Odor: none  Pain: mild, tender      Interventions  Visual inspection and assessment completed   Wound Care Rationale Promote moist wound healing without tissue dehydration  and Provide protection   Wound Care: done per plan of care  Supplies: floor stock and discussed with patient  Current off-loading measures: Pillows under calves and Pillows  Current support surface: Standard  Atmos Air mattress  Education provided to: plan of care  Discussed plan of care with Patient    Stephanie Butler RN CWOCN

## 2022-03-11 ENCOUNTER — DOCUMENTATION ONLY (OUTPATIENT)
Dept: ANTICOAGULATION | Facility: CLINIC | Age: 77
End: 2022-03-11

## 2022-03-11 ENCOUNTER — TRANSITIONAL CARE UNIT VISIT (OUTPATIENT)
Dept: GERIATRICS | Facility: CLINIC | Age: 77
End: 2022-03-11
Payer: COMMERCIAL

## 2022-03-11 VITALS
DIASTOLIC BLOOD PRESSURE: 74 MMHG | SYSTOLIC BLOOD PRESSURE: 120 MMHG | HEIGHT: 73 IN | TEMPERATURE: 98.3 F | RESPIRATION RATE: 16 BRPM | WEIGHT: 220.5 LBS | OXYGEN SATURATION: 99 % | BODY MASS INDEX: 29.22 KG/M2 | HEART RATE: 80 BPM

## 2022-03-11 VITALS
HEART RATE: 79 BPM | BODY MASS INDEX: 29.22 KG/M2 | SYSTOLIC BLOOD PRESSURE: 172 MMHG | RESPIRATION RATE: 16 BRPM | DIASTOLIC BLOOD PRESSURE: 86 MMHG | TEMPERATURE: 97.7 F | OXYGEN SATURATION: 99 % | HEIGHT: 73 IN | WEIGHT: 220.5 LBS

## 2022-03-11 DIAGNOSIS — Z79.01 LONG TERM CURRENT USE OF ANTICOAGULANTS WITH INR GOAL OF 2.0-3.0: ICD-10-CM

## 2022-03-11 DIAGNOSIS — K59.03 DRUG-INDUCED CONSTIPATION: ICD-10-CM

## 2022-03-11 DIAGNOSIS — S72.001D CLOSED FRACTURE OF RIGHT HIP WITH ROUTINE HEALING, SUBSEQUENT ENCOUNTER: ICD-10-CM

## 2022-03-11 DIAGNOSIS — G20.A1 PARKINSON DISEASE (H): ICD-10-CM

## 2022-03-11 DIAGNOSIS — G47.33 OSA (OBSTRUCTIVE SLEEP APNEA): ICD-10-CM

## 2022-03-11 DIAGNOSIS — T14.8XXA SKIN ABRASION: ICD-10-CM
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
## 2022-03-11 DIAGNOSIS — Z79.01 LONG TERM CURRENT USE OF ANTICOAGULANTS WITH INR GOAL OF 2.0-3.0: Primary | ICD-10-CM

## 2022-03-11 DIAGNOSIS — I48.20 CHRONIC ATRIAL FIBRILLATION (H): ICD-10-CM

## 2022-03-11 DIAGNOSIS — I48.19 PERSISTENT ATRIAL FIBRILLATION (H): ICD-10-CM

## 2022-03-11 DIAGNOSIS — M81.0 OSTEOPOROSIS, UNSPECIFIED OSTEOPOROSIS TYPE, UNSPECIFIED PATHOLOGICAL FRACTURE PRESENCE: ICD-10-CM

## 2022-03-11 DIAGNOSIS — C61 PROSTATE CANCER (H): ICD-10-CM

## 2022-03-11 DIAGNOSIS — I10 ESSENTIAL HYPERTENSION, BENIGN: ICD-10-CM

## 2022-03-11 DIAGNOSIS — N18.30 STAGE 3 CHRONIC KIDNEY DISEASE, UNSPECIFIED WHETHER STAGE 3A OR 3B CKD (H): ICD-10-CM

## 2022-03-11 DIAGNOSIS — Z71.89 ADVANCED DIRECTIVES, COUNSELING/DISCUSSION: ICD-10-CM

## 2022-03-11 LAB
ANION GAP SERPL CALCULATED.3IONS-SCNC: 2 MMOL/L (ref 3–14)
BUN SERPL-MCNC: 36 MG/DL (ref 7–30)
CALCIUM SERPL-MCNC: 8.8 MG/DL (ref 8.5–10.1)
CHLORIDE BLD-SCNC: 110 MMOL/L (ref 94–109)
CO2 SERPL-SCNC: 27 MMOL/L (ref 20–32)
CREAT SERPL-MCNC: 1.56 MG/DL (ref 0.66–1.25)
ERYTHROCYTE [DISTWIDTH] IN BLOOD BY AUTOMATED COUNT: 14.6 % (ref 10–15)
GFR SERPL CREATININE-BSD FRML MDRD: 46 ML/MIN/1.73M2
GLUCOSE BLD-MCNC: 98 MG/DL (ref 70–99)
HCT VFR BLD AUTO: 32.6 % (ref 40–53)
HGB BLD-MCNC: 10.4 G/DL (ref 13.3–17.7)
INR PPP: 2.16 (ref 0.85–1.15)
MAGNESIUM SERPL-MCNC: 2.3 MG/DL (ref 1.6–2.3)
MCH RBC QN AUTO: 27.4 PG (ref 26.5–33)
MCHC RBC AUTO-ENTMCNC: 31.9 G/DL (ref 31.5–36.5)
MCV RBC AUTO: 86 FL (ref 78–100)
PLATELET # BLD AUTO: 175 10E3/UL (ref 150–450)
POTASSIUM BLD-SCNC: 4.4 MMOL/L (ref 3.4–5.3)
RBC # BLD AUTO: 3.8 10E6/UL (ref 4.4–5.9)
SODIUM SERPL-SCNC: 139 MMOL/L (ref 133–144)
WBC # BLD AUTO: 8.8 10E3/UL (ref 4–11)

## 2022-03-11 PROCEDURE — 250N000013 HC RX MED GY IP 250 OP 250 PS 637

## 2022-03-11 PROCEDURE — 85027 COMPLETE CBC AUTOMATED: CPT | Performed by: INTERNAL MEDICINE

## 2022-03-11 PROCEDURE — 250N000013 HC RX MED GY IP 250 OP 250 PS 637: Performed by: INTERNAL MEDICINE

## 2022-03-11 PROCEDURE — 83735 ASSAY OF MAGNESIUM: CPT | Performed by: INTERNAL MEDICINE

## 2022-03-11 PROCEDURE — 99207 PR NON-BILLABLE SERV PER CHARTING: CPT | Performed by: INTERNAL MEDICINE

## 2022-03-11 PROCEDURE — 85610 PROTHROMBIN TIME: CPT | Performed by: INTERNAL MEDICINE

## 2022-03-11 PROCEDURE — 36415 COLL VENOUS BLD VENIPUNCTURE: CPT | Performed by: INTERNAL MEDICINE

## 2022-03-11 PROCEDURE — 80048 BASIC METABOLIC PNL TOTAL CA: CPT | Performed by: INTERNAL MEDICINE

## 2022-03-11 PROCEDURE — 99305 1ST NF CARE MODERATE MDM 35: CPT | Performed by: INTERNAL MEDICINE

## 2022-03-11 RX ADMIN — AMLODIPINE BESYLATE 10 MG: 10 TABLET ORAL at 08:57

## 2022-03-11 RX ADMIN — CARBIDOPA AND LEVODOPA 1 TABLET: 25; 100 TABLET ORAL at 08:57

## 2022-03-11 RX ADMIN — OXYCODONE HYDROCHLORIDE 10 MG: 5 TABLET ORAL at 08:57

## 2022-03-11 RX ADMIN — SENNOSIDES AND DOCUSATE SODIUM 1 TABLET: 50; 8.6 TABLET ORAL at 08:57

## 2022-03-11 RX ADMIN — ALLOPURINOL 100 MG: 100 TABLET ORAL at 08:57

## 2022-03-11 RX ADMIN — METOPROLOL SUCCINATE 75 MG: 50 TABLET, EXTENDED RELEASE ORAL at 08:57

## 2022-03-11 RX ADMIN — ACETAMINOPHEN 975 MG: 325 TABLET, FILM COATED ORAL at 00:46

## 2022-03-11 RX ADMIN — LISINOPRIL 40 MG: 40 TABLET ORAL at 08:57

## 2022-03-11 ASSESSMENT — ACTIVITIES OF DAILY LIVING (ADL)
ADLS_ACUITY_SCORE: 13

## 2022-03-11 NOTE — PLAN OF CARE
Occupational Therapy Discharge Summary    Reason for therapy discharge:    Discharged to transitional care facility.    Progress towards therapy goal(s). See goals on Care Plan in [facility] electronic health record for goal details.  Goals not met.  Barriers to achieving goals:   discharge from facility.    Therapy recommendation(s):    Continued therapy is recommended.  Rationale/Recommendations:  Pt is significantly below PLOF. Recommend ongoing skilled while in TCU setting to improve strength, functional activity tolerance, balance and safety needed for daily tasks. Pt motivated to return to indep functioning as he is active and still works part-time at [employer].

## 2022-03-11 NOTE — PLAN OF CARE
Goal Outcome Evaluation:      Patient vital signs are at baseline: Yes  Patient able to ambulate as they were prior to admission or with assist devices provided by therapies during their stay:  Yes  Patient MUST void prior to discharge:  Yes  Patient able to tolerate oral intake:  Yes  Pain has adequate pain control using Oral analgesics:  Yes  Does patient have an identified :  Yes  Has goal D/C date and time been discussed with patient:  Yes    Aox4. High dose miralax ordered. Patient drank over half, but was unable to finish. Passing gas. Bowel sounds hypoactive. Assist of 1 gbaw.     Patient had large BM at 1130.

## 2022-03-11 NOTE — LETTER
"    3/11/2022        RE: Billy Stock  [address]  [place] [address]        [facility]  PHYSICIAN NOTE    PRIMARY CARE PROVIDER AND CLINIC:  Malcolm Schafer MD, [address] / [facility] [address]    Chief Complaint   Patient presents with     Hospital F/U     [place] Medical Record Number:  [id number]  Place of Service where encounter took place:  [facility] ([facility]) [[id number]]    Billy Stock is a 76 year old (1945), admitted to the above facility from  [facility]. Hospital stay 3/6/22 through 3/11/22. Admitted to this facility for  rehab, medical management and nursing care.     HPI:    HPI information obtained from: facility chart records, facility staff, patient report and [facility] chart review.     Brief summary of hospital course: Mr. Cervantes \"Marek\" Montrell is a 76 yoM with h/o Parkinson's disease and prostate cancer admitted after falling on the ice while shoveling and sustaining a R hip fracture. Underwent R hip hemiarthroplasty without complications;  ml. He is also chronically anticoagulated for h/o afib and his INR was reversed for the procedure but Coumadin resumed postop. Constipation noted with bowel regimen initiated. [facility]ares.    Updates on status since skilled nursing admission: Billy is seen in his room today and welcomes the visit. Does have some expected pain in R hip region and is due for pain meds as hadn't yet received any since being in hospital earlier this AM and its now midday; Oxycodone is on its way from pharmacy. Says had several packets of Miralax in the hospital last night and bowels started to move in a \"soupy\" consistency. He verifies etiology of fall was a slip on the ice while shoveling. At baseline lives with his ex-wife but she works during the day so is unable to be home to help him at this time recover. 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 He would be in favor of on-site ortho following him for initial postop check. He says he had two small blisters R inner thigh in the hospital d/t moisture of legs together and wonders about wound care for these. Has already brought home CPAP to use here. We discussed timing of his TID Sinemet as well per home routine; says does get mild break-through tremor especially of LUE in between doses.    CODE STATUS/ADVANCE DIRECTIVES DISCUSSION:   FULL CODE - confirmed with Marek today  Patient's living condition: lives with ex-wife who works during the daytime hours    ALLERGIES: Cats, Codeine, Codeine, Maple tree, Morphine, and Watermelon [citrullus vulgaris]    Past Medical History:   Diagnosis Date     Bell's palsy 10/15/2002     CA IN SITU PROSTATE 10/15/2002     Chronic atrial fibrillation (H) 07/01/2010     Chronic renal insufficiency      Glaucoma 04/10/2003     Problem list name updated by automated process. Provider to review     Gout 05/16/2013     Hyperlipidemia LDL goal <100 09/10/2014     Hypertension goal BP (blood pressure) < 140/90 06/28/2006     SARAH (obstructive sleep apnea) 08/28/2013     SARAH (obstructive sleep apnea)      Parkinson disease (H) 2019     Pericarditis 2001     Renal cyst       Past Surgical History:   Procedure Laterality Date     CATARACT IOL, RT/LT  10/15, 11/15     COLONOSCOPY  2009    [facility]     COLONOSCOPY  9/30/2014    Dr. Long [facility]     COLONOSCOPY N/A 9/30/2014    Procedure: COMBINED COLONOSCOPY, SINGLE BIOPSY/POLYPECTOMY BY BIOPSY;  Surgeon: Puneet Long MD;  Location:  GI     EYE SURGERY  2007    glaucoma surgery right eye     HERNIA REPAIR       OPEN REDUCTION INTERNAL FIXATION HIP BIPOLAR Right 3/8/2022    Procedure: Right  hip hemiarthroplasty, anterolateral approach;  Surgeon: Bandar Lundberg MD;  Location: [facility] OR     PROSTATE SURGERY       SUPRAPUBIC PROSTATECTOMY  2002     VASECTOMY       Family History   Problem Relation Age of Onset     Hypertension  Maternal Grandfather      Cerebrovascular Disease Maternal Grandfather      Hypertension Maternal Grandmother      Cerebrovascular Disease Maternal Grandmother      Hypertension Mother          age 51, MVA     Hypertension Brother      Heart Failure Brother      Bronchitis Brother      Other - See Comments Brother         multiple myeloma     Heart Surgery Brother         defibrilater     Prostate Problems Brother      Diabetes Brother      Other Cancer Brother         Multiple Myeloma     Breast Cancer Maternal Aunt         hx     Cancer Maternal Aunt         cervical cancer     Cancer - colorectal Maternal Aunt      Unknown/Adopted Father         Don't know father's history     Diabetes Son      Cerebrovascular Disease Son      Colon Cancer Other      No Known Problems Daughter      Social History     Tobacco Use     Smoking status: Never Smoker     Smokeless tobacco: Never Used   Substance Use Topics     Alcohol use: No     Alcohol/week: 0.0 standard drinks     Drug use: No        Post-discharge medication reconciliation status: Reviewed and updated in [facility] according to facility MAR    Current Outpatient Medications   Medication Sig Dispense Refill     acetaminophen (TYLENOL) 325 MG tablet Take 2 tablets (650 mg) by mouth every 4 hours as needed for other (For optimal non-opioid multimodal pain management to improve pain control.) 30 tablet      allopurinol (ZYLOPRIM) 100 MG tablet Take 1 tablet (100 mg) by mouth daily 90 tablet 3     amLODIPine (NORVASC) 10 MG tablet Take 1 tablet by mouth once daily 90 tablet 0     Apoaequorin (PREVAGEN PO) Take 1 tablet by mouth daily       carbidopa-levodopa (SINEMET)  MG tablet Take 1 tablet by mouth 3 times daily       lisinopril (ZESTRIL) 40 MG tablet Take 1 tablet by mouth once daily 90 tablet 0     Loratadine (CLARITIN PO) Take 10 mg by mouth daily as needed        metoprolol succinate ER (TOPROL-XL) 50 MG 24 hr tablet TAKE 1 & 1/2 (ONE & ONE-HALF) TABLETS BY  "MOUTH ONCE DAILY 135 tablet 1     multivitamin w/minerals (MULTI-VITAMIN) tablet Take 1 tablet by mouth daily       oxyCODONE (ROXICODONE) 5 MG tablet Take 1-2 tablets (5-10 mg) by mouth every 4 hours as needed for moderate to severe pain 1 tab po q 4 hrs prn pain scale \"1-5\"  2 tabs po q 4 hrs prn pain scale \"6-10\" 30 tablet 0     senna-docusate (SENOKOT-S/PERICOLACE) 8.6-50 MG tablet Take 1 tablet by mouth 2 times daily as needed for constipation 20 tablet 0     warfarin ANTICOAGULANT (COUMADIN) 5 MG tablet Take one tablet (5 mg) by mouth daily except take one and a half tablets (7.5 mg) Mon, Th or as directed by INR Clinic 100 tablet 0       ROS:  10 point ROS of systems including Constitutional, Eyes, Respiratory, Cardiovascular, Gastroenterology, Genitourinary, Integumentary, Musculoskeletal, Psychiatric were all negative except for pertinent positives noted in my HPI.    Exam:  /74   Pulse 80   Temp 98.3  F (36.8  C)   Resp 16   Ht 1.854 m (6' 1\")   Wt 100 kg (220 lb 8 oz)   SpO2 99%   BMI 29.09 kg/m    Alert, pleasant, NAD, appears stated age  No scleral icterus  Moist oral mucosa  Heart irregularly irregular rate controlled  Lungs clear without wcr  Abdomen overweight, soft, NT, +Hyperactive bowel sounds  Mild LUE rest tremor noted and rigidity with masked facies and slow blink  Tiny superficial skin abrasions x 2 in right inner thigh where blisters have subsequently already popped (about 2 cm x 1 cm)  R lateral hip with intact Aquacel  No calf swelling or edema  Mood euthymic    Lab/Diagnostic data:  Xray pelvis R hip 3/6/22:  IMPRESSION: Fracture through the right femoral neck without evidence for intertrochanteric extension. No dislocation at the hip joint. There is superimposed degenerative change. Left hip negative for fracture. Additional mild degenerative change. Pelvis   negative for fracture.    3/11/22: Normal electrolytes, Cr 1.56, Hgb 10.4, INR 2.16    ASSESSMENT/PLAN:  (W19.XXXD) " Fall, subsequent encounter  (primary encounter diagnosis)  (S72.001D) Closed fracture of right hip with routine healing, subsequent encounter  (M81.0) Osteoporosis, unspecified osteoporosis type, unspecified pathological fracture presence  (K59.03) Drug-induced constipation  Sustained fall while shoveling at home and unfortunately broke R hip s/p right hip hemiarthroplasty anterolateral approach without complications  WBAT  Scheduled Tylenol with PRN Oxycodone available  He reports constipation in the hospital with several doses of Miralax yesterday and bowels now starting to move; is on Senna-S now as well and adjust as needed  Hgb on Monday 3/14/22  Continue physical therapy and occupational therapy  Aquacel in place if able to remain as such until ortho f/u  He does like the idea of Terri Sofia NP of [facility] to complete first postop visit [facility] site at [facility]  Is on MVI; Outpatient osteoporosis management with PCP  Coumadin for prior dx afib is current DVT prophy  Goal is to discharge home with his ex-wife where he resides in the community after appropriate rehabilitation (but she works during the day so was unable to be home with him during his immediate postop recovery)    (T14.8XXA) Skin abrasion  2 small superficial abrasions R inner thigh d/t his description of moisture with legs together in the hospital and subsequent blistering which have now popped   No signs of infection  Will paint with Betadine BID until dry/healed    (C61) Prostate cancer (H)  Follows with urologist Dr. Guthrie with last visit Oct 2021 when PSA was down trending to 0.05  S/p RRP 2002 w/ PSM, adjuvant radiation, on intermittent ADT for biochemical recurrence, last 6 month Eligard dose was Oct 2021 and has planned follow up again April 2022  Hospital imaging notes no specific evidence of bony metastasis contributing to fracture nor does op note    (G20) Parkinson disease (H)  Confirmed dosing of Sinemet per his home regimen at 9AM, 2 PM and 7  PM  He does have mild break-through tremor especially of LUE  Continue plans for physical therapy and occupational therapy in light of this diagnosis which can lead to rigidity and fall risk  Discussed and he is ok with holding over-the-counter Prevagen memory enhancer while at TCU (just started about a week prior to admission - unsure if helpful)    (I48.19) Persistent atrial fibrillation (H)  (Z79.01) Long term current use of anticoagulants with INR goal of 2.0-3.0  INR was reversed in the hospital for surgery then Coumadin resumed  Discharged on home dosing as noted above in current medication list so will receive 5 mg tonight with planned INR tomorrow 3/12/22    (I10) Essential hypertension, benign  (N18.30) Stage 3 chronic kidney disease, unspecified whether stage 3a or 3b CKD (H)  Initial BP satisfactory ; await more readings and labs BMP and Hgb on Monday 3/14/22    (G47.33) SARAH (obstructive sleep apnea)  He has home CPAP on site    (Z71.89) Advanced directives, counseling/discussion  Discussed with Marek and he confirms FULL CODE status      Electronically signed by:  Terri Robert DO        Sincerely,        Terri Robert DO

## 2022-03-11 NOTE — DISCHARGE SUMMARY
[facility]  Hospitalist Discharge Summary      Date of Admission:  3/6/2022  Date of Discharge:  3/11/2022  Discharging Provider: aPulie Laguerre MD, MD  Discharge Service: Hospitalist Service    Discharge Diagnoses   Mechanical fall leading to right hip fracture status post right hip hemiarthroplasty  Physical deconditioning    Follow-ups Needed After Discharge   Follow-up Appointments     Follow Up and recommended labs and tests      Follow up with Dr. Lundberg at [facility] 2 weeks after surgery.  If still in TCU,   can be seen by the orthopedic provider at the care facility (if this is an   option).    Call the care coordinator at [phone] to arrange the appt.   [facility]: [phone]  [facility]: [phone]    Follow-up with PCP.  Repeat INR this coming Monday, March 14, 2022.    Adjust warfarin dosing as needed             Unresulted Labs Ordered in the Past 30 Days of this Admission     No orders found from 2/4/2022 to 3/7/2022.          Discharge Disposition   Discharged to rehabilitation facility  Condition at discharge: Stable      Hospital Course      No significant reported events overnight.  Was able to participate with therapy services.  I assume service this morning.  I was informed that patient has plans for TCU discharge today.  Stable hemodynamics.  Not requiring any oxygen support.  Mr. Stock mentioned that he was able to sleep decent last night.  Tolerating oral diet.  No reported nausea, vomiting or diarrhea.  Pain is currently reasonably under control.  He is agreeable for planned TCU discharge going to [facility] [place].  Currently he is back on his chronic anticoagulation with warfarin now with therapeutic INR 2.4.  Resume home dosing.  Repeat INR monitoring in the next few days and adjust warfarin dosing as needed.        I will refer you to excerpts of my colleagues of prior progress notes as listed below for other details of his hospital  stay.    Summary of Stay: Billy Stock is a 76 year old male with a history of SARAH on CPAP, AFib chronically anticoagulated with warfarin, CKD with baseline creat 1.5, htn, remote hx of prostate cancer, parkinsonism,  admitted on 3/6/2022 with right hip fx after a mechanical fall while shoveling snow     Admit notable for therapeutic INR that underwent reversal and so to OR 3/8/22 for surgical repair.      Problem List:   Mechanical fall with right hip fx  Underwent surgical repair with right hip hemiarthroplasty 3/8/2022  PT/OT/weight bearing per ortho  Will need tcu at discharge-bed available 3/11/22  Warfarin for dvt ppx still subtherapeutic so will cover with ppx enox     Constipation has gotten  Senna s 1 tab bid and miralax every day for the past 2 days, and dose of MOM today without relief.  Will increase senna to 3 tabs bid, and give single dose high dose miralax to get him moving      Afib on warfarin for stroke ppx, CHADsVASC score is 3 (age/htn).  On BB for stroke ppx  INR reversed with  2 mg IV and then 5 mg oral Vitamin K.  Warfarin resumed will overlap with ppx every day enox until therapeutic      SARAH  CPAP as at home     htn on amlodipine 10 mg every day, lisinopril 40 mg every day, metop xl 75 mg every day  -resumed all with staggered parameters     Parkinson's dz  Resume pta carbi/levodopa  tid     Two small abrasions on right inner thigh  WOC consulted     CKD with baseline creat 1.5  Creat at baseline, recheck in am      Hx of prostate cancer s/p radical prostatectomy in 2002 with positive surgical margins so underwent adjuvant radiation, and now on androgen deprivation therapy      Covid negative  S/p J&J 3/2021 followed by moderna booster 10/2021     DVT Prophylaxis: PCDs->warfarin when cleared with ortho  Code Status: Full Code  Functional Status:  Independent at baseline  Diet: adat back to regular anesthesia clears          Disposition Plan     Expected discharge in 1 days to  TCU      Consultations This Hospital Stay   ORTHOPEDIC SURGERY IP CONSULT  CARE MANAGEMENT / SOCIAL WORK IP CONSULT  CARE MANAGEMENT / SOCIAL WORK IP CONSULT  PHYSICAL THERAPY ADULT IP CONSULT  OCCUPATIONAL THERAPY ADULT IP CONSULT  PHARMACY TO DOSE WARFARIN  SOCIAL WORK IP CONSULT  PHARMACY TO DOSE WARFARIN  WOUND OSTOMY CONTINENCE NURSE  IP CONSULT  PHYSICAL THERAPY ADULT IP CONSULT  OCCUPATIONAL THERAPY ADULT IP CONSULT    Code Status   Full Code    Time Spent on this Encounter   I, Paulie Laguerre MD, MD, personally saw the patient today and spent greater than 30 minutes discharging this patient.       Paulie Laguerre MD, MD  [facility]  [address] [facility] [address]  Phone: [phone]  Fax: [phone]  ______________________________________________________________________    Physical Exam   Vital Signs: Temp: 97.7  F (36.5  C) Temp src: Temporal BP: (!) 172/86 Pulse: 79   Resp: 16 SpO2: 99 % O2 Device: None (Room air)    Weight: 220 lbs 8 oz  HEENT; Atraumatic, normocephalic, pinkish conjuctiva, pupils bilateral reactive   Skin: warm and moist, no rashes  Lungs: equal chest expansion, clear to auscultation, no wheezes, no stridor, no crackles,   Heart: normal rate, normal rhythm, no rubs or gallops.   Abdomen: normal bowel sounds, no tenderness, no peritoneal signs, no guarding  Extremities: no deformities, no edema   Neuro; follow commands, alert and oriented x3, spontaneous speech, coherent, moves all extremities spontaneously  Psych; no hallucination, euthymic mood, not agitated           Primary Care Physician   Malcolm Schafer    Discharge Orders      General info for SNF    Length of Stay Estimate: Short Term Care: Estimated # of Days <30  Condition at Discharge: Improving  Level of care:skilled   Rehabilitation Potential: Good  Admission H&P remains valid and up-to-date: Yes  Recent Chemotherapy: N/A  Use Nursing Home Standing Orders: Yes      Mantoux instructions    Give two-step Mantoux (PPD) Per Facility Policy Yes     Reason for your hospital stay    Right hip fracture: hemiarthroplasty     Wound care (specify)    Site:   Right hip   Instructions:  Keep dressings clean, dry and intact.  Change if peeling off or >60% saturated.  Do not immerse.  Ok to shower over tegaderm and aquacel dressings.     Activity - Up with assistive device    Up with walker/assist     Weight bearing status    WBAT Right LE with walker     Follow Up and recommended labs and tests    Follow up with Dr. Lundberg at [facility] 2 weeks after surgery.  If still in TCU, can be seen by the orthopedic provider at the care facility (if this is an option).    Call the care coordinator at [phone] to arrange the appt.   [facility] [place]: [phone]  [facility]: [phone]    Follow-up with PCP.  Repeat INR this coming Monday, March 14, 2022.  Adjust warfarin dosing as needed     Physical Therapy Adult Consult    Evaluate and treat as clinically indicated.    Reason:  Right hip fracture: hemiarthroplasty     Occupational Therapy Adult Consult    Evaluate and treat as clinically indicated.    Reason:  Right hip fracture: hemiarthroplasty     Fall precautions     Crutches DME    DME Documentation: Describe the reason for need to support medical necessity: Impaired gait status post hip surgery. I, the undersigned, certify that the above prescribed supplies are medically necessary for this patient and is both reasonable and necessary in reference to accepted standards of medical practice in the treatment of this patient's condition and is not prescribed as a convenience.     Cane DME    DME Documentation: Describe the reason for need to support medical necessity: Impaired gait status post hip surgery. I, the undersigned, certify that the above prescribed supplies are medically necessary for this patient and is both reasonable and necessary in reference to accepted standards of medical  practice in the treatment of this patient's condition and is not prescribed as a convenience.     Walker DME    : DME Documentation: Describe the reason for need to support medical necessity: Impaired gait status post hip surgery. I, the undersigned, certify that the above prescribed supplies are medically necessary for this patient and is both reasonable and necessary in reference to accepted standards of medical practice in the treatment of this patient's condition and is not prescribed as a convenience.     Diet    Follow this diet upon discharge: Orders Placed This Encounter      Advance Diet as Tolerated: Regular Diet Adult       Significant Results and Procedures   Most Recent 3 CBC's:Recent Labs   Lab Test 03/11/22  0642 03/10/22  0722 03/09/22  0725   WBC 8.8 9.3 11.4*   HGB 10.4* 10.5* 11.1*   MCV 86 87 83    142* 147*     Most Recent 3 BMP's:Recent Labs   Lab Test 03/11/22  0642 03/10/22  0722 03/09/22  0725    139 140   POTASSIUM 4.4 4.4 4.3   CHLORIDE 110* 110* 109   CO2 27 26 25   BUN 36* 35* 27   CR 1.56* 1.49* 1.40*   ANIONGAP 2* 3 6   BRIANA 8.8 8.8 8.6   GLC 98 95  95 102*  102*     Most Recent TSH and T4:Recent Labs   Lab Test 01/13/21  1443   TSH 1.51     Most Recent 6 glucoses:Recent Labs   Lab Test 03/11/22  0642 03/10/22  0722 03/09/22  0725 03/07/22  0449 03/06/22  1817 01/13/21  1443   GLC 98 95  95 102*  102* 136* 99 88     Most Recent Urinalysis:Recent Labs   Lab Test 01/07/22  1121   COLOR Yellow   APPEARANCE Clear   URINEGLC Negative   URINEBILI Negative   URINEKETONE Negative   SG 1.025   UBLD Trace*   URINEPH 5.5   PROTEIN >=300*   UROBILINOGEN 0.2   NITRITE Negative   LEUKEST Negative   RBCU 0-2   WBCU 0-5   ,   Results for orders placed or performed during the hospital encounter of 03/06/22   XR Pelvis and Hip Right 1 View    Narrative    EXAM: XR PELVIS AND HIP RIGHT 1 VIEW  LOCATION: [facility]  DATE/TIME: 3/6/2022 6:24 PM    INDICATION: Right  "hip pain.  COMPARISON: None.      Impression    IMPRESSION: Fracture through the right femoral neck without evidence for intertrochanteric extension. No dislocation at the hip joint. There is superimposed degenerative change. Left hip negative for fracture. Additional mild degenerative change. Pelvis   negative for fracture.   XR Chest Port 1 View    Narrative    EXAM: XR CHEST PORT 1 VIEW  LOCATION: [facility]  DATE/TIME: 3/6/2022 9:04 PM    INDICATION: preop  COMPARISON: 05/24/2013      Impression    IMPRESSION: Cardiomegaly. Pulmonary vascularity normal. Artifact overlying the upper right chest. Lungs otherwise clear. No infiltrates or effusions. Tortuous descending thoracic aorta.   XR Pelvis w Hip Port Right 1 View    Narrative    XR PELVIS AD HIP PORTABLE RIGHT 1 VIEW 3/8/2022 11:02 AM     HISTORY: s/p right hemiarthroplasty    COMPARISON: 3/6/2022      Impression    IMPRESSION: Bipolar right hip arthroplasty has been placed in the  interval. Postoperative air is present. Skin staples are in place.  Components are well seated. Osteopenia. Surgical clips in the pelvis  and perineum.    GAETANO KING MD         SYSTEM ID:  [id number]       Discharge Medications   Current Discharge Medication List      START taking these medications    Details   oxyCODONE (ROXICODONE) 5 MG tablet Take 1-2 tablets (5-10 mg) by mouth every 4 hours as needed for moderate to severe pain 1 tab po q 4 hrs prn pain scale \"1-5\"  2 tabs po q 4 hrs prn pain scale \"6-10\"  Qty: 30 tablet, Refills: 0    Associated Diagnoses: Hip fracture, right, closed, initial encounter (H)      senna-docusate (SENOKOT-S/PERICOLACE) 8.6-50 MG tablet Take 1 tablet by mouth 2 times daily as needed for constipation  Qty: 20 tablet, Refills: 0    Associated Diagnoses: Hip fracture, right, closed, initial encounter (H)         CONTINUE these medications which have CHANGED    Details   acetaminophen (TYLENOL) 325 MG tablet Take 2 tablets " (650 mg) by mouth every 4 hours as needed for other (For optimal non-opioid multimodal pain management to improve pain control.)  Qty: 30 tablet    Associated Diagnoses: Hip fracture, right, closed, initial encounter (H)         CONTINUE these medications which have NOT CHANGED    Details   allopurinol (ZYLOPRIM) 100 MG tablet Take 1 tablet (100 mg) by mouth daily  Qty: 90 tablet, Refills: 3    Associated Diagnoses: Gout of foot, unspecified cause, unspecified chronicity, unspecified laterality      amLODIPine (NORVASC) 10 MG tablet Take 1 tablet by mouth once daily  Qty: 90 tablet, Refills: 0    Associated Diagnoses: Essential hypertension with goal blood pressure less than 140/90      Apoaequorin (PREVAGEN PO) Take 1 tablet by mouth daily      carbidopa-levodopa (SINEMET)  MG tablet Take 1 tablet by mouth 3 times daily      lisinopril (ZESTRIL) 40 MG tablet Take 1 tablet by mouth once daily  Qty: 90 tablet, Refills: 0    Associated Diagnoses: Essential hypertension      Loratadine (CLARITIN PO) Take 10 mg by mouth daily as needed       metoprolol succinate ER (TOPROL-XL) 50 MG 24 hr tablet TAKE 1 & 1/2 (ONE & ONE-HALF) TABLETS BY MOUTH ONCE DAILY  Qty: 135 tablet, Refills: 1    Associated Diagnoses: Persistent atrial fibrillation (H)      multivitamin w/minerals (MULTI-VITAMIN) tablet Take 1 tablet by mouth daily      warfarin ANTICOAGULANT (COUMADIN) 5 MG tablet Take one tablet (5 mg) by mouth daily except take one and a half tablets (7.5 mg) Mon, Th or as directed by INR Clinic  Qty: 100 tablet, Refills: 0    Associated Diagnoses: Chronic atrial fibrillation (H); Long term current use of anticoagulants with INR goal of 2.0-3.0           Allergies   Allergies   Allergen Reactions     Cats      Codeine      Hallucinated when taking codeine with another medication     Codeine Unknown     Maple Tree      Morphine Visual Disturbance and Other (See Comments)     Watermelon [Citrullus Vulgaris]      Itching  throat, hives

## 2022-03-11 NOTE — PLAN OF CARE
Patient vital signs are at baseline: Yes  Patient able to ambulate as they were prior to admission or with assist devices provided by therapies during their stay:  Yes  Patient MUST void prior to discharge:  Yes  Patient able to tolerate oral intake:  Yes  Pain has adequate pain control using Oral analgesics:  Yes  Does patient have an identified :  Yes  Has goal D/C date and time been discussed with patient:  Yes    Pt is A/O x4, up with A1 walker/gb. Incision is covered with Aquacel, C/D/I. Neuros intact. Pain 3/10, PRN oxycodone effective.   Discharge at 1200 with son transporting to [facility].

## 2022-03-11 NOTE — DISCHARGE INSTRUCTIONS
The PAS number is [id number].    Patient to discharge to [facility]   Address is [address] [place] [address]  Phone number is [phone]  Fax number is [phone]

## 2022-03-11 NOTE — PLAN OF CARE
Physical Therapy Discharge Summary    Reason for therapy discharge:    Discharged to transitional care facility.    Progress towards therapy goal(s). See goals on Care Plan in [facility] electronic health record for goal details.  Goals not met.  Barriers to achieving goals:   discharge from facility.    Therapy recommendation(s):    Continued therapy is recommended.  Rationale/Recommendations:  TCU per prior PT recommendation.

## 2022-03-11 NOTE — PROGRESS NOTES
Care Management Discharge Note    Discharge Date: 03/11/2022       Discharge Disposition: Skilled Nursing Facilty    Discharge Services:  TCU    Discharge DME: None    Discharge Transportation: car, drives self, family or friend will provide    Private pay costs discussed: private room/amenity fees    PAS Confirmation Code: [id number]  Patient/family educated on Medicare website which has current facility and service quality ratings: yes    Education Provided on the Discharge Plan:  yes  Persons Notified of Discharge Plans: patients son, bedside nurse  Patient/Family in Agreement with the Plan: yes    Handoff Referral Completed: No    Additional Information:  BABITA spoke with Alisa at [facility]. They can accept the patient today. SW called son and he can  the patient at noon. SW faxed discharge orders and completed the PAS.         MICHAEL Spicer

## 2022-03-11 NOTE — PROGRESS NOTES
ANTICOAGULATION  MANAGEMENT: Discharge Review    Billy Stock chart reviewed for anticoagulation continuity of care    Hospital Admission on 3/6/22 for hip fracture.    Discharge disposition: TCU    Results:    Recent labs: (last 7 days)     03/06/22  1912 03/06/22  2125 03/07/22  0449 03/07/22  0545 03/08/22  0417 03/09/22  0725 03/10/22  0722 03/11/22  0642   INR 2.43* 2.43* 1.90* 1.83* 1.38* 1.37* 1.62* 2.16*     Anticoagulation inpatient management:     reversed with Vitamin K on 2 mg IV on 3/6 and 5 mg oral on 3/7    Anticoagulation discharge instructions:     Warfarin dosing: home regimen continued   Bridging: bridging with enoxaparin (Lovenox)   INR goal change: No      Medication changes affecting anticoagulation: No    Additional factors affecting anticoagulation: No    Plan     No adjustment to anticoagulation plan needed    Patient not contacted    Anticoagulation Calendar updated    Marge Gale RN

## 2022-03-11 NOTE — PLAN OF CARE
"  Problem: Plan of Care - These are the overarching goals to be used throughout the patient stay.    Goal: Plan of Care Review/Shift Note  Description: The Plan of Care Review/Shift note should be completed every shift.  The Outcome Evaluation is a brief statement about your assessment that the patient is improving, declining, or no change.  This information will be displayed automatically on your shift note.  Outcome: Ongoing, Progressing  Flowsheets (Taken 3/11/2022 0454)  Plan of Care Reviewed With: patient  Overall Patient Progress: improving  Goal: Patient-Specific Goal (Individualized)  Description: You can add care plan individualizations to a care plan. Examples of Individualization might be:  \"Parent requests to be called daily at 9am for status\", \"I have a hard time hearing out of my right ear\", or \"Do not touch me to wake me up as it startles me\".  Outcome: Ongoing, Progressing  Goal: Absence of Hospital-Acquired Illness or Injury  Outcome: Ongoing, Progressing  Intervention: Identify and Manage Fall Risk  Recent Flowsheet Documentation  Taken 3/11/2022 0100 by Reema Eckert RN  Safety Promotion/Fall Prevention:   assistive device/personal items within reach   bed alarm on   clutter free environment maintained   fall prevention program maintained   lighting adjusted   room door open   room organization consistent   safety round/check completed  Intervention: Prevent Skin Injury  Recent Flowsheet Documentation  Taken 3/11/2022 0100 by Reema Eckert RN  Body Position:   sitting up in bed   position changed independently  Goal: Optimal Comfort and Wellbeing  Outcome: Ongoing, Progressing  Intervention: Provide Person-Centered Care  Recent Flowsheet Documentation  Taken 3/11/2022 0100 by Reema Eckert RN  Trust Relationship/Rapport: thoughts/feelings acknowledged  Goal: Readiness for Transition of Care  Outcome: Ongoing, Progressing     Problem: Pain Acute  Goal: Acceptable Pain Control and Functional " Ability  Outcome: Ongoing, Progressing  Intervention: Prevent or Manage Pain  Recent Flowsheet Documentation  Taken 3/11/2022 0100 by Reema Eckert RN  Medication Review/Management: medications reviewed     Problem: Adjustment to Injury (Hip Fracture Surgical Repair)  Goal: Optimal Coping with Change in Health Status  Outcome: Ongoing, Progressing     Problem: Bleeding (Hip Fracture Surgical Repair)  Goal: Absence of Bleeding  Outcome: Ongoing, Progressing     Problem: Bowel Motility Impaired (Hip Fracture Surgical Repair)  Goal: Effective Bowel Elimination  Outcome: Ongoing, Progressing     Problem: Cognitive Decline Risk (Hip Fracture Surgical Repair)  Goal: Cognitive Function Maintained  Outcome: Ongoing, Progressing     Problem: Fluid and Electrolyte Imbalance (Hip Fracture Surgical Repair)  Goal: Fluid and Electrolyte Balance  Outcome: Ongoing, Progressing     Problem: Infection (Hip Fracture Surgical Repair)  Goal: Absence of Infection Signs and Symptoms  Outcome: Ongoing, Progressing     Problem: Joint Function Impaired (Hip Fracture Surgical Repair)  Goal: Optimal Functional Ability  Outcome: Ongoing, Progressing     Problem: Ongoing Anesthesia Effects (Hip Fracture Surgical Repair)  Goal: Anesthesia/Sedation Recovery  Outcome: Ongoing, Progressing  Intervention: Optimize Anesthesia Recovery  Recent Flowsheet Documentation  Taken 3/11/2022 0100 by Reema Eckert RN  Safety Promotion/Fall Prevention:   assistive device/personal items within reach   bed alarm on   clutter free environment maintained   fall prevention program maintained   lighting adjusted   room door open   room organization consistent   safety round/check completed     Problem: Pain (Hip Fracture Surgical Repair)  Goal: Acceptable Pain Control  Outcome: Ongoing, Progressing     Problem: Postoperative Nausea and Vomiting (Hip Fracture Surgical Repair)  Goal: Nausea and Vomiting Relief  Outcome: Ongoing, Progressing     Problem:  Postoperative Urinary Retention (Hip Fracture Surgical Repair)  Goal: Effective Urinary Elimination  Outcome: Ongoing, Progressing     Problem: Respiratory Compromise (Hip Fracture Surgical Repair)  Goal: Effective Oxygenation and Ventilation  Outcome: Ongoing, Progressing  Intervention: Optimize Oxygenation and Ventilation  Recent Flowsheet Documentation  Taken 3/11/2022 0100 by Reema Eckert, RN  Head of Bed (HOB) Positioning: HOB at 45 degrees   Goal Outcome Evaluation:    Plan of Care Reviewed With: patient     Overall Patient Progress: improving       Pt resting comfortably in bed. C/O of mild pain and medicated as per MAR. Dressing to RT hip dry and intact. Pt had B/M on 3/10/2022. For possible discharge to TCU. Condition remain stable. Care continues.

## 2022-03-11 NOTE — PROGRESS NOTES
"Orthopedic Surgery  3/11/2022  POD 3    S: Patient voices no unexpected ortho complaints today. Denies chest pain or shortness of breath. He states he is planning to discharge to TCU today.    O: Blood pressure (!) 172/86, pulse 79, temperature 97.7  F (36.5  C), temperature source Temporal, resp. rate 16, height 1.854 m (6' 1\"), weight 100 kg (220 lb 8 oz), SpO2 99 %.  Lab Results   Component Value Date    HGB 10.4 03/11/2022    HGB 13.9 01/13/2021     Lab Results   Component Value Date    INR 2.16 03/11/2022    INR 1.90 07/01/2021     I/O last 3 completed shifts:  In: 120 [P.O.:120]  Out: 550 [Urine:550]  Distal extremity CMSI bilaterally.  Calves are negative bilaterally, both soft and nontender.  The dressing is C/D/I.      A: Mr. Stock is doing well status post Procedure(s):  Right  hip hemiarthroplasty, anterolateral approach.    P: Continue physical therapy. Continue DVT pphx with Coumadin. Anticipate discharge to TCU today around noon per RN.    Cinthya Landaverde PA-C  [phone]  "

## 2022-03-11 NOTE — PHARMACY-ANTICOAGULATION SERVICE
Clinical Pharmacy- Warfarin Discharge Note    Warfarin PTA Regimen: 7.5mg Mon/Thu; 5mg ROW      Anticoagulation Dose History     Recent Dosing and Labs Latest Ref Rng & Units 3/6/2022 3/7/2022 3/7/2022 3/8/2022 3/9/2022 3/10/2022 3/11/2022    COLETTE IMS TEMPLATE - - - - 7.5 mg 7.5 mg - -    Warfarin 3 mg - - - - - - 6 mg -    INR 0.85 - 1.15 2.43(H) 1.90(H) 1.83(H) 1.38(H) 1.37(H) 1.62(H) 2.16(H)          Vitamin K doses administered during the last 7 days: Yes    Patient admitted with warfarin as pta med with therapeutic INR.  Reversed for surgery.  Agree with plan to resume pta dosing with INR check on Monday     The patient should have an INR checked Monday 3/14.

## 2022-03-12 ENCOUNTER — LAB REQUISITION (OUTPATIENT)
Dept: LAB | Facility: CLINIC | Age: 77
End: 2022-03-12
Payer: COMMERCIAL

## 2022-03-12 ENCOUNTER — TELEPHONE (OUTPATIENT)
Dept: GERIATRICS | Facility: CLINIC | Age: 77
End: 2022-03-12
Payer: COMMERCIAL

## 2022-03-12 DIAGNOSIS — Z11.1 ENCOUNTER FOR SCREENING FOR RESPIRATORY TUBERCULOSIS: ICD-10-CM

## 2022-03-12 DIAGNOSIS — N18.30 CHRONIC KIDNEY DISEASE, STAGE 3 UNSPECIFIED (H): ICD-10-CM

## 2022-03-12 RX ORDER — ACETAMINOPHEN 500 MG
1000 TABLET ORAL 3 TIMES DAILY
COMMUNITY

## 2022-03-12 RX ORDER — ACETAMINOPHEN 650 MG
TABLET, EXTENDED RELEASE ORAL 2 TIMES DAILY
COMMUNITY
End: 2022-04-04

## 2022-03-12 RX ORDER — AMOXICILLIN 250 MG
1 CAPSULE ORAL 2 TIMES DAILY
COMMUNITY
End: 2022-09-17

## 2022-03-12 NOTE — TELEPHONE ENCOUNTER
INR 2.5    New admit    Coumadin 7.5mg on Mon and Th and 5mg all other days  Keep this order   INR on Tuesday    Electronically signed by Bibi James RN, CNP

## 2022-03-12 NOTE — PROGRESS NOTES
[facility] GERIATRICS    PRIMARY CARE PROVIDER AND CLINIC:  Malcolm Schafer MD, [address] / [facility] [address]  Chief Complaint   Patient presents with     Hospital F/U      [place] Medical Record Number:  [id number]  Place of Service where encounter took place:  [facility] ([facility]) [[id number]]    Billy Stock  is a 76 year old  (1945), admitted to the above facility from  [facility]. Hospital stay 3/6/22 through 3/11/22..   HPI:    Billy Stock is a 77 yo male with a PMH Atrial Fibrillation on chronic anticoagulation with warfarin, CKD, Parkinson's disease, HTN and SARAH who was recently admitted to [facility] for evaluation after sustaining a right hip fracture. He was shoveling snow when he fell. Xrays in the ER with right femoral neck fracture. Orthopedics was consulted and he underwent anterior lateral hip hemiarthroplasty on 3/8/22. Post operative course was uncomplicated and [facility]    Today Marek is seen in his room. Just finished working with therapies. Had an issue with MIKA at the facility over the weekend but that is being addressed with the facility. Denies issues with poorly controlled pain. Taking on average 1-2 oxycodone per day.  Denies chest pain or shortness of breath.  Using his CPAP. Had issues with constipation in the hospital. Was given Mirlax with several loose stools but nothing since Friday. Denies abdominal pain or or nausea.     BMP and labs reviewed today    CODE STATUS/ADVANCE DIRECTIVES DISCUSSION:  Prior  CPR/Full code   ALLERGIES:   Allergies   Allergen Reactions     Cats      Codeine      Hallucinated when taking codeine with another medication     Codeine Unknown     Maple Tree      Morphine Visual Disturbance and Other (See Comments)     Watermelon [Citrullus Vulgaris]      Itching throat, hives      PAST MEDICAL HISTORY:   Past Medical History:   Diagnosis Date     Bell's palsy 10/15/2002     CA IN  SITU PROSTATE 10/15/2002     Chronic atrial fibrillation (H) 07/01/2010     Chronic renal insufficiency      Glaucoma 04/10/2003     Problem list name updated by automated process. Provider to review     Gout 05/16/2013     Hyperlipidemia LDL goal <100 09/10/2014     Hypertension goal BP (blood pressure) < 140/90 06/28/2006     SARAH (obstructive sleep apnea) 08/28/2013     SARAH (obstructive sleep apnea)      Parkinson disease (H) 2019     Pericarditis 2001     Renal cyst       PAST SURGICAL HISTORY:   has a past surgical history that includes suprapubic prostatectomy (2002); colonoscopy (2009); colonoscopy (9/30/2014); Eye surgery (2007); Colonoscopy (N/A, 9/30/2014); cataract iol, rt/lt (10/15, 11/15); Vasectomy; Prostate surgery; hernia repair; and Open reduction internal fixation hip bipolar (Right, 3/8/2022).  FAMILY HISTORY: family history includes Breast Cancer in his maternal aunt; Bronchitis in his brother; Cancer in his maternal aunt; Cancer - colorectal in his maternal aunt; Cerebrovascular Disease in his maternal grandfather, maternal grandmother, and son; Colon Cancer in an other family member; Diabetes in his brother and son; Heart Failure in his brother; Heart Surgery in his brother; Hypertension in his brother, maternal grandfather, maternal grandmother, and mother; No Known Problems in his daughter; Other - See Comments in his brother; Other Cancer in his brother; Prostate Problems in his brother; Unknown/Adopted in his father.  SOCIAL HISTORY:   reports that he has never smoked. He has never used smokeless tobacco. He reports that he does not drink alcohol and does not use drugs.  Patient's living condition: Lives with ex wife    Post Discharge Medication Reconciliation Status: discharge medications reconciled and changed, per note/orders  Current Outpatient Medications   Medication Sig     acetaminophen (TYLENOL) 500 MG tablet Take 1,000 mg by mouth 3 times daily     allopurinol (ZYLOPRIM) 100 MG  "tablet Take 1 tablet (100 mg) by mouth daily     amLODIPine (NORVASC) 10 MG tablet Take 1 tablet by mouth once daily     lisinopril (ZESTRIL) 40 MG tablet Take 1 tablet by mouth once daily     Loratadine (CLARITIN PO) Take 10 mg by mouth daily as needed (allergies)      metoprolol succinate ER (TOPROL-XL) 50 MG 24 hr tablet TAKE 1 & 1/2 (ONE & ONE-HALF) TABLETS BY MOUTH ONCE DAILY     multivitamin w/minerals (MULTI-VITAMIN) tablet Take 1 tablet by mouth daily     oxyCODONE (ROXICODONE) 5 MG tablet Take 1-2 tablets (5-10 mg) by mouth every 4 hours as needed for moderate to severe pain 1 tab po q 4 hrs prn pain scale \"1-5\"  2 tabs po q 4 hrs prn pain scale \"6-10\"     polyethylene glycol (MIRALAX) 17 GM/Dose powder Take 17 g by mouth daily as needed for constipation     povidone-iodine (BETADINE) 10 % topical solution Apply topically 2 times daily to right inner thigh superficial abrasions until healed     senna-docusate (SENOKOT-S/PERICOLACE) 8.6-50 MG tablet Take 1 tablet by mouth 2 times daily     warfarin ANTICOAGULANT (COUMADIN) 5 MG tablet Take one tablet (5 mg) by mouth daily except take one and a half tablets (7.5 mg) Mon, Th or as directed by INR Clinic     Apoaequorin (PREVAGEN PO) Take 1 tablet by mouth daily (Patient not taking: Reported on 3/12/2022)     carbidopa-levodopa (SINEMET)  MG tablet Take 1 tablet by mouth 3 times daily     No current facility-administered medications for this visit.       ROS:  10 point ROS of systems including Constitutional, Eyes, Respiratory, Cardiovascular, Gastroenterology, Genitourinary, Integumentary, Musculoskeletal, Psychiatric were all negative except for pertinent positives noted in my HPI.    Vitals:  /68   Pulse 87   Temp 97.8  F (36.6  C)   Resp 18   Ht 1.854 m (6' 1\")   Wt 99.8 kg (220 lb)   SpO2 97%   BMI 29.03 kg/m    Exam:  Physical Exam  Constitutional:       Appearance: Normal appearance. He is normal weight.   HENT:      Head: " Normocephalic and atraumatic.   Eyes:      General: No scleral icterus.  Cardiovascular:      Rate and Rhythm: Normal rate and regular rhythm.      Heart sounds: No murmur heard.  Pulmonary:      Effort: Pulmonary effort is normal. No respiratory distress.      Breath sounds: Normal breath sounds.   Abdominal:      General: Abdomen is flat. There is no distension.      Palpations: Abdomen is soft.   Musculoskeletal:         General: Normal range of motion.   Skin:     General: Skin is warm and dry.      Findings: No rash.   Neurological:      General: No focal deficit present.      Mental Status: He is alert. Mental status is at baseline.   Psychiatric:         Mood and Affect: Mood normal.           Lab/Diagnostic data:  Recent labs in [facility] reviewed by me today.  and   Most Recent 3 CBC's:  Recent Labs   Lab Test 03/14/22  0657 03/11/22  0642 03/10/22  0722 03/09/22  0725   WBC  --  8.8 9.3 11.4*   HGB 10.0* 10.4* 10.5* 11.1*   MCV  --  86 87 83   PLT  --  175 142* 147*     Most Recent 3 BMP's:  Recent Labs   Lab Test 03/14/22  0657 03/11/22  0642 03/10/22  0722    139 139   POTASSIUM 4.0 4.4 4.4   CHLORIDE 107 110* 110*   CO2 26 27 26   BUN 22 36* 35*   CR 1.12 1.56* 1.49*   ANIONGAP 9 2* 3   BRIANA 8.9 8.8 8.8   GLC 84 98 95  95     Most Recent 3 Hemoglobins:  Recent Labs   Lab Test 03/14/22  0657 03/11/22  0642 03/10/22  0722   HGB 10.0* 10.4* 10.5*     Most Recent TSH and T4:  Recent Labs   Lab Test 01/13/21  1443   TSH 1.51     Most Recent Hemoglobin A1c:No lab results found.    ASSESSMENT/PLAN:    (W19.XXXD) Fall, subsequent encounter  (primary encounter diagnosis)  (S72.001D) Closed fracture of right hip with routine healing, subsequent encounter  (Z96.649) S/P hip hemiarthroplasty 3/8/22: Presented with a fall after shoveling. Underwent hemiarthroplasty on 3/8/22. Post-op course uncomplicated  - Continue pain control with prn tylenol and oxycodone.  Taking minimal oxycodone  - Follow up with onsite  Ortho in 2 weeks  - PT/OT/SW      (D62) ABLA (acute blood loss anemia): Hgb 13.9--10.5.  - Repeat Hgb today 10.0  -Monitor periodically    (K59.00) Constipation: Had issues with constipation in the hospital.  Received MiraLAX prior to discharge to TCU had several bowel movements on Friday but none since.  Denies any abdominal pain  -Continue twice daily scheduled senna  -We will make MiraLAX available as needed daily    (I10) Hypertension goal BP (blood pressure) < 140/90:[Norvasc 10 mg/d, Lisinopril 40 mg/d, Toprol Xl 75 mg/d] blood pressure currently adequately controlled  -Continue regimen without changes      (I48.20) Chronic atrial fibrillation (H)  (Z79.01) Long term current use of anticoagulants with INR goal of 2.0-3.0: PTA Warfarin dosing 7.5 mg Mon and Thurs and 5 mg AOD. Warfarin reversed on admission due to need for operative management. Bridged with lovenox post-op. INR 2.16 on discharge  -Continues on PTA regimen.  Next INR ordered for tomorrow    (G20) Parkinson disease (H):   - Continue PTA Sinamet    (G47.33) SARAH (obstructive sleep apnea)  -Using CPAP from home    (C61) Prostate cancer (H): S/p radical prostatectomy in 2001. Currently on androgen deprivation therapy. Followed by Dr. Guthrie of Urology  - Has follow up appointment 4/11    Orders:  Miralax 17 po daily prn        Electronically signed by:  Malinda Bragg PA-C

## 2022-03-12 NOTE — PROGRESS NOTES
"[place] [facility]  PHYSICIAN NOTE    PRIMARY CARE PROVIDER AND CLINIC:  Malcolm Schafer MD, [address] / [facility] [address]    Chief Complaint   Patient presents with     Hospital F/U     [place] Medical Record Number:  [id number]  Place of Service where encounter took place:  [facility] ([facility]) [[id number]]    Billy Stock is a 76 year old (1945), admitted to the above facility from  [facility]. Hospital stay 3/6/22 through 3/11/22. Admitted to this facility for  rehab, medical management and nursing care.     HPI:    HPI information obtained from: facility chart records, facility staff, patient report and [facility] chart review.     Brief summary of hospital course: Mr. Cervantes \"Marek\" Montrell is a 76 yoM with h/o Parkinson's disease and prostate cancer admitted after falling on the ice while shoveling and sustaining a R hip fracture. Underwent R hip hemiarthroplasty without complications;  ml. He is also chronically anticoagulated for h/o afib and his INR was reversed for the procedure but Coumadin resumed postop. Constipation noted with bowel regimen initiated. Discharged to TCU for rehab/cares.    Updates on status since skilled nursing admission: Billy is seen in his room today and welcomes the visit. Does have some expected pain in R hip region and is due for pain meds as hadn't yet received any since being in hospital earlier this AM and its now midday; Oxycodone is on its way from pharmacy. Says had several packets of Miralax in the hospital last night and bowels started to move in a \"soupy\" consistency. He verifies etiology of fall was a slip on the ice while shoveling. At baseline lives with his ex-wife but she works during the day so is unable to be home to help him at this time recover. He too works part-time at [employer] as a . He would be in favor of on-site ortho following him for initial postop " check. He says he had two small blisters R inner thigh in the hospital d/t moisture of legs together and wonders about wound care for these. Has already brought home CPAP to use here. We discussed timing of his TID Sinemet as well per home routine; says does get mild break-through tremor especially of LUE in between doses.    CODE STATUS/ADVANCE DIRECTIVES DISCUSSION:   FULL CODE - confirmed with Marek today  Patient's living condition: lives with ex-wife who works during the daytime hours    ALLERGIES: Cats, Codeine, Codeine, Maple tree, Morphine, and Watermelon [citrullus vulgaris]    Past Medical History:   Diagnosis Date     Bell's palsy 10/15/2002     CA IN SITU PROSTATE 10/15/2002     Chronic atrial fibrillation (H) 07/01/2010     Chronic renal insufficiency      Glaucoma 04/10/2003     Problem list name updated by automated process. Provider to review     Gout 05/16/2013     Hyperlipidemia LDL goal <100 09/10/2014     Hypertension goal BP (blood pressure) < 140/90 06/28/2006     SARAH (obstructive sleep apnea) 08/28/2013     SARAH (obstructive sleep apnea)      Parkinson disease (H) 2019     Pericarditis 2001     Renal cyst       Past Surgical History:   Procedure Laterality Date     CATARACT IOL, RT/LT  10/15, 11/15     COLONOSCOPY  2009    [facility]     COLONOSCOPY  9/30/2014    Dr. Long [facility]     COLONOSCOPY N/A 9/30/2014    Procedure: COMBINED COLONOSCOPY, SINGLE BIOPSY/POLYPECTOMY BY BIOPSY;  Surgeon: Puneet Long MD;  Location:  GI     EYE SURGERY  2007    glaucoma surgery right eye     HERNIA REPAIR       OPEN REDUCTION INTERNAL FIXATION HIP BIPOLAR Right 3/8/2022    Procedure: Right  hip hemiarthroplasty, anterolateral approach;  Surgeon: Bandar Lundberg MD;  Location: [facility] OR     PROSTATE SURGERY       SUPRAPUBIC PROSTATECTOMY  2002     VASECTOMY       Family History   Problem Relation Age of Onset     Hypertension Maternal Grandfather      Cerebrovascular Disease Maternal Grandfather       Hypertension Maternal Grandmother      Cerebrovascular Disease Maternal Grandmother      Hypertension Mother          age 51, MVA     Hypertension Brother      Heart Failure Brother      Bronchitis Brother      Other - See Comments Brother         multiple myeloma     Heart Surgery Brother         defibrilater     Prostate Problems Brother      Diabetes Brother      Other Cancer Brother         Multiple Myeloma     Breast Cancer Maternal Aunt         hx     Cancer Maternal Aunt         cervical cancer     Cancer - colorectal Maternal Aunt      Unknown/Adopted Father         Don't know father's history     Diabetes Son      Cerebrovascular Disease Son      Colon Cancer Other      No Known Problems Daughter      Social History     Tobacco Use     Smoking status: Never Smoker     Smokeless tobacco: Never Used   Substance Use Topics     Alcohol use: No     Alcohol/week: 0.0 standard drinks     Drug use: No        Post-discharge medication reconciliation status: Reviewed and updated in [facility] according to facility MAR    Current Outpatient Medications   Medication Sig Dispense Refill     acetaminophen (TYLENOL) 325 MG tablet Take 2 tablets (650 mg) by mouth every 4 hours as needed for other (For optimal non-opioid multimodal pain management to improve pain control.) 30 tablet      allopurinol (ZYLOPRIM) 100 MG tablet Take 1 tablet (100 mg) by mouth daily 90 tablet 3     amLODIPine (NORVASC) 10 MG tablet Take 1 tablet by mouth once daily 90 tablet 0     Apoaequorin (PREVAGEN PO) Take 1 tablet by mouth daily       carbidopa-levodopa (SINEMET)  MG tablet Take 1 tablet by mouth 3 times daily       lisinopril (ZESTRIL) 40 MG tablet Take 1 tablet by mouth once daily 90 tablet 0     Loratadine (CLARITIN PO) Take 10 mg by mouth daily as needed        metoprolol succinate ER (TOPROL-XL) 50 MG 24 hr tablet TAKE 1 & 1/2 (ONE & ONE-HALF) TABLETS BY MOUTH ONCE DAILY 135 tablet 1     multivitamin w/minerals (MULTI-VITAMIN)  "tablet Take 1 tablet by mouth daily       oxyCODONE (ROXICODONE) 5 MG tablet Take 1-2 tablets (5-10 mg) by mouth every 4 hours as needed for moderate to severe pain 1 tab po q 4 hrs prn pain scale \"1-5\"  2 tabs po q 4 hrs prn pain scale \"6-10\" 30 tablet 0     senna-docusate (SENOKOT-S/PERICOLACE) 8.6-50 MG tablet Take 1 tablet by mouth 2 times daily as needed for constipation 20 tablet 0     warfarin ANTICOAGULANT (COUMADIN) 5 MG tablet Take one tablet (5 mg) by mouth daily except take one and a half tablets (7.5 mg) Mon, Th or as directed by INR Clinic 100 tablet 0       ROS:  10 point ROS of systems including Constitutional, Eyes, Respiratory, Cardiovascular, Gastroenterology, Genitourinary, Integumentary, Musculoskeletal, Psychiatric were all negative except for pertinent positives noted in my HPI.    Exam:  /74   Pulse 80   Temp 98.3  F (36.8  C)   Resp 16   Ht 1.854 m (6' 1\")   Wt 100 kg (220 lb 8 oz)   SpO2 99%   BMI 29.09 kg/m    Alert, pleasant, NAD, appears stated age  No scleral icterus  Moist oral mucosa  Heart irregularly irregular rate controlled  Lungs clear without wcr  Abdomen overweight, soft, NT, +Hyperactive bowel sounds  Mild LUE rest tremor noted and rigidity with masked facies and slow blink  Tiny superficial skin abrasions x 2 in right inner thigh where blisters have subsequently already popped (about 2 cm x 1 cm)  R lateral hip with intact Aquacel  No calf swelling or edema  Mood euthymic    Lab/Diagnostic data:  Xray pelvis R hip 3/6/22:  IMPRESSION: Fracture through the right femoral neck without evidence for intertrochanteric extension. No dislocation at the hip joint. There is superimposed degenerative change. Left hip negative for fracture. Additional mild degenerative change. Pelvis   negative for fracture.    3/11/22: Normal electrolytes, Cr 1.56, Hgb 10.4, INR 2.16    ASSESSMENT/PLAN:  (W19.XXXD) Fall, subsequent encounter  (primary encounter diagnosis)  (S72.001D) Closed " fracture of right hip with routine healing, subsequent encounter  (M81.0) Osteoporosis, unspecified osteoporosis type, unspecified pathological fracture presence  (K59.03) Drug-induced constipation  Sustained fall while shoveling at home and unfortunately broke R hip s/p right hip hemiarthroplasty anterolateral approach without complications  WBAT  Scheduled Tylenol with PRN Oxycodone available  He reports constipation in the hospital with several doses of Miralax yesterday and bowels now starting to move; is on Senna-S now as well and adjust as needed  Hgb on Monday 3/14/22  Continue physical therapy and occupational therapy  Aquacel in place if able to remain as such until ortho f/u  He does like the idea of Terri Sofia [facility] of [facility] to complete first postop visit [facility]  Is on MVI; Outpatient osteoporosis management with PCP  Coumadin for prior dx afib is current DVT prophy  Goal is to discharge home with his ex-wife where he resides in the community after appropriate rehabilitation (but she works during the day so was unable to be home with him during his immediate postop recovery)    (T14.8XXA) Skin abrasion  2 small superficial abrasions R inner thigh d/t his description of moisture with legs together in the hospital and subsequent blistering which have now popped   No signs of infection  Will paint with Betadine BID until dry/healed    (C61) Prostate cancer (H)  Follows with urologist Dr. Guthrie with last visit Oct 2021 when PSA was down trending to 0.05  S/p RRP 2002 w/ PSM, adjuvant radiation, on intermittent ADT for biochemical recurrence, last 6 month Eligard dose was Oct 2021 and has planned follow up again April 2022  Hospital imaging notes no specific evidence of bony metastasis contributing to fracture nor does op note    (G20) Parkinson disease (H)  Confirmed dosing of Sinemet per his home regimen at 9AM, 2 PM and 7 PM  He does have mild break-through tremor especially of LUE  Continue plans  for physical therapy and occupational therapy in light of this diagnosis which can lead to rigidity and fall risk  Discussed and he is ok with holding over-the-counter Prevagen memory enhancer while at TCU (just started about a week prior to admission - unsure if helpful)    (I48.19) Persistent atrial fibrillation (H)  (Z79.01) Long term current use of anticoagulants with INR goal of 2.0-3.0  INR was reversed in the hospital for surgery then Coumadin resumed  Discharged on home dosing as noted above in current medication list so will receive 5 mg tonight with planned INR tomorrow 3/12/22    (I10) Essential hypertension, benign  (N18.30) Stage 3 chronic kidney disease, unspecified whether stage 3a or 3b CKD (H)  Initial BP satisfactory ; await more readings and labs BMP and Hgb on Monday 3/14/22    (G47.33) SARAH (obstructive sleep apnea)  He has home CPAP on site    (Z71.89) Advanced directives, counseling/discussion  Discussed with Marek and he confirms FULL CODE status      Electronically signed by:  Terri Robert DO

## 2022-03-14 ENCOUNTER — TRANSITIONAL CARE UNIT VISIT (OUTPATIENT)
Dept: GERIATRICS | Facility: CLINIC | Age: 77
End: 2022-03-14
Payer: COMMERCIAL

## 2022-03-14 VITALS
TEMPERATURE: 97.8 F | HEART RATE: 87 BPM | HEIGHT: 73 IN | SYSTOLIC BLOOD PRESSURE: 134 MMHG | OXYGEN SATURATION: 97 % | RESPIRATION RATE: 18 BRPM | DIASTOLIC BLOOD PRESSURE: 68 MMHG | WEIGHT: 220 LBS | BODY MASS INDEX: 29.16 KG/M2

## 2022-03-14 DIAGNOSIS — S72.001D CLOSED FRACTURE OF RIGHT HIP WITH ROUTINE HEALING, SUBSEQUENT ENCOUNTER: ICD-10-CM

## 2022-03-14 DIAGNOSIS — G20.A1 PARKINSON DISEASE (H): ICD-10-CM

## 2022-03-14 DIAGNOSIS — I10 HYPERTENSION GOAL BP (BLOOD PRESSURE) < 140/90: ICD-10-CM

## 2022-03-14 DIAGNOSIS — D62 ABLA (ACUTE BLOOD LOSS ANEMIA): ICD-10-CM

## 2022-03-14 DIAGNOSIS — C61 PROSTATE CANCER (H): ICD-10-CM

## 2022-03-14 DIAGNOSIS — Z79.01 LONG TERM CURRENT USE OF ANTICOAGULANTS WITH INR GOAL OF 2.0-3.0: ICD-10-CM

## 2022-03-14 DIAGNOSIS — Z96.649 S/P HIP HEMIARTHROPLASTY: ICD-10-CM

## 2022-03-14 DIAGNOSIS — I48.20 CHRONIC ATRIAL FIBRILLATION (H): ICD-10-CM
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
## 2022-03-14 DIAGNOSIS — K59.00 CONSTIPATION, UNSPECIFIED CONSTIPATION TYPE: ICD-10-CM

## 2022-03-14 DIAGNOSIS — G47.33 OSA (OBSTRUCTIVE SLEEP APNEA): ICD-10-CM

## 2022-03-14 LAB
ANION GAP SERPL CALCULATED.3IONS-SCNC: 9 MMOL/L (ref 5–18)
BUN SERPL-MCNC: 22 MG/DL (ref 8–28)
CALCIUM SERPL-MCNC: 8.9 MG/DL (ref 8.5–10.5)
CHLORIDE BLD-SCNC: 107 MMOL/L (ref 98–107)
CO2 SERPL-SCNC: 26 MMOL/L (ref 22–31)
CREAT SERPL-MCNC: 1.12 MG/DL (ref 0.7–1.3)
GFR SERPL CREATININE-BSD FRML MDRD: 69 ML/MIN/1.73M2
GLUCOSE BLD-MCNC: 84 MG/DL (ref 70–125)
HGB BLD-MCNC: 10 G/DL (ref 13.3–17.7)
POTASSIUM BLD-SCNC: 4 MMOL/L (ref 3.5–5)
SODIUM SERPL-SCNC: 142 MMOL/L (ref 136–145)

## 2022-03-14 PROCEDURE — 86481 TB AG RESPONSE T-CELL SUSP: CPT | Performed by: INTERNAL MEDICINE

## 2022-03-14 PROCEDURE — 85018 HEMOGLOBIN: CPT | Performed by: INTERNAL MEDICINE

## 2022-03-14 PROCEDURE — P9603 ONE-WAY ALLOW PRORATED MILES: HCPCS | Performed by: INTERNAL MEDICINE

## 2022-03-14 PROCEDURE — 36415 COLL VENOUS BLD VENIPUNCTURE: CPT | Performed by: INTERNAL MEDICINE

## 2022-03-14 PROCEDURE — 80048 BASIC METABOLIC PNL TOTAL CA: CPT | Performed by: INTERNAL MEDICINE

## 2022-03-14 PROCEDURE — 99309 SBSQ NF CARE MODERATE MDM 30: CPT | Performed by: PHYSICIAN ASSISTANT

## 2022-03-14 RX ORDER — POLYETHYLENE GLYCOL 3350 17 G/17G
17 POWDER, FOR SOLUTION ORAL DAILY PRN
Qty: 510 G
Start: 2022-03-14 | End: 2022-03-23

## 2022-03-14 RX ORDER — ACETAMINOPHEN 500 MG
1000 TABLET ORAL 3 TIMES DAILY
Start: 2022-03-14 | End: 2022-03-17

## 2022-03-14 NOTE — LETTER
3/14/2022        RE: Billy Stock  [address]  [place] [address]        [facility] GERIATRICS    PRIMARY CARE PROVIDER AND CLINIC:  Malcolm Schafer MD, [address] / [facility] [address]  Chief Complaint   Patient presents with     Hospital F/U      [facility] Record Number:  [id number]  Place of Service where encounter took place:  [facility] ([facility]) [[id number]]    Billy Stock  is a 76 year old  (1945), admitted to the above facility from  [facility]. Hospital stay 3/6/22 through 3/11/22..   HPI:    Billy Stock is a 75 yo male with a PMH Atrial Fibrillation on chronic anticoagulation with warfarin, CKD, Parkinson's disease, HTN and SARAH who was recently admitted to [facility] for evaluation after sustaining a right hip fracture. He was shoveling snow when he fell. Xrays in the ER with right femoral neck fracture. Orthopedics was consulted and he underwent anterior lateral hip hemiarthroplasty on 3/8/22. Post operative course was uncomplicated and [facility]    Today Marek is seen in his room. Just finished working with therapies. Had an issue with MIKA at the facility over the weekend but that is being addressed with the facility. Denies issues with poorly controlled pain. Taking on average 1-2 oxycodone per day.  Denies chest pain or shortness of breath.  Using his CPAP. Had issues with constipation in the hospital. Was given Mirlax with several loose stools but nothing since Friday. Denies abdominal pain or or nausea.     BMP and labs reviewed today    CODE STATUS/ADVANCE DIRECTIVES DISCUSSION:  Prior  CPR/Full code   ALLERGIES:   Allergies   Allergen Reactions     Cats      Codeine      Hallucinated when taking codeine with another medication     Codeine Unknown     Maple Tree      Morphine Visual Disturbance and Other (See Comments)     Watermelon [Citrullus Vulgaris]      Itching throat, hives      PAST MEDICAL  HISTORY:   Past Medical History:   Diagnosis Date     Bell's palsy 10/15/2002     CA IN SITU PROSTATE 10/15/2002     Chronic atrial fibrillation (H) 07/01/2010     Chronic renal insufficiency      Glaucoma 04/10/2003     Problem list name updated by automated process. Provider to review     Gout 05/16/2013     Hyperlipidemia LDL goal <100 09/10/2014     Hypertension goal BP (blood pressure) < 140/90 06/28/2006     SARAH (obstructive sleep apnea) 08/28/2013     SARAH (obstructive sleep apnea)      Parkinson disease (H) 2019     Pericarditis 2001     Renal cyst       PAST SURGICAL HISTORY:   has a past surgical history that includes suprapubic prostatectomy (2002); colonoscopy (2009); colonoscopy (9/30/2014); Eye surgery (2007); Colonoscopy (N/A, 9/30/2014); cataract iol, rt/lt (10/15, 11/15); Vasectomy; Prostate surgery; hernia repair; and Open reduction internal fixation hip bipolar (Right, 3/8/2022).  FAMILY HISTORY: family history includes Breast Cancer in his maternal aunt; Bronchitis in his brother; Cancer in his maternal aunt; Cancer - colorectal in his maternal aunt; Cerebrovascular Disease in his maternal grandfather, maternal grandmother, and son; Colon Cancer in an other family member; Diabetes in his brother and son; Heart Failure in his brother; Heart Surgery in his brother; Hypertension in his brother, maternal grandfather, maternal grandmother, and mother; No Known Problems in his daughter; Other - See Comments in his brother; Other Cancer in his brother; Prostate Problems in his brother; Unknown/Adopted in his father.  SOCIAL HISTORY:   reports that he has never smoked. He has never used smokeless tobacco. He reports that he does not drink alcohol and does not use drugs.  Patient's living condition: Lives with ex wife    Post Discharge Medication Reconciliation Status: discharge medications reconciled and changed, per note/orders  Current Outpatient Medications   Medication Sig     acetaminophen (TYLENOL)  "500 MG tablet Take 1,000 mg by mouth 3 times daily     allopurinol (ZYLOPRIM) 100 MG tablet Take 1 tablet (100 mg) by mouth daily     amLODIPine (NORVASC) 10 MG tablet Take 1 tablet by mouth once daily     lisinopril (ZESTRIL) 40 MG tablet Take 1 tablet by mouth once daily     Loratadine (CLARITIN PO) Take 10 mg by mouth daily as needed (allergies)      metoprolol succinate ER (TOPROL-XL) 50 MG 24 hr tablet TAKE 1 & 1/2 (ONE & ONE-HALF) TABLETS BY MOUTH ONCE DAILY     multivitamin w/minerals (MULTI-VITAMIN) tablet Take 1 tablet by mouth daily     oxyCODONE (ROXICODONE) 5 MG tablet Take 1-2 tablets (5-10 mg) by mouth every 4 hours as needed for moderate to severe pain 1 tab po q 4 hrs prn pain scale \"1-5\"  2 tabs po q 4 hrs prn pain scale \"6-10\"     polyethylene glycol (MIRALAX) 17 GM/Dose powder Take 17 g by mouth daily as needed for constipation     povidone-iodine (BETADINE) 10 % topical solution Apply topically 2 times daily to right inner thigh superficial abrasions until healed     senna-docusate (SENOKOT-S/PERICOLACE) 8.6-50 MG tablet Take 1 tablet by mouth 2 times daily     warfarin ANTICOAGULANT (COUMADIN) 5 MG tablet Take one tablet (5 mg) by mouth daily except take one and a half tablets (7.5 mg) Mon, Th or as directed by INR Clinic     Apoaequorin (PREVAGEN PO) Take 1 tablet by mouth daily (Patient not taking: Reported on 3/12/2022)     carbidopa-levodopa (SINEMET)  MG tablet Take 1 tablet by mouth 3 times daily     No current facility-administered medications for this visit.       ROS:  10 point ROS of systems including Constitutional, Eyes, Respiratory, Cardiovascular, Gastroenterology, Genitourinary, Integumentary, Musculoskeletal, Psychiatric were all negative except for pertinent positives noted in my HPI.    Vitals:  /68   Pulse 87   Temp 97.8  F (36.6  C)   Resp 18   Ht 1.854 m (6' 1\")   Wt 99.8 kg (220 lb)   SpO2 97%   BMI 29.03 kg/m    Exam:  Physical Exam  Constitutional:      "  Appearance: Normal appearance. He is normal weight.   HENT:      Head: Normocephalic and atraumatic.   Eyes:      General: No scleral icterus.  Cardiovascular:      Rate and Rhythm: Normal rate and regular rhythm.      Heart sounds: No murmur heard.  Pulmonary:      Effort: Pulmonary effort is normal. No respiratory distress.      Breath sounds: Normal breath sounds.   Abdominal:      General: Abdomen is flat. There is no distension.      Palpations: Abdomen is soft.   Musculoskeletal:         General: Normal range of motion.   Skin:     General: Skin is warm and dry.      Findings: No rash.   Neurological:      General: No focal deficit present.      Mental Status: He is alert. Mental status is at baseline.   Psychiatric:         Mood and Affect: Mood normal.           Lab/Diagnostic data:  Recent labs in [facility] reviewed by me today.  and   Most Recent 3 CBC's:  Recent Labs   Lab Test 03/14/22  0657 03/11/22  0642 03/10/22  0722 03/09/22  0725   WBC  --  8.8 9.3 11.4*   HGB 10.0* 10.4* 10.5* 11.1*   MCV  --  86 87 83   PLT  --  175 142* 147*     Most Recent 3 BMP's:  Recent Labs   Lab Test 03/14/22  0657 03/11/22  0642 03/10/22  0722    139 139   POTASSIUM 4.0 4.4 4.4   CHLORIDE 107 110* 110*   CO2 26 27 26   BUN 22 36* 35*   CR 1.12 1.56* 1.49*   ANIONGAP 9 2* 3   BRIANA 8.9 8.8 8.8   GLC 84 98 95  95     Most Recent 3 Hemoglobins:  Recent Labs   Lab Test 03/14/22  0657 03/11/22  0642 03/10/22  0722   HGB 10.0* 10.4* 10.5*     Most Recent TSH and T4:  Recent Labs   Lab Test 01/13/21  1443   TSH 1.51     Most Recent Hemoglobin A1c:No lab results found.    ASSESSMENT/PLAN:    (W19.XXXD) Fall, subsequent encounter  (primary encounter diagnosis)  (S72.001D) Closed fracture of right hip with routine healing, subsequent encounter  (Z96.649) S/P hip hemiarthroplasty 3/8/22: Presented with a fall after shoveling. Underwent hemiarthroplasty on 3/8/22. Post-op course uncomplicated  - Continue pain control with prn  tylenol and oxycodone.  Taking minimal oxycodone  - Follow up with onsite Ortho in 2 weeks  - PT/OT/SW      (D62) ABLA (acute blood loss anemia): Hgb 13.9--10.5.  - Repeat Hgb today 10.0  -Monitor periodically    (K59.00) Constipation: Had issues with constipation in the hospital.  Received MiraLAX prior to discharge to TCU had several bowel movements on Friday but none since.  Denies any abdominal pain  -Continue twice daily scheduled senna  -We will make MiraLAX available as needed daily    (I10) Hypertension goal BP (blood pressure) < 140/90:[Norvasc 10 mg/d, Lisinopril 40 mg/d, Toprol Xl 75 mg/d] blood pressure currently adequately controlled  -Continue regimen without changes      (I48.20) Chronic atrial fibrillation (H)  (Z79.01) Long term current use of anticoagulants with INR goal of 2.0-3.0: PTA Warfarin dosing 7.5 mg Mon and Thurs and 5 mg AOD. Warfarin reversed on admission due to need for operative management. Bridged with lovenox post-op. INR 2.16 on discharge  -Continues on PTA regimen.  Next INR ordered for tomorrow    (G20) Parkinson disease (H):   - Continue PTA Sinamet    (G47.33) SARAH (obstructive sleep apnea)  -Using CPAP from home    (C61) Prostate cancer (H): S/p radical prostatectomy in 2001. Currently on androgen deprivation therapy. Followed by Dr. Guthrie of Urology  - Has follow up appointment 4/11    Orders:  Miralax 17 po daily prn        Electronically signed by:  Malinda Bragg PA-C                     Sincerely,        Malinda Bragg PA-C

## 2022-03-15 ENCOUNTER — TRANSITIONAL CARE UNIT VISIT (OUTPATIENT)
Dept: GERIATRICS | Facility: CLINIC | Age: 77
End: 2022-03-15
Payer: COMMERCIAL

## 2022-03-15 ENCOUNTER — PATIENT OUTREACH (OUTPATIENT)
Dept: NURSING | Facility: CLINIC | Age: 77
End: 2022-03-15
Attending: INTERNAL MEDICINE
Payer: COMMERCIAL

## 2022-03-15 VITALS
TEMPERATURE: 98.9 F | BODY MASS INDEX: 29.16 KG/M2 | HEART RATE: 80 BPM | OXYGEN SATURATION: 95 % | RESPIRATION RATE: 16 BRPM | SYSTOLIC BLOOD PRESSURE: 164 MMHG | HEIGHT: 73 IN | WEIGHT: 220 LBS | DIASTOLIC BLOOD PRESSURE: 93 MMHG

## 2022-03-15 DIAGNOSIS — S72.009A HIP FRACTURE (H): ICD-10-CM

## 2022-03-15 DIAGNOSIS — Z51.81 ENCOUNTER FOR THERAPEUTIC DRUG MONITORING: Primary | ICD-10-CM

## 2022-03-15 DIAGNOSIS — Z79.01 LONG TERM (CURRENT) USE OF ANTICOAGULANTS: ICD-10-CM

## 2022-03-15 LAB
GAMMA INTERFERON BACKGROUND BLD IA-ACNC: 0.05 IU/ML
M TB IFN-G BLD-IMP: NEGATIVE
M TB IFN-G CD4+ BCKGRND COR BLD-ACNC: 2.54 IU/ML
MITOGEN IGNF BCKGRD COR BLD-ACNC: 0 IU/ML
MITOGEN IGNF BCKGRD COR BLD-ACNC: 0 IU/ML
QUANTIFERON MITOGEN: 2.59 IU/ML
QUANTIFERON NIL TUBE: 0.05 IU/ML
QUANTIFERON TB1 TUBE: 0.05 IU/ML
QUANTIFERON TB2 TUBE: 0.05

## 2022-03-15 PROCEDURE — 99309 SBSQ NF CARE MODERATE MDM 30: CPT | Performed by: PHYSICIAN ASSISTANT

## 2022-03-15 NOTE — PROGRESS NOTES
"[facility] GERIATRICS  [place] Medical Record Number:  [id number]  Place of Service where encounter took place: [facility]) [[id number]]    HPI:    Billy Stock is a 76 year old  (1945), who is being seen today for an episodic care visit at the above location. Today's concern is INR/Coumadin management for A. Fib    ROS/Subjective:  Bleeding Signs/Symptoms:  None  Thromboembolic Signs/Symptoms:  None  Medication Changes:  No  Dietary Changes:  Yes: Patient notes eatting less salads at [facility]  Activity Changes: No  Bacterial/Viral Infection:  No  Missed Coumadin Doses:  None  On ASA: No  Other Concerns:  No    OBJECTIVE:  BP (!) 164/93   Pulse 80   Temp 98.9  F (37.2  C)   Resp 16   Ht 1.854 m (6' 0.99\")   Wt 99.8 kg (220 lb)   SpO2 95%   BMI 29.03 kg/m       Physical Exam  Constitutional:       Appearance: Normal appearance. He is obese.   HENT:      Head: Normocephalic and atraumatic.   Pulmonary:      Effort: Pulmonary effort is normal.   Musculoskeletal:         General: Normal range of motion.   Neurological:      General: No focal deficit present.      Mental Status: He is alert.         Last INR: 2.5 on 3/11  INR Today:  3.0  Current Dose:  7.5 mg Monday and Thurs, 5 mg AOD (PTA dosage)      ASSESSMENT:  1. Encounter for therapeutic drug monitoring    2. Long term (current) use of anticoagulants      Therapeutic INR for goal of 2-3    PLAN/ORDERS:   New Dose: Warfarin 5 mg po daily.  Next INR: 3/18      Electronically signed by:  Malinda Bragg PA-C   "

## 2022-03-15 NOTE — PROGRESS NOTES
Clinic Care Coordination Contact  Care Coordination Transition Communication    Referral Source: IP Handoff    Clinical Data: Patient was hospitalized at [facility] from 3/6/2022 to 3/11/2022 with diagnoses of:    Mechanical fall leading to right hip fracture status post right hip hemiarthroplasty  Physical deconditioning       Transition to Facility:              Facility Name: [facility]              Contact name and phone number/fax:    Ph. [phone], Fax. [phone].    Plan: RN/SW Care Coordinator will await notification from facility staff informing RN/SW Care Coordinator of patient's discharge plans/needs. RN/SW Care Coordinator will review chart and outreach to facility staff every 4 weeks and as needed.     LEXIE Kowalski  Clinic Care Coordinator  Ph. [phone]  [email or web address]

## 2022-03-15 NOTE — LETTER
[facility]   To:   Gayla [facility]          Please give to facility    From:   Miguel OWEN  Care Coordinator   [facility]     Patient Name:  Billy Stock YOB: 1945   Admit date: 3/11/2022      *Information Needed:  Please contact me when the patient will discharge (or if they will move to long term care)- include the discharge date, disposition, and main diagnosis   - If the patient is discharged with home care services, please provide the name of the agency    Also- Please inform me if a care conference is being held.   Phone, Fax or Email with information       LEXIE Kowalski  Clinic Care Coordinator  Ph. [phone]  [email or web address]

## 2022-03-17 ENCOUNTER — TRANSITIONAL CARE UNIT VISIT (OUTPATIENT)
Dept: GERIATRICS | Facility: CLINIC | Age: 77
End: 2022-03-17
Payer: COMMERCIAL

## 2022-03-17 VITALS
DIASTOLIC BLOOD PRESSURE: 86 MMHG | HEIGHT: 73 IN | TEMPERATURE: 98.5 F | BODY MASS INDEX: 29.16 KG/M2 | WEIGHT: 220 LBS | RESPIRATION RATE: 18 BRPM | OXYGEN SATURATION: 97 % | SYSTOLIC BLOOD PRESSURE: 156 MMHG | HEART RATE: 68 BPM
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
## 2022-03-17 DIAGNOSIS — S72.001A HIP FRACTURE, RIGHT, CLOSED, INITIAL ENCOUNTER (H): ICD-10-CM

## 2022-03-17 DIAGNOSIS — Z96.649 S/P HIP HEMIARTHROPLASTY: ICD-10-CM

## 2022-03-17 DIAGNOSIS — N18.30 STAGE 3 CHRONIC KIDNEY DISEASE, UNSPECIFIED WHETHER STAGE 3A OR 3B CKD (H): ICD-10-CM

## 2022-03-17 DIAGNOSIS — C61 PROSTATE CANCER (H): ICD-10-CM

## 2022-03-17 DIAGNOSIS — G47.33 OSA (OBSTRUCTIVE SLEEP APNEA): ICD-10-CM

## 2022-03-17 DIAGNOSIS — K59.00 CONSTIPATION, UNSPECIFIED CONSTIPATION TYPE: ICD-10-CM

## 2022-03-17 DIAGNOSIS — D62 ABLA (ACUTE BLOOD LOSS ANEMIA): ICD-10-CM

## 2022-03-17 DIAGNOSIS — I10 HYPERTENSION GOAL BP (BLOOD PRESSURE) < 140/90: Primary | ICD-10-CM

## 2022-03-17 DIAGNOSIS — I48.20 CHRONIC ATRIAL FIBRILLATION (H): ICD-10-CM

## 2022-03-17 DIAGNOSIS — G20.A1 PARKINSON DISEASE (H): ICD-10-CM

## 2022-03-17 PROCEDURE — 99309 SBSQ NF CARE MODERATE MDM 30: CPT | Performed by: PHYSICIAN ASSISTANT

## 2022-03-17 NOTE — LETTER
"    3/17/2022        RE: Billy Stock  [address]  [place] [address]        [facility] GERIATRIC SERVICES    Chief Complaint   Patient presents with     RECHECK       HPI:  Billy Stock is a 76 year old  (1945), who is being seen today for an episodic care visit at: [facility] ([facility]) [[id number]].     Brief summary: Billy Stock is a 77 yo male with a PMH Atrial Fibrillation on chronic anticoagulation with warfarin, CKD, Parkinson's disease, HTN and SARAH who was recently admitted to [facility] for evaluation after sustaining a right hip fracture. He was shoveling snow when he fell. Xrays in the ER with right femoral neck fracture. Orthopedics was consulted and he underwent anterior lateral hip hemiarthroplasty on 3/8/22. Post operative course was uncomplicated and he was discharged to [facility]    Today's concern is: Marek is seen today for a follow up visit. Notes things are going well. Discussed need to monitor INR more frequently and he is in agreement. No abdominal pain. No issues with constipation. Notes pain is well controlled. Denies SOB or chest pain    Allergies, and PMH/PSH reviewed in [facility] today.    REVIEW OF SYSTEMS:  10 point ROS of systems including Constitutional, Eyes, Respiratory, Cardiovascular, Gastroenterology, Genitourinary, Integumentary, Musculoskeletal, Psychiatric were all negative except for pertinent positives noted in my HPI.    Objective:   BP (!) 156/86   Pulse 68   Temp 98.5  F (36.9  C)   Resp 18   Ht 1.854 m (6' 1\")   Wt 99.8 kg (220 lb)   SpO2 97%   BMI 29.03 kg/m      Physical Exam  Constitutional:       Appearance: Normal appearance. He is obese.   HENT:      Head: Normocephalic and atraumatic.   Eyes:      General: No scleral icterus.  Cardiovascular:      Rate and Rhythm: Normal rate and regular rhythm.      Heart sounds: No murmur heard.  Pulmonary:      Effort: Pulmonary effort is normal. No respiratory distress.      Breath sounds: Normal " breath sounds.   Abdominal:      General: Abdomen is flat. There is no distension.      Palpations: Abdomen is soft.   Musculoskeletal:         General: Normal range of motion.   Skin:     General: Skin is warm and dry.      Findings: No rash.   Neurological:      General: No focal deficit present.      Mental Status: He is alert. Mental status is at baseline.   Psychiatric:         Mood and Affect: Mood normal.          Recent labs in [facility] reviewed by me today.  and   Most Recent 3 CBC's:Recent Labs   Lab Test 03/14/22  0657 03/11/22  0642 03/10/22  0722 03/09/22  0725   WBC  --  8.8 9.3 11.4*   HGB 10.0* 10.4* 10.5* 11.1*   MCV  --  86 87 83   PLT  --  175 142* 147*     Most Recent 3 BMP's:Recent Labs   Lab Test 03/14/22 0657 03/11/22 0642 03/10/22  0722    139 139   POTASSIUM 4.0 4.4 4.4   CHLORIDE 107 110* 110*   CO2 26 27 26   BUN 22 36* 35*   CR 1.12 1.56* 1.49*   ANIONGAP 9 2* 3   BRIANA 8.9 8.8 8.8   GLC 84 98 95  95       Assessment/Plan:  (W19.XXXD) Fall, subsequent encounter  (primary encounter diagnosis)  (S72.001D) Closed fracture of right hip with routine healing, subsequent encounter  (Z96.649) S/P hip hemiarthroplasty 3/8/22: Presented with a fall after shoveling. Underwent hemiarthroplasty on 3/8/22. Post-op course uncomplicated  - Continue pain control with prn tylenol and oxycodone.  Taking minimal oxycodone, typically 1-2x day  - Follow up with onsite Ortho in 2 weeks. Being seen by onsite ortho 3/22  - PT/OT/SW      (D62) ABLA (acute blood loss anemia): Hgb 13.9--10.5.  - Repeat Hgb 3/14 10.0  -Monitor periodically    (K59.00) Constipation: Had issues with constipation in the hospital. Now improving  -Continue twice daily scheduled senna and prn Miralax    (I10) Hypertension goal BP (blood pressure) < 140/90:[Norvasc 10 mg/d, Lisinopril 40 mg/d, Toprol Xl 75 mg/d] Some blood pressures slightly above goal but overall well controlled  -Continue regimen without changes      (I48.20) Chronic  atrial fibrillation (H)  (Z79.01) Long term current use of anticoagulants with INR goal of 2.0-3.0: PTA Warfarin dosing 7.5 mg Mon and Thurs and 5 mg AOD. Warfarin reversed on admission due to need for operative management. Bridged with lovenox post-op. INR 2.16 on discharge  -INR earlier this week 3.0. Patient notes eating less leafy greens  - INR tomorrow, discussed will likely need more frequent monitoring in TCU    (G20) Parkinson disease (H):   - Continue PTA Sinamet    (G47.33) SARAH (obstructive sleep apnea)  -Using CPAP from home    (C61) Prostate cancer (H): S/p radical prostatectomy in 2001. Currently on androgen deprivation therapy. Followed by Dr. Guthrie of Urology  - Has follow up appointment 4/11      Orders:  none    Electronically signed by: Malinda Bragg PA-C           Sincerely,        Malinda Bragg PA-C

## 2022-03-17 NOTE — PROGRESS NOTES
"[facility] GERIATRIC SERVICES    Chief Complaint   Patient presents with     RECHECK       HPI:  Billy Stock is a 76 year old  (1945), who is being seen today for an episodic care visit at: [facility]) [[id number]].     Brief summary: Billy Stock is a 77 yo male with a PMH Atrial Fibrillation on chronic anticoagulation with warfarin, CKD, Parkinson's disease, HTN and SARAH who was recently admitted to [facility] for evaluation after sustaining a right hip fracture. He was shoveling snow when he fell. Xrays in the ER with right femoral neck fracture. Orthopedics was consulted and he underwent anterior lateral hip hemiarthroplasty on 3/8/22. Post operative course was uncomplicated and he was discharged to [facility]    Today's concern is: Marek is seen today for a follow up visit. Notes things are going well. Discussed need to monitor INR more frequently and he is in agreement. No abdominal pain. No issues with constipation. Notes pain is well controlled. Denies SOB or chest pain    Allergies, and PMH/PSH reviewed in [facility] today.    REVIEW OF SYSTEMS:  10 point ROS of systems including Constitutional, Eyes, Respiratory, Cardiovascular, Gastroenterology, Genitourinary, Integumentary, Musculoskeletal, Psychiatric were all negative except for pertinent positives noted in my HPI.    Objective:   BP (!) 156/86   Pulse 68   Temp 98.5  F (36.9  C)   Resp 18   Ht 1.854 m (6' 1\")   Wt 99.8 kg (220 lb)   SpO2 97%   BMI 29.03 kg/m      Physical Exam  Constitutional:       Appearance: Normal appearance. He is obese.   HENT:      Head: Normocephalic and atraumatic.   Eyes:      General: No scleral icterus.  Cardiovascular:      Rate and Rhythm: Normal rate and regular rhythm.      Heart sounds: No murmur heard.  Pulmonary:      Effort: Pulmonary effort is normal. No respiratory distress.      Breath sounds: Normal breath sounds.   Abdominal:      General: Abdomen is flat. There is no distension.      " Palpations: Abdomen is soft.   Musculoskeletal:         General: Normal range of motion.   Skin:     General: Skin is warm and dry.      Findings: No rash.   Neurological:      General: No focal deficit present.      Mental Status: He is alert. Mental status is at baseline.   Psychiatric:         Mood and Affect: Mood normal.          Recent labs in [facility] reviewed by me today.  and   Most Recent 3 CBC's:Recent Labs   Lab Test 03/14/22  0657 03/11/22  0642 03/10/22  0722 03/09/22  0725   WBC  --  8.8 9.3 11.4*   HGB 10.0* 10.4* 10.5* 11.1*   MCV  --  86 87 83   PLT  --  175 142* 147*     Most Recent 3 BMP's:Recent Labs   Lab Test 03/14/22 0657 03/11/22  0642 03/10/22  0722    139 139   POTASSIUM 4.0 4.4 4.4   CHLORIDE 107 110* 110*   CO2 26 27 26   BUN 22 36* 35*   CR 1.12 1.56* 1.49*   ANIONGAP 9 2* 3   BRIANA 8.9 8.8 8.8   GLC 84 98 95  95       Assessment/Plan:  (W19.XXXD) Fall, subsequent encounter  (primary encounter diagnosis)  (S72.001D) Closed fracture of right hip with routine healing, subsequent encounter  (Z96.649) S/P hip hemiarthroplasty 3/8/22: Presented with a fall after shoveling. Underwent hemiarthroplasty on 3/8/22. Post-op course uncomplicated  - Continue pain control with prn tylenol and oxycodone.  Taking minimal oxycodone, typically 1-2x day  - Follow up with onsite Ortho in 2 weeks. Being seen by onsite ortho 3/22  - PT/OT/SW      (D62) ABLA (acute blood loss anemia): Hgb 13.9--10.5.  - Repeat Hgb 3/14 10.0  -Monitor periodically    (K59.00) Constipation: Had issues with constipation in the hospital. Now improving  -Continue twice daily scheduled senna and prn Miralax    (I10) Hypertension goal BP (blood pressure) < 140/90:[Norvasc 10 mg/d, Lisinopril 40 mg/d, Toprol Xl 75 mg/d] Some blood pressures slightly above goal but overall well controlled  -Continue regimen without changes      (I48.20) Chronic atrial fibrillation (H)  (Z79.01) Long term current use of anticoagulants with INR goal  of 2.0-3.0: PTA Warfarin dosing 7.5 mg Mon and Thurs and 5 mg AOD. Warfarin reversed on admission due to need for operative management. Bridged with lovenox post-op. INR 2.16 on discharge  -INR earlier this week 3.0. Patient notes eating less leafy greens  - INR tomorrow, discussed will likely need more frequent monitoring in TCU    (G20) Parkinson disease (H):   - Continue PTA Sinamet    (G47.33) SARAH (obstructive sleep apnea)  -Using CPAP from home    (C61) Prostate cancer (H): S/p radical prostatectomy in 2001. Currently on androgen deprivation therapy. Followed by Dr. Guthrie of Urology  - Has follow up appointment 4/11      Orders:  none    Electronically signed by: Malinda Bragg PA-C

## 2022-03-21 ENCOUNTER — TRANSITIONAL CARE UNIT VISIT (OUTPATIENT)
Dept: GERIATRICS | Facility: CLINIC | Age: 77
End: 2022-03-21
Payer: COMMERCIAL

## 2022-03-21 VITALS
WEIGHT: 220 LBS | SYSTOLIC BLOOD PRESSURE: 151 MMHG | HEIGHT: 73 IN | DIASTOLIC BLOOD PRESSURE: 70 MMHG | TEMPERATURE: 98.5 F | BODY MASS INDEX: 29.16 KG/M2 | OXYGEN SATURATION: 99 % | HEART RATE: 55 BPM

## 2022-03-21 DIAGNOSIS — Z79.01 LONG TERM (CURRENT) USE OF ANTICOAGULANTS: ICD-10-CM

## 2022-03-21 DIAGNOSIS — I48.20 CHRONIC ATRIAL FIBRILLATION (H): Primary | ICD-10-CM

## 2022-03-21 PROCEDURE — 99309 SBSQ NF CARE MODERATE MDM 30: CPT | Performed by: PHYSICIAN ASSISTANT

## 2022-03-21 NOTE — PROGRESS NOTES
"[facility] GERIATRICS  [place] Medical Record Number:  [id number]  Place of Service where encounter took place: [facility] ([facility]) [[id number]]    HPI:    Billy Stock is a 76 year old  (1945), who is being seen today for an episodic care visit at the above location. Today's concern is INR/Coumadin management for A. Fib    ROS/Subjective:  Bleeding Signs/Symptoms:  None  Thromboembolic Signs/Symptoms:  None  Medication Changes:  No  Dietary Changes:  Yes: Eating less leafy greens per patient  Activity Changes: No  Bacterial/Viral Infection:  No  Missed Coumadin Doses:  None  On ASA: No  Other Concerns:  No    OBJECTIVE:  BP (!) 151/70   Pulse 55   Temp 98.5  F (36.9  C)   Ht 1.854 m (6' 1\")   Wt 99.8 kg (220 lb)   SpO2 99%   BMI 29.03 kg/m       Physical Exam  Constitutional:       Appearance: Normal appearance. He is obese.   HENT:      Head: Normocephalic and atraumatic.   Pulmonary:      Effort: Pulmonary effort is normal.   Musculoskeletal:         General: Normal range of motion.   Skin:     General: Skin is warm and dry.      Findings: No rash.   Neurological:      General: No focal deficit present.      Mental Status: He is alert. Mental status is at baseline.         Last INR: Historically very stable on warfarin 7.5 mg Monday/Thursday and 5 mg all other days.  Had continued PTA regimen on admission and INR 3/eighteen 3.7.  Warfarin held.  INR 3/19  2.4.  Warfarin held INR 3/22.4 and started on warfarin 2 mg daily  INR Today: 2.0  Current Dose:  2 mg      ASSESSMENT:  (I48.20) Chronic atrial fibrillation (H)  (primary encounter diagnosis)  (Z79.01) Long term (current) use of anticoagulants    Therapeutic INR for goal of 2-3    PLAN/ORDERS:     New Dose: Warfarin 3 mg daily  Next INR: INR 3/24/2022      Electronically signed by:  Malinda Bragg PA-C   "

## 2022-03-22 DIAGNOSIS — S72.001A HIP FRACTURE, RIGHT, CLOSED, INITIAL ENCOUNTER (H): ICD-10-CM

## 2022-03-22 DIAGNOSIS — S72.001D CLOSED FRACTURE OF RIGHT HIP WITH ROUTINE HEALING, SUBSEQUENT ENCOUNTER: Primary | ICD-10-CM

## 2022-03-23 ENCOUNTER — TRANSITIONAL CARE UNIT VISIT (OUTPATIENT)
Dept: GERIATRICS | Facility: CLINIC | Age: 77
End: 2022-03-23
Payer: COMMERCIAL

## 2022-03-23 VITALS
TEMPERATURE: 98.3 F | WEIGHT: 212 LBS | HEIGHT: 73 IN | DIASTOLIC BLOOD PRESSURE: 92 MMHG | OXYGEN SATURATION: 95 % | HEART RATE: 62 BPM | BODY MASS INDEX: 28.1 KG/M2 | RESPIRATION RATE: 16 BRPM | SYSTOLIC BLOOD PRESSURE: 174 MMHG

## 2022-03-23 DIAGNOSIS — S72.001A HIP FRACTURE, RIGHT, CLOSED, INITIAL ENCOUNTER (H): Primary | ICD-10-CM

## 2022-03-23 DIAGNOSIS — K59.00 CONSTIPATION, UNSPECIFIED CONSTIPATION TYPE: ICD-10-CM

## 2022-03-23 DIAGNOSIS — M81.0 OSTEOPOROSIS, UNSPECIFIED OSTEOPOROSIS TYPE, UNSPECIFIED PATHOLOGICAL FRACTURE PRESENCE: ICD-10-CM

## 2022-03-23 DIAGNOSIS — Z79.01 LONG TERM (CURRENT) USE OF ANTICOAGULANTS: ICD-10-CM

## 2022-03-23 DIAGNOSIS — I48.19 PERSISTENT ATRIAL FIBRILLATION (H): ICD-10-CM

## 2022-03-23 DIAGNOSIS — D62 ABLA (ACUTE BLOOD LOSS ANEMIA): ICD-10-CM

## 2022-03-23 DIAGNOSIS — I10 HYPERTENSION GOAL BP (BLOOD PRESSURE) < 140/90: ICD-10-CM

## 2022-03-23 DIAGNOSIS — N18.30 STAGE 3 CHRONIC KIDNEY DISEASE, UNSPECIFIED WHETHER STAGE 3A OR 3B CKD (H): ICD-10-CM

## 2022-03-23 DIAGNOSIS — G47.33 OSA (OBSTRUCTIVE SLEEP APNEA): ICD-10-CM

## 2022-03-23 DIAGNOSIS — S72.001D CLOSED FRACTURE OF RIGHT HIP WITH ROUTINE HEALING, SUBSEQUENT ENCOUNTER: ICD-10-CM

## 2022-03-23 PROCEDURE — 99309 SBSQ NF CARE MODERATE MDM 30: CPT | Performed by: PHYSICIAN ASSISTANT

## 2022-03-23 RX ORDER — POLYETHYLENE GLYCOL 3350 17 G/17G
17 POWDER, FOR SOLUTION ORAL DAILY
Qty: 510 G
Start: 2022-03-23

## 2022-03-23 RX ORDER — OXYCODONE HYDROCHLORIDE 5 MG/1
5-10 TABLET ORAL EVERY 4 HOURS PRN
Qty: 20 TABLET | Refills: 0 | Status: SHIPPED | OUTPATIENT
Start: 2022-03-23 | End: 2022-04-04

## 2022-03-23 NOTE — LETTER
3/23/2022        RE: Billy Stock  [address] [place] [address]        [facility] GERIATRIC SERVICES    Chief Complaint   Patient presents with     RECHECK       HPI:  Billy Stock is a 76 year old  (1945), who is being seen today for an episodic care visit at: [facility] ([facility]) [[id number]].     Brief summary:  Billy Stock is a 75 yo male with a PMH Atrial Fibrillation on chronic anticoagulation with warfarin, CKD, Parkinson's disease, HTN and SARAH who was recently admitted to [facility] for evaluation after sustaining a right hip fracture. He was shoveling snow when he fell. Xrays in the ER with right femoral neck fracture. Orthopedics was consulted and he underwent anterior lateral hip hemiarthroplasty on 3/8/22. Post operative course was uncomplicated and he was discharged to [facility]    Today's concern is: Mr. Stock is seen today for follow-up visit.  He notes overall things are going well.  He questions if he should be getting out of bed more.  He was seen by orthopedics earlier this week and his sutures were removed.  He is due to follow-up in 4 weeks.  Overall his pain is well controlled.  He is taking oxycodone 1-2 times per day.  Denies chest pain, shortness of breath or dizziness.  Does note occasionally needs to strain to have a bowel movement though had one today.  Denies melena or hematochezia.  Discussed changing MiraLAX from daily as needed to daily scheduled and he is in agreement    Morning blood pressure slightly above goal with SBP's 150s-160s.  This is prior to him taking his antihypertensives.  Discussed with him will monitor his blood sugars twice daily for a few days to ensure they come down with medications.  We will want to avoid overtreatment of hypertension in the setting of Parkinson's disease.      Allergies, and PMH/PSH reviewed in [facility] today.    REVIEW OF SYSTEMS:  10 point ROS of systems including Constitutional, Eyes, Respiratory, Cardiovascular,  "Gastroenterology, Genitourinary, Integumentary, Musculoskeletal, Psychiatric were all negative except for pertinent positives noted in my HPI.    Objective:   BP (!) 174/92   Pulse 62   Temp 98.3  F (36.8  C)   Resp 16   Ht 1.854 m (6' 1\")   Wt 96.2 kg (212 lb)   SpO2 95%   BMI 27.97 kg/m      Physical Exam  Constitutional:       Appearance: Normal appearance. He is normal weight.   HENT:      Head: Normocephalic and atraumatic.   Eyes:      General: No scleral icterus.  Cardiovascular:      Rate and Rhythm: Normal rate and regular rhythm.      Heart sounds: No murmur heard.  Pulmonary:      Effort: Pulmonary effort is normal. No respiratory distress.      Breath sounds: Normal breath sounds.   Abdominal:      General: Abdomen is flat. There is no distension.      Palpations: Abdomen is soft.   Musculoskeletal:         General: Normal range of motion.      Comments: Right hip incision c/d/i   Skin:     General: Skin is warm and dry.      Findings: No rash.   Neurological:      General: No focal deficit present.      Mental Status: He is alert. Mental status is at baseline.   Psychiatric:         Mood and Affect: Mood normal.          Recent labs in [facility] reviewed by me today.  and   Most Recent 3 CBC's:Recent Labs   Lab Test 03/14/22  0657 03/11/22  0642 03/10/22  0722 03/09/22  0725   WBC  --  8.8 9.3 11.4*   HGB 10.0* 10.4* 10.5* 11.1*   MCV  --  86 87 83   PLT  --  175 142* 147*     Most Recent 3 BMP's:Recent Labs   Lab Test 03/14/22  0657 03/11/22  0642 03/10/22  0722    139 139   POTASSIUM 4.0 4.4 4.4   CHLORIDE 107 110* 110*   CO2 26 27 26   BUN 22 36* 35*   CR 1.12 1.56* 1.49*   ANIONGAP 9 2* 3   BRIANA 8.9 8.8 8.8   GLC 84 98 95  95       Assessment/Plan:   (W19.XXXD) Fall, subsequent encounter  (primary encounter diagnosis)  (S72.001D) Closed fracture of right hip with routine healing, subsequent encounter  (Z96.649) S/P hip hemiarthroplasty 3/8/22: Presented with a fall after shoveling. Underwent " hemiarthroplasty on 3/8/22. Post-op course uncomplicated  - Continue pain control with prn tylenol and oxycodone.  Taking minimal oxycodone, typically 1-2x day  - Seen by onsite ortho 3/22, no changes, staples removed.  Follow-up in 4 weeks  - PT/OT/SW      (D62) ABLA (acute blood loss anemia): Hgb 13.9--10.5.  - Repeat Hgb 3/14 10.0  -Monitor periodically    (K59.00) Constipation: Notes occasionally straining to have a bowel movement.  No abdominal pain or distention  -Continue twice daily scheduled senna  -Change MiraLAX to 17 g daily scheduled    (I10) Hypertension goal BP (blood pressure) < 140/90:[Norvasc 10 mg/d, Lisinopril 40 mg/d, Toprol Xl 75 mg/d] morning blood pressures are slightly above goal but these are before receiving his a.m. medications  -For now continue current regimen without changes.  Want to avoid hypotension in the setting of Parkinson's  -Monitor blood pressure twice daily x5 days to ensure blood pressure improves with medication      (I48.20) Chronic atrial fibrillation (H)  (Z79.01) Long term current use of anticoagulants with INR goal of 2.0-3.0: PTA Warfarin dosing 7.5 mg Mon and Thurs and 5 mg AOD. Warfarin reversed on admission due to need for operative management. Bridged with lovenox post-op. INR 2.16 on discharge.  INR has been somewhat erratic since admission to TCU.  Initially supratherapeutic with home regimen.  Patient notes eating less leafy greens at TCU which is likely contributing  -Continue close monitoring of INR  -Discussed rationale for close monitoring of INR with patient    (G20) Parkinson disease (H):   - Continue PTA Sinamet    (G47.33) SARAH (obstructive sleep apnea)  -Using CPAP from home    (C61) Prostate cancer (H): S/p radical prostatectomy in 2001. Currently on androgen deprivation therapy. Followed by Dr. Guthrie of Urology  - Has follow up appointment 4/11      Orders:  Monitor blood pressure twice daily x5 days  Change MiraLAX to 17 g daily  scheduled    Electronically signed by: Malinda Bragg PA-C           Sincerely,        Malinda Bragg PA-C

## 2022-03-23 NOTE — PROGRESS NOTES
[facility] GERIATRIC SERVICES    Chief Complaint   Patient presents with     RECHECK       HPI:  Billy Stock is a 76 year old  (1945), who is being seen today for an episodic care visit at: [facility] ([facility]) [[id number]].     Brief summary:  Billy Stock is a 75 yo male with a PMH Atrial Fibrillation on chronic anticoagulation with warfarin, CKD, Parkinson's disease, HTN and SARAH who was recently admitted to [facility] for evaluation after sustaining a right hip fracture. He was shoveling snow when he fell. Xrays in the ER with right femoral neck fracture. Orthopedics was consulted and he underwent anterior lateral hip hemiarthroplasty on 3/8/22. Post operative course was uncomplicated and he was discharged to [facility]    Today's concern is: Mr. Stock is seen today for follow-up visit.  He notes overall things are going well.  He questions if he should be getting out of bed more.  He was seen by orthopedics earlier this week and his sutures were removed.  He is due to follow-up in 4 weeks.  Overall his pain is well controlled.  He is taking oxycodone 1-2 times per day.  Denies chest pain, shortness of breath or dizziness.  Does note occasionally needs to strain to have a bowel movement though had one today.  Denies melena or hematochezia.  Discussed changing MiraLAX from daily as needed to daily scheduled and he is in agreement    Morning blood pressure slightly above goal with SBP's 150s-160s.  This is prior to him taking his antihypertensives.  Discussed with him will monitor his blood sugars twice daily for a few days to ensure they come down with medications.  We will want to avoid overtreatment of hypertension in the setting of Parkinson's disease.      Allergies, and PMH/PSH reviewed in [facility] today.    REVIEW OF SYSTEMS:  10 point ROS of systems including Constitutional, Eyes, Respiratory, Cardiovascular, Gastroenterology, Genitourinary, Integumentary, Musculoskeletal, Psychiatric were all  "negative except for pertinent positives noted in my HPI.    Objective:   BP (!) 174/92   Pulse 62   Temp 98.3  F (36.8  C)   Resp 16   Ht 1.854 m (6' 1\")   Wt 96.2 kg (212 lb)   SpO2 95%   BMI 27.97 kg/m      Physical Exam  Constitutional:       Appearance: Normal appearance. He is normal weight.   HENT:      Head: Normocephalic and atraumatic.   Eyes:      General: No scleral icterus.  Cardiovascular:      Rate and Rhythm: Normal rate and regular rhythm.      Heart sounds: No murmur heard.  Pulmonary:      Effort: Pulmonary effort is normal. No respiratory distress.      Breath sounds: Normal breath sounds.   Abdominal:      General: Abdomen is flat. There is no distension.      Palpations: Abdomen is soft.   Musculoskeletal:         General: Normal range of motion.      Comments: Right hip incision c/d/i   Skin:     General: Skin is warm and dry.      Findings: No rash.   Neurological:      General: No focal deficit present.      Mental Status: He is alert. Mental status is at baseline.   Psychiatric:         Mood and Affect: Mood normal.          Recent labs in [facility] reviewed by me today.  and   Most Recent 3 CBC's:Recent Labs   Lab Test 03/14/22  0657 03/11/22  0642 03/10/22  0722 03/09/22  0725   WBC  --  8.8 9.3 11.4*   HGB 10.0* 10.4* 10.5* 11.1*   MCV  --  86 87 83   PLT  --  175 142* 147*     Most Recent 3 BMP's:Recent Labs   Lab Test 03/14/22  0657 03/11/22  0642 03/10/22  0722    139 139   POTASSIUM 4.0 4.4 4.4   CHLORIDE 107 110* 110*   CO2 26 27 26   BUN 22 36* 35*   CR 1.12 1.56* 1.49*   ANIONGAP 9 2* 3   BRIANA 8.9 8.8 8.8   GLC 84 98 95  95       Assessment/Plan:   (W19.XXXD) Fall, subsequent encounter  (primary encounter diagnosis)  (S72.001D) Closed fracture of right hip with routine healing, subsequent encounter  (Z96.649) S/P hip hemiarthroplasty 3/8/22: Presented with a fall after shoveling. Underwent hemiarthroplasty on 3/8/22. Post-op course uncomplicated  - Continue pain control " with prn tylenol and oxycodone.  Taking minimal oxycodone, typically 1-2x day  - Seen by onsite ortho 3/22, no changes, staples removed.  Follow-up in 4 weeks  - PT/OT/SW      (D62) ABLA (acute blood loss anemia): Hgb 13.9--10.5.  - Repeat Hgb 3/14 10.0  -Monitor periodically    (K59.00) Constipation: Notes occasionally straining to have a bowel movement.  No abdominal pain or distention  -Continue twice daily scheduled senna  -Change MiraLAX to 17 g daily scheduled    (I10) Hypertension goal BP (blood pressure) < 140/90:[Norvasc 10 mg/d, Lisinopril 40 mg/d, Toprol Xl 75 mg/d] morning blood pressures are slightly above goal but these are before receiving his a.m. medications  -For now continue current regimen without changes.  Want to avoid hypotension in the setting of Parkinson's  -Monitor blood pressure twice daily x5 days to ensure blood pressure improves with medication      (I48.20) Chronic atrial fibrillation (H)  (Z79.01) Long term current use of anticoagulants with INR goal of 2.0-3.0: PTA Warfarin dosing 7.5 mg Mon and Thurs and 5 mg AOD. Warfarin reversed on admission due to need for operative management. Bridged with lovenox post-op. INR 2.16 on discharge.  INR has been somewhat erratic since admission to TCU.  Initially supratherapeutic with home regimen.  Patient notes eating less leafy greens at TCU which is likely contributing  -Continue close monitoring of INR  -Discussed rationale for close monitoring of INR with patient    (G20) Parkinson disease (H):   - Continue PTA Sinamet    (G47.33) SARAH (obstructive sleep apnea)  -Using CPAP from home    (C61) Prostate cancer (H): S/p radical prostatectomy in 2001. Currently on androgen deprivation therapy. Followed by Dr. Guthrie of Urology  - Has follow up appointment 4/11      Orders:  Monitor blood pressure twice daily x5 days  Change MiraLAX to 17 g daily scheduled    Electronically signed by: Malinda Bragg PA-C

## 2022-03-24 ENCOUNTER — NURSING HOME VISIT (OUTPATIENT)
Dept: GERIATRICS | Facility: CLINIC | Age: 77
End: 2022-03-24
Payer: COMMERCIAL

## 2022-03-24 VITALS
WEIGHT: 212 LBS | HEART RATE: 53 BPM | SYSTOLIC BLOOD PRESSURE: 156 MMHG | RESPIRATION RATE: 18 BRPM | HEIGHT: 73 IN | OXYGEN SATURATION: 98 % | TEMPERATURE: 98.9 F | DIASTOLIC BLOOD PRESSURE: 85 MMHG | BODY MASS INDEX: 28.1 KG/M2

## 2022-03-24 DIAGNOSIS — Z51.81 ENCOUNTER FOR THERAPEUTIC DRUG MONITORING: Primary | ICD-10-CM

## 2022-03-24 DIAGNOSIS — Z79.01 LONG TERM (CURRENT) USE OF ANTICOAGULANTS: ICD-10-CM

## 2022-03-24 PROCEDURE — 99308 SBSQ NF CARE LOW MDM 20: CPT | Performed by: PHYSICIAN ASSISTANT

## 2022-03-28 ENCOUNTER — TRANSITIONAL CARE UNIT VISIT (OUTPATIENT)
Dept: GERIATRICS | Facility: CLINIC | Age: 77
End: 2022-03-28
Payer: COMMERCIAL

## 2022-03-28 VITALS
HEART RATE: 54 BPM | DIASTOLIC BLOOD PRESSURE: 79 MMHG | RESPIRATION RATE: 16 BRPM | HEIGHT: 73 IN | TEMPERATURE: 98.2 F | BODY MASS INDEX: 28.1 KG/M2 | SYSTOLIC BLOOD PRESSURE: 142 MMHG | OXYGEN SATURATION: 99 % | WEIGHT: 212 LBS

## 2022-03-28 DIAGNOSIS — D62 ABLA (ACUTE BLOOD LOSS ANEMIA): ICD-10-CM

## 2022-03-28 DIAGNOSIS — G47.33 OSA (OBSTRUCTIVE SLEEP APNEA): ICD-10-CM

## 2022-03-28 DIAGNOSIS — Z51.81 ENCOUNTER FOR THERAPEUTIC DRUG MONITORING: Primary | ICD-10-CM

## 2022-03-28 DIAGNOSIS — G20.A1 PARKINSON DISEASE (H): ICD-10-CM

## 2022-03-28 DIAGNOSIS — S72.001A HIP FRACTURE, RIGHT, CLOSED, INITIAL ENCOUNTER (H): ICD-10-CM

## 2022-03-28 DIAGNOSIS — I48.20 CHRONIC ATRIAL FIBRILLATION (H): ICD-10-CM
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
## 2022-03-28 DIAGNOSIS — C61 PROSTATE CANCER (H): ICD-10-CM

## 2022-03-28 DIAGNOSIS — K59.00 CONSTIPATION, UNSPECIFIED CONSTIPATION TYPE: ICD-10-CM

## 2022-03-28 DIAGNOSIS — I10 HYPERTENSION GOAL BP (BLOOD PRESSURE) < 140/90: ICD-10-CM

## 2022-03-28 PROCEDURE — 99309 SBSQ NF CARE MODERATE MDM 30: CPT | Performed by: PHYSICIAN ASSISTANT

## 2022-03-28 NOTE — PROGRESS NOTES
"[facility] GERIATRIC SERVICES    Chief Complaint   Patient presents with     RECHECK       HPI:  Billy Stock is a 76 year old  (1945), who is being seen today for an episodic care visit at: [facility]  ([facility]) [[id number]].     Brief summary:  Billy Stock is a 75 yo male with a PMH Atrial Fibrillation on chronic anticoagulation with warfarin, CKD, Parkinson's disease, HTN and SARAH who was recently admitted to [facility] for evaluation after sustaining a right hip fracture. He was shoveling snow when he fell. Xrays in the ER with right femoral neck fracture. Orthopedics was consulted and he underwent anterior lateral hip hemiarthroplasty on 3/8/22. Post operative course was uncomplicated and [facility]    Today's concern is: Today Marek is seen for follow-up visit and INR dosing.  He notes he continues to do well.  Pain continues to improve.  Really only bothers him at night.  On review of MAR he is taking oxycodone 1-2 times per day.  SBP slightly above goal 130s-150s.  Denies chest pain or headache.  Does note he is slightly more stressed than he is at home with pain at TCU.  Denies issues with constipation.  INR is 2.0 today.  Continues to have fluctuations in INR compared to baseline home stability.  Reiterated likely secondary to dietary changes at TCU.  Patient agrees to more frequent monitoring then at home.  He feels as though he is progressing well with physical therapy and is hopeful for discharge in the next 1 to 2 weeks    Allergies, and PMH/PSH reviewed in [facility] today.    REVIEW OF SYSTEMS:  4 point ROS including Respiratory, CV, GI and , other than that noted in the HPI,  is negative    Objective:   BP (!) 142/79   Pulse 54   Temp 98.2  F (36.8  C)   Resp 16   Ht 1.854 m (6' 0.99\")   Wt 96.2 kg (212 lb)   SpO2 99%   BMI 27.98 kg/m      Physical Exam  Constitutional:       Appearance: Normal appearance. He is obese.   HENT:      Head: Normocephalic and " atraumatic.   Eyes:      General: No scleral icterus.  Cardiovascular:      Rate and Rhythm: Normal rate and regular rhythm.      Heart sounds: No murmur heard.  Pulmonary:      Effort: Pulmonary effort is normal. No respiratory distress.      Breath sounds: Normal breath sounds.   Abdominal:      General: Abdomen is flat. There is no distension.      Palpations: Abdomen is soft.   Musculoskeletal:         General: Normal range of motion.   Skin:     General: Skin is warm and dry.      Findings: No rash.   Neurological:      General: No focal deficit present.      Mental Status: He is alert. Mental status is at baseline.   Psychiatric:         Mood and Affect: Mood normal.          Recent labs in [facility] reviewed by me today.  and   Most Recent 3 CBC's:Recent Labs   Lab Test 03/14/22  0657 03/11/22  0642 03/10/22  0722 03/09/22  0725   WBC  --  8.8 9.3 11.4*   HGB 10.0* 10.4* 10.5* 11.1*   MCV  --  86 87 83   PLT  --  175 142* 147*     Most Recent 3 BMP's:Recent Labs   Lab Test 03/14/22  0657 03/11/22  0642 03/10/22  0722    139 139   POTASSIUM 4.0 4.4 4.4   CHLORIDE 107 110* 110*   CO2 26 27 26   BUN 22 36* 35*   CR 1.12 1.56* 1.49*   ANIONGAP 9 2* 3   BRIANA 8.9 8.8 8.8   GLC 84 98 95  95       Assessment/Plan:  (W19.XXXD) Fall, subsequent encounter  (primary encounter diagnosis)  (S72.001D) Closed fracture of right hip with routine healing, subsequent encounter  (Z96.649) S/P hip hemiarthroplasty 3/8/22: Presented with a fall after shoveling. Underwent hemiarthroplasty on 3/8/22. Post-op course uncomplicated  - Continue pain control with prn tylenol and oxycodone.  Taking minimal oxycodone, typically 1-2x day  - Seen by onsite ortho 3/22, no changes, staples removed.  Follow-up in 4 weeks  - PT/OT/SW      (D62) ABLA (acute blood loss anemia): Hgb 13.9--10.5.  - Repeat Hgb 3/14 10.0  -Monitor periodically    (K59.00) Constipation: No issues currently  -Continue twice daily scheduled senna  -Continue MiraLAX to 17  g daily scheduled    (I10) Hypertension goal BP (blood pressure) < 140/90:[Norvasc 10 mg/d, Lisinopril 40 mg/d, Toprol Xl 75 mg/d] morning blood pressures are slightly above goal with SBPs occasionally in 150s.  Already on max dose of Norvasc and lisinopril.  Heart rates 50s-60s and would limit further up titration of beta-blocker.  Patient feels blood pressures may be more elevated due to being in TCU setting.  -For now continue current regimen without changes.  Want to avoid hypotension in the setting of Parkinson's  -Follow-up with PCP post discharge for ongoing adjustments      (I48.20) Chronic atrial fibrillation (H)  (Z79.01) Long term current use of anticoagulants with INR goal of 2.0-3.0: PTA Warfarin dosing 7.5 mg Mon and Thurs and 5 mg AOD. Warfarin reversed on admission due to need for operative management. Bridged with lovenox post-op. INR 2.16 on discharge.  INR has been somewhat erratic since admission to TCU.  Initially supratherapeutic with home regimen.  Patient notes eating less leafy greens at TCU which is likely contributing  -INR 2.0 today  -Warfarin 5 mg on Wednesday, warfarin 4 mg AOD, INR  3/31  -Discussed rationale for close monitoring of INR with patient    (G20) Parkinson disease (H):   - Continue PTA Sinamet    (G47.33) SARAH (obstructive sleep apnea)  -Using CPAP from home    (C61) Prostate cancer (H): S/p radical prostatectomy in 2001. Currently on androgen deprivation therapy. Followed by Dr. Guthrie of Urology  - Has follow up appointment 4/11      Orders:  Warfarin 5 mg Wed and 4 mg AOD  INR 3/31    Electronically signed by: Malinda Bragg PA-C

## 2022-03-28 NOTE — LETTER
"    3/28/2022        RE: Billy Stock  [address]  [place] [address]        [facility]    Chief Complaint   Patient presents with     RECHECK       HPI:  Billy Stock is a 76 year old  (1945), who is being seen today for an episodic care visit at: [facility]  ([facility]) [[id number]].     Brief summary:  Billy Stock is a 75 yo male with a PMH Atrial Fibrillation on chronic anticoagulation with warfarin, CKD, Parkinson's disease, HTN and SARAH who was recently admitted to [facility] for evaluation after sustaining a right hip fracture. He was shoveling snow when he fell. Xrays in the ER with right femoral neck fracture. Orthopedics was consulted and he underwent anterior lateral hip hemiarthroplasty on 3/8/22. Post operative course was uncomplicated and he was discharged to [facility]    Today's concern is: Today Marek is seen for follow-up visit and INR dosing.  He notes he continues to do well.  Pain continues to improve.  Really only bothers him at night.  On review of MAR he is taking oxycodone 1-2 times per day.  SBP slightly above goal 130s-150s.  Denies chest pain or headache.  Does note he is slightly more stressed than he is at home with pain at TCU.  Denies issues with constipation.  INR is 2.0 today.  Continues to have fluctuations in INR compared to baseline home stability.  Reiterated likely secondary to dietary changes at TCU.  Patient agrees to more frequent monitoring then at home.  He feels as though he is progressing well with physical therapy and is hopeful for discharge in the next 1 to 2 weeks    Allergies, and PMH/PSH reviewed in EPIC today.    REVIEW OF SYSTEMS:  4 point ROS including Respiratory, CV, GI and , other than that noted in the HPI,  is negative    Objective:   BP (!) 142/79   Pulse 54   Temp 98.2  F (36.8  C)   Resp 16   Ht 1.854 m (6' 0.99\")   Wt 96.2 kg (212 lb)   SpO2 99%   BMI 27.98 kg/m      Physical Exam  Constitutional:       " Appearance: Normal appearance. He is obese.   HENT:      Head: Normocephalic and atraumatic.   Eyes:      General: No scleral icterus.  Cardiovascular:      Rate and Rhythm: Normal rate and regular rhythm.      Heart sounds: No murmur heard.  Pulmonary:      Effort: Pulmonary effort is normal. No respiratory distress.      Breath sounds: Normal breath sounds.   Abdominal:      General: Abdomen is flat. There is no distension.      Palpations: Abdomen is soft.   Musculoskeletal:         General: Normal range of motion.   Skin:     General: Skin is warm and dry.      Findings: No rash.   Neurological:      General: No focal deficit present.      Mental Status: He is alert. Mental status is at baseline.   Psychiatric:         Mood and Affect: Mood normal.          Recent labs in [facility] reviewed by me today.  and   Most Recent 3 CBC's:Recent Labs   Lab Test 03/14/22  0657 03/11/22  0642 03/10/22  0722 03/09/22  0725   WBC  --  8.8 9.3 11.4*   HGB 10.0* 10.4* 10.5* 11.1*   MCV  --  86 87 83   PLT  --  175 142* 147*     Most Recent 3 BMP's:Recent Labs   Lab Test 03/14/22  0657 03/11/22  0642 03/10/22  0722    139 139   POTASSIUM 4.0 4.4 4.4   CHLORIDE 107 110* 110*   CO2 26 27 26   BUN 22 36* 35*   CR 1.12 1.56* 1.49*   ANIONGAP 9 2* 3   BRIANA 8.9 8.8 8.8   GLC 84 98 95  95       Assessment/Plan:  (W19.XXXD) Fall, subsequent encounter  (primary encounter diagnosis)  (S72.001D) Closed fracture of right hip with routine healing, subsequent encounter  (Z96.649) S/P hip hemiarthroplasty 3/8/22: Presented with a fall after shoveling. Underwent hemiarthroplasty on 3/8/22. Post-op course uncomplicated  - Continue pain control with prn tylenol and oxycodone.  Taking minimal oxycodone, typically 1-2x day  - Seen by onsite ortho 3/22, no changes, staples removed.  Follow-up in 4 weeks  - PT/OT/SW      (D62) ABLA (acute blood loss anemia): Hgb 13.9--10.5.  - Repeat Hgb 3/14 10.0  -Monitor periodically    (K59.00) Constipation: No  issues currently  -Continue twice daily scheduled senna  -Continue MiraLAX to 17 g daily scheduled    (I10) Hypertension goal BP (blood pressure) < 140/90:[Norvasc 10 mg/d, Lisinopril 40 mg/d, Toprol Xl 75 mg/d] morning blood pressures are slightly above goal with SBPs occasionally in 150s.  Already on max dose of Norvasc and lisinopril.  Heart rates 50s-60s and would limit further up titration of beta-blocker.  Patient feels blood pressures may be more elevated due to being in TCU setting.  -For now continue current regimen without changes.  Want to avoid hypotension in the setting of Parkinson's  -Follow-up with PCP post discharge for ongoing adjustments      (I48.20) Chronic atrial fibrillation (H)  (Z79.01) Long term current use of anticoagulants with INR goal of 2.0-3.0: PTA Warfarin dosing 7.5 mg Mon and Thurs and 5 mg AOD. Warfarin reversed on admission due to need for operative management. Bridged with lovenox post-op. INR 2.16 on discharge.  INR has been somewhat erratic since admission to TCU.  Initially supratherapeutic with home regimen.  Patient notes eating less leafy greens at TCU which is likely contributing  -INR 2.0 today  -Warfarin 5 mg on Wednesday, warfarin 4 mg AOD, INR  3/31  -Discussed rationale for close monitoring of INR with patient    (G20) Parkinson disease (H):   - Continue PTA Sinamet    (G47.33) SARAH (obstructive sleep apnea)  -Using CPAP from home    (C61) Prostate cancer (H): S/p radical prostatectomy in 2001. Currently on androgen deprivation therapy. Followed by Dr. Guthrie of Urology  - Has follow up appointment 4/11      Orders:  Warfarin 5 mg Wed and 4 mg AOD  INR 3/31    Electronically signed by: Malinda Bragg PA-C           Sincerely,        Malinda Bragg PA-C

## 2022-03-31 ENCOUNTER — TRANSITIONAL CARE UNIT VISIT (OUTPATIENT)
Dept: GERIATRICS | Facility: CLINIC | Age: 77
End: 2022-03-31
Payer: COMMERCIAL

## 2022-03-31 VITALS
OXYGEN SATURATION: 100 % | DIASTOLIC BLOOD PRESSURE: 76 MMHG | HEIGHT: 72 IN | HEART RATE: 56 BPM | SYSTOLIC BLOOD PRESSURE: 142 MMHG | TEMPERATURE: 97.8 F | WEIGHT: 219.6 LBS | BODY MASS INDEX: 29.74 KG/M2 | RESPIRATION RATE: 16 BRPM

## 2022-03-31 DIAGNOSIS — I10 HYPERTENSION GOAL BP (BLOOD PRESSURE) < 140/90: ICD-10-CM

## 2022-03-31 DIAGNOSIS — K59.03 DRUG-INDUCED CONSTIPATION: ICD-10-CM

## 2022-03-31 DIAGNOSIS — S72.001A HIP FRACTURE, RIGHT, CLOSED, INITIAL ENCOUNTER (H): ICD-10-CM

## 2022-03-31 DIAGNOSIS — Z51.81 ENCOUNTER FOR THERAPEUTIC DRUG MONITORING: Primary | ICD-10-CM

## 2022-03-31 DIAGNOSIS — Z96.649 S/P HIP HEMIARTHROPLASTY: ICD-10-CM

## 2022-03-31 DIAGNOSIS — G20.A1 PARKINSON DISEASE (H): ICD-10-CM

## 2022-03-31 DIAGNOSIS — I48.20 CHRONIC ATRIAL FIBRILLATION (H): ICD-10-CM

## 2022-03-31 DIAGNOSIS — K59.00 CONSTIPATION, UNSPECIFIED CONSTIPATION TYPE: ICD-10-CM

## 2022-03-31 PROCEDURE — 99309 SBSQ NF CARE MODERATE MDM 30: CPT | Performed by: PHYSICIAN ASSISTANT

## 2022-03-31 NOTE — PROGRESS NOTES
[facility]    Chief Complaint   Patient presents with     RECHECK       HPI:  Billy Stock is a 76 year old  (1945), who is being seen today for an episodic care visit at: [facility] ([facility]) [[id number]].     Brief summary: Billy Stock is a 75 yo male with a PMH Atrial Fibrillation on chronic anticoagulation with warfarin, CKD, Parkinson's disease, HTN and SARAH who was recently admitted to [facility] for evaluation after sustaining a right hip fracture. He was shoveling snow when he fell. Xrays in the ER with right femoral neck fracture. Orthopedics was consulted and he underwent anterior lateral hip hemiarthroplasty on 3/8/22. Post operative course was uncomplicated and he was discharged to [facility]    Today's concern is: today Marek is seen for a follow up visit. Notes overall doing well.Planning to discharge next week. INR is 1.8 today. Discussed plans to repeat INR on Monday and he is in agreement. Pain well controlled and taking oxycodone 1-2x day. Constipation improved after starting scheduled Miralax. Has questions regarding if he will get back to baseline mobility and discussed multiple factors in play and how given his co morbidities will take time to know what his new baseline is.     Allergies, and PMH/PSH reviewed in EPIC today.    REVIEW OF SYSTEMS:  4 point ROS including Respiratory, CV, GI and , other than that noted in the HPI,  is negative    Objective:   BP (!) 142/76   Pulse 56   Temp 97.8  F (36.6  C)   Resp 16   Ht 1.829 m (6')   Wt 99.6 kg (219 lb 9.6 oz)   SpO2 100%   BMI 29.78 kg/m      Physical Exam  Constitutional:       Appearance: Normal appearance. He is normal weight.   HENT:      Head: Normocephalic and atraumatic.   Eyes:      General: No scleral icterus.  Cardiovascular:      Rate and Rhythm: Normal rate and regular rhythm.      Heart sounds: No murmur heard.  Pulmonary:      Effort: Pulmonary effort is normal. No respiratory distress.       Breath sounds: Normal breath sounds.   Abdominal:      General: Abdomen is flat. There is no distension.      Palpations: Abdomen is soft.   Musculoskeletal:         General: Normal range of motion.      Right lower leg: No edema.      Left lower leg: No edema.      Comments: Right hip incision c/d/i   Skin:     General: Skin is warm and dry.      Findings: No rash.   Neurological:      General: No focal deficit present.      Mental Status: He is alert. Mental status is at baseline.   Psychiatric:         Mood and Affect: Mood normal.          Recent labs in [facility] reviewed by me today.  and   Most Recent 3 CBC's:Recent Labs   Lab Test 03/14/22  0657 03/11/22  0642 03/10/22  0722 03/09/22  0725   WBC  --  8.8 9.3 11.4*   HGB 10.0* 10.4* 10.5* 11.1*   MCV  --  86 87 83   PLT  --  175 142* 147*     Most Recent 3 BMP's:Recent Labs   Lab Test 03/14/22  0657 03/11/22  0642 03/10/22  0722    139 139   POTASSIUM 4.0 4.4 4.4   CHLORIDE 107 110* 110*   CO2 26 27 26   BUN 22 36* 35*   CR 1.12 1.56* 1.49*   ANIONGAP 9 2* 3   BRIANA 8.9 8.8 8.8   GLC 84 98 95  95       Assessment/Plan:  (W19.XXXD) Fall, subsequent encounter  (primary encounter diagnosis)  (S72.001D) Closed fracture of right hip with routine healing, subsequent encounter  (Z96.649) S/P hip hemiarthroplasty 3/8/22: Presented with a fall after shoveling. Underwent hemiarthroplasty on 3/8/22. Post-op course uncomplicated  - Continue pain control with prn tylenol and oxycodone.  Taking minimal oxycodone, typically 1-2x day  - Seen by onsite ortho 3/22, no changes, staples removed.  Follow-up in 4 weeks  - PT/OT/SW      (D62) ABLA (acute blood loss anemia): Hgb 13.9--10.5.  - Repeat Hgb 3/14 10.0  -Monitor periodically    (K59.00) Constipation: No issues currently  -Continue twice daily scheduled senna  -Continue MiraLAX to 17 g daily scheduled    (I10) Hypertension goal BP (blood pressure) < 140/90:[Norvasc 10 mg/d, Lisinopril 40 mg/d, Toprol Xl 75 mg/d] morning  blood pressures are slightly above goal with SBPs occasionally in 150s.  Already on max dose of Norvasc and lisinopril.  Heart rates 50s-60s and would limit further up titration of beta-blocker.  Patient feels blood pressures may be more elevated due to being in TCU setting.  -For now continue current regimen without changes.  Want to avoid hypotension in the setting of Parkinson's  -Follow-up with PCP post discharge for ongoing adjustments      (I48.20) Chronic atrial fibrillation (H)  (Z79.01) Long term current use of anticoagulants with INR goal of 2.0-3.0: PTA Warfarin dosing 7.5 mg Mon and Thurs and 5 mg AOD. Warfarin reversed on admission due to need for operative management. Bridged with lovenox post-op. INR 2.16 on discharge.  INR has been somewhat erratic since admission to TCU.  Initially supratherapeutic with home regimen.  Patient notes eating less leafy greens at TCU which is likely contributing  -INR 1.8 today  - Warfarin 5 mg po daily and repeat INR 4/4  -Discussed rationale for close monitoring of INR with patient    (G20) Parkinson disease (H):   - Continue PTA Sinamet    (G47.33) SARAH (obstructive sleep apnea)  -Using CPAP from home    (C61) Prostate cancer (H): S/p radical prostatectomy in 2001. Currently on androgen deprivation therapy. Followed by Dr. Guthrie of Urology  - Has follow up appointment 4/11    Orders:  Warfarin 5 mg po daily  INR 4/4    Electronically signed by: Malinda Bragg PA-C

## 2022-03-31 NOTE — LETTER
3/31/2022        RE: Billy Stock  [address]  [place] [address]        [facility]    Chief Complaint   Patient presents with     RECHECK       HPI:  Billy Stock is a 76 year old  (1945), who is being seen today for an episodic care visit at: [facility] ([facility]) [[id number]].     Brief summary: Billy Stock is a 75 yo male with a PMH Atrial Fibrillation on chronic anticoagulation with warfarin, CKD, Parkinson's disease, HTN and SARAH who was [facility] for evaluation after sustaining a right hip fracture. He was shoveling snow when he fell. Xrays in the ER with right femoral neck fracture. Orthopedics was consulted and he underwent anterior lateral hip hemiarthroplasty on 3/8/22. Post operative course was uncomplicated and he was discharged to [facility]    Today's concern is: today Marek is seen for a follow up visit. Notes overall doing well.Planning to discharge next week. INR is 1.8 today. Discussed plans to repeat INR on Monday and he is in agreement. Pain well controlled and taking oxycodone 1-2x day. Constipation improved after starting scheduled Miralax. Has questions regarding if he will get back to baseline mobility and discussed multiple factors in play and how given his co morbidities will take time to know what his new baseline is.     Allergies, and PMH/PSH reviewed in EPIC today.    REVIEW OF SYSTEMS:  4 point ROS including Respiratory, CV, GI and , other than that noted in the HPI,  is negative    Objective:   BP (!) 142/76   Pulse 56   Temp 97.8  F (36.6  C)   Resp 16   Ht 1.829 m (6')   Wt 99.6 kg (219 lb 9.6 oz)   SpO2 100%   BMI 29.78 kg/m      Physical Exam  Constitutional:       Appearance: Normal appearance. He is normal weight.   HENT:      Head: Normocephalic and atraumatic.   Eyes:      General: No scleral icterus.  Cardiovascular:      Rate and Rhythm: Normal rate and regular rhythm.      Heart sounds: No murmur  heard.  Pulmonary:      Effort: Pulmonary effort is normal. No respiratory distress.      Breath sounds: Normal breath sounds.   Abdominal:      General: Abdomen is flat. There is no distension.      Palpations: Abdomen is soft.   Musculoskeletal:         General: Normal range of motion.      Right lower leg: No edema.      Left lower leg: No edema.      Comments: Right hip incision c/d/i   Skin:     General: Skin is warm and dry.      Findings: No rash.   Neurological:      General: No focal deficit present.      Mental Status: He is alert. Mental status is at baseline.   Psychiatric:         Mood and Affect: Mood normal.          Recent labs in [facility] reviewed by me today.  and   Most Recent 3 CBC's:Recent Labs   Lab Test 03/14/22  0657 03/11/22  0642 03/10/22  0722 03/09/22  0725   WBC  --  8.8 9.3 11.4*   HGB 10.0* 10.4* 10.5* 11.1*   MCV  --  86 87 83   PLT  --  175 142* 147*     Most Recent 3 BMP's:Recent Labs   Lab Test 03/14/22  0657 03/11/22  0642 03/10/22  0722    139 139   POTASSIUM 4.0 4.4 4.4   CHLORIDE 107 110* 110*   CO2 26 27 26   BUN 22 36* 35*   CR 1.12 1.56* 1.49*   ANIONGAP 9 2* 3   BRIANA 8.9 8.8 8.8   GLC 84 98 95  95       Assessment/Plan:  (W19.XXXD) Fall, subsequent encounter  (primary encounter diagnosis)  (S72.001D) Closed fracture of right hip with routine healing, subsequent encounter  (Z96.649) S/P hip hemiarthroplasty 3/8/22: Presented with a fall after shoveling. Underwent hemiarthroplasty on 3/8/22. Post-op course uncomplicated  - Continue pain control with prn tylenol and oxycodone.  Taking minimal oxycodone, typically 1-2x day  - Seen by onsite ortho 3/22, no changes, staples removed.  Follow-up in 4 weeks  - PT/OT/SW      (D62) ABLA (acute blood loss anemia): Hgb 13.9--10.5.  - Repeat Hgb 3/14 10.0  -Monitor periodically    (K59.00) Constipation: No issues currently  -Continue twice daily scheduled senna  -Continue MiraLAX to 17 g daily scheduled    (I10) Hypertension goal BP  (blood pressure) < 140/90:[Norvasc 10 mg/d, Lisinopril 40 mg/d, Toprol Xl 75 mg/d] morning blood pressures are slightly above goal with SBPs occasionally in 150s.  Already on max dose of Norvasc and lisinopril.  Heart rates 50s-60s and would limit further up titration of beta-blocker.  Patient feels blood pressures may be more elevated due to being in TCU setting.  -For now continue current regimen without changes.  Want to avoid hypotension in the setting of Parkinson's  -Follow-up with PCP post discharge for ongoing adjustments      (I48.20) Chronic atrial fibrillation (H)  (Z79.01) Long term current use of anticoagulants with INR goal of 2.0-3.0: PTA Warfarin dosing 7.5 mg Mon and Thurs and 5 mg AOD. Warfarin reversed on admission due to need for operative management. Bridged with lovenox post-op. INR 2.16 on discharge.  INR has been somewhat erratic since admission to TCU.  Initially supratherapeutic with home regimen.  Patient notes eating less leafy greens at TCU which is likely contributing  -INR 1.8 today  - Warfarin 5 mg po daily and repeat INR 4/4  -Discussed rationale for close monitoring of INR with patient    (G20) Parkinson disease (H):   - Continue PTA Sinamet    (G47.33) SARAH (obstructive sleep apnea)  -Using CPAP from home    (C61) Prostate cancer (H): S/p radical prostatectomy in 2001. Currently on androgen deprivation therapy. Followed by Dr. Guthrie of Urology  - Has follow up appointment 4/11    Orders:  Warfarin 5 mg po daily  INR 4/4    Electronically signed by: Malinda Bragg PA-C           Sincerely,        Malinda Bragg PA-C

## 2022-04-04 ENCOUNTER — DISCHARGE SUMMARY NURSING HOME (OUTPATIENT)
Dept: GERIATRICS | Facility: CLINIC | Age: 77
End: 2022-04-04
Payer: COMMERCIAL

## 2022-04-04 VITALS
HEIGHT: 72 IN | WEIGHT: 219.6 LBS | TEMPERATURE: 98.5 F | BODY MASS INDEX: 29.74 KG/M2 | SYSTOLIC BLOOD PRESSURE: 155 MMHG | OXYGEN SATURATION: 98 % | HEART RATE: 69 BPM | RESPIRATION RATE: 16 BRPM | DIASTOLIC BLOOD PRESSURE: 77 MMHG

## 2022-04-04 DIAGNOSIS — K59.03 DRUG-INDUCED CONSTIPATION: ICD-10-CM

## 2022-04-04 DIAGNOSIS — S72.001D CLOSED FRACTURE OF RIGHT HIP WITH ROUTINE HEALING, SUBSEQUENT ENCOUNTER: ICD-10-CM

## 2022-04-04 DIAGNOSIS — I10 ESSENTIAL HYPERTENSION, BENIGN: ICD-10-CM

## 2022-04-04 DIAGNOSIS — Z79.01 LONG TERM CURRENT USE OF ANTICOAGULANTS WITH INR GOAL OF 2.0-3.0: ICD-10-CM

## 2022-04-04 DIAGNOSIS — S72.001A HIP FRACTURE, RIGHT, CLOSED, INITIAL ENCOUNTER (H): ICD-10-CM

## 2022-04-04 DIAGNOSIS — I48.20 CHRONIC ATRIAL FIBRILLATION (H): ICD-10-CM

## 2022-04-04 DIAGNOSIS — G20.A1 PARKINSON DISEASE (H): ICD-10-CM

## 2022-04-04 DIAGNOSIS — Z96.649 S/P HIP HEMIARTHROPLASTY: Primary | ICD-10-CM

## 2022-04-04 DIAGNOSIS — G47.33 OSA (OBSTRUCTIVE SLEEP APNEA): ICD-10-CM

## 2022-04-04 DIAGNOSIS — D62 ABLA (ACUTE BLOOD LOSS ANEMIA): ICD-10-CM

## 2022-04-04 PROCEDURE — 99316 NF DSCHRG MGMT 30 MIN+: CPT | Performed by: PHYSICIAN ASSISTANT

## 2022-04-04 RX ORDER — OXYCODONE HYDROCHLORIDE 5 MG/1
5-10 TABLET ORAL EVERY 4 HOURS PRN
Qty: 20 TABLET | Refills: 0 | Status: SHIPPED | OUTPATIENT
Start: 2022-04-04

## 2022-04-04 RX ORDER — WARFARIN SODIUM 5 MG/1
TABLET ORAL
Qty: 100 TABLET | Refills: 0
Start: 2022-04-04 | End: 2022-04-28

## 2022-04-04 NOTE — PATIENT INSTRUCTIONS
"Billy Stock  YOB: 1945                                   Discharge Date: 4/6/2022       PHYSICIAN's DISCHARGE SUMMARY / ORDER SHEET  1. Discharge to: Home  2. Medications:      Current Outpatient Medications   Medication Sig Dispense Refill     acetaminophen (TYLENOL) 500 MG tablet Take 1,000 mg by mouth 3 times daily       allopurinol (ZYLOPRIM) 100 MG tablet Take 1 tablet (100 mg) by mouth daily 90 tablet 3     amLODIPine (NORVASC) 10 MG tablet Take 1 tablet by mouth once daily 90 tablet 0     Apoaequorin (PREVAGEN PO) Take 1 tablet by mouth daily       carbidopa-levodopa (SINEMET)  MG tablet Take 1 tablet by mouth 3 times daily       lisinopril (ZESTRIL) 40 MG tablet Take 1 tablet by mouth once daily 90 tablet 0     Loratadine (CLARITIN PO) Take 10 mg by mouth daily as needed (allergies)        metoprolol succinate ER (TOPROL-XL) 50 MG 24 hr tablet TAKE 1 & 1/2 (ONE & ONE-HALF) TABLETS BY MOUTH ONCE DAILY 135 tablet 1     multivitamin w/minerals (MULTI-VITAMIN) tablet Take 1 tablet by mouth daily       oxyCODONE (ROXICODONE) 5 MG tablet Take 1-2 tablets (5-10 mg) by mouth every 4 hours as needed for moderate to severe pain 1 tab po q 4 hrs prn pain scale \"1-5\"  2 tabs po q 4 hrs prn pain scale \"6-10\" 20 tablet 0     polyethylene glycol (MIRALAX) 17 GM/Dose powder Take 17 g by mouth daily 510 g      senna-docusate (SENOKOT-S/PERICOLACE) 8.6-50 MG tablet Take 1 tablet by mouth 2 times daily       warfarin ANTICOAGULANT (COUMADIN) 5 MG tablet Warfarin 4 mg on Monday, 5 mg AOD. Recheck INR with home care 4/8. Further dosing per INR clinic 100 tablet 0          DISCHARGE PLAN:    Follow up labs: INR due 4/8 to be drawn by home care with results to Malcolm Schafer        Medical Follow Up:      Follow up with primary care provider in 1-2 weeks  Follow up with specialist Follow up with Ortho per recomendations    Current [place] scheduled appointments:  Next 5 appointments (look out 90 days)  "   May 25, 2022 10:15 AM  Return Visit with Ángel Guthrie MD  [facility] ([facility] [place] ) [address]  [address]  [place] [facility] [address] [phone]           Discharge Services: Home Care:  Occupational Therapy, Physical Therapy, Registered Nurse and Home Health Aide    Discharge Instructions Verbalized to Patient at Discharge:     Weight bearing restrictions:  Weight bearing as tolerated.     CPAP when sleeping; settings per usual practice.         Discharge patient to home with current medications and treatments  Discharge Home with Home care as above  Medication: Oxycodone 5 mg , 20 tabs given to patient at the time of discharge to take home  - Nursing call in 30 days supply of needed meds to pharmacy of choice upon discharge. Future refills by PCP Dr. Malcolm Schafer with phone number [phone].  - please send home with original of these discharge instructions and copy for chart.       ______________________________  Malinda Bragg PA-C   Department [facility]                   4/4/2022

## 2022-04-04 NOTE — PROGRESS NOTES
[facility] GERIATRICS DISCHARGE SUMMARY  PATIENT'S NAME: Billy Stock  YOB: 1945  MEDICAL RECORD NUMBER:  [id number]  Place of Service where encounter took place[facility]  [facility] ([facility] [[id number]]    PRIMARY CARE PROVIDER AND CLINIC RESPONSIBLE AFTER TRANSFER:   Malcolm Schafer MD, [address] [facility] [address]    [facility] Provider     Transferring providers: Malinda Bragg PA-C, Terri Robert DO  Recent Hospitalization/ED:  [facility] stay 3/6/22 to 3/11/22.  Date of SNF Admission: March 11, 2022  Date of SNF (anticipated) Discharge: April 06, 2022  Discharged to: previous independent home  Cognitive Scores: CPT: 5/5.6 SLUMS 22/30  Physical Function: Ambulating 250 ft with FWW. Able to go up down steps with SBA  DME: No new DME needed    CODE STATUS/ADVANCE DIRECTIVES DISCUSSION:  Prior   ALLERGIES: Cats, Codeine, Codeine, Maple tree, Morphine, and Watermelon [citrullus vulgaris]        Summary of nursing facility stay:   Billy Stock is a 77 yo male with a PMH Atrial Fibrillation on chronic anticoagulation with warfarin, CKD, Parkinson's disease, HTN and SARAH who was recently admitted to [facility] for evaluation after sustaining a right hip fracture. He was shoveling snow when he fell. Xrays in the ER with right femoral neck fracture. Orthopedics was consulted and he underwent anterior lateral hip hemiarthroplasty on 3/8/22. Post operative course was uncomplicated and he was discharged to [facility] TCU    He progressed well with physical therapy and will be discharging back to his home with home care. His INR was slightly erratic during his TCU stay, he indicates he is eating less salads since admission to TCU. Some SBP readings slightly above goal but elected not to make changes. Will have him follow up with PCP.    (W19.XXXD) Fall, subsequent encounter  (primary encounter diagnosis)  (S72.001D) Closed fracture of right hip with routine  healing, subsequent encounter  (Z96.649) S/P hip hemiarthroplasty 3/8/22: Presented with a fall after shoveling. Underwent hemiarthroplasty on 3/8/22. Post-op course uncomplicated  - Continue pain control with prn tylenol and oxycodone.  Taking minimal oxycodone, typically 1-2x day  - Seen by onsite ortho 3/22, no changes, staples removed.  Follow-up in 4 weeks  - PT/OT/SW      (D62) ABLA (acute blood loss anemia): Hgb 13.9--10.5.  - Repeat Hgb 3/14 10.0  -Monitor periodically    (K59.00) Constipation: No issues currently  -Continue twice daily scheduled senna  -Continue MiraLAX to 17 g daily scheduled    (I10) Hypertension goal BP (blood pressure) < 140/90:[Norvasc 10 mg/d, Lisinopril 40 mg/d, Toprol Xl 75 mg/d] morning blood pressures are slightly above goal with SBPs occasionally in 150s.  Already on max dose of Norvasc and lisinopril.  Heart rates 50s-60s and would limit further up titration of beta-blocker.  Patient feels blood pressures may be more elevated due to being in TCU setting.  -For now continue current regimen without changes.  Want to avoid hypotension in the setting of Parkinson's  -Follow-up with PCP post discharge for ongoing adjustments      (I48.20) Chronic atrial fibrillation (H)  (Z79.01) Long term current use of anticoagulants with INR goal of 2.0-3.0: PTA Warfarin dosing 7.5 mg Mon and Thurs and 5 mg AOD. Warfarin reversed on admission due to need for operative management. Bridged with lovenox post-op. INR 2.16 on discharge.  INR has been somewhat erratic since admission to TCU.  Initially supratherapeutic with home regimen.  Patient notes eating less leafy greens at TCU which is likely contributing  -INR 3.0  -   -Discussed rationale for close monitoring of INR with patient    (G20) Parkinson disease (H):   - Continue PTA Sinamet    (G47.33) SARAH (obstructive sleep apnea)  -Using CPAP from home    (C61) Prostate cancer (H): S/p radical prostatectomy in 2001. Currently on androgen  "deprivation therapy. Followed by Dr. Guthrie of Urology  - Has follow up appointment 4/11      Discharge Medications:    Current Outpatient Medications   Medication Sig Dispense Refill     acetaminophen (TYLENOL) 500 MG tablet Take 1,000 mg by mouth 3 times daily       allopurinol (ZYLOPRIM) 100 MG tablet Take 1 tablet (100 mg) by mouth daily 90 tablet 3     amLODIPine (NORVASC) 10 MG tablet Take 1 tablet by mouth once daily 90 tablet 0     Apoaequorin (PREVAGEN PO) Take 1 tablet by mouth daily       carbidopa-levodopa (SINEMET)  MG tablet Take 1 tablet by mouth 3 times daily       lisinopril (ZESTRIL) 40 MG tablet Take 1 tablet by mouth once daily 90 tablet 0     Loratadine (CLARITIN PO) Take 10 mg by mouth daily as needed (allergies)        metoprolol succinate ER (TOPROL-XL) 50 MG 24 hr tablet TAKE 1 & 1/2 (ONE & ONE-HALF) TABLETS BY MOUTH ONCE DAILY 135 tablet 1     multivitamin w/minerals (MULTI-VITAMIN) tablet Take 1 tablet by mouth daily       oxyCODONE (ROXICODONE) 5 MG tablet Take 1-2 tablets (5-10 mg) by mouth every 4 hours as needed for moderate to severe pain 1 tab po q 4 hrs prn pain scale \"1-5\"  2 tabs po q 4 hrs prn pain scale \"6-10\" 20 tablet 0     polyethylene glycol (MIRALAX) 17 GM/Dose powder Take 17 g by mouth daily 510 g      senna-docusate (SENOKOT-S/PERICOLACE) 8.6-50 MG tablet Take 1 tablet by mouth 2 times daily       warfarin ANTICOAGULANT (COUMADIN) 5 MG tablet Take one tablet (5 mg) by mouth daily except take one and a half tablets (7.5 mg) Mon, Th or as directed by INR Clinic 100 tablet 0          Controlled medications:   Medication: Oxycodone 5 mg , 20 tabs given to patient at the time of discharge to take home     Past Medical History:   Past Medical History:   Diagnosis Date     Bell's palsy 10/15/2002     CA IN SITU PROSTATE 10/15/2002     Chronic atrial fibrillation (H) 07/01/2010     Chronic renal insufficiency      Glaucoma 04/10/2003     Problem list name updated by " automated process. Provider to review     Gout 05/16/2013     Hyperlipidemia LDL goal <100 09/10/2014     Hypertension goal BP (blood pressure) < 140/90 06/28/2006     SARAH (obstructive sleep apnea) 08/28/2013     SARAH (obstructive sleep apnea)      Parkinson disease (H) 2019     Pericarditis 2001     Renal cyst      Physical Exam:   Vitals: BP (!) 155/77   Pulse 69   Temp 98.5  F (36.9  C)   Resp 16   Ht 1.829 m (6')   Wt 99.6 kg (219 lb 9.6 oz)   SpO2 98%   BMI 29.78 kg/m    BMI: Body mass index is 29.78 kg/m .  Physical Exam  Constitutional:       Appearance: Normal appearance. He is normal weight.   HENT:      Head: Normocephalic and atraumatic.   Eyes:      General: No scleral icterus.  Cardiovascular:      Rate and Rhythm: Normal rate and regular rhythm.      Heart sounds: No murmur heard.  Pulmonary:      Effort: Pulmonary effort is normal. No respiratory distress.      Breath sounds: Normal breath sounds.   Abdominal:      General: Abdomen is flat. There is no distension.      Palpations: Abdomen is soft.   Musculoskeletal:         General: Normal range of motion.      Right lower leg: No edema.      Left lower leg: No edema.   Skin:     General: Skin is warm and dry.      Findings: No rash.   Neurological:      General: No focal deficit present.      Mental Status: He is alert. Mental status is at baseline.   Psychiatric:         Mood and Affect: Mood normal.         SNF labs: Recent labs in [facility] reviewed by me today.  and   Most Recent 3 CBC's:Recent Labs   Lab Test 03/14/22 0657 03/11/22 0642 03/10/22  0722 03/09/22  0725   WBC  --  8.8 9.3 11.4*   HGB 10.0* 10.4* 10.5* 11.1*   MCV  --  86 87 83   PLT  --  175 142* 147*     Most Recent 3 BMP's:Recent Labs   Lab Test 03/14/22 0657 03/11/22  0642 03/10/22  0722    139 139   POTASSIUM 4.0 4.4 4.4   CHLORIDE 107 110* 110*   CO2 26 27 26   BUN 22 36* 35*   CR 1.12 1.56* 1.49*   ANIONGAP 9 2* 3   BRIANA 8.9 8.8 8.8   GLC 84 98 95  95     Most Recent  3 Hemoglobins:Recent Labs   Lab Test 03/14/22  0657 03/11/22  0642 03/10/22  0722   HGB 10.0* 10.4* 10.5*       DISCHARGE PLAN:    Follow up labs: INR due 4/8 to be drawn by home care with results to Malcolm Schafer        Medical Follow Up:      Follow up with primary care provider in 1-2 weeks  Follow up with specialist Follow up with Ortho per recomendations    [facility] scheduled appointments:  Next 5 appointments (look out 90 days)    May 25, 2022 10:15 AM  Return Visit with Ángel Guthrie MD  [facility] ([facility] ) [address]  [address]  [facility] [id number] [phone]           Discharge Services: Home Care:  Occupational Therapy, Physical Therapy, Registered Nurse and Home Health Aide    Discharge Instructions Verbalized to Patient at Discharge:     Weight bearing restrictions:  Weight bearing as tolerated.     CPAP when sleeping; settings per usual practice.     TOTAL DISCHARGE TIME:   Greater than 30 minutes  Electronically signed by:  Malinda Bragg PA-C     Documentation of Face to Face and Certification for Home Health Services    I certify that patient: Billy Stock is under my care and that I, or a nurse practitioner or physician's assistant working with me, had a face-to-face encounter that meets the physician face-to-face encounter requirements with this patient on: 4/4/2022.    This encounter with the patient was in whole, or in part, for the following medical condition, which is the primary reason for home health care:   1. S/P hip hemiarthroplasty    2. Drug-induced constipation    3. SARAH (obstructive sleep apnea)    4. Parkinson disease (H)    5. Hip fracture, right, closed, initial encounter (H)    6. Essential hypertension, benign    7. Chronic atrial fibrillation (H)    8. ABLA (acute blood loss anemia)    9. Closed fracture of right hip with routine healing, subsequent encounter    10. Long term  current use of anticoagulants with INR goal of 2.0-3.0      .    I certify that, based on my findings, the following services are medically necessary home health services: Nursing, Occupational Therapy and Physical Therapy.    My clinical findings support the need for the above services because: Nurse is needed: INR 4/8, results to PCP., Physical Therapy Services are needed to assess and treat the following functional impairments: recent hip fracture. and Speech Therapy Services are needed to assess and treat impairments in language and/or swallow functions due to recent hip fracture.    Further, I certify that my clinical findings support that this patient is homebound (i.e. absences from home require considerable and taxing effort and are for medical reasons or [employer] services or infrequently or of short duration when for other reasons) because: Requires assistance of another person or specialized equipment to access medical services because patient: Requires supervision of another for safe transfer...    Based on the above findings. I certify that this patient is confined to the home and needs intermittent skilled nursing care, physical therapy and/or speech therapy.  The patient is under my care, and I have initiated the establishment of the plan of care.  This patient will be followed by a physician who will periodically review the plan of care.  Physician/Provider to provide follow up care: Malcolm Schafer    Attending hospital physician (the Medicare certified [place] provider): Terri Robert MD  Physician Signature: See electronic signature associated with these discharge orders.  Date: 4/4/2022

## 2022-04-04 NOTE — LETTER
4/4/2022        RE: Billy Stock  [address]  [place] [address]        [facility] GERIATRICS DISCHARGE SUMMARY  PATIENT'S NAME: Billy Stock  YOB: 1945  MEDICAL RECORD NUMBER:  [id number]  Place of Service where encounter took place:  [facility] (U) [[id number]]    PRIMARY CARE PROVIDER AND CLINIC RESPONSIBLE AFTER TRANSFER:   Malcolm Schafer MD, [address] / [facility] [address]    [facility] Provider     Transferring providers: Malinda Bragg PA-C, Terri Robert DO  Recent Hospitalization/ED:  [facility] stay 3/6/22 to 3/11/22.  Date of SNF Admission: March 11, 2022  Date of SNF (anticipated) Discharge: April 06, 2022  Discharged to: previous independent home  Cognitive Scores: CPT: 5/5.6 SLUMS 22/30  Physical Function: Ambulating 250 ft with FWW. Able to go up down steps with SBA  DME: No new DME needed    CODE STATUS/ADVANCE DIRECTIVES DISCUSSION:  Prior   ALLERGIES: Cats, Codeine, Codeine, Maple tree, Morphine, and Watermelon [citrullus vulgaris]        Summary of nursing facility stay:   Billy Stock is a 75 yo male with a PMH Atrial Fibrillation on chronic anticoagulation with warfarin, CKD, Parkinson's disease, HTN and SARAH who was recently admitted to [facility] for evaluation after sustaining a right hip fracture. He was shoveling snow when he fell. Xrays in the ER with right femoral neck fracture. Orthopedics was consulted and he underwent anterior lateral hip hemiarthroplasty on 3/8/22. Post operative course was uncomplicated and he was discharged to [facility]CU    He progressed well with physical therapy and will be discharging back to his home with home care. His INR was slightly erratic during his TCU stay, he indicates he is eating less salads since admission to TCU. Some SBP readings slightly above goal but elected not to make changes. Will have him follow up with PCP.    (W19.XXXD) Fall, subsequent encounter   (primary encounter diagnosis)  (S72.001D) Closed fracture of right hip with routine healing, subsequent encounter  (Z96.649) S/P hip hemiarthroplasty 3/8/22: Presented with a fall after shoveling. Underwent hemiarthroplasty on 3/8/22. Post-op course uncomplicated  - Continue pain control with prn tylenol and oxycodone.  Taking minimal oxycodone, typically 1-2x day  - Seen by onsite ortho 3/22, no changes, staples removed.  Follow-up in 4 weeks  - PT/OT/SW      (D62) ABLA (acute blood loss anemia): Hgb 13.9--10.5.  - Repeat Hgb 3/14 10.0  -Monitor periodically    (K59.00) Constipation: No issues currently  -Continue twice daily scheduled senna  -Continue MiraLAX to 17 g daily scheduled    (I10) Hypertension goal BP (blood pressure) < 140/90:[Norvasc 10 mg/d, Lisinopril 40 mg/d, Toprol Xl 75 mg/d] morning blood pressures are slightly above goal with SBPs occasionally in 150s.  Already on max dose of Norvasc and lisinopril.  Heart rates 50s-60s and would limit further up titration of beta-blocker.  Patient feels blood pressures may be more elevated due to being in TCU setting.  -For now continue current regimen without changes.  Want to avoid hypotension in the setting of Parkinson's  -Follow-up with PCP post discharge for ongoing adjustments      (I48.20) Chronic atrial fibrillation (H)  (Z79.01) Long term current use of anticoagulants with INR goal of 2.0-3.0: PTA Warfarin dosing 7.5 mg Mon and Thurs and 5 mg AOD. Warfarin reversed on admission due to need for operative management. Bridged with lovenox post-op. INR 2.16 on discharge.  INR has been somewhat erratic since admission to TCU.  Initially supratherapeutic with home regimen.  Patient notes eating less leafy greens at TCU which is likely contributing  -INR 3.0  -   -Discussed rationale for close monitoring of INR with patient    (G20) Parkinson disease (H):   - Continue PTA Sinamet    (G47.33) SARAH (obstructive sleep apnea)  -Using CPAP from home    (C61)  "Prostate cancer (H): S/p radical prostatectomy in 2001. Currently on androgen deprivation therapy. Followed by Dr. Guthrie of Urology  - Has follow up appointment 4/11      Discharge Medications:    Current Outpatient Medications   Medication Sig Dispense Refill     acetaminophen (TYLENOL) 500 MG tablet Take 1,000 mg by mouth 3 times daily       allopurinol (ZYLOPRIM) 100 MG tablet Take 1 tablet (100 mg) by mouth daily 90 tablet 3     amLODIPine (NORVASC) 10 MG tablet Take 1 tablet by mouth once daily 90 tablet 0     Apoaequorin (PREVAGEN PO) Take 1 tablet by mouth daily       carbidopa-levodopa (SINEMET)  MG tablet Take 1 tablet by mouth 3 times daily       lisinopril (ZESTRIL) 40 MG tablet Take 1 tablet by mouth once daily 90 tablet 0     Loratadine (CLARITIN PO) Take 10 mg by mouth daily as needed (allergies)        metoprolol succinate ER (TOPROL-XL) 50 MG 24 hr tablet TAKE 1 & 1/2 (ONE & ONE-HALF) TABLETS BY MOUTH ONCE DAILY 135 tablet 1     multivitamin w/minerals (MULTI-VITAMIN) tablet Take 1 tablet by mouth daily       oxyCODONE (ROXICODONE) 5 MG tablet Take 1-2 tablets (5-10 mg) by mouth every 4 hours as needed for moderate to severe pain 1 tab po q 4 hrs prn pain scale \"1-5\"  2 tabs po q 4 hrs prn pain scale \"6-10\" 20 tablet 0     polyethylene glycol (MIRALAX) 17 GM/Dose powder Take 17 g by mouth daily 510 g      senna-docusate (SENOKOT-S/PERICOLACE) 8.6-50 MG tablet Take 1 tablet by mouth 2 times daily       warfarin ANTICOAGULANT (COUMADIN) 5 MG tablet Take one tablet (5 mg) by mouth daily except take one and a half tablets (7.5 mg) Mon, Th or as directed by INR Clinic 100 tablet 0          Controlled medications:   Medication: Oxycodone 5 mg , 20 tabs given to patient at the time of discharge to take home     Past Medical History:   Past Medical History:   Diagnosis Date     Bell's palsy 10/15/2002     CA IN SITU PROSTATE 10/15/2002     Chronic atrial fibrillation (H) 07/01/2010     Chronic renal " insufficiency      Glaucoma 04/10/2003     Problem list name updated by automated process. Provider to review     Gout 05/16/2013     Hyperlipidemia LDL goal <100 09/10/2014     Hypertension goal BP (blood pressure) < 140/90 06/28/2006     SARAH (obstructive sleep apnea) 08/28/2013     SARAH (obstructive sleep apnea)      Parkinson disease (H) 2019     Pericarditis 2001     Renal cyst      Physical Exam:   Vitals: BP (!) 155/77   Pulse 69   Temp 98.5  F (36.9  C)   Resp 16   Ht 1.829 m (6')   Wt 99.6 kg (219 lb 9.6 oz)   SpO2 98%   BMI 29.78 kg/m    BMI: Body mass index is 29.78 kg/m .  Physical Exam  Constitutional:       Appearance: Normal appearance. He is normal weight.   HENT:      Head: Normocephalic and atraumatic.   Eyes:      General: No scleral icterus.  Cardiovascular:      Rate and Rhythm: Normal rate and regular rhythm.      Heart sounds: No murmur heard.  Pulmonary:      Effort: Pulmonary effort is normal. No respiratory distress.      Breath sounds: Normal breath sounds.   Abdominal:      General: Abdomen is flat. There is no distension.      Palpations: Abdomen is soft.   Musculoskeletal:         General: Normal range of motion.      Right lower leg: No edema.      Left lower leg: No edema.   Skin:     General: Skin is warm and dry.      Findings: No rash.   Neurological:      General: No focal deficit present.      Mental Status: He is alert. Mental status is at baseline.   Psychiatric:         Mood and Affect: Mood normal.         SNF labs: Recent labs in [facility] reviewed by me today.  and   Most Recent 3 CBC's:Recent Labs   Lab Test 03/14/22 0657 03/11/22 0642 03/10/22  0722 03/09/22  0725   WBC  --  8.8 9.3 11.4*   HGB 10.0* 10.4* 10.5* 11.1*   MCV  --  86 87 83   PLT  --  175 142* 147*     Most Recent 3 BMP's:Recent Labs   Lab Test 03/14/22 0657 03/11/22  0642 03/10/22  0722    139 139   POTASSIUM 4.0 4.4 4.4   CHLORIDE 107 110* 110*   CO2 26 27 26   BUN 22 36* 35*   CR 1.12 1.56* 1.49*    ANIONGAP 9 2* 3   BRIANA 8.9 8.8 8.8   GLC 84 98 95  95     Most Recent 3 Hemoglobins:Recent Labs   Lab Test 03/14/22  0657 03/11/22  0642 03/10/22  0722   HGB 10.0* 10.4* 10.5*       DISCHARGE PLAN:    Follow up labs: INR due 4/8 to be drawn by home care with results to Malcolm Schafer        Medical Follow Up:      Follow up with primary care provider in 1-2 weeks  Follow up with specialist Follow up with Ortho per recomendations    [facility] scheduled appointments:  Next 5 appointments (look out 90 days)    May 25, 2022 10:15 AM  Return Visit with Ángel Guthrie MD  [facility] [place] ([facility] ) [address]  [address]  [facility] [id number] [phone]           Discharge Services: Home Care:  Occupational Therapy, Physical Therapy, Registered Nurse and Home Health Aide    Discharge Instructions Verbalized to Patient at Discharge:     Weight bearing restrictions:  Weight bearing as tolerated.     CPAP when sleeping; settings per usual practice.     TOTAL DISCHARGE TIME:   Greater than 30 minutes  Electronically signed by:  Malinda Bragg PA-C     Documentation of Face to Face and Certification for Home Health Services    I certify that patient: Billy Stock is under my care and that I, or a nurse practitioner or physician's assistant working with me, had a face-to-face encounter that meets the physician face-to-face encounter requirements with this patient on: 4/4/2022.    This encounter with the patient was in whole, or in part, for the following medical condition, which is the primary reason for home health care:   1. S/P hip hemiarthroplasty    2. Drug-induced constipation    3. SARAH (obstructive sleep apnea)    4. Parkinson disease (H)    5. Hip fracture, right, closed, initial encounter (H)    6. Essential hypertension, benign    7. Chronic atrial fibrillation (H)    8. ABLA (acute blood loss anemia)    9. Closed fracture of  right hip with routine healing, subsequent encounter    10. Long term current use of anticoagulants with INR goal of 2.0-3.0      .    I certify that, based on my findings, the following services are medically necessary home health services: Nursing, Occupational Therapy and Physical Therapy.    My clinical findings support the need for the above services because: Nurse is needed: INR 4/8, results to PCP., Physical Therapy Services are needed to assess and treat the following functional impairments: recent hip fracture. and Speech Therapy Services are needed to assess and treat impairments in language and/or swallow functions due to recent hip fracture.    Further, I certify that my clinical findings support that this patient is homebound (i.e. absences from home require considerable and taxing effort and are for medical reasons or [employer] services or infrequently or of short duration when for other reasons) because: Requires assistance of another person or specialized equipment to access medical services because patient: Requires supervision of another for safe transfer...    Based on the above findings. I certify that this patient is confined to the home and needs intermittent skilled nursing care, physical therapy and/or speech therapy.  The patient is under my care, and I have initiated the establishment of the plan of care.  This patient will be followed by a physician who will periodically review the plan of care.  Physician/Provider to provide follow up care: Malcolm Schafer    Attending hospital physician (the Medicare certified [place] provider): Terri Robert MD  Physician Signature: See electronic signature associated with these discharge orders.  Date: 4/4/2022                      Sincerely,        Malinda Bragg PA-C

## 2022-04-07 ENCOUNTER — MEDICAL CORRESPONDENCE (OUTPATIENT)
Dept: HEALTH INFORMATION MANAGEMENT | Facility: CLINIC | Age: 77
End: 2022-04-07
Payer: COMMERCIAL

## 2022-04-07 ENCOUNTER — ANTICOAGULATION THERAPY VISIT (OUTPATIENT)
Dept: ANTICOAGULATION | Facility: CLINIC | Age: 77
End: 2022-04-07
Payer: COMMERCIAL

## 2022-04-07 ENCOUNTER — TELEPHONE (OUTPATIENT)
Dept: INTERNAL MEDICINE | Facility: CLINIC | Age: 77
End: 2022-04-07
Payer: COMMERCIAL

## 2022-04-07 DIAGNOSIS — I48.20 CHRONIC ATRIAL FIBRILLATION (H): ICD-10-CM

## 2022-04-07 DIAGNOSIS — I48.20 CHRONIC ATRIAL FIBRILLATION (H): Primary | ICD-10-CM

## 2022-04-07 DIAGNOSIS — Z79.01 LONG TERM CURRENT USE OF ANTICOAGULANTS WITH INR GOAL OF 2.0-3.0: Primary | ICD-10-CM

## 2022-04-07 LAB — INR (EXTERNAL): 2.6 (ref 0.9–1.1)

## 2022-04-07 NOTE — TELEPHONE ENCOUNTER
Please see message below and advise.     Patient last saw Dr. Schafer on 2/25/2021 for office visit.     Patient has future appointment with Dr. Schafer on 4/12/2022.     Silvana AMATO RN   [facility]

## 2022-04-07 NOTE — TELEPHONE ENCOUNTER
[facility] calling for verbal orders for   Skilled nursing  2 times a week for 1 week  1 time a week for 3 weeks  1 time every other week for 4 weeks    Home health aide  1 time a week for 8 weeks    Patient discharged with no INR orders. Please advise when they should draw his INR.    Ok to call and ladarius [phone]

## 2022-04-07 NOTE — PROGRESS NOTES
ANTICOAGULATION MANAGEMENT     Billy Stock 76 year old male is on warfarin with therapeutic INR result. (Goal INR 2.0-3.0)    Recent labs: (last 7 days)     04/07/22  1508   INR 2.6*       ASSESSMENT       Source(s): Chart Review and Home Care/Facility Nurse       Warfarin doses taken: Warfarin taken as instructed    Diet: No new diet changes identified    New illness, injury, or hospitalization: Yes: Recent TCU discharge 4/6/22 for hip fracture    Medication/supplement changes: None noted    Signs or symptoms of bleeding or clotting: No    Previous INR: Therapeutic last visit; previously outside of goal range    Additional findings: Per discharge notes, patient's INR was 2.16 on discharge but no date was noted       PLAN     Recommended plan for no diet, medication or health factor changes affecting INR     Dosing Instructions: continue your current warfarin dose with next INR in 1 week       Summary  As of 4/7/2022    Full warfarin instructions:  7.5 mg every Mon, Thu; 5 mg all other days   Next INR check:  4/14/2022             Telephone call with Sima home care/facility nurse who agrees to plan and repeated back plan correctly    Orders given to  Homecare nurse/facility to recheck    Education provided: Please call back if any changes to your diet, medications or how you've been taking warfarin    Plan made per [facility] anticoagulation protocol    Wilma Al, RN  Anticoagulation Clinic  4/7/2022    _______________________________________________________________________     Anticoagulation Episode Summary     Current INR goal:  2.0-3.0   TTR:  61.4 % (11.1 mo)   Target end date:  Indefinite   Send INR reminders to:  [facility]    Indications    Long term current use of anticoagulants with INR goal of 2.0-3.0 [Z79.01]  Chronic atrial fibrillation (H) [I48.20]           Comments:  Patient may extend INR interval up to 8 weeks, and +2 weeks with each subsequent INR in range up to 12 weeks max          Anticoagulation Care Providers     Provider Role Specialty Phone number    Malcolm Schafer MD Referring Internal Medicine [phone]

## 2022-04-07 NOTE — TELEPHONE ENCOUNTER
Sima, nurse with [facility] called back.  Gave approval of orders then transferred her to [facility] to report today's INR results.  .STEPH Davis R.N.

## 2022-04-07 NOTE — TELEPHONE ENCOUNTER
Call to Sima with [facility] at [phone]. Detailed message left with Dr. Schafer's below response.     Dr. Schafer, are you okay with the request for the home health aid request as well?     [facility] will require us to fax an order for the INR lab. Lab order pended for provider to review and sign if appropriate.     Keeping this encounter open until receive call back [facility] to get fax number to send INR lab order.     Silvana AMATO RN   [facility]

## 2022-04-12 ENCOUNTER — VIRTUAL VISIT (OUTPATIENT)
Dept: INTERNAL MEDICINE | Facility: CLINIC | Age: 77
End: 2022-04-12
Payer: COMMERCIAL

## 2022-04-12 ENCOUNTER — TELEPHONE (OUTPATIENT)
Dept: INTERNAL MEDICINE | Facility: CLINIC | Age: 77
End: 2022-04-12

## 2022-04-12 DIAGNOSIS — Z87.81 STATUS POST-OPERATIVE REPAIR OF CLOSED HIP FRACTURE: ICD-10-CM

## 2022-04-12 DIAGNOSIS — G20.A1 PARKINSON DISEASE (H): ICD-10-CM

## 2022-04-12 DIAGNOSIS — I48.20 CHRONIC ATRIAL FIBRILLATION (H): ICD-10-CM

## 2022-04-12 DIAGNOSIS — I10 ESSENTIAL HYPERTENSION, BENIGN: ICD-10-CM

## 2022-04-12 DIAGNOSIS — Z09 HOSPITAL DISCHARGE FOLLOW-UP: ICD-10-CM

## 2022-04-12 DIAGNOSIS — Z98.890 STATUS POST-OPERATIVE REPAIR OF CLOSED HIP FRACTURE: ICD-10-CM

## 2022-04-12 DIAGNOSIS — Z13.220 SCREENING FOR HYPERLIPIDEMIA: Primary | ICD-10-CM

## 2022-04-12 PROCEDURE — 99441 PR PHYSICIAN TELEPHONE EVALUATION 5-10 MIN: CPT | Mod: 95 | Performed by: INTERNAL MEDICINE

## 2022-04-12 NOTE — TELEPHONE ENCOUNTER
Call to Sue and left message. Not sure if this is FYI or if she needs orders.     Verbal order given to approve visits until certification received for MD signature.     Pt had VV today with Dr Schafer.

## 2022-04-12 NOTE — PROGRESS NOTES
Marek is a 76 year old who is being evaluated via a billable telephone visit.      What phone number would you like to be contacted at? [phone]  How would you like to obtain your AVS? MyChart    Assessment & Plan     Screening for hyperlipidemia  Cont treatment     Hospital discharge follow-up  Improved, post hip partial replacement surgery     Status post-operative repair of closed hip fracture  Able to ambulate, keep exercises, walking, uses walker     Parkinson disease (H)  On Sinemet     Essential hypertension, benign  Controlled on treatment     Chronic atrial fibrillation (H)  On AC, rate controlled                BMI:   Estimated body mass index is 29.78 kg/m  as calculated from the following:    Height as of 4/4/22: 1.829 m (6').    Weight as of 4/4/22: 99.6 kg (219 lb 9.6 oz).       See Patient Instructions    Return in about 4 weeks (around 5/10/2022) for Physical Exam.    Malcolm Schafer MD  [facility]    Kelsey Jara is a 76 year old who presents for the following health issues     HPI   Follow up     Recently hospitalized for hip fracture post fall. Had partial hip replacement.   Was in TCU, now is at home. Feels well. Able to ambulate with a walker.   Pain is controlled, takes only Tylenol as needed.   Concern for left ankle and lower leg swelling. No pain. No redness.   On AC with Coumadin for h/o a fib , rate is controlled.   Has h/o HTN. on medical treatment. BP has been controlled. No side effects from medications. No CP, HA, dizziness. good compliance with medications and low salt diet.          Review of Systems   Constitutional, HEENT, cardiovascular, pulmonary, gi and gu systems are negative, except as otherwise noted.      Objective           Vitals:  No vitals were obtained today due to virtual visit.    Physical Exam   healthy, alert and no distress  PSYCH: Alert and oriented times 3; coherent speech, normal   rate and volume, able to articulate  logical thoughts, able   to abstract reason, no tangential thoughts, no hallucinations   or delusions  His affect is normal  RESP: No cough, no audible wheezing, able to talk in full sentences  Remainder of exam unable to be completed due to telephone visits    Anticoagulation Therapy Visit on 04/07/2022   Component Date Value Ref Range Status     INR (External) 04/07/2022 2.6 (A) 0.9 - 1.1 Final               Phone call duration: 9 minutes

## 2022-04-12 NOTE — TELEPHONE ENCOUNTER
Occupational Therapy evaluation only today.    Patient declines shower chair at this time.     Sue Occupational Therapist [phone] with [facility]

## 2022-04-14 ENCOUNTER — ANTICOAGULATION THERAPY VISIT (OUTPATIENT)
Dept: ANTICOAGULATION | Facility: CLINIC | Age: 77
End: 2022-04-14
Payer: COMMERCIAL

## 2022-04-14 DIAGNOSIS — Z79.01 LONG TERM CURRENT USE OF ANTICOAGULANTS WITH INR GOAL OF 2.0-3.0: Primary | ICD-10-CM

## 2022-04-14 DIAGNOSIS — I48.20 CHRONIC ATRIAL FIBRILLATION (H): ICD-10-CM

## 2022-04-14 LAB — INR (EXTERNAL): 4.4 (ref 0.9–1.1)

## 2022-04-14 NOTE — PROGRESS NOTES
ANTICOAGULATION MANAGEMENT     Billy Stock 76 year old male is on warfarin with supratherapeutic INR result. (Goal INR 2.0-3.0)    Recent labs: (last 7 days)     04/14/22  1017   INR 4.4*       ASSESSMENT       Source(s): Chart Review and Home Care/Facility Nurse       Warfarin doses taken: Warfarin taken as instructed    Diet: Increased greens/vitamin K in diet; ongoing change, reintroducing greens into diet now that hes home    New illness, injury, or hospitalization: Yes: Hip fracture, patient has +1 edema in lower extremity, swelling in hip area    Medication/supplement changes: Tylenol 1000mg TID    Signs or symptoms of bleeding or clotting: No    Previous INR: Therapeutic last 2(+) visits    Additional findings: None       PLAN     Recommended plan for temporary change(s) and ongoing change(s) affecting INR     Dosing Instructions: hold dose then decrease your warfarin dose (12.5% change) with next INR in 1 week       Summary  As of 4/14/2022    Full warfarin instructions:  4/14: Hold; Otherwise 5 mg every day   Next INR check:  4/21/2022             Telephone call with felipe home care/facility nurse who agrees to plan and repeated back plan correctly    Orders given to  Homecare nurse/facility to recheck    Education provided: Please call back if any changes to your diet, medications or how you've been taking warfarin    Plan made per [facility] anticoagulation protocol    Mary Del Valle, RN  Anticoagulation Clinic  4/14/2022    _______________________________________________________________________     Anticoagulation Episode Summary     Current INR goal:  2.0-3.0   TTR:  59.8 % (11.1 mo)   Target end date:  Indefinite   Send INR reminders to:  Pending sale to [facility]    Indications    Long term current use of anticoagulants with INR goal of 2.0-3.0 [Z79.01]  Chronic atrial fibrillation (H) [I48.20]           Comments:  Patient may extend INR interval up to 8 weeks, and +2 weeks with each subsequent INR in  range up to 12 weeks max         Anticoagulation Care Providers     Provider Role Specialty Phone number    Malcolm Schafer MD Referring Internal Medicine [phone]

## 2022-04-21 ENCOUNTER — TELEPHONE (OUTPATIENT)
Dept: INTERNAL MEDICINE | Facility: CLINIC | Age: 77
End: 2022-04-21
Payer: COMMERCIAL

## 2022-04-21 ENCOUNTER — MEDICAL CORRESPONDENCE (OUTPATIENT)
Dept: HEALTH INFORMATION MANAGEMENT | Facility: CLINIC | Age: 77
End: 2022-04-21
Payer: COMMERCIAL

## 2022-04-21 ENCOUNTER — ANTICOAGULATION THERAPY VISIT (OUTPATIENT)
Dept: ANTICOAGULATION | Facility: CLINIC | Age: 77
End: 2022-04-21
Payer: COMMERCIAL

## 2022-04-21 DIAGNOSIS — S72.001A HIP FRACTURE, RIGHT (H): ICD-10-CM

## 2022-04-21 DIAGNOSIS — Z79.01 LONG TERM CURRENT USE OF ANTICOAGULANTS WITH INR GOAL OF 2.0-3.0: Primary | ICD-10-CM

## 2022-04-21 DIAGNOSIS — I48.20 CHRONIC ATRIAL FIBRILLATION (H): ICD-10-CM

## 2022-04-21 DIAGNOSIS — Z00.00 ROUTINE GENERAL MEDICAL EXAMINATION AT A HEALTH CARE FACILITY: Primary | ICD-10-CM

## 2022-04-21 DIAGNOSIS — M81.0 OSTEOPOROSIS: ICD-10-CM

## 2022-04-21 DIAGNOSIS — M81.0 SENILE OSTEOPOROSIS: ICD-10-CM

## 2022-04-21 LAB — INR (EXTERNAL): 3.7 (ref 0.9–1.1)

## 2022-04-21 NOTE — TELEPHONE ENCOUNTER
04/07/22-06/05/22 home health certification orders received via fax. Form in your mailbox to be signed.

## 2022-04-21 NOTE — PROGRESS NOTES
ANTICOAGULATION MANAGEMENT     Billy Stock 76 year old male is on warfarin with supratherapeutic INR result. (Goal INR 2.0-3.0)    Recent labs: (last 7 days)     04/21/22  1124   INR 3.7*       ASSESSMENT       Source(s): Chart Review and Home Care/Facility Nurse       Warfarin doses taken: Warfarin taken as instructed    Diet: Decreased greens/vitamin K in diet; ongoing change    New illness, injury, or hospitalization: cough and runny nose    Medication/supplement changes: loratadine    Signs or symptoms of bleeding or clotting: No    Previous INR: Supratherapeutic    Additional findings: None       PLAN     Recommended plan for ongoing change(s) affecting INR     Dosing Instructions: decrease your warfarin dose (14.3% change) with next INR in 1 week       Summary  As of 4/21/2022    Full warfarin instructions:  2.5 mg every Mon, Thu; 5 mg all other days   Next INR check:  4/28/2022             Telephone call with Anita home care/facility nurse who verbalizes understanding and agrees to plan    Orders given to  Homecare nurse/facility to recheck    Education provided: Importance of consistent vitamin K intake    Plan made per [facility] anticoagulation protocol    Larissa Villela, RN  Anticoagulation Clinic  4/21/2022    _______________________________________________________________________     Anticoagulation Episode Summary     Current INR goal:  2.0-3.0   TTR:  57.7 % (11.1 mo)   Target end date:  Indefinite   Send INR reminders to:  [facility]    Indications    Long term current use of anticoagulants with INR goal of 2.0-3.0 [Z79.01]  Chronic atrial fibrillation (H) [I48.20]           Comments:  Patient may extend INR interval up to 8 weeks, and +2 weeks with each subsequent INR in range up to 12 weeks max         Anticoagulation Care Providers     Provider Role Specialty Phone number    Malcolm Schafer MD Referring Internal Medicine [phone]

## 2022-04-25 ENCOUNTER — TELEPHONE (OUTPATIENT)
Dept: INTERNAL MEDICINE | Facility: CLINIC | Age: 77
End: 2022-04-25
Payer: COMMERCIAL

## 2022-04-28 ENCOUNTER — ANTICOAGULATION THERAPY VISIT (OUTPATIENT)
Dept: ANTICOAGULATION | Facility: CLINIC | Age: 77
End: 2022-04-28
Payer: COMMERCIAL

## 2022-04-28 DIAGNOSIS — Z79.01 LONG TERM CURRENT USE OF ANTICOAGULANTS WITH INR GOAL OF 2.0-3.0: ICD-10-CM

## 2022-04-28 DIAGNOSIS — Z79.01 LONG TERM CURRENT USE OF ANTICOAGULANTS WITH INR GOAL OF 2.0-3.0: Primary | ICD-10-CM

## 2022-04-28 DIAGNOSIS — I48.20 CHRONIC ATRIAL FIBRILLATION (H): ICD-10-CM

## 2022-04-28 LAB — INR (EXTERNAL): 3.3 (ref 0.9–1.1)

## 2022-04-28 RX ORDER — WARFARIN SODIUM 5 MG/1
TABLET ORAL
Qty: 90 TABLET | Refills: 1 | Status: SHIPPED | OUTPATIENT
Start: 2022-04-28 | End: 2022-10-06

## 2022-04-28 NOTE — TELEPHONE ENCOUNTER
Pending Prescriptions:                       Disp   Refills    warfarin ANTICOAGULANT (COUMADIN) 5 MG tab*100 ta*0        Sig: Warfarin 4 mg on Monday, 5 mg AOD. Recheck INR with           home care 4/8. Further dosing per INR clinic

## 2022-04-28 NOTE — TELEPHONE ENCOUNTER
ANTICOAGULATION MANAGEMENT:  Medication Refill    Anticoagulation Summary  As of 4/28/2022    Warfarin maintenance plan:  2.5 mg (5 mg x 0.5) every Mon, Thu; 5 mg (5 mg x 1) all other days   Next INR check:  5/5/2022   Target end date:  Indefinite    Indications    Long term current use of anticoagulants with INR goal of 2.0-3.0 [Z79.01]  Chronic atrial fibrillation (H) [I48.20]             Anticoagulation Care Providers     Provider Role Specialty Phone number    Malcolm Schafer MD Referring Internal Medicine [phone]          Visit with referring provider/group within last year: Yes    [facility] referral signed within last year: Yes    Billy meets all criteria for refill (current [facility] referral, office visit with referring provider/group in last year, lab monitoring up to date or not exceeding 2 weeks overdue). Rx instructions and quantity supplied updated to match patient's current dosing plan. Warfarin 90 day supply with 1 refill granted per [facility] protocol     Samantha Richards RN  Anticoagulation Clinic

## 2022-04-29 ENCOUNTER — TELEPHONE (OUTPATIENT)
Dept: INTERNAL MEDICINE | Facility: CLINIC | Age: 77
End: 2022-04-29
Payer: COMMERCIAL

## 2022-05-02 ENCOUNTER — PATIENT OUTREACH (OUTPATIENT)
Dept: CARE COORDINATION | Facility: CLINIC | Age: 77
End: 2022-05-02
Payer: COMMERCIAL

## 2022-05-02 NOTE — PROGRESS NOTES
Clinic Care Coordination Contact  Care Coordination Communication    Referral Source: IP Handoff    Clinical Data: Patient was receiving restorative services at [facility] and was discharged home with home care on 4/6/2022.     Home Care Contact:              Home Care Agency: [facility]              Anticipated start of care date: In-progress      Plan: RN/SW Care Coordinator will await notification from home care staff informing RN/SW Care Coordinator of patients discharge plans/needs. No further outreaches will be made at this time unless a new referral is made or a change in the patient's status occurs. Patient was provided with this writer's contact information and encouraged to call with any questions or concerns.    LEXIE Kowalski  Clinic Care Coordinator  Ph. [phone]  [email or web address]

## 2022-05-05 ENCOUNTER — ANTICOAGULATION THERAPY VISIT (OUTPATIENT)
Dept: ANTICOAGULATION | Facility: CLINIC | Age: 77
End: 2022-05-05
Payer: COMMERCIAL

## 2022-05-05 DIAGNOSIS — I48.20 CHRONIC ATRIAL FIBRILLATION (H): ICD-10-CM

## 2022-05-05 DIAGNOSIS — Z79.01 LONG TERM CURRENT USE OF ANTICOAGULANTS WITH INR GOAL OF 2.0-3.0: Primary | ICD-10-CM

## 2022-05-05 LAB — INR (EXTERNAL): 3 (ref 0.9–1.1)

## 2022-05-05 NOTE — PROGRESS NOTES
ANTICOAGULATION MANAGEMENT     Billy Stock 76 year old male is on warfarin with therapeutic INR result. (Goal INR 2.0-3.0)    Recent labs: (last 7 days)     05/05/22  1210   INR 3.0*       ASSESSMENT       Source(s): Chart Review and Home Care/Facility Nurse       Warfarin doses taken: Warfarin taken as instructed    Diet: Patient added greens back into diet    New illness, injury, or hospitalization: No    Medication/supplement changes: None noted    Signs or symptoms of bleeding or clotting: No    Previous INR: Supratherapeutic    Additional findings: None       PLAN     Recommended plan for no diet, medication or health factor changes affecting INR     Dosing Instructions: continue your current warfarin dose with next INR in 1 week       Summary  As of 5/5/2022    Full warfarin instructions:  2.5 mg every Mon, Thu; 5 mg all other days   Next INR check:  5/12/2022             Telephone call with Anita home care/facility nurse who agrees to plan and repeated back plan correctly    Orders given to  Homecare nurse/facility to recheck    Education provided: Importance of consistent vitamin K intake    Plan made per [facility] anticoagulation protocol    Wilma Al, RN  Anticoagulation Clinic  5/5/2022    _______________________________________________________________________     Anticoagulation Episode Summary     Current INR goal:  2.0-3.0   TTR:  53.5 % (11.1 mo)   Target end date:  Indefinite   Send INR reminders to:  [facility]    Indications    Long term current use of anticoagulants with INR goal of 2.0-3.0 [Z79.01]  Chronic atrial fibrillation (H) [I48.20]           Comments:           Anticoagulation Care Providers     Provider Role Specialty Phone number    Malcolm Schafer MD Referring Internal Medicine [phone]

## 2022-05-09 ENCOUNTER — MEDICAL CORRESPONDENCE (OUTPATIENT)
Dept: HEALTH INFORMATION MANAGEMENT | Facility: CLINIC | Age: 77
End: 2022-05-09

## 2022-05-09 ENCOUNTER — TELEPHONE (OUTPATIENT)
Dept: INTERNAL MEDICINE | Facility: CLINIC | Age: 77
End: 2022-05-09
Payer: COMMERCIAL

## 2022-05-09 NOTE — TELEPHONE ENCOUNTER
Fax received from [facility] 05/05/22 for review and signature.  Put in Dr. Schafer's in basket.

## 2022-05-10 ENCOUNTER — MEDICAL CORRESPONDENCE (OUTPATIENT)
Dept: HEALTH INFORMATION MANAGEMENT | Facility: CLINIC | Age: 77
End: 2022-05-10

## 2022-05-10 ENCOUNTER — TELEPHONE (OUTPATIENT)
Dept: INTERNAL MEDICINE | Facility: CLINIC | Age: 77
End: 2022-05-10
Payer: COMMERCIAL

## 2022-05-11 ENCOUNTER — OFFICE VISIT (OUTPATIENT)
Dept: INTERNAL MEDICINE | Facility: CLINIC | Age: 77
End: 2022-05-11
Payer: COMMERCIAL

## 2022-05-11 VITALS
HEIGHT: 72 IN | OXYGEN SATURATION: 99 % | WEIGHT: 219 LBS | HEART RATE: 66 BPM | SYSTOLIC BLOOD PRESSURE: 152 MMHG | TEMPERATURE: 98.2 F | DIASTOLIC BLOOD PRESSURE: 81 MMHG | BODY MASS INDEX: 29.66 KG/M2

## 2022-05-11 DIAGNOSIS — I10 ESSENTIAL HYPERTENSION, BENIGN: Primary | ICD-10-CM

## 2022-05-11 DIAGNOSIS — G20.A1 PARKINSON DISEASE (H): ICD-10-CM

## 2022-05-11 DIAGNOSIS — Z09 HOSPITAL DISCHARGE FOLLOW-UP: ICD-10-CM

## 2022-05-11 DIAGNOSIS — N18.31 STAGE 3A CHRONIC KIDNEY DISEASE (H): ICD-10-CM

## 2022-05-11 DIAGNOSIS — S72.001D CLOSED FRACTURE OF RIGHT HIP WITH ROUTINE HEALING, SUBSEQUENT ENCOUNTER: ICD-10-CM

## 2022-05-11 DIAGNOSIS — I48.20 CHRONIC ATRIAL FIBRILLATION (H): ICD-10-CM

## 2022-05-11 DIAGNOSIS — C61 PROSTATE CANCER (H): ICD-10-CM

## 2022-05-11 LAB
ERYTHROCYTE [DISTWIDTH] IN BLOOD BY AUTOMATED COUNT: 14.6 % (ref 10–15)
HCT VFR BLD AUTO: 33.8 % (ref 40–53)
HGB BLD-MCNC: 10.8 G/DL (ref 13.3–17.7)
MCH RBC QN AUTO: 26.8 PG (ref 26.5–33)
MCHC RBC AUTO-ENTMCNC: 32 G/DL (ref 31.5–36.5)
MCV RBC AUTO: 84 FL (ref 78–100)
PLATELET # BLD AUTO: 161 10E3/UL (ref 150–450)
RBC # BLD AUTO: 4.03 10E6/UL (ref 4.4–5.9)
WBC # BLD AUTO: 4.8 10E3/UL (ref 4–11)

## 2022-05-11 PROCEDURE — 99214 OFFICE O/P EST MOD 30 MIN: CPT | Performed by: INTERNAL MEDICINE

## 2022-05-11 PROCEDURE — 80053 COMPREHEN METABOLIC PANEL: CPT | Performed by: INTERNAL MEDICINE

## 2022-05-11 PROCEDURE — 36415 COLL VENOUS BLD VENIPUNCTURE: CPT | Performed by: INTERNAL MEDICINE

## 2022-05-11 PROCEDURE — 84443 ASSAY THYROID STIM HORMONE: CPT | Performed by: INTERNAL MEDICINE

## 2022-05-11 PROCEDURE — 85027 COMPLETE CBC AUTOMATED: CPT | Performed by: INTERNAL MEDICINE

## 2022-05-11 NOTE — LETTER
May 16, 2022      Marek Stock  [address]  [facility] [place] [address]        Dear ,    We are writing to inform you of your test results.  Slightly decreased kidney function and mild anemia. Recommend to follow up in 3 months.      Resulted Orders   CBC with platelets   Result Value Ref Range    WBC Count 4.8 4.0 - 11.0 10e3/uL    RBC Count 4.03 (L) 4.40 - 5.90 10e6/uL    Hemoglobin 10.8 (L) 13.3 - 17.7 g/dL    Hematocrit 33.8 (L) 40.0 - 53.0 %    MCV 84 78 - 100 fL    MCH 26.8 26.5 - 33.0 pg    MCHC 32.0 31.5 - 36.5 g/dL    RDW 14.6 10.0 - 15.0 %    Platelet Count 161 150 - 450 10e3/uL    Narrative    Correlate with previous result   Comprehensive metabolic panel (BMP + Alb, Alk Phos, ALT, AST, Total. Bili, TP)   Result Value Ref Range    Sodium 143 133 - 144 mmol/L    Potassium 3.8 3.4 - 5.3 mmol/L    Chloride 111 (H) 94 - 109 mmol/L    Carbon Dioxide (CO2) 26 20 - 32 mmol/L    Anion Gap 6 3 - 14 mmol/L    Urea Nitrogen 26 7 - 30 mg/dL    Creatinine 1.28 (H) 0.66 - 1.25 mg/dL    Calcium 9.0 8.5 - 10.1 mg/dL    Glucose 101 (H) 70 - 99 mg/dL    Alkaline Phosphatase 120 40 - 150 U/L    AST 15 0 - 45 U/L    ALT 7 0 - 70 U/L    Protein Total 6.9 6.8 - 8.8 g/dL    Albumin 3.4 3.4 - 5.0 g/dL    Bilirubin Total 0.3 0.2 - 1.3 mg/dL    GFR Estimate 58 (L) >60 mL/min/1.73m2      Comment:      Effective December 21, 2021 eGFRcr in adults is calculated using the 2021 CKD-EPI creatinine equation which includes age and gender (Matthew et al., NEJ, DOI: 10.1056/ZJVUin4853849)   TSH with free T4 reflex   Result Value Ref Range    TSH 1.72 0.40 - 4.00 mU/L       If you have any questions or concerns, please call the clinic at the number listed above.       Sincerely,      Malcolm Schafer MD

## 2022-05-11 NOTE — PROGRESS NOTES
Assessment & Plan     Essential hypertension, benign  Assess lab, controlled   - CBC with platelets  - TSH with free T4 reflex    Chronic atrial fibrillation (H)  Cont treatment , on AC  - CBC with platelets    Stage 3a chronic kidney disease (H)   monitor renal function   - Comprehensive metabolic panel (BMP + Alb, Alk Phos, ALT, AST, Total. Bili, TP)    Hospital discharge follow-up  Improved, at home now with home care     Closed fracture of right hip with routine healing, subsequent encounter  Continue PT     Parkinson disease (H)  On sinemet, controlled, follow up with neurology     Prostate cancer (H)  Follow up with urology            BMI:   Estimated body mass index is 29.7 kg/m  as calculated from the following:    Height as of this encounter: 1.829 m (6').    Weight as of this encounter: 99.3 kg (219 lb).       See Patient Instructions    Return in about 3 months (around 8/11/2022) for Routine Visit.    Malcolm Schafer MD  [facility] ANASTACIO Jara is a 76 year old who presents for the following health issues     HPI   Chief Complaint   Patient presents with     RECHECK     1 month F/U     Patient is seen for a follow up visit.      Has had a fall, slipped on ice March 7 th, fractured his right hip.   Was admitted to hospital for surgery, then discharged to TCU and now past month is at home.   Has home care PT, OT, SK.   Pain is controlled. Able to ambulate with a walker. Uses a wheelchair.     Has h/o Parkinson's disease, on Sinemet. Has mild stiffness and poor ambulation.   Has history of atrial fibrillation. On anticoagulation with Coumadin and rate control medications. Asymptonatic - no chest pains , palpitations,  no side effects from medications.  Has h/o HTN. on medical treatment. BP has been controlled. No side effects from medications. No CP, HA, dizziness. good compliance with medications and low salt diet.  Has h/o prostate cancer, monitoring with urology.          Review of Systems   Constitutional, HEENT, cardiovascular, pulmonary, gi and gu systems are negative, except as otherwise noted.      Objective    BP (!) 152/81 (BP Location: Left arm, Patient Position: Sitting, Cuff Size: Adult Large)   Pulse 66   Temp 98.2  F (36.8  C) (Oral)   Ht 1.829 m (6')   Wt 99.3 kg (219 lb)   SpO2 99%   BMI 29.70 kg/m    Body mass index is 29.7 kg/m .  Physical Exam   GENERAL:frail, in a wheelchair, alert and no distress  NECK: no adenopathy, no asymmetry, masses, or scars and thyroid normal to palpation  RESP: lungs clear to auscultation - no rales, rhonchi or wheezes  CV: irregular rate and rhythm, normal S1 S2, no S3 or S4, no murmur, click or rub, peripheral pulses strong  ABDOMEN: soft,obese, nontender, no hepatosplenomegaly, no masses and bowel sounds normal  MS: no gross musculoskeletal defects noted, 1+ LE edema  SKIN: no suspicious lesions or rashes    Anticoagulation Therapy Visit on 05/05/2022   Component Date Value Ref Range Status     INR (External) 05/05/2022 3.0 (A) 0.9 - 1.1 Final

## 2022-05-12 ENCOUNTER — ANTICOAGULATION THERAPY VISIT (OUTPATIENT)
Dept: ANTICOAGULATION | Facility: CLINIC | Age: 77
End: 2022-05-12
Payer: COMMERCIAL

## 2022-05-12 DIAGNOSIS — Z79.01 LONG TERM CURRENT USE OF ANTICOAGULANTS WITH INR GOAL OF 2.0-3.0: Primary | ICD-10-CM

## 2022-05-12 DIAGNOSIS — I48.20 CHRONIC ATRIAL FIBRILLATION (H): ICD-10-CM

## 2022-05-12 LAB
ALBUMIN SERPL-MCNC: 3.4 G/DL (ref 3.4–5)
ALP SERPL-CCNC: 120 U/L (ref 40–150)
ALT SERPL W P-5'-P-CCNC: 7 U/L (ref 0–70)
ANION GAP SERPL CALCULATED.3IONS-SCNC: 6 MMOL/L (ref 3–14)
AST SERPL W P-5'-P-CCNC: 15 U/L (ref 0–45)
BILIRUB SERPL-MCNC: 0.3 MG/DL (ref 0.2–1.3)
BUN SERPL-MCNC: 26 MG/DL (ref 7–30)
CALCIUM SERPL-MCNC: 9 MG/DL (ref 8.5–10.1)
CHLORIDE BLD-SCNC: 111 MMOL/L (ref 94–109)
CO2 SERPL-SCNC: 26 MMOL/L (ref 20–32)
CREAT SERPL-MCNC: 1.28 MG/DL (ref 0.66–1.25)
GFR SERPL CREATININE-BSD FRML MDRD: 58 ML/MIN/1.73M2
GLUCOSE BLD-MCNC: 101 MG/DL (ref 70–99)
INR (EXTERNAL): 3 (ref 0.9–1.1)
POTASSIUM BLD-SCNC: 3.8 MMOL/L (ref 3.4–5.3)
PROT SERPL-MCNC: 6.9 G/DL (ref 6.8–8.8)
SODIUM SERPL-SCNC: 143 MMOL/L (ref 133–144)
TSH SERPL DL<=0.005 MIU/L-ACNC: 1.72 MU/L (ref 0.4–4)

## 2022-05-12 NOTE — PROGRESS NOTES
ANTICOAGULATION MANAGEMENT     Billy Stock 76 year old male is on warfarin with therapeutic INR result. (Goal INR 2.0-3.0)    Recent labs: (last 7 days)     05/12/22  0916   INR 3.0*       ASSESSMENT       Source(s): Chart Review and Home Care/Facility Nurse       Warfarin doses taken: Warfarin taken as instructed    Diet: No new diet changes identified    New illness, injury, or hospitalization: No    Medication/supplement changes: None noted    Signs or symptoms of bleeding or clotting: No    Previous INR: Therapeutic last visit; previously outside of goal range    Additional findings: Patient was seen by PCP yesterday and advised to decrease sodium intake due to edema in RLE       PLAN     Recommended plan for no diet, medication or health factor changes affecting INR     Dosing Instructions: continue your current warfarin dose with next INR in 2 weeks       Summary  As of 5/12/2022    Full warfarin instructions:  2.5 mg every Mon, Thu; 5 mg all other days   Next INR check:  5/26/2022             Telephone call with Anita home care/facility nurse who verbalizes understanding and agrees to plan    Orders given to  Homecare nurse/facility to recheck    Education provided: Importance of consistent vitamin K intake and Impact of vitamin K foods on INR    Plan made per [facility] anticoagulation protocol    Wilma Al RN  Anticoagulation Clinic  5/12/2022    _______________________________________________________________________     Anticoagulation Episode Summary     Current INR goal:  2.0-3.0   TTR:  55.5 % (11.1 mo)   Target end date:  Indefinite   Send INR reminders to:  [facility]    Indications    Long term current use of anticoagulants with INR goal of 2.0-3.0 [Z79.01]  Chronic atrial fibrillation (H) [I48.20]           Comments:           Anticoagulation Care Providers     Provider Role Specialty Phone number    Malcolm Schafer MD Referring Internal Medicine [phone]

## 2022-05-16 ENCOUNTER — TELEPHONE (OUTPATIENT)
Dept: INTERNAL MEDICINE | Facility: CLINIC | Age: 77
End: 2022-05-16
Payer: COMMERCIAL

## 2022-05-25 ENCOUNTER — OFFICE VISIT (OUTPATIENT)
Dept: UROLOGY | Facility: CLINIC | Age: 77
End: 2022-05-25

## 2022-05-25 VITALS — HEIGHT: 73 IN | BODY MASS INDEX: 29.03 KG/M2 | WEIGHT: 219 LBS | OXYGEN SATURATION: 98 % | HEART RATE: 88 BPM

## 2022-05-25 DIAGNOSIS — C61 PROSTATE CANCER (H): Primary | ICD-10-CM

## 2022-05-25 LAB — PSA SERPL-MCNC: <0.04 UG/L (ref 0–4)

## 2022-05-25 PROCEDURE — 99213 OFFICE O/P EST LOW 20 MIN: CPT | Performed by: STUDENT IN AN ORGANIZED HEALTH CARE EDUCATION/TRAINING PROGRAM

## 2022-05-25 PROCEDURE — 36415 COLL VENOUS BLD VENIPUNCTURE: CPT | Performed by: STUDENT IN AN ORGANIZED HEALTH CARE EDUCATION/TRAINING PROGRAM

## 2022-05-25 PROCEDURE — 84153 ASSAY OF PSA TOTAL: CPT | Performed by: STUDENT IN AN ORGANIZED HEALTH CARE EDUCATION/TRAINING PROGRAM

## 2022-05-25 ASSESSMENT — PAIN SCALES - GENERAL: PAINLEVEL: NO PAIN (0)

## 2022-05-25 NOTE — LETTER
"5/25/2022       RE: Billy Stock  [address]  [place] [address]     Dear Colleague,    Thank you for referring your patient, Billy Stock, to the [facility]OLOGY [facility] [place] at [facility]. Please see a copy of my visit note below.    CHIEF COMPLAINT   Billy Stock who is a 76 year old male returns today for follow-up of prostate cancer s/p RRP 2002 with PSM, adjuvant radiation, on intermittent ADT for biochemical recurrence    HPI   Billy Stock is a 76 year old male returns today for follow-up of prostate cancer s/p RRP 2002 with PSM, adjuvant radiation, on intermittent ADT for biochemical recurrence    Last seen 10/11/2021. His PSA at that time was low at 0.05 ng/ml. We had discussed continuing the ADT since it had risen precipitously earlier in the year up to a peak of 7.2 prior to restarting ADT.     Recently broke his right hip and underwent hemiarthroplasty on 3/8/2022. He has been in rehab and learning to walk again    PHYSICAL EXAM  Patient is a 76 year old  male   Vitals: Pulse 88, height 1.854 m (6' 1\"), weight 99.3 kg (219 lb), SpO2 98 %.  Body mass index is 28.89 kg/m .  General Appearance Adult:   Alert, no acute distress, oriented. seated  HENT: throat/mouth:normal, good dentition  Lungs: no respiratory distress, or pursed lip breathing  Heart: No obvious jugular venous distension present  Abdomen: soft, nontender, no organomegaly or masses  Musculoskeltal: extremities normal, no peripheral edema  Skin: no suspicious lesions or rashes  Neuro: Alert, oriented, speech and mentation normal  Psych: affect and mood normal  Gait: Normal      Component PSA PSA Diag Urologic Phys   Latest Ref Rng & Units 0.00 - 4.00 ug/L 0.00 - 4.00 ng/mL   5/11/2009 <0.10    2/21/2011 0.08    2/27/2012 0.45    5/21/2012 0.66    6/11/2013 1.03    1/12/2015 0.09    2/25/2016 0.23    2/15/2017  0.44   8/16/2017  1.30   2/19/2018  2.00   9/5/2018  2.50 "   3/5/2019  3.30   9/5/2019  0.12   1/13/2021 6.34 (H)    4/8/2021  7.20 (H)   10/11/2021 0.05    5/25/2022 <0.04        ASSESSMENT and PLAN  76 year old male returns today for follow-up of prostate cancer s/p RRP 2002 with PSM, adjuvant radiation, on intermittent ADT for biochemical recurrence  PSA today undetectable    He recently broke his hip in March 2022. He is getting DEXA testing next month. I recommend holding off on further androgen deprivation therapy at this time given his bone fracture. Will recheck PSA in 6 months and then consider restarting ADT if PSA has concerning rise    - return 6 months with PSA prior    Ángel Guthrie MD   [facility]  [facility] Phone: [phone]      Chief Complaint   Patient presents with     Prostate Cancer     Discuss psa     Ruth Sanches [facility]

## 2022-05-25 NOTE — PROGRESS NOTES
Chief Complaint   Patient presents with     Prostate Cancer     Discuss psa     Ruth Sanches, AGA

## 2022-05-25 NOTE — PROGRESS NOTES
"CHIEF COMPLAINT   Billy Stock who is a 76 year old male returns today for follow-up of prostate cancer s/p RRP 2002 with PSM, adjuvant radiation, on intermittent ADT for biochemical recurrence    HPI   Billy Stock is a 76 year old male returns today for follow-up of prostate cancer s/p RRP 2002 with PSM, adjuvant radiation, on intermittent ADT for biochemical recurrence    Last seen 10/11/2021. His PSA at that time was low at 0.05 ng/ml. We had discussed continuing the ADT since it had risen precipitously earlier in the year up to a peak of 7.2 prior to restarting ADT.     Recently broke his right hip and underwent hemiarthroplasty on 3/8/2022. He has been in rehab and learning to walk again    PHYSICAL EXAM  Patient is a 76 year old  male   Vitals: Pulse 88, height 1.854 m (6' 1\"), weight 99.3 kg (219 lb), SpO2 98 %.  Body mass index is 28.89 kg/m .  General Appearance Adult:   Alert, no acute distress, oriented. seated  HENT: throat/mouth:normal, good dentition  Lungs: no respiratory distress, or pursed lip breathing  Heart: No obvious jugular venous distension present  Abdomen: soft, nontender, no organomegaly or masses  Musculoskeltal: extremities normal, no peripheral edema  Skin: no suspicious lesions or rashes  Neuro: Alert, oriented, speech and mentation normal  Psych: affect and mood normal  Gait: Normal      Component PSA PSA Diag Urologic Phys   Latest Ref Rng & Units 0.00 - 4.00 ug/L 0.00 - 4.00 ng/mL   5/11/2009 <0.10    2/21/2011 0.08    2/27/2012 0.45    5/21/2012 0.66    6/11/2013 1.03    1/12/2015 0.09    2/25/2016 0.23    2/15/2017  0.44   8/16/2017  1.30   2/19/2018  2.00   9/5/2018  2.50   3/5/2019  3.30   9/5/2019  0.12   1/13/2021 6.34 (H)    4/8/2021  7.20 (H)   10/11/2021 0.05    5/25/2022 <0.04        ASSESSMENT and PLAN  76 year old male returns today for follow-up of prostate cancer s/p RRP 2002 with PSM, adjuvant radiation, on intermittent ADT for biochemical recurrence  PSA today " undetectable    He recently broke his hip in March 2022. He is getting DEXA testing next month. I recommend holding off on further androgen deprivation therapy at this time given his bone fracture. Will recheck PSA in 6 months and then consider restarting ADT if PSA has concerning rise    - return 6 months with PSA prior    Ángel Guthrie MD   [facility]rology  [facility] Phone: [phone]

## 2022-05-26 ENCOUNTER — ANTICOAGULATION THERAPY VISIT (OUTPATIENT)
Dept: ANTICOAGULATION | Facility: CLINIC | Age: 77
End: 2022-05-26
Payer: COMMERCIAL

## 2022-05-26 DIAGNOSIS — Z79.01 LONG TERM CURRENT USE OF ANTICOAGULANTS WITH INR GOAL OF 2.0-3.0: Primary | ICD-10-CM

## 2022-05-26 DIAGNOSIS — I48.20 CHRONIC ATRIAL FIBRILLATION (H): ICD-10-CM

## 2022-05-26 LAB — INR (EXTERNAL): 2.9 (ref 0.9–1.1)

## 2022-05-26 NOTE — PROGRESS NOTES
ANTICOAGULATION MANAGEMENT     Billy Stock 76 year old male is on warfarin with therapeutic INR result. (Goal INR 2.0-3.0)    Recent labs: (last 7 days)     05/26/22  1101   INR 2.9*       ASSESSMENT       Source(s): Chart Review and Patient/Caregiver Call       Warfarin doses taken: Warfarin taken as instructed    Diet: No new diet changes identified    New illness, injury, or hospitalization: No    Medication/supplement changes: None noted    Signs or symptoms of bleeding or clotting: No    Previous INR: Therapeutic last 2(+) visits    Additional findings: Discharging from  next week, changed back to [place] for management.        PLAN     Recommended plan for no diet, medication or health factor changes affecting INR     Dosing Instructions: continue your current warfarin dose with next INR in 3 weeks       Summary  As of 5/26/2022    Full warfarin instructions:  2.5 mg every Mon, Thu; 5 mg all other days   Next INR check:  6/16/2022             Telephone call with [facility] care/facility nurse who verbalizes understanding and agrees to plan    Patient will call and schedule INR as he is discharging from     Education provided: Please call back if any changes to your diet, medications or how you've been taking warfarin    Plan made per [facility] anticoagulation protocol    Gerry Moser RN  Anticoagulation Clinic  5/26/2022    _______________________________________________________________________     Anticoagulation Episode Summary     Current INR goal:  2.0-3.0   TTR:  58.8 % (11.1 mo)   Target end date:  Indefinite   Send INR reminders to:  [facility]    Indications    Long term current use of anticoagulants with INR goal of 2.0-3.0 [Z79.01]  Chronic atrial fibrillation (H) [I48.20]           Comments:           Anticoagulation Care Providers     Provider Role Specialty Phone number    Malcolm Schafer MD Referring Internal Medicine [phone]

## 2022-05-27 ENCOUNTER — TELEPHONE (OUTPATIENT)
Dept: INTERNAL MEDICINE | Facility: CLINIC | Age: 77
End: 2022-05-27
Payer: COMMERCIAL

## 2022-06-07 ENCOUNTER — TELEPHONE (OUTPATIENT)
Dept: INTERNAL MEDICINE | Facility: CLINIC | Age: 77
End: 2022-06-07
Payer: COMMERCIAL

## 2022-06-13 DIAGNOSIS — I10 ESSENTIAL HYPERTENSION WITH GOAL BLOOD PRESSURE LESS THAN 140/90: ICD-10-CM

## 2022-06-15 RX ORDER — AMLODIPINE BESYLATE 10 MG/1
TABLET ORAL
Qty: 90 TABLET | Refills: 1 | Status: SHIPPED | OUTPATIENT
Start: 2022-06-15 | End: 2022-10-06

## 2022-06-15 NOTE — TELEPHONE ENCOUNTER
Provider approval needed.     BP Readings from Last 3 Encounters:   05/11/22 (!) 152/81   04/04/22 (!) 155/77   03/31/22 (!) 142/76

## 2022-06-16 ENCOUNTER — ANTICOAGULATION THERAPY VISIT (OUTPATIENT)
Dept: ANTICOAGULATION | Facility: CLINIC | Age: 77
End: 2022-06-16

## 2022-06-16 ENCOUNTER — LAB (OUTPATIENT)
Dept: LAB | Facility: CLINIC | Age: 77
End: 2022-06-16
Payer: COMMERCIAL

## 2022-06-16 DIAGNOSIS — I48.20 CHRONIC ATRIAL FIBRILLATION (H): ICD-10-CM

## 2022-06-16 DIAGNOSIS — N18.31 STAGE 3A CHRONIC KIDNEY DISEASE (H): Primary | ICD-10-CM

## 2022-06-16 DIAGNOSIS — Z79.01 LONG TERM CURRENT USE OF ANTICOAGULANTS WITH INR GOAL OF 2.0-3.0: ICD-10-CM

## 2022-06-16 DIAGNOSIS — Z13.220 SCREENING FOR HYPERLIPIDEMIA: ICD-10-CM

## 2022-06-16 DIAGNOSIS — Z79.01 LONG TERM CURRENT USE OF ANTICOAGULANTS WITH INR GOAL OF 2.0-3.0: Primary | ICD-10-CM

## 2022-06-16 LAB — INR BLD: 1.1 (ref 0.9–1.1)

## 2022-06-16 PROCEDURE — 85610 PROTHROMBIN TIME: CPT

## 2022-06-16 PROCEDURE — 36416 COLLJ CAPILLARY BLOOD SPEC: CPT

## 2022-06-16 NOTE — PROGRESS NOTES
ANTICOAGULATION MANAGEMENT     Billy Stock 76 year old male is on warfarin with subtherapeutic INR result. (Goal INR 2.0-3.0)    Recent labs: (last 7 days)     06/16/22  1410   INR 1.1       ASSESSMENT       Source(s): Chart Review and Patient/Caregiver Call       Warfarin doses taken: Warfarin taken as instructed. Denies any missed doses.  States that he used to use a pill box, but has not been using one for a while.    Diet: No new diet changes identified.  Has been eating salads about 3 times a week.  Will avoid green veggies over the next 2 days to try to get INR to come up quicker.    New illness, injury, or hospitalization: No    Medication/supplement changes: None noted    Signs or symptoms of bleeding or clotting: No    Previous INR: Therapeutic last 2(+) visits    Additional findings: INR dropped unexpectedly.  May need to adjust maintenance dose if INR continues to be sub therapeutic.         PLAN     Recommended plan for no diet, medication or health factor changes affecting INR     Dosing Instructions: two booster dose then continue your current warfarin dose with next INR in 4 days       Summary  As of 6/16/2022    Full warfarin instructions:  6/16: 7.5 mg; 6/17: 7.5 mg; Otherwise 2.5 mg every Mon, Thu; 5 mg all other days   Next INR check:  6/20/2022             Telephone call with Marek who agrees to plan and repeated back plan correctly    Lab visit scheduled    Education provided: Please call back if any changes to your diet, medications or how you've been taking warfarin    Plan made per [facility] anticoagulation protocol    Marge Gale RN  Anticoagulation Clinic  6/16/2022    _______________________________________________________________________     Anticoagulation Episode Summary     Current INR goal:  2.0-3.0   TTR:  60.3 % (11.1 mo)   Target end date:  Indefinite   Send INR reminders to:  [facility]    Indications    Long term current use of anticoagulants with INR goal of  2.0-3.0 [Z79.01]  Chronic atrial fibrillation (H) [I48.20]           Comments:  4/14/2020-approved for 8 week INR checks         Anticoagulation Care Providers     Provider Role Specialty Phone number    Malcolm Schafer MD Referring Internal Medicine [phone]

## 2022-06-23 ENCOUNTER — TELEPHONE (OUTPATIENT)
Dept: ANTICOAGULATION | Facility: CLINIC | Age: 77
End: 2022-06-23

## 2022-06-23 ENCOUNTER — LAB (OUTPATIENT)
Dept: LAB | Facility: CLINIC | Age: 77
End: 2022-06-23
Payer: COMMERCIAL

## 2022-06-23 ENCOUNTER — ANCILLARY PROCEDURE (OUTPATIENT)
Dept: BONE DENSITY | Facility: CLINIC | Age: 77
End: 2022-06-23
Attending: NURSE PRACTITIONER
Payer: COMMERCIAL

## 2022-06-23 ENCOUNTER — ANTICOAGULATION THERAPY VISIT (OUTPATIENT)
Dept: ANTICOAGULATION | Facility: CLINIC | Age: 77
End: 2022-06-23

## 2022-06-23 DIAGNOSIS — Z86.69 HISTORY OF PARKINSON'S DISEASE: ICD-10-CM

## 2022-06-23 DIAGNOSIS — Z13.220 SCREENING FOR HYPERLIPIDEMIA: ICD-10-CM

## 2022-06-23 DIAGNOSIS — I48.20 CHRONIC ATRIAL FIBRILLATION (H): ICD-10-CM

## 2022-06-23 DIAGNOSIS — Z79.01 LONG TERM CURRENT USE OF ANTICOAGULANTS WITH INR GOAL OF 2.0-3.0: ICD-10-CM

## 2022-06-23 DIAGNOSIS — Z00.00 HEALTH MAINTENANCE EXAMINATION: ICD-10-CM

## 2022-06-23 DIAGNOSIS — Z79.01 LONG TERM CURRENT USE OF ANTICOAGULANTS WITH INR GOAL OF 2.0-3.0: Primary | ICD-10-CM

## 2022-06-23 DIAGNOSIS — S72.001A HIP FRACTURE, RIGHT (H): ICD-10-CM

## 2022-06-23 DIAGNOSIS — N18.31 STAGE 3A CHRONIC KIDNEY DISEASE (H): ICD-10-CM

## 2022-06-23 DIAGNOSIS — M81.0 OSTEOPOROSIS: ICD-10-CM

## 2022-06-23 LAB — INR BLD: 1.9 (ref 0.9–1.1)

## 2022-06-23 PROCEDURE — 77080 DXA BONE DENSITY AXIAL: CPT | Performed by: INTERNAL MEDICINE

## 2022-06-23 PROCEDURE — 36415 COLL VENOUS BLD VENIPUNCTURE: CPT

## 2022-06-23 PROCEDURE — 85610 PROTHROMBIN TIME: CPT

## 2022-06-23 PROCEDURE — 82043 UR ALBUMIN QUANTITATIVE: CPT

## 2022-06-23 PROCEDURE — 80061 LIPID PANEL: CPT

## 2022-06-23 NOTE — PROGRESS NOTES
ANTICOAGULATION MANAGEMENT     Billy Stock 76 year old male is on warfarin with subtherapeutic INR result. (Goal INR 2.0-3.0)    Recent labs: (last 7 days)     06/23/22  1331   INR 1.9*       ASSESSMENT       Source(s): Chart Review and Patient/Caregiver Call       Warfarin doses taken: Warfarin taken as instructed    Diet: No new diet changes identified    New illness, injury, or hospitalization: No    Medication/supplement changes: None noted    Signs or symptoms of bleeding or clotting: No    Previous INR: Subtherapeutic.  INR continues to be subtherapeutic today.  Maintenance dose was not adjusted at last check, so larger increase done today to account for low reading last week and boost doses that were given.    Additional findings: None       PLAN     Recommended plan for no diet, medication or health factor changes affecting INR     Dosing Instructions: Increase your warfarin dose (16% change) with next INR in 1 week       Summary  As of 6/23/2022    Full warfarin instructions:  5 mg every day   Next INR check:  6/30/2022             Telephone call with Marek who agrees to plan and repeated back plan correctly    Lab visit scheduled    Education provided: Please call back if any changes to your diet, medications or how you've been taking warfarin    Plan made per [facility] anticoagulation protocol    Marge Gale RN  Anticoagulation Clinic  6/23/2022    _______________________________________________________________________     Anticoagulation Episode Summary     Current INR goal:  2.0-3.0   TTR:  59.9 % (11.1 mo)   Target end date:  Indefinite   Send INR reminders to:  [facility]    Indications    Long term current use of anticoagulants with INR goal of 2.0-3.0 [Z79.01]  Chronic atrial fibrillation (H) [I48.20]           Comments:  4/14/2020-approved for 8 week INR checks         Anticoagulation Care Providers     Provider Role Specialty Phone number    Malcolm Schafer MD Referring  Internal Medicine [phone]

## 2022-06-23 NOTE — TELEPHONE ENCOUNTER
3:07 pm: Patient LVM requesting call back from INR team.     Patient left # [phone]    Thanks! Marge Wallace RN

## 2022-06-24 LAB
CHOLEST SERPL-MCNC: 111 MG/DL
CREAT UR-MCNC: 381 MG/DL
FASTING STATUS PATIENT QL REPORTED: NO
HDLC SERPL-MCNC: 43 MG/DL
LDLC SERPL CALC-MCNC: 44 MG/DL
MICROALBUMIN UR-MCNC: 5880 MG/L
MICROALBUMIN/CREAT UR: 1543.31 MG/G CR (ref 0–17)
NONHDLC SERPL-MCNC: 68 MG/DL
TRIGL SERPL-MCNC: 122 MG/DL

## 2022-06-30 ENCOUNTER — ANTICOAGULATION THERAPY VISIT (OUTPATIENT)
Dept: ANTICOAGULATION | Facility: CLINIC | Age: 77
End: 2022-06-30

## 2022-06-30 ENCOUNTER — LAB (OUTPATIENT)
Dept: LAB | Facility: CLINIC | Age: 77
End: 2022-06-30
Payer: COMMERCIAL

## 2022-06-30 DIAGNOSIS — I48.20 CHRONIC ATRIAL FIBRILLATION (H): ICD-10-CM

## 2022-06-30 DIAGNOSIS — Z79.01 LONG TERM CURRENT USE OF ANTICOAGULANTS WITH INR GOAL OF 2.0-3.0: Primary | ICD-10-CM

## 2022-06-30 DIAGNOSIS — Z79.01 LONG TERM CURRENT USE OF ANTICOAGULANTS WITH INR GOAL OF 2.0-3.0: ICD-10-CM

## 2022-06-30 LAB — INR BLD: 2 (ref 0.9–1.1)

## 2022-06-30 PROCEDURE — 36416 COLLJ CAPILLARY BLOOD SPEC: CPT

## 2022-06-30 PROCEDURE — 85610 PROTHROMBIN TIME: CPT

## 2022-06-30 NOTE — PROGRESS NOTES
ANTICOAGULATION MANAGEMENT     Billy Stock 76 year old male is on warfarin with therapeutic INR result. (Goal INR 2.0-3.0)    Recent labs: (last 7 days)     06/30/22  1525   INR 2.0*       ASSESSMENT       Source(s): Chart Review and Patient/Caregiver Call       Warfarin doses taken: Warfarin taken as instructed    Diet: No new diet changes identified    New illness, injury, or hospitalization: No    Medication/supplement changes: None noted    Signs or symptoms of bleeding or clotting: No    Previous INR: Subtherapeutic    Additional findings: None       PLAN     Recommended plan for no diet, medication or health factor changes affecting INR     Dosing Instructions: continue your current warfarin dose with next INR in 1 week       Summary  As of 6/30/2022    Full warfarin instructions:  5 mg every day   Next INR check:  7/7/2022             Telephone call with Marek who agrees to plan and repeated back plan correctly    Lab visit scheduled    Education provided: Please call back if any changes to your diet, medications or how you've been taking warfarin and Contact [phone]  with any changes, questions or concerns.     Plan made per [facility] anticoagulation protocol    Samantha Galeas RN  Anticoagulation Clinic  6/30/2022    _______________________________________________________________________     Anticoagulation Episode Summary     Current INR goal:  2.0-3.0   TTR:  59.9 % (11.1 mo)   Target end date:  Indefinite   Send INR reminders to:  [facility]    Indications    Long term current use of anticoagulants with INR goal of 2.0-3.0 [Z79.01]  Chronic atrial fibrillation (H) [I48.20]           Comments:  4/14/2020-approved for 8 week INR checks         Anticoagulation Care Providers     Provider Role Specialty Phone number    Malcolm Schafer MD Referring Internal Medicine [phone]

## 2022-07-07 ENCOUNTER — ANTICOAGULATION THERAPY VISIT (OUTPATIENT)
Dept: ANTICOAGULATION | Facility: CLINIC | Age: 77
End: 2022-07-07

## 2022-07-07 ENCOUNTER — LAB (OUTPATIENT)
Dept: LAB | Facility: CLINIC | Age: 77
End: 2022-07-07
Payer: COMMERCIAL

## 2022-07-07 DIAGNOSIS — I48.20 CHRONIC ATRIAL FIBRILLATION (H): ICD-10-CM

## 2022-07-07 DIAGNOSIS — Z79.01 LONG TERM CURRENT USE OF ANTICOAGULANTS WITH INR GOAL OF 2.0-3.0: Primary | ICD-10-CM

## 2022-07-07 LAB — INR BLD: 2.5 (ref 0.9–1.1)

## 2022-07-07 PROCEDURE — 36416 COLLJ CAPILLARY BLOOD SPEC: CPT

## 2022-07-07 PROCEDURE — 85610 PROTHROMBIN TIME: CPT

## 2022-07-07 NOTE — PROGRESS NOTES
ANTICOAGULATION MANAGEMENT     Billy Stock 76 year old male is on warfarin with therapeutic INR result. (Goal INR 2.0-3.0)    Recent labs: (last 7 days)     07/07/22  0315   INR 2.5*       ASSESSMENT       Source(s): Chart Review and Patient/Caregiver Call       Warfarin doses taken: Warfarin taken as instructed    Diet: No new diet changes identified    New illness, injury, or hospitalization: No    Medication/supplement changes: None noted    Signs or symptoms of bleeding or clotting: No    Previous INR: Therapeutic last visit; previously outside of goal range    Additional findings: None       PLAN     Recommended plan for no diet, medication or health factor changes affecting INR     Dosing Instructions: continue your current warfarin dose with next INR in 2 weeks       Summary  As of 7/7/2022    Full warfarin instructions:  5 mg every day   Next INR check:  7/21/2022             Telephone call with Marek who agrees to plan and repeated back plan correctly    Lab visit scheduled    Education provided: Please call back if any changes to your diet, medications or how you've been taking warfarin and Contact [phone]  with any changes, questions or concerns.     Plan made per [facility] anticoagulation protocol    Samantha Galeas RN  Anticoagulation Clinic  7/7/2022    _______________________________________________________________________     Anticoagulation Episode Summary     Current INR goal:  2.0-3.0   TTR:  60.7 % (11.1 mo)   Target end date:  Indefinite   Send INR reminders to:  [facility]    Indications    Long term current use of anticoagulants with INR goal of 2.0-3.0 [Z79.01]  Chronic atrial fibrillation (H) [I48.20]           Comments:  4/14/2020-approved for 8 week INR checks         Anticoagulation Care Providers     Provider Role Specialty Phone number    Malcolm Schafer MD Referring Internal Medicine [phone]

## 2022-07-21 ENCOUNTER — ANTICOAGULATION THERAPY VISIT (OUTPATIENT)
Dept: ANTICOAGULATION | Facility: CLINIC | Age: 77
End: 2022-07-21

## 2022-07-21 ENCOUNTER — LAB (OUTPATIENT)
Dept: LAB | Facility: CLINIC | Age: 77
End: 2022-07-21
Payer: COMMERCIAL

## 2022-07-21 DIAGNOSIS — I48.20 CHRONIC ATRIAL FIBRILLATION (H): ICD-10-CM

## 2022-07-21 DIAGNOSIS — Z79.01 LONG TERM CURRENT USE OF ANTICOAGULANTS WITH INR GOAL OF 2.0-3.0: Primary | ICD-10-CM

## 2022-07-21 DIAGNOSIS — Z79.01 LONG TERM CURRENT USE OF ANTICOAGULANTS WITH INR GOAL OF 2.0-3.0: ICD-10-CM

## 2022-07-21 LAB — INR BLD: 2.8 (ref 0.9–1.1)

## 2022-07-21 PROCEDURE — 36416 COLLJ CAPILLARY BLOOD SPEC: CPT

## 2022-07-21 PROCEDURE — 85610 PROTHROMBIN TIME: CPT

## 2022-07-21 NOTE — PROGRESS NOTES
Reason for Call:  Other call back    Detailed comments: patient returning call from INR nurse.    Phone Number Patient can be reached at: Home number on file [phone] (home)    Best Time: any    Can we leave a detailed message on this number? YES    Call taken on 7/21/2022 at 4:49 PM by Paula Workman

## 2022-07-21 NOTE — PROGRESS NOTES
ANTICOAGULATION MANAGEMENT     Billy Stock 76 year old male is on warfarin with therapeutic INR result. (Goal INR 2.0-3.0)    Recent labs: (last 7 days)     07/21/22  1551   INR 2.8*       ASSESSMENT       Source(s): Chart Review and Patient/Caregiver Call       Warfarin doses taken: Warfarin taken as instructed    Diet: No new diet changes identified    New illness, injury, or hospitalization: No    Medication/supplement changes: None noted    Signs or symptoms of bleeding or clotting: No    Previous INR: Therapeutic last 2(+) visits    Additional findings: see the dentist on 7/25 for a filling.  Will check with dentist if OK to take regular dose of warfarin       PLAN     Recommended plan for no diet, medication or health factor changes affecting INR     Dosing Instructions: continue your current warfarin dose with next INR in 3 weeks       Summary  As of 7/21/2022    Full warfarin instructions:  5 mg every day   Next INR check:  8/11/2022             Telephone call with Marek who verbalizes understanding and agrees to plan    Lab visit scheduled    Education provided: Please call back if any changes to your diet, medications or how you've been taking warfarin    Plan made per [facility] anticoagulation protocol    Nancy Lezama RN  Anticoagulation Clinic  7/21/2022    _______________________________________________________________________     Anticoagulation Episode Summary     Current INR goal:  2.0-3.0   TTR:  60.7 % (11.1 mo)   Target end date:  Indefinite   Send INR reminders to:  [facility]    Indications    Long term current use of anticoagulants with INR goal of 2.0-3.0 [Z79.01]  Chronic atrial fibrillation (H) [I48.20]           Comments:  4/14/2020-approved for 8 week INR checks         Anticoagulation Care Providers     Provider Role Specialty Phone number    Malcolm Schafer MD Referring Internal Medicine [phone]

## 2022-07-25 DIAGNOSIS — I10 ESSENTIAL HYPERTENSION: ICD-10-CM

## 2022-07-27 RX ORDER — LISINOPRIL 40 MG/1
40 TABLET ORAL DAILY
Qty: 90 TABLET | Refills: 0 | Status: SHIPPED | OUTPATIENT
Start: 2022-07-27 | End: 2022-10-06

## 2022-07-27 NOTE — TELEPHONE ENCOUNTER
Routing refill request to provider for review/approval because:  Labs out of range:  BP, creatinine    BP Readings from Last 3 Encounters:   05/11/22 (!) 152/81   04/04/22 (!) 155/77   03/31/22 (!) 142/76     Creatinine   Date Value Ref Range Status   05/11/2022 1.28 (H) 0.66 - 1.25 mg/dL Final   01/13/2021 1.50 (H) 0.66 - 1.25 mg/dL Sid LYNCH RN

## 2022-08-01 NOTE — LETTER
11/27/2018    Malcolm Schafer MD  [address] [facility] [address]    RE: Billy Stock       Dear Colleague,    I had the pleasure of seeing Billy Stock in the [facility].    HPI:   I had the pleasure of seeing Billy when he came for follow up of atrial fibrillation. He sees Dr. Reilly for his history of:    1. Permanent AFib, noted first in 2010 during pre-operative work up for eye surgery. Briefly returned to  [id number], but subsequently has been back in AFib. Remains on warfarin and metoprolol  2. Recent vertigo, now on meclizine    Billy comes today for annual follow up and from a heart standpoint, thinks he's done really well, without palpitations or lightheadedness.  He denies edema, orthopnea or PND.  He denies chest pain.  Unfortunately, he developed vertigo for the first time just a few weeks ago.  He was seen in follow-up for this and was given meclizine.  He states that things have been getting better.    EKG today shows AFib at 63 bpm  Echo 9/2010 showed EF 55-60%    Assessment & Plan:    1. Chronic Atrial Fibrillation    Heart rate under good control on EKG    Remains on warfarin with stable INRs >2.0    PLAN    Continue metoprolol and warfarin    See Dr. Reilly 1 year with echocardiogram to check EF as last checked in 2010      2.  Hypertension    Blood pressure is under excellent control    PLAN:    Continue current medications    3.  Vertigo    Has meclizine, which helped      PLAN:    Asked him to contact Dr. Schafer's office if this worsened or if he started requiring more meclizine as he may benefit from therapy.        Deena Kern PA-C, MSPAS      Orders Placed This Encounter   Procedures     EKG 12-lead complete w/read - Clinics (performed today)     Echocardiogram     No orders of the defined types were placed in this encounter.    There are no discontinued medications.      Encounter Diagnosis   Name Primary?     Chronic atrial fibrillation  (H) Yes       CURRENT MEDICATIONS:  Current Outpatient Prescriptions   Medication Sig Dispense Refill     acetaminophen (TYLENOL) 325 MG tablet Take 1-2 tablets by mouth every 6 hours as needed.       allopurinol (ZYLOPRIM) 100 MG tablet Take 1 tablet (100 mg) by mouth daily 90 tablet 4     amLODIPine (NORVASC) 10 MG tablet TAKE ONE TABLET BY MOUTH ONCE DAILY DUE  FOR  APPOINTMENT 90 tablet 3     fluticasone (FLONASE) 50 MCG/ACT spray Spray 1-2 sprays into both nostrils daily 1 Bottle 11     Levocetirizine Dihydrochloride (XYZAL PO) Take 5 mg by mouth daily        lisinopril (PRINIVIL/ZESTRIL) 10 MG tablet TAKE TWO TABLETS BY MOUTH  DAILY 180 tablet 3     meclizine (ANTIVERT) 25 MG tablet Take 1 tablet (25 mg) by mouth every 6 hours as needed for dizziness 30 tablet 1     metoprolol succinate (TOPROL-XL) 100 MG 24 hr tablet TAKE 1 TABLET EVERY DAY 90 tablet 3     UNABLE TO FIND MEDICATION NAME: Tabavit, multivitmain without vitamin K in it.       warfarin (COUMADIN) 5 MG tablet Take 1 tablet (5 mg) daily except a half tablet (2.5 mg) on Wednesday or as instructed by INR clinic 90 tablet 0     FLUZONE HIGH-DOSE 0.5 ML injection ADM 0.5ML IM UTD  0     triamcinolone (KENALOG) 0.5 % cream Apply sparingly to affected area three times daily. (Patient not taking: Reported on 11/27/2018) 30 g 0       ALLERGIES     Allergies   Allergen Reactions     Cats      Codeine      Hallucinated when taking codeine with another medication     Maple Tree      Morphine Visual Disturbance     Watermelon [Citrullus Vulgaris]      Itching throat, hives       PAST MEDICAL HISTORY:  Past Medical History:   Diagnosis Date     Bell's palsy 10/15/2002     CA IN SITU PROSTATE 10/15/2002     Chronic atrial fibrillation (H) 7/1/10     Glaucoma 4/10/2003     Problem list name updated by automated process. Provider to review     Gout 5/16/2013     Hyperlipidemia LDL goal <100 9/10/2014     Hypertension goal BP (blood pressure) < 140/90 6/28/2006      SARAH (obstructive sleep apnea) 2013     Pericarditis 2001     Renal cyst        PAST SURGICAL HISTORY:  Past Surgical History:   Procedure Laterality Date     CATARACT IOL, RT/LT  10/15, 11/15     COLONOSCOPY      [facility]     COLONOSCOPY  2014    Dr. Long [facility]     COLONOSCOPY N/A 2014    Procedure: COMBINED COLONOSCOPY, SINGLE BIOPSY/POLYPECTOMY BY BIOPSY;  Surgeon: Puneet Long MD;  Location:  GI     EYE SURGERY      glaucoma surgery right eye     PROSTATE SURGERY       SUPRAPUBIC PROSTATECTOMY  2002     VASECTOMY         FAMILY HISTORY:  Family History   Problem Relation Age of Onset     Hypertension Maternal Grandfather      Cerebrovascular Disease Maternal Grandfather      Hypertension Maternal Grandmother      Cerebrovascular Disease Maternal Grandmother      Hypertension Mother       age 51, MVA     Hypertension Brother      Heart Failure Brother      Bronchitis Brother      Other - See Comments Brother      multiple myeloma     Heart Surgery Brother      defibrilater     Prostate Problems Brother      Diabetes Brother      Other Cancer Brother      Multiple Myeloma     Breast Cancer Maternal Aunt      hx     Cancer Maternal Aunt      cervical cancer     Cancer - colorectal Maternal Aunt      Unknown/Adopted Father      Don't know father's history     Diabetes Son        SOCIAL HISTORY:  Social History     Social History     Marital status:      Spouse name: N/A     Number of children: 3     Years of education: N/A     Social History Main Topics     Smoking status: Never Smoker     Smokeless tobacco: Never Used     Alcohol use No     Drug use: No     Sexual activity: No     Other Topics Concern     Caffeine Concern No     Occupational Exposure No     Hobby Hazards No     Sleep Concern Yes     CPAP at night     Stress Concern No     Weight Concern No     Special Diet No     Back Care No     Exercise No     Seat Belt Yes     Social History Narrative       Review of  "Systems:  Skin:  Negative     Eyes:  Positive for cataracts  ENT:  Positive for vertigo  Respiratory:  Negative for shortness of breath;dyspnea on exertion;cough  Cardiovascular:  Negative for;palpitations;lightheadedness;dizziness;exercise intolerance;fatigue;chest pain    Gastroenterology: Negative for melena;hematochezia  Genitourinary:  Negative    Musculoskeletal:  Negative    Neurologic:  Negative    Psychiatric:  Negative    Heme/Lymph/Imm:  Negative    Endocrine:  Negative      Physical Exam:  Vitals: /77  Pulse 70  Ht 1.854 m (6' 1\")  Wt 104.3 kg (230 lb)  BMI 30.34 kg/m2    Constitutional:  cooperative, alert and oriented, well developed, well nourished, in no acute distress        Skin:  warm and dry to the touch        Head:  normocephalic        Eyes:  pupils equal and round        ENT:  no pallor or cyanosis        Neck:           Chest:  normal breath sounds, clear to auscultation, normal A-P diameter, normal symmetry, normal respiratory excursion, no use of accessory muscles        Cardiac:   irregularly irregular rhythm                Abdomen:  abdomen soft        Vascular: pulses full and equal                                      Extremities and Back:  no deformities, clubbing, cyanosis, erythema observed        Neurological:  no gross motor deficits          Recent Lab Results:  LIPID RESULTS:  Lab Results   Component Value Date    CHOL 113 01/19/2018    HDL 33 (L) 01/19/2018    LDL 51 01/19/2018    TRIG 143 01/19/2018    CHOLHDLRATIO 4.7 07/14/2015       LIVER ENZYME RESULTS:  Lab Results   Component Value Date    AST 18 10/29/2018    ALT 25 10/29/2018       CBC RESULTS:  Lab Results   Component Value Date    WBC 6.9 11/20/2018    RBC 5.30 11/20/2018    HGB 15.1 11/20/2018    HCT 43.9 11/20/2018    MCV 83 11/20/2018    MCH 28.5 11/20/2018    MCHC 34.4 11/20/2018    RDW 14.1 11/20/2018     (L) 11/20/2018       BMP RESULTS:  Lab Results   Component Value Date     11/20/2018 "    POTASSIUM 3.9 11/20/2018    CHLORIDE 109 11/20/2018    CO2 28 11/20/2018    ANIONGAP 4 11/20/2018    GLC 96 11/20/2018    BUN 18 11/20/2018    CR 1.20 11/20/2018    GFRESTIMATED 60 (L) 11/20/2018    GFRESTBLACK 72 11/20/2018    BRIANA 9.5 11/20/2018        A1C RESULTS:  No results found for: A1C    INR RESULTS:  Lab Results   Component Value Date    INR 2.8 (A) 11/08/2018    INR 2.5 (A) 10/11/2018    INR 2.52 (H) 10/23/2016    INR 2.13 (H) 10/21/2016         Thank you for allowing me to participate in the care of your patient.    Sincerely,     Radha Kern PA-C     [facility]     hand grasp, leg strength strong and equal bilaterally

## 2022-08-08 NOTE — ANESTHESIA PROCEDURE NOTES
Airway       Patient location during procedure: OR       Procedure Start/Stop Times: 3/8/2022 7:43 AM  Staff -        CRNA: Alicia Slater APRN CRNA       Performed By: CRNA  Consent for Airway        Urgency: elective  Indications and Patient Condition       Indications for airway management: antelmo-procedural       Induction type:intravenous       Mask difficulty assessment: 1 - vent by mask    Final Airway Details       Final airway type: endotracheal airway       Successful airway: ETT - single and Oral  Endotracheal Airway Details        ETT size (mm): 8.0       Cuffed: yes       Successful intubation technique: direct laryngoscopy       DL Blade Type: Kim 2       Grade View of Cords: 1       Adjucts: stylet       Position: Right       Measured from: gums/teeth       Secured at (cm): 24       Bite block used: Soft    Post intubation assessment        Placement verified by: capnometry, equal breath sounds and chest rise        Number of attempts at approach: 1       Number of other approaches attempted: 0       Secured with: plastic tape       Ease of procedure: easy       Dentition: Unchanged and Intact           no known mental health issues.

## 2022-08-11 ENCOUNTER — ANTICOAGULATION THERAPY VISIT (OUTPATIENT)
Dept: ANTICOAGULATION | Facility: CLINIC | Age: 77
End: 2022-08-11

## 2022-08-11 ENCOUNTER — LAB (OUTPATIENT)
Dept: LAB | Facility: CLINIC | Age: 77
End: 2022-08-11
Payer: COMMERCIAL

## 2022-08-11 DIAGNOSIS — Z79.01 LONG TERM CURRENT USE OF ANTICOAGULANTS WITH INR GOAL OF 2.0-3.0: ICD-10-CM

## 2022-08-11 DIAGNOSIS — I48.20 CHRONIC ATRIAL FIBRILLATION (H): ICD-10-CM

## 2022-08-11 DIAGNOSIS — Z79.01 LONG TERM CURRENT USE OF ANTICOAGULANTS WITH INR GOAL OF 2.0-3.0: Primary | ICD-10-CM

## 2022-08-11 LAB — INR BLD: 3.7 (ref 0.9–1.1)

## 2022-08-11 PROCEDURE — 36415 COLL VENOUS BLD VENIPUNCTURE: CPT

## 2022-08-11 PROCEDURE — 85610 PROTHROMBIN TIME: CPT

## 2022-08-11 NOTE — PROGRESS NOTES
ANTICOAGULATION MANAGEMENT     Billy Stock 76 year old male is on warfarin with supratherapeutic INR result. (Goal INR 2.0-3.0)    Recent labs: (last 7 days)     08/11/22  1526   INR 3.7*       ASSESSMENT       Source(s): Chart Review    Previous INR was Therapeutic last 2(+) visits    Medication, diet, health changes since last INR chart reviewed; none identified           PLAN     Unable to reach Marek today.    Left message to take reduced dose of warfarin, 2.5 mg tonight. Request call back for assessment. Left message to call [phone].     Follow up required to confirm warfarin dose taken and assess for changes and discuss out of range result     Samantha Galeas RN  Anticoagulation Clinic  8/11/2022

## 2022-08-12 ENCOUNTER — TELEPHONE (OUTPATIENT)
Dept: INTERNAL MEDICINE | Facility: CLINIC | Age: 77
End: 2022-08-12

## 2022-08-12 NOTE — TELEPHONE ENCOUNTER
Reason for Call:  Other returning call    Detailed comments: Patient missed call from INR nurse Samantha and is returnig their call. Gracie call back when able to.     Phone Number Patient can be reached at: Home number on file [phone] (home)    Best Time: Any Time    Can we leave a detailed message on this number? YES    Call taken on 8/12/2022 at 8:53 AM by Brenda Jarvis

## 2022-08-12 NOTE — PROGRESS NOTES
ANTICOAGULATION MANAGEMENT     Billy Stock 76 year old male is on warfarin with supratherapeutic INR result. (Goal INR 2.0-3.0)    Recent labs: (last 7 days)     08/11/22  1526   INR 3.7*       ASSESSMENT       Source(s): Chart Review and Patient/Caregiver Call       Warfarin doses taken: Warfarin taken as instructed    Diet: Increased greens/vitamin K in diet; plans to resume previous intake    New illness, injury, or hospitalization: No    Medication/supplement changes: None noted    Signs or symptoms of bleeding or clotting: Yes: spotting on leg a few weeks ago - now resolved    Previous INR: Therapeutic last 2(+) visits    Additional findings: Increased tylenol usage last couple weeks - having trouble sleeping and tylenol eases knee pain       PLAN     Recommended plan for temporary change(s) affecting INR     Dosing Instructions: partial hold then continue your current warfarin dose with next INR in 1-2 weeks         Summary  As of 8/11/2022    Full warfarin instructions:  8/11: 2.5 mg; Otherwise 5 mg every day   Next INR check:  8/25/2022             Telephone call with Marek who verbalizes understanding and agrees to plan    Lab visit scheduled    Education provided: Please call back if any changes to your diet, medications or how you've been taking warfarin    Plan made per [facility] anticoagulation protocol    Ruth Galeas RN  Anticoagulation Clinic  8/12/2022    _______________________________________________________________________     Anticoagulation Episode Summary     Current INR goal:  2.0-3.0   TTR:  61.2 % (11.1 mo)   Target end date:  Indefinite   Send INR reminders to:  [facility]    Indications    Long term current use of anticoagulants with INR goal of 2.0-3.0 [Z79.01]  Chronic atrial fibrillation (H) [I48.20]           Comments:  4/14/2020-approved for 8 week INR checks         Anticoagulation Care Providers     Provider Role Specialty Phone number    Vernaramjudie,  Malcolm SULLIVAN MD Referring Internal Medicine [phone]        ANTICOAGULATION MANAGEMENT     Billy Stock 76 year old male is on warfarin with supratherapeutic INR result. (Goal INR 2.0-3.0)    Recent labs: (last 7 days)     08/11/22  1526   INR 3.7*       ASSESSMENT       Source(s): Chart Review    Previous INR was Therapeutic last 2(+) visits    Medication, diet, health changes since last INR chart reviewed; none identified           PLAN     Unable to reach Marek today.    Left message to contact INR team today at [phone].    Follow up required to confirm warfarin dose taken and assess for changes and discuss out of range result     Ruth Galeas RN  Anticoagulation Clinic  8/12/2022

## 2022-08-25 ENCOUNTER — LAB (OUTPATIENT)
Dept: LAB | Facility: CLINIC | Age: 77
End: 2022-08-25
Payer: COMMERCIAL

## 2022-08-25 ENCOUNTER — ANTICOAGULATION THERAPY VISIT (OUTPATIENT)
Dept: ANTICOAGULATION | Facility: CLINIC | Age: 77
End: 2022-08-25

## 2022-08-25 DIAGNOSIS — Z79.01 LONG TERM CURRENT USE OF ANTICOAGULANTS WITH INR GOAL OF 2.0-3.0: ICD-10-CM

## 2022-08-25 DIAGNOSIS — I48.20 CHRONIC ATRIAL FIBRILLATION (H): ICD-10-CM

## 2022-08-25 DIAGNOSIS — Z79.01 LONG TERM CURRENT USE OF ANTICOAGULANTS WITH INR GOAL OF 2.0-3.0: Primary | ICD-10-CM

## 2022-08-25 LAB — INR BLD: 2 (ref 0.9–1.1)

## 2022-08-25 PROCEDURE — 36416 COLLJ CAPILLARY BLOOD SPEC: CPT

## 2022-08-25 PROCEDURE — 85610 PROTHROMBIN TIME: CPT

## 2022-08-25 NOTE — PROGRESS NOTES
ANTICOAGULATION MANAGEMENT     Billy Stock 76 year old male is on warfarin with therapeutic INR result. (Goal INR 2.0-3.0)    Recent labs: (last 7 days)     08/25/22  1521   INR 2.0*       ASSESSMENT     Source(s): Chart Review and Patient/Caregiver Call     Warfarin doses taken: Warfarin taken as instructed  Diet: No new diet changes identified  New illness, injury, or hospitalization: No  Medication/supplement changes: None noted  Signs or symptoms of bleeding or clotting: No  Previous INR: Supratherapeutic  Additional findings: None       PLAN     Recommended plan for no diet, medication or health factor changes affecting INR     Dosing Instructions: Continue your current warfarin dose with next INR in 3 weeks       Summary  As of 8/25/2022    Full warfarin instructions:  5 mg every day   Next INR check:  9/15/2022             Telephone call with Marek who verbalizes understanding and agrees to plan    Lab visit scheduled    Education provided: Please call back if any changes to your diet, medications or how you've been taking warfarin    Plan made per [facility] anticoagulation protocol    Gerry Moser RN  Anticoagulation Clinic  8/25/2022    _______________________________________________________________________     Anticoagulation Episode Summary     Current INR goal:  2.0-3.0   TTR:  59.6 % (11.1 mo)   Target end date:  Indefinite   Send INR reminders to:  [facility]    Indications    Long term current use of anticoagulants with INR goal of 2.0-3.0 [Z79.01]  Chronic atrial fibrillation (H) [I48.20]           Comments:  4/14/2020-approved for 8 week INR checks         Anticoagulation Care Providers     Provider Role Specialty Phone number    Malcolm Schafer MD Referring Internal Medicine [phone]

## 2022-09-07 ENCOUNTER — TELEPHONE (OUTPATIENT)
Dept: INTERNAL MEDICINE | Facility: CLINIC | Age: 77
End: 2022-09-07

## 2022-09-07 DIAGNOSIS — G51.0 BELL'S PALSY: Primary | ICD-10-CM

## 2022-09-07 RX ORDER — MULTIPLE VITAMINS W/ MINERALS TAB 9MG-400MCG
1 TAB ORAL DAILY
Qty: 100 TABLET | Refills: 3 | Status: SHIPPED | OUTPATIENT
Start: 2022-09-07

## 2022-09-07 NOTE — TELEPHONE ENCOUNTER
Reason for Call:  Medication or medication refill:    Do you use a [facility]?  Name of the pharmacy and phone number for the current request:  [facility]  Name of the medication requested: Would like an order for multi vitamin  Tab-a-naveed----pharmacy stated it is now needs a prescription as it used to be OTC    Other request:     Can we leave a detailed message on this number? YES    Phone number patient can be reached at: Home number on file [phone] (home)    Best Time:     Call taken on 9/7/2022 at 3:23 PM by Samantha Johnston PTA

## 2022-09-15 ENCOUNTER — ANTICOAGULATION THERAPY VISIT (OUTPATIENT)
Dept: ANTICOAGULATION | Facility: CLINIC | Age: 77
End: 2022-09-15

## 2022-09-15 ENCOUNTER — LAB (OUTPATIENT)
Dept: LAB | Facility: CLINIC | Age: 77
End: 2022-09-15
Payer: COMMERCIAL

## 2022-09-15 DIAGNOSIS — Z79.01 LONG TERM CURRENT USE OF ANTICOAGULANTS WITH INR GOAL OF 2.0-3.0: Primary | ICD-10-CM

## 2022-09-15 DIAGNOSIS — I48.20 CHRONIC ATRIAL FIBRILLATION (H): ICD-10-CM

## 2022-09-15 DIAGNOSIS — Z79.01 LONG TERM CURRENT USE OF ANTICOAGULANTS WITH INR GOAL OF 2.0-3.0: ICD-10-CM

## 2022-09-15 LAB — INR BLD: 2.7 (ref 0.9–1.1)

## 2022-09-15 PROCEDURE — 36416 COLLJ CAPILLARY BLOOD SPEC: CPT

## 2022-09-15 PROCEDURE — 85610 PROTHROMBIN TIME: CPT

## 2022-09-15 NOTE — PROGRESS NOTES
ANTICOAGULATION MANAGEMENT     Billy Stock 76 year old male is on warfarin with therapeutic INR result. (Goal INR 2.0-3.0)    Recent labs: (last 7 days)     09/15/22  1545   INR 2.7*       ASSESSMENT     Source(s): Chart Review  Previous INR was Therapeutic last visit; previously outside of goal range  Medication, diet, health changes since last INR chart reviewed; none identified           PLAN     Recommended plan for no diet, medication or health factor changes affecting INR     Dosing Instructions: Continue your current warfarin dose with next INR in 4 weeks       Summary  As of 9/15/2022    Full warfarin instructions:  5 mg every day   Next INR check:  10/13/2022             Detailed voice message left for Marek with dosing instructions and follow up date. Left message to call [phone] if he has had any changes.     Contact [phone]  to schedule and with any changes, questions or concerns.     Education provided: Please call back if any changes to your diet, medications or how you've been taking warfarin and Contact [phone]  with any changes, questions or concerns.     Plan made per [facility] anticoagulation protocol    Samantha Galeas RN  Anticoagulation Clinic  9/15/2022    _______________________________________________________________________     Anticoagulation Episode Summary     Current INR goal:  2.0-3.0   TTR:  59.6 % (11.1 mo)   Target end date:  Indefinite   Send INR reminders to:  [facility]    Indications    Long term current use of anticoagulants with INR goal of 2.0-3.0 [Z79.01]  Chronic atrial fibrillation (H) [I48.20]           Comments:  4/14/2020-approved for 8 week INR checks         Anticoagulation Care Providers     Provider Role Specialty Phone number    Malcolm Schafer MD Referring Internal Medicine [phone]

## 2022-09-15 NOTE — PROGRESS NOTES
Patient returned call, reports he has not had any changes. Lab INR appointment scheduled for 10/13/22.  Samantha Galeas RN, BSN  Anticoagulation Clinic

## 2022-09-16 ENCOUNTER — TELEPHONE (OUTPATIENT)
Dept: INTERNAL MEDICINE | Facility: CLINIC | Age: 77
End: 2022-09-16

## 2022-09-16 RX ORDER — ALENDRONATE SODIUM 70 MG/1
70 TABLET ORAL
Qty: 4 TABLET | Refills: 11 | Status: CANCELLED | COMMUNITY
Start: 2022-09-16

## 2022-09-16 NOTE — TELEPHONE ENCOUNTER
Per [employer], alendronate does not interfere with warfarin. Patient advised. Patient does not require a refill at this time.  Samantha Galeas RN, BSN  Anticoagulation Clinic

## 2022-09-16 NOTE — TELEPHONE ENCOUNTER
See message below.   Will route to INR clinic and PCP as FYI.     Per the outside meds tab, Alendronate 70 mg prescribed by Dali Sofia CNP Geriatric provider.       9/12/2022 ALENDRONATE SODIUM 70 MG TAB [facility] [address] [place] [address] [id number] [facility] [phone] 4.00 tablet  28  Surescripts (Claim History, Ambulatory)       Unit Strength: 70 MG    Prior auth: [id number]    Authorized by: DALI SOFIA

## 2022-09-17 ENCOUNTER — OFFICE VISIT (OUTPATIENT)
Dept: URGENT CARE | Facility: URGENT CARE | Age: 77
End: 2022-09-17
Payer: COMMERCIAL

## 2022-09-17 VITALS
TEMPERATURE: 97.8 F | HEART RATE: 60 BPM | SYSTOLIC BLOOD PRESSURE: 174 MMHG | DIASTOLIC BLOOD PRESSURE: 104 MMHG | WEIGHT: 223.5 LBS | BODY MASS INDEX: 29.49 KG/M2 | OXYGEN SATURATION: 100 %

## 2022-09-17 DIAGNOSIS — H61.23 BILATERAL IMPACTED CERUMEN: ICD-10-CM

## 2022-09-17 DIAGNOSIS — M25.552 HIP PAIN, LEFT: Primary | ICD-10-CM

## 2022-09-17 PROCEDURE — 99214 OFFICE O/P EST MOD 30 MIN: CPT | Performed by: FAMILY MEDICINE

## 2022-09-17 RX ORDER — PREDNISONE 20 MG/1
40 TABLET ORAL DAILY
Qty: 10 TABLET | Refills: 0 | Status: SHIPPED | OUTPATIENT
Start: 2022-09-17 | End: 2022-09-22

## 2022-09-17 RX ORDER — ALENDRONATE SODIUM 70 MG/1
TABLET ORAL
COMMUNITY
Start: 2022-09-12 | End: 2022-10-06

## 2022-09-17 RX ORDER — FAMOTIDINE 20 MG
TABLET ORAL
COMMUNITY

## 2022-09-17 RX ORDER — CLINDAMYCIN HCL 150 MG
CAPSULE ORAL
COMMUNITY
Start: 2022-06-14

## 2022-09-17 NOTE — PATIENT INSTRUCTIONS
Take prednisone burst to help with hip pain, possible gout flare  Okay to continue with tylenol for discomfort    Obtain debrox to help with ear wax, follow up with primary provider for ear irrigation    Contact INR nurse on Monday to let them know that you are on prednisone for any coumadin adjustment

## 2022-09-17 NOTE — PROGRESS NOTES
SUBJECTIVE:  Chief Complaint   Patient presents with     LEFT HIP PAIN     ONSET X 2 DAYS PT STATED HIS EARS ARE PLUGGED ALSO     Billy Stock is a 76 year old male who presents with a chief complaint of left hip pain.    Developed left hip pain X 2 days, had mild discomfort and had left over oxycodone and took that.  Woke up the following day and noted that was still painful.  Patient tried taking tylenol.  Patient was recently started on fosamax due to osteoporosis.    Had right hip fracture back in March, had surgery for hip replacement, long recovery.    Has gout, unsure if this is due to gout, usually occurs in left toe.  Feet has been more swollen.  Wondering about right leg discoloration.    Also complain of both ears feeling more plugged up.    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 Provider to review     Gout 05/16/2013     Hyperlipidemia LDL goal <100 09/10/2014     Hypertension goal BP (blood pressure) < 140/90 06/28/2006     SARAH (obstructive sleep apnea) 08/28/2013     SARAH (obstructive sleep apnea)      Parkinson disease (H) 2019     Pericarditis 2001     Renal cyst      Current Outpatient Medications   Medication Sig Dispense Refill     acetaminophen (TYLENOL) 500 MG tablet Take 1,000 mg by mouth 3 times daily       alendronate (FOSAMAX) 70 MG tablet TAKE 1 TABLET BY MOUTH ONCE A WEEK IN THE MORNING WITH A FULL GLASS OF WATER, 30 MINUTES BEFORE THE FIRST MEAL, BEVERAGE OR MEDICATION OF THE DAY. REMAIN UPRIGHT       allopurinol (ZYLOPRIM) 100 MG tablet Take 1 tablet (100 mg) by mouth daily 90 tablet 3     amLODIPine (NORVASC) 10 MG tablet Take 1 tablet by mouth once daily 90 tablet 1     Apoaequorin (PREVAGEN PO) Take 1 tablet by mouth daily       Calcium Citrate-Vitamin D (CALCIUM  "CITRATE+D3 PO) Take 600 mg by mouth       carbidopa-levodopa (SINEMET)  MG tablet Take 1 tablet by mouth 3 times daily       clindamycin (CLEOCIN) 150 MG capsule TAKE 4 TABLETS BY MOUTH 1 HOUR PRIOR TO DENTAL APPOINTMENT       lisinopril (ZESTRIL) 40 MG tablet Take 1 tablet (40 mg) by mouth daily 90 tablet 0     Loratadine (CLARITIN PO) Take 10 mg by mouth daily as needed (allergies)        metoprolol succinate ER (TOPROL-XL) 50 MG 24 hr tablet TAKE 1 & 1/2 (ONE & ONE-HALF) TABLETS BY MOUTH ONCE DAILY 135 tablet 1     multivitamin w/minerals (THERA-VIT-M) tablet Take 1 tablet by mouth daily 100 tablet 3     oxyCODONE (ROXICODONE) 5 MG tablet Take 1-2 tablets (5-10 mg) by mouth every 4 hours as needed for moderate to severe pain 1 tab po q 4 hrs prn pain scale \"1-5\"  2 tabs po q 4 hrs prn pain scale \"6-10\" 20 tablet 0     polyethylene glycol (MIRALAX) 17 GM/Dose powder Take 17 g by mouth daily 510 g      Vitamin D, Cholecalciferol, 25 MCG (1000 UT) CAPS        warfarin ANTICOAGULANT (COUMADIN) 5 MG tablet Take 2.5mg every Mon & Thurs / Take 5mg all other days OR per INR clinic 90 tablet 1     Social History     Tobacco Use     Smoking status: Never Smoker     Smokeless tobacco: Never Used   Substance Use Topics     Alcohol use: No     Alcohol/week: 0.0 standard drinks       ROS:  Review of systems negative except as stated above.    EXAM:   BP (!) 174/104   Pulse 60   Temp 97.8  F (36.6  C)   Wt 101.4 kg (223 lb 8 oz)   SpO2 100%   BMI 29.49 kg/m    GENERAL APPEARANCE: healthy, alert and no distress  EARS: bilateral cerumen impaction  EXTREMITIES: peripheral pulses normal, 1+ edema bilaterally, left foot with no erythema  SKIN: scattered ecchymosis and hyperpigmented patches  PSYCH:alert, affect flat    X-RAY was done - left hip - no acute fracture    ASSESSMENT/PLAN:  (M25.552) Hip pain, left  (primary encounter diagnosis)  Comment: strain vs gout  Plan: XR Hip Left 2-3 Views, predniSONE (DELTASONE)       "   20 MG tablet            (H61.23) Bilateral impacted cerumen  Plan: unsuccessful irrigation, debrox OTC      Unclear etiology for left hip pain, discussed possible minor strain given that patient may be walking slightly off due to right hip fracture.  Reviewed possible gout in joint causing discomfort.  Will treat with steroid as patient is unable to take NSAID due to coumadin use, RX prednisone burst given.  Will follow up on formal Xray report and notify if any abnormalities.  Recommend to contact INR clinic for coumadin adjustment if needed due to prednisone use.    Unable to tolerate ear irrigation by nursing staff due to discomfort.  Will have patient use OTC debrox and then follow up with primary provider for recheck.    Elevated BP today, denies any SOB or dizziness.  Reviewed that may be elevated due to pain.  Encourage to continue with current medication and follow up with primary provider for HTN management    Follow up with primary provider in 1-2 weeks    William James MD  September 17, 2022 4:43 PM

## 2022-09-19 ENCOUNTER — TELEPHONE (OUTPATIENT)
Dept: INTERNAL MEDICINE | Facility: CLINIC | Age: 77
End: 2022-09-19

## 2022-09-19 NOTE — TELEPHONE ENCOUNTER
Patient seen in Urgent Care 9/17/22 for left hip pain. Received Rx for Prednisone for 5 days for inflammation. Next INR is not scheduled until 10/6/22, will have patient check INR sooner.    Attempted to return call to patient. Left message to call [phone].    Samantha Galeas RN, BSN  Anticoagulation Clinic

## 2022-09-19 NOTE — TELEPHONE ENCOUNTER
Reason for Call:  Patient call back    Detailed comments: The patient is calling ACN regarding his UC appt over the weekend. He was prescribed prednisone 28 mg for 5 days for inflamation and is requesting advise regarding his Warfarin. Please advise at the number provided.    Phone Number Patient can be reached at: Home number on file [phone] (home)    Best Time: as soon as possible    Can we leave a detailed message on this number? YES    Call taken on 9/19/2022 at 10:28 AM by Allison Luna

## 2022-09-19 NOTE — TELEPHONE ENCOUNTER
Patient returned call, states hip pain/back pain is improving. Patient reports he did not start the Prednisone until 9/18/22. Lab INR appointment scheduled for 9/21/22  Samantha Galeas RN, BSN  Anticoagulation Clinic

## 2022-09-20 NOTE — TELEPHONE ENCOUNTER
Patient called back and advise   Procedure:  COLONOSCOPY    Relevant Problems   CARDIO   (+) Primary hypertension      ENDO   (+) Type 2 diabetes mellitus with hyperglycemia, with long-term current use of insulin (HCC)   (+) Type 2 diabetes mellitus with kidney complication, with long-term current use of insulin (HCC)      GI/HEPATIC   (+) Fatty liver      /RENAL   (+) Benign prostatic hyperplasia without lower urinary tract symptoms   (+) Kidney stone      PULMONARY   (+) ADDIS (obstructive sleep apnea)        Physical Exam    Airway    Mallampati score: II  TM Distance: >3 FB  Neck ROM: full     Dental   No notable dental hx     Cardiovascular  Cardiovascular exam normal    Pulmonary  Pulmonary exam normal     Other Findings        Anesthesia Plan  ASA Score- 2     Anesthesia Type- IV sedation with anesthesia with ASA Monitors  Additional Monitors:   Airway Plan:           Plan Factors-Exercise tolerance (METS): >4 METS  Chart reviewed  Patient summary reviewed  Patient is not a current smoker  Induction- intravenous  Postoperative Plan-     Informed Consent- Anesthetic plan and risks discussed with patient

## 2022-09-21 ENCOUNTER — ANTICOAGULATION THERAPY VISIT (OUTPATIENT)
Dept: ANTICOAGULATION | Facility: CLINIC | Age: 77
End: 2022-09-21

## 2022-09-21 ENCOUNTER — LAB (OUTPATIENT)
Dept: LAB | Facility: CLINIC | Age: 77
End: 2022-09-21
Payer: COMMERCIAL

## 2022-09-21 DIAGNOSIS — I48.20 CHRONIC ATRIAL FIBRILLATION (H): ICD-10-CM

## 2022-09-21 DIAGNOSIS — Z79.01 LONG TERM CURRENT USE OF ANTICOAGULANTS WITH INR GOAL OF 2.0-3.0: ICD-10-CM

## 2022-09-21 DIAGNOSIS — Z79.01 LONG TERM CURRENT USE OF ANTICOAGULANTS WITH INR GOAL OF 2.0-3.0: Primary | ICD-10-CM

## 2022-09-21 LAB — INR BLD: 4.7 (ref 0.9–1.1)

## 2022-09-21 PROCEDURE — 36416 COLLJ CAPILLARY BLOOD SPEC: CPT

## 2022-09-21 PROCEDURE — 85610 PROTHROMBIN TIME: CPT

## 2022-09-23 NOTE — TELEPHONE ENCOUNTER
Reason for Call:  Other patient calling with new medication update.    Detailed comments: patient states he was given a new antibiotic on 08/12/21. Did take first dose on evening of 08/12/21, but has not take this morning dose or his warfarin for today yet.   Patient was seen at [facility] for possible sinus infection; was given Augmentin (amoxicillin-clavulanate 875-125 mg tablet  Sig: take 1 tab by mouth bid) for 10 days.      Patient is wishing to speak with ACN prior to taking his Warfarin today.     Phone Number Patient can be reached at: Cell number on file:    Telephone Information:   Mobile [phone]       Best Time: any    Can we leave a detailed message on this number? YES    Call taken on 8/13/2021 at 11:06 AM by Stephanie Verdin       [FreeTextEntry3] : Large Joint Injection was performed because of pain and inflammation. Anesthesia: ethyl chloride sprayed topically.. \par Celestone: 2 cc. \par Lidocaine: 3 cc. \par Marcaine: 3 cc. \par \par Medication was injected in the RIGHT KNEE. Patient has tried OTC's including aspirin, Ibuprofen, Aleve etc or prescription NSAIDS, and/or exercises at home and/ or physical therapy without satisfactory response and Patient has decreased mobility in the joint. The risks, benefits, and alternatives to cortisone injection were explained in full to the patient. Risks outlined include but are not limited to infection, sepsis, bleeding, scarring, skin discoloration, temporary increase in pain, syncopal episode, failure to resolve symptoms, allergic reaction, symptom recurrence, and elevation of blood sugar in diabetics. Patient understood the risks. All questions were answered. After discussion of options, patient requested an injection. Oral informed consent was obtained and sterile prep was done of the injection site with betadyne and alcohol. Sterile technique was utilized for the procedure including the preparation of the solutions used for the injection. Patient tolerated the procedure well. Advised to ice the injection site this evening. Post Procedure Instructions: Patient was advised to call if redness, pain, or fever occur and apply ice for 15 min. out of every hour for the next 12-24 hours as tolerated. patient was advised to rest the joint(s) for 2 days.

## 2022-09-27 ENCOUNTER — LAB (OUTPATIENT)
Dept: LAB | Facility: CLINIC | Age: 77
End: 2022-09-27
Payer: COMMERCIAL

## 2022-09-27 ENCOUNTER — ANTICOAGULATION THERAPY VISIT (OUTPATIENT)
Dept: ANTICOAGULATION | Facility: CLINIC | Age: 77
End: 2022-09-27

## 2022-09-27 DIAGNOSIS — I48.20 CHRONIC ATRIAL FIBRILLATION (H): ICD-10-CM

## 2022-09-27 DIAGNOSIS — Z79.01 LONG TERM CURRENT USE OF ANTICOAGULANTS WITH INR GOAL OF 2.0-3.0: Primary | ICD-10-CM

## 2022-09-27 DIAGNOSIS — Z79.01 LONG TERM CURRENT USE OF ANTICOAGULANTS WITH INR GOAL OF 2.0-3.0: ICD-10-CM

## 2022-09-27 LAB — INR BLD: 2 (ref 0.9–1.1)

## 2022-09-27 PROCEDURE — 85610 PROTHROMBIN TIME: CPT

## 2022-09-27 PROCEDURE — 36416 COLLJ CAPILLARY BLOOD SPEC: CPT

## 2022-09-27 NOTE — PROGRESS NOTES
ANTICOAGULATION MANAGEMENT     Billy Stock 76 year old male is on warfarin with therapeutic INR result. (Goal INR 2.0-3.0)    Recent labs: (last 7 days)     09/27/22  1548   INR 2.0*       ASSESSMENT     Source(s): Chart Review and Patient/Caregiver Call     Warfarin doses taken: Warfarin taken as instructed  Diet: No new diet changes identified  New illness, injury, or hospitalization: No  Medication/supplement changes: Patient reports he completed course of Prednisone 9/22/22  Signs or symptoms of bleeding or clotting: No  Previous INR: Supratherapeutic  Additional findings: None       PLAN     Recommended plan for no diet, medication or health factor changes affecting INR     Dosing Instructions: Continue your current warfarin dose with next INR in 1 week       Summary  As of 9/27/2022    Full warfarin instructions:  5 mg every day   Next INR check:  10/4/2022             Telephone call with Marek who verbalizes understanding and agrees to plan    Lab visit scheduled    Education provided: Please call back if any changes to your diet, medications or how you've been taking warfarin and Contact [phone]  with any changes, questions or concerns.     Plan made per [facility] anticoagulation protocol    Samantha Galeas RN  Anticoagulation Clinic  9/27/2022    _______________________________________________________________________     Anticoagulation Episode Summary     Current INR goal:  2.0-3.0   TTR:  56.9 % (11.1 mo)   Target end date:  Indefinite   Send INR reminders to:  [facility]    Indications    Long term current use of anticoagulants with INR goal of 2.0-3.0 [Z79.01]  Chronic atrial fibrillation (H) [I48.20]           Comments:  4/14/2020-approved for 8 week INR checks         Anticoagulation Care Providers     Provider Role Specialty Phone number    Malcolm Schafer MD Referring Internal Medicine [phone]

## 2022-09-28 NOTE — PROGRESS NOTES
"[facility]    I had the pleasure of seeing Marek when he came for follow up of AFib.  This 76 year old seesJuliane for his history of:    1. Permanent AFib - first noted 2010 during pre-op evaluation for surgery. Had restoration of SR 5800-9637, but in AFib since  2.  CKD - baseline Cr 1.4  3.  Parkinson's        I last saw Marek 11/2018 at which time he was doing well, though was dealing with vertigo, improved with meclizine.    Dr. Reilly saw him last in 8/2021 at which time he was doing well after his metoprolol had been reduced and lisinopril added. His INR had been labile and DOAC was considered. Annual follow-up recommended.    Unfortunately, hospitalized 3/6- after a mechanical fall and broke his R hip requiring R hip hemiarthroplasty 3/8/2022.  Does not appear that he had any issues with RVR or HTN issues while hospitalized. Had long recovery in TCU. Unfortunately, not back to his old strength yet.      Interval History:  Marke hasn't done as well since his broken hip in 3/2022 overall, but has done \"nasir\" from a heart perspective. No palpitations, dizziness, lightheadedness.  His tremor is worse, which he blames on the stress from the surgery.     Notes R>L edema, worse since surgery. He walks with a cane but is in a wheelchair for the visit.     No CP, SOB.    VITALS:  Vitals: /72   Pulse 70   Ht 1.854 m (6' 1\")   Wt 94.8 kg (209 lb)   SpO2 100%   BMI 27.57 kg/m      Diagnostic Testing:  EKG 3/2022 with AFib 63 bpm  Echocardiogram 11/2019 - LVEF 55-60%. No RWMA. Nl RV. Mild MAC. 1 TR with RVSP 30+RAP. Ao sclerosis.   LE ABIs - 1.2 bilaterally, wnl      Component      Latest Ref Rng & Units 8/25/2022 9/15/2022 9/21/2022 9/27/2022   INR Point of Care      0.9 - 1.1 2.0 (H) 2.7 (H) 4.7 (H) 2.0 (H)         Plan:  1. Annual follow-up with echo and EKG    Assessment/Plan:    1. Permanent AFib    Remains on Metoprolol 75 mg daily for rate control    EKGs with HRs in 60s    Remains " on AC with warfarin    PLAN:    Continue current medications    Annual follow-up. EKG    2. HTN    BPs not checked at home b/c hasn't figured out how to use his wrist cuff    Here, BP looked good. In hospital 3/2022, no issues requiring med adjustments    PLAN:    Continue current medication        Deena Kern PA-C, MSPAS      Orders Placed This Encounter   Procedures     Follow-Up with Cardiology SUNG     EKG 12-lead complete w/read - Clinics (to be scheduled)     Echocardiogram Complete     No orders of the defined types were placed in this encounter.    There are no discontinued medications.      Encounter Diagnosis   Name Primary?     Persistent atrial fibrillation (H)        CURRENT MEDICATIONS:  Current Outpatient Medications   Medication Sig Dispense Refill     acetaminophen (TYLENOL) 500 MG tablet Take 1,000 mg by mouth 3 times daily       alendronate (FOSAMAX) 70 MG tablet TAKE 1 TABLET BY MOUTH ONCE A WEEK IN THE MORNING WITH A FULL GLASS OF WATER, 30 MINUTES BEFORE THE FIRST MEAL, BEVERAGE OR MEDICATION OF THE DAY. REMAIN UPRIGHT       allopurinol (ZYLOPRIM) 100 MG tablet Take 1 tablet (100 mg) by mouth daily 90 tablet 3     amLODIPine (NORVASC) 10 MG tablet Take 1 tablet by mouth once daily 90 tablet 1     Apoaequorin (PREVAGEN PO) Take 1 tablet by mouth daily       Calcium Citrate-Vitamin D (CALCIUM CITRATE+D3 PO) Take 600 mg by mouth       carbidopa-levodopa (SINEMET)  MG tablet Take 1 tablet by mouth 3 times daily       clindamycin (CLEOCIN) 150 MG capsule TAKE 4 TABLETS BY MOUTH 1 HOUR PRIOR TO DENTAL APPOINTMENT       lisinopril (ZESTRIL) 40 MG tablet Take 1 tablet (40 mg) by mouth daily 90 tablet 0     Loratadine (CLARITIN PO) Take 10 mg by mouth daily as needed (allergies)        metoprolol succinate ER (TOPROL-XL) 50 MG 24 hr tablet TAKE 1 & 1/2 (ONE & ONE-HALF) TABLETS BY MOUTH ONCE DAILY 135 tablet 1     multivitamin w/minerals (THERA-VIT-M) tablet Take 1 tablet by mouth daily 100 tablet 3  "    oxyCODONE (ROXICODONE) 5 MG tablet Take 1-2 tablets (5-10 mg) by mouth every 4 hours as needed for moderate to severe pain 1 tab po q 4 hrs prn pain scale \"1-5\"  2 tabs po q 4 hrs prn pain scale \"6-10\" 20 tablet 0     polyethylene glycol (MIRALAX) 17 GM/Dose powder Take 17 g by mouth daily 510 g      Vitamin D (Cholecalciferol) 25 MCG (1000 UT) CAPS        warfarin ANTICOAGULANT (COUMADIN) 5 MG tablet Take 2.5mg every Mon & Thurs / Take 5mg all other days OR per INR clinic 90 tablet 1       ALLERGIES     Allergies   Allergen Reactions     Cats      Codeine      Hallucinated when taking codeine with another medication     Codeine Unknown     Maple Tree      Morphine Visual Disturbance and Other (See Comments)     Watermelon [Citrullus Vulgaris]      Itching throat, hives         Review of Systems:  Skin:  Negative     Eyes:  Negative for cataracts  ENT:  Negative    Respiratory:  Positive for dyspnea on exertion (sob with stairs)  Cardiovascular:  palpitations;Negative for;chest pain;fatigue;dizziness lightheadedness;Positive for;edema  Gastroenterology: Negative for melena;hematochezia  Genitourinary:  Positive for incontinence  Musculoskeletal:  Positive for arthritis  Neurologic:  Positive for tremors  Psychiatric:  Positive for excessive stress  Heme/Lymph/Imm:  Negative    Endocrine:  Negative      Physical Exam:  Vitals: /72   Pulse 70   Ht 1.854 m (6' 1\")   Wt 94.8 kg (209 lb)   SpO2 100%   BMI 27.57 kg/m      Constitutional:  cooperative, alert and oriented, well developed, well nourished, in no acute distress        Skin:  warm and dry to the touch        Head:  normocephalic        Eyes:  pupils equal and round        ENT:  not assessed this visit        Neck:           Chest:  normal breath sounds, clear to auscultation, normal A-P diameter, normal symmetry, normal respiratory excursion, no use of accessory muscles        Cardiac:   irregularly irregular rhythm     systolic ejection murmur   "        Abdomen:  abdomen soft        Vascular: pulses full and equal                                      Extremities and Back:  no deformities, clubbing, cyanosis, erythema observed        Neurological:  no gross motor deficits            PAST MEDICAL HISTORY:  Past Medical History:   Diagnosis Date     Bell's palsy 10/15/2002     CA IN SITU PROSTATE 10/15/2002     Chronic atrial fibrillation (H) 2010     Chronic renal insufficiency      Glaucoma 04/10/2003     Problem list name updated by automated process. Provider to review     Gout 2013     Hyperlipidemia LDL goal <100 09/10/2014     Hypertension goal BP (blood pressure) < 140/90 2006     SARAH (obstructive sleep apnea) 2013     SARAH (obstructive sleep apnea)      Parkinson disease (H) 2019     Pericarditis 2001     Renal cyst        PAST SURGICAL HISTORY:  Past Surgical History:   Procedure Laterality Date     CATARACT IOL, RT/LT  10/15, 11/15     COLONOSCOPY      [facility]     COLONOSCOPY  2014    Dr. Long [facility]     COLONOSCOPY N/A 2014    Procedure: COMBINED COLONOSCOPY, SINGLE BIOPSY/POLYPECTOMY BY BIOPSY;  Surgeon: Puneet Long MD;  Location: [facility]     EYE SURGERY      glaucoma surgery right eye     HERNIA REPAIR       OPEN REDUCTION INTERNAL FIXATION HIP BIPOLAR Right 3/8/2022    Procedure: Right  hip hemiarthroplasty, anterolateral approach;  Surgeon: Bandar Lundberg MD;  Location: [facility] OR     PROSTATE SURGERY       SUPRAPUBIC PROSTATECTOMY       VASECTOMY         FAMILY HISTORY:  Family History   Problem Relation Age of Onset     Hypertension Mother          age 51, MVA     Unknown/Adopted Father         Don't know father's history     Hypertension Brother      Heart Failure Brother      Bronchitis Brother      Other - See Comments Brother         multiple myeloma     Heart Surgery Brother         defibrilater     Prostate Problems Brother      Diabetes Brother      Other Cancer Brother         Multiple  Myeloma     Hypertension Maternal Grandmother      Cerebrovascular Disease Maternal Grandmother      Hypertension Maternal Grandfather      Cerebrovascular Disease Maternal Grandfather      Breast Cancer Daughter      Diabetes Son      Cerebrovascular Disease Son         stroke 2021     Breast Cancer Maternal Aunt         hx     Cancer Maternal Aunt         cervical cancer     Cancer - colorectal Maternal Aunt      Colon Cancer Other        SOCIAL HISTORY:  Social History     Socioeconomic History     Marital status:      Spouse name: None     Number of children: 3     Years of education: None     Highest education level: None   Tobacco Use     Smoking status: Never Smoker     Smokeless tobacco: Never Used   Substance and Sexual Activity     Alcohol use: No     Alcohol/week: 0.0 standard drinks     Drug use: No     Sexual activity: Not Currently   Other Topics Concern     Caffeine Concern No     Occupational Exposure No     Hobby Hazards No     Sleep Concern Yes     Comment: CPAP at night     Stress Concern No     Weight Concern No     Special Diet No     Back Care No     Exercise No     Seat Belt Yes       CC  Justo Reilly MD  [address]  [address]  [place] [address]

## 2022-09-30 ENCOUNTER — OFFICE VISIT (OUTPATIENT)
Dept: CARDIOLOGY | Facility: CLINIC | Age: 77
End: 2022-09-30
Attending: INTERNAL MEDICINE
Payer: COMMERCIAL

## 2022-09-30 VITALS
OXYGEN SATURATION: 100 % | SYSTOLIC BLOOD PRESSURE: 128 MMHG | WEIGHT: 209 LBS | HEART RATE: 70 BPM | DIASTOLIC BLOOD PRESSURE: 72 MMHG | HEIGHT: 73 IN | BODY MASS INDEX: 27.7 KG/M2

## 2022-09-30 DIAGNOSIS — I48.19 PERSISTENT ATRIAL FIBRILLATION (H): ICD-10-CM

## 2022-09-30 PROCEDURE — 99213 OFFICE O/P EST LOW 20 MIN: CPT | Performed by: PHYSICIAN ASSISTANT

## 2022-09-30 NOTE — PATIENT INSTRUCTIONS
Marek - it was good to see you today!    Reviewed that heart-wise you've done well  I'm so sorry you had such trouble with the ice this year!  Reviewed broken hip - it seemed like your heart did OK during the surgery and while in the hospital    PLAN:  1. No changes needed today but CALL if you need records or anything if/when you move to [place]  [phone]  2. See us back 1 year with EKG and echo unless you've moved!  3. Bring BP cuff into next appt so you can review how to use it!

## 2022-09-30 NOTE — LETTER
"9/30/2022    Malcolm Schafer MD  [address] [facility] [address]    RE: Billy Stock       Dear Colleague,     I had the pleasure of seeing Billy Stock in the [facility].  [facility]    I had the pleasure of seeing Marek when he came for follow up of AFib.  This 76 year old seesLLi for his history of:    1. Permanent AFib - first noted 2010 during pre-op evaluation for surgery. Had restoration of SR 2404-2047, but in AFib since  2.  CKD - baseline Cr 1.4  3.  Parkinson's        I last saw Marek 11/2018 at which time he was doing well, though was dealing with vertigo, improved with meclizine.    Dr. Reilly saw him last in 8/2021 at which time he was doing well after his metoprolol had been reduced and lisinopril added. His INR had been labile and DOAC was considered. Annual follow-up recommended.    Unfortunately, hospitalized 3/6- after a mechanical fall and broke his R hip requiring R hip hemiarthroplasty 3/8/2022.  Does not appear that he had any issues with RVR or HTN issues while hospitalized. Had long recovery in TCU. Unfortunately, not back to his old strength yet.      Interval History:  Marek hasn't done as well since his broken hip in 3/2022 overall, but has done \"nasir\" from a heart perspective. No palpitations, dizziness, lightheadedness.  His tremor is worse, which he blames on the stress from the surgery.     Notes R>L edema, worse since surgery. He walks with a cane but is in a wheelchair for the visit.     No CP, SOB.    VITALS:  Vitals: /72   Pulse 70   Ht 1.854 m (6' 1\")   Wt 94.8 kg (209 lb)   SpO2 100%   BMI 27.57 kg/m      Diagnostic Testing:  EKG 3/2022 with AFib 63 bpm  Echocardiogram 11/2019 - LVEF 55-60%. No RWMA. Nl RV. Mild MAC. 1 TR with RVSP 30+RAP. Ao sclerosis.   LE ABIs - 1.2 bilaterally, wnl      Component      Latest Ref Rng & Units 8/25/2022 9/15/2022 9/21/2022 9/27/2022   INR Point of Care      0.9 - 1.1 2.0 " (H) 2.7 (H) 4.7 (H) 2.0 (H)         Plan:  1. Annual follow-up with echo and EKG    Assessment/Plan:    1. Permanent AFib    Remains on Metoprolol 75 mg daily for rate control    EKGs with HRs in 60s    Remains on AC with warfarin    PLAN:    Continue current medications    Annual follow-up. EKG    2. HTN    BPs not checked at home b/c hasn't figured out how to use his wrist cuff    Here, BP looked good. In hospital 3/2022, no issues requiring med adjustments    PLAN:    Continue current medication        Deena Kern PA-C, MSPAS      Orders Placed This Encounter   Procedures     Follow-Up with Cardiology SUNG     EKG 12-lead complete w/read - Clinics (to be scheduled)     Echocardiogram Complete     No orders of the defined types were placed in this encounter.    There are no discontinued medications.      Encounter Diagnosis   Name Primary?     Persistent atrial fibrillation (H)        CURRENT MEDICATIONS:  Current Outpatient Medications   Medication Sig Dispense Refill     acetaminophen (TYLENOL) 500 MG tablet Take 1,000 mg by mouth 3 times daily       alendronate (FOSAMAX) 70 MG tablet TAKE 1 TABLET BY MOUTH ONCE A WEEK IN THE MORNING WITH A FULL GLASS OF WATER, 30 MINUTES BEFORE THE FIRST MEAL, BEVERAGE OR MEDICATION OF THE DAY. REMAIN UPRIGHT       allopurinol (ZYLOPRIM) 100 MG tablet Take 1 tablet (100 mg) by mouth daily 90 tablet 3     amLODIPine (NORVASC) 10 MG tablet Take 1 tablet by mouth once daily 90 tablet 1     Apoaequorin (PREVAGEN PO) Take 1 tablet by mouth daily       Calcium Citrate-Vitamin D (CALCIUM CITRATE+D3 PO) Take 600 mg by mouth       carbidopa-levodopa (SINEMET)  MG tablet Take 1 tablet by mouth 3 times daily       clindamycin (CLEOCIN) 150 MG capsule TAKE 4 TABLETS BY MOUTH 1 HOUR PRIOR TO DENTAL APPOINTMENT       lisinopril (ZESTRIL) 40 MG tablet Take 1 tablet (40 mg) by mouth daily 90 tablet 0     Loratadine (CLARITIN PO) Take 10 mg by mouth daily as needed (allergies)         "metoprolol succinate ER (TOPROL-XL) 50 MG 24 hr tablet TAKE 1 & 1/2 (ONE & ONE-HALF) TABLETS BY MOUTH ONCE DAILY 135 tablet 1     multivitamin w/minerals (THERA-VIT-M) tablet Take 1 tablet by mouth daily 100 tablet 3     oxyCODONE (ROXICODONE) 5 MG tablet Take 1-2 tablets (5-10 mg) by mouth every 4 hours as needed for moderate to severe pain 1 tab po q 4 hrs prn pain scale \"1-5\"  2 tabs po q 4 hrs prn pain scale \"6-10\" 20 tablet 0     polyethylene glycol (MIRALAX) 17 GM/Dose powder Take 17 g by mouth daily 510 g      Vitamin D (Cholecalciferol) 25 MCG (1000 UT) CAPS        warfarin ANTICOAGULANT (COUMADIN) 5 MG tablet Take 2.5mg every Mon & Thurs / Take 5mg all other days OR per INR clinic 90 tablet 1       ALLERGIES     Allergies   Allergen Reactions     Cats      Codeine      Hallucinated when taking codeine with another medication     Codeine Unknown     Maple Tree      Morphine Visual Disturbance and Other (See Comments)     Watermelon [Citrullus Vulgaris]      Itching throat, hives         Review of Systems:  Skin:  Negative     Eyes:  Negative for cataracts  ENT:  Negative    Respiratory:  Positive for dyspnea on exertion (sob with stairs)  Cardiovascular:  palpitations;Negative for;chest pain;fatigue;dizziness lightheadedness;Positive for;edema  Gastroenterology: Negative for melena;hematochezia  Genitourinary:  Positive for incontinence  Musculoskeletal:  Positive for arthritis  Neurologic:  Positive for tremors  Psychiatric:  Positive for excessive stress  Heme/Lymph/Imm:  Negative    Endocrine:  Negative      Physical Exam:  Vitals: /72   Pulse 70   Ht 1.854 m (6' 1\")   Wt 94.8 kg (209 lb)   SpO2 100%   BMI 27.57 kg/m      Constitutional:  cooperative, alert and oriented, well developed, well nourished, in no acute distress        Skin:  warm and dry to the touch        Head:  normocephalic        Eyes:  pupils equal and round        ENT:  not assessed this visit        Neck:           Chest:  " normal breath sounds, clear to auscultation, normal A-P diameter, normal symmetry, normal respiratory excursion, no use of accessory muscles        Cardiac:   irregularly irregular rhythm     systolic ejection murmur          Abdomen:  abdomen soft        Vascular: pulses full and equal                                      Extremities and Back:  no deformities, clubbing, cyanosis, erythema observed        Neurological:  no gross motor deficits            PAST MEDICAL HISTORY:  Past Medical History:   Diagnosis Date     Bell's palsy 10/15/2002     CA IN SITU PROSTATE 10/15/2002     Chronic atrial fibrillation (H) 2010     Chronic renal insufficiency      Glaucoma 04/10/2003     Problem list name updated by automated process. Provider to review     Gout 2013     Hyperlipidemia LDL goal <100 09/10/2014     Hypertension goal BP (blood pressure) < 140/90 2006     SARAH (obstructive sleep apnea) 2013     SARAH (obstructive sleep apnea)      Parkinson disease (H) 2019     Pericarditis 2001     Renal cyst        PAST SURGICAL HISTORY:  Past Surgical History:   Procedure Laterality Date     CATARACT IOL, RT/LT  10/15, 11/15     COLONOSCOPY  2009    [facility]     COLONOSCOPY  2014    Dr. Long [facility]     COLONOSCOPY N/A 2014    Procedure: COMBINED COLONOSCOPY, SINGLE BIOPSY/POLYPECTOMY BY BIOPSY;  Surgeon: Puneet Long MD;  Location:  GI     EYE SURGERY      glaucoma surgery right eye     HERNIA REPAIR       OPEN REDUCTION INTERNAL FIXATION HIP BIPOLAR Right 3/8/2022    Procedure: Right  hip hemiarthroplasty, anterolateral approach;  Surgeon: Bandar Lundberg MD;  Location: [facility] OR     PROSTATE SURGERY       SUPRAPUBIC PROSTATECTOMY       VASECTOMY         FAMILY HISTORY:  Family History   Problem Relation Age of Onset     Hypertension Mother          age 51, MVA     Unknown/Adopted Father         Don't know father's history     Hypertension Brother      Heart Failure Brother       Bronchitis Brother      Other - See Comments Brother         multiple myeloma     Heart Surgery Brother         defibrilater     Prostate Problems Brother      Diabetes Brother      Other Cancer Brother         Multiple Myeloma     Hypertension Maternal Grandmother      Cerebrovascular Disease Maternal Grandmother      Hypertension Maternal Grandfather      Cerebrovascular Disease Maternal Grandfather      Breast Cancer Daughter      Diabetes Son      Cerebrovascular Disease Son         stroke 2021     Breast Cancer Maternal Aunt         hx     Cancer Maternal Aunt         cervical cancer     Cancer - colorectal Maternal Aunt      Colon Cancer Other        SOCIAL HISTORY:  Social History     Socioeconomic History     Marital status:      Spouse name: None     Number of children: 3     Years of education: None     Highest education level: None   Tobacco Use     Smoking status: Never Smoker     Smokeless tobacco: Never Used   Substance and Sexual Activity     Alcohol use: No     Alcohol/week: 0.0 standard drinks     Drug use: No     Sexual activity: Not Currently   Other Topics Concern     Caffeine Concern No     Occupational Exposure No     Hobby Hazards No     Sleep Concern Yes     Comment: CPAP at night     Stress Concern No     Weight Concern No     Special Diet No     Back Care No     Exercise No     Seat Belt Yes       CC  Justo Reilly MD  [address]  [address]  [place] [address]        Thank you for allowing me to participate in the care of your patient.      Sincerely,     Radha Kern PA-C     [facility]

## 2022-10-04 ENCOUNTER — LAB (OUTPATIENT)
Dept: LAB | Facility: CLINIC | Age: 77
End: 2022-10-04
Payer: COMMERCIAL

## 2022-10-04 ENCOUNTER — ANTICOAGULATION THERAPY VISIT (OUTPATIENT)
Dept: ANTICOAGULATION | Facility: CLINIC | Age: 77
End: 2022-10-04

## 2022-10-04 DIAGNOSIS — Z79.01 LONG TERM CURRENT USE OF ANTICOAGULANTS WITH INR GOAL OF 2.0-3.0: ICD-10-CM

## 2022-10-04 DIAGNOSIS — I48.20 CHRONIC ATRIAL FIBRILLATION (H): ICD-10-CM

## 2022-10-04 DIAGNOSIS — Z79.01 LONG TERM CURRENT USE OF ANTICOAGULANTS WITH INR GOAL OF 2.0-3.0: Primary | ICD-10-CM

## 2022-10-04 LAB — INR BLD: 2.2 (ref 0.9–1.1)

## 2022-10-04 PROCEDURE — 36415 COLL VENOUS BLD VENIPUNCTURE: CPT

## 2022-10-04 PROCEDURE — 85610 PROTHROMBIN TIME: CPT

## 2022-10-04 NOTE — PROGRESS NOTES
ANTICOAGULATION MANAGEMENT     Billy Stock 76 year old male is on warfarin with therapeutic INR result. (Goal INR 2.0-3.0)    Recent labs: (last 7 days)     10/04/22  1547   INR 2.2*       ASSESSMENT     Source(s): Chart Review  Previous INR was Therapeutic last visit; previously outside of goal range  Medication, diet, health changes since last INR chart reviewed; none identified           PLAN     Unable to reach Marek today.    Left message to continue current dose of warfarin 5 mg tonight. Request call back for assessment.       Follow up required to confirm warfarin dose taken and assess for changes    Samantha Galeas RN  Anticoagulation Clinic  10/4/2022

## 2022-10-05 NOTE — PROGRESS NOTES
ANTICOAGULATION MANAGEMENT     Billy Stock 76 year old male is on warfarin with therapeutic INR result. (Goal INR 2.0-3.0)    Recent labs: (last 7 days)     10/04/22  1547   INR 2.2*       ASSESSMENT     Source(s): Chart Review and Patient/Caregiver Call     Warfarin doses taken: Warfarin taken as instructed  Diet: No new diet changes identified  New illness, injury, or hospitalization: No  Medication/supplement changes: None noted  Signs or symptoms of bleeding or clotting: No  Previous INR: Therapeutic last visit; previously outside of goal range  Additional findings: None       PLAN     Recommended plan for no diet, medication or health factor changes affecting INR     Dosing Instructions: Continue your current warfarin dose with next INR in 2 weeks       Summary  As of 10/4/2022    Full warfarin instructions:  5 mg every day   Next INR check:  10/18/2022             Telephone call with Marek who verbalizes understanding and agrees to plan    Lab visit scheduled    Education provided: Please call back if any changes to your diet, medications or how you've been taking warfarin    Plan made per [facility] anticoagulation protocol    Gerry Moser RN  Anticoagulation Clinic  10/5/2022    _______________________________________________________________________     Anticoagulation Episode Summary     Current INR goal:  2.0-3.0   TTR:  56.8 % (11.1 mo)   Target end date:  Indefinite   Send INR reminders to:  [facility]    Indications    Long term current use of anticoagulants with INR goal of 2.0-3.0 [Z79.01]  Chronic atrial fibrillation (H) [I48.20]           Comments:  4/14/2020-approved for 8 week INR checks         Anticoagulation Care Providers     Provider Role Specialty Phone number    Malcolm Schafer MD Referring Internal Medicine [phone]

## 2022-10-06 ENCOUNTER — OFFICE VISIT (OUTPATIENT)
Dept: INTERNAL MEDICINE | Facility: CLINIC | Age: 77
End: 2022-10-06
Payer: COMMERCIAL

## 2022-10-06 VITALS
RESPIRATION RATE: 16 BRPM | HEIGHT: 73 IN | DIASTOLIC BLOOD PRESSURE: 82 MMHG | WEIGHT: 214 LBS | OXYGEN SATURATION: 98 % | HEART RATE: 73 BPM | BODY MASS INDEX: 28.36 KG/M2 | SYSTOLIC BLOOD PRESSURE: 150 MMHG | TEMPERATURE: 98 F

## 2022-10-06 DIAGNOSIS — G20.A1 PARKINSON DISEASE (H): ICD-10-CM

## 2022-10-06 DIAGNOSIS — I48.19 PERSISTENT ATRIAL FIBRILLATION (H): ICD-10-CM

## 2022-10-06 DIAGNOSIS — Z79.01 LONG TERM CURRENT USE OF ANTICOAGULANTS WITH INR GOAL OF 2.0-3.0: ICD-10-CM

## 2022-10-06 DIAGNOSIS — N18.31 STAGE 3A CHRONIC KIDNEY DISEASE (H): ICD-10-CM

## 2022-10-06 DIAGNOSIS — I10 ESSENTIAL HYPERTENSION WITH GOAL BLOOD PRESSURE LESS THAN 140/90: Primary | ICD-10-CM

## 2022-10-06 DIAGNOSIS — M81.0 OSTEOPOROSIS, UNSPECIFIED OSTEOPOROSIS TYPE, UNSPECIFIED PATHOLOGICAL FRACTURE PRESENCE: ICD-10-CM

## 2022-10-06 DIAGNOSIS — M10.9 GOUT OF FOOT, UNSPECIFIED CAUSE, UNSPECIFIED CHRONICITY, UNSPECIFIED LATERALITY: ICD-10-CM

## 2022-10-06 DIAGNOSIS — C61 PROSTATE CANCER (H): ICD-10-CM

## 2022-10-06 DIAGNOSIS — Z23 NEED FOR PROPHYLACTIC VACCINATION AND INOCULATION AGAINST INFLUENZA: ICD-10-CM

## 2022-10-06 LAB
ERYTHROCYTE [DISTWIDTH] IN BLOOD BY AUTOMATED COUNT: 15.8 % (ref 10–15)
HCT VFR BLD AUTO: 39.4 % (ref 40–53)
HGB BLD-MCNC: 13 G/DL (ref 13.3–17.7)
MCH RBC QN AUTO: 26.9 PG (ref 26.5–33)
MCHC RBC AUTO-ENTMCNC: 33 G/DL (ref 31.5–36.5)
MCV RBC AUTO: 82 FL (ref 78–100)
PLATELET # BLD AUTO: 182 10E3/UL (ref 150–450)
RBC # BLD AUTO: 4.83 10E6/UL (ref 4.4–5.9)
WBC # BLD AUTO: 5.5 10E3/UL (ref 4–11)

## 2022-10-06 PROCEDURE — 90662 IIV NO PRSV INCREASED AG IM: CPT | Performed by: NURSE PRACTITIONER

## 2022-10-06 PROCEDURE — 99214 OFFICE O/P EST MOD 30 MIN: CPT | Mod: 25 | Performed by: NURSE PRACTITIONER

## 2022-10-06 PROCEDURE — 85027 COMPLETE CBC AUTOMATED: CPT | Performed by: NURSE PRACTITIONER

## 2022-10-06 PROCEDURE — 36415 COLL VENOUS BLD VENIPUNCTURE: CPT | Performed by: NURSE PRACTITIONER

## 2022-10-06 PROCEDURE — G0008 ADMIN INFLUENZA VIRUS VAC: HCPCS | Performed by: NURSE PRACTITIONER

## 2022-10-06 PROCEDURE — 80048 BASIC METABOLIC PNL TOTAL CA: CPT | Performed by: NURSE PRACTITIONER

## 2022-10-06 RX ORDER — AMLODIPINE BESYLATE 10 MG/1
10 TABLET ORAL DAILY
Qty: 90 TABLET | Refills: 1 | Status: SHIPPED | OUTPATIENT
Start: 2022-10-06

## 2022-10-06 RX ORDER — ALLOPURINOL 100 MG/1
100 TABLET ORAL DAILY
Qty: 90 TABLET | Refills: 3 | Status: SHIPPED | OUTPATIENT
Start: 2022-10-06

## 2022-10-06 RX ORDER — LISINOPRIL 40 MG/1
40 TABLET ORAL DAILY
Qty: 90 TABLET | Refills: 0 | Status: SHIPPED | OUTPATIENT
Start: 2022-10-06

## 2022-10-06 RX ORDER — WARFARIN SODIUM 5 MG/1
TABLET ORAL
Qty: 90 TABLET | Refills: 1 | Status: SHIPPED | OUTPATIENT
Start: 2022-10-06 | End: 2023-05-09

## 2022-10-06 RX ORDER — ALENDRONATE SODIUM 70 MG/1
TABLET ORAL
Qty: 12 TABLET | Refills: 3 | Status: SHIPPED | OUTPATIENT
Start: 2022-10-06

## 2022-10-06 RX ORDER — METOPROLOL SUCCINATE 50 MG/1
TABLET, EXTENDED RELEASE ORAL
Qty: 135 TABLET | Refills: 3 | Status: SHIPPED | OUTPATIENT
Start: 2022-10-06

## 2022-10-06 NOTE — PROGRESS NOTES
"  Assessment & Plan     (N18.31) Stage 3a chronic kidney disease (H)  (primary encounter diagnosis)  Comment:   Plan:     (I10) Essential hypertension with goal blood pressure less than 140/90  Comment:   Plan: amLODIPine (NORVASC) 10 MG tablet, lisinopril         (ZESTRIL) 40 MG tablet            (M10.9) Gout of foot, unspecified cause, unspecified chronicity, unspecified laterality  Comment:   Plan: allopurinol (ZYLOPRIM) 100 MG tablet            (I48.19) Persistent atrial fibrillation (H)  Comment:   Plan: metoprolol succinate ER (TOPROL XL) 50 MG 24 hr        tablet            (Z79.01) Long term current use of anticoagulants with INR goal of 2.0-3.0  Comment:   Plan: warfarin ANTICOAGULANT (COUMADIN) 5 MG tablet            (G20) Parkinson disease (H)  Comment:   Plan:     (C61) Prostate cancer (H)  Comment:   Plan:     (M81.0) Osteoporosis, unspecified osteoporosis type, unspecified pathological fracture presence  Comment:   Plan: alendronate (FOSAMAX) 70 MG tablet                The current medical regimen is effective;  continue present plan and medications.  F/u PCP 3 months         BMI:   Estimated body mass index is 28.23 kg/m  as calculated from the following:    Height as of this encounter: 1.854 m (6' 1\").    Weight as of this encounter: 97.1 kg (214 lb).           Theresa Goddard NP  [facility] ANASTACIO Jara is a 76 year old, presenting for the following health issues:  Recheck Medication, Ear Problem, and Edema      HPI     Hypertension Follow-up      Do you check your blood pressure regularly outside of the clinic? Yes     Are you following a low salt diet? Yes    Are your blood pressures ever more than 140 on the top number (systolic) OR more   than 90 on the bottom number (diastolic), for example 140/90? Yes    Chronic Kidney Disease Follow-up    Do you take any over the counter pain medicine?: Yes  What over the counter medicine are you taking for your pain?:  " "NSAIDs      How often do you take this medicine?:  A few times a week        Review of Systems   Constitutional, HEENT, cardiovascular, pulmonary, gi and gu systems are negative, except as otherwise noted.      Objective    BP (!) 150/82   Pulse 73   Temp 98  F (36.7  C) (Tympanic)   Resp 16   Ht 1.854 m (6' 1\")   Wt 97.1 kg (214 lb)   SpO2 98%   BMI 28.23 kg/m    Body mass index is 28.23 kg/m .  Physical Exam   GENERAL: alert, no distress, frail and elderly  NECK: no adenopathy, no asymmetry, masses, or scars and thyroid normal to palpation  RESP: lungs clear to auscultation - no rales, rhonchi or wheezes  CV: regular rate and rhythm, normal S1 S2, no S3 or S4, no murmur, click or rub, no peripheral edema and peripheral pulses strong                    "

## 2022-10-07 LAB
ANION GAP SERPL CALCULATED.3IONS-SCNC: 4 MMOL/L (ref 3–14)
BUN SERPL-MCNC: 26 MG/DL (ref 7–30)
CALCIUM SERPL-MCNC: 9.3 MG/DL (ref 8.5–10.1)
CHLORIDE BLD-SCNC: 109 MMOL/L (ref 94–109)
CO2 SERPL-SCNC: 29 MMOL/L (ref 20–32)
CREAT SERPL-MCNC: 1.74 MG/DL (ref 0.66–1.25)
GFR SERPL CREATININE-BSD FRML MDRD: 40 ML/MIN/1.73M2
GLUCOSE BLD-MCNC: 92 MG/DL (ref 70–99)
POTASSIUM BLD-SCNC: 4 MMOL/L (ref 3.4–5.3)
SODIUM SERPL-SCNC: 142 MMOL/L (ref 133–144)

## 2022-10-18 ENCOUNTER — ANTICOAGULATION THERAPY VISIT (OUTPATIENT)
Dept: ANTICOAGULATION | Facility: CLINIC | Age: 77
End: 2022-10-18

## 2022-10-18 ENCOUNTER — LAB (OUTPATIENT)
Dept: LAB | Facility: CLINIC | Age: 77
End: 2022-10-18
Payer: COMMERCIAL

## 2022-10-18 DIAGNOSIS — I48.20 CHRONIC ATRIAL FIBRILLATION (H): ICD-10-CM

## 2022-10-18 DIAGNOSIS — Z79.01 LONG TERM CURRENT USE OF ANTICOAGULANTS WITH INR GOAL OF 2.0-3.0: ICD-10-CM

## 2022-10-18 DIAGNOSIS — Z79.01 LONG TERM CURRENT USE OF ANTICOAGULANTS WITH INR GOAL OF 2.0-3.0: Primary | ICD-10-CM

## 2022-10-18 LAB — INR BLD: 3.1 (ref 0.9–1.1)

## 2022-10-18 PROCEDURE — 36416 COLLJ CAPILLARY BLOOD SPEC: CPT

## 2022-10-18 PROCEDURE — 85610 PROTHROMBIN TIME: CPT

## 2022-10-18 NOTE — PROGRESS NOTES
ANTICOAGULATION MANAGEMENT     Billy Stock 76 year old male is on warfarin with supratherapeutic INR result. (Goal INR 2.0-3.0)    Recent labs: (last 7 days)     10/18/22  1604   INR 3.1*       ASSESSMENT     Source(s): Chart Review and Patient/Caregiver Call     Warfarin doses taken: Warfarin taken as instructed  Diet: No new diet changes identified  New illness, injury, or hospitalization: No  Medication/supplement changes: None noted  Signs or symptoms of bleeding or clotting: No  Previous INR: Therapeutic last 2(+) visits  Additional findings: None       PLAN     Recommended plan for no diet, medication or health factor changes affecting INR     Dosing Instructions: Continue your current warfarin dose with next INR in 2 weeks       Summary  As of 10/18/2022    Full warfarin instructions:  5 mg every day   Next INR check:  11/1/2022             Telephone call with Marek who verbalizes understanding and agrees to plan    Lab visit scheduled    Education provided: Please call back if any changes to your diet, medications or how you've been taking warfarin    Plan made per [facility] anticoagulation protocol    Ruth Galeas RN  Anticoagulation Clinic  10/18/2022    _______________________________________________________________________     Anticoagulation Episode Summary     Current INR goal:  2.0-3.0   TTR:  56.4 % (11.1 mo)   Target end date:  Indefinite   Send INR reminders to:  [facility]    Indications    Long term current use of anticoagulants with INR goal of 2.0-3.0 [Z79.01]  Chronic atrial fibrillation (H) [I48.20]           Comments:  4/14/2020-approved for 8 week INR checks         Anticoagulation Care Providers     Provider Role Specialty Phone number    Malcolm Schafer MD Referring Internal Medicine [phone]

## 2022-10-31 ENCOUNTER — TELEPHONE (OUTPATIENT)
Dept: INTERNAL MEDICINE | Facility: CLINIC | Age: 77
End: 2022-10-31

## 2022-10-31 NOTE — TELEPHONE ENCOUNTER
Spoke with patient. Reports open wound on front of right leg. Will keep INR appointment scheduled for tomorrow.

## 2022-10-31 NOTE — TELEPHONE ENCOUNTER
Reason for Call:  INR    Who is calling?  Patient     Phone number:  [phone]    Fax number:  NA    Name of caller: Marek    INR Value:  NA     Are there any other concerns:  Yes: Patient is called in about some bruising on his right leg - is wondering if the warfarin dose needs to be changed     Can we leave a detailed message on this number? YES    Phone number patient can be reached at: Cell number on file:    Telephone Information:   Mobile [phone]         Call taken on 10/31/2022 at 9:08 AM by Analia Mitchell

## 2022-11-01 ENCOUNTER — ANTICOAGULATION THERAPY VISIT (OUTPATIENT)
Dept: ANTICOAGULATION | Facility: CLINIC | Age: 77
End: 2022-11-01

## 2022-11-01 ENCOUNTER — LAB (OUTPATIENT)
Dept: LAB | Facility: CLINIC | Age: 77
End: 2022-11-01
Payer: COMMERCIAL

## 2022-11-01 DIAGNOSIS — Z79.01 LONG TERM CURRENT USE OF ANTICOAGULANTS WITH INR GOAL OF 2.0-3.0: Primary | ICD-10-CM

## 2022-11-01 DIAGNOSIS — I48.20 CHRONIC ATRIAL FIBRILLATION (H): ICD-10-CM

## 2022-11-01 DIAGNOSIS — Z79.01 LONG TERM CURRENT USE OF ANTICOAGULANTS WITH INR GOAL OF 2.0-3.0: ICD-10-CM

## 2022-11-01 LAB — INR BLD: 2.2 (ref 0.9–1.1)

## 2022-11-01 PROCEDURE — 36416 COLLJ CAPILLARY BLOOD SPEC: CPT

## 2022-11-01 PROCEDURE — 85610 PROTHROMBIN TIME: CPT

## 2022-11-01 NOTE — PROGRESS NOTES
ANTICOAGULATION MANAGEMENT     Billy Stock 77 year old male is on warfarin with therapeutic INR result. (Goal INR 2.0-3.0)    Recent labs: (last 7 days)     11/01/22  1343   INR 2.2*       ASSESSMENT     Source(s): Chart Review  Previous INR was Supratherapeutic  Medication, diet, health changes since last INR chart reviewed; none identified           PLAN     Unable to reach Marek today.    Left message to continue current dose of warfarin 5 mg tonight. Request call back for assessment. Left message to call [phone].       Follow up required to confirm warfarin dose taken and assess for changes    Samantha Galeas RN  Anticoagulation Clinic  11/1/2022

## 2022-11-01 NOTE — PROGRESS NOTES
ANTICOAGULATION MANAGEMENT     Billy Stock 77 year old male is on warfarin with therapeutic INR result. (Goal INR 2.0-3.0)    Recent labs: (last 7 days)     11/01/22  1343   INR 2.2*       ASSESSMENT     Source(s): Chart Review and Patient/Caregiver Call     Warfarin doses taken: Warfarin taken as instructed  Diet: No new diet changes identified  New illness, injury, or hospitalization: No  Medication/supplement changes: None noted  Signs or symptoms of bleeding or clotting: No  Previous INR: Supratherapeutic  Additional findings: None       PLAN     Recommended plan for no diet, medication or health factor changes affecting INR     Dosing Instructions: Continue your current warfarin dose with next INR in 3 weeks       Summary  As of 11/1/2022    Full warfarin instructions:  5 mg every day; Starting 11/1/2022   Next INR check:  11/23/2022             Telephone call with Marek who verbalizes understanding and agrees to plan    Lab visit scheduled    Education provided:   Please call back if any changes to your diet, medications or how you've been taking warfarin  Contact [phone]  with any changes, questions or concerns.     Plan made per [facility] anticoagulation protocol    Samantha Galeas RN  Anticoagulation Clinic  11/1/2022    _______________________________________________________________________     Anticoagulation Episode Summary     Current INR goal:  2.0-3.0   TTR:  58.9 % (11.1 mo)   Target end date:  Indefinite   Send INR reminders to:  [facility]    Indications    Long term current use of anticoagulants with INR goal of 2.0-3.0 [Z79.01]  Chronic atrial fibrillation (H) [I48.20]           Comments:  4/14/2020-approved for 8 week INR checks         Anticoagulation Care Providers     Provider Role Specialty Phone number    Malcolm Schafer MD Referring Internal Medicine [phone]

## 2022-11-02 ENCOUNTER — OFFICE VISIT (OUTPATIENT)
Dept: URGENT CARE | Facility: URGENT CARE | Age: 77
End: 2022-11-02
Payer: COMMERCIAL

## 2022-11-02 VITALS
TEMPERATURE: 97.5 F | SYSTOLIC BLOOD PRESSURE: 163 MMHG | RESPIRATION RATE: 22 BRPM | HEART RATE: 71 BPM | DIASTOLIC BLOOD PRESSURE: 77 MMHG | OXYGEN SATURATION: 99 %

## 2022-11-02 DIAGNOSIS — L03.115 CELLULITIS OF RIGHT LOWER LEG: ICD-10-CM

## 2022-11-02 DIAGNOSIS — I87.2 VENOUS STASIS DERMATITIS OF BOTH LOWER EXTREMITIES: Primary | ICD-10-CM

## 2022-11-02 PROCEDURE — 99214 OFFICE O/P EST MOD 30 MIN: CPT | Performed by: PHYSICIAN ASSISTANT

## 2022-11-02 RX ORDER — TRIAMCINOLONE ACETONIDE 1 MG/G
CREAM TOPICAL 2 TIMES DAILY
Qty: 45 G | Refills: 0 | Status: SHIPPED | OUTPATIENT
Start: 2022-11-02

## 2022-11-02 RX ORDER — CEPHALEXIN 500 MG/1
500 CAPSULE ORAL 3 TIMES DAILY
Qty: 21 CAPSULE | Refills: 0 | Status: SHIPPED | OUTPATIENT
Start: 2022-11-02 | End: 2022-11-09

## 2022-11-02 NOTE — PATIENT INSTRUCTIONS
You have an infection in your leg and   Stasis dermatitis : this is an inflammatory skin disease caused by fluid build up in your lower legs    Start taking Keflex for skin infection. Have your INR tested in one week.     You can use the triamcinolone cream for itchiness on your lower legs, continue to use aquaphor or Eucerin on your lower legs    Elevate your legs and use compression stockings

## 2022-11-02 NOTE — PROGRESS NOTES
Assessment & Plan     1. Venous stasis dermatitis of both lower extremities  - triamcinolone (KENALOG) 0.1 % external cream; Apply topically 2 times daily Do not use for more than 2 weeks at a time  Dispense: 45 g; Refill: 0    2. Cellulitis of right lower leg  - cephALEXin (KEFLEX) 500 MG capsule; Take 1 capsule (500 mg) by mouth 3 times daily for 7 days  Dispense: 21 capsule; Refill: 0    Patient has stasis dermatitis of her lower extremities B/L   Right side he does have a wound, which he endorses having some tenderness surrounding, no real swelling or significant erythema noted.   Treatment w Keflex, follow-up with INR nurse in one week for a recheck of INR  Discussed chronic nature of stasis dermatitis with patient,   Ok to use triamcinolone on the area if itchy and inflamed. Continue with aquaphor and Eucerin, elevate legs and wear compression stockings    Return in about 1 week (around 11/9/2022) for INR nurse.    Diagnosis and treatment plan was reviewed with patient and/or family.   We went over any labs or imaging. Discussed worsening symptoms or little to no relief despite treatment plan to follow-up with PCP or return to clinic.  Patient verbalizes understanding. All questions were addressed and answered.     Terri Yancey PA-C  [facility]    CHIEF COMPLAINT:   Chief Complaint   Patient presents with     Rash     X 2 weeks - right leg -      Subjective     Marek is a 77 year old male who presents to clinic today for evaluation of a rash.  Noticed it about 2 weeks ago, dark patches around his ankles and on the anterior aspect of his legs.  Did have a scab that he ripped off, was treating with Neosporin now has increased pain in the area.  No fevers or chills noted.  Denies any numbness or tingling into his legs.      Past Medical History:   Diagnosis Date     Bell's palsy 10/15/2002     CA IN SITU PROSTATE 10/15/2002     Chronic atrial fibrillation (H) 07/01/2010      Chronic renal insufficiency      Glaucoma 04/10/2003     Problem list name updated by automated process. Provider to review     Gout 05/16/2013     Hyperlipidemia LDL goal <100 09/10/2014     Hypertension goal BP (blood pressure) < 140/90 06/28/2006     SARAH (obstructive sleep apnea) 08/28/2013     SARAH (obstructive sleep apnea)      Parkinson disease (H) 2019     Pericarditis 2001     Renal cyst      Past Surgical History:   Procedure Laterality Date     CATARACT IOL, RT/LT  10/15, 11/15     COLONOSCOPY  2009    [facility]     COLONOSCOPY  9/30/2014    Dr. Long [facility]     COLONOSCOPY N/A 9/30/2014    Procedure: COMBINED COLONOSCOPY, SINGLE BIOPSY/POLYPECTOMY BY BIOPSY;  Surgeon: Puneet Long MD;  Location: [facility]     EYE SURGERY  2007    glaucoma surgery right eye     HERNIA REPAIR       OPEN REDUCTION INTERNAL FIXATION HIP BIPOLAR Right 3/8/2022    Procedure: Right  hip hemiarthroplasty, anterolateral approach;  Surgeon: Bandar Lundberg MD;  Location: [facility] OR     PROSTATE SURGERY       SUPRAPUBIC PROSTATECTOMY  2002     VASECTOMY       Social History     Tobacco Use     Smoking status: Never     Smokeless tobacco: Never   Substance Use Topics     Alcohol use: No     Alcohol/week: 0.0 standard drinks     Current Outpatient Medications   Medication     acetaminophen (TYLENOL) 500 MG tablet     alendronate (FOSAMAX) 70 MG tablet     allopurinol (ZYLOPRIM) 100 MG tablet     amLODIPine (NORVASC) 10 MG tablet     Apoaequorin (PREVAGEN PO)     Calcium Citrate-Vitamin D (CALCIUM CITRATE+D3 PO)     carbidopa-levodopa (SINEMET)  MG tablet     cephALEXin (KEFLEX) 500 MG capsule     lisinopril (ZESTRIL) 40 MG tablet     Loratadine (CLARITIN PO)     metoprolol succinate ER (TOPROL XL) 50 MG 24 hr tablet     multivitamin w/minerals (THERA-VIT-M) tablet     oxyCODONE (ROXICODONE) 5 MG tablet     polyethylene glycol (MIRALAX) 17 GM/Dose powder     triamcinolone (KENALOG) 0.1 % external cream     Vitamin D (Cholecalciferol) 25  MCG (1000 UT) CAPS     warfarin ANTICOAGULANT (COUMADIN) 5 MG tablet     clindamycin (CLEOCIN) 150 MG capsule     No current facility-administered medications for this visit.     Allergies   Allergen Reactions     Cats      Codeine      Hallucinated when taking codeine with another medication     Codeine Unknown     Maple Tree      Morphine Visual Disturbance and Other (See Comments)     Watermelon [Citrullus Vulgaris]      Itching throat, hives       10 point ROS of systems were all negative except for pertinent positives noted in my HPI.      Exam:   BP (!) 163/77   Pulse 71   Temp 97.5  F (36.4  C) (Tympanic)   Resp 22   SpO2 99%   Constitutional: healthy, alert and no distress  Skin: Lower extremities have swelling B/L with scaling. RLL has abrasion with faint erythema and TTP.   Neurologic:  Moves all extremities.    No results found for any visits on 11/02/22.

## 2022-11-03 ENCOUNTER — TELEPHONE (OUTPATIENT)
Dept: ANTICOAGULATION | Facility: CLINIC | Age: 77
End: 2022-11-03

## 2022-11-03 NOTE — TELEPHONE ENCOUNTER
Patient called to report he was seen in Urgent Care 11/2/22, noted to have venous stasis and cellulitis of right lower leg. Patient was given Keflex, per [facility] - Concurrent use of CEPHALEXIN and WARFARIN may result in an increased risk of bleeding.    Provider also wanted patient to have INR checked on 11/3/22, appointment scheduled.    Samantha Galeas RN, BSN  Anticoagulation Clinic

## 2022-11-15 ENCOUNTER — TELEPHONE (OUTPATIENT)
Dept: ANTICOAGULATION | Facility: CLINIC | Age: 77
End: 2022-11-15

## 2022-11-15 NOTE — TELEPHONE ENCOUNTER
Returned call to patient, left message advising lab INR appointment is 11/16/22, at the [facility] at 3:30.    Samantha Galeas RN, BSN  Anticoagulation Clinic

## 2022-11-15 NOTE — TELEPHONE ENCOUNTER
Patient called and left VM asking for a call back to confirm appointment for tomorrow - patient states someone told him it was at 2pm but patient does not have transportation - per epic lab appointment is at 330pm    Please call to clarify/follow up with patient.     Sana Cameron, RN, BSN, PHN  Anticoagulation Nurse  [phone]

## 2022-11-16 ENCOUNTER — ANTICOAGULATION THERAPY VISIT (OUTPATIENT)
Dept: ANTICOAGULATION | Facility: CLINIC | Age: 77
End: 2022-11-16

## 2022-11-16 ENCOUNTER — LAB (OUTPATIENT)
Dept: LAB | Facility: CLINIC | Age: 77
End: 2022-11-16
Payer: COMMERCIAL

## 2022-11-16 DIAGNOSIS — Z79.01 LONG TERM CURRENT USE OF ANTICOAGULANTS WITH INR GOAL OF 2.0-3.0: Primary | ICD-10-CM

## 2022-11-16 DIAGNOSIS — Z79.01 LONG TERM CURRENT USE OF ANTICOAGULANTS WITH INR GOAL OF 2.0-3.0: ICD-10-CM

## 2022-11-16 DIAGNOSIS — I48.20 CHRONIC ATRIAL FIBRILLATION (H): ICD-10-CM

## 2022-11-16 LAB — INR BLD: 2.1 (ref 0.9–1.1)

## 2022-11-16 PROCEDURE — 85610 PROTHROMBIN TIME: CPT

## 2022-11-16 PROCEDURE — 36416 COLLJ CAPILLARY BLOOD SPEC: CPT

## 2022-11-16 NOTE — PROGRESS NOTES
ANTICOAGULATION MANAGEMENT     Billy Stock 77 year old male is on warfarin with therapeutic INR result. (Goal INR 2.0-3.0)    Recent labs: (last 7 days)     11/16/22  1540   INR 2.1*       ASSESSMENT     Source(s): Chart Review and Patient/Caregiver Call     Warfarin doses taken: Warfarin taken as instructed  Diet: No new diet changes identified  New illness, injury, or hospitalization: Yes: Patient seen in Urgent Care 11/2/22 for rash on legs to be evaluated, found to have statis dermatitis, given topical cream and Keflex, patient was to have INR checked 1 week after, he canceled the appointment, patient has completed the antibiotic  Medication/supplement changes: None noted  Signs or symptoms of bleeding or clotting: No  Previous INR: Therapeutic last visit; previously outside of goal range  Additional findings: None       PLAN     Recommended plan for no diet, medication or health factor changes affecting INR     Dosing Instructions: Continue your current warfarin dose with next INR in 3 weeks       Summary  As of 11/16/2022    Full warfarin instructions:  5 mg every day; Starting 11/16/2022   Next INR check:  12/7/2022             Telephone call with Marek who agrees to plan and repeated back plan correctly    Lab visit scheduled    Education provided:   Please call back if any changes to your diet, medications or how you've been taking warfarin  Contact [phone]  with any changes, questions or concerns.     Plan made per [facility] anticoagulation protocol    Samantha Galeas RN  Anticoagulation Clinic  11/16/2022    _______________________________________________________________________     Anticoagulation Episode Summary     Current INR goal:  2.0-3.0   TTR:  58.9 % (11.1 mo)   Target end date:  Indefinite   Send INR reminders to:  [facility]    Indications    Long term current use of anticoagulants with INR goal of 2.0-3.0 [Z79.01]  Chronic atrial fibrillation (H) [I48.20]            Comments:  4/14/2020-approved for 8 week INR checks         Anticoagulation Care Providers     Provider Role Specialty Phone number    Malcolm Schafer MD Referring Internal Medicine [phone]

## 2022-11-28 DIAGNOSIS — R97.20 ELEVATED PROSTATE SPECIFIC ANTIGEN (PSA): Primary | ICD-10-CM

## 2022-12-02 ENCOUNTER — LAB (OUTPATIENT)
Dept: LAB | Facility: CLINIC | Age: 77
End: 2022-12-02
Payer: COMMERCIAL

## 2022-12-02 ENCOUNTER — ANTICOAGULATION THERAPY VISIT (OUTPATIENT)
Dept: ANTICOAGULATION | Facility: CLINIC | Age: 77
End: 2022-12-02

## 2022-12-02 DIAGNOSIS — R97.20 ELEVATED PROSTATE SPECIFIC ANTIGEN (PSA): ICD-10-CM

## 2022-12-02 DIAGNOSIS — Z79.01 LONG TERM CURRENT USE OF ANTICOAGULANTS WITH INR GOAL OF 2.0-3.0: ICD-10-CM

## 2022-12-02 DIAGNOSIS — I48.20 CHRONIC ATRIAL FIBRILLATION (H): ICD-10-CM

## 2022-12-02 DIAGNOSIS — Z79.01 LONG TERM CURRENT USE OF ANTICOAGULANTS WITH INR GOAL OF 2.0-3.0: Primary | ICD-10-CM

## 2022-12-02 DIAGNOSIS — I48.20 CHRONIC ATRIAL FIBRILLATION (H): Primary | ICD-10-CM

## 2022-12-02 LAB — INR BLD: 3.1 (ref 0.9–1.1)

## 2022-12-02 PROCEDURE — 85610 PROTHROMBIN TIME: CPT

## 2022-12-02 PROCEDURE — 36415 COLL VENOUS BLD VENIPUNCTURE: CPT

## 2022-12-02 PROCEDURE — 84153 ASSAY OF PSA TOTAL: CPT

## 2022-12-02 NOTE — PROGRESS NOTES
ANTICOAGULATION MANAGEMENT     Billy Stock 77 year old male is on warfarin with supratherapeutic INR result. (Goal INR 2.0-3.0)    Recent labs: (last 7 days)     12/02/22  1545   INR 3.1*       ASSESSMENT     Source(s): Chart Review  Previous INR was Therapeutic last 2(+) visits  Medication, diet, health changes since last INR chart reviewed; none identified  *Needs updated referral with PCP when this encounter is closed* Standing lab orders already approved today.         PLAN     Unable to reach Marek today.    Left message to continue current maintenance dose (5 mg daily) this weekend. Request call back for assessment.    Follow up required to confirm warfarin dose taken and assess for changes and discuss out of range result     Ruth Galeas RN  Anticoagulation Clinic  12/2/2022

## 2022-12-03 LAB — PSA SERPL-MCNC: 1.07 NG/ML (ref 0–6.5)

## 2022-12-05 ENCOUNTER — VIRTUAL VISIT (OUTPATIENT)
Dept: UROLOGY | Facility: CLINIC | Age: 77
End: 2022-12-05
Payer: COMMERCIAL

## 2022-12-05 VITALS — WEIGHT: 219 LBS | HEIGHT: 73 IN | BODY MASS INDEX: 29.03 KG/M2

## 2022-12-05 DIAGNOSIS — C61 PROSTATE CANCER (H): Primary | ICD-10-CM

## 2022-12-05 PROCEDURE — 99213 OFFICE O/P EST LOW 20 MIN: CPT | Mod: 95 | Performed by: STUDENT IN AN ORGANIZED HEALTH CARE EDUCATION/TRAINING PROGRAM

## 2022-12-05 ASSESSMENT — PAIN SCALES - GENERAL: PAINLEVEL: NO PAIN (0)

## 2022-12-05 NOTE — PROGRESS NOTES
ANTICOAGULATION MANAGEMENT     Billy Stock 77 year old male is on warfarin with supratherapeutic INR result. (Goal INR 2.0-3.0)    Recent labs: (last 7 days)     12/02/22  1545   INR 3.1*       ASSESSMENT     Source(s): Chart Review and Patient/Caregiver Call     Warfarin doses taken: Warfarin taken as instructed  Diet: Decreased greens/vitamin K in diet; plans to resume previous intake  New illness, injury, or hospitalization: No  Medication/supplement changes: None noted  Signs or symptoms of bleeding or clotting: No  Previous INR: Therapeutic last 2(+) visits  Additional findings: None       PLAN     Recommended plan for temporary change(s) affecting INR     Dosing Instructions: Continue your current warfarin dose with next INR in 2 weeks. Marek elected to schedule closer to 3 weeks when later appointment was available to align with his 's schedule    Summary  As of 12/2/2022    Full warfarin instructions:  5 mg every day; Starting 12/2/2022   Next INR check:  12/22/2022             Telephone call with Marek who verbalizes understanding and agrees to plan    Lab visit scheduled    Education provided:   Dietary considerations: importance of consistent vitamin K intake    Plan made per [facility] anticoagulation protocol    Ruth Galeas RN  Anticoagulation Clinic  12/5/2022    _______________________________________________________________________     Anticoagulation Episode Summary     Current INR goal:  2.0-3.0   TTR:  59.7 % (11 mo)   Target end date:  Indefinite   [facility]    Indications    Long term current use of anticoagulants with INR goal of 2.0-3.0 [Z79.01]  Chronic atrial fibrillation (H) [I48.20]           Comments:  4/14/2020-approved for 8 week INR checks         Anticoagulation Care Providers     Provider Role Specialty Phone number    Malcolm Schafer MD Referring Internal Medicine [phone]

## 2022-12-05 NOTE — LETTER
12/5/2022       RE: Billy Stock  [address]  [place] [address]     Dear Colleague,    Thank you for referring your patient, Billy Stock, to the [facility] at [facility]. Please see a copy of my visit note below.    Marek is a 77 year old who is being evaluated via a billable video visit.      How would you like to obtain your AVS? Mail a copy  If the video visit is dropped, the invitation should be resent by: Text to cell phone: [phone]  Will anyone else be joining your video visit? No    CHIEF COMPLAINT   Billy Stock who is a 77 year old male returns today for follow-up of prostate cancer s/p RRP 2002 with PSM, adjuvant radiation, on intermittent ADT for biochemical recurrence.      HPI   Billy Stock is a 77 year old male who presents with a history of prostate cancer s/p RRP 2002 with PSM, adjuvant radiation, on intermittent ADT for biochemical recurrence.      Last seen 5/25/2022. At that time he had recently broken his hip in March 2022. His PSA was undetectable at <0.04 ng/ml so I recommended holding off on further ADT given the bone fracture.    He is still walking with a cane and is having difficulty with walking, needs a cane.    He was subsequently diagnosed with severe osteoporosis on DEXA 6/2022. He was treated with alendronate as well as calcium/vit d.    PHYSICAL EXAM  Patient is a 77 year old  Male  General Appearance Adult:   Aler  Lungs: no respiratory distres  Neuro: Alert, oriented, speech and mentation normal  Psych: affect and mood normal        Component PSA PSA Diag Urologic Phys   Latest Ref Rng & Units 0.00 - 4.00 ug/L 0.00 - 4.00 ng/mL   1/12/2015 0.09    2/25/2016 0.23    2/15/2017  0.44   8/16/2017  1.30   2/19/2018  2.00   9/5/2018  2.50   3/5/2019  3.30   9/5/2019  0.12   1/13/2021 6.34 (H)    4/8/2021  7.20 (H)   10/11/2021 0.05    5/25/2022 <0.04    12/2/2022       Component PSA  Tumor Marker   Latest Ref Rng & Units 0.00 - 6.50 ng/mL   1/12/2015    2/25/2016    2/15/2017    8/16/2017    2/19/2018    9/5/2018    3/5/2019    9/5/2019    1/13/2021    4/8/2021    10/11/2021    5/25/2022    12/2/2022 1.07       ASSESSMENT and PLAN  77 year old male who presents with a history of prostate cancer s/p RRP 2002 with PSM, adjuvant radiation, on intermittent ADT for biochemical recurrence.  His PSA is now 1.07, up from undetectable in May 2022. He last received a 6 month Lupron injection 10/11/2021.    For now will defer restarting ADT given his severe osteoporosis and hip fracture. He is planning on relocating to [place] in January 2023. He should reestablish with a urologist when he relocates, with a repeat PSA prior to the visit    Ángel Guthrie MD   [facility] Urology  [facility] Phone: [phone]      Video-Visit Details (phone call instead of video visit due to technical issues)    Video Start Time: 2:30pm    Type of service:  Video Visit    Video End Time:2:40 PM    Originating Location (pt. Location): Home        Distant Location (provider location):  On-site    Platform used for Video Visit: Unable to complete video visit, phone call only

## 2022-12-05 NOTE — PROGRESS NOTES
Marek is a 77 year old who is being evaluated via a billable video visit.      How would you like to obtain your AVS? Mail a copy  If the video visit is dropped, the invitation should be resent by: Text to cell phone: [phone]  Will anyone else be joining your video visit? No    CHIEF COMPLAINT   Billy Stock who is a 77 year old male returns today for follow-up of prostate cancer s/p RRP 2002 with PSM, adjuvant radiation, on intermittent ADT for biochemical recurrence.      HPI   Billy Stock is a 77 year old male who presents with a history of prostate cancer s/p RRP 2002 with PSM, adjuvant radiation, on intermittent ADT for biochemical recurrence.      Last seen 5/25/2022. At that time he had recently broken his hip in March 2022. His PSA was undetectable at <0.04 ng/ml so I recommended holding off on further ADT given the bone fracture.    He is still walking with a cane and is having difficulty with walking, needs a cane.    He was subsequently diagnosed with severe osteoporosis on DEXA 6/2022. He was treated with alendronate as well as calcium/vit d.    PHYSICAL EXAM  Patient is a 77 year old  Male  General Appearance Adult:   Aler  Lungs: no respiratory distres  Neuro: Alert, oriented, speech and mentation normal  Psych: affect and mood normal        Component PSA PSA Diag Urologic Phys   Latest Ref Rng & Units 0.00 - 4.00 ug/L 0.00 - 4.00 ng/mL   1/12/2015 0.09    2/25/2016 0.23    2/15/2017  0.44   8/16/2017  1.30   2/19/2018  2.00   9/5/2018  2.50   3/5/2019  3.30   9/5/2019  0.12   1/13/2021 6.34 (H)    4/8/2021  7.20 (H)   10/11/2021 0.05    5/25/2022 <0.04    12/2/2022       Component PSA Tumor Marker   Latest Ref Rng & Units 0.00 - 6.50 ng/mL   1/12/2015    2/25/2016    2/15/2017    8/16/2017    2/19/2018    9/5/2018    3/5/2019    9/5/2019    1/13/2021    4/8/2021    10/11/2021    5/25/2022    12/2/2022 1.07       ASSESSMENT and PLAN  77 year old male who presents with a history of prostate  cancer s/p RRP 2002 with PSM, adjuvant radiation, on intermittent ADT for biochemical recurrence.  His PSA is now 1.07, up from undetectable in May 2022. He last received a 6 month Lupron injection 10/11/2021.    For now will defer restarting ADT given his severe osteoporosis and hip fracture. He is planning on relocating to [place] in January 2023. He should reestablish with a urologist when he relocates, with a repeat PSA prior to the visit    Ángel Guthrie MD   [facility]  [facility] Phone: [phone]      Video-Visit Details (phone call instead of video visit due to technical issues)    Video Start Time: 2:30pm    Type of service:  Video Visit    Video End Time:2:40 PM    Originating Location (pt. Location): Home        Distant Location (provider location):  On-site    Platform used for Video Visit: Unable to complete video visit, phone call only

## 2022-12-21 ENCOUNTER — DOCUMENTATION ONLY (OUTPATIENT)
Dept: ANTICOAGULATION | Facility: CLINIC | Age: 77
End: 2022-12-21

## 2022-12-21 DIAGNOSIS — I48.20 CHRONIC ATRIAL FIBRILLATION (H): Primary | ICD-10-CM

## 2022-12-21 NOTE — PROGRESS NOTES
ANTICOAGULATION CLINIC REFERRAL RENEWAL REQUEST       An annual renewal order is required for all patients referred to [facility].?  Please review and sign the pended referral order for Billy Montrell.       ANTICOAGULATION SUMMARY      Warfarin indication(s)   Atrial Fibrillation    Mechanical heart valve present?  NO       Current goal range   INR: 2.0-3.0     Goal appropriate for indication? Goal INR 2-3, standard for indication(s) above     Time in Therapeutic Range (TTR)  (Goal > 60%) 59.7%       Office visit with referring provider's group within last year yes on 10/6/22       Samantha Galeas RN  [facility]

## 2022-12-23 ENCOUNTER — TELEPHONE (OUTPATIENT)
Dept: ANTICOAGULATION | Facility: CLINIC | Age: 77
End: 2022-12-23

## 2022-12-23 ENCOUNTER — LAB (OUTPATIENT)
Dept: LAB | Facility: CLINIC | Age: 77
End: 2022-12-23
Payer: COMMERCIAL

## 2022-12-23 ENCOUNTER — ANTICOAGULATION THERAPY VISIT (OUTPATIENT)
Dept: ANTICOAGULATION | Facility: CLINIC | Age: 77
End: 2022-12-23

## 2022-12-23 DIAGNOSIS — I48.20 CHRONIC ATRIAL FIBRILLATION (H): ICD-10-CM

## 2022-12-23 DIAGNOSIS — Z79.01 LONG TERM CURRENT USE OF ANTICOAGULANTS WITH INR GOAL OF 2.0-3.0: Primary | ICD-10-CM

## 2022-12-23 LAB — INR BLD: 2.5 (ref 0.9–1.1)

## 2022-12-23 PROCEDURE — 85610 PROTHROMBIN TIME: CPT

## 2022-12-23 PROCEDURE — 36416 COLLJ CAPILLARY BLOOD SPEC: CPT

## 2022-12-23 NOTE — PROGRESS NOTES
ANTICOAGULATION MANAGEMENT     Billy Stock 77 year old male is on warfarin with therapeutic INR result. (Goal INR 2.0-3.0)    Recent labs: (last 7 days)     12/23/22  1600   INR 2.5*       ASSESSMENT     Source(s): Chart Review and Patient/Caregiver Call     Warfarin doses taken: Warfarin taken as instructed  Diet: No new diet changes identified  New illness, injury, or hospitalization: No  Medication/supplement changes: None noted  Signs or symptoms of bleeding or clotting: No  Previous INR: Supratherapeutic  Additional findings: Marek plans to move to [place] the second week of January. Scheduled one more INR visit prior to leaving. He will look into who his PCP might be and update [facility] with any findings.       PLAN     Recommended plan for no diet, medication or health factor changes affecting INR     Dosing Instructions: Continue your current warfarin dose with next INR in 2 weeks       Summary  As of 12/23/2022    Full warfarin instructions:  5 mg every day   Next INR check:  1/20/2023             Telephone call with Marek who verbalizes understanding and agrees to plan    Lab visit scheduled    Education provided:   Please call back if any changes to your diet, medications or how you've been taking warfarin    Plan made per [facility] anticoagulation protocol    Ruth Galeas RN  Anticoagulation Clinic  12/23/2022    _______________________________________________________________________     Anticoagulation Episode Summary     Current INR goal:  2.0-3.0   TTR:  64.4 % (11.1 mo)   Target end date:  Indefinite   Send INR reminders to:  [facility]    Indications    Long term current use of anticoagulants with INR goal of 2.0-3.0 [Z79.01]  Chronic atrial fibrillation (H) [I48.20]           Comments:  4/14/2020-approved for 8 week INR checks         Anticoagulation Care Providers     Provider Role Specialty Phone number    Malcolm Schafer MD Referring Internal Medicine [phone]

## 2022-12-23 NOTE — TELEPHONE ENCOUNTER
ANTICOAGULATION     Billydeepa Stock is overdue for INR check.      Left message for patient to call and schedule lab appointment as soon as possible. If returning call, please schedule.     Samantha Galeas RN

## 2023-01-05 ENCOUNTER — TELEPHONE (OUTPATIENT)
Dept: INTERNAL MEDICINE | Facility: CLINIC | Age: 78
End: 2023-01-05

## 2023-01-05 ENCOUNTER — ANTICOAGULATION THERAPY VISIT (OUTPATIENT)
Dept: ANTICOAGULATION | Facility: CLINIC | Age: 78
End: 2023-01-05

## 2023-01-05 ENCOUNTER — LAB (OUTPATIENT)
Dept: LAB | Facility: CLINIC | Age: 78
End: 2023-01-05
Payer: COMMERCIAL

## 2023-01-05 DIAGNOSIS — I48.20 CHRONIC ATRIAL FIBRILLATION (H): ICD-10-CM

## 2023-01-05 DIAGNOSIS — Z79.01 LONG TERM CURRENT USE OF ANTICOAGULANTS WITH INR GOAL OF 2.0-3.0: Primary | ICD-10-CM

## 2023-01-05 LAB — INR BLD: 2.5 (ref 0.9–1.1)

## 2023-01-05 PROCEDURE — 36415 COLL VENOUS BLD VENIPUNCTURE: CPT

## 2023-01-05 PROCEDURE — 85610 PROTHROMBIN TIME: CPT

## 2023-01-05 NOTE — PROGRESS NOTES
ANTICOAGULATION MANAGEMENT     Billy Stock 77 year old male is on warfarin with therapeutic INR result. (Goal INR 2.0-3.0)    Recent labs: (last 7 days)     01/05/23  1624   INR 2.5*       ASSESSMENT     Source(s): Chart Review  Previous INR was Therapeutic last visit; previously outside of goal range  Medication, diet, health changes since last INR chart reviewed; none identified   Is patient still moving to [place]?           PLAN     Unable to reach Marek today.    Left message to continue current dose of warfarin 5 mg tonight. Request call back for assessment. Left message to call [phone].         Follow up required to confirm warfarin dose taken and assess for changes and Discuss patient moving to [place].    Samantha Galeas RN  Anticoagulation Clinic  1/5/2023

## 2023-01-05 NOTE — PROGRESS NOTES
ANTICOAGULATION MANAGEMENT     Billy Stock 77 year old male is on warfarin with therapeutic INR result. (Goal INR 2.0-3.0)    Recent labs: (last 7 days)     01/05/23  1624   INR 2.5*       ASSESSMENT     Source(s): Chart Review and Patient/Caregiver Call     Warfarin doses taken: Warfarin taken as instructed  Diet: No new diet changes identified  New illness, injury, or hospitalization: No  Medication/supplement changes: None noted  Signs or symptoms of bleeding or clotting: No  Previous INR: Therapeutic last visit; previously outside of goal range  Additional findings: Patient will be moving to [place] 1/8/23       PLAN     Recommended plan for no diet, medication or health factor changes affecting INR     Dosing Instructions: Continue your current warfarin dose with next INR in 3 weeks       Summary  As of 1/5/2023    Full warfarin instructions:  5 mg every day   Next INR check:  1/26/2023             Telephone call with Marek who verbalizes understanding and agrees to plan    Patient is moving to [place] 1/8/23, states 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   Call the INR Clinic if you are not able to get an appointment with your provider to establish care by 1/26/23.    Plan made per [facility] anticoagulation protocol    Samantha Galeas RN  Anticoagulation Clinic  1/5/2023    _______________________________________________________________________     Anticoagulation Episode Summary     Current INR goal:  2.0-3.0   TTR:  68.3 % (11.1 mo)   Target end date:  Indefinite   Send INR reminders to:  [facility]    Indications    Long term current use of anticoagulants with INR goal of 2.0-3.0 [Z79.01]  Chronic atrial fibrillation (H) [I48.20]           Comments:  4/14/2020-approved for 8 week INR  checks         Anticoagulation Care Providers     Provider Role Specialty Phone number    Malcolm Schafer MD Referring Internal Medicine [phone]

## 2023-01-05 NOTE — TELEPHONE ENCOUNTER
Returned call to patient, see 1/5/23 Anticoagulation encounter for warfarin dosing details.  Samantha Galeas RN, BSN  Anticoagulation Clinic

## 2023-01-05 NOTE — TELEPHONE ENCOUNTER
Reason for Call:  Other returning call    Detailed comments: Patient returning call from Ruth INR Nurse. He said he tried to reach his phone but could not get it so he is calling back.  I do not see number for her in the last note. I did try a phone number she left in the note before, but it did not work. Can Ruth please call patient back? Thank you!    Phone Number Patient can be reached at: [phone]    Best Time: ASAP    Can we leave a detailed message on this number? Please try to reach patient. He is calling back, but yes, you can leave a detailed message. Thank you!    Call taken on 1/5/2023 at 5:16 PM by Nakia Alvarado

## 2023-01-25 ENCOUNTER — ANTICOAGULATION THERAPY VISIT (OUTPATIENT)
Dept: ANTICOAGULATION | Facility: CLINIC | Age: 78
End: 2023-01-25
Payer: COMMERCIAL

## 2023-01-25 ENCOUNTER — TELEPHONE (OUTPATIENT)
Dept: INTERNAL MEDICINE | Facility: CLINIC | Age: 78
End: 2023-01-25
Payer: COMMERCIAL

## 2023-01-25 DIAGNOSIS — Z79.01 LONG TERM CURRENT USE OF ANTICOAGULANTS WITH INR GOAL OF 2.0-3.0: Primary | ICD-10-CM

## 2023-01-25 DIAGNOSIS — I48.20 CHRONIC ATRIAL FIBRILLATION (H): ICD-10-CM

## 2023-01-25 NOTE — TELEPHONE ENCOUNTER
Reason for Call:  Other _Requesting a call back.    Detailed comments: Pt is moving clinics and had questions about transition and how he will report INR results.     Phone Number Patient can be reached at: Cell number on file:    Telephone Information:   Mobile [phone]       Best Time:  8 am to 5pm    Can we leave a detailed message on this number? YES    Call taken on 1/25/2023 at 4:43 PM by Elle Yin

## 2023-01-25 NOTE — TELEPHONE ENCOUNTER
Patient has moved to [place], has been seen by a provider there and warfarin was dosed.  Patient advised that [facility] does not need to have the INR reported to us. Will resolve Anticoagulation Episode.  Samantha Galeas RN, BSN  Anticoagulation Clinic

## 2023-01-25 NOTE — PROGRESS NOTES
ANTICOAGULATION  MANAGEMENT    Billy Stock is being discharged from the [facility] ([facility]).    Reason for discharge: Patient moved to [place]    Anticoagulation episode resolved, [facility] referral closed and Standing order discontinued    If patient needs warfarin management in the future, please send a new referral    Samantha Galeas RN

## 2023-02-03 ENCOUNTER — TELEPHONE (OUTPATIENT)
Dept: INTERNAL MEDICINE | Facility: CLINIC | Age: 78
End: 2023-02-03
Payer: COMMERCIAL

## 2023-02-03 NOTE — LETTER
[facility]  [address]  [address]  [facility] [id number] [phone]        February 3, 2023  Billy Stock  [place] [address]    Dear Billy,    I care about your health and have reviewed your health plan. I have reviewed your medical conditions, medication list, and lab results and am making recommendations based on this review, to better manage your health.    You are in particular need of attention regarding:  -High Blood Pressure  -Wellness (Physical) Visit     I am recommending that you:  -schedule a WELLNESS (Physical) APPOINTMENT with me.   I will check fasting labs the same day - nothing to eat except water and meds for 8-10 hours prior.    Here is a list of Health Maintenance topics that are due now or due soon:  Health Maintenance Due   Topic Date Due     ZOSTER IMMUNIZATION (2 of 3) 12/26/2013     MEDICARE ANNUAL WELLNESS VISIT  01/13/2022     DTAP/TDAP/TD IMMUNIZATION (3 - Td or Tdap) 04/02/2022     PHQ-2 (once per calendar year)  01/01/2023       Please call us at [phone] (or use [employer]) to address the above recommendations.     Thank you for trusting [facility] and we appreciate the opportunity to serve you.  We look forward to supporting your healthcare needs in the future.    Healthy Regards,    Malcolm Schafer MD    les

## 2023-02-03 NOTE — TELEPHONE ENCOUNTER
Patient Quality Outreach    Patient is due for the following:   Hypertension -  Hypertension follow-up visit  Physical Annual Wellness Visit    Next Steps:   Schedule a Annual Wellness Visit    Type of outreach:    Sent letter.      Questions for provider review:         Stephanie Wei MA

## 2023-05-07 DIAGNOSIS — Z79.01 LONG TERM CURRENT USE OF ANTICOAGULANTS WITH INR GOAL OF 2.0-3.0: ICD-10-CM

## 2023-05-08 DIAGNOSIS — Z79.01 LONG TERM CURRENT USE OF ANTICOAGULANTS WITH INR GOAL OF 2.0-3.0: ICD-10-CM

## 2023-05-08 DIAGNOSIS — I48.20 CHRONIC ATRIAL FIBRILLATION (H): Primary | ICD-10-CM

## 2023-05-08 RX ORDER — WARFARIN SODIUM 5 MG/1
TABLET ORAL
Qty: 90 TABLET | Refills: 0 | OUTPATIENT
Start: 2023-05-08

## 2023-05-08 NOTE — TELEPHONE ENCOUNTER
Rx denied, patient moved to [place].     Ella Moser RN   [facility]

## 2023-05-08 NOTE — TELEPHONE ENCOUNTER
Pending Prescriptions:                       Disp   Refills    warfarin ANTICOAGULANT (COUMADIN) 5 MG tab*90 tab*0        Sig: TAKE 1/2 (ONE-HALF) TABLET BY MOUTH EVERY MONDAY AND           EVERY THURSDAY AND 1 ALL OTHER DAYS

## 2023-05-09 DIAGNOSIS — I48.20 CHRONIC ATRIAL FIBRILLATION (H): ICD-10-CM

## 2023-05-09 RX ORDER — WARFARIN SODIUM 5 MG/1
TABLET ORAL
Qty: 1 TABLET | Refills: 0 | Status: SHIPPED | OUTPATIENT
Start: 2023-05-09

## 2023-05-09 NOTE — TELEPHONE ENCOUNTER
Warfarin refill denied, per 01/25/2023 [facility] encounter, patient moved to [place].    Samantha Richards RN  Anticoagulation Clinic

## 2023-05-10 RX ORDER — WARFARIN SODIUM 5 MG/1
TABLET ORAL
Qty: 1 TABLET | Refills: 0 | OUTPATIENT
Start: 2023-05-10

## 2023-05-10 NOTE — TELEPHONE ENCOUNTER
ANTICOAGULATION MANAGEMENT:  Medication Refill    Patient moved to [place] and is no longer under the care of [facility].  Rx for warfarin denied.    Marge Gale RN  Anticoagulation Clinic

## 2023-10-12 ENCOUNTER — MEDICAL CORRESPONDENCE (OUTPATIENT)
Dept: HEALTH INFORMATION MANAGEMENT | Facility: CLINIC | Age: 78
End: 2023-10-12
Payer: COMMERCIAL

## 2024-03-15 NOTE — TELEPHONE ENCOUNTER
If Wegovy/Semaglutide is covered-  please let me know when you /start so we can discuss next steps   Routing refill request to provider for review/approval because:  Blood pressure out of protocol range

## 2025-01-01 NOTE — PROGRESS NOTES
"    Assessment & Plan     Primary osteoarthritis of both knees  Improved symptoms.   Continue Tylenol   If flare up, consider steroid injection   Try voltaren gel                See Patient Instructions    No follow-ups on file.    Malcolm Schafer MD  [facility]    Kelsey Jara is a 75 year old who presents for the following health issues :left knee and ankle edema    HPI       Presents for follow up on left knee and ankle pain.   Seen in  a week ago.   Has symptoms of pain with ambulation , with bending of the knee, feels tight .   Treated Prednisone. Has h/o gout. Improved symptoms.   No pain at night.   After taking the prednisone symptoms are better.   His X rays showed knee OA changes.         Review of Systems   Constitutional, HEENT, cardiovascular, pulmonary, gi and gu systems are negative, except as otherwise noted.      Objective    /72   Pulse 82   Temp 98.1  F (36.7  C) (Oral)   Resp 16   Ht 1.854 m (6' 1\")   Wt 100.2 kg (221 lb)   SpO2 98%   BMI 29.16 kg/m    Body mass index is 29.16 kg/m .  Physical Exam   GENERAL: healthy, alert and no distress  MS: no gross musculoskeletal defects noted, no edema  Left knee mild edema , normal ROM, no pain with movements.   Left ankle normal ROM. No pain     Orders Only on 02/23/2021   Component Date Value Ref Range Status     INR 02/23/2021 3.70* 0.86 - 1.14 Final    Comment: This test is intended for monitoring Coumadin therapy.  Results are not   accurate in patients with prolonged INR due to factor deficiency.       Capillary Blood Collection 02/23/2021 Capillary collection performed   Final               " 60

## (undated) DEVICE — GLOVE PROTEXIS W/NEU-THERA 7.5  2D73TE75

## (undated) DEVICE — ESU GROUND PAD ADULT W/CORD E7507

## (undated) DEVICE — ENDO SNARE POLYPECTOMY OVAL 15MM LOOP SD-240U-15

## (undated) DEVICE — ENDO TRAP POLYP QUICK CATCH 710201

## (undated) DEVICE — PACK TOTAL HIP RIDGES LATEX PO15HIFSG

## (undated) DEVICE — KIT ENDO TURNOVER/PROCEDURE W/CLEAN A SCOPE LINERS 103888

## (undated) DEVICE — SU PDO 1 STRATAFIX 36X36CM CTX TAPERPOINT SXPD2B405

## (undated) DEVICE — HOOD FLYTE W/PEELAWAY 408-800-100

## (undated) DEVICE — DRAPE STERI TOWEL LG 1010

## (undated) DEVICE — SU ETHIBOND 5 V-37 4X30" MB66G

## (undated) DEVICE — GLOVE PROTEXIS BLUE W/NEU-THERA 7.5  2D73EB75

## (undated) DEVICE — BONE CLEANING TIP INTERPULSE FEMORAL CANAL 0210-008-000

## (undated) DEVICE — BAG CLEAR TRASH 1.3M 39X33" P4040C

## (undated) DEVICE — DEVICE RETRIEVER HEWSON 71111579

## (undated) DEVICE — DRSG TEGADERM 4X10" 1627

## (undated) DEVICE — SU MONOCRYL 4-0 PS-2 18" UND Y496G

## (undated) DEVICE — BRUSH FEMORAL CANAL 210-4 0210004000

## (undated) DEVICE — LINEN DRAPE 54X72" 5467

## (undated) DEVICE — SU VICRYL 2-0 CT-1 27" UND J259H

## (undated) DEVICE — PREP CHLORAPREP 26ML TINTED HI-LITE ORANGE 930815

## (undated) DEVICE — DRAPE CONVERTORS U-DRAPE 60X72" 8476

## (undated) DEVICE — DRAPE IOBAN INCISE 23X17" 6650EZ

## (undated) DEVICE — SET HANDPIECE INTERPULSE W/COAXIAL FAN SPRAY TIP 0210118000

## (undated) DEVICE — SU MONOCRYL 2-0 CT-1 36" UND Y945H

## (undated) DEVICE — LINEN HALF SHEET 5512

## (undated) DEVICE — SUCTION MANIFOLD NEPTUNE 2 SYS 4 PORT 0702-020-000

## (undated) DEVICE — SPONGE LAP 18X18" X8435

## (undated) DEVICE — SU ETHIBOND 5 LRDA 30" B499T

## (undated) DEVICE — DRAPE SPLIT SHEET 77X108 REINFORCED 29436

## (undated) DEVICE — SU VICRYL 0 CT-1 27" J340H

## (undated) DEVICE — SU VICRYL+ 1 MO-4 18" DYED VCP702D

## (undated) DEVICE — LINEN FULL SHEET 5511

## (undated) DEVICE — BLADE SAW SAGITTAL STRK 25X90X1.27MM HD SYS 6 6125-127-090

## (undated) DEVICE — DRSG AQUACEL AG 3.5X12" HYDROFIBER 420670

## (undated) DEVICE — LINEN ORTHO ACL PACK 5447

## (undated) DEVICE — SOL NACL 0.9% IRRIG 3000ML BAG 2B7477

## (undated) RX ORDER — LIDOCAINE HYDROCHLORIDE 10 MG/ML
INJECTION, SOLUTION EPIDURAL; INFILTRATION; INTRACAUDAL; PERINEURAL
Status: DISPENSED
Start: 2022-03-08

## (undated) RX ORDER — FENTANYL CITRATE 50 UG/ML
INJECTION, SOLUTION INTRAMUSCULAR; INTRAVENOUS
Status: DISPENSED
Start: 2022-03-08

## (undated) RX ORDER — GLYCOPYRROLATE 0.2 MG/ML
INJECTION INTRAMUSCULAR; INTRAVENOUS
Status: DISPENSED
Start: 2022-03-08

## (undated) RX ORDER — ONDANSETRON 2 MG/ML
INJECTION INTRAMUSCULAR; INTRAVENOUS
Status: DISPENSED
Start: 2022-03-08

## (undated) RX ORDER — TRANEXAMIC ACID 10 MG/ML
INJECTION, SOLUTION INTRAVENOUS
Status: DISPENSED
Start: 2022-03-08

## (undated) RX ORDER — PROPOFOL 10 MG/ML
INJECTION, EMULSION INTRAVENOUS
Status: DISPENSED
Start: 2022-03-08

## (undated) RX ORDER — NEOSTIGMINE METHYLSULFATE 1 MG/ML
VIAL (ML) INJECTION
Status: DISPENSED
Start: 2022-03-08

## (undated) RX ORDER — DEXAMETHASONE SODIUM PHOSPHATE 4 MG/ML
INJECTION, SOLUTION INTRA-ARTICULAR; INTRALESIONAL; INTRAMUSCULAR; INTRAVENOUS; SOFT TISSUE
Status: DISPENSED
Start: 2022-03-08

## (undated) RX ORDER — LABETALOL 20 MG/4 ML (5 MG/ML) INTRAVENOUS SYRINGE
Status: DISPENSED
Start: 2022-03-08

## (undated) RX ORDER — FENTANYL CITRATE 50 UG/ML
INJECTION, SOLUTION INTRAMUSCULAR; INTRAVENOUS
Status: DISPENSED
Start: 2019-09-09

## (undated) RX ORDER — CEFAZOLIN SODIUM/WATER 2 G/20 ML
SYRINGE (ML) INTRAVENOUS
Status: DISPENSED
Start: 2022-03-08